# Patient Record
Sex: MALE | Race: WHITE | NOT HISPANIC OR LATINO | Employment: OTHER | URBAN - METROPOLITAN AREA
[De-identification: names, ages, dates, MRNs, and addresses within clinical notes are randomized per-mention and may not be internally consistent; named-entity substitution may affect disease eponyms.]

---

## 2020-10-07 ENCOUNTER — TELEPHONE (OUTPATIENT)
Dept: ENDOCRINOLOGY | Facility: CLINIC | Age: 65
End: 2020-10-07

## 2020-10-07 DIAGNOSIS — E11.9 TYPE 2 DIABETES MELLITUS WITHOUT COMPLICATION, WITH LONG-TERM CURRENT USE OF INSULIN (HCC): Primary | ICD-10-CM

## 2020-10-07 DIAGNOSIS — Z79.4 TYPE 2 DIABETES MELLITUS WITHOUT COMPLICATION, WITH LONG-TERM CURRENT USE OF INSULIN (HCC): Primary | ICD-10-CM

## 2020-10-07 DIAGNOSIS — E78.01 FAMILIAL HYPERCHOLESTEREMIA: ICD-10-CM

## 2020-10-07 NOTE — TELEPHONE ENCOUNTER
PLEASE CALL IN FOR PT HIS SYRINGES AND HIS INSULIN AND BP MED AND WHATEVER HE TAKES  [facility] [address]

## 2020-10-08 ENCOUNTER — TELEPHONE (OUTPATIENT)
Dept: ENDOCRINOLOGY | Facility: CLINIC | Age: 65
End: 2020-10-08

## 2020-10-08 RX ORDER — PEN NEEDLE, DIABETIC 32GX 5/32"
NEEDLE, DISPOSABLE MISCELLANEOUS
Qty: 100 EACH | Refills: 0 | Status: SHIPPED | OUTPATIENT
Start: 2020-10-08 | End: 2021-04-23 | Stop reason: SDUPTHER

## 2020-10-08 RX ORDER — ROSUVASTATIN CALCIUM 20 MG/1
20 TABLET, COATED ORAL DAILY
Qty: 30 TABLET | Refills: 0 | Status: SHIPPED | OUTPATIENT
Start: 2020-10-08 | End: 2020-11-13 | Stop reason: SDUPTHER

## 2020-10-08 RX ORDER — LISINOPRIL 5 MG/1
5 TABLET ORAL DAILY
Qty: 30 TABLET | Refills: 0 | Status: SHIPPED | OUTPATIENT
Start: 2020-10-08 | End: 2020-11-13 | Stop reason: SDUPTHER

## 2020-10-09 ENCOUNTER — TELEPHONE (OUTPATIENT)
Dept: ENDOCRINOLOGY | Facility: CLINIC | Age: 65
End: 2020-10-09

## 2020-10-09 DIAGNOSIS — E11.9 TYPE 2 DIABETES MELLITUS WITHOUT COMPLICATION, WITH LONG-TERM CURRENT USE OF INSULIN (HCC): Primary | ICD-10-CM

## 2020-10-09 DIAGNOSIS — Z79.4 TYPE 2 DIABETES MELLITUS WITHOUT COMPLICATION, WITH LONG-TERM CURRENT USE OF INSULIN (HCC): Primary | ICD-10-CM

## 2020-10-09 NOTE — TELEPHONE ENCOUNTER
Pharmacy has a question about a insulin glargine prescription came over as generic this only comes as brand name. Pharmacy notice this was for quick pen and notice the patient was getting vials are you switching the patient to quick pen or vials? Please contact pharmacy

## 2020-11-13 ENCOUNTER — OFFICE VISIT (OUTPATIENT)
Dept: ENDOCRINOLOGY | Facility: CLINIC | Age: 65
End: 2020-11-13

## 2020-11-13 VITALS
WEIGHT: 162.2 LBS | HEIGHT: 66 IN | OXYGEN SATURATION: 97 % | DIASTOLIC BLOOD PRESSURE: 76 MMHG | BODY MASS INDEX: 26.07 KG/M2 | HEART RATE: 62 BPM | TEMPERATURE: 98.4 F | SYSTOLIC BLOOD PRESSURE: 134 MMHG

## 2020-11-13 DIAGNOSIS — E78.5 DYSLIPIDEMIA: ICD-10-CM

## 2020-11-13 DIAGNOSIS — Z79.4 TYPE 2 DIABETES MELLITUS WITHOUT COMPLICATION, WITH LONG-TERM CURRENT USE OF INSULIN (HCC): ICD-10-CM

## 2020-11-13 DIAGNOSIS — IMO0002 DIABETES MELLITUS TYPE 2, UNCONTROLLED, WITH COMPLICATIONS: Primary | ICD-10-CM

## 2020-11-13 DIAGNOSIS — E11.9 TYPE 2 DIABETES MELLITUS WITHOUT COMPLICATION, WITH LONG-TERM CURRENT USE OF INSULIN (HCC): ICD-10-CM

## 2020-11-13 DIAGNOSIS — E78.01 FAMILIAL HYPERCHOLESTEREMIA: ICD-10-CM

## 2020-11-13 DIAGNOSIS — I10 ESSENTIAL HYPERTENSION: ICD-10-CM

## 2020-11-13 LAB
EXPIRATION DATE: NORMAL
HBA1C MFR BLD: 6.3 %
Lab: NORMAL

## 2020-11-13 PROCEDURE — 99213 OFFICE O/P EST LOW 20 MIN: CPT | Performed by: INTERNAL MEDICINE

## 2020-11-13 PROCEDURE — 83036 HEMOGLOBIN GLYCOSYLATED A1C: CPT | Performed by: INTERNAL MEDICINE

## 2020-11-13 RX ORDER — INSULIN GLARGINE 100 [IU]/ML
60 INJECTION, SOLUTION SUBCUTANEOUS DAILY
COMMUNITY
End: 2021-03-30 | Stop reason: SDUPTHER

## 2020-11-13 RX ORDER — LISINOPRIL 5 MG/1
5 TABLET ORAL DAILY
Qty: 30 TABLET | Refills: 0 | Status: SHIPPED | OUTPATIENT
Start: 2020-11-13 | End: 2022-12-13

## 2020-11-13 RX ORDER — IBUPROFEN 200 MG
TABLET ORAL
Qty: 100 EACH | Refills: 3 | Status: SHIPPED | OUTPATIENT
Start: 2020-11-13 | End: 2021-04-26 | Stop reason: SDUPTHER

## 2020-11-13 RX ORDER — ROSUVASTATIN CALCIUM 20 MG/1
20 TABLET, COATED ORAL DAILY
Qty: 30 TABLET | Refills: 0 | Status: SHIPPED | OUTPATIENT
Start: 2020-11-13 | End: 2021-04-27 | Stop reason: SDUPTHER

## 2020-11-13 NOTE — PROGRESS NOTES
"     Office Note      Date: 2020  Patient Name: Herbert Harley  MRN: [id number]  : 1955    Chief Complaint   Patient presents with   • Diabetes       History of Present Illness:   Herbert Harley is a 65 y.o. male who presents for Diabetes type 2. Diagnosed in   Severity: easily controlled  Complications: no retinopathy. Has symptoms of neuropathy in his feet. Has upcoming eye exam.  Foot exam is up to date.  Associated conditions  : htn and high cholesterol  Duration- 10 years    bg checks- seldom  Rarely low     Last A1c:  Hemoglobin A1C   Date Value Ref Range Status   2020 6.3 % Final       Changes in health since last visit: none . Last eye exam up to date .    Subjective          Review of Systems:   Review of Systems   Constitutional: Negative.    HENT: Negative.    Eyes: Negative.    Respiratory: Negative.    Cardiovascular: Negative.    Gastrointestinal: Negative.    Endocrine: Negative.    Genitourinary: Negative.    Musculoskeletal: Negative.    Skin: Negative.    Allergic/Immunologic: Negative.    Neurological: Negative.    Hematological: Negative.    Psychiatric/Behavioral: Negative.        The following portions of the patient's history were reviewed and updated as appropriate: allergies, current medications, past family history, past medical history, past social history, past surgical history and problem list.    Objective     Visit Vitals  /76 (BP Location: Left arm, Patient Position: Sitting, Cuff Size: Adult)   Pulse 62   Temp 98.4 °F (36.9 °C) (Infrared)   Ht 167.6 cm (66\")   Wt 73.6 kg (162 lb 3.2 oz)   SpO2 97%   BMI 26.18 kg/m²       Labs:    CMP  Lab Results   Component Value Date    GLUCOSE 106 (H) 2014    BUN 14 2014    CREATININE 0.9 2014    K 4.2 2014    CO2 26 2014    CALCIUM 8.9 2014    AST 78 (H) 2014    ALT 32 2014        CBC w/DIFF  Lab Results   Component Value Date    WBC 13.33 (H) 2014    RBC 5.26 " 03/29/2014    HGB 15.7 03/29/2014    HCT 46.7 03/29/2014    MCV 88.8 03/29/2014    MCH 29.8 03/29/2014    MCHC 33.6 03/29/2014     03/29/2014    NEUTRORELPCT 71.9 (H) 03/28/2014    LYMPHORELPCT 17.9 (L) 03/28/2014    MONORELPCT 8.3 03/28/2014    EOSRELPCT 1.3 03/28/2014    BASORELPCT 0.3 03/28/2014    NEUTROABS 10.95 (H) 03/28/2014    LYMPHSABS 2.72 03/28/2014    MONOSABS 1.26 (H) 03/28/2014    EOSABS 0.20 03/28/2014    BASOSABS 0.05 03/28/2014    NRBC 0.0 03/28/2014       Physical Exam:  Physical Exam  Vitals signs reviewed.   Constitutional:       Appearance: Normal appearance. He is normal weight.   HENT:      Head: Atraumatic.   Neck:      Musculoskeletal: Normal range of motion and neck supple.   Cardiovascular:      Rate and Rhythm: Normal rate and regular rhythm.   Neurological:      General: No focal deficit present.      Mental Status: He is alert. Mental status is at baseline.   Psychiatric:         Mood and Affect: Mood normal.         Thought Content: Thought content normal.         Judgment: Judgment normal.          Assessment / Plan      Assessment & Plan:  Problem List Items Addressed This Visit        Cardiovascular and Mediastinum    Hypertension    Current Assessment & Plan     Stable  And controlled . Refill meds .         Relevant Medications    carvedilol (COREG) 12.5 MG tablet    lisinopril (PRINIVIL,ZESTRIL) 5 MG tablet       Endocrine    Diabetes mellitus type 2, uncontrolled, with complications (CMS/[facility]) - Primary    Current Assessment & Plan     Diabetes is stable and well controlled without complications           Relevant Medications    insulin glargine (LANTUS) 100 UNIT/ML injection    Other Relevant Orders    POC Glycosylated Hemoglobin (Hb A1C) (Completed)       Other    Dyslipidemia    Current Assessment & Plan     Labs were good in spring. Will refill meds            Other Visit Diagnoses     Type 2 diabetes mellitus without complication, with long-term current use of insulin  "(CMS/[facility])        Relevant Medications    insulin glargine (LANTUS) 100 UNIT/ML injection    lisinopril (PRINIVIL,ZESTRIL) 5 MG tablet    Insulin Syringe 29G X 1/2\" 1 ML misc    Familial hypercholesteremia        Relevant Medications    rosuvastatin (Crestor) 20 MG tablet           Nima Peter MD   11/13/2020  "

## 2021-03-30 RX ORDER — INSULIN GLARGINE 100 [IU]/ML
60 INJECTION, SOLUTION SUBCUTANEOUS DAILY
Qty: 18 ML | Refills: 2 | Status: SHIPPED | OUTPATIENT
Start: 2021-03-30 | End: 2021-06-11 | Stop reason: SDUPTHER

## 2021-03-30 NOTE — TELEPHONE ENCOUNTER
[facility] CALLED REQUESTING A REFILL OF LANTUS TO BE SENT IN FOR THIS PT. THE PT IS OUT OF MEDICATION CURRENTLY. PLEASE SEND IN AT EARLIEST CONVENIENCE. THANK YOU

## 2021-04-23 DIAGNOSIS — E11.9 TYPE 2 DIABETES MELLITUS WITHOUT COMPLICATION, WITH LONG-TERM CURRENT USE OF INSULIN (HCC): ICD-10-CM

## 2021-04-23 DIAGNOSIS — Z79.4 TYPE 2 DIABETES MELLITUS WITHOUT COMPLICATION, WITH LONG-TERM CURRENT USE OF INSULIN (HCC): ICD-10-CM

## 2021-04-23 RX ORDER — PEN NEEDLE, DIABETIC 32GX 5/32"
NEEDLE, DISPOSABLE MISCELLANEOUS
Qty: 100 EACH | Refills: 0 | Status: SHIPPED | OUTPATIENT
Start: 2021-04-23 | End: 2021-06-11 | Stop reason: CLARIF

## 2021-04-26 DIAGNOSIS — Z79.4 TYPE 2 DIABETES MELLITUS WITHOUT COMPLICATION, WITH LONG-TERM CURRENT USE OF INSULIN (HCC): ICD-10-CM

## 2021-04-26 DIAGNOSIS — E11.9 TYPE 2 DIABETES MELLITUS WITHOUT COMPLICATION, WITH LONG-TERM CURRENT USE OF INSULIN (HCC): ICD-10-CM

## 2021-04-26 RX ORDER — IBUPROFEN 200 MG
TABLET ORAL
Qty: 100 EACH | Refills: 3 | Status: SHIPPED | OUTPATIENT
Start: 2021-04-26 | End: 2021-06-11 | Stop reason: SDUPTHER

## 2021-04-27 DIAGNOSIS — E78.01 FAMILIAL HYPERCHOLESTEREMIA: ICD-10-CM

## 2021-04-27 RX ORDER — ROSUVASTATIN CALCIUM 20 MG/1
20 TABLET, COATED ORAL DAILY
Qty: 90 TABLET | Refills: 1 | Status: SHIPPED | OUTPATIENT
Start: 2021-04-27 | End: 2021-12-23 | Stop reason: SDUPTHER

## 2021-06-11 ENCOUNTER — TELEPHONE (OUTPATIENT)
Dept: ENDOCRINOLOGY | Facility: CLINIC | Age: 66
End: 2021-06-11

## 2021-06-11 ENCOUNTER — OFFICE VISIT (OUTPATIENT)
Dept: ENDOCRINOLOGY | Facility: CLINIC | Age: 66
End: 2021-06-11

## 2021-06-11 VITALS
HEIGHT: 65 IN | DIASTOLIC BLOOD PRESSURE: 70 MMHG | BODY MASS INDEX: 27.72 KG/M2 | WEIGHT: 166.4 LBS | SYSTOLIC BLOOD PRESSURE: 150 MMHG

## 2021-06-11 DIAGNOSIS — E11.9 TYPE 2 DIABETES MELLITUS WITHOUT COMPLICATION, WITH LONG-TERM CURRENT USE OF INSULIN (HCC): Primary | ICD-10-CM

## 2021-06-11 DIAGNOSIS — Z79.4 TYPE 2 DIABETES MELLITUS WITHOUT COMPLICATION, WITH LONG-TERM CURRENT USE OF INSULIN (HCC): Primary | ICD-10-CM

## 2021-06-11 LAB
EXPIRATION DATE: ABNORMAL
EXPIRATION DATE: NORMAL
GLUCOSE BLDC GLUCOMTR-MCNC: 136 MG/DL (ref 70–130)
HBA1C MFR BLD: 6.9 %
Lab: ABNORMAL
Lab: NORMAL

## 2021-06-11 PROCEDURE — 82947 ASSAY GLUCOSE BLOOD QUANT: CPT | Performed by: INTERNAL MEDICINE

## 2021-06-11 PROCEDURE — 83036 HEMOGLOBIN GLYCOSYLATED A1C: CPT | Performed by: INTERNAL MEDICINE

## 2021-06-11 PROCEDURE — 99213 OFFICE O/P EST LOW 20 MIN: CPT | Performed by: INTERNAL MEDICINE

## 2021-06-11 RX ORDER — INSULIN GLARGINE 100 [IU]/ML
60 INJECTION, SOLUTION SUBCUTANEOUS DAILY
Qty: 18 ML | Refills: 2 | Status: SHIPPED | OUTPATIENT
Start: 2021-06-11 | End: 2021-09-13

## 2021-06-11 RX ORDER — IBUPROFEN 200 MG
TABLET ORAL
Qty: 100 EACH | Refills: 3 | Status: SHIPPED | OUTPATIENT
Start: 2021-06-11 | End: 2022-06-16 | Stop reason: SDUPTHER

## 2021-06-11 NOTE — PROGRESS NOTES
"     Office Note      Date: 2021  Patient Name: Herbert Harley  MRN: [id number]  : 1955    Chief Complaint   Patient presents with   • Follow-up   • Diabetes     type2    • Diabetic Eye Exam     week ago        History of Present Illness:   Herbert Harley is a 65 y.o. male who presents for Diabetes - type 2   Last A1c:6.3    bg is checked sometimes  He takes one shot oer shannan  He has occasion low spells in the am.  Hemoglobin A1C   Date Value Ref Range Status   2021 6.9 % Final       Changes in health since last visit: none . Last eye exam 5 days ago  Feet- no open wounds  Fasting labs are due .    Subjective              Review of Systems:   Review of Systems   Constitutional: Negative.    HENT: Negative.    Eyes: Negative.    Respiratory: Negative.    All other systems reviewed and are negative.      The following portions of the patient's history were reviewed and updated as appropriate: allergies, current medications, past family history, past medical history, past social history, past surgical history and problem list.    Objective     Visit Vitals  /70   Ht 165.1 cm (65\")   Wt 75.5 kg (166 lb 6.4 oz)   BMI 27.69 kg/m²       Labs:    CMP  Lab Results   Component Value Date    GLUCOSE 106 (H) 2014    BUN 14 2014    CREATININE 0.9 2014    K 4.2 2014    CO2 26 2014    CALCIUM 8.9 2014    AST 78 (H) 2014    ALT 32 2014        CBC w/DIFF  Lab Results   Component Value Date    WBC 13.33 (H) 2014    RBC 5.26 2014    HGB 15.7 2014    HCT 46.7 2014    MCV 88.8 2014    MCH 29.8 2014    MCHC 33.6 2014     2014    NEUTRORELPCT 71.9 (H) 2014    LYMPHORELPCT 17.9 (L) 2014    MONORELPCT 8.3 2014    EOSRELPCT 1.3 2014    BASORELPCT 0.3 2014    NEUTROABS 10.95 (H) 2014    LYMPHSABS 2.72 2014    MONOSABS 1.26 (H) 2014    EOSABS 0.20 2014    KRISTIE " "0.05 03/28/2014    [facility] 0.0 03/28/2014       Physical Exam:  Physical Exam  Vitals reviewed.   Constitutional:       Appearance: Normal appearance.   Neurological:      Mental Status: He is alert and oriented to person, place, and time.   Psychiatric:         Mood and Affect: Mood normal.         Thought Content: Thought content normal.         Judgment: Judgment normal.          Assessment / Plan      Assessment & Plan:  Problem List Items Addressed This Visit        Other    Type 2 diabetes mellitus without complication, with long-term current use of insulin (CMS/[facility]) - Primary    Current Assessment & Plan     At goal. No changes.          Relevant Medications    lisinopril (PRINIVIL,ZESTRIL) 5 MG tablet    Insulin Syringe 29G X 1/2\" 1 ML misc    Other Relevant Orders    POC Glycosylated Hemoglobin (Hb A1C) (Completed)    POC Glucose, Blood (Completed)           Nima Peter MD   06/11/2021  "

## 2021-06-11 NOTE — TELEPHONE ENCOUNTER
[facility] called and wanted to know if on the syringes, if they could change the gauge to 31, everything else to stay the same. Please advise.

## 2021-09-13 RX ORDER — INSULIN GLARGINE 100 [IU]/ML
INJECTION, SOLUTION SUBCUTANEOUS
Qty: 30 ML | Refills: 1 | Status: SHIPPED | OUTPATIENT
Start: 2021-09-13 | End: 2022-02-21

## 2021-12-14 ENCOUNTER — OFFICE VISIT (OUTPATIENT)
Dept: ENDOCRINOLOGY | Facility: CLINIC | Age: 66
End: 2021-12-14

## 2021-12-14 ENCOUNTER — LAB (OUTPATIENT)
Dept: LAB | Facility: HOSPITAL | Age: 66
End: 2021-12-14

## 2021-12-14 VITALS
DIASTOLIC BLOOD PRESSURE: 60 MMHG | BODY MASS INDEX: 27.92 KG/M2 | SYSTOLIC BLOOD PRESSURE: 160 MMHG | HEIGHT: 65 IN | WEIGHT: 167.6 LBS

## 2021-12-14 DIAGNOSIS — E11.649 TYPE 2 DIABETES MELLITUS WITH HYPOGLYCEMIA WITHOUT COMA, WITH LONG-TERM CURRENT USE OF INSULIN (HCC): ICD-10-CM

## 2021-12-14 DIAGNOSIS — Z79.4 TYPE 2 DIABETES MELLITUS WITH HYPOGLYCEMIA WITHOUT COMA, WITH LONG-TERM CURRENT USE OF INSULIN (HCC): Primary | ICD-10-CM

## 2021-12-14 DIAGNOSIS — E11.649 TYPE 2 DIABETES MELLITUS WITH HYPOGLYCEMIA WITHOUT COMA, WITH LONG-TERM CURRENT USE OF INSULIN (HCC): Primary | ICD-10-CM

## 2021-12-14 DIAGNOSIS — Z79.4 TYPE 2 DIABETES MELLITUS WITH HYPOGLYCEMIA WITHOUT COMA, WITH LONG-TERM CURRENT USE OF INSULIN (HCC): ICD-10-CM

## 2021-12-14 LAB
ALBUMIN SERPL-MCNC: 4.5 G/DL (ref 3.5–5.2)
ALBUMIN/GLOB SERPL: 1.4 G/DL
ALP SERPL-CCNC: 114 U/L (ref 39–117)
ALT SERPL W P-5'-P-CCNC: 40 U/L (ref 1–41)
ANION GAP SERPL CALCULATED.3IONS-SCNC: 7.9 MMOL/L (ref 5–15)
AST SERPL-CCNC: 19 U/L (ref 1–40)
BILIRUB SERPL-MCNC: 0.3 MG/DL (ref 0–1.2)
BUN SERPL-MCNC: 17 MG/DL (ref 8–23)
BUN/CREAT SERPL: 15.3 (ref 7–25)
CALCIUM SPEC-SCNC: 9.8 MG/DL (ref 8.6–10.5)
CHLORIDE SERPL-SCNC: 101 MMOL/L (ref 98–107)
CHOLEST SERPL-MCNC: 178 MG/DL (ref 0–200)
CO2 SERPL-SCNC: 30.1 MMOL/L (ref 22–29)
CREAT SERPL-MCNC: 1.11 MG/DL (ref 0.76–1.27)
EXPIRATION DATE: ABNORMAL
EXPIRATION DATE: NORMAL
GFR SERPL CREATININE-BSD FRML MDRD: 66 ML/MIN/1.73
GLOBULIN UR ELPH-MCNC: 3.2 GM/DL
GLUCOSE BLDC GLUCOMTR-MCNC: 46 MG/DL (ref 70–130)
GLUCOSE SERPL-MCNC: 88 MG/DL (ref 65–99)
HBA1C MFR BLD: 6.9 %
HDLC SERPL-MCNC: 33 MG/DL (ref 40–60)
LDLC SERPL CALC-MCNC: 117 MG/DL (ref 0–100)
LDLC/HDLC SERPL: 3.46 {RATIO}
Lab: ABNORMAL
Lab: NORMAL
POTASSIUM SERPL-SCNC: 4.4 MMOL/L (ref 3.5–5.2)
PROT SERPL-MCNC: 7.7 G/DL (ref 6–8.5)
SODIUM SERPL-SCNC: 139 MMOL/L (ref 136–145)
TRIGL SERPL-MCNC: 154 MG/DL (ref 0–150)
TSH SERPL DL<=0.05 MIU/L-ACNC: 3.24 UIU/ML (ref 0.27–4.2)
VLDLC SERPL-MCNC: 28 MG/DL (ref 5–40)

## 2021-12-14 PROCEDURE — 84443 ASSAY THYROID STIM HORMONE: CPT

## 2021-12-14 PROCEDURE — 82947 ASSAY GLUCOSE BLOOD QUANT: CPT | Performed by: INTERNAL MEDICINE

## 2021-12-14 PROCEDURE — 80053 COMPREHEN METABOLIC PANEL: CPT

## 2021-12-14 PROCEDURE — 80061 LIPID PANEL: CPT

## 2021-12-14 PROCEDURE — 83036 HEMOGLOBIN GLYCOSYLATED A1C: CPT | Performed by: INTERNAL MEDICINE

## 2021-12-14 PROCEDURE — 99213 OFFICE O/P EST LOW 20 MIN: CPT | Performed by: INTERNAL MEDICINE

## 2021-12-14 PROCEDURE — 3044F HG A1C LEVEL LT 7.0%: CPT | Performed by: INTERNAL MEDICINE

## 2021-12-14 NOTE — PROGRESS NOTES
"     Office Note      Date: 2021  Patient Name: Herbert Harley  MRN: [id number]  : 1955    Chief Complaint   Patient presents with   • Diabetes   • Fasting for Labs       History of Present Illness:   Herbert Harley is a 66 y.o. male who presents for Diabetes -type 2  He takes 60 units of insulin daily  bg checks are  Done rarely  He states he is seldom hypoglycemic  He was low this am because he was fasting.  We treated the low blood sugar     Last A1c:  Hemoglobin A1C   Date Value Ref Range Status   2021 6.9 % Final       Changes in health since last visit: none . Last eye exam up to date.    Subjective      .    Review of Systems:   Review of Systems   Constitutional: Negative.    HENT: Negative.    Eyes: Negative.    Respiratory: Negative.        The following portions of the patient's history were reviewed and updated as appropriate: allergies, current medications, past family history, past medical history, past social history, past surgical history and problem list.    Objective     Visit Vitals  /60 (BP Location: Left arm, Patient Position: Sitting, Cuff Size: Adult)   Ht 165.1 cm (65\")   Wt 76 kg (167 lb 9.6 oz)   BMI 27.89 kg/m²       Labs:    CMP  Lab Results   Component Value Date    GLUCOSE 106 (H) 2014    BUN 14 2014    CREATININE 0.9 2014    K 4.2 2014    CO2 26 2014    CALCIUM 8.9 2014    AST 78 (H) 2014    ALT 32 2014        CBC w/DIFF  Lab Results   Component Value Date    WBC 13.33 (H) 2014    RBC 5.26 2014    HGB 15.7 2014    HCT 46.7 2014    MCV 88.8 2014    MCH 29.8 2014    MCHC 33.6 2014     2014    NEUTRORELPCT 71.9 (H) 2014    LYMPHORELPCT 17.9 (L) 2014    MONORELPCT 8.3 2014    EOSRELPCT 1.3 2014    BASORELPCT 0.3 2014    NEUTROABS 10.95 (H) 2014    LYMPHSABS 2.72 2014    MONOSABS 1.26 (H) 2014    EOSABS 0.20 " "03/28/2014    BASOSABS 0.05 03/28/2014    NRBC 0.0 03/28/2014       Physical Exam:  Physical Exam  Vitals reviewed.   Constitutional:       Appearance: Normal appearance. He is normal weight.   Cardiovascular:      Pulses:           Dorsalis pedis pulses are 2+ on the right side and 2+ on the left side.        Posterior tibial pulses are 2+ on the right side and 2+ on the left side.   Musculoskeletal:      Right foot: Normal range of motion. Prominent metatarsal heads present. No deformity, bunion, Charcot foot or foot drop.      Left foot: Normal range of motion. Prominent metatarsal heads present. No deformity, bunion, Charcot foot or foot drop.   Feet:      Right foot:      Protective Sensation: 5 sites tested. 5 sites sensed.      Skin integrity: Skin integrity normal.      Toenail Condition: Right toenails are normal.      Left foot:      Protective Sensation: 5 sites tested. 5 sites sensed.      Skin integrity: Skin integrity normal.      Toenail Condition: Left toenails are normal.      Comments: Diabetic Foot Exam Performed and Monofilament Test Performed    Neurological:      Mental Status: He is alert.   Psychiatric:         Mood and Affect: Mood normal.         Thought Content: Thought content normal.         Judgment: Judgment normal.          Assessment / Plan      Assessment & Plan:  Problem List Items Addressed This Visit        Other    Type 2 diabetes mellitus with hypoglycemia, with long-term current use of insulin (HCC) - Primary    Current Assessment & Plan     Diabetes is unchanged.   Continue current treatment regimen.  Diabetes will be reassessed in 6 months.  We hypoglycemic just due to prolonged fasting  This am. No need to change  treatment         Relevant Medications    lisinopril (PRINIVIL,ZESTRIL) 5 MG tablet    Insulin Syringe 29G X 1/2\" 1 ML misc    Lantus 100 UNIT/ML injection    Other Relevant Orders    POC Glycosylated Hemoglobin (Hb A1C) (Completed)    POC Glucose, Blood " (Completed)           Nima Peter MD   12/14/2021

## 2021-12-14 NOTE — ASSESSMENT & PLAN NOTE
Diabetes is unchanged.   Continue current treatment regimen.  Diabetes will be reassessed in 6 months.  We hypoglycemic just due to prolonged fasting  This am. No need to change  treatment

## 2021-12-23 DIAGNOSIS — E78.01 FAMILIAL HYPERCHOLESTEREMIA: ICD-10-CM

## 2021-12-23 RX ORDER — ROSUVASTATIN CALCIUM 20 MG/1
20 TABLET, COATED ORAL DAILY
Qty: 90 TABLET | Refills: 1 | Status: SHIPPED | OUTPATIENT
Start: 2021-12-23 | End: 2022-12-13

## 2022-02-21 RX ORDER — INSULIN GLARGINE 100 [IU]/ML
INJECTION, SOLUTION SUBCUTANEOUS
Qty: 30 ML | Refills: 1 | Status: SHIPPED | OUTPATIENT
Start: 2022-02-21 | End: 2022-06-16 | Stop reason: SDUPTHER

## 2022-06-16 ENCOUNTER — OFFICE VISIT (OUTPATIENT)
Dept: ENDOCRINOLOGY | Facility: CLINIC | Age: 67
End: 2022-06-16

## 2022-06-16 VITALS
OXYGEN SATURATION: 99 % | WEIGHT: 167 LBS | DIASTOLIC BLOOD PRESSURE: 74 MMHG | SYSTOLIC BLOOD PRESSURE: 130 MMHG | HEIGHT: 65 IN | BODY MASS INDEX: 27.82 KG/M2 | HEART RATE: 84 BPM

## 2022-06-16 DIAGNOSIS — E11.649 TYPE 2 DIABETES MELLITUS WITH HYPOGLYCEMIA WITHOUT COMA, WITH LONG-TERM CURRENT USE OF INSULIN: Primary | ICD-10-CM

## 2022-06-16 DIAGNOSIS — Z79.4 TYPE 2 DIABETES MELLITUS WITH HYPOGLYCEMIA WITHOUT COMA, WITH LONG-TERM CURRENT USE OF INSULIN: Primary | ICD-10-CM

## 2022-06-16 LAB
EXPIRATION DATE: ABNORMAL
EXPIRATION DATE: NORMAL
GLUCOSE BLDC GLUCOMTR-MCNC: 212 MG/DL (ref 70–130)
HBA1C MFR BLD: 7.1 %
Lab: ABNORMAL
Lab: NORMAL

## 2022-06-16 PROCEDURE — 99213 OFFICE O/P EST LOW 20 MIN: CPT | Performed by: INTERNAL MEDICINE

## 2022-06-16 PROCEDURE — 83036 HEMOGLOBIN GLYCOSYLATED A1C: CPT | Performed by: INTERNAL MEDICINE

## 2022-06-16 PROCEDURE — 82947 ASSAY GLUCOSE BLOOD QUANT: CPT | Performed by: INTERNAL MEDICINE

## 2022-06-16 PROCEDURE — 3051F HG A1C>EQUAL 7.0%<8.0%: CPT | Performed by: INTERNAL MEDICINE

## 2022-06-16 RX ORDER — IBUPROFEN 200 MG
TABLET ORAL
Qty: 100 EACH | Refills: 3 | Status: ON HOLD | OUTPATIENT
Start: 2022-06-16 | End: 2023-03-07

## 2022-06-16 RX ORDER — INSULIN GLARGINE 100 [IU]/ML
INJECTION, SOLUTION SUBCUTANEOUS
Qty: 30 ML | Refills: 1 | Status: SHIPPED | OUTPATIENT
Start: 2022-06-16 | End: 2022-10-17

## 2022-06-16 NOTE — PROGRESS NOTES
"     Office Note      Date: 2022  Patient Name: Herbert Harley  MRN: [id number]  : 1955    Chief Complaint   Patient presents with   • Diabetes       History of Present Illness:   Herbert Harley is a 66 y.o. male who presents for Diabetes- type 2  Takes one shot per day  bg checks are done sporadically   He has not had symptoms of hypoglycemia    Last A1c:  Hemoglobin A1C   Date Value Ref Range Status   2022 7.1 % Final       Changes in health since last visit: none . Last eye exam up to date.    Subjective          Review of Systems:   Review of Systems   Constitutional: Negative.    HENT: Negative.    Eyes: Negative.    Respiratory: Negative.        The following portions of the patient's history were reviewed and updated as appropriate: allergies, current medications, past family history, past medical history, past social history, past surgical history and problem list.    Objective     Visit Vitals  /74   Pulse 84   Ht 165.1 cm (65\")   Wt 75.8 kg (167 lb)   SpO2 99%   BMI 27.79 kg/m²       Labs:    CMP  Lab Results   Component Value Date    GLUCOSE 88 2021    BUN 17 2021    CREATININE 1.11 2021    EGFRIFNONA 66 2021    BCR 15.3 2021    K 4.4 2021    CO2 30.1 (H) 2021    CALCIUM 9.8 2021    AST 19 2021    ALT 40 2021        CBC w/DIFF  Lab Results   Component Value Date    WBC 13.33 (H) 2014    RBC 5.26 2014    HGB 15.7 2014    HCT 46.7 2014    MCV 88.8 2014    MCH 29.8 2014    MCHC 33.6 2014     2014    NEUTRORELPCT 71.9 (H) 2014    LYMPHORELPCT 17.9 (L) 2014    MONORELPCT 8.3 2014    EOSRELPCT 1.3 2014    BASORELPCT 0.3 2014    NEUTROABS 10.95 (H) 2014    LYMPHSABS 2.72 2014    MONOSABS 1.26 (H) 2014    EOSABS 0.20 2014    BASOSABS 0.05 2014    NRBC 0.0 2014       Physical Exam:  Physical Exam  Vitals " "reviewed.   Constitutional:       Appearance: Normal appearance.   Neurological:      Mental Status: He is alert.   Psychiatric:         Mood and Affect: Mood normal.         Behavior: Behavior normal.         Thought Content: Thought content normal.         Judgment: Judgment normal.          Assessment / Plan      Assessment & Plan:  Problem List Items Addressed This Visit        Other    Type 2 diabetes mellitus with hypoglycemia, with long-term current use of insulin (HCC) - Primary    Current Assessment & Plan     Diabetes is unchanged.   Continue current treatment regimen.  Diabetes will be reassessed in 6 months.           Relevant Medications    lisinopril (PRINIVIL,ZESTRIL) 5 MG tablet    Insulin Syringe 29G X 1/2\" 1 ML misc    insulin glargine (Lantus) 100 UNIT/ML injection    Other Relevant Orders    POC Glucose, Blood (Completed)    POC Glycosylated Hemoglobin (Hb A1C) (Completed)           Nima Peter MD   06/16/2022  "

## 2022-10-17 RX ORDER — INSULIN GLARGINE 100 [IU]/ML
INJECTION, SOLUTION SUBCUTANEOUS
Qty: 20 ML | Refills: 5 | Status: ON HOLD | OUTPATIENT
Start: 2022-10-17 | End: 2023-04-05 | Stop reason: SDUPTHER

## 2022-12-11 ENCOUNTER — APPOINTMENT (OUTPATIENT)
Dept: GENERAL RADIOLOGY | Facility: HOSPITAL | Age: 67
End: 2022-12-11

## 2022-12-11 ENCOUNTER — APPOINTMENT (OUTPATIENT)
Dept: CT IMAGING | Facility: HOSPITAL | Age: 67
End: 2022-12-11

## 2022-12-11 ENCOUNTER — HOSPITAL ENCOUNTER (EMERGENCY)
Facility: HOSPITAL | Age: 67
Discharge: HOME | End: 2022-12-11
Attending: EMERGENCY MEDICINE | Admitting: EMERGENCY MEDICINE

## 2022-12-11 VITALS
SYSTOLIC BLOOD PRESSURE: 162 MMHG | HEIGHT: 68 IN | RESPIRATION RATE: 24 BRPM | HEART RATE: 98 BPM | BODY MASS INDEX: 25.01 KG/M2 | OXYGEN SATURATION: 90 % | WEIGHT: 165 LBS | DIASTOLIC BLOOD PRESSURE: 87 MMHG | TEMPERATURE: 98.3 F

## 2022-12-11 DIAGNOSIS — R06.02 SHORTNESS OF BREATH: Primary | ICD-10-CM

## 2022-12-11 DIAGNOSIS — R05.9 COUGH, UNSPECIFIED TYPE: ICD-10-CM

## 2022-12-11 DIAGNOSIS — F17.200 SMOKER: ICD-10-CM

## 2022-12-11 DIAGNOSIS — Z86.39 HISTORY OF DIABETES MELLITUS: ICD-10-CM

## 2022-12-11 LAB
ALBUMIN SERPL-MCNC: 4.6 G/DL (ref 3.5–5.2)
ALBUMIN/GLOB SERPL: 1.4 G/DL
ALP SERPL-CCNC: 106 U/L (ref 39–117)
ALT SERPL W P-5'-P-CCNC: 38 U/L (ref 1–41)
ANION GAP SERPL CALCULATED.3IONS-SCNC: 13 MMOL/L (ref 5–15)
AST SERPL-CCNC: 30 U/L (ref 1–40)
BASOPHILS # BLD AUTO: 0.07 10*3/MM3 (ref 0–0.2)
BASOPHILS NFR BLD AUTO: 0.5 % (ref 0–1.5)
BILIRUB SERPL-MCNC: 0.5 MG/DL (ref 0–1.2)
BUN SERPL-MCNC: 15 MG/DL (ref 8–23)
BUN/CREAT SERPL: 15.5 (ref 7–25)
CALCIUM SPEC-SCNC: 9.7 MG/DL (ref 8.6–10.5)
CHLORIDE SERPL-SCNC: 99 MMOL/L (ref 98–107)
CO2 SERPL-SCNC: 27 MMOL/L (ref 22–29)
CREAT SERPL-MCNC: 0.97 MG/DL (ref 0.76–1.27)
D DIMER PPP FEU-MCNC: 0.63 MCGFEU/ML (ref 0–0.67)
DEPRECATED RDW RBC AUTO: 45.8 FL (ref 37–54)
EGFRCR SERPLBLD CKD-EPI 2021: 85.6 ML/MIN/1.73
EOSINOPHIL # BLD AUTO: 0.23 10*3/MM3 (ref 0–0.4)
EOSINOPHIL NFR BLD AUTO: 1.8 % (ref 0.3–6.2)
ERYTHROCYTE [DISTWIDTH] IN BLOOD BY AUTOMATED COUNT: 13.5 % (ref 12.3–15.4)
FLUAV RNA RESP QL NAA+PROBE: NOT DETECTED
FLUBV RNA RESP QL NAA+PROBE: NOT DETECTED
GLOBULIN UR ELPH-MCNC: 3.3 GM/DL
GLUCOSE SERPL-MCNC: 130 MG/DL (ref 65–99)
HCT VFR BLD AUTO: 54.2 % (ref 37.5–51)
HGB BLD-MCNC: 17.8 G/DL (ref 13–17.7)
HOLD SPECIMEN: NORMAL
IMM GRANULOCYTES # BLD AUTO: 0.04 10*3/MM3 (ref 0–0.05)
IMM GRANULOCYTES NFR BLD AUTO: 0.3 % (ref 0–0.5)
LYMPHOCYTES # BLD AUTO: 1.92 10*3/MM3 (ref 0.7–3.1)
LYMPHOCYTES NFR BLD AUTO: 14.8 % (ref 19.6–45.3)
MCH RBC QN AUTO: 30.1 PG (ref 26.6–33)
MCHC RBC AUTO-ENTMCNC: 32.8 G/DL (ref 31.5–35.7)
MCV RBC AUTO: 91.6 FL (ref 79–97)
MONOCYTES # BLD AUTO: 1.07 10*3/MM3 (ref 0.1–0.9)
MONOCYTES NFR BLD AUTO: 8.2 % (ref 5–12)
NEUTROPHILS NFR BLD AUTO: 74.4 % (ref 42.7–76)
NEUTROPHILS NFR BLD AUTO: 9.68 10*3/MM3 (ref 1.7–7)
NRBC BLD AUTO-RTO: 0 /100 WBC (ref 0–0.2)
NT-PROBNP SERPL-MCNC: 2387 PG/ML (ref 0–900)
PLATELET # BLD AUTO: 214 10*3/MM3 (ref 140–450)
PMV BLD AUTO: 9.8 FL (ref 6–12)
POTASSIUM SERPL-SCNC: 4.6 MMOL/L (ref 3.5–5.2)
PROT SERPL-MCNC: 7.9 G/DL (ref 6–8.5)
RBC # BLD AUTO: 5.92 10*6/MM3 (ref 4.14–5.8)
SARS-COV-2 RNA RESP QL NAA+PROBE: NOT DETECTED
SODIUM SERPL-SCNC: 139 MMOL/L (ref 136–145)
TROPONIN T SERPL-MCNC: <0.01 NG/ML (ref 0–0.03)
WBC NRBC COR # BLD: 13.01 10*3/MM3 (ref 3.4–10.8)
WHOLE BLOOD HOLD COAG: NORMAL
WHOLE BLOOD HOLD SPECIMEN: NORMAL

## 2022-12-11 PROCEDURE — 85379 FIBRIN DEGRADATION QUANT: CPT | Performed by: EMERGENCY MEDICINE

## 2022-12-11 PROCEDURE — 84484 ASSAY OF TROPONIN QUANT: CPT | Performed by: EMERGENCY MEDICINE

## 2022-12-11 PROCEDURE — 71275 CT ANGIOGRAPHY CHEST: CPT

## 2022-12-11 PROCEDURE — 93005 ELECTROCARDIOGRAM TRACING: CPT

## 2022-12-11 PROCEDURE — 80053 COMPREHEN METABOLIC PANEL: CPT | Performed by: EMERGENCY MEDICINE

## 2022-12-11 PROCEDURE — 83880 ASSAY OF NATRIURETIC PEPTIDE: CPT | Performed by: EMERGENCY MEDICINE

## 2022-12-11 PROCEDURE — 71045 X-RAY EXAM CHEST 1 VIEW: CPT

## 2022-12-11 PROCEDURE — 99284 EMERGENCY DEPT VISIT MOD MDM: CPT

## 2022-12-11 PROCEDURE — 93005 ELECTROCARDIOGRAM TRACING: CPT | Performed by: EMERGENCY MEDICINE

## 2022-12-11 PROCEDURE — 87636 SARSCOV2 & INF A&B AMP PRB: CPT | Performed by: EMERGENCY MEDICINE

## 2022-12-11 PROCEDURE — 0 IOPAMIDOL PER 1 ML: Performed by: EMERGENCY MEDICINE

## 2022-12-11 PROCEDURE — 85025 COMPLETE CBC W/AUTO DIFF WBC: CPT | Performed by: EMERGENCY MEDICINE

## 2022-12-11 RX ORDER — SODIUM CHLORIDE 0.9 % (FLUSH) 0.9 %
10 SYRINGE (ML) INJECTION AS NEEDED
Status: DISCONTINUED | OUTPATIENT
Start: 2022-12-11 | End: 2022-12-12 | Stop reason: HOSPADM

## 2022-12-11 RX ORDER — BENZONATATE 100 MG/1
100 CAPSULE ORAL 3 TIMES DAILY PRN
Qty: 12 CAPSULE | Refills: 0 | Status: SHIPPED | OUTPATIENT
Start: 2022-12-11 | End: 2022-12-13

## 2022-12-11 RX ADMIN — IOPAMIDOL 80 ML: 755 INJECTION, SOLUTION INTRAVENOUS at 19:28

## 2022-12-11 NOTE — ED PROVIDER NOTES
[place]    EMERGENCY DEPARTMENT ENCOUNTER      Pt Name: Herbert Harley  MRN: [id number]  YOB: 1955  Date of evaluation: 12/11/2022  Provider: Jozef Harper MD    CHIEF COMPLAINT       Chief Complaint   Patient presents with   • Shortness of Breath         HISTORY OF PRESENT ILLNESS  (Location/Symptom, Timing/Onset, Context/Setting, Quality, Duration, Modifying Factors, Severity.)   Herbert Harley is a 67 y.o. male who presents to the emergency department with cough and congestion over the course the past 8 days.  Patient has some mild shortness of breath today as well its not worsened with exertion and is no associated any chest pain, fever, or chills.  He is a diabetic.  No no history of VTE.      Nursing notes were reviewed.    REVIEW OF SYSTEMS    (2-9 systems for level 4, 10 or more for level 5)   ROS:  General:  No fevers, no chills, no weakness  Cardiovascular:  No chest pain, no palpitations  Respiratory: Shortness breath, cough  Gastrointestinal:  No pain, no nausea, no vomiting, no diarrhea  Musculoskeletal:  No muscle pain, no joint pain  Skin:  No rash  Neurologic:  No speech problems, no headache, no extremity numbness, no extremity tingling, no extremity weakness  Psychiatric:  No anxiety  Genitourinary:  No dysuria, no hematuria    Except as noted above the remainder of the review of systems was reviewed and negative.       PAST MEDICAL HISTORY     Past Medical History:   Diagnosis Date   • Type 2 diabetes mellitus (HCC)          SURGICAL HISTORY       Past Surgical History:   Procedure Laterality Date   • TONSILLECTOMY           CURRENT MEDICATIONS     No current facility-administered medications for this encounter.    Current Outpatient Medications:   •  albuterol sulfate  (90 Base) MCG/ACT inhaler, Inhale 2 puffs Every 4 (Four) Hours As Needed for Wheezing., Disp: 18 g, Rfl: 2  •  aspirin 81 MG EC tablet, Take 81 mg by mouth daily., Disp: , Rfl:   •  furosemide (Lasix) 20  "MG tablet, Take 1 tablet by mouth 2 (Two) Times a Day., Disp: 60 tablet, Rfl: 0  •  Insulin Syringe 29G X 1/2\" 1 ML misc, For daily use, Disp: 100 each, Rfl: 3  •  Lantus 100 UNIT/ML injection, INJECT 60 UNITS SUBCUTANEOUSLY ONCE DAILY, Disp: 20 mL, Rfl: 5  •  nitroglycerin (NITROSTAT) 0.4 MG SL tablet, Place 0.4 mg under the tongue As Needed for Chest Pain. Take no more than 3 doses in 15 minutes., Disp: , Rfl:     ALLERGIES     Wellbutrin [bupropion]    FAMILY HISTORY       Family History   Problem Relation Age of Onset   • Diabetes Mother    • Cancer Father         liver          SOCIAL HISTORY       Social History     Socioeconomic History   • Marital status:    Tobacco Use   • Smoking status: Former     Packs/day: 1.00     Years: 40.00     Pack years: 40.00     Types: Cigarettes     Quit date: 2022     Years since quittin.0     Passive exposure: Never   • Smokeless tobacco: Never   Vaping Use   • Vaping Use: Never used   Substance and Sexual Activity   • Alcohol use: No   • Drug use: No   • Sexual activity: Defer         PHYSICAL EXAM    (up to 7 for level 4, 8 or more for level 5)     Vitals:    22 1636 22 2000 22 2109   BP: (!) 184/83 162/87    BP Location: Left arm     Patient Position: Sitting     Pulse: 97 93 98   Resp: 24     Temp: 98.3 °F (36.8 °C)     TempSrc: Oral     SpO2: 95% 92% 90%   Weight: 74.8 kg (165 lb)     Height: 172.7 cm (68\")         Physical Exam  General: Awake, alert, no acute distress.  HEENT: Conjunctivae normal.  Neck: Trachea midline.  Cardiac: Heart regular rate, rhythm, no murmurs, rubs, or gallops  Lungs: Lungs are clear to auscultation, there is no wheezing, rhonchi, or rales. There is no use of accessory muscles.  Chest wall: There is no tenderness to palpation over the chest wall or over ribs  Abdomen: Abdomen is soft, nontender, nondistended. There are no firm or pulsatile masses, no rebound rigidity or guarding.   Musculoskeletal: No " deformity.  Neuro: Alert and oriented x 4.  Dermatology: Skin is warm and dry  Psych: Mentation is grossly normal, cognition is grossly normal. Affect is appropriate.        DIAGNOSTIC RESULTS     EKG: All EKGs are interpreted by the Emergency Department Physician who either signs or Co-signs this chart in the absence of a cardiologist.    ECG 12 Lead ED Triage Standing Order; SOA   Final Result   Test Reason : ED Triage Standing Order~   Blood Pressure :   */*   mmHG   Vent. Rate :  85 BPM     Atrial Rate :  85 BPM      P-R Int : 202 ms          QRS Dur :  92 ms       QT Int : 382 ms       P-R-T Axes :  65  36 139 degrees      QTc Int : 454 ms      Normal sinus rhythm   Possible Left atrial enlargement   Inferior infarct (cited on or before 28-MAR-2014)   Cannot rule out Anteroseptal infarct (cited on or before 28-MAR-2014)   ST & T wave abnormality, consider lateral ischemia   Abnormal ECG   When compared with ECG of 29-MAR-2014 04:14,   Nonspecific T wave abnormality now evident in Inferior leads   QT has lengthened   Confirmed by RITA GRANGER ([id number]) on 12/12/2022 8:54:05 PM      Referred By:            Confirmed By: RITA GRANGER          RADIOLOGY:   Non-plain film images such as CT, Ultrasound and MRI are read by the radiologist. Plain radiographic images are visualized and preliminarily interpreted by the emergency physician with the below findings:      [x] Radiologist's Report Reviewed:  CT Angiogram Chest   Final Result   No pulmonary embolus or other acute finding in the chest.       Nonspecific mildly prominent upper retroperitoneal, axillary and   mediastinal lymph nodes are present, borderline enlarged and mildly   conspicuous in number, potentially reactive. Consider follow-up CT if   there is concern for lymphoproliferative process.       This report was finalized on 12/11/2022 7:48 PM by Nicolas Dover.          XR Chest 1 View   Final Result   No evidence of acute disease.       This report  was finalized on 12/11/2022 5:10 PM by Nicolas Dover.                ED BEDSIDE ULTRASOUND:   Performed by ED Physician - none    LABS:    I have reviewed and interpreted all of the currently available lab results from this visit (if applicable):  Results for orders placed or performed during the hospital encounter of 12/11/22   COVID-19 and FLU A/B PCR - Swab, Nasopharynx    Specimen: Nasopharynx; Swab   Result Value Ref Range    COVID19 Not Detected Not Detected - Ref. Range    Influenza A PCR Not Detected Not Detected    Influenza B PCR Not Detected Not Detected   Comprehensive Metabolic Panel    Specimen: Blood   Result Value Ref Range    Glucose 130 (H) 65 - 99 mg/dL    BUN 15 8 - 23 mg/dL    Creatinine 0.97 0.76 - 1.27 mg/dL    Sodium 139 136 - 145 mmol/L    Potassium 4.6 3.5 - 5.2 mmol/L    Chloride 99 98 - 107 mmol/L    CO2 27.0 22.0 - 29.0 mmol/L    Calcium 9.7 8.6 - 10.5 mg/dL    Total Protein 7.9 6.0 - 8.5 g/dL    Albumin 4.60 3.50 - 5.20 g/dL    ALT (SGPT) 38 1 - 41 U/L    AST (SGOT) 30 1 - 40 U/L    Alkaline Phosphatase 106 39 - 117 U/L    Total Bilirubin 0.5 0.0 - 1.2 mg/dL    Globulin 3.3 gm/dL    A/G Ratio 1.4 g/dL    BUN/Creatinine Ratio 15.5 7.0 - 25.0    Anion Gap 13.0 5.0 - 15.0 mmol/L    eGFR 85.6 >60.0 mL/min/1.73   BNP    Specimen: Blood   Result Value Ref Range    proBNP 2,387.0 (H) 0.0 - 900.0 pg/mL   Troponin    Specimen: Blood   Result Value Ref Range    Troponin T <0.010 0.000 - 0.030 ng/mL   CBC Auto Differential    Specimen: Blood   Result Value Ref Range    WBC 13.01 (H) 3.40 - 10.80 10*3/mm3    RBC 5.92 (H) 4.14 - 5.80 10*6/mm3    Hemoglobin 17.8 (H) 13.0 - 17.7 g/dL    Hematocrit 54.2 (H) 37.5 - 51.0 %    MCV 91.6 79.0 - 97.0 fL    MCH 30.1 26.6 - 33.0 pg    MCHC 32.8 31.5 - 35.7 g/dL    RDW 13.5 12.3 - 15.4 %    RDW-SD 45.8 37.0 - 54.0 fl    MPV 9.8 6.0 - 12.0 fL    Platelets 214 140 - 450 10*3/mm3    Neutrophil % 74.4 42.7 - 76.0 %    Lymphocyte % 14.8 (L) 19.6 - 45.3 %     "Monocyte % 8.2 5.0 - 12.0 %    Eosinophil % 1.8 0.3 - 6.2 %    Basophil % 0.5 0.0 - 1.5 %    Immature Grans % 0.3 0.0 - 0.5 %    Neutrophils, Absolute 9.68 (H) 1.70 - 7.00 10*3/mm3    Lymphocytes, Absolute 1.92 0.70 - 3.10 10*3/mm3    Monocytes, Absolute 1.07 (H) 0.10 - 0.90 10*3/mm3    Eosinophils, Absolute 0.23 0.00 - 0.40 10*3/mm3    Basophils, Absolute 0.07 0.00 - 0.20 10*3/mm3    Immature Grans, Absolute 0.04 0.00 - 0.05 10*3/mm3    nRBC 0.0 0.0 - 0.2 /100 WBC   D-dimer, Quantitative    Specimen: Blood   Result Value Ref Range    D-Dimer, Quantitative 0.63 0.00 - 0.67 MCGFEU/mL   ECG 12 Lead ED Triage Standing Order; SOA   Result Value Ref Range    QT Interval 382 ms    QTC Interval 454 ms   Green Top (Gel)   Result Value Ref Range    Extra Tube Hold for add-ons.    Lavender Top   Result Value Ref Range    Extra Tube hold for add-on    Gold Top - SST   Result Value Ref Range    Extra Tube Hold for add-ons.    Gray Top   Result Value Ref Range    Extra Tube Hold for add-ons.    Light Blue Top   Result Value Ref Range    Extra Tube Hold for add-ons.         All other labs were within normal range or not returned as of this dictation.      EMERGENCY DEPARTMENT COURSE and DIFFERENTIAL DIAGNOSIS/MDM:   Vitals:    Vitals:    12/11/22 1636 12/11/22 2000 12/11/22 2109   BP: (!) 184/83 162/87    BP Location: Left arm     Patient Position: Sitting     Pulse: 97 93 98   Resp: 24     Temp: 98.3 °F (36.8 °C)     TempSrc: Oral     SpO2: 95% 92% 90%   Weight: 74.8 kg (165 lb)     Height: 172.7 cm (68\")              Patient very well-appearing and nontoxic during the course of her stay in the emergency department.  CT chest demonstrates no evidence of PE, pneumonia, pneumothorax, or other acute intrathoracic process.  Vital signs stable and patient is not hypoxic.  No respiratory difficulty whatsoever.  No consistent with atypical ACS presentation and symptoms are most consistent with infectious etiology.  COVID and flu swabs " are both negative.  ECG and troponin demonstrate no evidence of myocardial injury.  Labs otherwise reassuring.    I had a discussion with the patient/family regarding diagnosis, diagnostic results, treatment plan, and medications.  The patient/family indicated understanding of these instructions.  I spent adequate time at the bedside preceding discharge necessary to personally discuss the aftercare instructions, giving patient education, providing explanations of the results of our evaluations/findings, and my decision making to assure that the patient/family understand the plan of care.  Time was allotted to answer questions at that time and throughout the ED course.  Emphasis was placed on timely follow-up after discharge.  I also discussed the potential for the development of an acute emergent condition requiring further evaluation, admission, or even surgical intervention. I discussed that we found nothing during the visit today indicating the need for further workup, admission, or the presence of an unstable medical condition.  I encouraged the patient to return to the emergency department immediately for ANY concerns, worsening, new complaints, or if symptoms persist and unable to seek follow-up in a timely fashion.  The patient/family expressed understanding and agreement with this plan.  The patient will follow-up with their PCP in 1-2 days for reevaluation.       MEDICATIONS ADMINISTERED IN ED:  Medications   iopamidol (ISOVUE-370) 76 % injection 100 mL (80 mL Intravenous Given 12/11/22 1928)       PROCEDURES:  Procedures    CRITICAL CARE TIME    Total Critical Care time was 0 minutes, excluding separately reportable procedures.   There was a high probability of clinically significant/life threatening deterioration in the patient's condition which required my urgent intervention.      FINAL IMPRESSION      1. Shortness of breath    2. Cough, unspecified type    3. History of diabetes mellitus    4. Smoker   "        DISPOSITION/PLAN     ED Disposition     ED Disposition   Discharge    Condition   Stable    Comment   --             PATIENT REFERRED TO:  Provider, No Known  [place] [address]    Schedule an appointment as soon as possible for a visit in 2 days      [facility] Emergency Department  [address] [facility] [address] [phone]    If symptoms worsen      DISCHARGE MEDICATIONS:     Medication List      CONTINUE taking these medications    aspirin 81 MG EC tablet     Insulin Syringe 29G X 1/2\" 1 ML misc  For daily use     Lantus 100 UNIT/ML injection  Generic drug: insulin glargine  INJECT 60 UNITS SUBCUTANEOUSLY ONCE DAILY     nitroglycerin 0.4 MG SL tablet  Commonly known as: NITROSTAT                Comment: Please note this report has been produced using speech recognition software.      Jozef Harper MD  Attending Emergency Physician               Jozef Harper MD  12/18/22 1942    "

## 2022-12-12 LAB
QT INTERVAL: 382 MS
QTC INTERVAL: 454 MS

## 2022-12-12 NOTE — DISCHARGE INSTRUCTIONS
Your CT scan showed some enlarged lymph nodes in your chest. Please follow up with your primary care provider as soon as possible regarding this.

## 2022-12-13 ENCOUNTER — OFFICE VISIT (OUTPATIENT)
Dept: FAMILY MEDICINE CLINIC | Facility: CLINIC | Age: 67
End: 2022-12-13

## 2022-12-13 VITALS
DIASTOLIC BLOOD PRESSURE: 68 MMHG | OXYGEN SATURATION: 94 % | TEMPERATURE: 97.8 F | HEIGHT: 66 IN | BODY MASS INDEX: 26.26 KG/M2 | HEART RATE: 86 BPM | SYSTOLIC BLOOD PRESSURE: 132 MMHG | WEIGHT: 163.4 LBS

## 2022-12-13 DIAGNOSIS — E78.5 DYSLIPIDEMIA: ICD-10-CM

## 2022-12-13 DIAGNOSIS — I25.10 CORONARY ARTERY DISEASE INVOLVING NATIVE CORONARY ARTERY OF NATIVE HEART WITHOUT ANGINA PECTORIS: ICD-10-CM

## 2022-12-13 DIAGNOSIS — Z79.4 TYPE 2 DIABETES MELLITUS WITH HYPOGLYCEMIA WITHOUT COMA, WITH LONG-TERM CURRENT USE OF INSULIN: Primary | ICD-10-CM

## 2022-12-13 DIAGNOSIS — R06.02 SHORTNESS OF BREATH: ICD-10-CM

## 2022-12-13 DIAGNOSIS — Z72.0 TOBACCO USE: ICD-10-CM

## 2022-12-13 DIAGNOSIS — E11.649 TYPE 2 DIABETES MELLITUS WITH HYPOGLYCEMIA WITHOUT COMA, WITH LONG-TERM CURRENT USE OF INSULIN: Primary | ICD-10-CM

## 2022-12-13 DIAGNOSIS — I10 PRIMARY HYPERTENSION: ICD-10-CM

## 2022-12-13 LAB
EXPIRATION DATE: NORMAL
HBA1C MFR BLD: 7.1 %
Lab: NORMAL

## 2022-12-13 PROCEDURE — 3051F HG A1C>EQUAL 7.0%<8.0%: CPT | Performed by: FAMILY MEDICINE

## 2022-12-13 PROCEDURE — 83036 HEMOGLOBIN GLYCOSYLATED A1C: CPT | Performed by: FAMILY MEDICINE

## 2022-12-13 PROCEDURE — 99204 OFFICE O/P NEW MOD 45 MIN: CPT | Performed by: FAMILY MEDICINE

## 2022-12-13 RX ORDER — FUROSEMIDE 20 MG/1
20 TABLET ORAL 2 TIMES DAILY
Qty: 60 TABLET | Refills: 0 | Status: SHIPPED | OUTPATIENT
Start: 2022-12-13 | End: 2023-01-19

## 2022-12-13 RX ORDER — ALBUTEROL SULFATE 90 UG/1
2 AEROSOL, METERED RESPIRATORY (INHALATION) EVERY 4 HOURS PRN
Qty: 18 G | Refills: 2 | Status: SHIPPED | OUTPATIENT
Start: 2022-12-13

## 2022-12-13 NOTE — PROGRESS NOTES
"      Subjective     Chief Complaint  Cough (10 days/Pt has possibly pulled a muscle on his left side/Pt had to go to ER bc he couldn't catch his breath, smothering)    Subjective          Herbert Harley is a 67 y.o. male who presents today to [facility] for initial evaluation .    HPI:   History of Present Illness     Patient is a very pleasant 67-year-old male presents today to follow-up after ER visit for shortness of breath.  He also is in need of a primary care physician and would like to establish.    His past medical history includes coronary artery disease status post PCI in 2014, type 2 diabetes mellitus, hyperlipidemia.     For shortness of breath, he reported to the emergency room for evaluation.  Chest x-ray was within normal limits, CTA was also within normal limits.  No signs of pleural effusion, PE or pneumonia.     Labs from his ER visit were reviewed: He did have some elevated white blood cell count, elevated BNP.   He was complaining of lower chest pain that was related to excess cough x 12 days.     The following portions of the patient's history were reviewed and updated as appropriate: allergies, current medications, past family history, past medical history, past social history, past surgical history and problem list.    Objective     Objective     Allergy:   Allergies   Allergen Reactions   • Wellbutrin [Bupropion]         Current Medications:   Current Outpatient Medications   Medication Sig Dispense Refill   • aspirin 81 MG EC tablet Take 81 mg by mouth daily.     • Insulin Syringe 29G X 1/2\" 1 ML misc For daily use 100 each 3   • Lantus 100 UNIT/ML injection INJECT 60 UNITS SUBCUTANEOUSLY ONCE DAILY 20 mL 5   • albuterol sulfate  (90 Base) MCG/ACT inhaler Inhale 2 puffs Every 4 (Four) Hours As Needed for Wheezing. 18 g 2   • furosemide (Lasix) 20 MG tablet Take 1 tablet by mouth 2 (Two) Times a Day. 60 tablet 0   • nitroglycerin (NITROSTAT) 0.4 MG SL " "tablet Place 0.4 mg under the tongue As Needed for Chest Pain. Take no more than 3 doses in 15 minutes.       No current facility-administered medications for this visit.       Past Medical History:  Past Medical History:   Diagnosis Date   • Type 2 diabetes mellitus (HCC)        Past Surgical History:  Past Surgical History:   Procedure Laterality Date   • TONSILLECTOMY         Social History:  Social History     Socioeconomic History   • Marital status:    Tobacco Use   • Smoking status: Former     Packs/day: 1.00     Years: 40.00     Pack years: 40.00     Types: Cigarettes     Quit date: 2022     Years since quittin.0     Passive exposure: Never   • Smokeless tobacco: Never   Vaping Use   • Vaping Use: Never used   Substance and Sexual Activity   • Alcohol use: No   • Drug use: No   • Sexual activity: Defer       Family History:  Family History   Problem Relation Age of Onset   • Diabetes Mother    • Cancer Father         liver       Review of Systems:  Review of Systems   Constitutional: Negative for activity change, appetite change and fatigue.   Respiratory: Positive for cough, shortness of breath and wheezing. Negative for chest tightness.    Cardiovascular: Negative for chest pain, palpitations and leg swelling.   Gastrointestinal: Negative for abdominal pain, blood in stool, constipation and diarrhea.   Allergic/Immunologic: Negative for environmental allergies.   Neurological: Negative for dizziness, facial asymmetry, light-headedness and numbness.   Psychiatric/Behavioral: Negative for agitation, hallucinations, self-injury and suicidal ideas.       Vital Signs:   /68   Pulse 86   Temp 97.8 °F (36.6 °C) (Infrared)   Ht 167.6 cm (66\")   Wt 74.1 kg (163 lb 6.4 oz)   SpO2 94%   BMI 26.37 kg/m²      Physical Exam:  Physical Exam  Constitutional:       Appearance: He is normal weight.   HENT:      Head: Normocephalic.   Cardiovascular:      Rate and Rhythm: Normal rate and regular " rhythm.      Pulses: Normal pulses.      Heart sounds: No murmur heard.  Pulmonary:      Effort: Pulmonary effort is normal. No respiratory distress.      Breath sounds: Rales present. No wheezing.   Abdominal:      General: Abdomen is flat. Bowel sounds are normal.   Musculoskeletal:         General: No swelling or tenderness.      Cervical back: Normal range of motion.   Neurological:      General: No focal deficit present.      Mental Status: He is alert. Mental status is at baseline.   Psychiatric:         Mood and Affect: Mood normal.         Behavior: Behavior normal.         Thought Content: Thought content normal.         Judgment: Judgment normal.             PHQ-9 Score  PHQ-9 Total Score: 0     Lab Review  Office Visit on 12/13/2022   Component Date Value Ref Range Status   • Hemoglobin A1C 12/13/2022 7.1  % Final   • Lot Number 12/13/2022 [id number]   Final   • Expiration Date 12/13/2022 09/14/2024   Final   Admission on 12/11/2022, Discharged on 12/11/2022   Component Date Value Ref Range Status   • QT Interval 12/11/2022 382  ms Final   • QTC Interval 12/11/2022 454  ms Final   • Glucose 12/11/2022 130 (H)  65 - 99 mg/dL Final   • BUN 12/11/2022 15  8 - 23 mg/dL Final   • Creatinine 12/11/2022 0.97  0.76 - 1.27 mg/dL Final   • Sodium 12/11/2022 139  136 - 145 mmol/L Final   • Potassium 12/11/2022 4.6  3.5 - 5.2 mmol/L Final    Slight hemolysis detected by analyzer. Results may be affected.   • Chloride 12/11/2022 99  98 - 107 mmol/L Final   • CO2 12/11/2022 27.0  22.0 - 29.0 mmol/L Final   • Calcium 12/11/2022 9.7  8.6 - 10.5 mg/dL Final   • Total Protein 12/11/2022 7.9  6.0 - 8.5 g/dL Final   • Albumin 12/11/2022 4.60  3.50 - 5.20 g/dL Final   • ALT (SGPT) 12/11/2022 38  1 - 41 U/L Final   • AST (SGOT) 12/11/2022 30  1 - 40 U/L Final   • Alkaline Phosphatase 12/11/2022 106  39 - 117 U/L Final   • Total Bilirubin 12/11/2022 0.5  0.0 - 1.2 mg/dL Final   • Globulin 12/11/2022 3.3  gm/dL Final     Calculated Result   • A/G Ratio 12/11/2022 1.4  g/dL Final   • BUN/Creatinine Ratio 12/11/2022 15.5  7.0 - 25.0 Final   • Anion Gap 12/11/2022 13.0  5.0 - 15.0 mmol/L Final   • eGFR 12/11/2022 85.6  >60.0 mL/min/1.73 Final    National Kidney Foundation and American Society of Nephrology (ASN) Task Force recommended calculation based on the Chronic Kidney Disease Epidemiology Collaboration (CKD-EPI) equation refit without adjustment for race.   • proBNP 12/11/2022 2,387.0 (H)  0.0 - 900.0 pg/mL Final   • Troponin T 12/11/2022 <0.010  0.000 - 0.030 ng/mL Final   • Extra Tube 12/11/2022 Hold for add-ons.   Final    Auto resulted.   • Extra Tube 12/11/2022 hold for add-on   Final    Auto resulted   • Extra Tube 12/11/2022 Hold for add-ons.   Final    Auto resulted.   • Extra Tube 12/11/2022 Hold for add-ons.   Final    Auto resulted.   • Extra Tube 12/11/2022 Hold for add-ons.   Final    Auto resulted   • WBC 12/11/2022 13.01 (H)  3.40 - 10.80 10*3/mm3 Final   • RBC 12/11/2022 5.92 (H)  4.14 - 5.80 10*6/mm3 Final   • Hemoglobin 12/11/2022 17.8 (H)  13.0 - 17.7 g/dL Final   • Hematocrit 12/11/2022 54.2 (H)  37.5 - 51.0 % Final   • MCV 12/11/2022 91.6  79.0 - 97.0 fL Final   • MCH 12/11/2022 30.1  26.6 - 33.0 pg Final   • MCHC 12/11/2022 32.8  31.5 - 35.7 g/dL Final   • RDW 12/11/2022 13.5  12.3 - 15.4 % Final   • RDW-SD 12/11/2022 45.8  37.0 - 54.0 fl Final   • MPV 12/11/2022 9.8  6.0 - 12.0 fL Final   • Platelets 12/11/2022 214  140 - 450 10*3/mm3 Final   • Neutrophil % 12/11/2022 74.4  42.7 - 76.0 % Final   • Lymphocyte % 12/11/2022 14.8 (L)  19.6 - 45.3 % Final   • Monocyte % 12/11/2022 8.2  5.0 - 12.0 % Final   • Eosinophil % 12/11/2022 1.8  0.3 - 6.2 % Final   • Basophil % 12/11/2022 0.5  0.0 - 1.5 % Final   • Immature Grans % 12/11/2022 0.3  0.0 - 0.5 % Final   • Neutrophils, Absolute 12/11/2022 9.68 (H)  1.70 - 7.00 10*3/mm3 Final   • Lymphocytes, Absolute 12/11/2022 1.92  0.70 - 3.10 10*3/mm3 Final   • Monocytes,  Absolute 12/11/2022 1.07 (H)  0.10 - 0.90 10*3/mm3 Final   • Eosinophils, Absolute 12/11/2022 0.23  0.00 - 0.40 10*3/mm3 Final   • Basophils, Absolute 12/11/2022 0.07  0.00 - 0.20 10*3/mm3 Final   • Immature Grans, Absolute 12/11/2022 0.04  0.00 - 0.05 10*3/mm3 Final   • nRBC 12/11/2022 0.0  0.0 - 0.2 /100 WBC Final   • COVID19 12/11/2022 Not Detected  Not Detected - Ref. Range Final   • Influenza A PCR 12/11/2022 Not Detected  Not Detected Final   • Influenza B PCR 12/11/2022 Not Detected  Not Detected Final   • D-Dimer, Quantitative 12/11/2022 0.63  0.00 - 0.67 MCGFEU/mL Final        Radiology Results  XR Chest 1 View    Result Date: 12/11/2022  Impression: No evidence of acute disease.  This report was finalized on 12/11/2022 5:10 PM by Nicolas Dover.      CT Angiogram Chest    Result Date: 12/11/2022  Impression: No pulmonary embolus or other acute finding in the chest.  Nonspecific mildly prominent upper retroperitoneal, axillary and mediastinal lymph nodes are present, borderline enlarged and mildly conspicuous in number, potentially reactive. Consider follow-up CT if there is concern for lymphoproliferative process.  This report was finalized on 12/11/2022 7:48 PM by Nicolas Dover.         Assessment / Plan         Assessment and Plan   Diagnoses and all orders for this visit:    1. Type 2 diabetes mellitus with hypoglycemia without coma, with long-term current use of insulin (HCC) (Primary)  Assessment & Plan:  Diabetes is unchanged.   Continue current treatment regimen.  Diabetes will be reassessed in 6 months.    Orders:  -     POC Glycosylated Hemoglobin (Hb A1C)  -     CBC & Differential; Future  -     Microalbumin / Creatinine Urine Ratio - Urine, Clean Catch; Future  -     TSH Rfx On Abnormal To Free T4; Future    2. Primary hypertension  Assessment & Plan:  Hypertension is improving with treatment.  Continue current treatment regimen.  Blood pressure will be reassessed at the next regular  appointment.    The patient was instructed to take blood pressure readings daily and bring log to next scheduled follow up.   Advised that their target blood pressure readings are an average of <140/90.  Advised to follow a low salt diet.       Orders:  -     Comprehensive Metabolic Panel; Future  -     TSH Rfx On Abnormal To Free T4; Future    3. Dyslipidemia  Assessment & Plan:  Fasting lipid panel ordered with labs       4. Coronary artery disease involving native coronary artery of native heart without angina pectoris  -     Lipid Panel; Future    5. Tobacco use  Assessment & Plan:  Pt has stopped smoking about 1 week ago!       6. Shortness of breath  Assessment & Plan:  Likely multifactorial - COPD vs Fluid over load   - Rx Lasix 20 mg BID for the next week   rtc for reevaluation in 2 weeks   - Also rx Albuterol prn     Pt with mild lymphadenopathy on CT scan, most likely reactive due to illness. Will monitor with repeat imaging in 3 mo     Orders:  -     BNP; Future  -     Adult Transthoracic Echo Complete W/ Cont if Necessary Per Protocol; Future  -     furosemide (Lasix) 20 MG tablet; Take 1 tablet by mouth 2 (Two) Times a Day.  Dispense: 60 tablet; Refill: 0  -     albuterol sulfate  (90 Base) MCG/ACT inhaler; Inhale 2 puffs Every 4 (Four) Hours As Needed for Wheezing.  Dispense: 18 g; Refill: 2      Discussed possible differential diagnoses, testing, treatment, recommended non-pharmacological interventions, risks, warning signs to monitor for that would indicate need for follow-up in clinic or ER. If no improvement with these regimens or you have new or worsening symptoms follow-up. Patient verbalizes understanding and agreement with plan of care. Denies further needs or concerns.     Patient was given instructions and counseling regarding her condition and for health maintenance advised.    BMI is >= 25 and <30. (Overweight) The following options were offered after discussion;: weight loss  educational material (shared in after visit summary)           Health Maintenance  Health Maintenance:   Health Maintenance Due   Topic Date Due   • URINE MICROALBUMIN  Never done   • COLORECTAL CANCER SCREENING  Never done   • TDAP/TD VACCINES (1 - Tdap) Never done   • ZOSTER VACCINE (1 of 2) Never done   • HEPATITIS C SCREENING  Never done   • ANNUAL WELLNESS VISIT  Never done   • AAA SCREEN (ONE-TIME)  Never done        Meds ordered during this visit  New Medications Ordered This Visit   Medications   • furosemide (Lasix) 20 MG tablet     Sig: Take 1 tablet by mouth 2 (Two) Times a Day.     Dispense:  60 tablet     Refill:  0   • albuterol sulfate  (90 Base) MCG/ACT inhaler     Sig: Inhale 2 puffs Every 4 (Four) Hours As Needed for Wheezing.     Dispense:  18 g     Refill:  2       Meds stopped during this visit:  Medications Discontinued During This Encounter   Medication Reason   • carvedilol (COREG) 12.5 MG tablet    • lisinopril (PRINIVIL,ZESTRIL) 5 MG tablet    • rosuvastatin (Crestor) 20 MG tablet    • clopidogrel (PLAVIX) 75 MG tablet    • benzonatate (TESSALON) 100 MG capsule         Visit Diagnoses    ICD-10-CM ICD-9-CM   1. Type 2 diabetes mellitus with hypoglycemia without coma, with long-term current use of insulin (HCC)  E11.649 250.80    Z79.4 251.2     V58.67   2. Primary hypertension  I10 401.9   3. Dyslipidemia  E78.5 272.4   4. Coronary artery disease involving native coronary artery of native heart without angina pectoris  I25.10 414.01   5. Tobacco use  Z72.0 305.1   6. Shortness of breath  R06.02 786.05       Patient was given instructions and counseling regarding his condition or for health maintenance advice. Please see specific information pulled into the AVS if appropriate.     Follow Up   Return in about 9 days (around 12/22/2022) for Recheck.          This document has been electronically signed by Yessica Ricci DO   December 13, 2022 14:53 EST    Dictated Utilizing Dragon  Dictation: Part of this note may be an electronic transcription/translation of spoken language to printed text using the Dragon Dictation System.    Yessica Ricci D.O.  [facility]

## 2022-12-13 NOTE — ASSESSMENT & PLAN NOTE
Likely multifactorial - COPD vs Fluid over load   - Rx Lasix 20 mg BID for the next week   rtc for reevaluation in 2 weeks   - Also rx Albuterol prn     Pt with mild lymphadenopathy on CT scan, most likely reactive due to illness. Will monitor with repeat imaging in 3 mo

## 2022-12-21 ENCOUNTER — HOSPITAL ENCOUNTER (OUTPATIENT)
Dept: CARDIOLOGY | Facility: HOSPITAL | Age: 67
Discharge: HOME | End: 2022-12-21
Admitting: FAMILY MEDICINE

## 2022-12-21 VITALS — HEIGHT: 66 IN | WEIGHT: 163 LBS | BODY MASS INDEX: 26.2 KG/M2

## 2022-12-21 DIAGNOSIS — R06.02 SHORTNESS OF BREATH: ICD-10-CM

## 2022-12-21 LAB
BH CV ECHO MEAS - AO MAX PG: 6.7 MMHG
BH CV ECHO MEAS - AO MEAN PG: 3.7 MMHG
BH CV ECHO MEAS - AO ROOT DIAM: 3.2 CM
BH CV ECHO MEAS - AO V2 MAX: 129.3 CM/SEC
BH CV ECHO MEAS - AO V2 VTI: 28.1 CM
BH CV ECHO MEAS - AVA(I,D): 1.8 CM2
BH CV ECHO MEAS - EDV(CUBED): 113.2 ML
BH CV ECHO MEAS - EDV(MOD-SP2): 142.4 ML
BH CV ECHO MEAS - EDV(MOD-SP4): 110.9 ML
BH CV ECHO MEAS - EF(MOD-BP): 44 %
BH CV ECHO MEAS - EF(MOD-SP2): 45.7 %
BH CV ECHO MEAS - EF(MOD-SP4): 41.2 %
BH CV ECHO MEAS - ESV(MOD-SP2): 77.3 ML
BH CV ECHO MEAS - ESV(MOD-SP4): 65.2 ML
BH CV ECHO MEAS - IVS/LVPW: 0.91 CM
BH CV ECHO MEAS - IVSD: 1.26 CM
BH CV ECHO MEAS - LA DIMENSION: 3.5 CM
BH CV ECHO MEAS - LV MASS(C)D: 254.4 GRAMS
BH CV ECHO MEAS - LV MAX PG: 4.4 MMHG
BH CV ECHO MEAS - LV MEAN PG: 2.44 MMHG
BH CV ECHO MEAS - LV V1 MAX: 105.1 CM/SEC
BH CV ECHO MEAS - LV V1 VTI: 22.1 CM
BH CV ECHO MEAS - LVIDD: 4.8 CM
BH CV ECHO MEAS - LVOT AREA: 2.29 CM2
BH CV ECHO MEAS - LVOT DIAM: 1.71 CM
BH CV ECHO MEAS - LVPWD: 1.38 CM
BH CV ECHO MEAS - MV A MAX VEL: 81.4 CM/SEC
BH CV ECHO MEAS - MV DEC SLOPE: 438.8 CM/SEC2
BH CV ECHO MEAS - MV DEC TIME: 0.19 MSEC
BH CV ECHO MEAS - MV E MAX VEL: 84 CM/SEC
BH CV ECHO MEAS - MV E/A: 1.03
BH CV ECHO MEAS - PA ACC TIME: 0.11 SEC
BH CV ECHO MEAS - PA PR(ACCEL): 31 MMHG
BH CV ECHO MEAS - PA V2 MAX: 112.1 CM/SEC
BH CV ECHO MEAS - PULM A REVS DUR: 0.19 SEC
BH CV ECHO MEAS - PULM A REVS VEL: 40.9 CM/SEC
BH CV ECHO MEAS - PULM DIAS VEL: 57 CM/SEC
BH CV ECHO MEAS - PULM S/D: 1.02
BH CV ECHO MEAS - PULM SYS VEL: 58 CM/SEC
BH CV ECHO MEAS - RAP SYSTOLE: 3 MMHG
BH CV ECHO MEAS - RVSP: 38 MMHG
BH CV ECHO MEAS - SV(LVOT): 50.6 ML
BH CV ECHO MEAS - SV(MOD-SP2): 65.1 ML
BH CV ECHO MEAS - SV(MOD-SP4): 45.7 ML
BH CV ECHO MEAS - TAPSE (>1.6): 2.1 CM
BH CV ECHO MEAS - TR MAX PG: 16.2 MMHG
BH CV ECHO MEAS - TR MAX VEL: 182.5 CM/SEC
BH CV XLRA - RV BASE: 4 CM
BH CV XLRA - RV LENGTH: 7.6 CM
BH CV XLRA - RV MID: 2.8 CM
LEFT ATRIUM VOLUME INDEX: 37 ML/M2
MAXIMAL PREDICTED HEART RATE: 153 BPM
STRESS TARGET HR: 130 BPM

## 2022-12-21 PROCEDURE — 93306 TTE W/DOPPLER COMPLETE: CPT | Performed by: INTERNAL MEDICINE

## 2022-12-21 PROCEDURE — 93306 TTE W/DOPPLER COMPLETE: CPT

## 2022-12-22 ENCOUNTER — OFFICE VISIT (OUTPATIENT)
Dept: FAMILY MEDICINE CLINIC | Facility: CLINIC | Age: 67
End: 2022-12-22

## 2022-12-22 ENCOUNTER — LAB (OUTPATIENT)
Dept: LAB | Facility: HOSPITAL | Age: 67
End: 2022-12-22

## 2022-12-22 VITALS
TEMPERATURE: 97.5 F | SYSTOLIC BLOOD PRESSURE: 136 MMHG | BODY MASS INDEX: 26.58 KG/M2 | WEIGHT: 165.4 LBS | HEART RATE: 93 BPM | DIASTOLIC BLOOD PRESSURE: 65 MMHG | HEIGHT: 66 IN | OXYGEN SATURATION: 97 %

## 2022-12-22 DIAGNOSIS — I25.10 CORONARY ARTERY DISEASE INVOLVING NATIVE CORONARY ARTERY OF NATIVE HEART WITHOUT ANGINA PECTORIS: ICD-10-CM

## 2022-12-22 DIAGNOSIS — I10 PRIMARY HYPERTENSION: ICD-10-CM

## 2022-12-22 DIAGNOSIS — R06.02 SHORTNESS OF BREATH: Primary | ICD-10-CM

## 2022-12-22 DIAGNOSIS — I25.5 ISCHEMIC CARDIOMYOPATHY: ICD-10-CM

## 2022-12-22 DIAGNOSIS — Z79.4 TYPE 2 DIABETES MELLITUS WITH HYPOGLYCEMIA WITHOUT COMA, WITH LONG-TERM CURRENT USE OF INSULIN: ICD-10-CM

## 2022-12-22 DIAGNOSIS — I25.118 CORONARY ARTERY DISEASE OF NATIVE ARTERY OF NATIVE HEART WITH STABLE ANGINA PECTORIS: ICD-10-CM

## 2022-12-22 DIAGNOSIS — R06.02 SHORTNESS OF BREATH: ICD-10-CM

## 2022-12-22 DIAGNOSIS — E11.649 TYPE 2 DIABETES MELLITUS WITH HYPOGLYCEMIA WITHOUT COMA, WITH LONG-TERM CURRENT USE OF INSULIN: ICD-10-CM

## 2022-12-22 LAB
ALBUMIN SERPL-MCNC: 4.2 G/DL (ref 3.5–5.2)
ALBUMIN UR-MCNC: 3.8 MG/DL
ALBUMIN/GLOB SERPL: 1.4 G/DL
ALP SERPL-CCNC: 108 U/L (ref 39–117)
ALT SERPL W P-5'-P-CCNC: 35 U/L (ref 1–41)
ANION GAP SERPL CALCULATED.3IONS-SCNC: 9.6 MMOL/L (ref 5–15)
AST SERPL-CCNC: 25 U/L (ref 1–40)
BASOPHILS # BLD AUTO: 0.1 10*3/MM3 (ref 0–0.2)
BASOPHILS NFR BLD AUTO: 1 % (ref 0–1.5)
BILIRUB SERPL-MCNC: 0.5 MG/DL (ref 0–1.2)
BUN SERPL-MCNC: 17 MG/DL (ref 8–23)
BUN/CREAT SERPL: 15.9 (ref 7–25)
CALCIUM SPEC-SCNC: 9.2 MG/DL (ref 8.6–10.5)
CHLORIDE SERPL-SCNC: 99 MMOL/L (ref 98–107)
CHOLEST SERPL-MCNC: 181 MG/DL (ref 0–200)
CO2 SERPL-SCNC: 29.4 MMOL/L (ref 22–29)
CREAT SERPL-MCNC: 1.07 MG/DL (ref 0.76–1.27)
CREAT UR-MCNC: 68 MG/DL
DEPRECATED RDW RBC AUTO: 41.2 FL (ref 37–54)
EGFRCR SERPLBLD CKD-EPI 2021: 76.1 ML/MIN/1.73
EOSINOPHIL # BLD AUTO: 0.46 10*3/MM3 (ref 0–0.4)
EOSINOPHIL NFR BLD AUTO: 4.5 % (ref 0.3–6.2)
ERYTHROCYTE [DISTWIDTH] IN BLOOD BY AUTOMATED COUNT: 12.9 % (ref 12.3–15.4)
GLOBULIN UR ELPH-MCNC: 3 GM/DL
GLUCOSE SERPL-MCNC: 147 MG/DL (ref 65–99)
HCT VFR BLD AUTO: 51.5 % (ref 37.5–51)
HDLC SERPL-MCNC: 33 MG/DL (ref 40–60)
HGB BLD-MCNC: 17.4 G/DL (ref 13–17.7)
IMM GRANULOCYTES # BLD AUTO: 0.05 10*3/MM3 (ref 0–0.05)
IMM GRANULOCYTES NFR BLD AUTO: 0.5 % (ref 0–0.5)
LDLC SERPL CALC-MCNC: 127 MG/DL (ref 0–100)
LDLC/HDLC SERPL: 3.79 {RATIO}
LYMPHOCYTES # BLD AUTO: 1.99 10*3/MM3 (ref 0.7–3.1)
LYMPHOCYTES NFR BLD AUTO: 19.6 % (ref 19.6–45.3)
MCH RBC QN AUTO: 29.8 PG (ref 26.6–33)
MCHC RBC AUTO-ENTMCNC: 33.8 G/DL (ref 31.5–35.7)
MCV RBC AUTO: 88.3 FL (ref 79–97)
MICROALBUMIN/CREAT UR: 55.9 MG/G
MONOCYTES # BLD AUTO: 1.22 10*3/MM3 (ref 0.1–0.9)
MONOCYTES NFR BLD AUTO: 12 % (ref 5–12)
NEUTROPHILS NFR BLD AUTO: 6.31 10*3/MM3 (ref 1.7–7)
NEUTROPHILS NFR BLD AUTO: 62.4 % (ref 42.7–76)
NRBC BLD AUTO-RTO: 0 /100 WBC (ref 0–0.2)
NT-PROBNP SERPL-MCNC: 1229 PG/ML (ref 0–900)
PLATELET # BLD AUTO: 280 10*3/MM3 (ref 140–450)
PMV BLD AUTO: 10.1 FL (ref 6–12)
POTASSIUM SERPL-SCNC: 4.5 MMOL/L (ref 3.5–5.2)
PROT SERPL-MCNC: 7.2 G/DL (ref 6–8.5)
RBC # BLD AUTO: 5.83 10*6/MM3 (ref 4.14–5.8)
SODIUM SERPL-SCNC: 138 MMOL/L (ref 136–145)
TRIGL SERPL-MCNC: 114 MG/DL (ref 0–150)
TSH SERPL DL<=0.05 MIU/L-ACNC: 2.33 UIU/ML (ref 0.27–4.2)
VLDLC SERPL-MCNC: 21 MG/DL (ref 5–40)
WBC NRBC COR # BLD: 10.13 10*3/MM3 (ref 3.4–10.8)

## 2022-12-22 PROCEDURE — 99214 OFFICE O/P EST MOD 30 MIN: CPT | Performed by: FAMILY MEDICINE

## 2022-12-22 PROCEDURE — 84443 ASSAY THYROID STIM HORMONE: CPT

## 2022-12-22 PROCEDURE — 36415 COLL VENOUS BLD VENIPUNCTURE: CPT

## 2022-12-22 PROCEDURE — 83880 ASSAY OF NATRIURETIC PEPTIDE: CPT

## 2022-12-22 PROCEDURE — 85025 COMPLETE CBC W/AUTO DIFF WBC: CPT

## 2022-12-22 PROCEDURE — 80061 LIPID PANEL: CPT

## 2022-12-22 PROCEDURE — 82043 UR ALBUMIN QUANTITATIVE: CPT

## 2022-12-22 PROCEDURE — 82570 ASSAY OF URINE CREATININE: CPT

## 2022-12-22 PROCEDURE — 80053 COMPREHEN METABOLIC PANEL: CPT

## 2022-12-22 NOTE — PROGRESS NOTES
Subjective     Chief Complaint  Cough (Follow up from last week, ultrasound and labs done)    Subjective          Herbert Harley is a 67 y.o. male who presents today to [facility] for initial evaluation .    HPI:      Information from last visit:   Patient is a very pleasant 67-year-old male presents today to follow-up after ER visit for shortness of breath.  He also is in need of a primary care physician and would like to establish.    His past medical history includes coronary artery disease status post PCI in 2014, type 2 diabetes mellitus, hyperlipidemia.     For shortness of breath, he reported to the emergency room for evaluation.  Chest x-ray was within normal limits, CTA was also within normal limits.  No signs of pleural effusion, PE or pneumonia.     Labs from his ER visit were reviewed: He did have some elevated white blood cell count, elevated BNP.   He was complaining of lower chest pain that was related to excess cough x 12 days.       At last office visit was felt that his cough and shortness of breath was multifactorial with COPD versus volume overload.  Prescribed Lasix 20 mg twice daily for the next week for him to come in for reevaluation.  A 2D echo was also obtained.  I discussed that his ejection fraction is 41 to 45%, left ventricular wall has mild concentric hypertrophy.  This could be contributing to his shortness of breath and volume overload.      The following portions of the patient's history were reviewed and updated as appropriate: allergies, current medications, past family history, past medical history, past social history, past surgical history and problem list.    Objective     Objective     Allergy:   Allergies   Allergen Reactions   • Wellbutrin [Bupropion]         Current Medications:   Current Outpatient Medications   Medication Sig Dispense Refill   • albuterol sulfate  (90 Base) MCG/ACT inhaler Inhale 2 puffs Every 4 (Four) Hours  "As Needed for Wheezing. 18 g 2   • aspirin 81 MG EC tablet Take 81 mg by mouth daily.     • Insulin Syringe 29G X /2\" 1 ML misc For daily use 100 each 3   • Lantus 100 UNIT/ML injection INJECT 60 UNITS SUBCUTANEOUSLY ONCE DAILY 20 mL 5   • furosemide (Lasix) 20 MG tablet Take 1 tablet by mouth 2 (Two) Times a Day. 60 tablet 0     No current facility-administered medications for this visit.       Past Medical History:  Past Medical History:   Diagnosis Date   • Type 2 diabetes mellitus (HCC)        Past Surgical History:  Past Surgical History:   Procedure Laterality Date   • TONSILLECTOMY         Social History:  Social History     Socioeconomic History   • Marital status:    Tobacco Use   • Smoking status: Former     Packs/day: 1.00     Years: 40.00     Pack years: 40.00     Types: Cigarettes     Quit date: 2022     Years since quittin.0     Passive exposure: Past   • Smokeless tobacco: Never   Vaping Use   • Vaping Use: Never used   Substance and Sexual Activity   • Alcohol use: No   • Drug use: No   • Sexual activity: Defer       Family History:  Family History   Problem Relation Age of Onset   • Diabetes Mother    • Cancer Father         liver       Review of Systems:  Review of Systems   Constitutional: Negative for activity change, appetite change and fatigue.   Respiratory: Positive for cough, shortness of breath and wheezing. Negative for chest tightness.    Cardiovascular: Negative for chest pain, palpitations and leg swelling.   Gastrointestinal: Negative for abdominal pain, blood in stool, constipation and diarrhea.   Allergic/Immunologic: Negative for environmental allergies.   Neurological: Negative for dizziness, facial asymmetry, light-headedness and numbness.   Psychiatric/Behavioral: Negative for agitation, hallucinations, self-injury and suicidal ideas.       Vital Signs:   /65   Pulse 93   Temp 97.5 °F (36.4 °C) (Infrared)   Ht 167.6 cm (65.98\")   Wt 75 kg (165 lb 6.4 " oz)   SpO2 97%   BMI 26.71 kg/m²      Physical Exam:  Physical Exam  Constitutional:       Appearance: He is normal weight.   HENT:      Head: Normocephalic.   Cardiovascular:      Rate and Rhythm: Normal rate and regular rhythm.      Pulses: Normal pulses.      Heart sounds: No murmur heard.  Pulmonary:      Effort: Pulmonary effort is normal. No respiratory distress.      Breath sounds: No wheezing or rales.   Abdominal:      General: Abdomen is flat. Bowel sounds are normal.   Musculoskeletal:         General: No swelling or tenderness.      Cervical back: Normal range of motion.   Skin:     Capillary Refill: Capillary refill takes less than 2 seconds.   Neurological:      General: No focal deficit present.      Mental Status: He is alert. Mental status is at baseline.   Psychiatric:         Mood and Affect: Mood normal.         Behavior: Behavior normal.         Thought Content: Thought content normal.         Judgment: Judgment normal.             PHQ-9 Score  PHQ-9 Total Score:       Lab Review  Hospital Outpatient Visit on 12/21/2022   Component Date Value Ref Range Status   • Target HR (85%) 12/21/2022 130  bpm Final   • Max. Pred. HR (100%) 12/21/2022 153  bpm Final   • LVIDd 12/21/2022 4.8  cm Final   • IVSd 12/21/2022 1.26  cm Final   • LVPWd 12/21/2022 1.38  cm Final   • IVS/LVPW 12/21/2022 0.91  cm Final   • EDV(cubed) 12/21/2022 113.2  ml Final   • LVOT area 12/21/2022 2.29  cm2 Final   • LV mass(C)d 12/21/2022 254.4  grams Final   • LVOT diam 12/21/2022 1.71  cm Final   • EDV(MOD-sp2) 12/21/2022 142.4  ml Final   • EDV(MOD-sp4) 12/21/2022 110.9  ml Final   • ESV(MOD-sp2) 12/21/2022 77.3  ml Final   • ESV(MOD-sp4) 12/21/2022 65.2  ml Final   • SV(MOD-sp2) 12/21/2022 65.1  ml Final   • SV(MOD-sp4) 12/21/2022 45.7  ml Final   • EF(MOD-sp2) 12/21/2022 45.7  % Final   • EF(MOD-sp4) 12/21/2022 41.2  % Final   • MV E max shona 12/21/2022 84.0  cm/sec Final   • MV A max shona 12/21/2022 81.4  cm/sec Final   •  MV dec time 12/21/2022 0.19  msec Final   • MV E/A 12/21/2022 1.03   Final   • Pulm A Revs Dur 12/21/2022 0.19  sec Final   • SV(LVOT) 12/21/2022 50.6  ml Final   • Pulm Sys Agustin 12/21/2022 58.0  cm/sec Final   • Pulm Alonso Agustin 12/21/2022 57.0  cm/sec Final   • Pulm S/D 12/21/2022 1.02   Final   • Pulm A Revs Agustin 12/21/2022 40.9  cm/sec Final   • LV V1 max 12/21/2022 105.1  cm/sec Final   • LV V1 max PG 12/21/2022 4.4  mmHg Final   • LV V1 mean PG 12/21/2022 2.44  mmHg Final   • LV V1 VTI 12/21/2022 22.1  cm Final   • Ao pk agustin 12/21/2022 129.3  cm/sec Final   • Ao max PG 12/21/2022 6.7  mmHg Final   • Ao mean PG 12/21/2022 3.7  mmHg Final   • Ao V2 VTI 12/21/2022 28.1  cm Final   • WILLIAMS(I,D) 12/21/2022 1.80  cm2 Final   • MV dec slope 12/21/2022 438.8  cm/sec2 Final   • TR max agustin 12/21/2022 182.5  cm/sec Final   • TR max PG 12/21/2022 16.2  mmHg Final   • PA V2 max 12/21/2022 112.1  cm/sec Final   • PA acc time 12/21/2022 0.11  sec Final   • PA pr(Accel) 12/21/2022 31.0  mmHg Final   • LA ESV Index (BP) 12/21/2022 37.0  ml/m2 Final   • RV Base 12/21/2022 4.00  cm Final   • RV Mid 12/21/2022 2.80  cm Final   • RV Length 12/21/2022 7.60  cm Final   • TAPSE (>1.6) 12/21/2022 2.10  cm Final   • LA dimension (2D)  12/21/2022 3.5  cm Final   • RVSP(TR) 12/21/2022 38  mmHg Final   • RAP systole 12/21/2022 3  mmHg Final   • Ao root diam 12/21/2022 3.2  cm Final   • EF(MOD-bp) 12/21/2022 44.0  % Final   Office Visit on 12/13/2022   Component Date Value Ref Range Status   • Hemoglobin A1C 12/13/2022 7.1  % Final   • Lot Number 12/13/2022 [id number]   Final   • Expiration Date 12/13/2022 09/14/2024   Final   Admission on 12/11/2022, Discharged on 12/11/2022   Component Date Value Ref Range Status   • QT Interval 12/11/2022 382  ms Final   • QTC Interval 12/11/2022 454  ms Final   • Glucose 12/11/2022 130 (H)  65 - 99 mg/dL Final   • BUN 12/11/2022 15  8 - 23 mg/dL Final   • Creatinine 12/11/2022 0.97  0.76 - 1.27 mg/dL Final    • Sodium 12/11/2022 139  136 - 145 mmol/L Final   • Potassium 12/11/2022 4.6  3.5 - 5.2 mmol/L Final    Slight hemolysis detected by analyzer. Results may be affected.   • Chloride 12/11/2022 99  98 - 107 mmol/L Final   • CO2 12/11/2022 27.0  22.0 - 29.0 mmol/L Final   • Calcium 12/11/2022 9.7  8.6 - 10.5 mg/dL Final   • Total Protein 12/11/2022 7.9  6.0 - 8.5 g/dL Final   • Albumin 12/11/2022 4.60  3.50 - 5.20 g/dL Final   • ALT (SGPT) 12/11/2022 38  1 - 41 U/L Final   • AST (SGOT) 12/11/2022 30  1 - 40 U/L Final   • Alkaline Phosphatase 12/11/2022 106  39 - 117 U/L Final   • Total Bilirubin 12/11/2022 0.5  0.0 - 1.2 mg/dL Final   • Globulin 12/11/2022 3.3  gm/dL Final    Calculated Result   • A/G Ratio 12/11/2022 1.4  g/dL Final   • BUN/Creatinine Ratio 12/11/2022 15.5  7.0 - 25.0 Final   • Anion Gap 12/11/2022 13.0  5.0 - 15.0 mmol/L Final   • eGFR 12/11/2022 85.6  >60.0 mL/min/1.73 Final    National Kidney Foundation and American Society of Nephrology (ASN) Task Force recommended calculation based on the Chronic Kidney Disease Epidemiology Collaboration (CKD-EPI) equation refit without adjustment for race.   • proBNP 12/11/2022 2,387.0 (H)  0.0 - 900.0 pg/mL Final   • Troponin T 12/11/2022 <0.010  0.000 - 0.030 ng/mL Final   • Extra Tube 12/11/2022 Hold for add-ons.   Final    Auto resulted.   • Extra Tube 12/11/2022 hold for add-on   Final    Auto resulted   • Extra Tube 12/11/2022 Hold for add-ons.   Final    Auto resulted.   • Extra Tube 12/11/2022 Hold for add-ons.   Final    Auto resulted.   • Extra Tube 12/11/2022 Hold for add-ons.   Final    Auto resulted   • WBC 12/11/2022 13.01 (H)  3.40 - 10.80 10*3/mm3 Final   • RBC 12/11/2022 5.92 (H)  4.14 - 5.80 10*6/mm3 Final   • Hemoglobin 12/11/2022 17.8 (H)  13.0 - 17.7 g/dL Final   • Hematocrit 12/11/2022 54.2 (H)  37.5 - 51.0 % Final   • MCV 12/11/2022 91.6  79.0 - 97.0 fL Final   • MCH 12/11/2022 30.1  26.6 - 33.0 pg Final   • MCHC 12/11/2022 32.8  31.5 -  35.7 g/dL Final   • RDW 12/11/2022 13.5  12.3 - 15.4 % Final   • RDW-SD 12/11/2022 45.8  37.0 - 54.0 fl Final   • MPV 12/11/2022 9.8  6.0 - 12.0 fL Final   • Platelets 12/11/2022 214  140 - 450 10*3/mm3 Final   • Neutrophil % 12/11/2022 74.4  42.7 - 76.0 % Final   • Lymphocyte % 12/11/2022 14.8 (L)  19.6 - 45.3 % Final   • Monocyte % 12/11/2022 8.2  5.0 - 12.0 % Final   • Eosinophil % 12/11/2022 1.8  0.3 - 6.2 % Final   • Basophil % 12/11/2022 0.5  0.0 - 1.5 % Final   • Immature Grans % 12/11/2022 0.3  0.0 - 0.5 % Final   • Neutrophils, Absolute 12/11/2022 9.68 (H)  1.70 - 7.00 10*3/mm3 Final   • Lymphocytes, Absolute 12/11/2022 1.92  0.70 - 3.10 10*3/mm3 Final   • Monocytes, Absolute 12/11/2022 1.07 (H)  0.10 - 0.90 10*3/mm3 Final   • Eosinophils, Absolute 12/11/2022 0.23  0.00 - 0.40 10*3/mm3 Final   • Basophils, Absolute 12/11/2022 0.07  0.00 - 0.20 10*3/mm3 Final   • Immature Grans, Absolute 12/11/2022 0.04  0.00 - 0.05 10*3/mm3 Final   • nRBC 12/11/2022 0.0  0.0 - 0.2 /100 WBC Final   • COVID19 12/11/2022 Not Detected  Not Detected - Ref. Range Final   • Influenza A PCR 12/11/2022 Not Detected  Not Detected Final   • Influenza B PCR 12/11/2022 Not Detected  Not Detected Final   • D-Dimer, Quantitative 12/11/2022 0.63  0.00 - 0.67 MCGFEU/mL Final        Radiology Results  XR Chest 1 View    Result Date: 12/11/2022  Impression: No evidence of acute disease.  This report was finalized on 12/11/2022 5:10 PM by Nicolas Dover.      CT Angiogram Chest    Result Date: 12/11/2022  Impression: No pulmonary embolus or other acute finding in the chest.  Nonspecific mildly prominent upper retroperitoneal, axillary and mediastinal lymph nodes are present, borderline enlarged and mildly conspicuous in number, potentially reactive. Consider follow-up CT if there is concern for lymphoproliferative process.  This report was finalized on 12/11/2022 7:48 PM by Nicolas Dover.       Results for orders placed during the  hospital encounter of 12/21/22    Adult Transthoracic Echo Complete W/ Cont if Necessary Per Protocol    Interpretation Summary  •  Left ventricular systolic function is mildly decreased. Left ventricular ejection fraction appears to be 41 - 45%.  •  Left ventricular wall thickness is consistent with mild concentric hypertrophy.  •  Left ventricular diastolic function was indeterminate.  •  Left atrial volume is mildly increased.  •  There is calcification of the aortic valve.  •  Estimated right ventricular systolic pressure from tricuspid regurgitation is mildly elevated (35-45 mmHg). Calculated right ventricular systolic pressure from tricuspid regurgitation is 38 mmHg.      Assessment / Plan         Assessment and Plan   Diagnoses and all orders for this visit:    1. Shortness of breath (Primary)  Assessment & Plan:  Still cardiac versus pulmonary nature.  Pulmonary function tests ordered to evaluate for COPD    Patient's EF is slightly decreased 41 to 45%  -- he has a history of coronary disease status post PCI.  Therefore I am referring patient to cardiology for evaluation for ischemic cardiomyopathy    Orders:  -     Full Pulmonary Function Test With Bronchodilator; Future    2. Ischemic cardiomyopathy  Assessment & Plan:  Recent EF 41 to 45%.  Last echo was in 2016 with a EF of 55%      Orders:  -     Ambulatory Referral to Cardiology    3. Coronary artery disease of native artery of native heart with stable angina pectoris (HCC)  -     Ambulatory Referral to Cardiology      Discussed possible differential diagnoses, testing, treatment, recommended non-pharmacological interventions, risks, warning signs to monitor for that would indicate need for follow-up in clinic or ER. If no improvement with these regimens or you have new or worsening symptoms follow-up. Patient verbalizes understanding and agreement with plan of care. Denies further needs or concerns.     Patient was given instructions and counseling  regarding her condition and for health maintenance advised.    BMI is >= 25 and <30. (Overweight) The following options were offered after discussion;: weight loss educational material (shared in after visit summary)           Health Maintenance  Health Maintenance:   Health Maintenance Due   Topic Date Due   • URINE MICROALBUMIN  Never done   • COLORECTAL CANCER SCREENING  Never done   • COVID-19 Vaccine (1) Never done   • TDAP/TD VACCINES (1 - Tdap) Never done   • ZOSTER VACCINE (1 of 2) Never done   • HEPATITIS C SCREENING  Never done   • ANNUAL WELLNESS VISIT  Never done   • AAA SCREEN (ONE-TIME)  Never done   • DIABETIC FOOT EXAM  12/14/2022   • LIPID PANEL  12/14/2022        Meds ordered during this visit  No orders of the defined types were placed in this encounter.      Meds stopped during this visit:  Medications Discontinued During This Encounter   Medication Reason   • nitroglycerin (NITROSTAT) 0.4 MG SL tablet         Visit Diagnoses    ICD-10-CM ICD-9-CM   1. Shortness of breath  R06.02 786.05   2. Ischemic cardiomyopathy  I25.5 414.8   3. Coronary artery disease of native artery of native heart with stable angina pectoris (HCC)  I25.118 414.01     413.9       Patient was given instructions and counseling regarding his condition or for health maintenance advice. Please see specific information pulled into the AVS if appropriate.     Follow Up   Return in about 2 months (around 2/22/2023) for Recheck.      This document has been electronically signed by Yessica Ricci DO   December 22, 2022 14:23 EST    Dictated Utilizing Dragon Dictation: Part of this note may be an electronic transcription/translation of spoken language to printed text using the Dragon Dictation System.    Yessica Ricci D.O.  [facility] [place]

## 2022-12-22 NOTE — ASSESSMENT & PLAN NOTE
Still cardiac versus pulmonary nature.  Pulmonary function tests ordered to evaluate for COPD    Patient's EF is slightly decreased 41 to 45%  -- he has a history of coronary disease status post PCI.  Therefore I am referring patient to cardiology for evaluation for ischemic cardiomyopathy

## 2022-12-23 ENCOUNTER — TELEPHONE (OUTPATIENT)
Dept: FAMILY MEDICINE CLINIC | Facility: CLINIC | Age: 67
End: 2022-12-23
Payer: MEDICARE

## 2022-12-23 RX ORDER — ATORVASTATIN CALCIUM 40 MG/1
40 TABLET, FILM COATED ORAL NIGHTLY
Qty: 90 TABLET | Refills: 1 | Status: SHIPPED | OUTPATIENT
Start: 2022-12-23 | End: 2023-01-19

## 2022-12-23 NOTE — TELEPHONE ENCOUNTER
HUB/FRONT CAN RELAY MESSAGE ----- Message from Yessica Ricci DO sent at 12/23/2022 10:47 AM EST -----  Attempted to contact pt with results - no answer. His labs are stable, his cholesterol is still elevated therefore I am sending a prescription for Atorvastatin to his pharmacy. Thanks!

## 2022-12-29 ENCOUNTER — HOSPITAL ENCOUNTER (OUTPATIENT)
Dept: PULMONOLOGY | Facility: HOSPITAL | Age: 67
Discharge: HOME | End: 2022-12-29
Admitting: FAMILY MEDICINE

## 2022-12-29 DIAGNOSIS — R06.02 SHORTNESS OF BREATH: ICD-10-CM

## 2022-12-29 PROCEDURE — 94727 GAS DIL/WSHOT DETER LNG VOL: CPT | Performed by: INTERNAL MEDICINE

## 2022-12-29 PROCEDURE — 94729 DIFFUSING CAPACITY: CPT

## 2022-12-29 PROCEDURE — 94060 EVALUATION OF WHEEZING: CPT

## 2022-12-29 PROCEDURE — 94060 EVALUATION OF WHEEZING: CPT | Performed by: INTERNAL MEDICINE

## 2022-12-29 PROCEDURE — 94727 GAS DIL/WSHOT DETER LNG VOL: CPT

## 2022-12-29 PROCEDURE — 94729 DIFFUSING CAPACITY: CPT | Performed by: INTERNAL MEDICINE

## 2022-12-29 PROCEDURE — 94640 AIRWAY INHALATION TREATMENT: CPT

## 2022-12-29 RX ORDER — ALBUTEROL SULFATE 2.5 MG/3ML
2.5 SOLUTION RESPIRATORY (INHALATION) ONCE
Status: COMPLETED | OUTPATIENT
Start: 2022-12-29 | End: 2022-12-29

## 2022-12-29 RX ADMIN — ALBUTEROL SULFATE 2.5 MG: 2.5 SOLUTION RESPIRATORY (INHALATION) at 13:02

## 2023-01-18 NOTE — PROGRESS NOTES
New Cardiology Patient Office Visit      Date: 2023  Patient Name: Herbert Harley  : 1955   MRN: [id number]   PCP: Yessica Ricci DO   Referring Provider: Yessica Ricci DO     Chief Complaint:    Chief Complaint   Patient presents with   • Establish Care     Coronary artery disease involving native coronary artery of native heart without angina pectoris       History of Present Illness: Herbert Harley is a 67 y.o. male who is referred here by Ysesica Ricci DO for evaluation of CAD.  Patient had an acute MI in  and never followed up after that with a doctor.  He starting having shortness of breath coughing and feeling bad in December.  He went to the emergency room and found to have been in heart failure at that time.  He was started on Lasix he is feeling better he is sent here for further evaluation.  He still having a nagging cough and at night especially.  He also is feeling fatigue and tired.  Patient denies any chest pain any lower extremity edema.  Patient is able to do most of the stuff at this time.      Problem List       1. CARDIAC  a. Coronary Artery Disease:   i. Inferior STEMI with [facility], Dr. Carter, 3/28/2014:  EF 50%, EES to the mid-dominant RCA, EES to the proximal LAD    b. Myocardium:   a. Echocardiogram, 3/29/2014:   EF 50-55%, RVSP 30.  b. Echo 2022:Stage C, HFmrEF EF 41- 45%, mild LVH    c. Valvular:   i. Mild calcification to aortic valve    d. Electrical:   i. NSR, LVH, PVC    e. Percardium:   i. Normal      2. CARDIAC RISK FACTORS:  a.        Hypertension  b.        Diabetes  c.        Dyslipidemia  d.        Tobacco Use, Quit after 75 years    3. NON-CARDIAC:  a. Asthma/COPD    4. SURGERIES:  a. Tonsillectomy      Subjective      Review of Systems:   Review of Systems   Constitutional: Positive for fatigue.   Respiratory: Positive for cough and shortness of breath.        Medications:   Current Outpatient Medications   Medication Sig Dispense Refill  "  • albuterol sulfate  (90 Base) MCG/ACT inhaler Inhale 2 puffs Every 4 (Four) Hours As Needed for Wheezing. 18 g 2   • aspirin 81 MG EC tablet Take 81 mg by mouth daily.     • Insulin Syringe 29G X 1/2\" 1 ML misc For daily use 100 each 3   • Lantus 100 UNIT/ML injection INJECT 60 UNITS SUBCUTANEOUSLY ONCE DAILY 20 mL 5   • dapagliflozin Propanediol (Farxiga) 10 MG tablet Take 10 mg by mouth Daily for 30 days. 30 tablet 3   • ezetimibe (ZETIA) 10 MG tablet Take 1 tablet by mouth Daily for 30 days. 30 tablet 11   • rosuvastatin (CRESTOR) 10 MG tablet Take 0.5 tablets by mouth Every Night for 30 days. 30 tablet 11   • sacubitril-valsartan (Entresto) 24-26 MG tablet Take 1 tablet by mouth 2 (Two) Times a Day for 30 days. 60 tablet 1     No current facility-administered medications for this visit.       Allergies:   Allergies   Allergen Reactions   • Wellbutrin [Bupropion] Anaphylaxis                 The following portions of the patient's history were reviewed and updated as appropriate: allergies, current medications, past family history, past medical history, past social history, past surgical history and problem list.    Objective     Physical Exam:  Vital Signs:   Vitals:    01/19/23 0817   BP: 102/60   BP Location: Right arm   Patient Position: Sitting   Cuff Size: Adult   Pulse: 56   Resp: 18   SpO2: 96%   Weight: 73.5 kg (162 lb)   Height: 167.6 cm (65.98\")     Body mass index is 26.16 kg/m².     Constitutional:       General: Not in acute distress.     Appearance: Healthy appearance. Not in distress.   Pulmonary:      Effort: Pulmonary effort is normal.      Breath sounds: Normal breath sounds. No wheezing. No rhonchi. No rales.   Cardiovascular:      Normal rate. Regular rhythm. Normal S1. Normal S2.      Murmurs: There is no murmur.      No gallop. No click. No rub.   Abdominal:      General: Bowel sounds are normal.      Palpations: Abdomen is soft.      Tenderness: There is no abdominal " tenderness.  Extremities:     Pulses     Intact distal pulses.  Right Carotid bruit     No Edema    Labs:  Lab Results   Component Value Date    GLUCOSE 147 (H) 12/22/2022    BUN 17 12/22/2022    CREATININE 1.07 12/22/2022    EGFRIFNONA 66 12/14/2021    BCR 15.9 12/22/2022    K 4.5 12/22/2022    CO2 29.4 (H) 12/22/2022    CALCIUM 9.2 12/22/2022    ALBUMIN 4.20 12/22/2022    AST 25 12/22/2022    ALT 35 12/22/2022     Lab Results   Component Value Date    WBC 10.13 12/22/2022    HGB 17.4 12/22/2022    HCT 51.5 (H) 12/22/2022    MCV 88.3 12/22/2022     12/22/2022     Lab Results   Component Value Date    CHOL 181 12/22/2022    CHLPL 174 03/28/2014    TRIG 114 12/22/2022    HDL 33 (L) 12/22/2022     (H) 12/22/2022     Lab Results   Component Value Date    TSH 2.330 12/22/2022     Lab Results   Component Value Date    HGBA1C 7.1 12/13/2022             ECG 12 Lead    Date/Time: 1/19/2023 9:25 AM  Performed by: Nadya Ramirez MD  Authorized by: Nadya Ramirez MD   Comparison: compared with previous ECG from 12/11/2022  Comparison to previous ECG: Heart rate has slowed down              Smoking Cessation:   He recently has quit smoking    Assessment / Plan      Assessment:   Diagnosis Plan   1. Acute HFrEF (heart failure with reduced ejection fraction) ([facility])  Basic Metabolic Panel      2. Coronary artery disease involving native coronary artery of native heart, unspecified whether angina present  Stress Test With Myocardial Perfusion One Day      3. Hyperlipidemia, unspecified hyperlipidemia type        4. Bruit of right carotid artery  US Carotid Bilateral           Plan:    1.  Patient obviously had heart failure with mildly reduced ejection fraction.  We will start him on Entresto at low-dose.  If he tolerates it we can do it twice a day.    2.  We will schedule him for a stress test.    3.  We will start him on Crestor and he will titrate it up.  We will also schedule him for Zetia 10 mg daily.    4.   We will get the ultrasound of the carotid arteries.    5.  We will schedule him for BMP in a week for his kidney function.          Follow Up:   Return in about 3 weeks (around 2/9/2023).    Scribed for Nadya Ramirez MD by Nadya Ramirez MD. 1/19/2023  09:29 EST     Nadya Ramirez MD

## 2023-01-19 ENCOUNTER — OFFICE VISIT (OUTPATIENT)
Dept: CARDIOLOGY | Facility: CLINIC | Age: 68
End: 2023-01-19
Payer: MEDICARE

## 2023-01-19 VITALS
OXYGEN SATURATION: 96 % | SYSTOLIC BLOOD PRESSURE: 102 MMHG | WEIGHT: 162 LBS | DIASTOLIC BLOOD PRESSURE: 60 MMHG | RESPIRATION RATE: 18 BRPM | HEIGHT: 66 IN | BODY MASS INDEX: 26.03 KG/M2 | HEART RATE: 56 BPM

## 2023-01-19 DIAGNOSIS — R09.89 BRUIT OF RIGHT CAROTID ARTERY: ICD-10-CM

## 2023-01-19 DIAGNOSIS — I50.21 ACUTE HFREF (HEART FAILURE WITH REDUCED EJECTION FRACTION): Primary | ICD-10-CM

## 2023-01-19 DIAGNOSIS — I25.10 CORONARY ARTERY DISEASE INVOLVING NATIVE CORONARY ARTERY OF NATIVE HEART, UNSPECIFIED WHETHER ANGINA PRESENT: ICD-10-CM

## 2023-01-19 DIAGNOSIS — E78.5 HYPERLIPIDEMIA, UNSPECIFIED HYPERLIPIDEMIA TYPE: ICD-10-CM

## 2023-01-19 PROCEDURE — 93000 ELECTROCARDIOGRAM COMPLETE: CPT | Performed by: INTERNAL MEDICINE

## 2023-01-19 PROCEDURE — 99204 OFFICE O/P NEW MOD 45 MIN: CPT | Performed by: INTERNAL MEDICINE

## 2023-01-19 RX ORDER — SACUBITRIL AND VALSARTAN 24; 26 MG/1; MG/1
1 TABLET, FILM COATED ORAL 2 TIMES DAILY
Qty: 60 TABLET | Refills: 1 | Status: SHIPPED | OUTPATIENT
Start: 2023-01-19 | End: 2023-02-18

## 2023-01-19 RX ORDER — DAPAGLIFLOZIN 10 MG/1
10 TABLET, FILM COATED ORAL
Qty: 30 TABLET | Refills: 3 | Status: SHIPPED | OUTPATIENT
Start: 2023-01-19 | End: 2023-02-28

## 2023-01-19 RX ORDER — ROSUVASTATIN CALCIUM 10 MG/1
5 TABLET, COATED ORAL NIGHTLY
Qty: 30 TABLET | Refills: 11 | Status: SHIPPED | OUTPATIENT
Start: 2023-01-19 | End: 2023-02-23 | Stop reason: DRUGHIGH

## 2023-01-19 RX ORDER — EZETIMIBE 10 MG/1
10 TABLET ORAL DAILY
Qty: 30 TABLET | Refills: 11 | Status: SHIPPED | OUTPATIENT
Start: 2023-01-19

## 2023-01-19 NOTE — PATIENT INSTRUCTIONS
Start taking Entresto, half a tablet at night for 1 week then increase dose to half a tablet in the morning and in the evening if your blood pressure can tolerate it.  You should only increase the dose if you are systolic blood pressure, top number is greater than 100.  If you experience any dizziness upon standing or syncope stop taking the medication.

## 2023-01-26 ENCOUNTER — OFFICE VISIT (OUTPATIENT)
Dept: FAMILY MEDICINE CLINIC | Facility: CLINIC | Age: 68
End: 2023-01-26
Payer: MEDICARE

## 2023-01-26 VITALS
TEMPERATURE: 96.9 F | HEIGHT: 66 IN | OXYGEN SATURATION: 97 % | HEART RATE: 71 BPM | BODY MASS INDEX: 26.87 KG/M2 | WEIGHT: 167.2 LBS | SYSTOLIC BLOOD PRESSURE: 114 MMHG | DIASTOLIC BLOOD PRESSURE: 62 MMHG

## 2023-01-26 DIAGNOSIS — Z12.11 SCREENING FOR COLON CANCER: ICD-10-CM

## 2023-01-26 DIAGNOSIS — Z23 NEED FOR VACCINATION: ICD-10-CM

## 2023-01-26 DIAGNOSIS — J45.909 MODERATE ASTHMA WITHOUT COMPLICATION, UNSPECIFIED WHETHER PERSISTENT: ICD-10-CM

## 2023-01-26 DIAGNOSIS — Z00.00 ENCOUNTER FOR SUBSEQUENT ANNUAL WELLNESS VISIT (AWV) IN MEDICARE PATIENT: Primary | ICD-10-CM

## 2023-01-26 PROBLEM — J30.9 ALLERGIC RHINITIS: Status: ACTIVE | Noted: 2023-01-26

## 2023-01-26 PROBLEM — E78.5 HLD (HYPERLIPIDEMIA): Status: ACTIVE | Noted: 2023-01-26

## 2023-01-26 PROBLEM — F17.200 NICOTINE ADDICTION: Status: ACTIVE | Noted: 2023-01-26

## 2023-01-26 PROBLEM — I25.2 HEALED MYOCARDIAL INFARCT: Status: ACTIVE | Noted: 2023-01-26

## 2023-01-26 PROBLEM — R10.9 ABDOMINAL PAIN: Status: ACTIVE | Noted: 2023-01-26

## 2023-01-26 PROBLEM — R00.0 TACHYCARDIA: Status: ACTIVE | Noted: 2021-06-30
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
## 2023-01-26 PROCEDURE — 90715 TDAP VACCINE 7 YRS/> IM: CPT | Performed by: FAMILY MEDICINE

## 2023-01-26 PROCEDURE — 99214 OFFICE O/P EST MOD 30 MIN: CPT | Performed by: FAMILY MEDICINE

## 2023-01-26 PROCEDURE — 1126F AMNT PAIN NOTED NONE PRSNT: CPT | Performed by: FAMILY MEDICINE

## 2023-01-26 PROCEDURE — G0439 PPPS, SUBSEQ VISIT: HCPCS | Performed by: FAMILY MEDICINE

## 2023-01-26 PROCEDURE — 90471 IMMUNIZATION ADMIN: CPT | Performed by: FAMILY MEDICINE

## 2023-01-26 PROCEDURE — 1159F MED LIST DOCD IN RCRD: CPT | Performed by: FAMILY MEDICINE

## 2023-01-26 PROCEDURE — 1170F FXNL STATUS ASSESSED: CPT | Performed by: FAMILY MEDICINE

## 2023-01-26 RX ORDER — ASPIRIN 81 MG/1
81 TABLET, CHEWABLE ORAL DAILY
COMMUNITY
End: 2023-03-20 | Stop reason: HOSPADM

## 2023-01-26 NOTE — ASSESSMENT & PLAN NOTE
Annual wellness exam completed today. Health Maintenance including immunizations was updated and reflected in the chart.    Health advice: healthy food choices with fresh fruits and vegetables, maintain sleep pattern at least 8 hours, avoid texting and distracted driving practices; wear safety belt, engage in regular exercise, maintain healthy weight, use safe sex practices, avoid alcohol and illicit drugs. Maintain immunizations that are up to date. Maintain health maintenance:PSA, Colonoscopy  Follow up with PCP if struggling with depression or anxiety. Keep regular dental and eye exams. Brush and floss teeth daily.     F/U annually and prn.

## 2023-01-26 NOTE — PROGRESS NOTES
"The ABCs of the Annual Wellness Visit  Subsequent Medicare Wellness Visit    Subjective    Herbert Harley is a 67 y.o. male who presents for a Subsequent Medicare Wellness Visit.    The following portions of the patient's history were reviewed and   updated as appropriate: allergies, current medications, past family history, past medical history, past social history, past surgical history and problem list.    Compared to one year ago, the patient feels his physical   health is the same.    Compared to one year ago, the patient feels his mental   health is the same.    Recent Hospitalizations:  He was not admitted within the past 365 days.      Current Medical Providers:  Patient Care Team:  Yessica Ricci DO as PCP - General (Family Medicine)  Nima Peter MD as Consulting Physician (Endocrinology)  Nadya Ramirez MD as Cardiologist (Cardiology)  Yessica Ricci DO as Consulting Physician (Family Medicine)    Outpatient Medications Prior to Visit   Medication Sig Dispense Refill   • albuterol sulfate  (90 Base) MCG/ACT inhaler Inhale 2 puffs Every 4 (Four) Hours As Needed for Wheezing. 18 g 2   • aspirin 81 MG chewable tablet Chew 81 mg Daily.     • aspirin 81 MG EC tablet Take 81 mg by mouth daily.     • dapagliflozin Propanediol (Farxiga) 10 MG tablet Take 10 mg by mouth Daily for 30 days. 30 tablet 3   • ezetimibe (ZETIA) 10 MG tablet Take 1 tablet by mouth Daily for 30 days. 30 tablet 11   • Insulin Syringe 29G X 1/2\" 1 ML misc For daily use 100 each 3   • Lantus 100 UNIT/ML injection INJECT 60 UNITS SUBCUTANEOUSLY ONCE DAILY 20 mL 5   • rosuvastatin (CRESTOR) 10 MG tablet Take 0.5 tablets by mouth Every Night for 30 days. 30 tablet 11   • sacubitril-valsartan (Entresto) 24-26 MG tablet Take 1 tablet by mouth 2 (Two) Times a Day for 30 days. 60 tablet 1     No facility-administered medications prior to visit.       No opioid medication identified on active medication list. I have " "reviewed chart for other potential  high risk medication/s and harmful drug interactions in the elderly.          . Aspirin use is indicated based on review of current medical condition/s. Pros and cons of this therapy have been discussed today. Benefits of this medication outweigh potential harm.  Patient has been encouraged to continue taking this medication.  .      Patient Active Problem List   Diagnosis   • Coronary artery disease   • Tobacco use   • Hypertension   • Dyslipidemia   • Type 2 diabetes mellitus with hypoglycemia, with long-term current use of insulin (HCC)   • Shortness of breath   • Ischemic cardiomyopathy   • Abdominal pain   • Healed myocardial infarct   • Allergic rhinitis   • HLD (hyperlipidemia)   • Nicotine addiction   • Tachycardia   • Tick bite   • Encounter for subsequent annual wellness visit (AWV) in Medicare patient   • Moderate asthma without complication     Advance Care Planning  Advance Directive is not on file.  ACP discussion was held with the patient during this visit. Patient does not have an advance directive, information provided.     Objective    Vitals:    01/26/23 1403   BP: 114/62   Pulse: 71   Temp: 96.9 °F (36.1 °C)   TempSrc: Infrared   SpO2: 97%   Weight: 75.8 kg (167 lb 3.2 oz)   Height: 167.6 cm (65.98\")   PainSc: 0-No pain     Estimated body mass index is 27 kg/m² as calculated from the following:    Height as of this encounter: 167.6 cm (65.98\").    Weight as of this encounter: 75.8 kg (167 lb 3.2 oz).    BMI is >= 25 and <30. (Overweight) The following options were offered after discussion;: weight loss educational material (shared in after visit summary)      Does the patient have evidence of cognitive impairment? No    Lab Results   Component Value Date    TRIG 114 12/22/2022    HDL 33 (L) 12/22/2022     (H) 12/22/2022    VLDL 21 12/22/2022    HGBA1C 7.1 12/13/2022        HEALTH RISK ASSESSMENT    Smoking Status:  Social History     Tobacco Use "   Smoking Status Former   • Packs/day: 1.50   • Years: 50.00   • Pack years: 75.00   • Types: Cigarettes   • Quit date: 2022   • Years since quittin.1   • Passive exposure: Past   Smokeless Tobacco Never     Alcohol Consumption:  Social History     Substance and Sexual Activity   Alcohol Use No     Fall Risk Screen:    JAMIL Fall Risk Assessment was completed, and patient is at LOW risk for falls.Assessment completed on:2023    Depression Screening:  PHQ-2/PHQ-9 Depression Screening 2023   Little Interest or Pleasure in Doing Things 0-->not at all   Feeling Down, Depressed or Hopeless 0-->not at all   PHQ-9: Brief Depression Severity Measure Score 0       Health Habits and Functional and Cognitive Screening:  Functional & Cognitive Status 2023   Do you have difficulty preparing food and eating? No   Do you have difficulty bathing yourself, getting dressed or grooming yourself? No   Do you have difficulty using the toilet? No   Do you have difficulty moving around from place to place? No   Do you have trouble with steps or getting out of a bed or a chair? No   Current Diet Well Balanced Diet   Dental Exam Not up to date   Eye Exam Up to date   Exercise (times per week) 0 times per week   Current Exercises Include No Regular Exercise   Do you need help using the phone?  No   Are you deaf or do you have serious difficulty hearing?  No   Do you need help with transportation? No   Do you need help shopping? No   Do you need help preparing meals?  No   Do you need help with housework?  No   Do you need help with laundry? No   Do you need help taking your medications? No   Do you need help managing money? No   Do you ever drive or ride in a car without wearing a seat belt? No   Have you felt unusual stress, anger or loneliness in the last month? No   Who do you live with? Spouse   If you need help, do you have trouble finding someone available to you? No   Have you been bothered in the last four  weeks by sexual problems? No   Do you have difficulty concentrating, remembering or making decisions? No       Age-appropriate Screening Schedule:  Refer to the list below for future screening recommendations based on patient's age, sex and/or medical conditions. Orders for these recommended tests are listed in the plan section. The patient has been provided with a written plan.    Health Maintenance   Topic Date Due   • ZOSTER VACCINE (1 of 2) Never done   • DIABETIC FOOT EXAM  12/14/2022   • INFLUENZA VACCINE  03/31/2023 (Originally 8/1/2022)   • HEMOGLOBIN A1C  06/13/2023   • DIABETIC EYE EXAM  10/20/2023   • LIPID PANEL  12/22/2023   • URINE MICROALBUMIN  12/22/2023   • TDAP/TD VACCINES (2 - Td or Tdap) 01/26/2033                CMS Preventative Services Quick Reference  Risk Factors Identified During Encounter  Immunizations Discussed/Encouraged: Tdap  Tobacco Use/Dependance Risk Encouraged continued cessation of smoking     The above risks/problems have been discussed with the patient.  Pertinent information has been shared with the patient in the After Visit Summary.  An After Visit Summary and PPPS were made available to the patient.    Follow Up:   Next Medicare Wellness visit to be scheduled in 1 year.       Additional E&M Note during same encounter follows:  Patient has multiple medical problems which are significant and separately identifiable that require additional work above and beyond the Medicare Wellness Visit.      Chief Complaint  Medicare Wellness-subsequent (Patient also wants to discuss what cardiologist had to say about ejection fraction being down. )    Subjective        HPI  Herbert Harley is also being seen today for follow-up on chronic conditions including new diagnosis of ischemic cardiomyopathy.  He also recently had PFTs that show moderate restriction and no obstruction.  He has intermittent shortness of breath.  It is improved with albuterol.  He has followed with cardiology  "for his cardiac issues and medications were optimized.  He has a follow-up with them in a few weeks and also a stress test ordered         Objective   Vital Signs:  /62   Pulse 71   Temp 96.9 °F (36.1 °C) (Infrared)   Ht 167.6 cm (65.98\")   Wt 75.8 kg (167 lb 3.2 oz)   SpO2 97%   BMI 27.00 kg/m²     Physical Exam  Constitutional:       Appearance: Normal appearance.   Cardiovascular:      Rate and Rhythm: Normal rate.      Pulses: Normal pulses.      Heart sounds: Normal heart sounds.   Pulmonary:      Effort: Pulmonary effort is normal.   Abdominal:      General: Abdomen is flat.      Palpations: Abdomen is soft.   Skin:     General: Skin is warm.      Capillary Refill: Capillary refill takes less than 2 seconds.   Neurological:      General: No focal deficit present.      Mental Status: He is alert.   Psychiatric:         Mood and Affect: Mood normal.         Behavior: Behavior normal.         Thought Content: Thought content normal.         Judgment: Judgment normal.                     Assessment and Plan   Diagnoses and all orders for this visit:    1. Encounter for subsequent annual wellness visit (AWV) in Medicare patient (Primary)  Assessment & Plan:  Annual wellness exam completed today. Health Maintenance including immunizations was updated and reflected in the chart.    Health advice: healthy food choices with fresh fruits and vegetables, maintain sleep pattern at least 8 hours, avoid texting and distracted driving practices; wear safety belt, engage in regular exercise, maintain healthy weight, use safe sex practices, avoid alcohol and illicit drugs. Maintain immunizations that are up to date. Maintain health maintenance:PSA, Colonoscopy  Follow up with PCP if struggling with depression or anxiety. Keep regular dental and eye exams. Brush and floss teeth daily.     F/U annually and prn.         2. Moderate asthma without complication, unspecified whether persistent  Assessment & Plan:  Asthma " is newly identified.  The patient is experiencing weekly daytime asthma symptoms. He is experiencing monthly nighttime asthma symptoms.  Counseled to void exposure to cigarette smoke.  Medications: Start Trelegy.  A sample given with expiration July 2024 with lot number [id number].  Follow up in 3 months, or sooner should new symptoms or problems arise.        Orders:  -     Fluticasone-Umeclidin-Vilant (TRELEGY) 100-62.5-25 MCG/ACT inhaler; Inhale 1 puff Daily.  Dispense: 1 each; Refill: 5    3. Screening for colon cancer  -     Cologuard - Stool, Per Rectum; Future    4. Need for vaccination  -     Tdap Vaccine Greater Than or Equal To 6yo IM       Follow Up   Return in about 3 months (around 4/26/2023) for Recheck.  Patient was given instructions and counseling regarding his condition or for health maintenance advice. Please see specific information pulled into the AVS if appropriate.         This document has been electronically signed by Yessica Ricci DO   January 26, 2023 15:35 EST    Dictated Utilizing Dragon Dictation: Part of this note may be an electronic transcription/translation of spoken language to printed text using the Dragon Dictation System.    Yessica Ricci D.O.  [facility]

## 2023-01-26 NOTE — PROGRESS NOTES
"The ABCs of the Annual Wellness Visit  Aline to Medicare Visit    Subjective   {Wrapup  Review (Popup)  Advance Care Planning  Labs  CC  Problem List  Visit Diagnosis  Medications  Result Review  Imaging  [facility]  [facility]  SnapShot  Encounters  Notes  Media  Procedures :23}  Herbert Harley is a 67 y.o. male who presents for a  Welcome to Medicare Visit.    The following portions of the patient's history were reviewed and   updated as appropriate: allergies, current medications, past family history, past medical history, past social history, past surgical history and problem list.     Compared to one year ago, the patient feels his physical   health is the same.    Compared to one year ago, the patient feels his mental   health is the same.    Recent Hospitalizations:  He was not admitted to the hospital during the last year.       Current Medical Providers:  Patient Care Team:  Yessica Ricci DO as PCP - General (Family Medicine)  Nima Peter MD as Consulting Physician (Endocrinology)  Nadya Ramirez MD as Cardiologist (Cardiology)  Yessica Ricci DO as Consulting Physician (Family Medicine)    Outpatient Medications Prior to Visit   Medication Sig Dispense Refill   • albuterol sulfate  (90 Base) MCG/ACT inhaler Inhale 2 puffs Every 4 (Four) Hours As Needed for Wheezing. 18 g 2   • aspirin 81 MG chewable tablet Chew 81 mg Daily.     • aspirin 81 MG EC tablet Take 81 mg by mouth daily.     • dapagliflozin Propanediol (Farxiga) 10 MG tablet Take 10 mg by mouth Daily for 30 days. 30 tablet 3   • ezetimibe (ZETIA) 10 MG tablet Take 1 tablet by mouth Daily for 30 days. 30 tablet 11   • Insulin Syringe 29G X 1/2\" 1 ML misc For daily use 100 each 3   • Lantus 100 UNIT/ML injection INJECT 60 UNITS SUBCUTANEOUSLY ONCE DAILY 20 mL 5   • rosuvastatin (CRESTOR) 10 MG tablet Take 0.5 tablets by mouth Every Night for 30 days. 30 tablet 11   • " "sacubitril-valsartan (Entresto) 24-26 MG tablet Take 1 tablet by mouth 2 (Two) Times a Day for 30 days. 60 tablet 1     No facility-administered medications prior to visit.       No opioid medication identified on active medication list. I have reviewed chart for other potential  high risk medication/s and harmful drug interactions in the elderly.          Aspirin is on active medication list. Aspirin use is indicated based on review of current medical condition/s. Pros and cons of this therapy have been discussed today. Benefits of this medication outweigh potential harm.  Patient has been encouraged to continue taking this medication.  .      Patient Active Problem List   Diagnosis   • Coronary artery disease   • Tobacco use   • Hypertension   • Dyslipidemia   • Type 2 diabetes mellitus with hypoglycemia, with long-term current use of insulin (HCC)   • Shortness of breath   • Ischemic cardiomyopathy   • Abdominal pain   • Healed myocardial infarct   • Allergic rhinitis   • HLD (hyperlipidemia)   • Nicotine addiction   • Tachycardia   • Tick bite   • Encounter for subsequent annual wellness visit (AWV) in Medicare patient   • Moderate asthma without complication     Advance Care Planning  Advance Directive is not on file.  ACP discussion was held with the patient during this visit. Patient does not have an advance directive, information provided.        Objective   Vitals:    01/26/23 1403   BP: 114/62   Pulse: 71   Temp: 96.9 °F (36.1 °C)   TempSrc: Infrared   SpO2: 97%   Weight: 75.8 kg (167 lb 3.2 oz)   Height: 167.6 cm (65.98\")   PainSc: 0-No pain     Estimated body mass index is 27 kg/m² as calculated from the following:    Height as of this encounter: 167.6 cm (65.98\").    Weight as of this encounter: 75.8 kg (167 lb 3.2 oz).    BMI is >= 25 and <30. (Overweight) The following options were offered after discussion;: weight loss educational material (shared in after visit summary)      Does the patient have " evidence of cognitive impairment?   No    Lab Results   Component Value Date    TRIG 114 2022    HDL 33 (L) 2022     (H) 2022    VLDL 21 2022    HGBA1C 7.1 2022            HEALTH RISK ASSESSMENT    Smoking Status:  Social History     Tobacco Use   Smoking Status Former   • Packs/day: 1.50   • Years: 50.00   • Pack years: 75.00   • Types: Cigarettes   • Quit date: 2022   • Years since quittin.1   • Passive exposure: Past   Smokeless Tobacco Never     Alcohol Consumption:  Social History     Substance and Sexual Activity   Alcohol Use No       Fall Risk Screen:    STEADI Fall Risk Assessment was completed, and patient is at LOW risk for falls.Assessment completed on:2023    Depression Screen:   PHQ-2/PHQ-9 Depression Screening 2023   Little Interest or Pleasure in Doing Things 0-->not at all   Feeling Down, Depressed or Hopeless 0-->not at all   PHQ-9: Brief Depression Severity Measure Score 0       Health Habits and Functional and Cognitive Screening:  Functional & Cognitive Status 2023   Do you have difficulty preparing food and eating? No   Do you have difficulty bathing yourself, getting dressed or grooming yourself? No   Do you have difficulty using the toilet? No   Do you have difficulty moving around from place to place? No   Do you have trouble with steps or getting out of a bed or a chair? No   Current Diet Well Balanced Diet   Dental Exam Not up to date   Eye Exam Up to date   Exercise (times per week) 0 times per week   Current Exercises Include No Regular Exercise   Do you need help using the phone?  No   Are you deaf or do you have serious difficulty hearing?  No   Do you need help with transportation? No   Do you need help shopping? No   Do you need help preparing meals?  No   Do you need help with housework?  No   Do you need help with laundry? No   Do you need help taking your medications? No   Do you need help managing money? No   Do you  ever drive or ride in a car without wearing a seat belt? No   Have you felt unusual stress, anger or loneliness in the last month? No   Who do you live with? Spouse   If you need help, do you have trouble finding someone available to you? No   Have you been bothered in the last four weeks by sexual problems? No   Do you have difficulty concentrating, remembering or making decisions? No       Visual Acuity:    No results found.    Age-appropriate Screening Schedule:  Refer to the list below for future screening recommendations based on patient's age, sex and/or medical conditions. Orders for these recommended tests are listed in the plan section. The patient has been provided with a written plan.    Health Maintenance   Topic Date Due   • TDAP/TD VACCINES (1 - Tdap) Never done   • ZOSTER VACCINE (1 of 2) Never done   • DIABETIC FOOT EXAM  12/14/2022   • INFLUENZA VACCINE  03/31/2023 (Originally 8/1/2022)   • HEMOGLOBIN A1C  06/13/2023   • DIABETIC EYE EXAM  10/20/2023   • LIPID PANEL  12/22/2023   • URINE MICROALBUMIN  12/22/2023        CMS Preventative Services Quick Reference  Risk Factors Identified During Encounter    {Medicare Wellness Risk Factors:18305}  The above risks/problems have been discussed with the patient.  Pertinent information has been shared with the patient in the After Visit Summary.  Follow up plans and orders are seen below in the Assessment/Plan Section.    Diagnoses and all orders for this visit:    1. Encounter for subsequent annual wellness visit (AWV) in Medicare patient (Primary)    2. Type 2 diabetes mellitus with hypoglycemia without coma, with long-term current use of insulin (HCC)    3. Primary hypertension    4. Dyslipidemia    5. Moderate asthma without complication, unspecified whether persistent    Other orders  -     Fluticasone-Umeclidin-Vilant (TRELEGY) 100-62.5-25 MCG/ACT inhaler; Inhale 1 puff Daily.  Dispense: 1 each; Refill: 5        Follow Up:   Initial Medicare Visit in  one year    An After Visit Summary and PPPS were made available to the patient.        This document has been electronically signed by Yessica Ricci DO   January 26, 2023 14:53 EST    Dictated Utilizing Dragon Dictation: Part of this note may be an electronic transcription/translation of spoken language to printed text using the Dragon Dictation System.    Yessica Ricci D.O.  [facility] [place] [facility] [place]

## 2023-01-26 NOTE — ASSESSMENT & PLAN NOTE
Asthma is newly identified.  The patient is experiencing weekly daytime asthma symptoms. He is experiencing monthly nighttime asthma symptoms.  Counseled to void exposure to cigarette smoke.  Medications: Start Trelegy.  A sample given with expiration July 2024 with lot number [id number].  Follow up in 3 months, or sooner should new symptoms or problems arise.

## 2023-02-01 NOTE — H&P (VIEW-ONLY)
Follow-up Visit      Date: 2023  Patient Name: Herbert Harley  : 1955   MRN: [id number]     Chief Complaint:    Chief Complaint   Patient presents with   • Acute HFrEF (heart failure with reduced ejection fraction       History of Present Illness: Herbert Harley is a 67 y.o. male who is here today for clarification of his tests.  Patient has been feeling fatigued and tired.  Patient wants to know why he needs to have a stress test.  I have start him a couple of medications and he is going to increase his dose and see how he responds.  He is having a little cough off and on.  He is doing fine otherwise without any significant symptoms.      Problem List       CARDIAC  Coronary Artery Disease:   Inferior STEMI with [facility], Dr. Carter, 3/28/2014:  EF 50%, EES to the mid-dominant RCA, EES to the proximal LAD    Myocardium:   Echocardiogram, 3/29/2014:   EF 50-55%, RVSP 30.  Echo 2022:Stage C, HFmrEF EF 41- 45%, mild LVH    Valvular:   Mild calcification to aortic valve    Electrical:   NSR, LVH, PVC    Percardium:   Normal      CARDIAC RISK FACTORS:         Hypertension         Diabetes         Dyslipidemia         Tobacco Use, Quit after 75 years    NON-CARDIAC:  Asthma/COPD    SURGERIES:  Tonsillectomy      Subjective      Review of Systems:   Review of Systems   Constitutional: Positive for fatigue.       Medications:     Current Outpatient Medications:   •  albuterol sulfate  (90 Base) MCG/ACT inhaler, Inhale 2 puffs Every 4 (Four) Hours As Needed for Wheezing., Disp: 18 g, Rfl: 2  •  aspirin 81 MG chewable tablet, Chew 81 mg Daily., Disp: , Rfl:   •  dapagliflozin Propanediol (Farxiga) 10 MG tablet, Take 10 mg by mouth Daily for 30 days., Disp: 30 tablet, Rfl: 3  •  ezetimibe (ZETIA) 10 MG tablet, Take 1 tablet by mouth Daily for 30 days., Disp: 30 tablet, Rfl: 11  •  Fluticasone-Umeclidin-Vilant (TRELEGY) 100-62.5-25 MCG/ACT inhaler, Inhale 1 puff Daily., Disp: 1 each, Rfl:  "5  •  Insulin Syringe 29G X 1/2\" 1 ML misc, For daily use, Disp: 100 each, Rfl: 3  •  Lantus 100 UNIT/ML injection, INJECT 60 UNITS SUBCUTANEOUSLY ONCE DAILY, Disp: 20 mL, Rfl: 5  •  rosuvastatin (CRESTOR) 10 MG tablet, Take 0.5 tablets by mouth Every Night for 30 days. (Patient taking differently: Take 10 mg by mouth Every Night.), Disp: 30 tablet, Rfl: 11  •  sacubitril-valsartan (Entresto) 24-26 MG tablet, Take 1 tablet by mouth 2 (Two) Times a Day for 30 days., Disp: 60 tablet, Rfl: 1    Allergies:   Allergies   Allergen Reactions   • Wellbutrin [Bupropion] Anaphylaxis       Objective     Physical Exam:  Vitals:    02/02/23 1550   BP: 128/66   BP Location: Right arm   Patient Position: Sitting   Pulse: 56   SpO2: 94%   Weight: 76.4 kg (168 lb 6.4 oz)   Height: 170.2 cm (67\")     Body mass index is 26.38 kg/m².      Constitutional:       General: Not in acute distress.     Appearance: Healthy appearance. Not in distress.     Neck:     JVP: JVP not elevated     Carotid artery: No carotid bruit    Pulmonary:      Effort: Pulmonary effort is normal.      Breath sounds: Normal breath sounds. No wheezing. No rhonchi. No rales.     Cardiovascular:      Normal rate. Regular rhythm. Normal S1. Normal S2.      Murmurs: There is no murmur.      No gallop. No click. No rub.     Abdominal:      General: Bowel sounds are normal.      Palpations: Abdomen is soft.      Tenderness: There is no abdominal tenderness.    Extremities:     Pulses: Good distal pulses     Edema: No edema    Smoking Cessation:   He just quit smoking after 25 pack years    Lab Review:   Lab Results   Component Value Date    GLUCOSE 147 (H) 12/22/2022    BUN 17 12/22/2022    CREATININE 1.07 12/22/2022    EGFRIFNONA 66 12/14/2021    BCR 15.9 12/22/2022    K 4.5 12/22/2022    CO2 29.4 (H) 12/22/2022    CALCIUM 9.2 12/22/2022    ALBUMIN 4.20 12/22/2022    AST 25 12/22/2022    ALT 35 12/22/2022     Lab Results   Component Value Date    WBC 10.13 12/22/2022    " HGB 17.4 12/22/2022    HCT 51.5 (H) 12/22/2022    MCV 88.3 12/22/2022     12/22/2022     Lab Results   Component Value Date    CHOL 181 12/22/2022    CHLPL 174 03/28/2014    TRIG 114 12/22/2022    HDL 33 (L) 12/22/2022     (H) 12/22/2022     Lab Results   Component Value Date    TSH 2.330 12/22/2022     Lab Results   Component Value Date    HGBA1C 7.1 12/13/2022           Assessment / Plan      Assessment:   Diagnosis Plan   1. Coronary artery disease involving native coronary artery of native heart, unspecified whether angina present        2. Mixed hyperlipidemia        3. Tobacco use             Plan:  1.  Patient EF has decreased in the last many years.  We have scheduled him for a stress test which he will be doing on February 15.    2.  His ejection fraction has been low.  I will start him on a low-dose of Entresto and will increase it to 1 tablet twice daily now.    3.  He is to continue quit smoking.      Follow Up:       Return in about 6 months (around 8/2/2023).    Nadya Ramirez MD

## 2023-02-01 NOTE — PROGRESS NOTES
Follow-up Visit      Date: 2023  Patient Name: Herbert Harley  : 1955   MRN: [id number]     Chief Complaint:    Chief Complaint   Patient presents with   • Acute HFrEF (heart failure with reduced ejection fraction       History of Present Illness: Herbert Harley is a 67 y.o. male who is here today for clarification of his tests.  Patient has been feeling fatigued and tired.  Patient wants to know why he needs to have a stress test.  I have start him a couple of medications and he is going to increase his dose and see how he responds.  He is having a little cough off and on.  He is doing fine otherwise without any significant symptoms.      Problem List       CARDIAC  Coronary Artery Disease:   Inferior STEMI with [facility], Dr. Carter, 3/28/2014:  EF 50%, EES to the mid-dominant RCA, EES to the proximal LAD    Myocardium:   Echocardiogram, 3/29/2014:   EF 50-55%, RVSP 30.  Echo 2022:Stage C, HFmrEF EF 41- 45%, mild LVH    Valvular:   Mild calcification to aortic valve    Electrical:   NSR, LVH, PVC    Percardium:   Normal      CARDIAC RISK FACTORS:         Hypertension         Diabetes         Dyslipidemia         Tobacco Use, Quit after 75 years    NON-CARDIAC:  Asthma/COPD    SURGERIES:  Tonsillectomy      Subjective      Review of Systems:   Review of Systems   Constitutional: Positive for fatigue.       Medications:     Current Outpatient Medications:   •  albuterol sulfate  (90 Base) MCG/ACT inhaler, Inhale 2 puffs Every 4 (Four) Hours As Needed for Wheezing., Disp: 18 g, Rfl: 2  •  aspirin 81 MG chewable tablet, Chew 81 mg Daily., Disp: , Rfl:   •  dapagliflozin Propanediol (Farxiga) 10 MG tablet, Take 10 mg by mouth Daily for 30 days., Disp: 30 tablet, Rfl: 3  •  ezetimibe (ZETIA) 10 MG tablet, Take 1 tablet by mouth Daily for 30 days., Disp: 30 tablet, Rfl: 11  •  Fluticasone-Umeclidin-Vilant (TRELEGY) 100-62.5-25 MCG/ACT inhaler, Inhale 1 puff Daily., Disp: 1 each, Rfl:  "5  •  Insulin Syringe 29G X 1/2\" 1 ML misc, For daily use, Disp: 100 each, Rfl: 3  •  Lantus 100 UNIT/ML injection, INJECT 60 UNITS SUBCUTANEOUSLY ONCE DAILY, Disp: 20 mL, Rfl: 5  •  rosuvastatin (CRESTOR) 10 MG tablet, Take 0.5 tablets by mouth Every Night for 30 days. (Patient taking differently: Take 10 mg by mouth Every Night.), Disp: 30 tablet, Rfl: 11  •  sacubitril-valsartan (Entresto) 24-26 MG tablet, Take 1 tablet by mouth 2 (Two) Times a Day for 30 days., Disp: 60 tablet, Rfl: 1    Allergies:   Allergies   Allergen Reactions   • Wellbutrin [Bupropion] Anaphylaxis       Objective     Physical Exam:  Vitals:    02/02/23 1550   BP: 128/66   BP Location: Right arm   Patient Position: Sitting   Pulse: 56   SpO2: 94%   Weight: 76.4 kg (168 lb 6.4 oz)   Height: 170.2 cm (67\")     Body mass index is 26.38 kg/m².      Constitutional:       General: Not in acute distress.     Appearance: Healthy appearance. Not in distress.     Neck:     JVP: JVP not elevated     Carotid artery: No carotid bruit    Pulmonary:      Effort: Pulmonary effort is normal.      Breath sounds: Normal breath sounds. No wheezing. No rhonchi. No rales.     Cardiovascular:      Normal rate. Regular rhythm. Normal S1. Normal S2.      Murmurs: There is no murmur.      No gallop. No click. No rub.     Abdominal:      General: Bowel sounds are normal.      Palpations: Abdomen is soft.      Tenderness: There is no abdominal tenderness.    Extremities:     Pulses: Good distal pulses     Edema: No edema    Smoking Cessation:   He just quit smoking after 25 pack years    Lab Review:   Lab Results   Component Value Date    GLUCOSE 147 (H) 12/22/2022    BUN 17 12/22/2022    CREATININE 1.07 12/22/2022    EGFRIFNONA 66 12/14/2021    BCR 15.9 12/22/2022    K 4.5 12/22/2022    CO2 29.4 (H) 12/22/2022    CALCIUM 9.2 12/22/2022    ALBUMIN 4.20 12/22/2022    AST 25 12/22/2022    ALT 35 12/22/2022     Lab Results   Component Value Date    WBC 10.13 12/22/2022    " HGB 17.4 12/22/2022    HCT 51.5 (H) 12/22/2022    MCV 88.3 12/22/2022     12/22/2022     Lab Results   Component Value Date    CHOL 181 12/22/2022    CHLPL 174 03/28/2014    TRIG 114 12/22/2022    HDL 33 (L) 12/22/2022     (H) 12/22/2022     Lab Results   Component Value Date    TSH 2.330 12/22/2022     Lab Results   Component Value Date    HGBA1C 7.1 12/13/2022           Assessment / Plan      Assessment:   Diagnosis Plan   1. Coronary artery disease involving native coronary artery of native heart, unspecified whether angina present        2. Mixed hyperlipidemia        3. Tobacco use             Plan:  1.  Patient EF has decreased in the last many years.  We have scheduled him for a stress test which he will be doing on February 15.    2.  His ejection fraction has been low.  I will start him on a low-dose of Entresto and will increase it to 1 tablet twice daily now.    3.  He is to continue quit smoking.      Follow Up:       Return in about 6 months (around 8/2/2023).    Nadya Ramirez MD

## 2023-02-02 ENCOUNTER — OFFICE VISIT (OUTPATIENT)
Dept: CARDIOLOGY | Facility: CLINIC | Age: 68
End: 2023-02-02
Payer: MEDICARE

## 2023-02-02 VITALS
BODY MASS INDEX: 26.43 KG/M2 | SYSTOLIC BLOOD PRESSURE: 128 MMHG | HEART RATE: 56 BPM | WEIGHT: 168.4 LBS | DIASTOLIC BLOOD PRESSURE: 66 MMHG | OXYGEN SATURATION: 94 % | HEIGHT: 67 IN

## 2023-02-02 DIAGNOSIS — I25.10 CORONARY ARTERY DISEASE INVOLVING NATIVE CORONARY ARTERY OF NATIVE HEART, UNSPECIFIED WHETHER ANGINA PRESENT: Primary | ICD-10-CM

## 2023-02-02 DIAGNOSIS — Z72.0 TOBACCO USE: ICD-10-CM

## 2023-02-02 DIAGNOSIS — E78.2 MIXED HYPERLIPIDEMIA: ICD-10-CM

## 2023-02-02 PROCEDURE — 99213 OFFICE O/P EST LOW 20 MIN: CPT | Performed by: INTERNAL MEDICINE

## 2023-02-15 ENCOUNTER — TELEPHONE (OUTPATIENT)
Dept: CARDIOLOGY | Facility: CLINIC | Age: 68
End: 2023-02-15

## 2023-02-15 ENCOUNTER — HOSPITAL ENCOUNTER (OUTPATIENT)
Dept: CARDIOLOGY | Facility: HOSPITAL | Age: 68
Discharge: HOME | End: 2023-02-15
Payer: MEDICARE

## 2023-02-15 VITALS
SYSTOLIC BLOOD PRESSURE: 114 MMHG | OXYGEN SATURATION: 98 % | HEIGHT: 67 IN | DIASTOLIC BLOOD PRESSURE: 66 MMHG | BODY MASS INDEX: 26.44 KG/M2 | WEIGHT: 168.43 LBS | HEART RATE: 54 BPM

## 2023-02-15 VITALS — DIASTOLIC BLOOD PRESSURE: 61 MMHG | SYSTOLIC BLOOD PRESSURE: 115 MMHG

## 2023-02-15 DIAGNOSIS — E78.5 DYSLIPIDEMIA: ICD-10-CM

## 2023-02-15 DIAGNOSIS — R94.39 POSITIVE CARDIAC STRESS TEST: Primary | ICD-10-CM

## 2023-02-15 DIAGNOSIS — R94.39 ABNORMAL STRESS TEST: Primary | ICD-10-CM

## 2023-02-15 DIAGNOSIS — I25.10 CORONARY ARTERY DISEASE INVOLVING NATIVE CORONARY ARTERY OF NATIVE HEART, UNSPECIFIED WHETHER ANGINA PRESENT: ICD-10-CM

## 2023-02-15 DIAGNOSIS — R09.89 BRUIT OF RIGHT CAROTID ARTERY: ICD-10-CM

## 2023-02-15 LAB
BH CV REST NUCLEAR ISOTOPE DOSE: 9.9 MCI
BH CV STRESS BP STAGE 1: NORMAL
BH CV STRESS BP STAGE 2: NORMAL
BH CV STRESS DURATION MIN STAGE 1: 3
BH CV STRESS DURATION MIN STAGE 2: 3
BH CV STRESS DURATION MIN STAGE 3: 1
BH CV STRESS DURATION SEC STAGE 1: 0
BH CV STRESS DURATION SEC STAGE 2: 0
BH CV STRESS DURATION SEC STAGE 3: 13
BH CV STRESS GRADE STAGE 1: 10
BH CV STRESS GRADE STAGE 2: 12
BH CV STRESS GRADE STAGE 3: 14
BH CV STRESS HR STAGE 1: 104
BH CV STRESS HR STAGE 3: 134
BH CV STRESS METS STAGE 1: 5
BH CV STRESS METS STAGE 2: 7.5
BH CV STRESS METS STAGE 3: 10
BH CV STRESS NUCLEAR ISOTOPE DOSE: 32.1 MCI
BH CV STRESS O2 STAGE 1: 99
BH CV STRESS O2 STAGE 2: 98
BH CV STRESS PROTOCOL 1: NORMAL
BH CV STRESS RECOVERY BP: NORMAL MMHG
BH CV STRESS RECOVERY HR: 82 BPM
BH CV STRESS RECOVERY O2: 99 %
BH CV STRESS SPEED STAGE 1: 1.7
BH CV STRESS SPEED STAGE 2: 2.5
BH CV STRESS SPEED STAGE 3: 3.4
BH CV STRESS STAGE 1: 1
BH CV STRESS STAGE 2: 2
BH CV STRESS STAGE 3: 3
BH CV XLRA MEAS LEFT DIST CCA EDV: 13 CM/SEC
BH CV XLRA MEAS LEFT DIST CCA PSV: 127 CM/SEC
BH CV XLRA MEAS LEFT DIST ICA EDV: 17 CM/SEC
BH CV XLRA MEAS LEFT DIST ICA PSV: 131 CM/SEC
BH CV XLRA MEAS LEFT ICA/CCA RATIO: 2.09
BH CV XLRA MEAS LEFT MID CCA EDV: 15.4 CM/SEC
BH CV XLRA MEAS LEFT MID CCA PSV: 102 CM/SEC
BH CV XLRA MEAS LEFT MID ICA EDV: 32 CM/SEC
BH CV XLRA MEAS LEFT MID ICA PSV: 159 CM/SEC
BH CV XLRA MEAS LEFT PROX CCA EDV: 12.6 CM/SEC
BH CV XLRA MEAS LEFT PROX CCA PSV: 114 CM/SEC
BH CV XLRA MEAS LEFT PROX ECA EDV: 13 CM/SEC
BH CV XLRA MEAS LEFT PROX ECA PSV: 158 CM/SEC
BH CV XLRA MEAS LEFT PROX ICA EDV: 27.4 CM/SEC
BH CV XLRA MEAS LEFT PROX ICA PSV: 213 CM/SEC
BH CV XLRA MEAS LEFT PROX SCLA PSV: 283 CM/SEC
BH CV XLRA MEAS LEFT VERTEBRAL A EDV: 13 CM/SEC
BH CV XLRA MEAS LEFT VERTEBRAL A PSV: 58 CM/SEC
BH CV XLRA MEAS RIGHT DIST CCA EDV: 23 CM/SEC
BH CV XLRA MEAS RIGHT DIST CCA PSV: 142 CM/SEC
BH CV XLRA MEAS RIGHT DIST ICA EDV: 20 CM/SEC
BH CV XLRA MEAS RIGHT DIST ICA PSV: 93 CM/SEC
BH CV XLRA MEAS RIGHT ICA/CCA RATIO: 0.93
BH CV XLRA MEAS RIGHT MID CCA EDV: 26 CM/SEC
BH CV XLRA MEAS RIGHT MID CCA PSV: 144 CM/SEC
BH CV XLRA MEAS RIGHT MID ICA EDV: 19 CM/SEC
BH CV XLRA MEAS RIGHT MID ICA PSV: 132 CM/SEC
BH CV XLRA MEAS RIGHT PROX CCA PSV: 177 CM/SEC
BH CV XLRA MEAS RIGHT PROX ECA PSV: 226 CM/SEC
BH CV XLRA MEAS RIGHT PROX ICA EDV: 16.2 CM/SEC
BH CV XLRA MEAS RIGHT PROX ICA PSV: 62.1 CM/SEC
BH CV XLRA MEAS RIGHT PROX SCLA PSV: 224 CM/SEC
BH CV XLRA MEAS RIGHT VERTEBRAL A EDV: 14.8 CM/SEC
BH CV XLRA MEAS RIGHT VERTEBRAL A PSV: 67 CM/SEC
LEFT ARM BP: NORMAL MMHG
LV EF NUC BP: 42 %
MAXIMAL PREDICTED HEART RATE: 153 BPM
MAXIMAL PREDICTED HEART RATE: 153 BPM
PERCENT MAX PREDICTED HR: 88.89 %
RIGHT ARM BP: NORMAL MMHG
STRESS BASELINE BP: NORMAL MMHG
STRESS BASELINE HR: 52 BPM
STRESS O2 SAT REST: 94 %
STRESS PERCENT HR: 105 %
STRESS POST ESTIMATED WORKLOAD: 7.6 METS
STRESS POST EXERCISE DUR MIN: 7 MIN
STRESS POST EXERCISE DUR SEC: 13 SEC
STRESS POST PEAK BP: NORMAL MMHG
STRESS POST PEAK HR: 136 BPM
STRESS TARGET HR: 130 BPM
STRESS TARGET HR: 130 BPM

## 2023-02-15 PROCEDURE — 78452 HT MUSCLE IMAGE SPECT MULT: CPT | Performed by: INTERNAL MEDICINE

## 2023-02-15 PROCEDURE — 78452 HT MUSCLE IMAGE SPECT MULT: CPT

## 2023-02-15 PROCEDURE — A9500 TC99M SESTAMIBI: HCPCS | Performed by: INTERNAL MEDICINE

## 2023-02-15 PROCEDURE — 0 TECHNETIUM SESTAMIBI: Performed by: INTERNAL MEDICINE

## 2023-02-15 PROCEDURE — 93880 EXTRACRANIAL BILAT STUDY: CPT

## 2023-02-15 PROCEDURE — 93017 CV STRESS TEST TRACING ONLY: CPT

## 2023-02-15 PROCEDURE — 93018 CV STRESS TEST I&R ONLY: CPT | Performed by: INTERNAL MEDICINE

## 2023-02-15 PROCEDURE — 93880 EXTRACRANIAL BILAT STUDY: CPT | Performed by: INTERNAL MEDICINE

## 2023-02-15 RX ORDER — SODIUM CHLORIDE 9 MG/ML
3 INJECTION, SOLUTION INTRAVENOUS CONTINUOUS
Status: CANCELLED | OUTPATIENT
Start: 2023-02-15 | End: 2023-02-15

## 2023-02-15 RX ADMIN — TECHNETIUM TC 99M SESTAMIBI 1 DOSE: 1 INJECTION INTRAVENOUS at 08:00

## 2023-02-15 RX ADMIN — TECHNETIUM TC 99M SESTAMIBI 1 DOSE: 1 INJECTION INTRAVENOUS at 09:40

## 2023-02-15 NOTE — TELEPHONE ENCOUNTER
Notified Vivian pts wife of positive ST and recommending heart cath per ROCHELLE Park verbalized understanding.     Notified Cyndi  to put pt on for Friday 2/17.

## 2023-02-17 ENCOUNTER — HOSPITAL ENCOUNTER (OUTPATIENT)
Facility: HOSPITAL | Age: 68
Discharge: HOME | End: 2023-02-17
Attending: INTERNAL MEDICINE | Admitting: INTERNAL MEDICINE
Payer: MEDICARE

## 2023-02-17 ENCOUNTER — APPOINTMENT (OUTPATIENT)
Dept: PULMONOLOGY | Facility: HOSPITAL | Age: 68
End: 2023-02-17
Payer: MEDICARE

## 2023-02-17 ENCOUNTER — TELEPHONE (OUTPATIENT)
Dept: CARDIOLOGY | Facility: CLINIC | Age: 68
End: 2023-02-17
Payer: MEDICARE

## 2023-02-17 VITALS
BODY MASS INDEX: 25.82 KG/M2 | HEIGHT: 67 IN | RESPIRATION RATE: 16 BRPM | TEMPERATURE: 97.4 F | HEART RATE: 50 BPM | DIASTOLIC BLOOD PRESSURE: 68 MMHG | WEIGHT: 164.53 LBS | SYSTOLIC BLOOD PRESSURE: 156 MMHG | OXYGEN SATURATION: 98 %

## 2023-02-17 DIAGNOSIS — R94.39 ABNORMAL STRESS TEST: ICD-10-CM

## 2023-02-17 DIAGNOSIS — E78.5 DYSLIPIDEMIA: ICD-10-CM

## 2023-02-17 DIAGNOSIS — R94.39 POSITIVE CARDIAC STRESS TEST: ICD-10-CM

## 2023-02-17 DIAGNOSIS — I25.10 CORONARY ARTERY DISEASE INVOLVING NATIVE CORONARY ARTERY OF NATIVE HEART, UNSPECIFIED WHETHER ANGINA PRESENT: Primary | ICD-10-CM

## 2023-02-17 LAB
ALBUMIN SERPL-MCNC: 3.9 G/DL (ref 3.5–5.2)
ALBUMIN/GLOB SERPL: 1.4 G/DL
ALP SERPL-CCNC: 75 U/L (ref 39–117)
ALT SERPL W P-5'-P-CCNC: 28 U/L (ref 1–41)
ANION GAP SERPL CALCULATED.3IONS-SCNC: 7 MMOL/L (ref 5–15)
AST SERPL-CCNC: 22 U/L (ref 1–40)
BILIRUB SERPL-MCNC: 0.4 MG/DL (ref 0–1.2)
BUN SERPL-MCNC: 15 MG/DL (ref 8–23)
BUN/CREAT SERPL: 14.2 (ref 7–25)
CALCIUM SPEC-SCNC: 8.6 MG/DL (ref 8.6–10.5)
CHLORIDE SERPL-SCNC: 110 MMOL/L (ref 98–107)
CHOLEST SERPL-MCNC: 80 MG/DL (ref 0–200)
CO2 SERPL-SCNC: 25 MMOL/L (ref 22–29)
CREAT BLDA-MCNC: 1.1 MG/DL (ref 0.6–1.3)
CREAT SERPL-MCNC: 1.06 MG/DL (ref 0.76–1.27)
DEPRECATED RDW RBC AUTO: 44.7 FL (ref 37–54)
EGFRCR SERPLBLD CKD-EPI 2021: 76.9 ML/MIN/1.73
ERYTHROCYTE [DISTWIDTH] IN BLOOD BY AUTOMATED COUNT: 13.4 % (ref 12.3–15.4)
GLOBULIN UR ELPH-MCNC: 2.8 GM/DL
GLUCOSE SERPL-MCNC: 83 MG/DL (ref 65–99)
HCT VFR BLD AUTO: 51.7 % (ref 37.5–51)
HDLC SERPL-MCNC: 37 MG/DL (ref 40–60)
HGB BLD-MCNC: 16.9 G/DL (ref 13–17.7)
LDLC SERPL CALC-MCNC: 29 MG/DL (ref 0–100)
LDLC/HDLC SERPL: 0.84 {RATIO}
MCH RBC QN AUTO: 29.5 PG (ref 26.6–33)
MCHC RBC AUTO-ENTMCNC: 32.7 G/DL (ref 31.5–35.7)
MCV RBC AUTO: 90.2 FL (ref 79–97)
PLATELET # BLD AUTO: 189 10*3/MM3 (ref 140–450)
PMV BLD AUTO: 10.4 FL (ref 6–12)
POTASSIUM SERPL-SCNC: 4.4 MMOL/L (ref 3.5–5.2)
PROT SERPL-MCNC: 6.7 G/DL (ref 6–8.5)
RBC # BLD AUTO: 5.73 10*6/MM3 (ref 4.14–5.8)
SODIUM SERPL-SCNC: 142 MMOL/L (ref 136–145)
TRIGL SERPL-MCNC: 60 MG/DL (ref 0–150)
VLDLC SERPL-MCNC: 14 MG/DL (ref 5–40)
WBC NRBC COR # BLD: 8.52 10*3/MM3 (ref 3.4–10.8)

## 2023-02-17 PROCEDURE — 25010000002 HEPARIN (PORCINE) PER 1000 UNITS: Performed by: INTERNAL MEDICINE

## 2023-02-17 PROCEDURE — 99204 OFFICE O/P NEW MOD 45 MIN: CPT | Performed by: STUDENT IN AN ORGANIZED HEALTH CARE EDUCATION/TRAINING PROGRAM

## 2023-02-17 PROCEDURE — 82565 ASSAY OF CREATININE: CPT

## 2023-02-17 PROCEDURE — 25010000002 FENTANYL CITRATE (PF) 50 MCG/ML SOLUTION: Performed by: INTERNAL MEDICINE

## 2023-02-17 PROCEDURE — C1769 GUIDE WIRE: HCPCS | Performed by: INTERNAL MEDICINE

## 2023-02-17 PROCEDURE — 36415 COLL VENOUS BLD VENIPUNCTURE: CPT

## 2023-02-17 PROCEDURE — 80053 COMPREHEN METABOLIC PANEL: CPT | Performed by: PHYSICIAN ASSISTANT

## 2023-02-17 PROCEDURE — C1894 INTRO/SHEATH, NON-LASER: HCPCS | Performed by: INTERNAL MEDICINE

## 2023-02-17 PROCEDURE — 25010000002 MIDAZOLAM PER 1 MG: Performed by: INTERNAL MEDICINE

## 2023-02-17 PROCEDURE — 0 IOPAMIDOL PER 1 ML: Performed by: INTERNAL MEDICINE

## 2023-02-17 PROCEDURE — 85027 COMPLETE CBC AUTOMATED: CPT | Performed by: PHYSICIAN ASSISTANT

## 2023-02-17 PROCEDURE — 94010 BREATHING CAPACITY TEST: CPT | Performed by: INTERNAL MEDICINE

## 2023-02-17 PROCEDURE — 93454 CORONARY ARTERY ANGIO S&I: CPT | Performed by: INTERNAL MEDICINE

## 2023-02-17 PROCEDURE — 80061 LIPID PANEL: CPT | Performed by: PHYSICIAN ASSISTANT

## 2023-02-17 PROCEDURE — 94010 BREATHING CAPACITY TEST: CPT

## 2023-02-17 RX ORDER — HEPARIN SODIUM 1000 [USP'U]/ML
INJECTION, SOLUTION INTRAVENOUS; SUBCUTANEOUS
Status: DISCONTINUED | OUTPATIENT
Start: 2023-02-17 | End: 2023-02-17 | Stop reason: HOSPADM

## 2023-02-17 RX ORDER — NICARDIPINE HCL-0.9% SOD CHLOR 1 MG/10 ML
SYRINGE (ML) INTRAVENOUS
Status: DISCONTINUED | OUTPATIENT
Start: 2023-02-17 | End: 2023-02-17 | Stop reason: HOSPADM

## 2023-02-17 RX ORDER — SODIUM CHLORIDE 9 MG/ML
3 INJECTION, SOLUTION INTRAVENOUS CONTINUOUS
Status: ACTIVE | OUTPATIENT
Start: 2023-02-17 | End: 2023-02-17

## 2023-02-17 RX ORDER — LIDOCAINE HYDROCHLORIDE 10 MG/ML
INJECTION, SOLUTION EPIDURAL; INFILTRATION; INTRACAUDAL; PERINEURAL
Status: DISCONTINUED | OUTPATIENT
Start: 2023-02-17 | End: 2023-02-17 | Stop reason: HOSPADM

## 2023-02-17 RX ORDER — SODIUM CHLORIDE 9 MG/ML
1 INJECTION, SOLUTION INTRAVENOUS CONTINUOUS
Status: DISCONTINUED | OUTPATIENT
Start: 2023-02-17 | End: 2023-02-17 | Stop reason: HOSPADM

## 2023-02-17 RX ORDER — MIDAZOLAM HYDROCHLORIDE 1 MG/ML
INJECTION INTRAMUSCULAR; INTRAVENOUS
Status: DISCONTINUED | OUTPATIENT
Start: 2023-02-17 | End: 2023-02-17 | Stop reason: HOSPADM

## 2023-02-17 RX ORDER — FENTANYL CITRATE 50 UG/ML
INJECTION, SOLUTION INTRAMUSCULAR; INTRAVENOUS
Status: DISCONTINUED | OUTPATIENT
Start: 2023-02-17 | End: 2023-02-17 | Stop reason: HOSPADM

## 2023-02-17 RX ORDER — ACETAMINOPHEN 325 MG/1
650 TABLET ORAL EVERY 4 HOURS PRN
Status: DISCONTINUED | OUTPATIENT
Start: 2023-02-17 | End: 2023-02-17 | Stop reason: HOSPADM

## 2023-02-17 RX ADMIN — SODIUM CHLORIDE 3 ML/KG/HR: 9 INJECTION, SOLUTION INTRAVENOUS at 08:41

## 2023-02-17 NOTE — DISCHARGE INSTR - ACTIVITY
Dr Saavedra's office will contact you regarding your pre-admission testing & surgery date.  [phone]

## 2023-02-17 NOTE — CONSULTS
Diabetes Education  Assessment/Teaching    Patient Name:  Herbert Harley  YOB: 1955  MRN: [id number]  Admit Date:  2/17/2023    Packet sent, will follow up postoperatively when admitted for CABG.      Electronically signed by:  Chloe Magaña RN  02/17/23 12:58 EST

## 2023-02-17 NOTE — CONSULTS
Cardiothoracic Surgery History & Physical           Chief complaint: Fatigue    HPI: Herbert Harley is a 67-year-old male, former smoker (states that he quit in 2022), with past medical history of coronary artery disease (s/p PCI in ), hypertension, diabetes, who presents for scheduled cardiac cath after a concerning stress test in the cardiology office.  He reports that he was experiencing some shortness of breath associated with cough for the past several months.  He reports occasional jaw pain associated with this.  He saw his PCP (he states that he was not following with any cardiologist after his PCI) who referred him to cardiologist where he underwent stress test.  Dr. Ramirez performed his cardiac cath which showed multivessel coronary artery disease.  The patient does not take any blood thinners, he denies any swelling in his legs.      Past Medical History:   Diagnosis Date   • Asthma 2022   • Coronary artery disease 2014   • HLD (hyperlipidemia) 2023   • Myocardial infarction (HCC) 2014   • Type 2 diabetes mellitus (HCC)      Past Surgical History:   Procedure Laterality Date   • CARDIAC CATHETERIZATION  2014   • CORONARY STENT PLACEMENT  2014   • TONSILLECTOMY       Family History   Problem Relation Age of Onset   • Diabetes Mother    • Cancer Father         liver   • Brain cancer Sister    • Heart attack Brother    • Prostate cancer Brother      Social History     Tobacco Use   • Smoking status: Former     Packs/day: 1.50     Years: 50.00     Pack years: 75.00     Types: Cigarettes     Quit date: 2022     Years since quittin.1     Passive exposure: Past   • Smokeless tobacco: Never   Vaping Use   • Vaping Use: Never used   Substance Use Topics   • Alcohol use: No   • Drug use: No       Medications Prior to Admission   Medication Sig Dispense Refill Last Dose   • albuterol sulfate  (90 Base) MCG/ACT inhaler Inhale 2 puffs Every 4 (Four) Hours  "As Needed for Wheezing. 18 g 2    • aspirin 81 MG chewable tablet Chew 81 mg Daily.   2/16/2023   • dapagliflozin Propanediol (Farxiga) 10 MG tablet Take 10 mg by mouth Daily for 30 days. 30 tablet 3 2/16/2023   • ezetimibe (ZETIA) 10 MG tablet Take 1 tablet by mouth Daily for 30 days. 30 tablet 11 2/16/2023   • Fluticasone-Umeclidin-Vilant (TRELEGY) 100-62.5-25 MCG/ACT inhaler Inhale 1 puff Daily. 1 each 5 2/16/2023   • Lantus 100 UNIT/ML injection INJECT 60 UNITS SUBCUTANEOUSLY ONCE DAILY 20 mL 5 2/16/2023   • rosuvastatin (CRESTOR) 10 MG tablet Take 0.5 tablets by mouth Every Night for 30 days. 30 tablet 11 2/16/2023   • sacubitril-valsartan (Entresto) 24-26 MG tablet Take 1 tablet by mouth 2 (Two) Times a Day for 30 days. 60 tablet 1 2/16/2023   • Insulin Syringe 29G X 1/2\" 1 ML misc For daily use 100 each 3      Allergies:  Wellbutrin [bupropion]    Review of Systems:  A comprehensive review of systems was negative except for:   Respiratory: Shortness of breath, cough  All other systems were reviewed and were negative.    Vital Signs:  Temp:  [97.4 °F (36.3 °C)] 97.4 °F (36.3 °C)  Heart Rate:  [52-75] 52  Resp:  [16] 16  BP: (110-138)/(53-73) 110/60    Physical Exam:  Physical Exam  Constitutional:       Appearance: Normal appearance.   HENT:      Head: Normocephalic and atraumatic.   Eyes:      Extraocular Movements: Extraocular movements intact.   Cardiovascular:      Rate and Rhythm: Normal rate and regular rhythm.      Pulses: Normal pulses.           Dorsalis pedis pulses are 2+ on the right side and 2+ on the left side.      Heart sounds: Normal heart sounds. No murmur heard.  Pulmonary:      Effort: Pulmonary effort is normal. No respiratory distress.      Breath sounds: Normal breath sounds. No wheezing.   Abdominal:      General: There is no distension.      Palpations: Abdomen is soft.      Tenderness: There is no abdominal tenderness.   Musculoskeletal:      Right lower leg: No edema.      Left lower " leg: No edema.   Skin:     General: Skin is warm and dry.      Comments: Multiple excoriations on bilateral lower extremities   Neurological:      Mental Status: He is alert and oriented to person, place, and time.         Labs:  Results from last 7 days   Lab Units 02/17/23  0830   WBC 10*3/mm3 8.52   HEMOGLOBIN g/dL 16.9   HEMATOCRIT % 51.7*   PLATELETS 10*3/mm3 189     Results from last 7 days   Lab Units 02/17/23  0925 02/17/23  0919   SODIUM mmol/L  --  142   POTASSIUM mmol/L  --  4.4   CHLORIDE mmol/L  --  110*   CO2 mmol/L  --  25.0   BUN mg/dL  --  15   CREATININE mg/dL 1.10 1.06   GLUCOSE mg/dL  --  83   CALCIUM mg/dL  --  8.6         Coagulation: No results found for: INR, APTT  Cardiac markers:     ABGs:       Invalid input(s): PO2    Imaging:   Transthoracic echo 2/21/2022:  Left Ventricle Left ventricular systolic function is mildly decreased. Left ventricular ejection fraction appears to be 41 - 45%.     Normal left ventricular cavity size noted. Left ventricular wall thickness is consistent with mild concentric hypertrophy. Left ventricular diastolic function was indeterminate.   Right Ventricle Normal right ventricular cavity size and systolic function noted.   Left Atrium Normal left atrial cavity size noted. Left atrial volume is mildly increased.   Right Atrium Normal right atrial cavity size noted.   Aortic Valve No aortic valve regurgitation or stenosis is present. The aortic valve is abnormal in structure. There is mild calcification of the aortic valve.   Mitral Valve The mitral valve is normal in structure. Mild mitral valve regurgitation is present.   Tricuspid Valve The tricuspid valve is structurally normal with no stenosis present. Mild tricuspid valve regurgitation is present. Estimated right ventricular systolic pressure from tricuspid regurgitation is mildly elevated (35-45 mmHg). Calculated right ventricular systolic pressure from tricuspid regurgitation is 38 mmHg.   Pulmonic Valve  The pulmonic valve is structurally normal. There is mild pulmonic valve regurgitation present.   Greater Vessels No dilation of the aortic root is present. The inferior vena cava is normally sized. Normal IVC inspiratory collapse of greater than 50% noted.   Pericardium The pericardium is normal. There is no evidence of pericardial effusion. .           Assessment:   Patient Active Problem List   Diagnosis   • Coronary artery disease   • Tobacco use   • Hypertension   • Dyslipidemia   • Type 2 diabetes mellitus with hypoglycemia, with long-term current use of insulin (HCC)   • Shortness of breath   • Ischemic cardiomyopathy   • Abdominal pain   • Healed myocardial infarct   • Allergic rhinitis   • HLD (hyperlipidemia)   • Nicotine addiction   • Tachycardia   • Tick bite   • Encounter for subsequent annual wellness visit (AWV) in Medicare patient   • Moderate asthma without complication   • Positive cardiac stress test         Plan:   Plan for CABG with Dr. Saavedra on 3/1  Preoperative work-up pending        Charlee Montgomery PA-C  02/17/23  11:56 EST

## 2023-02-17 NOTE — INTERVAL H&P NOTE
"  H&P updated. The patient was examined and the following changes are noted:        Subjective:  Since patient's office visit patient still complains of fatigue.    Objective:  /63 (BP Location: Left arm, Patient Position: Lying)   Pulse 57   Temp 97.4 °F (36.3 °C) (Temporal)   Ht 170.2 cm (67\")   Wt 74.6 kg (164 lb 8.5 oz)   SpO2 98%   BMI 25.77 kg/m²     General: No acute distress, mother at bedside  Lungs: Clear to auscultation bilaterally without wheezing, rhonchi or crackles  Cardiac: Regular rate and rhythm without murmur  Extremities: Palpable radial pulses bilaterally, no peripheral edema.    Lab Results   Component Value Date    GLUCOSE 147 (H) 12/22/2022    BUN 17 12/22/2022    CREATININE 1.07 12/22/2022    EGFR 76.1 12/22/2022    BCR 15.9 12/22/2022    K 4.5 12/22/2022    CO2 29.4 (H) 12/22/2022    CALCIUM 9.2 12/22/2022    ALBUMIN 4.20 12/22/2022    AST 25 12/22/2022    ALT 35 12/22/2022     CBC      CBC 12/11/22 12/22/22 2/17/23   WBC 13.01 (A) 10.13 8.52   RBC 5.92 (A) 5.83 (A) 5.73   Hemoglobin 17.8 (A) 17.4 16.9   Hematocrit 54.2 (A) 51.5 (A) 51.7 (A)   MCV 91.6 88.3 90.2   MCH 30.1 29.8 29.5   MCHC 32.8 33.8 32.7   RDW 13.5 12.9 13.4   Platelets 214 280 189   (A) Abnormal value                  Assessment and plan:    1.  CAD with abnormal stress test  -Plan for left heart catheterization plus or minus catheter-based intervention by Dr. Ramirez.  Risk and benefits were discussed with the patient and he agrees to proceed with heart catheterization.    Patient severe triple-vessel disease.  I have discussed in detail with patient the problems and goals of further treatment plan.  He was seen by a surgeon.  He is scheduled for coronary artery bypass graft in 1 March.  "

## 2023-02-20 ENCOUNTER — DOCUMENTATION (OUTPATIENT)
Dept: CARDIAC REHAB | Facility: HOSPITAL | Age: 68
End: 2023-02-20
Payer: MEDICARE

## 2023-02-20 NOTE — PROGRESS NOTES
Referral received for Phase II Cardiac Rehab.  Staff has reviewed chart and patient does not have a qualifying diagnosis for Phase II Cardiac Rehab at this time.  Staff follow up after CABG surgery.

## 2023-02-22 ENCOUNTER — PREP FOR SURGERY (OUTPATIENT)
Dept: OTHER | Facility: HOSPITAL | Age: 68
End: 2023-02-22
Payer: MEDICARE

## 2023-02-22 DIAGNOSIS — I25.10 CORONARY ARTERY DISEASE INVOLVING NATIVE CORONARY ARTERY OF NATIVE HEART, UNSPECIFIED WHETHER ANGINA PRESENT: Primary | ICD-10-CM

## 2023-02-23 ENCOUNTER — TELEPHONE (OUTPATIENT)
Dept: CARDIOLOGY | Facility: CLINIC | Age: 68
End: 2023-02-23
Payer: MEDICARE

## 2023-02-23 DIAGNOSIS — E78.5 DYSLIPIDEMIA: Primary | ICD-10-CM

## 2023-02-23 RX ORDER — CHLORHEXIDINE GLUCONATE 500 MG/1
1 CLOTH TOPICAL EVERY 12 HOURS PRN
Status: CANCELLED | OUTPATIENT
Start: 2023-02-23

## 2023-02-23 RX ORDER — ROSUVASTATIN CALCIUM 10 MG/1
10 TABLET, COATED ORAL DAILY
Qty: 30 TABLET | Refills: 11 | Status: SHIPPED | OUTPATIENT
Start: 2023-02-23

## 2023-02-23 RX ORDER — ASPIRIN 325 MG
325 TABLET ORAL NIGHTLY
Status: CANCELLED | OUTPATIENT
Start: 2023-02-23 | End: 2023-02-24

## 2023-02-23 RX ORDER — CEFAZOLIN SODIUM 2 G/100ML
2 INJECTION, SOLUTION INTRAVENOUS ONCE
Status: CANCELLED | OUTPATIENT
Start: 2023-02-23 | End: 2023-02-23

## 2023-02-23 RX ORDER — NITROGLYCERIN 0.4 MG/1
0.4 TABLET SUBLINGUAL
Status: CANCELLED | OUTPATIENT
Start: 2023-02-23

## 2023-02-23 RX ORDER — CHLORHEXIDINE GLUCONATE 0.12 MG/ML
15 RINSE ORAL ONCE
Status: CANCELLED | OUTPATIENT
Start: 2023-02-23 | End: 2023-02-23

## 2023-02-23 NOTE — TELEPHONE ENCOUNTER
Pt's wife Vivian calling to report pt is out of Crestor. She states MA said it was okay for the pt to take 10 mg Crestor daily due to pills not being scored to make 5 mg.     JOSE COLUNGA and Per MA sending a refill for Crestor 10 mg Daily to [facility] for pt.     Vivian verbalized understanding.

## 2023-02-28 ENCOUNTER — ANESTHESIA EVENT (OUTPATIENT)
Dept: PERIOP | Facility: HOSPITAL | Age: 68
DRG: 235 | End: 2023-02-28
Payer: MEDICARE

## 2023-02-28 ENCOUNTER — HOSPITAL ENCOUNTER (OUTPATIENT)
Dept: GENERAL RADIOLOGY | Facility: HOSPITAL | Age: 68
Discharge: HOME | DRG: 235 | End: 2023-02-28
Payer: MEDICARE

## 2023-02-28 ENCOUNTER — PRE-ADMISSION TESTING (OUTPATIENT)
Dept: PREADMISSION TESTING | Facility: HOSPITAL | Age: 68
DRG: 235 | End: 2023-02-28
Payer: MEDICARE

## 2023-02-28 VITALS — HEIGHT: 64 IN | BODY MASS INDEX: 28.4 KG/M2 | OXYGEN SATURATION: 96 % | WEIGHT: 166.34 LBS

## 2023-02-28 DIAGNOSIS — I25.10 CORONARY ARTERY DISEASE INVOLVING NATIVE CORONARY ARTERY OF NATIVE HEART, UNSPECIFIED WHETHER ANGINA PRESENT: ICD-10-CM

## 2023-02-28 LAB
ABO GROUP BLD: NORMAL
ALBUMIN SERPL-MCNC: 4.5 G/DL (ref 3.5–5.2)
ALBUMIN/GLOB SERPL: 1.5 G/DL
ALP SERPL-CCNC: 92 U/L (ref 39–117)
ALT SERPL W P-5'-P-CCNC: 32 U/L (ref 1–41)
AMPHET+METHAMPHET UR QL: NEGATIVE
AMPHETAMINES UR QL: NEGATIVE
ANION GAP SERPL CALCULATED.3IONS-SCNC: 8 MMOL/L (ref 5–15)
APTT PPP: 23.4 SECONDS (ref 22–39)
AST SERPL-CCNC: 22 U/L (ref 1–40)
BARBITURATES UR QL SCN: NEGATIVE
BASOPHILS # BLD AUTO: 0.09 10*3/MM3 (ref 0–0.2)
BASOPHILS NFR BLD AUTO: 1.1 % (ref 0–1.5)
BENZODIAZ UR QL SCN: NEGATIVE
BILIRUB SERPL-MCNC: 0.6 MG/DL (ref 0–1.2)
BLD GP AB SCN SERPL QL: NEGATIVE
BUN SERPL-MCNC: 18 MG/DL (ref 8–23)
BUN/CREAT SERPL: 17.5 (ref 7–25)
BUPRENORPHINE SERPL-MCNC: NEGATIVE NG/ML
CALCIUM SPEC-SCNC: 9.1 MG/DL (ref 8.6–10.5)
CANNABINOIDS SERPL QL: NEGATIVE
CHLORIDE SERPL-SCNC: 103 MMOL/L (ref 98–107)
CO2 SERPL-SCNC: 28 MMOL/L (ref 22–29)
COCAINE UR QL: NEGATIVE
CREAT SERPL-MCNC: 1.03 MG/DL (ref 0.76–1.27)
DEPRECATED RDW RBC AUTO: 42.5 FL (ref 37–54)
EGFRCR SERPLBLD CKD-EPI 2021: 79.6 ML/MIN/1.73
EOSINOPHIL # BLD AUTO: 0.48 10*3/MM3 (ref 0–0.4)
EOSINOPHIL NFR BLD AUTO: 5.8 % (ref 0.3–6.2)
ERYTHROCYTE [DISTWIDTH] IN BLOOD BY AUTOMATED COUNT: 13 % (ref 12.3–15.4)
GLOBULIN UR ELPH-MCNC: 3 GM/DL
GLUCOSE SERPL-MCNC: 91 MG/DL (ref 65–99)
HBA1C MFR BLD: 7.4 % (ref 4.8–5.6)
HCT VFR BLD AUTO: 51.5 % (ref 37.5–51)
HGB BLD-MCNC: 17 G/DL (ref 13–17.7)
IMM GRANULOCYTES # BLD AUTO: 0.03 10*3/MM3 (ref 0–0.05)
IMM GRANULOCYTES NFR BLD AUTO: 0.4 % (ref 0–0.5)
INR PPP: 1.01 (ref 0.84–1.13)
LYMPHOCYTES # BLD AUTO: 1.62 10*3/MM3 (ref 0.7–3.1)
LYMPHOCYTES NFR BLD AUTO: 19.6 % (ref 19.6–45.3)
MAGNESIUM SERPL-MCNC: 2.3 MG/DL (ref 1.6–2.4)
MCH RBC QN AUTO: 29.4 PG (ref 26.6–33)
MCHC RBC AUTO-ENTMCNC: 33 G/DL (ref 31.5–35.7)
MCV RBC AUTO: 89.1 FL (ref 79–97)
METHADONE UR QL SCN: NEGATIVE
MONOCYTES # BLD AUTO: 0.98 10*3/MM3 (ref 0.1–0.9)
MONOCYTES NFR BLD AUTO: 11.9 % (ref 5–12)
NEUTROPHILS NFR BLD AUTO: 5.06 10*3/MM3 (ref 1.7–7)
NEUTROPHILS NFR BLD AUTO: 61.2 % (ref 42.7–76)
NRBC BLD AUTO-RTO: 0 /100 WBC (ref 0–0.2)
OPIATES UR QL: NEGATIVE
OXYCODONE UR QL SCN: NEGATIVE
PA ADP PRP-ACNC: 137 PRU
PCP UR QL SCN: NEGATIVE
PLATELET # BLD AUTO: 194 10*3/MM3 (ref 140–450)
PMV BLD AUTO: 10.1 FL (ref 6–12)
POTASSIUM SERPL-SCNC: 4.2 MMOL/L (ref 3.5–5.2)
PROPOXYPH UR QL: NEGATIVE
PROT SERPL-MCNC: 7.5 G/DL (ref 6–8.5)
PROTHROMBIN TIME: 13.2 SECONDS (ref 11.4–14.4)
RBC # BLD AUTO: 5.78 10*6/MM3 (ref 4.14–5.8)
RH BLD: POSITIVE
SODIUM SERPL-SCNC: 139 MMOL/L (ref 136–145)
T&S EXPIRATION DATE: NORMAL
TRICYCLICS UR QL SCN: NEGATIVE
WBC NRBC COR # BLD: 8.26 10*3/MM3 (ref 3.4–10.8)

## 2023-02-28 PROCEDURE — 80053 COMPREHEN METABOLIC PANEL: CPT

## 2023-02-28 PROCEDURE — 80306 DRUG TEST PRSMV INSTRMNT: CPT

## 2023-02-28 PROCEDURE — 85730 THROMBOPLASTIN TIME PARTIAL: CPT

## 2023-02-28 PROCEDURE — 86900 BLOOD TYPING SEROLOGIC ABO: CPT

## 2023-02-28 PROCEDURE — 83036 HEMOGLOBIN GLYCOSYLATED A1C: CPT

## 2023-02-28 PROCEDURE — 86850 RBC ANTIBODY SCREEN: CPT

## 2023-02-28 PROCEDURE — 71046 X-RAY EXAM CHEST 2 VIEWS: CPT

## 2023-02-28 PROCEDURE — 85576 BLOOD PLATELET AGGREGATION: CPT

## 2023-02-28 PROCEDURE — 86901 BLOOD TYPING SEROLOGIC RH(D): CPT

## 2023-02-28 PROCEDURE — 85025 COMPLETE CBC W/AUTO DIFF WBC: CPT

## 2023-02-28 PROCEDURE — 83735 ASSAY OF MAGNESIUM: CPT

## 2023-02-28 PROCEDURE — 80305 DRUG TEST PRSMV DIR OPT OBS: CPT | Performed by: PHYSICIAN ASSISTANT

## 2023-02-28 PROCEDURE — 36415 COLL VENOUS BLD VENIPUNCTURE: CPT

## 2023-02-28 PROCEDURE — 85610 PROTHROMBIN TIME: CPT

## 2023-02-28 PROCEDURE — 86923 COMPATIBILITY TEST ELECTRIC: CPT

## 2023-02-28 RX ORDER — ASPIRIN 325 MG
325 TABLET ORAL NIGHTLY
Status: SHIPPED | OUTPATIENT
Start: 2023-02-28 | End: 2023-03-01

## 2023-02-28 RX ORDER — FAMOTIDINE 10 MG/ML
20 INJECTION, SOLUTION INTRAVENOUS ONCE
Status: CANCELLED | OUTPATIENT
Start: 2023-02-28 | End: 2023-02-28

## 2023-02-28 RX ORDER — CHLORHEXIDINE GLUCONATE 500 MG/1
1 CLOTH TOPICAL EVERY 12 HOURS PRN
Status: DISCONTINUED | OUTPATIENT
Start: 2023-02-28 | End: 2023-03-02

## 2023-03-01 ENCOUNTER — APPOINTMENT (OUTPATIENT)
Dept: GENERAL RADIOLOGY | Facility: HOSPITAL | Age: 68
DRG: 235 | End: 2023-03-01
Payer: MEDICARE

## 2023-03-01 ENCOUNTER — HOSPITAL ENCOUNTER (INPATIENT)
Facility: HOSPITAL | Age: 68
LOS: 19 days | Discharge: HOME HEALTH | DRG: 235 | End: 2023-03-20
Attending: THORACIC SURGERY (CARDIOTHORACIC VASCULAR SURGERY) | Admitting: THORACIC SURGERY (CARDIOTHORACIC VASCULAR SURGERY)
Payer: MEDICARE

## 2023-03-01 ENCOUNTER — ANESTHESIA EVENT CONVERTED (OUTPATIENT)
Dept: ANESTHESIOLOGY | Facility: HOSPITAL | Age: 68
DRG: 235 | End: 2023-03-01
Payer: MEDICARE

## 2023-03-01 ENCOUNTER — ANESTHESIA (OUTPATIENT)
Dept: PERIOP | Facility: HOSPITAL | Age: 68
DRG: 235 | End: 2023-03-01
Payer: MEDICARE

## 2023-03-01 ENCOUNTER — TELEPHONE (OUTPATIENT)
Dept: CARDIOLOGY | Facility: CLINIC | Age: 68
End: 2023-03-01
Payer: MEDICARE

## 2023-03-01 DIAGNOSIS — R94.39 POSITIVE CARDIAC STRESS TEST: ICD-10-CM

## 2023-03-01 DIAGNOSIS — I25.10 CORONARY ARTERY DISEASE INVOLVING NATIVE CORONARY ARTERY OF NATIVE HEART WITHOUT ANGINA PECTORIS: Primary | ICD-10-CM

## 2023-03-01 DIAGNOSIS — N17.9 ACUTE RENAL FAILURE, UNSPECIFIED ACUTE RENAL FAILURE TYPE: ICD-10-CM

## 2023-03-01 DIAGNOSIS — I25.10 CORONARY ARTERY DISEASE INVOLVING NATIVE CORONARY ARTERY OF NATIVE HEART, UNSPECIFIED WHETHER ANGINA PRESENT: ICD-10-CM

## 2023-03-01 DIAGNOSIS — I25.10 CORONARY ARTERY DISEASE INVOLVING NATIVE CORONARY ARTERY OF NATIVE HEART WITHOUT ANGINA PECTORIS: ICD-10-CM

## 2023-03-01 DIAGNOSIS — R13.13 PHARYNGEAL DYSPHAGIA: ICD-10-CM

## 2023-03-01 DIAGNOSIS — I50.21 ACUTE SYSTOLIC HEART FAILURE: ICD-10-CM

## 2023-03-01 DIAGNOSIS — J96.01 ACUTE RESPIRATORY FAILURE WITH HYPOXIA: ICD-10-CM

## 2023-03-01 PROBLEM — I50.22 CHRONIC SYSTOLIC CONGESTIVE HEART FAILURE: Status: ACTIVE | Noted: 2023-03-01

## 2023-03-01 PROBLEM — I25.2 HEALED MYOCARDIAL INFARCT: Status: RESOLVED | Noted: 2023-01-26 | Resolved: 2023-03-01

## 2023-03-01 PROBLEM — R00.0 TACHYCARDIA: Status: RESOLVED | Noted: 2021-06-30 | Resolved: 2023-03-01

## 2023-03-01 PROBLEM — R94.31 PROLONGED Q-T INTERVAL ON ECG: Status: ACTIVE | Noted: 2023-03-01

## 2023-03-01 PROBLEM — J41.1 MUCOPURULENT CHRONIC BRONCHITIS: Status: ACTIVE | Noted: 2023-03-01

## 2023-03-01 PROBLEM — F17.200 NICOTINE ADDICTION: Status: RESOLVED | Noted: 2023-01-26 | Resolved: 2023-03-01

## 2023-03-01 PROBLEM — R10.9 ABDOMINAL PAIN: Status: RESOLVED | Noted: 2023-01-26 | Resolved: 2023-03-01

## 2023-03-01 PROBLEM — Z00.00 ENCOUNTER FOR SUBSEQUENT ANNUAL WELLNESS VISIT (AWV) IN MEDICARE PATIENT: Status: RESOLVED | Noted: 2023-01-26 | Resolved: 2023-03-01
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
## 2023-03-01 PROBLEM — J45.909 MODERATE ASTHMA WITHOUT COMPLICATION: Status: RESOLVED | Noted: 2023-01-26 | Resolved: 2023-03-01

## 2023-03-01 PROBLEM — R06.02 SHORTNESS OF BREATH: Status: RESOLVED | Noted: 2022-12-13 | Resolved: 2023-03-01

## 2023-03-01 LAB
ABO GROUP BLD: NORMAL
ALBUMIN SERPL-MCNC: 4.3 G/DL (ref 3.5–5.2)
ALBUMIN SERPL-MCNC: 4.5 G/DL (ref 3.5–5.2)
ANION GAP SERPL CALCULATED.3IONS-SCNC: 10 MMOL/L (ref 5–15)
ANION GAP SERPL CALCULATED.3IONS-SCNC: 15 MMOL/L (ref 5–15)
APTT PPP: 31.3 SECONDS (ref 22–39)
ARTERIAL PATENCY WRIST A: ABNORMAL
ARTERIAL PATENCY WRIST A: ABNORMAL
ATMOSPHERIC PRESS: ABNORMAL MM[HG]
ATMOSPHERIC PRESS: ABNORMAL MM[HG]
BASE EXCESS BLDA CALC-SCNC: -0.6 MMOL/L (ref 0–2)
BASE EXCESS BLDA CALC-SCNC: -4 MMOL/L (ref 0–2)
BDY SITE: ABNORMAL
BDY SITE: ABNORMAL
BODY TEMPERATURE: 37 C
BODY TEMPERATURE: 37 C
BUN SERPL-MCNC: 17 MG/DL (ref 8–23)
BUN SERPL-MCNC: 18 MG/DL (ref 8–23)
BUN/CREAT SERPL: 11.7 (ref 7–25)
BUN/CREAT SERPL: 13.2 (ref 7–25)
CA-I SERPL ISE-MCNC: 1.32 MMOL/L (ref 1.12–1.32)
CALCIUM SPEC-SCNC: 8.8 MG/DL (ref 8.6–10.5)
CALCIUM SPEC-SCNC: 9.8 MG/DL (ref 8.6–10.5)
CHLORIDE SERPL-SCNC: 106 MMOL/L (ref 98–107)
CHLORIDE SERPL-SCNC: 107 MMOL/L (ref 98–107)
CO2 BLDA-SCNC: 24 MMOL/L (ref 22–33)
CO2 BLDA-SCNC: 24.1 MMOL/L (ref 22–33)
CO2 SERPL-SCNC: 22 MMOL/L (ref 22–29)
CO2 SERPL-SCNC: 23 MMOL/L (ref 22–29)
COHGB MFR BLD: 1 % (ref 0–2)
COHGB MFR BLD: 1.1 % (ref 0–2)
COTININE UR QL SCN: NEGATIVE NG/ML
CREAT SERPL-MCNC: 1.29 MG/DL (ref 0.76–1.27)
CREAT SERPL-MCNC: 1.54 MG/DL (ref 0.76–1.27)
DEPRECATED RDW RBC AUTO: 43.8 FL (ref 37–54)
DEPRECATED RDW RBC AUTO: 45 FL (ref 37–54)
EGFRCR SERPLBLD CKD-EPI 2021: 49.1 ML/MIN/1.73
EGFRCR SERPLBLD CKD-EPI 2021: 60.8 ML/MIN/1.73
EPAP: 0
EPAP: 0
ERYTHROCYTE [DISTWIDTH] IN BLOOD BY AUTOMATED COUNT: 13.4 % (ref 12.3–15.4)
ERYTHROCYTE [DISTWIDTH] IN BLOOD BY AUTOMATED COUNT: 13.5 % (ref 12.3–15.4)
GLUCOSE BLDC GLUCOMTR-MCNC: 128 MG/DL (ref 70–130)
GLUCOSE BLDC GLUCOMTR-MCNC: 129 MG/DL (ref 70–130)
GLUCOSE BLDC GLUCOMTR-MCNC: 156 MG/DL (ref 70–130)
GLUCOSE BLDC GLUCOMTR-MCNC: 163 MG/DL (ref 70–130)
GLUCOSE BLDC GLUCOMTR-MCNC: 164 MG/DL (ref 70–130)
GLUCOSE BLDC GLUCOMTR-MCNC: 165 MG/DL (ref 70–130)
GLUCOSE BLDC GLUCOMTR-MCNC: 169 MG/DL (ref 70–130)
GLUCOSE BLDC GLUCOMTR-MCNC: 183 MG/DL (ref 70–130)
GLUCOSE BLDC GLUCOMTR-MCNC: 189 MG/DL (ref 70–130)
GLUCOSE BLDC GLUCOMTR-MCNC: 274 MG/DL (ref 70–130)
GLUCOSE SERPL-MCNC: 138 MG/DL (ref 65–99)
GLUCOSE SERPL-MCNC: 183 MG/DL (ref 65–99)
HCO3 BLDA-SCNC: 22.6 MMOL/L (ref 20–26)
HCO3 BLDA-SCNC: 23.1 MMOL/L (ref 20–26)
HCT VFR BLD AUTO: 38.1 % (ref 37.5–51)
HCT VFR BLD AUTO: 41.7 % (ref 37.5–51)
HCT VFR BLD CALC: 38.8 % (ref 38–51)
HCT VFR BLD CALC: 43.8 % (ref 38–51)
HGB BLD-MCNC: 12.7 G/DL (ref 13–17.7)
HGB BLD-MCNC: 14.1 G/DL (ref 13–17.7)
HGB BLDA-MCNC: 12.7 G/DL (ref 13.5–17.5)
HGB BLDA-MCNC: 14.3 G/DL (ref 13.5–17.5)
INHALED O2 CONCENTRATION: 100 %
INHALED O2 CONCENTRATION: 40 %
INR PPP: 1.43 (ref 0.84–1.13)
IPAP: 0
IPAP: 0
Lab: NORMAL
MAGNESIUM SERPL-MCNC: 2.3 MG/DL (ref 1.6–2.4)
MAGNESIUM SERPL-MCNC: 2.4 MG/DL (ref 1.6–2.4)
MCH RBC QN AUTO: 30.2 PG (ref 26.6–33)
MCH RBC QN AUTO: 30.5 PG (ref 26.6–33)
MCHC RBC AUTO-ENTMCNC: 33.3 G/DL (ref 31.5–35.7)
MCHC RBC AUTO-ENTMCNC: 33.8 G/DL (ref 31.5–35.7)
MCV RBC AUTO: 89.3 FL (ref 79–97)
MCV RBC AUTO: 91.4 FL (ref 79–97)
METHGB BLD QL: 0.8 % (ref 0–1.5)
METHGB BLD QL: 0.9 % (ref 0–1.5)
MODALITY: ABNORMAL
MODALITY: ABNORMAL
NOTE: ABNORMAL
NOTE: ABNORMAL
OXYHGB MFR BLDV: 96.7 % (ref 94–99)
OXYHGB MFR BLDV: 98.6 % (ref 94–99)
PAW @ PEAK INSP FLOW SETTING VENT: 0 CMH2O
PAW @ PEAK INSP FLOW SETTING VENT: 0 CMH2O
PCO2 BLDA: 34.5 MM HG (ref 35–45)
PCO2 BLDA: 46.3 MM HG (ref 35–45)
PCO2 TEMP ADJ BLD: 34.5 MM HG (ref 35–48)
PCO2 TEMP ADJ BLD: 46.3 MM HG (ref 35–48)
PH BLDA: 7.3 PH UNITS (ref 7.35–7.45)
PH BLDA: 7.43 PH UNITS (ref 7.35–7.45)
PH, TEMP CORRECTED: 7.3 PH UNITS
PH, TEMP CORRECTED: 7.43 PH UNITS
PHOSPHATE SERPL-MCNC: 1.6 MG/DL (ref 2.5–4.5)
PHOSPHATE SERPL-MCNC: 6.2 MG/DL (ref 2.5–4.5)
PLATELET # BLD AUTO: 211 10*3/MM3 (ref 140–450)
PLATELET # BLD AUTO: 222 10*3/MM3 (ref 140–450)
PMV BLD AUTO: 10.5 FL (ref 6–12)
PMV BLD AUTO: 10.7 FL (ref 6–12)
PO2 BLDA: 114 MM HG (ref 83–108)
PO2 BLDA: 457 MM HG (ref 83–108)
PO2 TEMP ADJ BLD: 114 MM HG (ref 83–108)
PO2 TEMP ADJ BLD: 457 MM HG (ref 83–108)
POTASSIUM SERPL-SCNC: 4.2 MMOL/L (ref 3.5–5.2)
POTASSIUM SERPL-SCNC: 4.9 MMOL/L (ref 3.5–5.2)
PROTHROMBIN TIME: 17.4 SECONDS (ref 11.4–14.4)
QT INTERVAL: 442 MS
QTC INTERVAL: 500 MS
RBC # BLD AUTO: 4.17 10*6/MM3 (ref 4.14–5.8)
RBC # BLD AUTO: 4.67 10*6/MM3 (ref 4.14–5.8)
RH BLD: POSITIVE
SODIUM SERPL-SCNC: 139 MMOL/L (ref 136–145)
SODIUM SERPL-SCNC: 144 MMOL/L (ref 136–145)
TOTAL RATE: 0 BREATHS/MINUTE
TOTAL RATE: 0 BREATHS/MINUTE
WBC NRBC COR # BLD: 17.61 10*3/MM3 (ref 3.4–10.8)
WBC NRBC COR # BLD: 19.66 10*3/MM3 (ref 3.4–10.8)

## 2023-03-01 PROCEDURE — 94002 VENT MGMT INPAT INIT DAY: CPT

## 2023-03-01 PROCEDURE — 33533 CABG ARTERIAL SINGLE: CPT | Performed by: THORACIC SURGERY (CARDIOTHORACIC VASCULAR SURGERY)

## 2023-03-01 PROCEDURE — 25010000002 FENTANYL CITRATE (PF) 50 MCG/ML SOLUTION: Performed by: THORACIC SURGERY (CARDIOTHORACIC VASCULAR SURGERY)

## 2023-03-01 PROCEDURE — 93005 ELECTROCARDIOGRAM TRACING: CPT | Performed by: PHYSICIAN ASSISTANT

## 2023-03-01 PROCEDURE — 85014 HEMATOCRIT: CPT

## 2023-03-01 PROCEDURE — P9100 PATHOGEN TEST FOR PLATELETS: HCPCS

## 2023-03-01 PROCEDURE — 82375 ASSAY CARBOXYHB QUANT: CPT

## 2023-03-01 PROCEDURE — P9041 ALBUMIN (HUMAN),5%, 50ML: HCPCS | Performed by: PHYSICIAN ASSISTANT

## 2023-03-01 PROCEDURE — 93010 ELECTROCARDIOGRAM REPORT: CPT | Performed by: STUDENT IN AN ORGANIZED HEALTH CARE EDUCATION/TRAINING PROGRAM

## 2023-03-01 PROCEDURE — 25010000002 AMIODARONE PER 30 MG: Performed by: ANESTHESIOLOGY

## 2023-03-01 PROCEDURE — 25010000002 ALBUMIN HUMAN 5% PER 50 ML: Performed by: PHYSICIAN ASSISTANT

## 2023-03-01 PROCEDURE — 63710000001 INSULIN REGULAR HUMAN PER 5 UNITS: Performed by: ANESTHESIOLOGY

## 2023-03-01 PROCEDURE — 25010000002 AMIODARONE IN DEXTROSE 5% 360-4.14 MG/200ML-% SOLUTION: Performed by: ANESTHESIOLOGY

## 2023-03-01 PROCEDURE — 94640 AIRWAY INHALATION TREATMENT: CPT

## 2023-03-01 PROCEDURE — 36430 TRANSFUSION BLD/BLD COMPNT: CPT

## 2023-03-01 PROCEDURE — 83050 HGB METHEMOGLOBIN QUAN: CPT

## 2023-03-01 PROCEDURE — 82805 BLOOD GASES W/O2 SATURATION: CPT

## 2023-03-01 PROCEDURE — 25010000002 CEFAZOLIN IN DEXTROSE 2-4 GM/100ML-% SOLUTION: Performed by: PHYSICIAN ASSISTANT

## 2023-03-01 PROCEDURE — 33518 CABG ARTERY-VEIN TWO: CPT | Performed by: PHYSICIAN ASSISTANT

## 2023-03-01 PROCEDURE — 25010000002 ALBUMIN HUMAN 5% PER 50 ML

## 2023-03-01 PROCEDURE — 33508 ENDOSCOPIC VEIN HARVEST: CPT | Performed by: PHYSICIAN ASSISTANT

## 2023-03-01 PROCEDURE — 85027 COMPLETE CBC AUTOMATED: CPT | Performed by: PHYSICIAN ASSISTANT

## 2023-03-01 PROCEDURE — 25010000002 PROPOFOL 1000 MG/ML EMULSION: Performed by: ANESTHESIOLOGY

## 2023-03-01 PROCEDURE — 94761 N-INVAS EAR/PLS OXIMETRY MLT: CPT

## 2023-03-01 PROCEDURE — 84132 ASSAY OF SERUM POTASSIUM: CPT

## 2023-03-01 PROCEDURE — 25010000002 HEPARIN (PORCINE) PER 1000 UNITS: Performed by: ANESTHESIOLOGY

## 2023-03-01 PROCEDURE — 25010000002 PROTAMINE SULFATE PER 10 MG

## 2023-03-01 PROCEDURE — 94799 UNLISTED PULMONARY SVC/PX: CPT

## 2023-03-01 PROCEDURE — 85347 COAGULATION TIME ACTIVATED: CPT

## 2023-03-01 PROCEDURE — P9041 ALBUMIN (HUMAN),5%, 50ML: HCPCS

## 2023-03-01 PROCEDURE — 80069 RENAL FUNCTION PANEL: CPT | Performed by: PHYSICIAN ASSISTANT

## 2023-03-01 PROCEDURE — 99223 1ST HOSP IP/OBS HIGH 75: CPT | Performed by: INTERNAL MEDICINE

## 2023-03-01 PROCEDURE — 93005 ELECTROCARDIOGRAM TRACING: CPT | Performed by: THORACIC SURGERY (CARDIOTHORACIC VASCULAR SURGERY)

## 2023-03-01 PROCEDURE — 25010000002 PHENYLEPHRINE 10 MG/ML SOLUTION: Performed by: ANESTHESIOLOGY

## 2023-03-01 PROCEDURE — 06BP4ZZ EXCISION OF RIGHT SAPHENOUS VEIN, PERCUTANEOUS ENDOSCOPIC APPROACH: ICD-10-PCS | Performed by: THORACIC SURGERY (CARDIOTHORACIC VASCULAR SURGERY)

## 2023-03-01 PROCEDURE — 83735 ASSAY OF MAGNESIUM: CPT | Performed by: PHYSICIAN ASSISTANT

## 2023-03-01 PROCEDURE — C1894 INTRO/SHEATH, NON-LASER: HCPCS | Performed by: THORACIC SURGERY (CARDIOTHORACIC VASCULAR SURGERY)

## 2023-03-01 PROCEDURE — 82962 GLUCOSE BLOOD TEST: CPT

## 2023-03-01 PROCEDURE — 25010000002 EPINEPHRINE 1 MG/ML SOLUTION 30 ML VIAL: Performed by: PHYSICIAN ASSISTANT

## 2023-03-01 PROCEDURE — 84295 ASSAY OF SERUM SODIUM: CPT

## 2023-03-01 PROCEDURE — 71045 X-RAY EXAM CHEST 1 VIEW: CPT

## 2023-03-01 PROCEDURE — 99232 SBSQ HOSP IP/OBS MODERATE 35: CPT | Performed by: NURSE PRACTITIONER

## 2023-03-01 PROCEDURE — 25010000002 GENTAMICIN PER 80 MG: Performed by: THORACIC SURGERY (CARDIOTHORACIC VASCULAR SURGERY)

## 2023-03-01 PROCEDURE — 25010000002 VANCOMYCIN 1 G RECONSTITUTED SOLUTION: Performed by: THORACIC SURGERY (CARDIOTHORACIC VASCULAR SURGERY)

## 2023-03-01 PROCEDURE — 25810000003 DEXTROSE 5 % WITH KCL 20 MEQ 20 MEQ/L SOLUTION: Performed by: PHYSICIAN ASSISTANT

## 2023-03-01 PROCEDURE — P9035 PLATELET PHERES LEUKOREDUCED: HCPCS

## 2023-03-01 PROCEDURE — 25010000002 PROPOFOL 10 MG/ML EMULSION

## 2023-03-01 PROCEDURE — 25010000002 FENTANYL CITRATE (PF) 1000 MCG/20ML SOLUTION: Performed by: ANESTHESIOLOGY

## 2023-03-01 PROCEDURE — 33533 CABG ARTERIAL SINGLE: CPT | Performed by: PHYSICIAN ASSISTANT

## 2023-03-01 PROCEDURE — 25010000002 MIDAZOLAM PER 1 MG: Performed by: ANESTHESIOLOGY

## 2023-03-01 PROCEDURE — 82803 BLOOD GASES ANY COMBINATION: CPT

## 2023-03-01 PROCEDURE — 0 ACETAMINOPHEN 10 MG/ML SOLUTION: Performed by: ANESTHESIOLOGY

## 2023-03-01 PROCEDURE — 25010000002 AMIODARONE PER 30 MG: Performed by: THORACIC SURGERY (CARDIOTHORACIC VASCULAR SURGERY)

## 2023-03-01 PROCEDURE — 25010000002 PAPAVERINE PER 60 MG: Performed by: THORACIC SURGERY (CARDIOTHORACIC VASCULAR SURGERY)

## 2023-03-01 PROCEDURE — 85730 THROMBOPLASTIN TIME PARTIAL: CPT | Performed by: PHYSICIAN ASSISTANT

## 2023-03-01 PROCEDURE — C1751 CATH, INF, PER/CENT/MIDLINE: HCPCS | Performed by: ANESTHESIOLOGY

## 2023-03-01 PROCEDURE — 82330 ASSAY OF CALCIUM: CPT

## 2023-03-01 PROCEDURE — 86900 BLOOD TYPING SEROLOGIC ABO: CPT

## 2023-03-01 PROCEDURE — 33508 ENDOSCOPIC VEIN HARVEST: CPT | Performed by: THORACIC SURGERY (CARDIOTHORACIC VASCULAR SURGERY)

## 2023-03-01 PROCEDURE — 25010000002 MORPHINE PER 10 MG: Performed by: THORACIC SURGERY (CARDIOTHORACIC VASCULAR SURGERY)

## 2023-03-01 PROCEDURE — 82947 ASSAY GLUCOSE BLOOD QUANT: CPT

## 2023-03-01 PROCEDURE — 25010000002 HEPARIN (PORCINE) PER 1000 UNITS: Performed by: THORACIC SURGERY (CARDIOTHORACIC VASCULAR SURGERY)

## 2023-03-01 PROCEDURE — 25010000002 CEFAZOLIN PER 500 MG: Performed by: THORACIC SURGERY (CARDIOTHORACIC VASCULAR SURGERY)

## 2023-03-01 PROCEDURE — 82330 ASSAY OF CALCIUM: CPT | Performed by: PHYSICIAN ASSISTANT

## 2023-03-01 PROCEDURE — 86901 BLOOD TYPING SEROLOGIC RH(D): CPT

## 2023-03-01 PROCEDURE — 85610 PROTHROMBIN TIME: CPT | Performed by: PHYSICIAN ASSISTANT

## 2023-03-01 PROCEDURE — 0211093 BYPASS CORONARY ARTERY, TWO ARTERIES FROM CORONARY ARTERY WITH AUTOLOGOUS VENOUS TISSUE, OPEN APPROACH: ICD-10-PCS | Performed by: THORACIC SURGERY (CARDIOTHORACIC VASCULAR SURGERY)

## 2023-03-01 PROCEDURE — 25010000002 ONDANSETRON PER 1 MG: Performed by: PHYSICIAN ASSISTANT

## 2023-03-01 PROCEDURE — 0 ACETAMINOPHEN 10 MG/ML SOLUTION: Performed by: PHYSICIAN ASSISTANT

## 2023-03-01 PROCEDURE — 33518 CABG ARTERY-VEIN TWO: CPT | Performed by: THORACIC SURGERY (CARDIOTHORACIC VASCULAR SURGERY)

## 2023-03-01 PROCEDURE — 25010000002 PROTAMINE SULFATE PER 10 MG: Performed by: ANESTHESIOLOGY

## 2023-03-01 PROCEDURE — 5A1221Z PERFORMANCE OF CARDIAC OUTPUT, CONTINUOUS: ICD-10-PCS | Performed by: THORACIC SURGERY (CARDIOTHORACIC VASCULAR SURGERY)

## 2023-03-01 PROCEDURE — 25010000002 CEFAZOLIN PER 500 MG: Performed by: ANESTHESIOLOGY

## 2023-03-01 PROCEDURE — 02100Z9 BYPASS CORONARY ARTERY, ONE ARTERY FROM LEFT INTERNAL MAMMARY, OPEN APPROACH: ICD-10-PCS | Performed by: THORACIC SURGERY (CARDIOTHORACIC VASCULAR SURGERY)

## 2023-03-01 RX ORDER — MIDAZOLAM HYDROCHLORIDE 1 MG/ML
INJECTION INTRAMUSCULAR; INTRAVENOUS AS NEEDED
Status: DISCONTINUED | OUTPATIENT
Start: 2023-03-01 | End: 2023-03-01 | Stop reason: SURG

## 2023-03-01 RX ORDER — VANCOMYCIN HYDROCHLORIDE 1 G/20ML
INJECTION, POWDER, LYOPHILIZED, FOR SOLUTION INTRAVENOUS AS NEEDED
Status: DISCONTINUED | OUTPATIENT
Start: 2023-03-01 | End: 2023-03-01 | Stop reason: HOSPADM

## 2023-03-01 RX ORDER — AMIODARONE HCL/D5W 450 MG/250
1 PLASTIC BAG, INJECTION (ML) INTRAVENOUS CONTINUOUS
Status: DISCONTINUED | OUTPATIENT
Start: 2023-03-01 | End: 2023-03-01

## 2023-03-01 RX ORDER — ATORVASTATIN CALCIUM 40 MG/1
40 TABLET, FILM COATED ORAL NIGHTLY
Status: DISCONTINUED | OUTPATIENT
Start: 2023-03-01 | End: 2023-03-03

## 2023-03-01 RX ORDER — SODIUM CHLORIDE 9 MG/ML
INJECTION, SOLUTION INTRAVENOUS AS NEEDED
Status: DISCONTINUED | OUTPATIENT
Start: 2023-03-01 | End: 2023-03-01 | Stop reason: HOSPADM

## 2023-03-01 RX ORDER — AMIODARONE HCL/D5W 450 MG/250
0.5 PLASTIC BAG, INJECTION (ML) INTRAVENOUS CONTINUOUS
Status: DISCONTINUED | OUTPATIENT
Start: 2023-03-01 | End: 2023-03-03

## 2023-03-01 RX ORDER — SODIUM CHLORIDE 0.9 % (FLUSH) 0.9 %
10 SYRINGE (ML) INJECTION EVERY 12 HOURS SCHEDULED
Status: DISCONTINUED | OUTPATIENT
Start: 2023-03-01 | End: 2023-03-01 | Stop reason: HOSPADM

## 2023-03-01 RX ORDER — NICARDIPINE HYDROCHLORIDE 2.5 MG/ML
INJECTION INTRAVENOUS AS NEEDED
Status: DISCONTINUED | OUTPATIENT
Start: 2023-03-01 | End: 2023-03-01 | Stop reason: SURG

## 2023-03-01 RX ORDER — DEXMEDETOMIDINE HYDROCHLORIDE 4 UG/ML
.2-1.5 INJECTION, SOLUTION INTRAVENOUS CONTINUOUS PRN
Status: DISCONTINUED | OUTPATIENT
Start: 2023-03-01 | End: 2023-03-02

## 2023-03-01 RX ORDER — CEFAZOLIN SODIUM 1 G/3ML
INJECTION, POWDER, FOR SOLUTION INTRAMUSCULAR; INTRAVENOUS AS NEEDED
Status: DISCONTINUED | OUTPATIENT
Start: 2023-03-01 | End: 2023-03-01 | Stop reason: SURG

## 2023-03-01 RX ORDER — CEFAZOLIN SODIUM 2 G/100ML
2 INJECTION, SOLUTION INTRAVENOUS EVERY 8 HOURS
Status: DISCONTINUED | OUTPATIENT
Start: 2023-03-01 | End: 2023-03-01

## 2023-03-01 RX ORDER — AMIODARONE HCL/D5W 450 MG/250
1 PLASTIC BAG, INJECTION (ML) INTRAVENOUS CONTINUOUS
Status: DISPENSED | OUTPATIENT
Start: 2023-03-01 | End: 2023-03-01

## 2023-03-01 RX ORDER — LIDOCAINE HYDROCHLORIDE 10 MG/ML
0.5 INJECTION, SOLUTION EPIDURAL; INFILTRATION; INTRACAUDAL; PERINEURAL ONCE AS NEEDED
Status: COMPLETED | OUTPATIENT
Start: 2023-03-01 | End: 2023-03-01

## 2023-03-01 RX ORDER — PROTAMINE SULFATE 10 MG/ML
INJECTION, SOLUTION INTRAVENOUS AS NEEDED
Status: DISCONTINUED | OUTPATIENT
Start: 2023-03-01 | End: 2023-03-01 | Stop reason: SURG

## 2023-03-01 RX ORDER — SODIUM CHLORIDE 0.9 % (FLUSH) 0.9 %
10 SYRINGE (ML) INJECTION AS NEEDED
Status: DISCONTINUED | OUTPATIENT
Start: 2023-03-01 | End: 2023-03-01 | Stop reason: HOSPADM

## 2023-03-01 RX ORDER — SODIUM CHLORIDE 9 MG/ML
INJECTION, SOLUTION INTRAVENOUS CONTINUOUS PRN
Status: DISCONTINUED | OUTPATIENT
Start: 2023-03-01 | End: 2023-03-01 | Stop reason: SURG

## 2023-03-01 RX ORDER — NOREPINEPHRINE BIT/0.9 % NACL 8 MG/250ML
INFUSION BOTTLE (ML) INTRAVENOUS CONTINUOUS PRN
Status: DISCONTINUED | OUTPATIENT
Start: 2023-03-01 | End: 2023-03-01 | Stop reason: SURG

## 2023-03-01 RX ORDER — BUDESONIDE 0.5 MG/2ML
0.5 INHALANT ORAL
Status: DISCONTINUED | OUTPATIENT
Start: 2023-03-01 | End: 2023-03-13

## 2023-03-01 RX ORDER — ETOMIDATE 2 MG/ML
INJECTION INTRAVENOUS AS NEEDED
Status: DISCONTINUED | OUTPATIENT
Start: 2023-03-01 | End: 2023-03-01 | Stop reason: SURG

## 2023-03-01 RX ORDER — NITROGLYCERIN 20 MG/100ML
5-200 INJECTION INTRAVENOUS CONTINUOUS PRN
Status: DISCONTINUED | OUTPATIENT
Start: 2023-03-01 | End: 2023-03-13

## 2023-03-01 RX ORDER — ACETAMINOPHEN 10 MG/ML
1000 INJECTION, SOLUTION INTRAVENOUS ONCE
Status: COMPLETED | OUTPATIENT
Start: 2023-03-01 | End: 2023-03-01

## 2023-03-01 RX ORDER — AMIODARONE HCL/D5W 450 MG/250
0.5 PLASTIC BAG, INJECTION (ML) INTRAVENOUS CONTINUOUS
Status: DISCONTINUED | OUTPATIENT
Start: 2023-03-01 | End: 2023-03-01

## 2023-03-01 RX ORDER — CEFAZOLIN SODIUM 2 G/100ML
2 INJECTION, SOLUTION INTRAVENOUS EVERY 8 HOURS
Status: DISCONTINUED | OUTPATIENT
Start: 2023-03-01 | End: 2023-03-02

## 2023-03-01 RX ORDER — MORPHINE SULFATE 2 MG/ML
2 INJECTION, SOLUTION INTRAMUSCULAR; INTRAVENOUS
Status: DISCONTINUED | OUTPATIENT
Start: 2023-03-01 | End: 2023-03-01

## 2023-03-01 RX ORDER — CHLORHEXIDINE GLUCONATE 0.12 MG/ML
15 RINSE ORAL ONCE
Status: COMPLETED | OUTPATIENT
Start: 2023-03-01 | End: 2023-03-01

## 2023-03-01 RX ORDER — DEXTROSE MONOHYDRATE 25 G/50ML
10-50 INJECTION, SOLUTION INTRAVENOUS
Status: DISCONTINUED | OUTPATIENT
Start: 2023-03-01 | End: 2023-03-03

## 2023-03-01 RX ORDER — EPHEDRINE SULFATE 50 MG/ML
INJECTION INTRAVENOUS AS NEEDED
Status: DISCONTINUED | OUTPATIENT
Start: 2023-03-01 | End: 2023-03-01 | Stop reason: SURG

## 2023-03-01 RX ORDER — ONDANSETRON 2 MG/ML
4 INJECTION INTRAMUSCULAR; INTRAVENOUS EVERY 6 HOURS PRN
Status: DISCONTINUED | OUTPATIENT
Start: 2023-03-01 | End: 2023-03-20 | Stop reason: HOSPADM

## 2023-03-01 RX ORDER — PROTAMINE SULFATE 10 MG/ML
50 INJECTION, SOLUTION INTRAVENOUS ONCE
Status: COMPLETED | OUTPATIENT
Start: 2023-03-01 | End: 2023-03-01

## 2023-03-01 RX ORDER — PROTAMINE SULFATE 10 MG/ML
INJECTION, SOLUTION INTRAVENOUS
Status: COMPLETED
Start: 2023-03-01 | End: 2023-03-01

## 2023-03-01 RX ORDER — MEPERIDINE HYDROCHLORIDE 25 MG/ML
25 INJECTION INTRAMUSCULAR; INTRAVENOUS; SUBCUTANEOUS EVERY 4 HOURS PRN
Status: ACTIVE | OUTPATIENT
Start: 2023-03-01 | End: 2023-03-01

## 2023-03-01 RX ORDER — MIDAZOLAM HYDROCHLORIDE 1 MG/ML
0.5 INJECTION INTRAMUSCULAR; INTRAVENOUS
Status: DISCONTINUED | OUTPATIENT
Start: 2023-03-01 | End: 2023-03-01 | Stop reason: HOSPADM

## 2023-03-01 RX ORDER — ASPIRIN 325 MG
325 TABLET ORAL ONCE
Status: COMPLETED | OUTPATIENT
Start: 2023-03-01 | End: 2023-03-01

## 2023-03-01 RX ORDER — OXYCODONE HYDROCHLORIDE 5 MG/1
10 TABLET ORAL EVERY 4 HOURS PRN
Status: DISCONTINUED | OUTPATIENT
Start: 2023-03-01 | End: 2023-03-03

## 2023-03-01 RX ORDER — MORPHINE SULFATE 2 MG/ML
2 INJECTION, SOLUTION INTRAMUSCULAR; INTRAVENOUS
Status: DISPENSED | OUTPATIENT
Start: 2023-03-01 | End: 2023-03-08

## 2023-03-01 RX ORDER — ALBUMIN, HUMAN INJ 5% 5 %
SOLUTION INTRAVENOUS
Status: COMPLETED
Start: 2023-03-01 | End: 2023-03-01

## 2023-03-01 RX ORDER — HYDROCODONE BITARTRATE AND ACETAMINOPHEN 7.5; 325 MG/1; MG/1
1 TABLET ORAL EVERY 4 HOURS PRN
Status: DISCONTINUED | OUTPATIENT
Start: 2023-03-01 | End: 2023-03-03

## 2023-03-01 RX ORDER — ASPIRIN 325 MG
325 TABLET, DELAYED RELEASE (ENTERIC COATED) ORAL DAILY
Status: DISCONTINUED | OUTPATIENT
Start: 2023-03-02 | End: 2023-03-03

## 2023-03-01 RX ORDER — PROPOFOL 10 MG/ML
VIAL (ML) INTRAVENOUS
Status: COMPLETED
Start: 2023-03-01 | End: 2023-03-01

## 2023-03-01 RX ORDER — DOPAMINE HYDROCHLORIDE 160 MG/100ML
2-20 INJECTION, SOLUTION INTRAVENOUS CONTINUOUS PRN
Status: DISCONTINUED | OUTPATIENT
Start: 2023-03-01 | End: 2023-03-03

## 2023-03-01 RX ORDER — ALBUTEROL SULFATE 2.5 MG/3ML
2.5 SOLUTION RESPIRATORY (INHALATION) EVERY 4 HOURS PRN
Status: DISCONTINUED | OUTPATIENT
Start: 2023-03-01 | End: 2023-03-01

## 2023-03-01 RX ORDER — NITROGLYCERIN 0.4 MG/1
0.4 TABLET SUBLINGUAL
Status: DISCONTINUED | OUTPATIENT
Start: 2023-03-01 | End: 2023-03-01 | Stop reason: HOSPADM

## 2023-03-01 RX ORDER — ROCURONIUM BROMIDE 10 MG/ML
INJECTION, SOLUTION INTRAVENOUS AS NEEDED
Status: DISCONTINUED | OUTPATIENT
Start: 2023-03-01 | End: 2023-03-01 | Stop reason: SURG

## 2023-03-01 RX ORDER — POTASSIUM CHLORIDE, DEXTROSE MONOHYDRATE 150; 5 MG/100ML; G/100ML
30 INJECTION, SOLUTION INTRAVENOUS CONTINUOUS
Status: DISCONTINUED | OUTPATIENT
Start: 2023-03-01 | End: 2023-03-02

## 2023-03-01 RX ORDER — FENTANYL CITRATE 0.05 MG/ML
INJECTION, SOLUTION INTRAMUSCULAR; INTRAVENOUS AS NEEDED
Status: DISCONTINUED | OUTPATIENT
Start: 2023-03-01 | End: 2023-03-01 | Stop reason: SURG

## 2023-03-01 RX ORDER — NOREPINEPHRINE BIT/0.9 % NACL 8 MG/250ML
.02-.3 INFUSION BOTTLE (ML) INTRAVENOUS CONTINUOUS PRN
Status: DISCONTINUED | OUTPATIENT
Start: 2023-03-01 | End: 2023-03-15

## 2023-03-01 RX ORDER — METOPROLOL TARTRATE 5 MG/5ML
2.5 INJECTION INTRAVENOUS EVERY 6 HOURS SCHEDULED
Status: ACTIVE | OUTPATIENT
Start: 2023-03-01 | End: 2023-03-02

## 2023-03-01 RX ORDER — DOBUTAMINE HYDROCHLORIDE 100 MG/100ML
5 INJECTION INTRAVENOUS CONTINUOUS PRN
Status: DISCONTINUED | OUTPATIENT
Start: 2023-03-01 | End: 2023-03-08

## 2023-03-01 RX ORDER — FAMOTIDINE 20 MG/1
20 TABLET, FILM COATED ORAL ONCE
Status: COMPLETED | OUTPATIENT
Start: 2023-03-01 | End: 2023-03-01

## 2023-03-01 RX ORDER — AMINOCAPROIC ACID 250 MG/ML
INJECTION, SOLUTION INTRAVENOUS AS NEEDED
Status: DISCONTINUED | OUTPATIENT
Start: 2023-03-01 | End: 2023-03-01 | Stop reason: SURG

## 2023-03-01 RX ORDER — NITROGLYCERIN 20 MG/100ML
INJECTION INTRAVENOUS AS NEEDED
Status: DISCONTINUED | OUTPATIENT
Start: 2023-03-01 | End: 2023-03-01 | Stop reason: SURG

## 2023-03-01 RX ORDER — SODIUM CHLORIDE 9 MG/ML
40 INJECTION, SOLUTION INTRAVENOUS AS NEEDED
Status: DISCONTINUED | OUTPATIENT
Start: 2023-03-01 | End: 2023-03-01 | Stop reason: HOSPADM

## 2023-03-01 RX ORDER — ALBUMIN, HUMAN INJ 5% 5 %
250 SOLUTION INTRAVENOUS AS NEEDED
Status: DISCONTINUED | OUTPATIENT
Start: 2023-03-01 | End: 2023-03-02

## 2023-03-01 RX ORDER — ARFORMOTEROL TARTRATE 15 UG/2ML
15 SOLUTION RESPIRATORY (INHALATION)
Status: DISCONTINUED | OUTPATIENT
Start: 2023-03-01 | End: 2023-03-15

## 2023-03-01 RX ORDER — PHENYLEPHRINE HYDROCHLORIDE 10 MG/ML
INJECTION INTRAVENOUS AS NEEDED
Status: DISCONTINUED | OUTPATIENT
Start: 2023-03-01 | End: 2023-03-01 | Stop reason: SURG

## 2023-03-01 RX ORDER — ACETAMINOPHEN 325 MG/1
650 TABLET ORAL EVERY 8 HOURS SCHEDULED
Status: DISCONTINUED | OUTPATIENT
Start: 2023-03-02 | End: 2023-03-03

## 2023-03-01 RX ORDER — NICOTINE POLACRILEX 4 MG
15 LOZENGE BUCCAL
Status: DISCONTINUED | OUTPATIENT
Start: 2023-03-01 | End: 2023-03-03

## 2023-03-01 RX ORDER — AMIODARONE HYDROCHLORIDE 50 MG/ML
INJECTION, SOLUTION INTRAVENOUS AS NEEDED
Status: DISCONTINUED | OUTPATIENT
Start: 2023-03-01 | End: 2023-03-01 | Stop reason: SURG

## 2023-03-01 RX ORDER — AMOXICILLIN 250 MG
2 CAPSULE ORAL 2 TIMES DAILY
Status: DISCONTINUED | OUTPATIENT
Start: 2023-03-01 | End: 2023-03-03

## 2023-03-01 RX ORDER — CEFAZOLIN SODIUM 2 G/100ML
2 INJECTION, SOLUTION INTRAVENOUS ONCE
Status: COMPLETED | OUTPATIENT
Start: 2023-03-01 | End: 2023-03-01

## 2023-03-01 RX ORDER — THROMBIN HUMAN AND FIBRINOGEN 2; 5.5 [USP'U]/1; MG/1
PATCH TOPICAL AS NEEDED
Status: DISCONTINUED | OUTPATIENT
Start: 2023-03-01 | End: 2023-03-01 | Stop reason: HOSPADM

## 2023-03-01 RX ORDER — FENTANYL CITRATE 50 UG/ML
25 INJECTION, SOLUTION INTRAMUSCULAR; INTRAVENOUS
Status: DISCONTINUED | OUTPATIENT
Start: 2023-03-01 | End: 2023-03-03

## 2023-03-01 RX ORDER — CHLORHEXIDINE GLUCONATE 0.12 MG/ML
15 RINSE ORAL EVERY 12 HOURS SCHEDULED
Status: DISCONTINUED | OUTPATIENT
Start: 2023-03-01 | End: 2023-03-02

## 2023-03-01 RX ORDER — IBUPROFEN 600 MG/1
1 TABLET ORAL
Status: DISCONTINUED | OUTPATIENT
Start: 2023-03-01 | End: 2023-03-03

## 2023-03-01 RX ORDER — DEXMEDETOMIDINE HYDROCHLORIDE 4 UG/ML
INJECTION, SOLUTION INTRAVENOUS CONTINUOUS PRN
Status: DISCONTINUED | OUTPATIENT
Start: 2023-03-01 | End: 2023-03-01 | Stop reason: SURG

## 2023-03-01 RX ORDER — ESMOLOL HYDROCHLORIDE 10 MG/ML
INJECTION INTRAVENOUS AS NEEDED
Status: DISCONTINUED | OUTPATIENT
Start: 2023-03-01 | End: 2023-03-01 | Stop reason: SURG

## 2023-03-01 RX ORDER — CHLORHEXIDINE GLUCONATE 500 MG/1
1 CLOTH TOPICAL EVERY 12 HOURS PRN
Status: DISCONTINUED | OUTPATIENT
Start: 2023-03-01 | End: 2023-03-01 | Stop reason: HOSPADM

## 2023-03-01 RX ORDER — NALOXONE HCL 0.4 MG/ML
0.4 VIAL (ML) INJECTION
Status: DISCONTINUED | OUTPATIENT
Start: 2023-03-01 | End: 2023-03-13

## 2023-03-01 RX ORDER — GUAIFENESIN 600 MG/1
1200 TABLET, EXTENDED RELEASE ORAL EVERY 12 HOURS SCHEDULED
Status: DISCONTINUED | OUTPATIENT
Start: 2023-03-02 | End: 2023-03-03

## 2023-03-01 RX ORDER — PAPAVERINE HYDROCHLORIDE 30 MG/ML
INJECTION INTRAMUSCULAR; INTRAVENOUS AS NEEDED
Status: DISCONTINUED | OUTPATIENT
Start: 2023-03-01 | End: 2023-03-01 | Stop reason: HOSPADM

## 2023-03-01 RX ORDER — SODIUM CHLORIDE, SODIUM LACTATE, POTASSIUM CHLORIDE, CALCIUM CHLORIDE 600; 310; 30; 20 MG/100ML; MG/100ML; MG/100ML; MG/100ML
9 INJECTION, SOLUTION INTRAVENOUS CONTINUOUS
Status: DISCONTINUED | OUTPATIENT
Start: 2023-03-01 | End: 2023-03-02

## 2023-03-01 RX ORDER — CALCIUM CHLORIDE 100 MG/ML
INJECTION INTRAVENOUS; INTRAVENTRICULAR AS NEEDED
Status: DISCONTINUED | OUTPATIENT
Start: 2023-03-01 | End: 2023-03-01 | Stop reason: SURG

## 2023-03-01 RX ORDER — ACETAMINOPHEN 10 MG/ML
INJECTION, SOLUTION INTRAVENOUS AS NEEDED
Status: DISCONTINUED | OUTPATIENT
Start: 2023-03-01 | End: 2023-03-01 | Stop reason: SURG

## 2023-03-01 RX ORDER — IPRATROPIUM BROMIDE AND ALBUTEROL SULFATE 2.5; .5 MG/3ML; MG/3ML
3 SOLUTION RESPIRATORY (INHALATION) EVERY 4 HOURS PRN
Status: DISCONTINUED | OUTPATIENT
Start: 2023-03-01 | End: 2023-03-13

## 2023-03-01 RX ORDER — HEPARIN SODIUM 1000 [USP'U]/ML
INJECTION, SOLUTION INTRAVENOUS; SUBCUTANEOUS AS NEEDED
Status: DISCONTINUED | OUTPATIENT
Start: 2023-03-01 | End: 2023-03-01 | Stop reason: SURG

## 2023-03-01 RX ADMIN — MIDAZOLAM 1 MG: 1 INJECTION INTRAMUSCULAR; INTRAVENOUS at 09:57

## 2023-03-01 RX ADMIN — AMINOCAPROIC ACID 10 G: 250 INJECTION, SOLUTION INTRAVENOUS at 07:36

## 2023-03-01 RX ADMIN — EPHEDRINE SULFATE 5 MG: 50 INJECTION INTRAVENOUS at 09:40

## 2023-03-01 RX ADMIN — FENTANYL CITRATE 250 MCG: 0.05 INJECTION, SOLUTION INTRAMUSCULAR; INTRAVENOUS at 07:42

## 2023-03-01 RX ADMIN — LIDOCAINE HYDROCHLORIDE 100 MG: 20 INJECTION INTRAVENOUS at 09:33

## 2023-03-01 RX ADMIN — CHLORHEXIDINE GLUCONATE 0.12% ORAL RINSE 15 ML: 1.2 LIQUID ORAL at 12:04

## 2023-03-01 RX ADMIN — HYDROCODONE BITARTRATE AND ACETAMINOPHEN 1 TABLET: 7.5; 325 TABLET ORAL at 16:17

## 2023-03-01 RX ADMIN — PROPOFOL 50 MCG/KG/MIN: 10 INJECTION, EMULSION INTRAVENOUS at 11:05

## 2023-03-01 RX ADMIN — MORPHINE SULFATE 2 MG: 2 INJECTION, SOLUTION INTRAMUSCULAR; INTRAVENOUS at 21:35

## 2023-03-01 RX ADMIN — EPHEDRINE SULFATE 10 MG: 50 INJECTION INTRAVENOUS at 07:20

## 2023-03-01 RX ADMIN — OXYCODONE HYDROCHLORIDE 10 MG: 5 TABLET ORAL at 14:20

## 2023-03-01 RX ADMIN — EPINEPHRINE 0.02 MCG/KG/MIN: 1 INJECTION INTRAMUSCULAR; INTRAVENOUS; SUBCUTANEOUS at 21:32

## 2023-03-01 RX ADMIN — NICARDIPINE HYDROCHLORIDE 0.25 MG: 25 INJECTION INTRAVENOUS at 08:01

## 2023-03-01 RX ADMIN — SODIUM PHOSPHATE, MONOBASIC, MONOHYDRATE AND SODIUM PHOSPHATE, DIBASIC, ANHYDROUS 15 MMOL: 276; 142 INJECTION, SOLUTION INTRAVENOUS at 14:05

## 2023-03-01 RX ADMIN — FAMOTIDINE 20 MG: 20 TABLET ORAL at 06:20

## 2023-03-01 RX ADMIN — MORPHINE SULFATE 2 MG: 2 INJECTION, SOLUTION INTRAMUSCULAR; INTRAVENOUS at 20:33

## 2023-03-01 RX ADMIN — SODIUM PHOSPHATE, MONOBASIC, MONOHYDRATE AND SODIUM PHOSPHATE, DIBASIC, ANHYDROUS 15 MMOL: 276; 142 INJECTION, SOLUTION INTRAVENOUS at 14:54

## 2023-03-01 RX ADMIN — FENTANYL CITRATE 250 MCG: 0.05 INJECTION, SOLUTION INTRAMUSCULAR; INTRAVENOUS at 10:00

## 2023-03-01 RX ADMIN — ALBUMIN (HUMAN) 250 ML: 12.5 INJECTION, SOLUTION INTRAVENOUS at 13:25

## 2023-03-01 RX ADMIN — SODIUM CHLORIDE: 9 INJECTION, SOLUTION INTRAMUSCULAR; INTRAVENOUS; SUBCUTANEOUS at 10:02

## 2023-03-01 RX ADMIN — INSULIN HUMAN 3.2 UNITS/HR: 1 INJECTION, SOLUTION INTRAVENOUS at 07:28

## 2023-03-01 RX ADMIN — ACETAMINOPHEN 1000 MG: 10 INJECTION INTRAVENOUS at 17:13

## 2023-03-01 RX ADMIN — SODIUM CHLORIDE, POTASSIUM CHLORIDE, SODIUM LACTATE AND CALCIUM CHLORIDE 9 ML/HR: 600; 310; 30; 20 INJECTION, SOLUTION INTRAVENOUS at 06:18

## 2023-03-01 RX ADMIN — ESMOLOL HYDROCHLORIDE 20 MG: 100 INJECTION, SOLUTION INTRAVENOUS at 07:06

## 2023-03-01 RX ADMIN — ACETAMINOPHEN 1000 MG: 10 INJECTION INTRAVENOUS at 10:11

## 2023-03-01 RX ADMIN — PHENYLEPHRINE HYDROCHLORIDE 50 MCG: 10 INJECTION INTRAVENOUS at 08:19

## 2023-03-01 RX ADMIN — HEPARIN SODIUM 30000 UNITS: 1000 INJECTION, SOLUTION INTRAVENOUS; SUBCUTANEOUS at 08:04

## 2023-03-01 RX ADMIN — AMINOCAPROIC ACID 10 G: 250 INJECTION, SOLUTION INTRAVENOUS at 10:04

## 2023-03-01 RX ADMIN — PROTAMINE SULFATE 50 MG: 10 INJECTION, SOLUTION INTRAVENOUS at 11:30

## 2023-03-01 RX ADMIN — CEFAZOLIN SODIUM 2 G: 2 INJECTION, SOLUTION INTRAVENOUS at 07:24

## 2023-03-01 RX ADMIN — OXYCODONE HYDROCHLORIDE 10 MG: 5 TABLET ORAL at 23:35

## 2023-03-01 RX ADMIN — OXYCODONE HYDROCHLORIDE 10 MG: 5 TABLET ORAL at 19:19

## 2023-03-01 RX ADMIN — LIDOCAINE HYDROCHLORIDE 70 MG: 20 INJECTION INTRAVENOUS at 07:06

## 2023-03-01 RX ADMIN — ALBUMIN (HUMAN) 250 ML: 12.5 INJECTION, SOLUTION INTRAVENOUS at 18:34

## 2023-03-01 RX ADMIN — EPHEDRINE SULFATE 5 MG: 50 INJECTION INTRAVENOUS at 07:37

## 2023-03-01 RX ADMIN — Medication 0.5 MG/MIN: at 16:24

## 2023-03-01 RX ADMIN — FENTANYL CITRATE 250 MCG: 0.05 INJECTION, SOLUTION INTRAMUSCULAR; INTRAVENOUS at 07:49

## 2023-03-01 RX ADMIN — AMIODARONE HYDROCHLORIDE 1 MG/MIN: 1.8 INJECTION, SOLUTION INTRAVENOUS at 09:32

## 2023-03-01 RX ADMIN — CEFAZOLIN SODIUM 2 G: 2 INJECTION, SOLUTION INTRAVENOUS at 14:54

## 2023-03-01 RX ADMIN — DEXMEDETOMIDINE HYDROCHLORIDE 0.3 MCG/HR: 4 INJECTION, SOLUTION INTRAVENOUS at 07:40

## 2023-03-01 RX ADMIN — EPHEDRINE SULFATE 5 MG: 50 INJECTION INTRAVENOUS at 09:28

## 2023-03-01 RX ADMIN — ETOMIDATE 24 MG: 2 INJECTION, SOLUTION INTRAVENOUS at 07:06

## 2023-03-01 RX ADMIN — PROTAMINE SULFATE 50 MG: 10 INJECTION, SOLUTION INTRAVENOUS at 10:19

## 2023-03-01 RX ADMIN — NICARDIPINE HYDROCHLORIDE 0.5 MG: 25 INJECTION INTRAVENOUS at 09:53

## 2023-03-01 RX ADMIN — FENTANYL CITRATE 250 MCG: 0.05 INJECTION, SOLUTION INTRAMUSCULAR; INTRAVENOUS at 07:06

## 2023-03-01 RX ADMIN — ARFORMOTEROL TARTRATE 15 MCG: 15 SOLUTION RESPIRATORY (INHALATION) at 19:06

## 2023-03-01 RX ADMIN — GUAIFENESIN 1200 MG: 600 TABLET, EXTENDED RELEASE ORAL at 23:44

## 2023-03-01 RX ADMIN — BUDESONIDE 0.5 MG: 0.5 SUSPENSION RESPIRATORY (INHALATION) at 19:06

## 2023-03-01 RX ADMIN — PROPOFOL 50 MCG/KG/MIN: 10 INJECTION, EMULSION INTRAVENOUS at 10:06

## 2023-03-01 RX ADMIN — ROCURONIUM BROMIDE 50 MG: 50 INJECTION INTRAVENOUS at 08:16

## 2023-03-01 RX ADMIN — CALCIUM CHLORIDE 0.5 G: 100 INJECTION INTRAVENOUS; INTRAVENTRICULAR at 09:57

## 2023-03-01 RX ADMIN — MORPHINE SULFATE 2 MG: 2 INJECTION, SOLUTION INTRAMUSCULAR; INTRAVENOUS at 17:13

## 2023-03-01 RX ADMIN — MIDAZOLAM 2 MG: 1 INJECTION INTRAMUSCULAR; INTRAVENOUS at 07:03

## 2023-03-01 RX ADMIN — MORPHINE SULFATE 2 MG: 2 INJECTION, SOLUTION INTRAMUSCULAR; INTRAVENOUS at 19:20

## 2023-03-01 RX ADMIN — INSULIN HUMAN 5 UNITS: 100 INJECTION, SOLUTION PARENTERAL at 09:05

## 2023-03-01 RX ADMIN — ONDANSETRON 4 MG: 2 INJECTION INTRAMUSCULAR; INTRAVENOUS at 20:37

## 2023-03-01 RX ADMIN — MUPIROCIN 1 APPLICATION: 20 OINTMENT TOPICAL at 06:18

## 2023-03-01 RX ADMIN — ASPIRIN 325 MG: 325 TABLET ORAL at 12:04

## 2023-03-01 RX ADMIN — ALBUMIN (HUMAN) 250 ML: 12.5 INJECTION, SOLUTION INTRAVENOUS at 11:30

## 2023-03-01 RX ADMIN — ROCURONIUM BROMIDE 100 MG: 50 INJECTION INTRAVENOUS at 07:06

## 2023-03-01 RX ADMIN — POTASSIUM CHLORIDE AND DEXTROSE MONOHYDRATE 30 ML/HR: 150; 5 INJECTION, SOLUTION INTRAVENOUS at 11:00

## 2023-03-01 RX ADMIN — SODIUM CHLORIDE: 9 INJECTION, SOLUTION INTRAMUSCULAR; INTRAVENOUS; SUBCUTANEOUS at 07:20

## 2023-03-01 RX ADMIN — CHLORHEXIDINE GLUCONATE 0.12% ORAL RINSE 15 ML: 1.2 LIQUID ORAL at 06:18

## 2023-03-01 RX ADMIN — LIDOCAINE HYDROCHLORIDE 0.5 ML: 10 INJECTION, SOLUTION EPIDURAL; INFILTRATION; INTRACAUDAL; PERINEURAL at 06:18

## 2023-03-01 RX ADMIN — NICARDIPINE HYDROCHLORIDE 0.5 MG: 25 INJECTION INTRAVENOUS at 09:45

## 2023-03-01 RX ADMIN — CEFAZOLIN SODIUM 2 G: 1 INJECTION, POWDER, FOR SOLUTION INTRAMUSCULAR; INTRAVENOUS at 10:41

## 2023-03-01 RX ADMIN — HYDROCODONE BITARTRATE AND ACETAMINOPHEN 1 TABLET: 7.5; 325 TABLET ORAL at 20:33

## 2023-03-01 RX ADMIN — Medication 0.02 MCG/KG/MIN: at 09:37

## 2023-03-01 RX ADMIN — MORPHINE SULFATE 2 MG: 2 INJECTION, SOLUTION INTRAMUSCULAR; INTRAVENOUS at 23:35

## 2023-03-01 RX ADMIN — MIDAZOLAM 2 MG: 1 INJECTION INTRAMUSCULAR; INTRAVENOUS at 08:16

## 2023-03-01 RX ADMIN — FENTANYL CITRATE 25 MCG: 50 INJECTION, SOLUTION INTRAMUSCULAR; INTRAVENOUS at 14:41

## 2023-03-01 RX ADMIN — PROTAMINE SULFATE 350 MG: 10 INJECTION, SOLUTION INTRAVENOUS at 09:54

## 2023-03-01 RX ADMIN — NITROGLYCERIN 36 MCG/MIN: 20 INJECTION INTRAVENOUS at 07:48

## 2023-03-01 RX ADMIN — CEFAZOLIN SODIUM 2 G: 2 INJECTION, SOLUTION INTRAVENOUS at 23:39

## 2023-03-01 RX ADMIN — AMIODARONE HYDROCHLORIDE 375 MG: 50 INJECTION, SOLUTION INTRAVENOUS at 09:29

## 2023-03-01 NOTE — ANESTHESIA PROCEDURE NOTES
Central Line      Patient reassessed immediately prior to procedure    Patient location during procedure: OR  Start time: 3/1/2023 7:20 AM  Indications: vascular access, MD/Surgeon request and central pressure monitoring  Staff  Anesthesiologist: Chelsy Lema MD  Preanesthetic Checklist  Completed: patient identified, IV checked, site marked, risks and benefits discussed, surgical consent, monitors and equipment checked, pre-op evaluation and timeout performed  Central Line Prep  Sterile Tech:cap, gloves and gown  Prep: chloraprep  Patient monitoring: blood pressure monitoring, continuous pulse oximetry and EKG  Central Line Procedure  Laterality:right  Location:internal jugular  Catheter Type:MAC  Catheter Size:9 Fr  Guidance:ultrasound guidedImages: still images obtained, printed/placed on chart  Assessment  Post procedure:biopatch applied, line sutured, occlusive dressing applied and secured with tape  Assessement:blood return through all ports, free fluid flow, chest x-ray ordered and Adarsh Test  Complications:no  Patient Tolerance:patient tolerated the procedure well with no apparent complications

## 2023-03-01 NOTE — PROGRESS NOTES
Intensive Care Admission Note     Chief Complaint:  Coronary artery disease    History of Present Illness     Mr. Harley is a 68 yo male with PMH former smoker (quit in December 2022), CAD s/p PCI in 2014, HTN and diabetes who presents to the [facility] ICU s/p CABG with Dr. Saavedra. Patient reported he had been experiencing shortness of breath associated with cough for which his PCP referred him to a cardiologist. He underwent stress test that was found to be abnormal and was thus sent for [facility]. That showed multivessel CAD so he was referred to [facility] for evaluation. At that time it was felt he would benefit from a CABG.    ECHO: EF 41-45%. Mild concentric hypertrophy of left ventricular wall. Mild calcification of the aortic, mitral, tricuspid, pulmonic valves. Est. RVSP is 35-45 mmHg.     Carotid Duplex: Right ICA stenosis 0-49%. Left ICA stenosis 50-69%. There is antegrade flow in the vertebral arteries bilaterally.     PFTs shows no clear obstructive defect. The reduction in volumes could suggest restrictive disease. FVC 41%, FEV 52%, FEV1/FVC 93.24    A1C 7.4    Today he presented to the hospital for procedure. Post op he was brought to the ICU for management.    I spent 8 minutes of time on this.  Kirsten Aguila, ZORA, AGACNP-BC, APRN    Electronically signed by TALON Kurtz, 03/01/23, 7:39 AM EST.    On arrival to the ICU patient is on nitroglycerin on mechanical ventilation.  Appropriate chest tube and urine output.  Patient is also on amiodarone    Problem List, Surgical History, Family, Social History, and ROS     Patient Active Problem List    Diagnosis    • *Coronary artery disease [I25.10]    • Mucopurulent chronic bronchitis (HCC) [J41.1]    • Positive cardiac stress test [R94.39]    • Abdominal pain [R10.9]    • Healed myocardial infarct [I25.2]    • Allergic rhinitis [J30.9]    • HLD (hyperlipidemia) [E78.5]    • Nicotine addiction [F17.200]    • Tick bite [W57.XXXA]    • Encounter for subsequent annual  "wellness visit (AWV) in Medicare patient [Z00.00]    • Moderate asthma without complication [J45.909]    • Ischemic cardiomyopathy [I25.5]    • Shortness of breath [R06.02]    • Tachycardia [R00.0]    • Type 2 diabetes mellitus with hypoglycemia, with long-term current use of insulin (HCC) [E11.649, Z79.4]    • Hypertension [I10]    • Dyslipidemia [E78.5]      Past Surgical History:   Procedure Laterality Date   • CARDIAC CATHETERIZATION  March 2014   • CARDIAC CATHETERIZATION Left 02/17/2023    Procedure: Left Heart Cath;  Surgeon: Nadya Ramirez MD;  Location: [facility] CATH INVASIVE LOCATION;  Service: Cardiology;  Laterality: Left;   • CORONARY STENT PLACEMENT  March 2014   • TONSILLECTOMY         Allergies   Allergen Reactions   • Wellbutrin [Bupropion] Swelling     Facial swelling, severe     No current facility-administered medications on file prior to encounter.     Current Outpatient Medications on File Prior to Encounter   Medication Sig   • albuterol sulfate  (90 Base) MCG/ACT inhaler Inhale 2 puffs Every 4 (Four) Hours As Needed for Wheezing.   • aspirin 81 MG chewable tablet Chew 1 tablet Daily.   • ezetimibe (ZETIA) 10 MG tablet Take 1 tablet by mouth Daily for 30 days.   • Fluticasone-Umeclidin-Vilant (TRELEGY) 100-62.5-25 MCG/ACT inhaler Inhale 1 puff Daily. (Patient taking differently: Inhale 1 puff Daily. Patient is taking only as needed (not daily))   • Lantus 100 UNIT/ML injection INJECT 60 UNITS SUBCUTANEOUSLY ONCE DAILY (Patient taking differently: Inject 60 Units under the skin into the appropriate area as directed Every Night. INJECT 60 UNITS SUBCUTANEOUSLY ONCE DAILY)   • dapagliflozin Propanediol (Farxiga) 10 MG tablet Take 10 mg by mouth Daily for 30 days.   • Insulin Syringe 29G X 1/2\" 1 ML misc For daily use     MEDICATION LIST AND ALLERGIES REVIEWED.    Family History   Problem Relation Age of Onset   • Diabetes Mother    • Cancer Father         liver   • Brain cancer Sister    • " "Heart attack Brother    • Prostate cancer Brother      Social History     Tobacco Use   • Smoking status: Former     Packs/day: 1.50     Years: 52.00     Pack years: 78.00     Types: Cigarettes     Start date:      Quit date: 2022     Years since quittin.2     Passive exposure: Past   • Smokeless tobacco: Never   Vaping Use   • Vaping Use: Never used   Substance Use Topics   • Alcohol use: No   • Drug use: No     Social History     Social History Narrative   • Not on file     FAMILY AND SOCIAL HISTORY REVIEWED.    Review of Systems   Unable to perform ROS: Intubated     ALL OTHER SYSTEMS REVIEWED AND ARE NEGATIVE.    Physical Exam and Clinical Information   /80 (BP Location: Right arm, Patient Position: Lying) Comment: 161/86 left  Pulse 61   Temp 97.6 °F (36.4 °C) (Temporal)   Resp 16   Ht 162.6 cm (64\")   Wt 75.3 kg (166 lb)   SpO2 96%   BMI 28.49 kg/m²   GENERAL : Sedated and intubated  SKIN : Normal temperature, turgor and texture; no rash, ulcers or subcutaneous nodules  EYES : conjunctiva clear, Pupils equal and reactive.  HENMT : Atraumatic; ET tube in place  NECK : Trachea midline; FROM, supple, no thyromegaly or lymphadenopathy  RESPIRATORY/THORAX : Mild increase in respiratory effort, Coarse breath sounds bilaterally  CARDIOVASCULAR : Normal S1/S2, RRR. 1+ ext edema.  GASTROINTESTINAL : Soft, NT/ND. BS x 4 normoactive. No hepatosplenomegaly.  MUSCULOSKELETAL : No cyanosis, clubbing, or ischemia  NEUROLOGICAL: Sedated    Results from last 7 days   Lab Units 23  0835   WBC 10*3/mm3 8.26   HEMOGLOBIN g/dL 17.0   PLATELETS 10*3/mm3 194     Results from last 7 days   Lab Units 23  0835   SODIUM mmol/L 139   POTASSIUM mmol/L 4.2   CO2 mmol/L 28.0   BUN mg/dL 18   CREATININE mg/dL 1.03   MAGNESIUM mg/dL 2.3   GLUCOSE mg/dL 91     Estimated Creatinine Clearance: 64.6 mL/min (by C-G formula based on SCr of 1.03 mg/dL).  Results from last 7 days   Lab Units 23  0835 "   HEMOGLOBIN A1C % 7.40*         No results found for: LACTATE       I reviewed the patient's results/ images and I agree with the reports.     Impression     Coronary artery disease    Dyslipidemia    Type 2 diabetes mellitus with hypoglycemia, with long-term current use of insulin (HCC)    Ischemic cardiomyopathy    Mucopurulent chronic bronchitis (HCC)      Plan/Recommendations     67-year-old male with a past medical history significant for tobacco abuse, coronary disease status post PCI in 2014, hypertension, and diabetes mellitus.  Who presents to [facility] ICU status post CABG by Dr. Saavedra on 3/1/2023.    -ICU to follow  -Postoperative orders and chest tubes per CT surgery  -Continue mechanical ventilation wean as able  -Continue to wean vasoactive medications   -Insulin drip  -Aspirin/statin/beta-blocker  -Brovana, budesonide, and DuoNebs for COPD  -PT/OT to evaluate post extubation  -SCDs for DVT prophylaxis until chest tubes are removed  -A.m. chest x-ray  -A.m. labs    High level of risk due to:  drug(s) requiring intensive monitoring for toxicity and Post CABG on mechanical ventilation.      HARDEEP Hayes DO  Pulmonary and Critical Care Medicine  03/01/23 10:23 EST     CC: Yessica Ricci DO

## 2023-03-01 NOTE — PROGRESS NOTES
Cardiology Progress Note      Reason for visit:    · Coronary artery disease status post CABG  · Ischemic cardiomyopathy  · Hyperlipidemia    IDENTIFICATION: 67-year-old gentleman who resides in [facility] Problems    Diagnosis  POA   • **Coronary artery disease involving native coronary artery of native heart without angina pectoris [I25.10]  Yes     Priority: High     · Cardiac catheterization for inferior STEMI with Dr. Carter (3/28/2014): LVEF 50%.  NILE to RCA.  NILE to proximal LAD.  Had intraprocedural atrial fibrillation  · Echo (3/29/2014): LVEF 50-55%.  RVSP 30 mmHg  · MPS (2/15/2023): Abnormal with moderate to large area of ischemia in inferior lateral wall.  High risk study  · Cardiac cath (2/17/2023): Multivessel CAD.  · CABG with Dr. Saavedra (3/1/2023): LIMA to LAD.  SVG to OM.  SVG to RPDA     • Ischemic cardiomyopathy [I25.5]  Yes     Priority: High     · Echo (3/29/2014): LVEF 50-55%  · Echo (12/2022): LVEF 41-45%     • Type 2 diabetes mellitus with hypoglycemia, with long-term current use of insulin (HCC) [E11.649, Z79.4]  Not Applicable     Priority: High   • Mucopurulent chronic bronchitis (HCC) [J41.1]  Yes     Priority: Medium   • Hyperlipidemia LDL goal <70 [E78.5]  Yes     Priority: Medium   • Hypertension [I10]  Yes     Priority: Medium            Patient is a 67-year-old gentleman with a history of coronary artery disease status post PCI in 2014 who was seen last month in the office by Dr. Nadya Ramirez.  He had an echocardiogram in December that showed a reduced LVEF of 41-45%.  He had complaints of fatigue and shortness of breath and underwent stress testing which was abnormal.  He ultimately underwent cardiac catheterization on 2/17/2023 and results showed multivessel CAD.  He was referred to CT surgery for consideration of surgical revascularization.  The patient underwent a scheduled CABG with Dr. Paresh Saavedra earlier today and we have been asked to assist  with management of his ischemic cardiomyopathy and blood pressures in the postoperative period.  The patient is currently sedated and intubated and no family is at bedside.  IV amiodarone drip is infusing at 1 mg/minute.  He is in normal sinus rhythm on telemetry.           Vital Sign Min/Max for last 24 hours  Temp  Min: 96.1 °F (35.6 °C)  Max: 97.6 °F (36.4 °C)   BP  Min: 131/65  Max: 170/80   Pulse  Min: 57  Max: 61   Resp  Min: 14  Max: 16   SpO2  Min: 96 %  Max: 100 %   No data recorded      Intake/Output Summary (Last 24 hours) at 3/1/2023 1148  Last data filed at 3/1/2023 1100  Gross per 24 hour   Intake 2205 ml   Output 1650 ml   Net 555 ml           Physical Exam  Constitutional:       Interventions: He is sedated and intubated.   Cardiovascular:      Rate and Rhythm: Normal rate and regular rhythm.   Pulmonary:      Effort: Pulmonary effort is normal. He is intubated.      Breath sounds: Normal breath sounds.   Abdominal:      General: There is no distension.      Palpations: Abdomen is soft.   Skin:     General: Skin is warm and dry.         Tele: Normal sinus rhythm    Results Review (reviewed the patient's recent labs in the electronic medical record):     EKG (3/1/2023): Normal sinus rhythm first-degree AV block.  ST/T wave abnormality in anterior and lateral leads, prolonged QT interval    ECHO (12/21/2022): LVEF 41-45%.  RVSP 38 mmHg.  Calcification aortic valve    Results from last 7 days   Lab Units 02/28/23  0835   SODIUM mmol/L 139   POTASSIUM mmol/L 4.2   CHLORIDE mmol/L 103   BUN mg/dL 18   CREATININE mg/dL 1.03   MAGNESIUM mg/dL 2.3         Results from last 7 days   Lab Units 03/01/23  1124 02/28/23  0835   WBC 10*3/mm3 19.66* 8.26   HEMOGLOBIN g/dL 14.1 17.0   HEMATOCRIT % 41.7 51.5*   PLATELETS 10*3/mm3 211 194       Lab Results   Component Value Date    HGBA1C 7.40 (H) 02/28/2023       Lab Results   Component Value Date    CHOL 80 02/17/2023    CHLPL 174 03/28/2014    TRIG 60 02/17/2023     HDL 37 (L) 02/17/2023    LDL 29 02/17/2023              Coronary artery disease  · Status post CABG 3/1/2023 with LIMA to LAD, SVG to RPDA and SVG to OM  · Aspirin 325 mg daily      Ischemic cardiomyopathy/systolic heart failure  · Echo 12/2022 LVEF 41-45%      Prolonged QT interval on EKG  · Monitor EKGs and avoid QT prolonging medications    Type 2 diabetes mellitus  · Relatively controlled with hemoglobin A1c 7.4      Hypertension  · Current /66      Hyperlipidemia  · Lipitor 40 mg daily               · Once extubated recommend starting a SGL2 inhibitor for his ischemic cardiomyopathy  · Continue routine postoperative care with aspirin, and statin  · Will start guideline directed medical therapy for heart failure once blood pressure is hemodynamically stable  · Amiodarone per CT surgery.  Monitor telemetry for any arrhythmia's    Electronically signed by TALON Modi, 03/01/23, 11:48 AM EST.

## 2023-03-01 NOTE — ANESTHESIA PREPROCEDURE EVALUATION
Anesthesia Evaluation     Patient summary reviewed and Nursing notes reviewed   no history of anesthetic complications:  NPO Solid Status: > 8 hours  NPO Liquid Status: > 2 hours           Airway   Mallampati: II  TM distance: >3 FB  Neck ROM: full  No difficulty expected  Dental    (+) poor dentition    Comment: Multiple missing and broken teeth    Pulmonary - normal exam   (+) a smoker Former, asthma,shortness of breath,   Cardiovascular - normal exam    ECG reviewed  Beta blocker given within 24 hours of surgery and Beta blocker not taken-may be given intraoperatively    (+) hypertension, past MI , CAD, angina, LOPEZ, hyperlipidemia,     ROS comment: Cath Angiographic Findings:2/17/23  Right coronary dominance  LM: Distal left main has 40 to 50% disease divides into LAD and circumflex  LAD: Proximal LAD had eccentric lesion of 70 to 80%  LCX: Circumflex is 100% occluded  RCA: RCA has proximal disease of 80 to 85% with distal 80 percent to 85%  Collaterals collaterals are seen from the right and from the left    Echo 12/21/2022  Interpretation Summary       •  Left ventricular systolic function is mildly decreased. Left ventricular ejection fraction appears to be 41 - 45%.  •  Left ventricular wall thickness is consistent with mild concentric hypertrophy.  •  Left ventricular diastolic function was indeterminate.  •  Left atrial volume is mildly increased.  •  There is calcification of the aortic valve.  •  Estimated right ventricular systolic pressure from tricuspid regurgitation is mildly elevated (35-45 mmHg). Calculated right ventricular systolic pressure from tricuspid regurgitation is 38 mmHg.         Neuro/Psych- negative ROS  GI/Hepatic/Renal/Endo    (+)   diabetes mellitus,   (-) GERD, liver disease, no renal disease, no thyroid disorder    Musculoskeletal     Abdominal    Substance History      OB/GYN          Other                      Anesthesia Plan    ASA 4     general, Paola, CVL and ERAS Protocol      (Paola, CVC, +/- JUAN PABLO, post op ICu/vent)  intravenous induction   Postoperative Plan: Expected vent after surgery  Anesthetic plan, risks, benefits, and alternatives have been provided, discussed and informed consent has been obtained with: patient.    Use of blood products discussed with patient .       CODE STATUS:

## 2023-03-01 NOTE — H&P
Pre-Op H&P  Herbert Harley  [id number]  1955      Chief complaint: Fatigue      Subjective:  Patient is a 67 y.o.male presents for scheduled surgery by Dr. Saavedra. He anticipates a CORONARY ARTERY BYPASS GRAFTING, EV today.  The patient endorsed having extreme fatigue recently.  He denied any chest pain, shortness of breath, or any other related symptoms.  The cardiac cath on 2/17/2023 demonstrated severe triple-vessel disease.      Review of Systems:  Constitutional-- No fever, chills or sweats. No fatigue.  CV-- No chest pain, palpitation or syncope. +HTN, HLD. Previous MI (2014)  Resp-- No SOB, cough, hemoptysis  Skin--No rashes or lesions      Allergies:   Allergies   Allergen Reactions   • Wellbutrin [Bupropion] Swelling     Facial swelling, severe         Home Meds:  Medications Prior to Admission   Medication Sig Dispense Refill Last Dose   • albuterol sulfate  (90 Base) MCG/ACT inhaler Inhale 2 puffs Every 4 (Four) Hours As Needed for Wheezing. 18 g 2 Past Week   • aspirin 81 MG chewable tablet Chew 1 tablet Daily.   2/28/2023 at 2000   • ezetimibe (ZETIA) 10 MG tablet Take 1 tablet by mouth Daily for 30 days. 30 tablet 11 2/28/2023   • Fluticasone-Umeclidin-Vilant (TRELEGY) 100-62.5-25 MCG/ACT inhaler Inhale 1 puff Daily. (Patient taking differently: Inhale 1 puff Daily. Patient is taking only as needed (not daily)) 1 each 5 Past Month   • Lantus 100 UNIT/ML injection INJECT 60 UNITS SUBCUTANEOUSLY ONCE DAILY (Patient taking differently: Inject 60 Units under the skin into the appropriate area as directed Every Night. INJECT 60 UNITS SUBCUTANEOUSLY ONCE DAILY) 20 mL 5 2/27/2023   • rosuvastatin (CRESTOR) 10 MG tablet Take 1 tablet by mouth Daily. 30 tablet 11 2/28/2023   • sacubitril-valsartan (ENTRESTO) 24-26 MG tablet Take 1 tablet by mouth 2 (Two) Times a Day.   2/28/2023   • dapagliflozin Propanediol (Farxiga) 10 MG tablet Take 10 mg by mouth Daily for 30 days. 30 tablet 3    •  "Insulin Syringe 29G X 1/2\" 1 ML misc For daily use 100 each 3          PMH:   Past Medical History:   Diagnosis Date   • Asthma 2022   • Coronary artery disease 2014   • Elevated cholesterol    • History of tobacco abuse    • HLD (hyperlipidemia) 2023   • Hypertension    • Ischemic cardiomyopathy    • Myocardial infarction (HCC) 2014   • Type 2 diabetes mellitus (HCC)    • Wears glasses      PSH:    Past Surgical History:   Procedure Laterality Date   • CARDIAC CATHETERIZATION  2014   • CARDIAC CATHETERIZATION Left 2023    Procedure: Left Heart Cath;  Surgeon: Nadya Ramirez MD;  Location: [facility] CATH INVASIVE LOCATION;  Service: Cardiology;  Laterality: Left;   • CORONARY STENT PLACEMENT  2014   • TONSILLECTOMY         Immunization History:  Influenza: No  Pneumococcal: No  Tetanus: Yes  Covid : No    Social History:   Tobacco:   Social History     Tobacco Use   Smoking Status Former   • Packs/day: 1.50   • Years: 52.00   • Pack years: 78.00   • Types: Cigarettes   • Start date:    • Quit date: 2022   • Years since quittin.2   • Passive exposure: Past   Smokeless Tobacco Never      Alcohol:     Social History     Substance and Sexual Activity   Alcohol Use No         Physical Exam:/80 (BP Location: Right arm, Patient Position: Lying) Comment: 161/86 left  Pulse 61   Temp 97.6 °F (36.4 °C) (Temporal)   Resp 16   Ht 162.6 cm (64\")   Wt 75.3 kg (166 lb)   SpO2 96%   BMI 28.49 kg/m²       General Appearance:    Alert, cooperative, no distress, appears stated age   Head:    Normocephalic, without obvious abnormality, atraumatic   Lungs:     Clear to auscultation bilaterally, respirations unlabored    Heart:   Regular rate and rhythm, S1 and S2 normal    Abdomen:    Soft without tenderness   Extremities:   Extremities normal, atraumatic, no cyanosis or edema   Skin:   Skin color, texture, turgor normal, no rashes or lesions   Neurologic:   Grossly " intact     Results Review:     LABS:  Lab Results   Component Value Date    WBC 8.26 02/28/2023    HGB 17.0 02/28/2023    HCT 51.5 (H) 02/28/2023    MCV 89.1 02/28/2023     02/28/2023    NEUTROABS 5.06 02/28/2023    GLUCOSE 91 02/28/2023    BUN 18 02/28/2023    CREATININE 1.03 02/28/2023    EGFRIFNONA 66 12/14/2021     02/28/2023    K 4.2 02/28/2023     02/28/2023    CO2 28.0 02/28/2023    MG 2.3 02/28/2023    CALCIUM 9.1 02/28/2023    ALBUMIN 4.5 02/28/2023    AST 22 02/28/2023    ALT 32 02/28/2023    BILITOT 0.6 02/28/2023       RADIOLOGY:  Cardiac Catheterization 2/17/23    Conclusion       •  Severe triple-vessel disease     Evaluation for coronary artery bypass graft     Procedure Narrative    FINAL IMPRESSION:  Severe coronary artery disease       I reviewed the patient's new clinical results.        Impression: Coronary Artery Disease      Plan: CORONARY ARTERY BYPASS GRAFTING, EVH  Agree the above.  I discussed surgery with the patient and his wife and I discussed his risk of stroke bleeding infection and death based upon the STS database which is recorded on the chart.  We have actually done this database on 2 occasions and on both occasions we have the same results.  The patient is agreeable to proceed    Marek Clarke, APRN   3/1/2023   06:26 EST

## 2023-03-01 NOTE — OP NOTE
Operative Report    Preop Diagnosis: Coronary artery disease.  Hypertension hyperlipidemia    Results of STS risk score discussed with patient and family    Postoperative Diagnosis: Same      Procedure: Coronary artery bypass grafting x3 with endoscopic harvesting of the right great saphenous vein.  Left internal mammary artery to left anterior descending saphenous vein graft to the obtuse marginal branch of circumflex and saphenous vein graft to the posterior descending branch of the right coronary.        Surgeons: Paresh Saavedra MD      Assistant: Stacy De La Torre PA-C    The Assistant provided services of suctioning, irrigation and retraction.  Also, assisted in suture closure of parts of the skin incision.      Indication: Patient referred with the above listed medical problems.  He understood that the surgery carries with it a risk of stroke bleed infection death renal failure agreed to proceed.  No guarantees were made as to outcome.  STS risk score was discussed with the patient.        Description: Supine position.  Sterile prep and drape and antibiotics given.  General endotracheal anesthesia.  The right great saphenous vein was harvested endoscopically and it was a good quality conduit.  Median sternotomy performed and the left internal mammary was harvested as a pedicled graft and the patient fully heparinized.  Cannulas placed in the ascending aorta and right atrial appendage and patient begun on cardiopulmonary bypass.  Aorta crossclamped antegrade cardioplegia given with a rapid electrical and mechanical arrest.  For saphenous vein graft sutured to a diffusely diseased chronically occluded obtuse marginal branch and circumflex.  Second saphenous vein graft sutured to a fairly good quality posterior descending branch of the right which was 1.75 mm in diameter.  The left internal mammary was sutured to an intramyocardial left anterior descending coronary.  This was a quite difficult anastomosis as not  only was the left anterior descending significantly intramyocardial but there was a large amount of epicardial fat surrounding this making the anastomoses technically difficult.  The 2 proximal vein grafts were sutured to the ascending aorta.  The patient was weaned from bypass initially paced but subsequently sinus and without the need for pressor agents.  Sternum was closed with wire in fashion skin was suture sponge and needle counts was reported as correct.  Estimated blood loss 250 mL.  There was no early apparent present complications      Please note that portions of this note were completed with a voice recognition program. Efforts were made to edit the dictations, but occasionally words are mistranscribed.

## 2023-03-01 NOTE — ANESTHESIA PROCEDURE NOTES
Arterial Line      Patient reassessed immediately prior to procedure    Patient location during procedure: pre-op  Start time: 3/1/2023 6:45 AM   Line placed for hemodynamic monitoring, ABGs/Labs/ISTAT and MD/Surgeon request.  Performed By   Anesthesiologist: Chelsy Lema MD   Preanesthetic Checklist  Completed: patient identified, IV checked, site marked, risks and benefits discussed, surgical consent, monitors and equipment checked, pre-op evaluation and timeout performed  Arterial Line Prep    Sterile Tech: gloves, cap, mask and sterile barriers  Prep: ChloraPrep  Patient monitoring: blood pressure monitoring, continuous pulse oximetry and EKG  Arterial Line Procedure   Laterality:right  Location:  radial artery  Catheter size: 20 G   Guidance: ultrasound guided  PROCEDURE NOTE/ULTRASOUND INTERPRETATION.  Using ultrasound guidance the potential vascular sites for insertion of the catheter were visualized to determine the patency of the vessel to be used for vascular access.  After selecting the appropriate site for insertion, the needle was visualized under ultrasound being inserted into the radial artery, followed by ultrasound confirmation of wire and catheter placement. There were no abnormalities seen on ultrasound; an image was taken; and the patient tolerated the procedure with no complications.   Number of attempts: 1  Successful placement: yes Images: still images not obtained  Post Assessment   Dressing Type: biopatch applied, line sutured, occlusive dressing applied, secured with tape and wrist guard applied.   Complications no  Circ/Move/Sens Assessment: normal.   Patient Tolerance: patient tolerated the procedure well with no apparent complications

## 2023-03-01 NOTE — ANESTHESIA PROCEDURE NOTES
Airway  Date/Time: 3/1/2023 7:08 AM  Airway not difficult    General Information and Staff    Patient location during procedure: OR  Anesthesiologist: Chelsy Lema MD    Indications and Patient Condition  Indications for airway management: airway protection    Preoxygenated: yes  Mask difficulty assessment: 1 - vent by mask    Final Airway Details  Final airway type: endotracheal airway      Successful airway: ETT  Cuffed: yes   Successful intubation technique: direct laryngoscopy  Facilitating devices/methods: intubating stylet and anterior pressure/BURP  Endotracheal tube insertion site: oral  Blade: Adyana  Blade size: 3  ETT size (mm): 7.5  Cormack-Lehane Classification: grade IIa - partial view of glottis  Placement verified by: chest auscultation and capnometry   Cuff volume (mL): 8  Measured from: gums  ETT/EBT to gums (cm): 22  Number of attempts at approach: 1  Assessment: lips, teeth, and gum same as pre-op and atraumatic intubation

## 2023-03-01 NOTE — ANESTHESIA POSTPROCEDURE EVALUATION
Patient: Herbert Halllps    Procedure Summary     Date: 03/01/23 Room / Location:  [facility] [address] [place] [address] [facility]    Anesthesia Start: 0700 Anesthesia Stop: 1103    Procedure: MEDIAN STERNOTOMY CORONARY ARTERY BYPASS GRAFTING X3  UTILIZING THE LEFT INTERNAL MAMMERY GRAFT, AND EVH OF THE GREATER RIGHT SAPHENOUS VEIN (Chest) Diagnosis:       Coronary artery disease involving native coronary artery of native heart, unspecified whether angina present      (Coronary artery disease involving native coronary artery of native heart, unspecified whether angina present [I25.10])    Surgeons: Paresh Saavedra MD Provider: Chelsy Lema MD    Anesthesia Type: general, [place], CVL, ERAS Protocol ASA Status: 4          Anesthesia Type: general, [place], CVL, ERAS Protocol    Vitals  No vitals data found for the desired time range.          Post Anesthesia Care and Evaluation    Patient location during evaluation: ICU  Patient participation: complete - patient cannot participate (intubated and sedated)  Post-procedure mental status: sedated.  Pain score: 0    Airway patency: patent  Anesthetic complications: No anesthetic complications  PONV Status: none  Cardiovascular status: acceptable and hemodynamically stable  Respiratory status: acceptable, ETT, intubated and ventilator  Hydration status: acceptable    Comments: No apparent anesthesia complications noted at this time

## 2023-03-02 ENCOUNTER — APPOINTMENT (OUTPATIENT)
Dept: ULTRASOUND IMAGING | Facility: HOSPITAL | Age: 68
DRG: 235 | End: 2023-03-02
Payer: MEDICARE

## 2023-03-02 ENCOUNTER — APPOINTMENT (OUTPATIENT)
Dept: CARDIOLOGY | Facility: HOSPITAL | Age: 68
DRG: 235 | End: 2023-03-02
Payer: MEDICARE

## 2023-03-02 ENCOUNTER — APPOINTMENT (OUTPATIENT)
Dept: GENERAL RADIOLOGY | Facility: HOSPITAL | Age: 68
DRG: 235 | End: 2023-03-02
Payer: MEDICARE

## 2023-03-02 PROBLEM — N17.9 ACUTE RENAL FAILURE (ARF): Status: ACTIVE | Noted: 2023-03-02

## 2023-03-02 PROBLEM — R57.9 SHOCK: Status: ACTIVE | Noted: 2023-03-02

## 2023-03-02 LAB
ACT BLD: 125 SECONDS (ref 82–152)
ACT BLD: 125 SECONDS (ref 82–152)
ACT BLD: 636 SECONDS (ref 82–152)
ACT BLD: 702 SECONDS (ref 82–152)
ACT BLD: 799 SECONDS (ref 82–152)
ACT BLD: 799 SECONDS (ref 82–152)
ALBUMIN SERPL-MCNC: 3.8 G/DL (ref 3.5–5.2)
ALBUMIN SERPL-MCNC: 4.5 G/DL (ref 3.5–5.2)
ALBUMIN/GLOB SERPL: 1.9 G/DL
ALP SERPL-CCNC: 54 U/L (ref 39–117)
ALT SERPL W P-5'-P-CCNC: 14 U/L (ref 1–41)
ANION GAP SERPL CALCULATED.3IONS-SCNC: 16 MMOL/L (ref 5–15)
ANION GAP SERPL CALCULATED.3IONS-SCNC: 17 MMOL/L (ref 5–15)
ANION GAP SERPL CALCULATED.3IONS-SCNC: 20 MMOL/L (ref 5–15)
ARTERIAL PATENCY WRIST A: ABNORMAL
AST SERPL-CCNC: 65 U/L (ref 1–40)
ATMOSPHERIC PRESS: ABNORMAL MM[HG]
BASE EXCESS BLDA CALC-SCNC: -1 MMOL/L (ref -5–5)
BASE EXCESS BLDA CALC-SCNC: -1 MMOL/L (ref -5–5)
BASE EXCESS BLDA CALC-SCNC: -10.8 MMOL/L (ref 0–2)
BASE EXCESS BLDA CALC-SCNC: -2 MMOL/L (ref -5–5)
BASE EXCESS BLDA CALC-SCNC: -2.8 MMOL/L (ref 0–2)
BASE EXCESS BLDA CALC-SCNC: -5.6 MMOL/L (ref 0–2)
BASE EXCESS BLDA CALC-SCNC: -8.2 MMOL/L (ref 0–2)
BASE EXCESS BLDA CALC-SCNC: 0 MMOL/L (ref -5–5)
BASE EXCESS BLDA CALC-SCNC: 1 MMOL/L (ref -5–5)
BASE EXCESS BLDA CALC-SCNC: 2 MMOL/L (ref -5–5)
BASE EXCESS BLDA CALC-SCNC: 2 MMOL/L (ref -5–5)
BASOPHILS # BLD AUTO: 0.05 10*3/MM3 (ref 0–0.2)
BASOPHILS NFR BLD AUTO: 0.3 % (ref 0–1.5)
BDY SITE: ABNORMAL
BH BB BLOOD EXPIRATION DATE: NORMAL
BH BB BLOOD TYPE BARCODE: 6200
BH BB DISPENSE STATUS: NORMAL
BH BB PRODUCT CODE: NORMAL
BH BB UNIT NUMBER: NORMAL
BILIRUB SERPL-MCNC: 0.2 MG/DL (ref 0–1.2)
BODY TEMPERATURE: 37 C
BUN SERPL-MCNC: 22 MG/DL (ref 8–23)
BUN SERPL-MCNC: 26 MG/DL (ref 8–23)
BUN SERPL-MCNC: 31 MG/DL (ref 8–23)
BUN/CREAT SERPL: 5.7 (ref 7–25)
BUN/CREAT SERPL: 6.6 (ref 7–25)
BUN/CREAT SERPL: 8.4 (ref 7–25)
CA-I BLDA-SCNC: 0.86 MMOL/L (ref 1.2–1.32)
CA-I BLDA-SCNC: 0.9 MMOL/L (ref 1.2–1.32)
CA-I BLDA-SCNC: 0.99 MMOL/L (ref 1.2–1.32)
CA-I BLDA-SCNC: 1.04 MMOL/L (ref 1.2–1.32)
CA-I BLDA-SCNC: 1.13 MMOL/L (ref 1.2–1.32)
CA-I BLDA-SCNC: 1.21 MMOL/L (ref 1.2–1.32)
CA-I BLDA-SCNC: 1.3 MMOL/L (ref 1.2–1.32)
CA-I SERPL ISE-MCNC: 1.04 MMOL/L (ref 1.12–1.32)
CALCIUM SPEC-SCNC: 7.2 MG/DL (ref 8.6–10.5)
CALCIUM SPEC-SCNC: 7.4 MG/DL (ref 8.6–10.5)
CALCIUM SPEC-SCNC: 8 MG/DL (ref 8.6–10.5)
CHLORIDE SERPL-SCNC: 100 MMOL/L (ref 98–107)
CHLORIDE SERPL-SCNC: 101 MMOL/L (ref 98–107)
CHLORIDE SERPL-SCNC: 95 MMOL/L (ref 98–107)
CO2 BLDA-SCNC: 18.9 MMOL/L (ref 22–33)
CO2 BLDA-SCNC: 21 MMOL/L (ref 22–33)
CO2 BLDA-SCNC: 22.6 MMOL/L (ref 22–33)
CO2 BLDA-SCNC: 25 MMOL/L (ref 24–29)
CO2 BLDA-SCNC: 25.4 MMOL/L (ref 22–33)
CO2 BLDA-SCNC: 27 MMOL/L (ref 24–29)
CO2 BLDA-SCNC: 27 MMOL/L (ref 24–29)
CO2 BLDA-SCNC: 28 MMOL/L (ref 24–29)
CO2 BLDA-SCNC: 29 MMOL/L (ref 24–29)
CO2 SERPL-SCNC: 20 MMOL/L (ref 22–29)
CO2 SERPL-SCNC: 23 MMOL/L (ref 22–29)
CO2 SERPL-SCNC: 23 MMOL/L (ref 22–29)
COHGB MFR BLD: 0.9 % (ref 0–2)
CORTIS SERPL-MCNC: 27.44 MCG/DL
CREAT SERPL-MCNC: 2.62 MG/DL (ref 0.76–1.27)
CREAT SERPL-MCNC: 4.58 MG/DL (ref 0.76–1.27)
CREAT SERPL-MCNC: 4.73 MG/DL (ref 0.76–1.27)
CREAT UR-MCNC: 58.8 MG/DL
DEPRECATED RDW RBC AUTO: 48.6 FL (ref 37–54)
DEPRECATED RDW RBC AUTO: 49.9 FL (ref 37–54)
EGFRCR SERPLBLD CKD-EPI 2021: 12.8 ML/MIN/1.73
EGFRCR SERPLBLD CKD-EPI 2021: 13.3 ML/MIN/1.73
EGFRCR SERPLBLD CKD-EPI 2021: 26 ML/MIN/1.73
EOSINOPHIL # BLD AUTO: 0.04 10*3/MM3 (ref 0–0.4)
EOSINOPHIL NFR BLD AUTO: 0.2 % (ref 0.3–6.2)
EOSINOPHIL SPEC QL MICRO: 0 % EOS/100 CELLS (ref 0–0)
EPAP: 0
EPAP: 0
EPAP: 7
ERYTHROCYTE [DISTWIDTH] IN BLOOD BY AUTOMATED COUNT: 14.2 % (ref 12.3–15.4)
ERYTHROCYTE [DISTWIDTH] IN BLOOD BY AUTOMATED COUNT: 14.5 % (ref 12.3–15.4)
GLOBULIN UR ELPH-MCNC: 2 GM/DL
GLUCOSE BLDC GLUCOMTR-MCNC: 120 MG/DL (ref 70–130)
GLUCOSE BLDC GLUCOMTR-MCNC: 123 MG/DL (ref 70–130)
GLUCOSE BLDC GLUCOMTR-MCNC: 127 MG/DL (ref 70–130)
GLUCOSE BLDC GLUCOMTR-MCNC: 128 MG/DL (ref 70–130)
GLUCOSE BLDC GLUCOMTR-MCNC: 131 MG/DL (ref 70–130)
GLUCOSE BLDC GLUCOMTR-MCNC: 132 MG/DL (ref 70–130)
GLUCOSE BLDC GLUCOMTR-MCNC: 141 MG/DL (ref 70–130)
GLUCOSE BLDC GLUCOMTR-MCNC: 143 MG/DL (ref 70–130)
GLUCOSE BLDC GLUCOMTR-MCNC: 146 MG/DL (ref 70–130)
GLUCOSE BLDC GLUCOMTR-MCNC: 148 MG/DL (ref 70–130)
GLUCOSE BLDC GLUCOMTR-MCNC: 148 MG/DL (ref 70–130)
GLUCOSE BLDC GLUCOMTR-MCNC: 152 MG/DL (ref 70–130)
GLUCOSE BLDC GLUCOMTR-MCNC: 154 MG/DL (ref 70–130)
GLUCOSE BLDC GLUCOMTR-MCNC: 160 MG/DL (ref 70–130)
GLUCOSE BLDC GLUCOMTR-MCNC: 161 MG/DL (ref 70–130)
GLUCOSE BLDC GLUCOMTR-MCNC: 162 MG/DL (ref 70–130)
GLUCOSE BLDC GLUCOMTR-MCNC: 163 MG/DL (ref 70–130)
GLUCOSE BLDC GLUCOMTR-MCNC: 164 MG/DL (ref 70–130)
GLUCOSE BLDC GLUCOMTR-MCNC: 172 MG/DL (ref 70–130)
GLUCOSE BLDC GLUCOMTR-MCNC: 180 MG/DL (ref 70–130)
GLUCOSE BLDC GLUCOMTR-MCNC: 196 MG/DL (ref 70–130)
GLUCOSE BLDC GLUCOMTR-MCNC: 206 MG/DL (ref 70–130)
GLUCOSE BLDC GLUCOMTR-MCNC: 209 MG/DL (ref 70–130)
GLUCOSE BLDC GLUCOMTR-MCNC: 215 MG/DL (ref 70–130)
GLUCOSE BLDC GLUCOMTR-MCNC: 220 MG/DL (ref 70–130)
GLUCOSE SERPL-MCNC: 142 MG/DL (ref 65–99)
GLUCOSE SERPL-MCNC: 153 MG/DL (ref 65–99)
GLUCOSE SERPL-MCNC: 189 MG/DL (ref 65–99)
HCO3 BLDA-SCNC: 17.5 MMOL/L (ref 20–26)
HCO3 BLDA-SCNC: 19.5 MMOL/L (ref 20–26)
HCO3 BLDA-SCNC: 21.2 MMOL/L (ref 20–26)
HCO3 BLDA-SCNC: 23.6 MMOL/L (ref 22–26)
HCO3 BLDA-SCNC: 23.8 MMOL/L (ref 22–26)
HCO3 BLDA-SCNC: 23.8 MMOL/L (ref 22–26)
HCO3 BLDA-SCNC: 23.9 MMOL/L (ref 20–26)
HCO3 BLDA-SCNC: 25.5 MMOL/L (ref 22–26)
HCO3 BLDA-SCNC: 25.6 MMOL/L (ref 22–26)
HCO3 BLDA-SCNC: 26.8 MMOL/L (ref 22–26)
HCO3 BLDA-SCNC: 27.3 MMOL/L (ref 22–26)
HCT VFR BLD AUTO: 31.9 % (ref 37.5–51)
HCT VFR BLD AUTO: 33.6 % (ref 37.5–51)
HCT VFR BLD CALC: 32.4 % (ref 38–51)
HCT VFR BLD CALC: 32.7 % (ref 38–51)
HCT VFR BLD CALC: 32.8 % (ref 38–51)
HCT VFR BLD CALC: 33.3 % (ref 38–51)
HCT VFR BLDA CALC: 29 % (ref 38–51)
HCT VFR BLDA CALC: 31 % (ref 38–51)
HCT VFR BLDA CALC: 33 % (ref 38–51)
HCT VFR BLDA CALC: 36 % (ref 38–51)
HCT VFR BLDA CALC: 37 % (ref 38–51)
HCT VFR BLDA CALC: 42 % (ref 38–51)
HCT VFR BLDA CALC: 47 % (ref 38–51)
HGB BLD-MCNC: 10.4 G/DL (ref 13–17.7)
HGB BLD-MCNC: 10.7 G/DL (ref 13–17.7)
HGB BLDA-MCNC: 10.5 G/DL (ref 12–17)
HGB BLDA-MCNC: 10.6 G/DL (ref 13.5–17.5)
HGB BLDA-MCNC: 10.7 G/DL (ref 13.5–17.5)
HGB BLDA-MCNC: 10.7 G/DL (ref 13.5–17.5)
HGB BLDA-MCNC: 10.9 G/DL (ref 13.5–17.5)
HGB BLDA-MCNC: 11.2 G/DL (ref 12–17)
HGB BLDA-MCNC: 12.2 G/DL (ref 12–17)
HGB BLDA-MCNC: 12.6 G/DL (ref 12–17)
HGB BLDA-MCNC: 14.3 G/DL (ref 12–17)
HGB BLDA-MCNC: 16 G/DL (ref 12–17)
HGB BLDA-MCNC: 9.9 G/DL (ref 12–17)
IMM GRANULOCYTES # BLD AUTO: 0.1 10*3/MM3 (ref 0–0.05)
IMM GRANULOCYTES NFR BLD AUTO: 0.5 % (ref 0–0.5)
INHALED O2 CONCENTRATION: 28 %
INHALED O2 CONCENTRATION: 30 %
INHALED O2 CONCENTRATION: 35 %
INHALED O2 CONCENTRATION: 36 %
INR PPP: 1.32 (ref 0.84–1.13)
IPAP: 0
IPAP: 0
IPAP: 14
LYMPHOCYTES # BLD AUTO: 1.02 10*3/MM3 (ref 0.7–3.1)
LYMPHOCYTES NFR BLD AUTO: 5.3 % (ref 19.6–45.3)
Lab: ABNORMAL
Lab: ABNORMAL
MAGNESIUM SERPL-MCNC: 1.8 MG/DL (ref 1.6–2.4)
MAGNESIUM SERPL-MCNC: 2.1 MG/DL (ref 1.6–2.4)
MCH RBC QN AUTO: 30.1 PG (ref 26.6–33)
MCH RBC QN AUTO: 30.1 PG (ref 26.6–33)
MCHC RBC AUTO-ENTMCNC: 31.8 G/DL (ref 31.5–35.7)
MCHC RBC AUTO-ENTMCNC: 32.6 G/DL (ref 31.5–35.7)
MCV RBC AUTO: 92.2 FL (ref 79–97)
MCV RBC AUTO: 94.6 FL (ref 79–97)
METHGB BLD QL: 0.3 % (ref 0–1.5)
METHGB BLD QL: 0.5 % (ref 0–1.5)
METHGB BLD QL: 0.6 % (ref 0–1.5)
METHGB BLD QL: 0.6 % (ref 0–1.5)
MODALITY: ABNORMAL
MONOCYTES # BLD AUTO: 1.54 10*3/MM3 (ref 0.1–0.9)
MONOCYTES NFR BLD AUTO: 8.1 % (ref 5–12)
NEUTROPHILS NFR BLD AUTO: 16.33 10*3/MM3 (ref 1.7–7)
NEUTROPHILS NFR BLD AUTO: 85.6 % (ref 42.7–76)
NOTE: ABNORMAL
NOTIFIED BY: ABNORMAL
NOTIFIED BY: ABNORMAL
NOTIFIED WHO: ABNORMAL
NOTIFIED WHO: ABNORMAL
NRBC BLD AUTO-RTO: 0 /100 WBC (ref 0–0.2)
OXYHGB MFR BLDV: 95.4 % (ref 94–99)
OXYHGB MFR BLDV: 96.4 % (ref 94–99)
OXYHGB MFR BLDV: 97.5 % (ref 94–99)
OXYHGB MFR BLDV: 97.6 % (ref 94–99)
PAW @ PEAK INSP FLOW SETTING VENT: 0 CMH2O
PAW @ PEAK INSP FLOW SETTING VENT: 0 CMH2O
PCO2 BLDA: 39.6 MM HG (ref 35–45)
PCO2 BLDA: 39.9 MM HG (ref 35–45)
PCO2 BLDA: 40.3 MM HG (ref 35–45)
PCO2 BLDA: 41.6 MM HG (ref 35–45)
PCO2 BLDA: 44.4 MM HG (ref 35–45)
PCO2 BLDA: 45.8 MM HG (ref 35–45)
PCO2 BLDA: 46.1 MM HG (ref 35–45)
PCO2 BLDA: 46.9 MM HG (ref 35–45)
PCO2 BLDA: 48 MM HG (ref 35–45)
PCO2 BLDA: 48.6 MM HG (ref 35–45)
PCO2 BLDA: 48.9 MM HG (ref 35–45)
PCO2 TEMP ADJ BLD: 46.1 MM HG (ref 35–48)
PCO2 TEMP ADJ BLD: 48 MM HG (ref 35–48)
PCO2 TEMP ADJ BLD: 48.6 MM HG (ref 35–48)
PCO2 TEMP ADJ BLD: 48.9 MM HG (ref 35–48)
PH BLDA: 7.17 PH UNITS (ref 7.35–7.45)
PH BLDA: 7.21 PH UNITS (ref 7.35–7.45)
PH BLDA: 7.27 PH UNITS (ref 7.35–7.45)
PH BLDA: 7.3 PH UNITS (ref 7.35–7.45)
PH BLDA: 7.36 PH UNITS (ref 7.35–7.6)
PH BLDA: 7.36 PH UNITS (ref 7.35–7.6)
PH BLDA: 7.37 PH UNITS (ref 7.35–7.6)
PH BLDA: 7.38 PH UNITS (ref 7.35–7.6)
PH BLDA: 7.39 PH UNITS (ref 7.35–7.6)
PH BLDA: 7.39 PH UNITS (ref 7.35–7.6)
PH BLDA: 7.41 PH UNITS (ref 7.35–7.6)
PH, TEMP CORRECTED: 7.17 PH UNITS
PH, TEMP CORRECTED: 7.21 PH UNITS
PH, TEMP CORRECTED: 7.27 PH UNITS
PH, TEMP CORRECTED: 7.3 PH UNITS
PHOSPHATE SERPL-MCNC: 7.2 MG/DL (ref 2.5–4.5)
PLATELET # BLD AUTO: 160 10*3/MM3 (ref 140–450)
PLATELET # BLD AUTO: 242 10*3/MM3 (ref 140–450)
PMV BLD AUTO: 11 FL (ref 6–12)
PMV BLD AUTO: 11.8 FL (ref 6–12)
PO2 BLDA: 111 MM HG (ref 83–108)
PO2 BLDA: 121 MM HG (ref 83–108)
PO2 BLDA: 236 MMHG (ref 80–105)
PO2 BLDA: 398 MMHG (ref 80–105)
PO2 BLDA: 399 MMHG (ref 80–105)
PO2 BLDA: 40 MMHG (ref 80–105)
PO2 BLDA: 401 MMHG (ref 80–105)
PO2 BLDA: 440 MMHG (ref 80–105)
PO2 BLDA: 497 MMHG (ref 80–105)
PO2 BLDA: 86.2 MM HG (ref 83–108)
PO2 BLDA: 88.2 MM HG (ref 83–108)
PO2 TEMP ADJ BLD: 111 MM HG (ref 83–108)
PO2 TEMP ADJ BLD: 121 MM HG (ref 83–108)
PO2 TEMP ADJ BLD: 86.2 MM HG (ref 83–108)
PO2 TEMP ADJ BLD: 88.2 MM HG (ref 83–108)
POTASSIUM BLDA-SCNC: 4.2 MMOL/L (ref 3.5–4.9)
POTASSIUM BLDA-SCNC: 4.5 MMOL/L (ref 3.5–4.9)
POTASSIUM BLDA-SCNC: 4.5 MMOL/L (ref 3.5–4.9)
POTASSIUM BLDA-SCNC: 5.6 MMOL/L (ref 3.5–4.9)
POTASSIUM BLDA-SCNC: 5.8 MMOL/L (ref 3.5–4.9)
POTASSIUM BLDA-SCNC: 6.3 MMOL/L (ref 3.5–4.9)
POTASSIUM BLDA-SCNC: 7.5 MMOL/L (ref 3.5–4.9)
POTASSIUM SERPL-SCNC: 3.1 MMOL/L (ref 3.5–5.2)
POTASSIUM SERPL-SCNC: 4.4 MMOL/L (ref 3.5–5.2)
POTASSIUM SERPL-SCNC: 4.8 MMOL/L (ref 3.5–5.2)
PROT ?TM UR-MCNC: 180 MG/DL
PROT SERPL-MCNC: 5.8 G/DL (ref 6–8.5)
PROTHROMBIN TIME: 16.3 SECONDS (ref 11.4–14.4)
QT INTERVAL: 430 MS
QT INTERVAL: 452 MS
QTC INTERVAL: 460 MS
QTC INTERVAL: 525 MS
RBC # BLD AUTO: 3.46 10*6/MM3 (ref 4.14–5.8)
RBC # BLD AUTO: 3.55 10*6/MM3 (ref 4.14–5.8)
SAO2 % BLDA: 100 % (ref 95–98)
SAO2 % BLDA: 74 % (ref 95–98)
SODIUM BLD-SCNC: 134 MMOL/L (ref 138–146)
SODIUM BLD-SCNC: 136 MMOL/L (ref 138–146)
SODIUM BLD-SCNC: 137 MMOL/L (ref 138–146)
SODIUM BLD-SCNC: 137 MMOL/L (ref 138–146)
SODIUM BLD-SCNC: 142 MMOL/L (ref 138–146)
SODIUM SERPL-SCNC: 134 MMOL/L (ref 136–145)
SODIUM SERPL-SCNC: 140 MMOL/L (ref 136–145)
SODIUM SERPL-SCNC: 141 MMOL/L (ref 136–145)
SODIUM UR-SCNC: 30 MMOL/L
TOTAL RATE: 0 BREATHS/MINUTE
TOTAL RATE: 0 BREATHS/MINUTE
UNIT  ABO: NORMAL
UNIT  RH: NORMAL
VENTILATOR MODE: ABNORMAL
WBC NRBC COR # BLD: 18.55 10*3/MM3 (ref 3.4–10.8)
WBC NRBC COR # BLD: 19.08 10*3/MM3 (ref 3.4–10.8)

## 2023-03-02 PROCEDURE — 99024 POSTOP FOLLOW-UP VISIT: CPT | Performed by: THORACIC SURGERY (CARDIOTHORACIC VASCULAR SURGERY)

## 2023-03-02 PROCEDURE — 63710000001 DIPHENHYDRAMINE PER 50 MG: Performed by: INTERNAL MEDICINE

## 2023-03-02 PROCEDURE — 83735 ASSAY OF MAGNESIUM: CPT | Performed by: PHYSICIAN ASSISTANT

## 2023-03-02 PROCEDURE — 25010000002 EPINEPHRINE 1 MG/ML SOLUTION 30 ML VIAL: Performed by: PHYSICIAN ASSISTANT

## 2023-03-02 PROCEDURE — 84300 ASSAY OF URINE SODIUM: CPT | Performed by: INTERNAL MEDICINE

## 2023-03-02 PROCEDURE — 83735 ASSAY OF MAGNESIUM: CPT | Performed by: INTERNAL MEDICINE

## 2023-03-02 PROCEDURE — 85610 PROTHROMBIN TIME: CPT | Performed by: PHYSICIAN ASSISTANT

## 2023-03-02 PROCEDURE — 99232 SBSQ HOSP IP/OBS MODERATE 35: CPT | Performed by: INTERNAL MEDICINE

## 2023-03-02 PROCEDURE — 76775 US EXAM ABDO BACK WALL LIM: CPT

## 2023-03-02 PROCEDURE — 25010000002 ONDANSETRON PER 1 MG: Performed by: PHYSICIAN ASSISTANT

## 2023-03-02 PROCEDURE — 93306 TTE W/DOPPLER COMPLETE: CPT

## 2023-03-02 PROCEDURE — 94799 UNLISTED PULMONARY SVC/PX: CPT

## 2023-03-02 PROCEDURE — 25010000002 CALCIUM GLUCONATE-NACL 1-0.675 GM/50ML-% SOLUTION: Performed by: NURSE PRACTITIONER

## 2023-03-02 PROCEDURE — 82533 TOTAL CORTISOL: CPT | Performed by: NURSE PRACTITIONER

## 2023-03-02 PROCEDURE — 25010000002 MORPHINE PER 10 MG: Performed by: THORACIC SURGERY (CARDIOTHORACIC VASCULAR SURGERY)

## 2023-03-02 PROCEDURE — 71045 X-RAY EXAM CHEST 1 VIEW: CPT

## 2023-03-02 PROCEDURE — 80053 COMPREHEN METABOLIC PANEL: CPT | Performed by: PHYSICIAN ASSISTANT

## 2023-03-02 PROCEDURE — 25010000002 FENTANYL CITRATE (PF) 50 MCG/ML SOLUTION: Performed by: THORACIC SURGERY (CARDIOTHORACIC VASCULAR SURGERY)

## 2023-03-02 PROCEDURE — 25010000002 CEFAZOLIN IN DEXTROSE 2-4 GM/100ML-% SOLUTION

## 2023-03-02 PROCEDURE — 94660 CPAP INITIATION&MGMT: CPT

## 2023-03-02 PROCEDURE — 93010 ELECTROCARDIOGRAM REPORT: CPT | Performed by: INTERNAL MEDICINE

## 2023-03-02 PROCEDURE — 36600 WITHDRAWAL OF ARTERIAL BLOOD: CPT

## 2023-03-02 PROCEDURE — 82570 ASSAY OF URINE CREATININE: CPT | Performed by: INTERNAL MEDICINE

## 2023-03-02 PROCEDURE — 25010000002 DOBUTAMINE PER 250 MG: Performed by: PHYSICIAN ASSISTANT

## 2023-03-02 PROCEDURE — 84100 ASSAY OF PHOSPHORUS: CPT | Performed by: PHYSICIAN ASSISTANT

## 2023-03-02 PROCEDURE — 82375 ASSAY CARBOXYHB QUANT: CPT

## 2023-03-02 PROCEDURE — 85027 COMPLETE CBC AUTOMATED: CPT | Performed by: INTERNAL MEDICINE

## 2023-03-02 PROCEDURE — 80048 BASIC METABOLIC PNL TOTAL CA: CPT | Performed by: NURSE PRACTITIONER

## 2023-03-02 PROCEDURE — 63710000001 DIPHENHYDRAMINE PER 50 MG: Performed by: NURSE PRACTITIONER

## 2023-03-02 PROCEDURE — 84156 ASSAY OF PROTEIN URINE: CPT | Performed by: INTERNAL MEDICINE

## 2023-03-02 PROCEDURE — 82962 GLUCOSE BLOOD TEST: CPT

## 2023-03-02 PROCEDURE — 93306 TTE W/DOPPLER COMPLETE: CPT | Performed by: INTERNAL MEDICINE

## 2023-03-02 PROCEDURE — 25010000002 DOBUTAMINE PER 250 MG: Performed by: THORACIC SURGERY (CARDIOTHORACIC VASCULAR SURGERY)

## 2023-03-02 PROCEDURE — 82330 ASSAY OF CALCIUM: CPT | Performed by: INTERNAL MEDICINE

## 2023-03-02 PROCEDURE — 93005 ELECTROCARDIOGRAM TRACING: CPT | Performed by: PHYSICIAN ASSISTANT

## 2023-03-02 PROCEDURE — 85025 COMPLETE CBC W/AUTO DIFF WBC: CPT | Performed by: PHYSICIAN ASSISTANT

## 2023-03-02 PROCEDURE — 36620 INSERTION CATHETER ARTERY: CPT

## 2023-03-02 PROCEDURE — 99291 CRITICAL CARE FIRST HOUR: CPT | Performed by: INTERNAL MEDICINE

## 2023-03-02 PROCEDURE — 82805 BLOOD GASES W/O2 SATURATION: CPT

## 2023-03-02 PROCEDURE — 94761 N-INVAS EAR/PLS OXIMETRY MLT: CPT

## 2023-03-02 PROCEDURE — 03HC33Z INSERTION OF INFUSION DEVICE INTO LEFT RADIAL ARTERY, PERCUTANEOUS APPROACH: ICD-10-PCS

## 2023-03-02 PROCEDURE — 83050 HGB METHEMOGLOBIN QUAN: CPT

## 2023-03-02 PROCEDURE — 87205 SMEAR GRAM STAIN: CPT | Performed by: INTERNAL MEDICINE

## 2023-03-02 RX ORDER — DIPHENHYDRAMINE HCL 25 MG
25 CAPSULE ORAL ONCE
Status: COMPLETED | OUTPATIENT
Start: 2023-03-02 | End: 2023-03-02

## 2023-03-02 RX ORDER — DIPHENHYDRAMINE HCL 25 MG
25 CAPSULE ORAL EVERY 6 HOURS PRN
Status: DISCONTINUED | OUTPATIENT
Start: 2023-03-02 | End: 2023-03-03

## 2023-03-02 RX ORDER — CALCIUM GLUCONATE 20 MG/ML
1 INJECTION, SOLUTION INTRAVENOUS ONCE
Status: COMPLETED | OUTPATIENT
Start: 2023-03-02 | End: 2023-03-02

## 2023-03-02 RX ORDER — CEFAZOLIN SODIUM 2 G/100ML
2 INJECTION, SOLUTION INTRAVENOUS EVERY 12 HOURS
Status: COMPLETED | OUTPATIENT
Start: 2023-03-02 | End: 2023-03-03

## 2023-03-02 RX ORDER — FUROSEMIDE 20 MG/1
20 TABLET ORAL 2 TIMES DAILY
COMMUNITY
End: 2023-03-20 | Stop reason: HOSPADM

## 2023-03-02 RX ADMIN — ARFORMOTEROL TARTRATE 15 MCG: 15 SOLUTION RESPIRATORY (INHALATION) at 20:40

## 2023-03-02 RX ADMIN — DIPHENHYDRAMINE HYDROCHLORIDE 25 MG: 25 CAPSULE ORAL at 21:17

## 2023-03-02 RX ADMIN — CHLORHEXIDINE GLUCONATE 0.12% ORAL RINSE 15 ML: 1.2 LIQUID ORAL at 09:39

## 2023-03-02 RX ADMIN — BUDESONIDE 0.5 MG: 0.5 SUSPENSION RESPIRATORY (INHALATION) at 08:52

## 2023-03-02 RX ADMIN — DOBUTAMINE IN DEXTROSE 2 MCG/KG/MIN: 100 INJECTION, SOLUTION INTRAVENOUS at 03:30

## 2023-03-02 RX ADMIN — ONDANSETRON 4 MG: 2 INJECTION INTRAMUSCULAR; INTRAVENOUS at 17:10

## 2023-03-02 RX ADMIN — DIPHENHYDRAMINE HYDROCHLORIDE 25 MG: 25 CAPSULE ORAL at 16:03

## 2023-03-02 RX ADMIN — SODIUM BICARBONATE 150 MEQ: 84 INJECTION INTRAVENOUS at 11:52

## 2023-03-02 RX ADMIN — SODIUM BICARBONATE 75 MEQ: 84 INJECTION, SOLUTION INTRAVENOUS at 10:30

## 2023-03-02 RX ADMIN — FENTANYL CITRATE 25 MCG: 50 INJECTION, SOLUTION INTRAMUSCULAR; INTRAVENOUS at 13:33

## 2023-03-02 RX ADMIN — INSULIN HUMAN 4.2 UNITS/HR: 1 INJECTION, SOLUTION INTRAVENOUS at 13:07

## 2023-03-02 RX ADMIN — BUDESONIDE 0.5 MG: 0.5 SUSPENSION RESPIRATORY (INHALATION) at 20:40

## 2023-03-02 RX ADMIN — SODIUM BICARBONATE 50 MEQ: 84 INJECTION, SOLUTION INTRAVENOUS at 09:39

## 2023-03-02 RX ADMIN — SODIUM BICARBONATE 50 MEQ: 84 INJECTION, SOLUTION INTRAVENOUS at 11:18

## 2023-03-02 RX ADMIN — HYDROCODONE BITARTRATE AND ACETAMINOPHEN 1 TABLET: 7.5; 325 TABLET ORAL at 17:10

## 2023-03-02 RX ADMIN — OXYCODONE HYDROCHLORIDE 10 MG: 5 TABLET ORAL at 20:12

## 2023-03-02 RX ADMIN — ACETAMINOPHEN 325MG 650 MG: 325 TABLET ORAL at 20:13

## 2023-03-02 RX ADMIN — MORPHINE SULFATE 2 MG: 2 INJECTION, SOLUTION INTRAMUSCULAR; INTRAVENOUS at 20:38

## 2023-03-02 RX ADMIN — SODIUM BICARBONATE 25 MEQ: 84 INJECTION, SOLUTION INTRAVENOUS at 18:38

## 2023-03-02 RX ADMIN — ACETAMINOPHEN 325MG 650 MG: 325 TABLET ORAL at 13:33

## 2023-03-02 RX ADMIN — Medication 0.02 MCG/KG/MIN: at 06:32

## 2023-03-02 RX ADMIN — CEFAZOLIN SODIUM 2 G: 2 INJECTION, SOLUTION INTRAVENOUS at 13:07

## 2023-03-02 RX ADMIN — HYDROCODONE BITARTRATE AND ACETAMINOPHEN 1 TABLET: 7.5; 325 TABLET ORAL at 23:01

## 2023-03-02 RX ADMIN — SODIUM CHLORIDE 500 ML: 9 INJECTION, SOLUTION INTRAVENOUS at 03:33

## 2023-03-02 RX ADMIN — ARFORMOTEROL TARTRATE 15 MCG: 15 SOLUTION RESPIRATORY (INHALATION) at 08:52

## 2023-03-02 RX ADMIN — CALCIUM GLUCONATE 1 G: 20 INJECTION, SOLUTION INTRAVENOUS at 22:52

## 2023-03-02 RX ADMIN — EPINEPHRINE 0.28 MCG/KG/MIN: 1 INJECTION INTRAMUSCULAR; INTRAVENOUS; SUBCUTANEOUS at 07:45

## 2023-03-02 RX ADMIN — SENNOSIDES AND DOCUSATE SODIUM 2 TABLET: 8.6; 5 TABLET ORAL at 20:12

## 2023-03-02 RX ADMIN — GUAIFENESIN 1200 MG: 600 TABLET, EXTENDED RELEASE ORAL at 20:12

## 2023-03-02 RX ADMIN — DOBUTAMINE IN DEXTROSE 5 MCG/KG/MIN: 100 INJECTION, SOLUTION INTRAVENOUS at 17:10

## 2023-03-02 RX ADMIN — ATORVASTATIN CALCIUM 40 MG: 40 TABLET, FILM COATED ORAL at 20:13

## 2023-03-02 RX ADMIN — INSULIN HUMAN 9.8 UNITS/HR: 1 INJECTION, SOLUTION INTRAVENOUS at 03:44

## 2023-03-02 RX ADMIN — Medication 0.12 MCG/KG/MIN: at 17:10

## 2023-03-02 RX ADMIN — DOBUTAMINE IN DEXTROSE 10 MCG/KG/MIN: 100 INJECTION, SOLUTION INTRAVENOUS at 09:13

## 2023-03-02 NOTE — PROGRESS NOTES
"[facility]  IP Progress Note    PROBLEM LIST:    CARDIAC  Coronary Artery Disease:   Inferior STEMI with [facility], Dr. Carter, 3/28/2014:  EF 50%, NILE to the mid-dominant RCA, NILE to the proximal LAD  Stress test, 2/13/2023: Inferolateral ischemia, %, RCA 80%, LAD 80%  [facility] 2/17/2023: Severe triple-vessel disease  CABG x3 3/1/2023: LIMA to the LAD, SVG to OM, and SVG to PDA    Myocardium:   Echocardiogram, 3/29/2014:   EF 50-55%, RVSP 30.  Echo 12/21/2022:Stage C, HFmrEF EF 41- 45%, mild LVH    Valvular:   Mild calcification to aortic valve    Electrical:   NSR, LVH, PVC    Percardium:   Normal    VASCULAR:  Arterial  Cerebrovascular disease:   Carotid Doppler: 2/2023 less than 50 %      CARDIAC RISK FACTORS:         Hypertension         Diabetes         Dyslipidemia         Tobacco Use, Quit after 75 years    NON-CARDIAC:  Asthma/COPD: Abnormal PFTs  CKD    SURGERIES:  Tonsillectomy    CHIEF COMPLAINTS:  Follow-up hypertension and dyslipidemia      Subjective   Patient in bed with BiPAP on, family at bedside.  Patient does complain of pain around his sternum but not left-sided chest pain.  He states he feels better than he did this morning.      Objective     Blood pressure 107/63, pulse 82, temperature 99 °F (37.2 °C), temperature source Bladder, resp. rate 18, height 162.6 cm (64\"), weight 75.3 kg (166 lb), SpO2 100 %.     Intake/Output Summary (Last 24 hours) at 3/2/2023 1212  Last data filed at 3/2/2023 1000  Gross per 24 hour   Intake 2543.53 ml   Output 1510 ml   Net 1033.53 ml     Constitutional:       Comments: Mild discomfort   HENT:         Comments: BiPAP mask on  Neck:      Vascular: JVD normal.   Pulmonary:      Breath sounds: Examination of the right-lower field reveals decreased breath sounds. Examination of the left-lower field reveals decreased breath sounds. Decreased breath sounds present.   Cardiovascular:      Regular rhythm.      Murmurs: There is no " murmur.      Biphasic rub.      Comments: Trace bilateral lower extremity edema, patient also has edema noted to upper extremities.  Sternal incision is clean dry and intact.  Abdominal:      General: There is no distension.   Musculoskeletal:         General: No deformity. Skin:     General: Skin is warm and dry.   Neurological:      General: No focal deficit present.      Mental Status: Alert and oriented to person, place and time.         RESULR REVIEW:    I reviewed the patient's new clinical results.      MEDICATIONS:    acetaminophen, 650 mg, Oral, Q8H  arformoterol, 15 mcg, Nebulization, BID - RT  aspirin, 325 mg, Oral, Daily  atorvastatin, 40 mg, Oral, Nightly  budesonide, 0.5 mg, Nebulization, BID - RT  ceFAZolin, 2 g, Intravenous, Q12H  chlorhexidine, 15 mL, Mouth/Throat, Q12H  guaiFENesin, 1,200 mg, Oral, Q12H  metoprolol tartrate, 12.5 mg, Oral, Q12H  pharmacy consult - MTM, , Does not apply, BID  senna-docusate sodium, 2 tablet, Oral, BID          Results from last 7 days   Lab Units 03/02/23 0215   WBC 10*3/mm3 19.08*   HEMOGLOBIN g/dL 10.7*   HEMATOCRIT % 33.6*   PLATELETS 10*3/mm3 242     Results from last 7 days   Lab Units 03/02/23 0215 03/01/23  1210 02/28/23  0835   SODIUM mmol/L 141   < > 139   POTASSIUM mmol/L 3.1*   < > 4.2   CHLORIDE mmol/L 101   < > 103   CO2 mmol/L 20.0*   < > 28.0   BUN mg/dL 22   < > 18   CREATININE mg/dL 2.62*   < > 1.03   CALCIUM mg/dL 8.0*   < > 9.1   BILIRUBIN mg/dL  --   --  0.6   ALK PHOS U/L  --   --  92   ALT (SGPT) U/L  --   --  32   AST (SGOT) U/L  --   --  22   GLUCOSE mg/dL 189*   < > 91    < > = values in this interval not displayed.     Results from last 7 days   Lab Units 03/02/23 0215 03/01/23  1124 02/28/23  0835   INR  1.32* 1.43* 1.01     Lab Results   Component Value Date    TROPONINI 15.31 (C) 03/30/2014    TROPONINT <0.010 12/11/2022                 No results found for: IRON, FERRITIN, LABIRON, TIBC   Hemoglobin A1C   Date Value Ref Range Status    02/28/2023 7.40 (H) 4.80 - 5.60 % Final     Magnesium   Date Value Ref Range Status   03/02/2023 2.1 1.6 - 2.4 mg/dL Final        Tele: Normal sinus rhythm      ASSESSMENT:     1. CAD s/p CABG x3, preop EF 40-45%  2. Ischemic cardiomyopathy  3. History of hypertension with recent hypotension on multiple pressors overnight.  4. Prolonged QT  5. Hyperlipidemia, on Crestor 10 at home  6. Acute on chronic kidney disease  7. Type 2 diabetes          PLAN:     1. Continue IV pressor support, currently weaning.  2. Can continue Lipitor 40 for now, will transition to Crestor in the future.  3. Will initiate GDMT once patient's blood pressure is more stable.    Rosangela Queen PA-C          STAFF CARDIOLOGIST:      SUMMARY:    1. Patient underwent successful revascularization but having problem with urine output      PERTINENT:    1. On pressors  2. Decreased urine output      IMPRESSION:    1. Coronary artery disease  2. Acute kidney injury      RECOMMENDATIONS:    1. Continue with current medications.  2. Nephrology is on board  3. May need early dialysis        I have seen and examined the patient, reviewed the above note, necessary changes were made and I agree with the final note.   Nadya Ramirez MD

## 2023-03-02 NOTE — CONSULTS
Referring Provider: Paresh Cullen  Reason for Consultation: [place] post CABG    Subjective     Chief complaint coronary artery disease    History of present illness:    67-year-old male with a history of hypertension, diabetes, former smoker quit December 2022, history of CAD s/p PCI in 2014.  Patient complained of shortness of breath associated with cough, he was referred to cardiologist, stress stress was positive, he was sent for heart cath which showed coronary artery disease.  Patient underwent CABG x3, his current condition got worse postsurgery now on multiple pressors for blood pressure support, renal function has decreased.  His echocardiogram shows ejection fraction 41-45% with mild concentric hypertrophy the left ventricle.  Patient's blood pressure yesterday was in the 40s systolic, and mostly remained in the 60s and 80s range.  Requiring multiple IV pressors.  Patient denies any history of epistaxis, hemoptysis, hematemesis, gross hematuria, kidney stones, recurrent urine tract infection, no recent fever or chills.  History  Past Medical History:   Diagnosis Date   • Acute renal failure (ARF) ([facility]) 3/2/2023   • Asthma December 2022   • Chronic systolic congestive heart failure (HCC) 3/1/2023   • Coronary artery disease March 2014   • Elevated cholesterol    • History of tobacco abuse    • HLD (hyperlipidemia) 01/26/2023   • Hypertension    • Ischemic cardiomyopathy    • Myocardial infarction (HCC) March 2014   • Prolonged Q-T interval on ECG 3/1/2023   • Type 2 diabetes mellitus (HCC)    • Wears glasses    ,   Past Surgical History:   Procedure Laterality Date   • CARDIAC CATHETERIZATION  March 2014   • CARDIAC CATHETERIZATION Left 02/17/2023    Procedure: Left Heart Cath;  Surgeon: Nadya Ramirez MD;  Location: [facility] INVASIVE LOCATION;  Service: Cardiology;  Laterality: Left;   • CORONARY ARTERY BYPASS GRAFT N/A 3/1/2023    Procedure: MEDIAN STERNOTOMY CORONARY ARTERY BYPASS GRAFTING X3   UTILIZING THE LEFT INTERNAL MAMMERY GRAFT, AND EVH OF THE GREATER RIGHT SAPHENOUS VEIN;  Surgeon: Paresh Saavedra MD;  Location: [facility];  Service: Cardiothoracic;  Laterality: N/A;   • CORONARY STENT PLACEMENT  2014   • TONSILLECTOMY     ,   Family History   Problem Relation Age of Onset   • Diabetes Mother    • Cancer Father         liver   • Brain cancer Sister    • Heart attack Brother    • Prostate cancer Brother    ,   Social History     Socioeconomic History   • Marital status:    Tobacco Use   • Smoking status: Former     Packs/day: 1.50     Years: 52.00     Pack years: 78.00     Types: Cigarettes     Start date:      Quit date: 2022     Years since quittin.2     Passive exposure: Past   • Smokeless tobacco: Never   Vaping Use   • Vaping Use: Never used   Substance and Sexual Activity   • Alcohol use: No   • Drug use: No   • Sexual activity: Defer     E-cigarette/Vaping   • E-cigarette/Vaping Use Never User    • Passive Exposure No      E-cigarette/Vaping Substances     E-cigarette/Vaping Devices       ,   Medications Prior to Admission   Medication Sig Dispense Refill Last Dose   • albuterol sulfate  (90 Base) MCG/ACT inhaler Inhale 2 puffs Every 4 (Four) Hours As Needed for Wheezing. 18 g 2 Past Week   • aspirin 81 MG chewable tablet Chew 1 tablet Daily.   2023 at 2000   • ezetimibe (ZETIA) 10 MG tablet Take 1 tablet by mouth Daily for 30 days. 30 tablet 11 2023   • Fluticasone-Umeclidin-Vilant (TRELEGY) 100-62.5-25 MCG/ACT inhaler Inhale 1 puff Daily. (Patient taking differently: Inhale 1 puff Daily. Patient is taking only as needed (not daily)) 1 each 5 Past Month   • Lantus 100 UNIT/ML injection INJECT 60 UNITS SUBCUTANEOUSLY ONCE DAILY (Patient taking differently: Inject 60 Units under the skin into the appropriate area as directed Every Night. INJECT 60 UNITS SUBCUTANEOUSLY ONCE DAILY) 20 mL 5 2023   • rosuvastatin (CRESTOR) 10 MG tablet Take 1  "tablet by mouth Daily. 30 tablet 11 2/28/2023   • sacubitril-valsartan (ENTRESTO) 24-26 MG tablet Take 1 tablet by mouth 2 (Two) Times a Day.   2/28/2023   • dapagliflozin Propanediol (Farxiga) 10 MG tablet Take 10 mg by mouth Daily for 30 days. 30 tablet 3    • furosemide (LASIX) 20 MG tablet Take 1 tablet by mouth 2 (Two) Times a Day.      • Insulin Syringe 29G X 1/2\" 1 ML misc For daily use 100 each 3    , Scheduled Meds:  acetaminophen, 650 mg, Oral, Q8H  arformoterol, 15 mcg, Nebulization, BID - RT  aspirin, 325 mg, Oral, Daily  atorvastatin, 40 mg, Oral, Nightly  budesonide, 0.5 mg, Nebulization, BID - RT  ceFAZolin, 2 g, Intravenous, Q12H  chlorhexidine, 15 mL, Mouth/Throat, Q12H  guaiFENesin, 1,200 mg, Oral, Q12H  metoprolol tartrate, 12.5 mg, Oral, Q12H  pharmacy consult - MTM, , Does not apply, BID  senna-docusate sodium, 2 tablet, Oral, BID    , Continuous Infusions:  amiodarone, 0.5 mg/min, Last Rate: Stopped (03/01/23 2229)  dexmedetomidine, 0.2-1.5 mcg/kg/hr  DOBUTamine, 5 mcg/kg/min, Last Rate: 5 mcg/kg/min (03/02/23 1356)  DOPamine, 2-20 mcg/kg/min  EPINEPHrine, 0.02-0.3 mcg/kg/min, Last Rate: Stopped (03/02/23 1425)  insulin, 0-100 Units/hr, Last Rate: 2.9 Units/hr (03/02/23 1309)  niCARdipine, 5-15 mg/hr  nitroglycerin, 5-200 mcg/min, Last Rate: Stopped (03/01/23 1431)  norepinephrine, 0.02-0.3 mcg/kg/min, Last Rate: 0.16 mcg/kg/min (03/02/23 1425)  phenylephrine, 0.5-3 mcg/kg/min  propofol, 5-50 mcg/kg/min, Last Rate: Stopped (03/01/23 1426)  sodium bicarbonate drip (greater than 75 mEq/bag), 150 mEq, Last Rate: 150 mEq (03/02/23 1152)    , PRN Meds:  •  albumin human  •  dexmedetomidine  •  dextrose  •  dextrose  •  DOBUTamine  •  DOPamine  •  EPINEPHrine  •  fentaNYL citrate (PF) **AND** naloxone  •  glucagon (human recombinant)  •  HYDROcodone-acetaminophen  •  ipratropium-albuterol  •  Morphine **AND** naloxone  •  niCARdipine  •  nitroglycerin  •  norepinephrine  •  ondansetron  •  " oxyCODONE  •  phenylephrine  •  propofol  •  racemic epinephrine and Allergies:  Wellbutrin [bupropion]    Review of Systems  Pertinent items are noted in HPI, all other systems reviewed and negative    Objective     Vital Signs  Temp:  [97.9 °F (36.6 °C)-101.1 °F (38.4 °C)] 99 °F (37.2 °C)  Heart Rate:  [55-96] 74  Resp:  [18-22] 18  BP: ()/(32-65) 118/56  FiO2 (%):  [40 %] 40 %    I/O this shift:  In: 260 [I.V.:160; IV Piggyback:100]  Out: 180 [Urine:20; Chest Tube:160]  I/O last 3 completed shifts:  In: 4748.5 [I.V.:3393.5; Blood:655; IV Piggyback:700]  Out: 4185 [Urine:3415; Chest Tube:770]    Physical Exam:  General appearance: Awake, alert, oriented, no obvious distress laying comfortable in bed.  HEENT: Atraumatic normocephalic head, eyes pupil reactive, extraocular muscle intact, nose no bleed, oropharynx clear, neck is supple, no JVD, no lymph node enlargement, trachea midline.  Chest wall: Surgical scar mid sternum and bandage no sign of bleeding.  Chest tubes are in place.  Lungs: Clear to auscultation, equal chest movement, no crepitation.  Heart: Normal S1, S2, no gallop, murmur.  Abdomen: Soft, nontender, positive bowel sounds, no organomegaly.  Extremities: No edema, no cyanosis, no joint swelling.  Neuro: Alert, oriented x4, no focal deficit.  Psych: Mood and affect are normal and appropriate.  Skin: Skin is warm and dry.  : No suprapubic fullness or tenderness, Solomon catheter in place.  Low urine output.    Results Review:   I reviewed the patient's new clinical results.  I reviewed the patient's new imaging results and agree with the interpretation.    WBC WBC   Date Value Ref Range Status   03/02/2023 18.55 (H) 3.40 - 10.80 10*3/mm3 Final   03/02/2023 19.08 (H) 3.40 - 10.80 10*3/mm3 Final   03/01/2023 17.61 (H) 3.40 - 10.80 10*3/mm3 Final   03/01/2023 19.66 (H) 3.40 - 10.80 10*3/mm3 Final   02/28/2023 8.26 3.40 - 10.80 10*3/mm3 Final      HGB Hemoglobin   Date Value Ref Range Status    03/02/2023 10.4 (L) 13.0 - 17.7 g/dL Final   03/02/2023 10.7 (L) 13.0 - 17.7 g/dL Final   03/01/2023 12.7 (L) 13.0 - 17.7 g/dL Final   03/01/2023 14.1 13.0 - 17.7 g/dL Final   03/01/2023 11.2 (L) 12.0 - 17.0 g/dL Final   03/01/2023 12.2 12.0 - 17.0 g/dL Final   03/01/2023 12.6 12.0 - 17.0 g/dL Final   03/01/2023 10.5 (L) 12.0 - 17.0 g/dL Final   03/01/2023 9.9 (L) 12.0 - 17.0 g/dL Final   03/01/2023 14.3 12.0 - 17.0 g/dL Final   03/01/2023 16.0 12.0 - 17.0 g/dL Final   02/28/2023 17.0 13.0 - 17.7 g/dL Final      HCT Hematocrit   Date Value Ref Range Status   03/02/2023 31.9 (L) 37.5 - 51.0 % Final   03/02/2023 33.6 (L) 37.5 - 51.0 % Final   03/01/2023 38.1 37.5 - 51.0 % Final   03/01/2023 41.7 37.5 - 51.0 % Final   03/01/2023 33 (L) 38 - 51 % Final   03/01/2023 36 (L) 38 - 51 % Final   03/01/2023 37 (L) 38 - 51 % Final   03/01/2023 31 (L) 38 - 51 % Final   03/01/2023 29 (L) 38 - 51 % Final   03/01/2023 42 38 - 51 % Final   03/01/2023 47 38 - 51 % Final   02/28/2023 51.5 (H) 37.5 - 51.0 % Final      Platlets No results found for: LABPLAT   MCV MCV   Date Value Ref Range Status   03/02/2023 92.2 79.0 - 97.0 fL Final   03/02/2023 94.6 79.0 - 97.0 fL Final   03/01/2023 91.4 79.0 - 97.0 fL Final   03/01/2023 89.3 79.0 - 97.0 fL Final   02/28/2023 89.1 79.0 - 97.0 fL Final          Sodium Sodium   Date Value Ref Range Status   03/02/2023 141 136 - 145 mmol/L Final   03/01/2023 144 136 - 145 mmol/L Final   03/01/2023 139 136 - 145 mmol/L Final   02/28/2023 139 136 - 145 mmol/L Final      Potassium Potassium   Date Value Ref Range Status   03/02/2023 3.1 (L) 3.5 - 5.2 mmol/L Final     Comment:     Slight hemolysis detected by analyzer. Results may be affected.   03/01/2023 4.2 3.5 - 5.2 mmol/L Final   03/01/2023 4.9 3.5 - 5.2 mmol/L Final     Comment:     Slight 1+ hemolysis detected by analyzer. Results may be affected.   02/28/2023 4.2 3.5 - 5.2 mmol/L Final     Comment:     Slight hemolysis detected by analyzer.  Results may be affected.      Chloride Chloride   Date Value Ref Range Status   03/02/2023 101 98 - 107 mmol/L Final   03/01/2023 107 98 - 107 mmol/L Final   03/01/2023 106 98 - 107 mmol/L Final   02/28/2023 103 98 - 107 mmol/L Final      CO2 CO2   Date Value Ref Range Status   03/02/2023 20.0 (L) 22.0 - 29.0 mmol/L Final   03/01/2023 22.0 22.0 - 29.0 mmol/L Final   03/01/2023 23.0 22.0 - 29.0 mmol/L Final   02/28/2023 28.0 22.0 - 29.0 mmol/L Final      BUN BUN   Date Value Ref Range Status   03/02/2023 22 8 - 23 mg/dL Final   03/01/2023 18 8 - 23 mg/dL Final   03/01/2023 17 8 - 23 mg/dL Final   02/28/2023 18 8 - 23 mg/dL Final      Creatinine Creatinine   Date Value Ref Range Status   03/02/2023 2.62 (H) 0.76 - 1.27 mg/dL Final   03/01/2023 1.54 (H) 0.76 - 1.27 mg/dL Final   03/01/2023 1.29 (H) 0.76 - 1.27 mg/dL Final   02/28/2023 1.03 0.76 - 1.27 mg/dL Final      Calcium Calcium   Date Value Ref Range Status   03/02/2023 8.0 (L) 8.6 - 10.5 mg/dL Final   03/01/2023 8.8 8.6 - 10.5 mg/dL Final   03/01/2023 9.8 8.6 - 10.5 mg/dL Final   02/28/2023 9.1 8.6 - 10.5 mg/dL Final      PO4 No results found for: CAPO4   Albumin Albumin   Date Value Ref Range Status   03/02/2023 4.5 3.5 - 5.2 g/dL Final   03/01/2023 4.3 3.5 - 5.2 g/dL Final   03/01/2023 4.5 3.5 - 5.2 g/dL Final   02/28/2023 4.5 3.5 - 5.2 g/dL Final      Magnesium Magnesium   Date Value Ref Range Status   03/02/2023 2.1 1.6 - 2.4 mg/dL Final   03/01/2023 2.4 1.6 - 2.4 mg/dL Final   03/01/2023 2.3 1.6 - 2.4 mg/dL Final   02/28/2023 2.3 1.6 - 2.4 mg/dL Final      Uric Acid No results found for: URICACID         acetaminophen, 650 mg, Oral, Q8H  arformoterol, 15 mcg, Nebulization, BID - RT  aspirin, 325 mg, Oral, Daily  atorvastatin, 40 mg, Oral, Nightly  budesonide, 0.5 mg, Nebulization, BID - RT  ceFAZolin, 2 g, Intravenous, Q12H  chlorhexidine, 15 mL, Mouth/Throat, Q12H  guaiFENesin, 1,200 mg, Oral, Q12H  metoprolol tartrate, 12.5 mg, Oral, Q12H  pharmacy  consult - MTM, , Does not apply, BID  senna-docusate sodium, 2 tablet, Oral, BID      amiodarone, 0.5 mg/min, Last Rate: Stopped (03/01/23 2229)  dexmedetomidine, 0.2-1.5 mcg/kg/hr  DOBUTamine, 5 mcg/kg/min, Last Rate: 5 mcg/kg/min (03/02/23 1356)  DOPamine, 2-20 mcg/kg/min  EPINEPHrine, 0.02-0.3 mcg/kg/min, Last Rate: Stopped (03/02/23 1425)  insulin, 0-100 Units/hr, Last Rate: 2.9 Units/hr (03/02/23 1309)  niCARdipine, 5-15 mg/hr  nitroglycerin, 5-200 mcg/min, Last Rate: Stopped (03/01/23 1431)  norepinephrine, 0.02-0.3 mcg/kg/min, Last Rate: 0.16 mcg/kg/min (03/02/23 1425)  phenylephrine, 0.5-3 mcg/kg/min  propofol, 5-50 mcg/kg/min, Last Rate: Stopped (03/01/23 1426)  sodium bicarbonate drip (greater than 75 mEq/bag), 150 mEq, Last Rate: 150 mEq (03/02/23 1152)        Assessment & Plan       Coronary artery disease involving native coronary artery of native heart without angina pectoris    Hypertension    Hyperlipidemia LDL goal <70    Type 2 diabetes mellitus with hypoglycemia, with long-term current use of insulin (HCC)    Ischemic cardiomyopathy    Mucopurulent chronic bronchitis (HCC)    Prolonged Q-T interval on ECG    Acute renal failure (ARF) (HCC)    Shock (HCC)    1.  [place]: ATN likely secondary to shock with systolic blood pressure postsurgery in the range of 40s to 80s, now on multiple vasopressors.  Admit creatinine 1.08, creatinine 2.62 at this time.  2.  S/p CABG x3  3.  Ischemic cardiomyopathy with ejection fraction 41-45%.  4.  Diabetes type 2.  5.  Cardiogenic shock.    Recommendations:  If no renal recovery patient will require CRRT if he remains on IV pressors.  At this time will monitor closely.  Keep the mean arterial pressure greater than 65.  Avoid nephrotoxic medications.  Keep systolic blood pressure greater than 100.  Check volume status.  Check labs in the morning.  Urine labs, complement C3-C4  Daily evaluation for renal replacement therapy will be done.  Adjust medication for the new  GFR.  Case discussed with the medical staff taking care of the patient.  I discussed the patients findings and my recommendations with patient, family and nursing staff  High risk patient complex with multiple medical problems as noted above.  Replace with packed RBC if hemoglobin less than 7  Chris Moran MD  03/02/23  @NOW

## 2023-03-02 NOTE — PROGRESS NOTES
Intensive Care Follow-up     Hospital:  LOS: 1 day   Mr. Herbert Harley, 67 y.o. male is followed for:   Coronary artery disease involving native coronary artery of native heart without angina pectoris            History of present illness:   Mr. Harley is a 66 yo male with PMH former smoker (quit in December 2022), CAD s/p PCI in 2014, HTN and diabetes who presents to the [facility] ICU s/p CABG with Dr. Saavedra. Patient reported he had been experiencing shortness of breath associated with cough for which his PCP referred him to a cardiologist. He underwent stress test that was found to be abnormal and was thus sent for [facility]. That showed multivessel CAD so he was referred to [facility] for evaluation. At that time it was felt he would benefit from a CABG.     ECHO: EF 41-45%. Mild concentric hypertrophy of left ventricular wall. Mild calcification of the aortic, mitral, tricuspid, pulmonic valves. Est. RVSP is 35-45 mmHg.      Carotid Duplex: Right ICA stenosis 0-49%. Left ICA stenosis 50-69%. There is antegrade flow in the vertebral arteries bilaterally.      PFTs shows no clear obstructive defect. The reduction in volumes could suggest restrictive disease. FVC 41%, FEV 52%, FEV1/FVC 93.24     A1C 7.4     Today he presented to the hospital for procedure. Post op he was brought to the ICU for management.      Subjective   Interval History:  Patient had significant decompensation overnight.  When he came out of the OR he was on antihypertensive medications.  Now is currently on 3 pressors.  Bedside echo performed this morning stat showed normal LV function no signs of pericardial effusion.  Creatinine doubled overnight with minimal urine output.  A-line was lost over the course the evening and replaced this morning, showing the patient has been hypotensive.  I suspect the patient has been in shock for the last few hours due to his worsening renal failure and acidosis.  Renal has been consulted.  Patient was started on a  bicarb drip this morning in addition to utilizing BiPAP for some concomitant hypercarbia.             The patient's past medical, surgical and social history were reviewed and updated in Epic as appropriate.       Objective     Infusions:  amiodarone, 0.5 mg/min, Last Rate: Stopped (03/01/23 2229)  dexmedetomidine, 0.2-1.5 mcg/kg/hr  dextrose 5 % with KCl 20 mEq, 30 mL/hr, Last Rate: 30 mL/hr (03/01/23 1100)  DOBUTamine, 2-20 mcg/kg/min, Last Rate: 10 mcg/kg/min (03/02/23 0913)  DOPamine, 2-20 mcg/kg/min  EPINEPHrine, 0.02-0.3 mcg/kg/min, Last Rate: 0.16 mcg/kg/min (03/02/23 0943)  insulin, 0-100 Units/hr, Last Rate: 10 Units/hr (03/02/23 0951)  lactated ringers, 9 mL/hr, Last Rate: Stopped (03/01/23 1033)  niCARdipine, 5-15 mg/hr  nitroglycerin, 5-200 mcg/min, Last Rate: Stopped (03/01/23 1431)  norepinephrine, 0.02-0.3 mcg/kg/min, Last Rate: 0.16 mcg/kg/min (03/02/23 0942)  phenylephrine, 0.5-3 mcg/kg/min  propofol, 5-50 mcg/kg/min, Last Rate: Stopped (03/01/23 1426)  sodium bicarbonate drip less than/equal to 75 mEq/bag, 75 mEq      Medications:  acetaminophen, 650 mg, Oral, Q8H  arformoterol, 15 mcg, Nebulization, BID - RT  aspirin, 325 mg, Oral, Daily  atorvastatin, 40 mg, Oral, Nightly  budesonide, 0.5 mg, Nebulization, BID - RT  ceFAZolin, 2 g, Intravenous, Q12H  chlorhexidine, 15 mL, Mouth/Throat, Q12H  guaiFENesin, 1,200 mg, Oral, Q12H  metoprolol tartrate, 12.5 mg, Oral, Q12H  metoprolol tartrate, 2.5 mg, Intravenous, Q6H  pharmacy consult - MT, , Does not apply, BID  senna-docusate sodium, 2 tablet, Oral, BID      I reviewed the patient's medications.    Vital Sign Min/Max for last 24 hours  Temp  Min: 95.4 °F (35.2 °C)  Max: 101.1 °F (38.4 °C)   BP  Min: 49/36  Max: 136/66   Pulse  Min: 55  Max: 87   Resp  Min: 14  Max: 22   SpO2  Min: 56 %  Max: 100 %   Flow (L/min)  Min: 1  Max: 2       Input/Output for last 24 hour shift  03/01 0701 - 03/02 0700  In: 4748.5 [I.V.:3393.5]  Out: 4185 [Urine:3415]    FiO2 (%):  [40 %-100 %] 40 %  S RR:  [10-14] 14  PEEP/CPAP (cm H2O):  [5 cm H20] 5 cm H20  MT SUP:  [0 cm H20-10 cm H20] 0 cm H20  MAP (cm H2O):  [5.8-13] 5.8  GENERAL : NAD, conversant  RESPIRATORY/THORAX : normal respiratory effort and no intercostal retractions, Coarse crackles bilaterally  CARDIOVASCULAR : Normal S1/S2, RRR. 1+ lower ext edema.  GASTROINTESTINAL : Soft, NT/ND. BS x 4 normoactive. No hepatosplenomegaly.  MUSCULOSKELETAL : No cyanosis, clubbing, or ischemia  NEUROLOGICAL: alert and oriented to person, place and time  PSYCHOLOGICAL : Appropriate affect    Results from last 7 days   Lab Units 03/02/23  0215 03/01/23  1509 03/01/23  1124   WBC 10*3/mm3 19.08* 17.61* 19.66*   HEMOGLOBIN g/dL 10.7* 12.7* 14.1   PLATELETS 10*3/mm3 242 222 211     Results from last 7 days   Lab Units 03/02/23  0215 03/01/23  1509 03/01/23  1210   SODIUM mmol/L 141 144 139   POTASSIUM mmol/L 3.1* 4.2 4.9   CO2 mmol/L 20.0* 22.0 23.0   BUN mg/dL 22 18 17   CREATININE mg/dL 2.62* 1.54* 1.29*   MAGNESIUM mg/dL 2.1 2.4 2.3   PHOSPHORUS mg/dL 7.2* 6.2* 1.6*   GLUCOSE mg/dL 189* 183* 138*     Estimated Creatinine Clearance: 25.4 mL/min (A) (by C-G formula based on SCr of 2.62 mg/dL (H)).    Results from last 7 days   Lab Units 03/02/23  0927   PH, ARTERIAL pH units 7.169*   PCO2, ARTERIAL mm Hg 48.0*   PO2 ART mm Hg 86.2       I reviewed the patient's new clinical results.  I reviewed the patient's new imaging results/reports including actual images and agree with reports.       Imaging Results (Last 24 Hours)     Procedure Component Value Units Date/Time    XR Chest 1 View [[id number]] Collected: 03/02/23 0054     Updated: 03/02/23 0256    Narrative:      Chest one view.    DATE: 3/2/2023.    COMPARISON: 3/1/2023.    CLINICAL HISTORY: Postop open heart surgery.      Impression:        There is diffuse interstitial patchy airspace opacities which is new since the previous examination likely relates to a  component.    Mediastinal and left-sided chest tubes are present. The endotracheal tube has been removed.    Right internal jugular sheath is unchanged.    There are midline sternotomy wires.    There is likely a small-to-moderate left pleural effusion.    Electronically signed by:  Costa Rousseau D.O.    3/2/2023 12:55 AM Mountain Time    XR Chest 1 View [[id number]] Collected: 03/01/23 1202     Updated: 03/01/23 1207    Narrative:      XR CHEST 1 VW    Date of Exam: 3/1/2023 11:37 AM EST    Indication: Post-Op Check Line & Tube Placement.    Comparison: 2/28/2023    Findings:  Patient is now status post median sternotomy. Endotracheal tube is in place the tip 2 cm above the teagan. There is a right internal jugular central venous catheter in place the tip projecting over the superior vena cava. Nasogastric tube is in place the   tip below the diaphragm but not included within the field-of-view. 2 left-sided thoracostomy tubes are in place. Mediastinal drain projects over the right side of the lower mediastinum.    Heart size is within normal limits. Pulmonary vessels are indistinct suggesting some mild pulmonary vascular congestion. No focal airspace consolidation. No pleural effusion. No evidence of pneumothorax.      Impression:      Impression:    1. Interval median sternotomy. There are multiple support tubes and lines in place which are in good position with no evidence of complication.  2. Mild pulmonary vascular congestion.  3. No evidence of pneumothorax.    Electronically Signed: Bret Pierson    3/1/2023 12:04 PM EST    Workstation ID: [id number]          Assessment & Plan   Impression        Coronary artery disease involving native coronary artery of native heart without angina pectoris    Hypertension    Hyperlipidemia LDL goal <70    Type 2 diabetes mellitus with hypoglycemia, with long-term current use of insulin (HCC)    Ischemic cardiomyopathy    Mucopurulent chronic bronchitis (HCC)    Prolonged Q-T  interval on ECG    Acute renal failure (ARF) (HCC)    Shock (HCC)       Plan        67-year-old male with a past medical history significant for tobacco abuse, coronary disease status post PCI in 2014, hypertension, and diabetes mellitus.  Who presents to [facility] ICU status post CABG by Dr. Saavedra on 3/1/2023.  Patient initially hypertensive but then overnight on 3/2/2023 patient developed worsening shock and acute renal failure.  Stat echo performed on 3/2/2023 showed an EF of 40 to 45% with no signs of pericardial effusion.       -ICU to follow  -Postoperative orders and chest tubes per CT surgery  -Continue BiPAP and supplemental oxygen, will wean oxygen to saturation greater than 88%.  Patient is at high risk for need for reintubation secondary to the worsening acidosis.  -Continue to wean vasoactive medications  -Start bicarb drip, with serial ABGs  -Consult renal, patient may require CRRT this afternoon if there is no improvement in the patient's urine output with blood pressure support.  -Continue insulin drip  -Aspirin/statin/beta-blocker  -Brovana, budesonide, and DuoNebs for COPD  -PT/OT when patient is more stable  -We will plan to start heparin for DVT prophylaxis once chest tubes are removed  -A.m. chest x-ray  -A.m. labs    I conducted multidisciplinary rounds in the plan of care was discussed with the multidisciplinary team at that time. In attendance at multidisciplinary rounds was clinical pharmacist, dietitian, nursing staff, and case management.    I discussed the patient's findings and my recommendations with primary care team     Critical Care time spent in direct patient care: 45 minutes (excluding procedure time, if applicable) including high complexity decision making to assess, manipulate, and support vital organ system failure in this individual who has impairment of one or more vital organ systems such that there is a high probability of imminent or life threatening  deterioration in the patient's condition.      Carlos Hayes,   Pulmonary, Critical care and Sleep Medicine

## 2023-03-02 NOTE — PROGRESS NOTES
CTS Progress Note       LOS: 1 day   Patient Care Team:  Yessica Ricci DO as PCP - General (Family Medicine)  Nima Peter MD as Consulting Physician (Endocrinology)  Nadya Ramirez MD as Cardiologist (Cardiology)  Yessica Ricci DO as Consulting Physician (Family Medicine)    Chief Complaint: Coronary artery disease involving native coronary artery of native heart without angina pectoris    Vital Signs:  Temp:  [95.4 °F (35.2 °C)-101.1 °F (38.4 °C)] 99 °F (37.2 °C)  Heart Rate:  [55-87] 83  Resp:  [14-22] 18  BP: ()/(32-73) 94/65  FiO2 (%):  [40 %-100 %] 40 %    Physical Exam: Patient is alert conversant but lethargic he is breathing unlabored although the lung sounds are coarse       Results:   Results from last 7 days   Lab Units 03/02/23  0215   WBC 10*3/mm3 19.08*   HEMOGLOBIN g/dL 10.7*   HEMATOCRIT % 33.6*   PLATELETS 10*3/mm3 242     Results from last 7 days   Lab Units 03/02/23  0215   SODIUM mmol/L 141   POTASSIUM mmol/L 3.1*   CHLORIDE mmol/L 101   CO2 mmol/L 20.0*   BUN mg/dL 22   CREATININE mg/dL 2.62*   GLUCOSE mg/dL 189*   CALCIUM mg/dL 8.0*           Imaging Results (Last 24 Hours)     Procedure Component Value Units Date/Time    XR Chest 1 View [[id number]] Collected: 03/02/23 0054     Updated: 03/02/23 0256    Narrative:      Chest one view.    DATE: 3/2/2023.    COMPARISON: 3/1/2023.    CLINICAL HISTORY: Postop open heart surgery.      Impression:        There is diffuse interstitial patchy airspace opacities which is new since the previous examination likely relates to a component.    Mediastinal and left-sided chest tubes are present. The endotracheal tube has been removed.    Right internal jugular sheath is unchanged.    There are midline sternotomy wires.    There is likely a small-to-moderate left pleural effusion.    Electronically signed by:  Costa Rousseau D.O.    3/2/2023 12:55 AM Mountain Time    XR Chest 1 View [[id number]] Collected: 03/01/23 1202     Updated:  03/01/23 1207    Narrative:      XR CHEST 1 VW    Date of Exam: 3/1/2023 11:37 AM EST    Indication: Post-Op Check Line & Tube Placement.    Comparison: 2/28/2023    Findings:  Patient is now status post median sternotomy. Endotracheal tube is in place the tip 2 cm above the teagan. There is a right internal jugular central venous catheter in place the tip projecting over the superior vena cava. Nasogastric tube is in place the   tip below the diaphragm but not included within the field-of-view. 2 left-sided thoracostomy tubes are in place. Mediastinal drain projects over the right side of the lower mediastinum.    Heart size is within normal limits. Pulmonary vessels are indistinct suggesting some mild pulmonary vascular congestion. No focal airspace consolidation. No pleural effusion. No evidence of pneumothorax.      Impression:      Impression:    1. Interval median sternotomy. There are multiple support tubes and lines in place which are in good position with no evidence of complication.  2. Mild pulmonary vascular congestion.  3. No evidence of pneumothorax.    Electronically Signed: Bret Pierson    3/1/2023 12:04 PM EST    Workstation ID: [id number]          Assessment      Coronary artery disease involving native coronary artery of native heart without angina pectoris    Hypertension    Hyperlipidemia LDL goal <70    Type 2 diabetes mellitus with hypoglycemia, with long-term current use of insulin (HCC)    Ischemic cardiomyopathy    Mucopurulent chronic bronchitis (HCC)    Prolonged Q-T interval on ECG    Patient is requiring more pressor agents than he should.  Urine output has significantly decreased and I believe it was related to a period of hypotension during the night and loss of arterial line function.  I will ask [facility] to see and we are obtaining an echocardiogram.  His cardiac index is satisfactory but his chest x-ray gives the appearance of pulmonary edema with possible heart failure I am  concerned that this patient could end up back on the ventilator and/or CRRT    Plan   Perform ordered echocardiogram stat    Please note that portions of this note were completed with a voice recognition program. Efforts were made to edit the dictations, but occasionally words are mistranscribed.    Paresh Saavedra MD  03/02/23  09:11 EST

## 2023-03-02 NOTE — CASE MANAGEMENT/SOCIAL WORK
Discharge Planning Assessment  [facility]     Patient Name: Herbert Harley  MRN: [id number]  Today's Date: 3/2/2023    Admit Date: 3/1/2023    Plan: home   Discharge Needs Assessment     Row Name 03/02/23 1156       Living Environment    People in Home spouse    Current Living Arrangements home    Primary Care Provided by self    Provides Primary Care For no one    Family Caregiver if Needed spouse    Quality of Family Relationships helpful;involved;supportive    Able to Return to Prior Arrangements yes       Resource/Environmental Concerns    Resource/Environmental Concerns none       Transition Planning    Patient/Family Anticipates Transition to home with family    Transportation Anticipated family or friend will provide       Discharge Needs Assessment    Equipment Currently Used at Home none               Discharge Plan     Row Name 03/02/23 1157       Plan    Plan home    Patient/Family in Agreement with Plan yes    Plan Comments Mr. Harley resides in [place] with his spouse.  He is independent with ADL's and does not use any DME.  I verified his insurance and that he has prescription coverage.  He is not current with home health or PT.  He is post op CABG X 3 who declined during night.  He is now on multiple pressors for B/P support and bipap at 35%.  Renal has been consulted for decreased kidney function.  Plan pending patients progress.    Final Discharge Disposition Code 30 - still a patient              Continued Care and Services - Admitted Since 3/1/2023    Coordination has not been started for this encounter.       Expected Discharge Date and Time     Expected Discharge Date Expected Discharge Time    Mar 6, 2023          Demographic Summary     Row Name 03/02/23 1154       General Information    Admission Type inpatient    Reason for Consult discharge planning    Preferred Language English    General Information Comments PCP - Yessica Ricci               Functional Status     Row Name  03/02/23 1156       Functional Status    Usual Activity Tolerance good    Current Activity Tolerance moderate       Functional Status, IADL    Medications independent    Meal Preparation independent    Housekeeping independent    Laundry independent    Shopping independent       Employment/    Employment Status retired               Psychosocial    No documentation.                Abuse/Neglect    No documentation.                Legal    No documentation.                Substance Abuse    No documentation.                Patient Forms    No documentation.                   Martha Montanez RN

## 2023-03-02 NOTE — PROCEDURES
Insert Arterial Line    Date/Time: 3/2/2023 6:06 AM  Performed by: Sarahi Lin APRN  Authorized by: Sarahi Lin APRN     Universal Protocol:     Verbal consent obtained?: Yes      Risks and benefits: Risks, benefits and alternatives were discussed      Consent given by:  Patient    Patient identity confirmed:  Arm band and provided demographic data    Time out: Immediately prior to the procedure a time out was called    A time out verifies correct patient, procedure, equipment, support staff and site/side marked as required:   Preparation:     Preparation: Patient was prepped and draped in usual sterile fashion    Indications:     Indications: hemodynamic monitoring    Location:     Location:  Left radial  Anesthesia:     Anesthesia:  Local infiltration    Local anesthetic:  Lidocaine 1% without epinephrine    Anesthetic total (ml):  2    Patient sedated: No    Procedure Details:     Ultrasound Guidance: yes  The ultrasound was used for evaluation of possible access sites.  Vessel patency was confirmed with the ultrasound.  Needle entry into vessel was visualized in realtime with the ultrasound.   Permanent recorded image(s) of the vascular access site will be included in the patient record.      Bo's test normal?: Yes      Needle gauge:  20    Seldinger technique: Seldinger technique used      Number of attempts:  1  Post-procedure:     Post-procedure:  Line sutured and dressing applied    Post-procedure CMS:  Normal     patient tolerated the procedure well with no immediate complications     Inserted with ultrasound guidance with bright red pulsatile blood detected. Connected to pressure tubing with arterial waveform present. Suture placed and sterile dressing applied.     Rubi Lin, MSN, APRN, ACNPC-AG  Pulmonary and Critical Care Medicine  Electronically signed by TALON Bean, 03/02/23, 6:07 AM EST.

## 2023-03-02 NOTE — PLAN OF CARE
Goal Outcome Evaluation:  Plan of Care Reviewed With: patient, spouse        Progress: improving   Patient post op CABG x 3, extubated at 1615, neuro intact, 2 L NC, NSR 1st degree block, CI> 2.7, amio/insulin/levo gtts, phos replaced, MT total output: 395 air leak and fluctuation present, muller total output: 1960, family at bedside.

## 2023-03-02 NOTE — PROGRESS NOTES
CTS Progress Note       LOS: 1 day   Patient Care Team:  Yessica Ricci DO as PCP - General (Family Medicine)  Nima Peter MD as Consulting Physician (Endocrinology)  Nadya Ramirez MD as Cardiologist (Cardiology)  Yessica Ricci DO as Consulting Physician (Family Medicine)    Chief Complaint: Coronary artery disease involving native coronary artery of native heart without angina pectoris    Vital Signs:  Temp:  [95.4 °F (35.2 °C)-101.1 °F (38.4 °C)] 99 °F (37.2 °C)  Heart Rate:  [55-87] 85  Resp:  [14-22] 18  BP: ()/(32-73) 83/42  FiO2 (%):  [40 %-100 %] 40 %    Physical Exam:  Extubated neuro intact     Results:   Results from last 7 days   Lab Units 03/02/23  0215   WBC 10*3/mm3 19.08*   HEMOGLOBIN g/dL 10.7*   HEMATOCRIT % 33.6*   PLATELETS 10*3/mm3 242     Results from last 7 days   Lab Units 03/02/23  0215   SODIUM mmol/L 141   POTASSIUM mmol/L 3.1*   CHLORIDE mmol/L 101   CO2 mmol/L 20.0*   BUN mg/dL 22   CREATININE mg/dL 2.62*   GLUCOSE mg/dL 189*   CALCIUM mg/dL 8.0*           Imaging Results (Last 24 Hours)     Procedure Component Value Units Date/Time    XR Chest 1 View [[id number]] Collected: 03/02/23 0054     Updated: 03/02/23 0256    Narrative:      Chest one view.    DATE: 3/2/2023.    COMPARISON: 3/1/2023.    CLINICAL HISTORY: Postop open heart surgery.      Impression:        There is diffuse interstitial patchy airspace opacities which is new since the previous examination likely relates to a component.    Mediastinal and left-sided chest tubes are present. The endotracheal tube has been removed.    Right internal jugular sheath is unchanged.    There are midline sternotomy wires.    There is likely a small-to-moderate left pleural effusion.    Electronically signed by:  Costa Rousseau D.O.    3/2/2023 12:55 AM Mountain Time    XR Chest 1 View [[id number]] Collected: 03/01/23 1202     Updated: 03/01/23 1207    Narrative:      XR CHEST 1 VW    Date of Exam: 3/1/2023 11:37 AM  EST    Indication: Post-Op Check Line & Tube Placement.    Comparison: 2/28/2023    Findings:  Patient is now status post median sternotomy. Endotracheal tube is in place the tip 2 cm above the teagan. There is a right internal jugular central venous catheter in place the tip projecting over the superior vena cava. Nasogastric tube is in place the   tip below the diaphragm but not included within the field-of-view. 2 left-sided thoracostomy tubes are in place. Mediastinal drain projects over the right side of the lower mediastinum.    Heart size is within normal limits. Pulmonary vessels are indistinct suggesting some mild pulmonary vascular congestion. No focal airspace consolidation. No pleural effusion. No evidence of pneumothorax.      Impression:      Impression:    1. Interval median sternotomy. There are multiple support tubes and lines in place which are in good position with no evidence of complication.  2. Mild pulmonary vascular congestion.  3. No evidence of pneumothorax.    Electronically Signed: Bret Pierson    3/1/2023 12:04 PM EST    Workstation ID: [id number]          Assessment      Coronary artery disease involving native coronary artery of native heart without angina pectoris    Hypertension    Hyperlipidemia LDL goal <70    Type 2 diabetes mellitus with hypoglycemia, with long-term current use of insulin (HCC)    Ischemic cardiomyopathy    Mucopurulent chronic bronchitis (HCC)    Prolonged Q-T interval on ECG    Unclear as to why patient had increase in serum creatinine but there may have been some hypotension during the night as patient's arterial line became nonfunctional.  We now have an arterial line that is functional and I discussed with nurses we will institute low-dose Levophed    Plan   Continuing supportive care    Please note that portions of this note were completed with a voice recognition program. Efforts were made to edit the dictations, but occasionally words are  mistchelseascribed.    Paresh Saavedra MD  03/02/23  06:35 EST

## 2023-03-03 ENCOUNTER — TRANSCRIBE ORDERS (OUTPATIENT)
Dept: CARDIAC REHAB | Facility: HOSPITAL | Age: 68
End: 2023-03-03
Payer: MEDICARE

## 2023-03-03 ENCOUNTER — APPOINTMENT (OUTPATIENT)
Dept: GENERAL RADIOLOGY | Facility: HOSPITAL | Age: 68
DRG: 235 | End: 2023-03-03
Payer: MEDICARE

## 2023-03-03 DIAGNOSIS — Z95.1 S/P CABG (CORONARY ARTERY BYPASS GRAFT): Primary | ICD-10-CM

## 2023-03-03 PROBLEM — J96.01 ACUTE RESPIRATORY FAILURE WITH HYPOXIA: Status: ACTIVE | Noted: 2023-03-03

## 2023-03-03 LAB
ALBUMIN SERPL-MCNC: 3.6 G/DL (ref 3.5–5.2)
ALBUMIN SERPL-MCNC: 3.8 G/DL (ref 3.5–5.2)
ALBUMIN/GLOB SERPL: 2 G/DL
ALP SERPL-CCNC: 54 U/L (ref 39–117)
ALT SERPL W P-5'-P-CCNC: 6 U/L (ref 1–41)
ANION GAP SERPL CALCULATED.3IONS-SCNC: 14 MMOL/L (ref 5–15)
ANION GAP SERPL CALCULATED.3IONS-SCNC: 17 MMOL/L (ref 5–15)
ARTERIAL PATENCY WRIST A: ABNORMAL
AST SERPL-CCNC: 67 U/L (ref 1–40)
ATMOSPHERIC PRESS: ABNORMAL MM[HG]
BASE EXCESS BLDA CALC-SCNC: -0.5 MMOL/L (ref 0–2)
BDY SITE: ABNORMAL
BH CV ECHO MEAS - AO MAX PG: 16 MMHG
BH CV ECHO MEAS - AO MEAN PG: 9 MMHG
BH CV ECHO MEAS - AO ROOT DIAM: 3 CM
BH CV ECHO MEAS - AO V2 MAX: 200 CM/SEC
BH CV ECHO MEAS - AO V2 VTI: 32.6 CM
BH CV ECHO MEAS - AVA(I,D): 2.07 CM2
BH CV ECHO MEAS - EDV(MOD-SP2): 140 ML
BH CV ECHO MEAS - EDV(MOD-SP4): 128 ML
BH CV ECHO MEAS - EF(MOD-BP): 40.4 %
BH CV ECHO MEAS - EF(MOD-SP2): 48.8 %
BH CV ECHO MEAS - EF(MOD-SP4): 32.3 %
BH CV ECHO MEAS - ESV(MOD-SP2): 71.7 ML
BH CV ECHO MEAS - ESV(MOD-SP4): 86.6 ML
BH CV ECHO MEAS - LAT PEAK E' VEL: 10.7 CM/SEC
BH CV ECHO MEAS - LV MAX PG: 8.8 MMHG
BH CV ECHO MEAS - LV MEAN PG: 4 MMHG
BH CV ECHO MEAS - LV V1 MAX: 148 CM/SEC
BH CV ECHO MEAS - LV V1 VTI: 21.5 CM
BH CV ECHO MEAS - LVOT AREA: 3.1 CM2
BH CV ECHO MEAS - LVOT DIAM: 2 CM
BH CV ECHO MEAS - MED PEAK E' VEL: 7.8 CM/SEC
BH CV ECHO MEAS - MV A MAX VEL: 104 CM/SEC
BH CV ECHO MEAS - MV DEC SLOPE: 382 CM/SEC2
BH CV ECHO MEAS - MV DEC TIME: 0.19 MSEC
BH CV ECHO MEAS - MV E MAX VEL: 104 CM/SEC
BH CV ECHO MEAS - MV E/A: 1
BH CV ECHO MEAS - MV MAX PG: 4.4 MMHG
BH CV ECHO MEAS - MV MEAN PG: 2 MMHG
BH CV ECHO MEAS - MV P1/2T: 79 MSEC
BH CV ECHO MEAS - MV V2 VTI: 29.1 CM
BH CV ECHO MEAS - MVA(P1/2T): 2.8 CM2
BH CV ECHO MEAS - MVA(VTI): 2.32 CM2
BH CV ECHO MEAS - PA ACC TIME: 0.13 SEC
BH CV ECHO MEAS - PA PR(ACCEL): 20.5 MMHG
BH CV ECHO MEAS - PA V2 MAX: 88.2 CM/SEC
BH CV ECHO MEAS - RAP SYSTOLE: 3 MMHG
BH CV ECHO MEAS - RVSP: 23 MMHG
BH CV ECHO MEAS - SV(LVOT): 67.5 ML
BH CV ECHO MEAS - SV(MOD-SP2): 68.3 ML
BH CV ECHO MEAS - SV(MOD-SP4): 41.4 ML
BH CV ECHO MEAS - TAPSE (>1.6): 1.04 CM
BH CV ECHO MEAS - TR MAX PG: 19.6 MMHG
BH CV ECHO MEAS - TR MAX VEL: 221 CM/SEC
BH CV ECHO MEASUREMENTS AVERAGE E/E' RATIO: 11.24
BH CV VAS BP LEFT ARM: NORMAL MMHG
BH CV XLRA - RV BASE: 4.2 CM
BH CV XLRA - RV LENGTH: 5.9 CM
BH CV XLRA - RV MID: 2.7 CM
BH CV XLRA - TDI S': 11.6 CM/SEC
BILIRUB SERPL-MCNC: 0.2 MG/DL (ref 0–1.2)
BODY TEMPERATURE: 37 C
BUN SERPL-MCNC: 34 MG/DL (ref 8–23)
BUN SERPL-MCNC: 35 MG/DL (ref 8–23)
BUN/CREAT SERPL: 6.9 (ref 7–25)
BUN/CREAT SERPL: 8.4 (ref 7–25)
C3 SERPL-MCNC: 83 MG/DL (ref 82–167)
C4 SERPL-MCNC: 18 MG/DL (ref 14–44)
CA-I SERPL ISE-MCNC: 1.02 MMOL/L (ref 1.12–1.32)
CA-I SERPL ISE-MCNC: 1.06 MMOL/L (ref 1.12–1.32)
CALCIUM SPEC-SCNC: 7.1 MG/DL (ref 8.6–10.5)
CALCIUM SPEC-SCNC: 7.3 MG/DL (ref 8.6–10.5)
CHLORIDE SERPL-SCNC: 93 MMOL/L (ref 98–107)
CHLORIDE SERPL-SCNC: 95 MMOL/L (ref 98–107)
CO2 BLDA-SCNC: 27.1 MMOL/L (ref 22–33)
CO2 SERPL-SCNC: 23 MMOL/L (ref 22–29)
CO2 SERPL-SCNC: 24 MMOL/L (ref 22–29)
COHGB MFR BLD: 1 % (ref 0–2)
CREAT SERPL-MCNC: 4.05 MG/DL (ref 0.76–1.27)
CREAT SERPL-MCNC: 5.09 MG/DL (ref 0.76–1.27)
DEPRECATED RDW RBC AUTO: 48.5 FL (ref 37–54)
EGFRCR SERPLBLD CKD-EPI 2021: 11.7 ML/MIN/1.73
EGFRCR SERPLBLD CKD-EPI 2021: 15.4 ML/MIN/1.73
EPAP: 0
ERYTHROCYTE [DISTWIDTH] IN BLOOD BY AUTOMATED COUNT: 14.6 % (ref 12.3–15.4)
GLOBULIN UR ELPH-MCNC: 1.9 GM/DL
GLUCOSE BLDC GLUCOMTR-MCNC: 134 MG/DL (ref 70–130)
GLUCOSE BLDC GLUCOMTR-MCNC: 136 MG/DL (ref 70–130)
GLUCOSE BLDC GLUCOMTR-MCNC: 139 MG/DL (ref 70–130)
GLUCOSE BLDC GLUCOMTR-MCNC: 139 MG/DL (ref 70–130)
GLUCOSE BLDC GLUCOMTR-MCNC: 142 MG/DL (ref 70–130)
GLUCOSE BLDC GLUCOMTR-MCNC: 149 MG/DL (ref 70–130)
GLUCOSE BLDC GLUCOMTR-MCNC: 151 MG/DL (ref 70–130)
GLUCOSE BLDC GLUCOMTR-MCNC: 167 MG/DL (ref 70–130)
GLUCOSE BLDC GLUCOMTR-MCNC: 182 MG/DL (ref 70–130)
GLUCOSE BLDC GLUCOMTR-MCNC: 189 MG/DL (ref 70–130)
GLUCOSE SERPL-MCNC: 191 MG/DL (ref 65–99)
GLUCOSE SERPL-MCNC: 195 MG/DL (ref 65–99)
HCO3 BLDA-SCNC: 25.6 MMOL/L (ref 20–26)
HCT VFR BLD AUTO: 30.8 % (ref 37.5–51)
HCT VFR BLD CALC: 31.3 % (ref 38–51)
HGB BLD-MCNC: 10.2 G/DL (ref 13–17.7)
HGB BLDA-MCNC: 10.2 G/DL (ref 13.5–17.5)
INHALED O2 CONCENTRATION: 30 %
IPAP: 0
LEFT ATRIUM VOLUME INDEX: 37.3 ML/M2
MAGNESIUM SERPL-MCNC: 1.7 MG/DL (ref 1.6–2.4)
MAGNESIUM SERPL-MCNC: 1.8 MG/DL (ref 1.6–2.4)
MAXIMAL PREDICTED HEART RATE: 153 BPM
MCH RBC QN AUTO: 30.2 PG (ref 26.6–33)
MCHC RBC AUTO-ENTMCNC: 33.1 G/DL (ref 31.5–35.7)
MCV RBC AUTO: 91.1 FL (ref 79–97)
METHGB BLD QL: 0.5 % (ref 0–1.5)
MODALITY: ABNORMAL
NOTE: ABNORMAL
OXYHGB MFR BLDV: 96.2 % (ref 94–99)
PAW @ PEAK INSP FLOW SETTING VENT: 0 CMH2O
PCO2 BLDA: 48.1 MM HG (ref 35–45)
PCO2 TEMP ADJ BLD: 48.1 MM HG (ref 35–48)
PH BLDA: 7.33 PH UNITS (ref 7.35–7.45)
PH, TEMP CORRECTED: 7.33 PH UNITS
PHOSPHATE SERPL-MCNC: 6.1 MG/DL (ref 2.5–4.5)
PLATELET # BLD AUTO: 160 10*3/MM3 (ref 140–450)
PMV BLD AUTO: 11.1 FL (ref 6–12)
PO2 BLDA: 86 MM HG (ref 83–108)
PO2 TEMP ADJ BLD: 86 MM HG (ref 83–108)
POTASSIUM SERPL-SCNC: 4.5 MMOL/L (ref 3.5–5.2)
POTASSIUM SERPL-SCNC: 5 MMOL/L (ref 3.5–5.2)
PROT SERPL-MCNC: 5.7 G/DL (ref 6–8.5)
QT INTERVAL: 372 MS
QT INTERVAL: 422 MS
QTC INTERVAL: 486 MS
QTC INTERVAL: 536 MS
RBC # BLD AUTO: 3.38 10*6/MM3 (ref 4.14–5.8)
SODIUM SERPL-SCNC: 132 MMOL/L (ref 136–145)
SODIUM SERPL-SCNC: 134 MMOL/L (ref 136–145)
STRESS TARGET HR: 130 BPM
TOTAL RATE: 0 BREATHS/MINUTE
WBC NRBC COR # BLD: 22 10*3/MM3 (ref 3.4–10.8)

## 2023-03-03 PROCEDURE — 93005 ELECTROCARDIOGRAM TRACING: CPT | Performed by: INTERNAL MEDICINE

## 2023-03-03 PROCEDURE — 86160 COMPLEMENT ANTIGEN: CPT | Performed by: INTERNAL MEDICINE

## 2023-03-03 PROCEDURE — 82375 ASSAY CARBOXYHB QUANT: CPT

## 2023-03-03 PROCEDURE — 83735 ASSAY OF MAGNESIUM: CPT | Performed by: INTERNAL MEDICINE

## 2023-03-03 PROCEDURE — 99024 POSTOP FOLLOW-UP VISIT: CPT | Performed by: PHYSICIAN ASSISTANT

## 2023-03-03 PROCEDURE — 94799 UNLISTED PULMONARY SVC/PX: CPT

## 2023-03-03 PROCEDURE — 25010000002 MAGNESIUM SULFATE 2 GM/50ML SOLUTION: Performed by: INTERNAL MEDICINE

## 2023-03-03 PROCEDURE — P9047 ALBUMIN (HUMAN), 25%, 50ML: HCPCS | Performed by: INTERNAL MEDICINE

## 2023-03-03 PROCEDURE — 25010000002 DOBUTAMINE PER 250 MG: Performed by: THORACIC SURGERY (CARDIOTHORACIC VASCULAR SURGERY)

## 2023-03-03 PROCEDURE — 84100 ASSAY OF PHOSPHORUS: CPT | Performed by: INTERNAL MEDICINE

## 2023-03-03 PROCEDURE — 82962 GLUCOSE BLOOD TEST: CPT

## 2023-03-03 PROCEDURE — 83050 HGB METHEMOGLOBIN QUAN: CPT

## 2023-03-03 PROCEDURE — 5A1D70Z PERFORMANCE OF URINARY FILTRATION, INTERMITTENT, LESS THAN 6 HOURS PER DAY: ICD-10-PCS | Performed by: INTERNAL MEDICINE

## 2023-03-03 PROCEDURE — 74018 RADEX ABDOMEN 1 VIEW: CPT

## 2023-03-03 PROCEDURE — 85027 COMPLETE CBC AUTOMATED: CPT | Performed by: PHYSICIAN ASSISTANT

## 2023-03-03 PROCEDURE — 63710000001 DIPHENHYDRAMINE PER 50 MG: Performed by: NURSE PRACTITIONER

## 2023-03-03 PROCEDURE — B544ZZA ULTRASONOGRAPHY OF LEFT JUGULAR VEINS, GUIDANCE: ICD-10-PCS | Performed by: NURSE PRACTITIONER

## 2023-03-03 PROCEDURE — 93010 ELECTROCARDIOGRAM REPORT: CPT | Performed by: INTERNAL MEDICINE

## 2023-03-03 PROCEDURE — 25010000002 FENTANYL CITRATE (PF) 50 MCG/ML SOLUTION: Performed by: THORACIC SURGERY (CARDIOTHORACIC VASCULAR SURGERY)

## 2023-03-03 PROCEDURE — 25010000002 AMIODARONE IN DEXTROSE 5% 150-4.21 MG/100ML-% SOLUTION: Performed by: THORACIC SURGERY (CARDIOTHORACIC VASCULAR SURGERY)

## 2023-03-03 PROCEDURE — 82805 BLOOD GASES W/O2 SATURATION: CPT

## 2023-03-03 PROCEDURE — 71045 X-RAY EXAM CHEST 1 VIEW: CPT

## 2023-03-03 PROCEDURE — 76937 US GUIDE VASCULAR ACCESS: CPT | Performed by: NURSE PRACTITIONER

## 2023-03-03 PROCEDURE — 25010000002 ALBUMIN HUMAN 25% PER 50 ML: Performed by: INTERNAL MEDICINE

## 2023-03-03 PROCEDURE — 82330 ASSAY OF CALCIUM: CPT | Performed by: INTERNAL MEDICINE

## 2023-03-03 PROCEDURE — 80053 COMPREHEN METABOLIC PANEL: CPT | Performed by: INTERNAL MEDICINE

## 2023-03-03 PROCEDURE — 36556 INSERT NON-TUNNEL CV CATH: CPT | Performed by: NURSE PRACTITIONER

## 2023-03-03 PROCEDURE — 94660 CPAP INITIATION&MGMT: CPT

## 2023-03-03 PROCEDURE — 25010000002 ONDANSETRON PER 1 MG: Performed by: PHYSICIAN ASSISTANT

## 2023-03-03 PROCEDURE — 82330 ASSAY OF CALCIUM: CPT | Performed by: NURSE PRACTITIONER

## 2023-03-03 PROCEDURE — 93005 ELECTROCARDIOGRAM TRACING: CPT | Performed by: PHYSICIAN ASSISTANT

## 2023-03-03 PROCEDURE — 25010000002 MORPHINE PER 10 MG: Performed by: THORACIC SURGERY (CARDIOTHORACIC VASCULAR SURGERY)

## 2023-03-03 PROCEDURE — 99232 SBSQ HOSP IP/OBS MODERATE 35: CPT | Performed by: INTERNAL MEDICINE

## 2023-03-03 PROCEDURE — 05HN33Z INSERTION OF INFUSION DEVICE INTO LEFT INTERNAL JUGULAR VEIN, PERCUTANEOUS APPROACH: ICD-10-PCS | Performed by: NURSE PRACTITIONER

## 2023-03-03 PROCEDURE — 25010000002 AMIODARONE IN DEXTROSE 5% 360-4.14 MG/200ML-% SOLUTION: Performed by: THORACIC SURGERY (CARDIOTHORACIC VASCULAR SURGERY)

## 2023-03-03 PROCEDURE — C1752 CATH,HEMODIALYSIS,SHORT-TERM: HCPCS

## 2023-03-03 PROCEDURE — 99291 CRITICAL CARE FIRST HOUR: CPT | Performed by: INTERNAL MEDICINE

## 2023-03-03 PROCEDURE — 25010000002 CEFAZOLIN IN DEXTROSE 2-4 GM/100ML-% SOLUTION

## 2023-03-03 RX ORDER — CYCLOBENZAPRINE HCL 10 MG
10 TABLET ORAL 3 TIMES DAILY PRN
Status: DISCONTINUED | OUTPATIENT
Start: 2023-03-03 | End: 2023-03-03

## 2023-03-03 RX ORDER — ACETAMINOPHEN 160 MG/5ML
650 SOLUTION ORAL EVERY 8 HOURS
Status: DISCONTINUED | OUTPATIENT
Start: 2023-03-03 | End: 2023-03-03

## 2023-03-03 RX ORDER — NICOTINE POLACRILEX 4 MG
15 LOZENGE BUCCAL
Status: DISCONTINUED | OUTPATIENT
Start: 2023-03-03 | End: 2023-03-03

## 2023-03-03 RX ORDER — DIPHENHYDRAMINE HCL 12.5MG/5ML
25 LIQUID (ML) ORAL EVERY 6 HOURS PRN
Status: DISCONTINUED | OUTPATIENT
Start: 2023-03-03 | End: 2023-03-03

## 2023-03-03 RX ORDER — CALCIUM CHLORIDE, MAGNESIUM CHLORIDE, SODIUM CHLORIDE, SODIUM BICARBONATE, POTASSIUM CHLORIDE AND SODIUM PHOSPHATE DIBASIC DIHYDRATE 3.68; 3.05; 6.34; 3.09; .314; .187 G/L; G/L; G/L; G/L; G/L; G/L
750 INJECTION INTRAVENOUS CONTINUOUS
Status: DISCONTINUED | OUTPATIENT
Start: 2023-03-03 | End: 2023-03-11

## 2023-03-03 RX ORDER — ROSUVASTATIN CALCIUM 10 MG/1
10 TABLET, COATED ORAL NIGHTLY
Status: DISCONTINUED | OUTPATIENT
Start: 2023-03-03 | End: 2023-03-03

## 2023-03-03 RX ORDER — INSULIN LISPRO 100 [IU]/ML
0-14 INJECTION, SOLUTION INTRAVENOUS; SUBCUTANEOUS
Status: DISCONTINUED | OUTPATIENT
Start: 2023-03-03 | End: 2023-03-03

## 2023-03-03 RX ORDER — DEXTROSE MONOHYDRATE 25 G/50ML
25 INJECTION, SOLUTION INTRAVENOUS
Status: DISCONTINUED | OUTPATIENT
Start: 2023-03-03 | End: 2023-03-20 | Stop reason: HOSPADM

## 2023-03-03 RX ORDER — AMIODARONE HCL/D5W 450 MG/250
1 PLASTIC BAG, INJECTION (ML) INTRAVENOUS CONTINUOUS
Status: DISCONTINUED | OUTPATIENT
Start: 2023-03-03 | End: 2023-03-03

## 2023-03-03 RX ORDER — DOCUSATE SODIUM 50 MG/5 ML
100 LIQUID (ML) ORAL 2 TIMES DAILY
Status: DISCONTINUED | OUTPATIENT
Start: 2023-03-03 | End: 2023-03-04

## 2023-03-03 RX ORDER — ANTICOAGULANT CITRATE DEXTROSE SOLUTION FORMULA A 12.25; 11; 3.65 G/500ML; G/500ML; G/500ML
0-2 SOLUTION INTRAVENOUS AS NEEDED
Status: DISCONTINUED | OUTPATIENT
Start: 2023-03-03 | End: 2023-03-13

## 2023-03-03 RX ORDER — ASPIRIN 325 MG
325 TABLET ORAL DAILY
Status: DISCONTINUED | OUTPATIENT
Start: 2023-03-04 | End: 2023-03-03

## 2023-03-03 RX ORDER — ACETAMINOPHEN 160 MG/5ML
650 SOLUTION ORAL EVERY 8 HOURS
Status: DISCONTINUED | OUTPATIENT
Start: 2023-03-03 | End: 2023-03-04

## 2023-03-03 RX ORDER — AMIODARONE HCL/D5W 450 MG/250
0.5 PLASTIC BAG, INJECTION (ML) INTRAVENOUS CONTINUOUS
Status: DISCONTINUED | OUTPATIENT
Start: 2023-03-03 | End: 2023-03-03

## 2023-03-03 RX ORDER — MAGNESIUM SULFATE HEPTAHYDRATE 40 MG/ML
2 INJECTION, SOLUTION INTRAVENOUS ONCE
Status: COMPLETED | OUTPATIENT
Start: 2023-03-03 | End: 2023-03-03

## 2023-03-03 RX ORDER — IBUPROFEN 600 MG/1
1 TABLET ORAL
Status: DISCONTINUED | OUTPATIENT
Start: 2023-03-03 | End: 2023-03-13

## 2023-03-03 RX ORDER — GUAIFENESIN 200 MG/10ML
400 LIQUID ORAL EVERY 6 HOURS
Status: DISCONTINUED | OUTPATIENT
Start: 2023-03-03 | End: 2023-03-03

## 2023-03-03 RX ORDER — HYDROCODONE BITARTRATE AND ACETAMINOPHEN 7.5; 325 MG/1; MG/1
1 TABLET ORAL EVERY 4 HOURS PRN
Status: DISPENSED | OUTPATIENT
Start: 2023-03-03 | End: 2023-03-08

## 2023-03-03 RX ORDER — DIPHENHYDRAMINE HCL 12.5MG/5ML
25 LIQUID (ML) ORAL EVERY 6 HOURS PRN
Status: DISCONTINUED | OUTPATIENT
Start: 2023-03-03 | End: 2023-03-04

## 2023-03-03 RX ORDER — ALBUMIN (HUMAN) 12.5 G/50ML
12.5 SOLUTION INTRAVENOUS AS NEEDED
Status: DISCONTINUED | OUTPATIENT
Start: 2023-03-03 | End: 2023-03-13

## 2023-03-03 RX ORDER — CYCLOBENZAPRINE HCL 10 MG
10 TABLET ORAL 3 TIMES DAILY PRN
Status: DISCONTINUED | OUTPATIENT
Start: 2023-03-03 | End: 2023-03-04

## 2023-03-03 RX ORDER — ASPIRIN 325 MG
325 TABLET ORAL DAILY
Status: DISCONTINUED | OUTPATIENT
Start: 2023-03-04 | End: 2023-03-04

## 2023-03-03 RX ORDER — GUAIFENESIN 200 MG/10ML
400 LIQUID ORAL EVERY 6 HOURS
Status: DISCONTINUED | OUTPATIENT
Start: 2023-03-03 | End: 2023-03-04

## 2023-03-03 RX ORDER — OXYCODONE HYDROCHLORIDE 5 MG/1
10 TABLET ORAL EVERY 4 HOURS PRN
Status: DISCONTINUED | OUTPATIENT
Start: 2023-03-03 | End: 2023-03-04

## 2023-03-03 RX ORDER — ROSUVASTATIN CALCIUM 10 MG/1
10 TABLET, COATED ORAL NIGHTLY
Status: DISCONTINUED | OUTPATIENT
Start: 2023-03-03 | End: 2023-03-04

## 2023-03-03 RX ADMIN — BUDESONIDE 0.5 MG: 0.5 SUSPENSION RESPIRATORY (INHALATION) at 21:05

## 2023-03-03 RX ADMIN — SENNOSIDES AND DOCUSATE SODIUM 2 TABLET: 8.6; 5 TABLET ORAL at 09:24

## 2023-03-03 RX ADMIN — SODIUM BICARBONATE 150 MEQ: 84 INJECTION INTRAVENOUS at 00:00

## 2023-03-03 RX ADMIN — BUDESONIDE 0.5 MG: 0.5 SUSPENSION RESPIRATORY (INHALATION) at 09:04

## 2023-03-03 RX ADMIN — AMIODARONE HYDROCHLORIDE 0.5 MG/MIN: 1.8 INJECTION, SOLUTION INTRAVENOUS at 19:44

## 2023-03-03 RX ADMIN — MORPHINE SULFATE 2 MG: 2 INJECTION, SOLUTION INTRAMUSCULAR; INTRAVENOUS at 22:58

## 2023-03-03 RX ADMIN — DIPHENHYDRAMINE HYDROCHLORIDE 25 MG: 25 CAPSULE ORAL at 15:33

## 2023-03-03 RX ADMIN — ASPIRIN 325 MG: 325 TABLET, COATED ORAL at 09:24

## 2023-03-03 RX ADMIN — CALCIUM CHLORIDE, MAGNESIUM CHLORIDE, SODIUM CHLORIDE, SODIUM BICARBONATE, POTASSIUM CHLORIDE AND SODIUM PHOSPHATE DIBASIC DIHYDRATE 1500 ML/HR: 3.68; 3.05; 6.34; 3.09; .314; .187 INJECTION INTRAVENOUS at 19:39

## 2023-03-03 RX ADMIN — ROSUVASTATIN 10 MG: 10 TABLET, FILM COATED ORAL at 20:38

## 2023-03-03 RX ADMIN — GUAIFENESIN 1200 MG: 600 TABLET, EXTENDED RELEASE ORAL at 09:24

## 2023-03-03 RX ADMIN — MORPHINE SULFATE 2 MG: 2 INJECTION, SOLUTION INTRAMUSCULAR; INTRAVENOUS at 00:04

## 2023-03-03 RX ADMIN — Medication 0.2 MCG/KG/MIN: at 09:37

## 2023-03-03 RX ADMIN — FENTANYL CITRATE 25 MCG: 50 INJECTION, SOLUTION INTRAMUSCULAR; INTRAVENOUS at 10:59

## 2023-03-03 RX ADMIN — CEFAZOLIN SODIUM 2 G: 2 INJECTION, SOLUTION INTRAVENOUS at 00:00

## 2023-03-03 RX ADMIN — ACETAMINOPHEN 325MG 650 MG: 325 TABLET ORAL at 05:58

## 2023-03-03 RX ADMIN — CALCIUM CHLORIDE, MAGNESIUM CHLORIDE, SODIUM CHLORIDE, SODIUM BICARBONATE, POTASSIUM CHLORIDE AND SODIUM PHOSPHATE DIBASIC DIHYDRATE 1500 ML/HR: 3.68; 3.05; 6.34; 3.09; .314; .187 INJECTION INTRAVENOUS at 19:38

## 2023-03-03 RX ADMIN — DIPHENHYDRAMINE HYDROCHLORIDE 25 MG: 25 SOLUTION ORAL at 23:20

## 2023-03-03 RX ADMIN — DOCUSATE SODIUM 100 MG: 50 LIQUID ORAL at 20:41

## 2023-03-03 RX ADMIN — CALCIUM CHLORIDE, MAGNESIUM CHLORIDE, SODIUM CHLORIDE, SODIUM BICARBONATE, POTASSIUM CHLORIDE AND SODIUM PHOSPHATE DIBASIC DIHYDRATE 1500 ML/HR: 3.68; 3.05; 6.34; 3.09; .314; .187 INJECTION INTRAVENOUS at 15:20

## 2023-03-03 RX ADMIN — OXYCODONE HYDROCHLORIDE 10 MG: 5 TABLET ORAL at 20:38

## 2023-03-03 RX ADMIN — ARFORMOTEROL TARTRATE 15 MCG: 15 SOLUTION RESPIRATORY (INHALATION) at 09:04

## 2023-03-03 RX ADMIN — MAGNESIUM SULFATE HEPTAHYDRATE 2 G: 2 INJECTION, SOLUTION INTRAVENOUS at 15:33

## 2023-03-03 RX ADMIN — CYCLOBENZAPRINE 10 MG: 10 TABLET, FILM COATED ORAL at 17:35

## 2023-03-03 RX ADMIN — DOBUTAMINE IN DEXTROSE 5 MCG/KG/MIN: 100 INJECTION, SOLUTION INTRAVENOUS at 00:12

## 2023-03-03 RX ADMIN — Medication 0.18 MCG/KG/MIN: at 20:37

## 2023-03-03 RX ADMIN — SODIUM BICARBONATE 150 MEQ: 84 INJECTION INTRAVENOUS at 14:07

## 2023-03-03 RX ADMIN — MORPHINE SULFATE 2 MG: 2 INJECTION, SOLUTION INTRAMUSCULAR; INTRAVENOUS at 01:05

## 2023-03-03 RX ADMIN — CALCIUM CHLORIDE, MAGNESIUM CHLORIDE, SODIUM CHLORIDE, SODIUM BICARBONATE, POTASSIUM CHLORIDE AND SODIUM PHOSPHATE DIBASIC DIHYDRATE 1500 ML/HR: 3.68; 3.05; 6.34; 3.09; .314; .187 INJECTION INTRAVENOUS at 22:46

## 2023-03-03 RX ADMIN — ACETAMINOPHEN 650 MG: 650 SOLUTION ORAL at 19:46

## 2023-03-03 RX ADMIN — ARFORMOTEROL TARTRATE 15 MCG: 15 SOLUTION RESPIRATORY (INHALATION) at 21:06

## 2023-03-03 RX ADMIN — ALBUMIN (HUMAN) 12.5 G: 0.25 INJECTION, SOLUTION INTRAVENOUS at 23:35

## 2023-03-03 RX ADMIN — AMIODARONE HYDROCHLORIDE 0.5 MG/MIN: 1.8 INJECTION, SOLUTION INTRAVENOUS at 18:50

## 2023-03-03 RX ADMIN — OXYCODONE HYDROCHLORIDE 10 MG: 5 TABLET ORAL at 00:00

## 2023-03-03 RX ADMIN — CALCIUM CHLORIDE, MAGNESIUM CHLORIDE, SODIUM CHLORIDE, SODIUM BICARBONATE, POTASSIUM CHLORIDE AND SODIUM PHOSPHATE DIBASIC DIHYDRATE 1500 ML/HR: 3.68; 3.05; 6.34; 3.09; .314; .187 INJECTION INTRAVENOUS at 12:40

## 2023-03-03 RX ADMIN — GUAIFENESIN 400 MG: 200 SOLUTION ORAL at 20:38

## 2023-03-03 RX ADMIN — HYDROCODONE BITARTRATE AND ACETAMINOPHEN 1 TABLET: 7.5; 325 TABLET ORAL at 10:33

## 2023-03-03 RX ADMIN — Medication 0.2 MCG/KG/MIN: at 01:11

## 2023-03-03 RX ADMIN — CYCLOBENZAPRINE 10 MG: 10 TABLET, FILM COATED ORAL at 09:24

## 2023-03-03 RX ADMIN — DOBUTAMINE IN DEXTROSE 5 MCG/KG/MIN: 100 INJECTION, SOLUTION INTRAVENOUS at 09:37

## 2023-03-03 RX ADMIN — Medication 12.5 MG: at 22:58

## 2023-03-03 RX ADMIN — ONDANSETRON 4 MG: 2 INJECTION INTRAMUSCULAR; INTRAVENOUS at 10:59

## 2023-03-03 RX ADMIN — AMIODARONE HYDROCHLORIDE 150 MG: 1.5 INJECTION, SOLUTION INTRAVENOUS at 13:16

## 2023-03-03 RX ADMIN — AMIODARONE HYDROCHLORIDE 1 MG/MIN: 1.8 INJECTION, SOLUTION INTRAVENOUS at 13:17

## 2023-03-03 RX ADMIN — DOBUTAMINE IN DEXTROSE 5 MCG/KG/MIN: 100 INJECTION, SOLUTION INTRAVENOUS at 19:46

## 2023-03-03 RX ADMIN — SENNOSIDES 5 ML: 8.8 LIQUID ORAL at 20:38

## 2023-03-03 RX ADMIN — LIDOCAINE HYDROCHLORIDE: 20 SOLUTION ORAL; TOPICAL at 18:42

## 2023-03-03 NOTE — PROGRESS NOTES
"   LOS: 2 days    Patient Care Team:  Yessica Ricci DO as PCP - General (Family Medicine)  Nima Peter MD as Consulting Physician (Endocrinology)  Nadya Ramirez MD as Cardiologist (Cardiology)  Yessica Ricci DO as Consulting Physician (Family Medicine)    Chief Complaint: Acute kidney injury s/p CABG  67-year-old with history of hypertension, diabetes, former smoker 1-1/2 pack/day for quite some time, CAD s/p PCI in 2014, underwent CABG x3, post CABG remain hypotensive blood pressure in the 40s systolic in 60s and 70s 80s range requiring IV pressors.  Developed GEORGIANA oliguric, now an uric  Subjective     Interval History:   Hypotensive, critically ill in ICU  Anuric.    Review of Systems:   Complains of chest discomfort, hiccups.  All other negative    Objective     Vital Sign Min/Max for last 24 hours  Temp  Min: 98.1 °F (36.7 °C)  Max: 99.1 °F (37.3 °C)   BP  Min: 82/47  Max: 124/50   Pulse  Min: 70  Max: 96   Resp  Min: 16  Max: 26   SpO2  Min: 94 %  Max: 100 %   Flow (L/min)  Min: 2  Max: 4   No data recorded     Flowsheet Rows    Flowsheet Row First Filed Value   Admission Height 162.6 cm (64\") Documented at 03/01/2023 0611   Admission Weight 75.3 kg (166 lb) Documented at 03/01/2023 0611          No intake/output data recorded.  I/O last 3 completed shifts:  In: 4393 [I.V.:4193; IV Piggyback:200]  Out: 1200 [Urine:345; Chest Tube:855]    Physical Exam:  General Appearance: Alert, oriented, no obvious distress.  Seen in ICU setting  Eyes: PER, EOMI.  Neck: Supple no JVD.  Chest: Chest tube in place.  Lungs: Clear auscultation, no rales rhonchi's, equal chest movement, nonlabored.  Heart: No gallop, murmur, rub, RRR.  Abdomen: Soft, nontender, positive bowel sounds, no organomegaly.  Extremities: Positive bilateral lower extremity dependent edema, no cyanosis.  Neuro: No focal deficit, moving all extremities, alert oriented X 3  : Solomon catheter in place.  Minimal urine output  Skin warm and " dry.  Psych mood and affect appropriate   WBC WBC   Date Value Ref Range Status   03/03/2023 22.00 (H) 3.40 - 10.80 10*3/mm3 Final   03/02/2023 18.55 (H) 3.40 - 10.80 10*3/mm3 Final   03/02/2023 19.08 (H) 3.40 - 10.80 10*3/mm3 Final   03/01/2023 17.61 (H) 3.40 - 10.80 10*3/mm3 Final   03/01/2023 19.66 (H) 3.40 - 10.80 10*3/mm3 Final      HGB Hemoglobin   Date Value Ref Range Status   03/03/2023 10.2 (L) 13.0 - 17.7 g/dL Final   03/02/2023 10.4 (L) 13.0 - 17.7 g/dL Final   03/02/2023 10.7 (L) 13.0 - 17.7 g/dL Final   03/01/2023 12.7 (L) 13.0 - 17.7 g/dL Final   03/01/2023 14.1 13.0 - 17.7 g/dL Final   03/01/2023 11.2 (L) 12.0 - 17.0 g/dL Final   03/01/2023 12.2 12.0 - 17.0 g/dL Final   03/01/2023 12.6 12.0 - 17.0 g/dL Final   03/01/2023 10.5 (L) 12.0 - 17.0 g/dL Final   03/01/2023 9.9 (L) 12.0 - 17.0 g/dL Final   03/01/2023 14.3 12.0 - 17.0 g/dL Final   03/01/2023 16.0 12.0 - 17.0 g/dL Final      HCT Hematocrit   Date Value Ref Range Status   03/03/2023 30.8 (L) 37.5 - 51.0 % Final   03/02/2023 31.9 (L) 37.5 - 51.0 % Final   03/02/2023 33.6 (L) 37.5 - 51.0 % Final   03/01/2023 38.1 37.5 - 51.0 % Final   03/01/2023 41.7 37.5 - 51.0 % Final   03/01/2023 33 (L) 38 - 51 % Final   03/01/2023 36 (L) 38 - 51 % Final   03/01/2023 37 (L) 38 - 51 % Final   03/01/2023 31 (L) 38 - 51 % Final   03/01/2023 29 (L) 38 - 51 % Final   03/01/2023 42 38 - 51 % Final   03/01/2023 47 38 - 51 % Final      Platlets No results found for: LABPLAT   MCV MCV   Date Value Ref Range Status   03/03/2023 91.1 79.0 - 97.0 fL Final   03/02/2023 92.2 79.0 - 97.0 fL Final   03/02/2023 94.6 79.0 - 97.0 fL Final   03/01/2023 91.4 79.0 - 97.0 fL Final   03/01/2023 89.3 79.0 - 97.0 fL Final          Sodium Sodium   Date Value Ref Range Status   03/03/2023 134 (L) 136 - 145 mmol/L Final   03/02/2023 134 (L) 136 - 145 mmol/L Final   03/02/2023 140 136 - 145 mmol/L Final   03/02/2023 141 136 - 145 mmol/L Final   03/01/2023 144 136 - 145 mmol/L Final    03/01/2023 139 136 - 145 mmol/L Final      Potassium Potassium   Date Value Ref Range Status   03/03/2023 4.5 3.5 - 5.2 mmol/L Final     Comment:     Slight hemolysis detected by analyzer. Results may be affected.   03/02/2023 4.8 3.5 - 5.2 mmol/L Final   03/02/2023 4.4 3.5 - 5.2 mmol/L Final   03/02/2023 3.1 (L) 3.5 - 5.2 mmol/L Final     Comment:     Slight hemolysis detected by analyzer. Results may be affected.   03/01/2023 4.2 3.5 - 5.2 mmol/L Final   03/01/2023 4.9 3.5 - 5.2 mmol/L Final     Comment:     Slight 1+ hemolysis detected by analyzer. Results may be affected.      Chloride Chloride   Date Value Ref Range Status   03/03/2023 93 (L) 98 - 107 mmol/L Final   03/02/2023 95 (L) 98 - 107 mmol/L Final   03/02/2023 100 98 - 107 mmol/L Final   03/02/2023 101 98 - 107 mmol/L Final   03/01/2023 107 98 - 107 mmol/L Final   03/01/2023 106 98 - 107 mmol/L Final      CO2 CO2   Date Value Ref Range Status   03/03/2023 24.0 22.0 - 29.0 mmol/L Final   03/02/2023 23.0 22.0 - 29.0 mmol/L Final   03/02/2023 23.0 22.0 - 29.0 mmol/L Final   03/02/2023 20.0 (L) 22.0 - 29.0 mmol/L Final   03/01/2023 22.0 22.0 - 29.0 mmol/L Final   03/01/2023 23.0 22.0 - 29.0 mmol/L Final      BUN BUN   Date Value Ref Range Status   03/03/2023 35 (H) 8 - 23 mg/dL Final   03/02/2023 31 (H) 8 - 23 mg/dL Final   03/02/2023 26 (H) 8 - 23 mg/dL Final   03/02/2023 22 8 - 23 mg/dL Final   03/01/2023 18 8 - 23 mg/dL Final   03/01/2023 17 8 - 23 mg/dL Final      Creatinine Creatinine   Date Value Ref Range Status   03/03/2023 5.09 (H) 0.76 - 1.27 mg/dL Final   03/02/2023 4.73 (H) 0.76 - 1.27 mg/dL Final   03/02/2023 4.58 (H) 0.76 - 1.27 mg/dL Final   03/02/2023 2.62 (H) 0.76 - 1.27 mg/dL Final   03/01/2023 1.54 (H) 0.76 - 1.27 mg/dL Final   03/01/2023 1.29 (H) 0.76 - 1.27 mg/dL Final      Calcium Calcium   Date Value Ref Range Status   03/03/2023 7.3 (L) 8.6 - 10.5 mg/dL Final   03/02/2023 7.2 (L) 8.6 - 10.5 mg/dL Final   03/02/2023 7.4 (L) 8.6 -  10.5 mg/dL Final   03/02/2023 8.0 (L) 8.6 - 10.5 mg/dL Final   03/01/2023 8.8 8.6 - 10.5 mg/dL Final   03/01/2023 9.8 8.6 - 10.5 mg/dL Final      PO4 No results found for: CAPO4   Albumin Albumin   Date Value Ref Range Status   03/03/2023 3.8 3.5 - 5.2 g/dL Final   03/02/2023 3.8 3.5 - 5.2 g/dL Final   03/02/2023 4.5 3.5 - 5.2 g/dL Final   03/01/2023 4.3 3.5 - 5.2 g/dL Final   03/01/2023 4.5 3.5 - 5.2 g/dL Final      Magnesium Magnesium   Date Value Ref Range Status   03/02/2023 1.8 1.6 - 2.4 mg/dL Final   03/02/2023 2.1 1.6 - 2.4 mg/dL Final   03/01/2023 2.4 1.6 - 2.4 mg/dL Final   03/01/2023 2.3 1.6 - 2.4 mg/dL Final      Uric Acid No results found for: URICACID        Results Review:     I reviewed the patient's new clinical results.    acetaminophen, 650 mg, Oral, Q8H  arformoterol, 15 mcg, Nebulization, BID - RT  aspirin, 325 mg, Oral, Daily  atorvastatin, 40 mg, Oral, Nightly  budesonide, 0.5 mg, Nebulization, BID - RT  guaiFENesin, 1,200 mg, Oral, Q12H  metoprolol tartrate, 12.5 mg, Oral, Q12H  pharmacy consult - [place], , Does not apply, BID  senna-docusate sodium, 2 tablet, Oral, BID      DOBUTamine, 5 mcg/kg/min, Last Rate: 5 mcg/kg/min (03/03/23 0012)  DOPamine, 2-20 mcg/kg/min  EPINEPHrine, 0.02-0.3 mcg/kg/min, Last Rate: Stopped (03/02/23 6110)  insulin, 0-100 Units/hr, Last Rate: 1.5 Units/hr (03/03/23 1256)  niCARdipine, 5-15 mg/hr  nitroglycerin, 5-200 mcg/min, Last Rate: Stopped (03/01/23 1431)  norepinephrine, 0.02-0.3 mcg/kg/min, Last Rate: 0.22 mcg/kg/min (03/03/23 0400)  phenylephrine, 0.5-3 mcg/kg/min  sodium bicarbonate drip (greater than 75 mEq/bag), 150 mEq, Last Rate: 150 mEq (03/03/23 0000)        Medication Review: Reviewed    Assessment & Plan       Coronary artery disease involving native coronary artery of native heart without angina pectoris    Hypertension    Hyperlipidemia LDL goal <70    Type 2 diabetes mellitus with hypoglycemia, with long-term current use of insulin (HCC)     Ischemic cardiomyopathy    Mucopurulent chronic bronchitis (HCC)    Prolonged Q-T interval on ECG    Acute renal failure (ARF) (HCC)    Shock (HCC)     1.  GEORGIANA: ATN likely secondary to shock with systolic blood pressure postsurgery in the range of 40s to 80s, now on multiple vasopressors.  Admit creatinine 1.08, creatinine progressively getting worse.  Patient is oliguric.  2.  S/p CABG x3  3.  Ischemic cardiomyopathy with ejection fraction 41-45%.  4.  Diabetes type 2.  5.  Cardiogenic shock.     Recommendations:  Discussed with Dr. Hayes and Dr. Saavedra's regards to starting CRRT.  Discussed with the wife in the room and the patient and explained the procedure and outcomes.  Oliguric, increasing BUN and creatinine, hypotensive, patient required CRRT.  Avoid nephrotoxic medications.  Keep systolic blood pressure greater than 100.  Check volume status.  Check labs in the morning.  Urine labs, complement C3-C4   Adjust medication for the new GFR.  Case discussed with the medical staff taking care of the patient.  High risk patient with complex medical problems.  Replace with packed RBC if hemoglobin less than 7       Chris Moran MD  03/03/23  08:52 EST

## 2023-03-03 NOTE — CASE MANAGEMENT/SOCIAL WORK
Continued Stay Note  [facility]     Patient Name: Herbert Harley  MRN: [id number]  Today's Date: 3/3/2023    Admit Date: 3/1/2023    Plan: ongoing   Discharge Plan     Row Name 03/03/23 1115       Plan    Plan ongoing    Plan Comments Mr. Harley remains in the ICU on pressors for B/P support.  Plan  of care discussed in MDR.  Possibly starting CRRT this afternoon on patient.  CM will continue to follow for discharge planning.    Final Discharge Disposition Code 30 - still a patient               Discharge Codes    No documentation.               Expected Discharge Date and Time     Expected Discharge Date Expected Discharge Time    Mar 6, 2023             Martha Montanez RN

## 2023-03-03 NOTE — PROGRESS NOTES
Intensive Care Follow-up     Hospital:  LOS: 2 days   Mr. Herbert Harley, 67 y.o. male is followed for:   Coronary artery disease involving native coronary artery of native heart without angina pectoris            History of present illness:   Mr. Harley is a 68 yo male with PMH former smoker (quit in December 2022), CAD s/p PCI in 2014, HTN and diabetes who presents to the [facility] ICU s/p CABG with Dr. Saavedra. Patient reported he had been experiencing shortness of breath associated with cough for which his PCP referred him to a cardiologist. He underwent stress test that was found to be abnormal and was thus sent for [facility]. That showed multivessel CAD so he was referred to [facility] for evaluation. At that time it was felt he would benefit from a CABG.     ECHO: EF 41-45%. Mild concentric hypertrophy of left ventricular wall. Mild calcification of the aortic, mitral, tricuspid, pulmonic valves. Est. RVSP is 35-45 mmHg.      Carotid Duplex: Right ICA stenosis 0-49%. Left ICA stenosis 50-69%. There is antegrade flow in the vertebral arteries bilaterally.      PFTs shows no clear obstructive defect. The reduction in volumes could suggest restrictive disease. FVC 41%, FEV 52%, FEV1/FVC 93.24     A1C 7.4     Today he presented to the hospital for procedure. Post op he was brought to the ICU for management.      Subjective   Interval History:  Patient doing better this morning.  Had 80 cc of urine out overnight.  pH is improved this morning.  A-line had to be placed at bedside.  Found to have clots in both radial arteries bilaterally therefore catheter placed in the left groin.  Continuing to wean pressors.             The patient's past medical, surgical and social history were reviewed and updated in Epic as appropriate.       Objective     Infusions:  DOBUTamine, 5 mcg/kg/min, Last Rate: 5 mcg/kg/min (03/03/23 1366)  EPINEPHrine, 0.02-0.3 mcg/kg/min, Last Rate: Stopped (03/02/23 4767)  niCARdipine, 5-15  mg/hr  nitroglycerin, 5-200 mcg/min, Last Rate: Stopped (03/01/23 1431)  norepinephrine, 0.02-0.3 mcg/kg/min, Last Rate: 0.2 mcg/kg/min (03/03/23 0937)  phenylephrine, 0.5-3 mcg/kg/min  sodium bicarbonate drip (greater than 75 mEq/bag), 150 mEq, Last Rate: 150 mEq (03/03/23 0000)      Medications:  acetaminophen, 650 mg, Oral, Q8H  arformoterol, 15 mcg, Nebulization, BID - RT  aspirin, 325 mg, Oral, Daily  budesonide, 0.5 mg, Nebulization, BID - RT  guaiFENesin, 1,200 mg, Oral, Q12H  metoprolol tartrate, 12.5 mg, Oral, Q12H  pharmacy consult - MTM, , Does not apply, BID  rosuvastatin, 10 mg, Oral, Nightly  senna-docusate sodium, 2 tablet, Oral, BID      I reviewed the patient's medications.    Vital Sign Min/Max for last 24 hours  Temp  Min: 98.1 °F (36.7 °C)  Max: 99.1 °F (37.3 °C)   BP  Min: 82/47  Max: 124/50   Pulse  Min: 70  Max: 96   Resp  Min: 16  Max: 26   SpO2  Min: 94 %  Max: 100 %   Flow (L/min)  Min: 2  Max: 5       Input/Output for last 24 hour shift  03/02 0701 - 03/03 0700  In: 3130 [I.V.:3030]  Out: 570 [Urine:90]      GENERAL : NAD, conversant  RESPIRATORY/THORAX : normal respiratory effort and no intercostal retractions, Coarse crackles bilaterally  CARDIOVASCULAR : Normal S1/S2, RRR. 1+ lower ext edema.  GASTROINTESTINAL : Soft, NT/ND. BS x 4 normoactive. No hepatosplenomegaly.  MUSCULOSKELETAL : No cyanosis, clubbing, or ischemia  NEUROLOGICAL: alert and oriented to person, place and time  PSYCHOLOGICAL : Appropriate affect    Results from last 7 days   Lab Units 03/03/23  0206 03/02/23  1356 03/02/23  0215   WBC 10*3/mm3 22.00* 18.55* 19.08*   HEMOGLOBIN g/dL 10.2* 10.4* 10.7*   PLATELETS 10*3/mm3 160 160 242     Results from last 7 days   Lab Units 03/03/23  0206 03/02/23  1952 03/02/23  1438 03/02/23  0215 03/01/23  1509 03/01/23  1210   SODIUM mmol/L 134* 134* 140 141 144 139   POTASSIUM mmol/L 4.5 4.8 4.4 3.1* 4.2 4.9   CO2 mmol/L 24.0 23.0 23.0 20.0* 22.0 23.0   BUN mg/dL 35* 31* 26* 22 18  17   CREATININE mg/dL 5.09* 4.73* 4.58* 2.62* 1.54* 1.29*   MAGNESIUM mg/dL  --   --  1.8 2.1 2.4 2.3   PHOSPHORUS mg/dL  --   --   --  7.2* 6.2* 1.6*   GLUCOSE mg/dL 191* 153* 142* 189* 183* 138*     Estimated Creatinine Clearance: 13.1 mL/min (A) (by C-G formula based on SCr of 5.09 mg/dL (H)).    Results from last 7 days   Lab Units 03/03/23  0500   PH, ARTERIAL pH units 7.335*   PCO2, ARTERIAL mm Hg 48.1*   PO2 ART mm Hg 86.0       I reviewed the patient's new clinical results.  I reviewed the patient's new imaging results/reports including actual images and agree with reports.       Imaging Results (Last 24 Hours)     Procedure Component Value Units Date/Time    XR Chest 1 View [[id number]] Collected: 03/03/23 0745     Updated: 03/03/23 0750    Narrative:      XR CHEST 1 VW    Date of Exam: 3/3/2023 4:42 AM EST    Indication: Post-Op Heart Surgery.    Comparison: 3/2/2023 0147 hours    Findings:  Postoperative changes are noted. The chest tube/mediastinal drains are stable in position. Residual atelectasis is seen throughout the left mid to lower lung. There is a small residual left pleural effusion. The right IJ venous sheath is stable in   position. The catheter is retracted towards the sheath. There is no evidence for pneumothorax bilaterally.      Impression:      Impression:  1.Stable positioning of the left chest tube and mediastinal drains.  2.Residual atelectasis is seen throughout the left mid to lower lung. There is residual small left pleural effusion.  3.The right IJ venous sheath is stable in position. The catheter is retracted towards the sheath.    Electronically Signed: Vincent Servin    3/3/2023 7:47 AM EST    Workstation ID: [id number]    US Renal Bilateral [[id number]] Collected: 03/02/23 1733     Updated: 03/02/23 1737    Narrative:      US RENAL BILATERAL    Date of Exam: 3/2/2023 4:51 PM EST    Indication: Acute kidney injury.    Comparison: No comparisons available.    Technique: Grayscale  and color Doppler ultrasound evaluation of the kidneys and urinary bladder was performed.      Findings:  The right kidney measures 11.0 x 4.9 x 5.4 cm in length and the left kidney measures 10.6 x 5.4 x 5.3 cm in length   Kidneys demonstrate normal cortical echogenicity.  No hydronephrosis or intrarenal stones.  No focal lesions.    Bladder is limited secondary to Solomon catheter placement      Impression:      Unremarkable ultrasound of the kidneys.  Limited evaluation of the bladder secondary to placement of Solomon catheter    Electronically Signed: Estevan Montaño    3/2/2023 5:34 PM EST    Workstation ID: [id number]          Assessment & Plan   Impression        Coronary artery disease involving native coronary artery of native heart without angina pectoris    Hypertension    Hyperlipidemia LDL goal <70    Type 2 diabetes mellitus with hypoglycemia, with long-term current use of insulin (HCC)    Ischemic cardiomyopathy    Mucopurulent chronic bronchitis (HCC)    Prolonged Q-T interval on ECG    Acute renal failure (ARF) (HCC)    Shock (HCC)    Acute respiratory failure with hypoxia (HCC)       Plan        67-year-old male with a past medical history significant for tobacco abuse, coronary disease status post PCI in 2014, hypertension, and diabetes mellitus.  Who presents to [facility] ICU status post CABG by Dr. Saavedra on 3/1/2023.  Patient initially hypertensive but then overnight on 3/2/2023 patient developed worsening shock and acute renal failure.  Stat echo performed on 3/2/2023 showed an EF of 40 to 45% with no signs of pericardial effusion.       -ICU to follow  -chest tubes per CT surgery  -Continue BiPAP and supplemental oxygen, will wean oxygen to saturation greater than 88%.  -Continue to wean norepinephrine to a MAP greater than 65  -Continue bicarb drip and monitor ABGs  -Renal has been consulted, will defer to them about timing of initiating CRRT if needed.  -Discontinue insulin drip,  transition to subcu insulin  -Aspirin/statin/beta-blocker  -Brovana, budesonide, and DuoNebs for COPD  -PT/OT when patient is more stable  -We will plan to start heparin for DVT prophylaxis once chest tubes are removed  -A.m. chest x-ray  -A.m. labs    I conducted multidisciplinary rounds in the plan of care was discussed with the multidisciplinary team at that time. In attendance at multidisciplinary rounds was clinical pharmacist, dietitian, nursing staff, and case management.      Critical Care time spent in direct patient care: 35 minutes (excluding procedure time, if applicable) including high complexity decision making to assess, manipulate, and support vital organ system failure in this individual who has impairment of one or more vital organ systems such that there is a high probability of imminent or life threatening deterioration in the patient's condition.      Carlos Hayes, DO  Pulmonary, Critical care and Sleep Medicine

## 2023-03-03 NOTE — PROCEDURES
"Non-Tunneled Catheter    Date/Time: 3/3/2023 12:08 PM  Performed by: Whit Resendez APRN  Authorized by: Whit Resendez APRN   Consent: Written consent obtained.  Risks and benefits: risks, benefits and alternatives were discussed  Procedure consent: procedure consent matches procedure scheduled  Patient identity confirmed: arm band and verbally with patient  Time out: Immediately prior to procedure a \"time out\" was called to verify the correct patient, procedure, equipment, support staff and site/side marked as required.  Indications: vascular access  Anesthesia: local infiltration    Anesthesia:  Local Anesthetic: lidocaine 1% without epinephrine  Anesthetic total: 5 mL    Sedation:  Patient sedated: yes  Sedatives: fentanyl  Vitals: Vital signs were monitored during sedation.    Preparation: skin prepped with ChloraPrep  Skin prep agent dried: skin prep agent completely dried prior to procedure  Sterile barriers: all five maximum sterile barriers used - cap, mask, sterile gown, sterile gloves, and large sterile sheet  Hand hygiene: hand hygiene performed prior to central venous catheter insertion  Location details: left internal jugular  Patient position: flat  Catheter type: triple lumen  Catheter size: 12 Fr  Pre-procedure: landmarks identified  Ultrasound guidance: yes  Number of attempts: 1  Successful placement: yes  Post-procedure: line sutured and dressing applied  Assessment: blood return through all ports,  free fluid flow,  placement verified by x-ray and no pneumothorax on x-ray  Patient tolerance: patient tolerated the procedure well with no immediate complications  Comments: Dark, nonpulsatile blood was aspirated from Lt IJ using finder needle under ultrasound.  Wire was threaded via finder needle without difficulty and visualized in Lt IJ under ultrasound.  Dilator was advanced over wire after small incision was made in the skin.  Dilator was removed.  Catheter was " advanced over the wire up to hub of the catheter.  Wire was removed.  Flushed end caps were placed on all three ports.  All three ports aspirated dark, nonpulsatile blood and flushed back well.  Catheter was sutured in place x2.  An antibacterial dressing was applied over the site.

## 2023-03-03 NOTE — PROGRESS NOTES
Pt. Referred for Phase II Cardiac Rehab. Staff discussed benefits of exercise, program protocol, and educational material provided. Teach back verified.  Patient scheduled for orientation at [facility] on Tuesday April 4th, 2023 at 1:00pm.

## 2023-03-03 NOTE — PROGRESS NOTES
Cardiothoracic Surgery Progress Note      POD # 2 s/p CABG x 3     LOS: 2 days      Subjective:  No acute events overnight    Objective:  Vital Signs  Temp:  [98.1 °F (36.7 °C)-99.1 °F (37.3 °C)] 99.1 °F (37.3 °C)  Heart Rate:  [70-96] 81  Resp:  [16-26] 16  BP: ()/(40-63) 124/50    Physical Exam:   General Appearance: alert, appears stated age and cooperative   Lungs: breathing unlabored, respirations even   Heart: regular rate and rhythm   Skin: Incision c/d/i    Output by Drain (mL) 03/02/23 0701 - 03/02/23 1900 03/02/23 1901 - 03/03/23 0700 03/03/23 0701 - 03/03/23 0835 Range Total   Y Chest Tube 1 and 2 1 Right Mediastinal 32 Fr. 2 Right Mediastinal 32 Fr. 100 200  300   Y Chest Tube 3 and 4 3 Right Mediastinal 32 Fr. 4 Right Mediastinal 32 Fr. 100 80  180        Results:  Results from last 7 days   Lab Units 03/03/23  0206   WBC 10*3/mm3 22.00*   HEMOGLOBIN g/dL 10.2*   HEMATOCRIT % 30.8*   PLATELETS 10*3/mm3 160     Results from last 7 days   Lab Units 03/03/23  0206   SODIUM mmol/L 134*   POTASSIUM mmol/L 4.5   CHLORIDE mmol/L 93*   CO2 mmol/L 24.0   BUN mg/dL 35*   CREATININE mg/dL 5.09*   GLUCOSE mg/dL 191*   CALCIUM mg/dL 7.3*       Assessment:  POD # 2 s/p CABG x 3    Plan:  Keep in ICU  Nephrology following   Keep chest tubes given output    Eva Pino PA-C  03/03/23  08:35 EST

## 2023-03-03 NOTE — OP NOTE
CTS Progress Note       LOS: 2 days   Patient Care Team:  Yessica Ricci DO as PCP - General (Family Medicine)  Nima Peter MD as Consulting Physician (Endocrinology)  Nadya Ramirez MD as Cardiologist (Cardiology)  Yessica Ricci DO as Consulting Physician (Family Medicine)    Chief Complaint: Coronary artery disease involving native coronary artery of native heart without angina pectoris    Vital Signs:  Temp:  [98.1 °F (36.7 °C)-99.1 °F (37.3 °C)] 99.1 °F (37.3 °C)  Heart Rate:  [70-96] 81  Resp:  [16-26] 16  BP: ()/(40-65) 124/50    Physical Exam: Alert breathing unlabored       Results:   Results from last 7 days   Lab Units 03/03/23  0206   WBC 10*3/mm3 22.00*   HEMOGLOBIN g/dL 10.2*   HEMATOCRIT % 30.8*   PLATELETS 10*3/mm3 160     Results from last 7 days   Lab Units 03/03/23  0206   SODIUM mmol/L 134*   POTASSIUM mmol/L 4.5   CHLORIDE mmol/L 93*   CO2 mmol/L 24.0   BUN mg/dL 35*   CREATININE mg/dL 5.09*   GLUCOSE mg/dL 191*   CALCIUM mg/dL 7.3*           Imaging Results (Last 24 Hours)     Procedure Component Value Units Date/Time    XR Chest 1 View [[id number]] Resulted: 03/03/23 0442     Updated: 03/03/23 0450    US Renal Bilateral [[id number]] Collected: 03/02/23 1733     Updated: 03/02/23 1737    Narrative:      US RENAL BILATERAL    Date of Exam: 3/2/2023 4:51 PM EST    Indication: Acute kidney injury.    Comparison: No comparisons available.    Technique: Grayscale and color Doppler ultrasound evaluation of the kidneys and urinary bladder was performed.      Findings:  The right kidney measures 11.0 x 4.9 x 5.4 cm in length and the left kidney measures 10.6 x 5.4 x 5.3 cm in length   Kidneys demonstrate normal cortical echogenicity.  No hydronephrosis or intrarenal stones.  No focal lesions.    Bladder is limited secondary to Solomon catheter placement      Impression:      Unremarkable ultrasound of the kidneys.  Limited evaluation of the bladder secondary to placement of Solomon  catheter    Electronically Signed: Estevan Montaño    3/2/2023 5:34 PM EST    Workstation ID: [id number]          Assessment      Coronary artery disease involving native coronary artery of native heart without angina pectoris    Hypertension    Hyperlipidemia LDL goal <70    Type 2 diabetes mellitus with hypoglycemia, with long-term current use of insulin (HCC)    Ischemic cardiomyopathy    Mucopurulent chronic bronchitis (HCC)    Prolonged Q-T interval on ECG    Acute renal failure (ARF) (HCC)    Shock (HCC)    Is still on pressor agents.  Renal function has essentially shut down and will require CRRT today.  He was never hypotensive in the operating room and actually left the operating room to the intensive care unit on Cardene.  This probable hypotension the night post surgery contributed to his renal failure.  Today's chest x-ray demonstrates what appears to be collapse of the left lower lobe and this patient may benefit from bronchoscopy will defer that to [facility]  Do not remove chest tubes    Plan   Do not remove chest tubes  May need bronchoscopy will defer to pulmonary    Please note that portions of this note were completed with a voice recognition program. Efforts were made to edit the dictations, but occasionally words are mistranscribed.    Paresh Saavedra MD  03/03/23  07:08 EST

## 2023-03-04 ENCOUNTER — APPOINTMENT (OUTPATIENT)
Dept: GENERAL RADIOLOGY | Facility: HOSPITAL | Age: 68
DRG: 235 | End: 2023-03-04
Payer: MEDICARE

## 2023-03-04 LAB
ALBUMIN SERPL-MCNC: 3.2 G/DL (ref 3.5–5.2)
ALBUMIN SERPL-MCNC: 3.4 G/DL (ref 3.5–5.2)
ALBUMIN SERPL-MCNC: 3.4 G/DL (ref 3.5–5.2)
ALBUMIN SERPL-MCNC: 3.7 G/DL (ref 3.5–5.2)
ALBUMIN/GLOB SERPL: 1.7 G/DL
ALP SERPL-CCNC: 65 U/L (ref 39–117)
ALT SERPL W P-5'-P-CCNC: <5 U/L (ref 1–41)
ANION GAP SERPL CALCULATED.3IONS-SCNC: 14 MMOL/L (ref 5–15)
ANION GAP SERPL CALCULATED.3IONS-SCNC: 15 MMOL/L (ref 5–15)
ARTERIAL PATENCY WRIST A: ABNORMAL
AST SERPL-CCNC: 54 U/L (ref 1–40)
ATMOSPHERIC PRESS: ABNORMAL MM[HG]
BASE EXCESS BLDA CALC-SCNC: -6.6 MMOL/L (ref 0–2)
BDY SITE: ABNORMAL
BH BB BLOOD EXPIRATION DATE: NORMAL
BH BB BLOOD EXPIRATION DATE: NORMAL
BH BB BLOOD TYPE BARCODE: 5100
BH BB BLOOD TYPE BARCODE: 5100
BH BB DISPENSE STATUS: NORMAL
BH BB DISPENSE STATUS: NORMAL
BH BB PRODUCT CODE: NORMAL
BH BB PRODUCT CODE: NORMAL
BH BB UNIT NUMBER: NORMAL
BH BB UNIT NUMBER: NORMAL
BILIRUB SERPL-MCNC: 0.3 MG/DL (ref 0–1.2)
BODY TEMPERATURE: 37 C
BUN SERPL-MCNC: 24 MG/DL (ref 8–23)
BUN SERPL-MCNC: 24 MG/DL (ref 8–23)
BUN SERPL-MCNC: 26 MG/DL (ref 8–23)
BUN SERPL-MCNC: 33 MG/DL (ref 8–23)
BUN/CREAT SERPL: 13.4 (ref 7–25)
BUN/CREAT SERPL: 9.1 (ref 7–25)
BUN/CREAT SERPL: 9.3 (ref 7–25)
BUN/CREAT SERPL: 9.6 (ref 7–25)
CA-I SERPL ISE-MCNC: 1.02 MMOL/L (ref 1.12–1.32)
CA-I SERPL ISE-MCNC: 1.05 MMOL/L (ref 1.12–1.32)
CA-I SERPL ISE-MCNC: 1.05 MMOL/L (ref 1.12–1.32)
CALCIUM SPEC-SCNC: 6.8 MG/DL (ref 8.6–10.5)
CALCIUM SPEC-SCNC: 7 MG/DL (ref 8.6–10.5)
CALCIUM SPEC-SCNC: 7.2 MG/DL (ref 8.6–10.5)
CALCIUM SPEC-SCNC: 7.2 MG/DL (ref 8.6–10.5)
CHLORIDE SERPL-SCNC: 98 MMOL/L (ref 98–107)
CHLORIDE SERPL-SCNC: 99 MMOL/L (ref 98–107)
CO2 BLDA-SCNC: 21.6 MMOL/L (ref 22–33)
CO2 SERPL-SCNC: 21 MMOL/L (ref 22–29)
CO2 SERPL-SCNC: 22 MMOL/L (ref 22–29)
COHGB MFR BLD: 0.9 % (ref 0–2)
CREAT SERPL-MCNC: 2.47 MG/DL (ref 0.76–1.27)
CREAT SERPL-MCNC: 2.49 MG/DL (ref 0.76–1.27)
CREAT SERPL-MCNC: 2.65 MG/DL (ref 0.76–1.27)
CREAT SERPL-MCNC: 2.81 MG/DL (ref 0.76–1.27)
CROSSMATCH INTERPRETATION: NORMAL
CROSSMATCH INTERPRETATION: NORMAL
DEPRECATED RDW RBC AUTO: 49.8 FL (ref 37–54)
EGFRCR SERPLBLD CKD-EPI 2021: 23.9 ML/MIN/1.73
EGFRCR SERPLBLD CKD-EPI 2021: 25.6 ML/MIN/1.73
EGFRCR SERPLBLD CKD-EPI 2021: 27.6 ML/MIN/1.73
EGFRCR SERPLBLD CKD-EPI 2021: 27.9 ML/MIN/1.73
EPAP: 0
ERYTHROCYTE [DISTWIDTH] IN BLOOD BY AUTOMATED COUNT: 14.6 % (ref 12.3–15.4)
GLOBULIN UR ELPH-MCNC: 2 GM/DL
GLUCOSE BLDC GLUCOMTR-MCNC: 149 MG/DL (ref 70–130)
GLUCOSE BLDC GLUCOMTR-MCNC: 162 MG/DL (ref 70–130)
GLUCOSE BLDC GLUCOMTR-MCNC: 172 MG/DL (ref 70–130)
GLUCOSE BLDC GLUCOMTR-MCNC: 82 MG/DL (ref 70–130)
GLUCOSE SERPL-MCNC: 160 MG/DL (ref 65–99)
GLUCOSE SERPL-MCNC: 161 MG/DL (ref 65–99)
GLUCOSE SERPL-MCNC: 166 MG/DL (ref 65–99)
GLUCOSE SERPL-MCNC: 211 MG/DL (ref 65–99)
HCO3 BLDA-SCNC: 20.2 MMOL/L (ref 20–26)
HCT VFR BLD AUTO: 28.2 % (ref 37.5–51)
HCT VFR BLD AUTO: 28.6 % (ref 37.5–51)
HCT VFR BLD CALC: 28.5 % (ref 38–51)
HGB BLD-MCNC: 9 G/DL (ref 13–17.7)
HGB BLD-MCNC: 9.1 G/DL (ref 13–17.7)
HGB BLDA-MCNC: 9.3 G/DL (ref 13.5–17.5)
INHALED O2 CONCENTRATION: 21 %
IPAP: 0
MAGNESIUM SERPL-MCNC: 2.2 MG/DL (ref 1.6–2.4)
MAGNESIUM SERPL-MCNC: 2.4 MG/DL (ref 1.6–2.4)
MAGNESIUM SERPL-MCNC: 2.4 MG/DL (ref 1.6–2.4)
MCH RBC QN AUTO: 29.7 PG (ref 26.6–33)
MCHC RBC AUTO-ENTMCNC: 31.9 G/DL (ref 31.5–35.7)
MCV RBC AUTO: 93.1 FL (ref 79–97)
METHGB BLD QL: 0.6 % (ref 0–1.5)
MODALITY: ABNORMAL
NOTE: ABNORMAL
OXYHGB MFR BLDV: 94 % (ref 94–99)
PAW @ PEAK INSP FLOW SETTING VENT: 0 CMH2O
PCO2 BLDA: 45.4 MM HG (ref 35–45)
PCO2 TEMP ADJ BLD: 45.4 MM HG (ref 35–48)
PH BLDA: 7.26 PH UNITS (ref 7.35–7.45)
PH, TEMP CORRECTED: 7.26 PH UNITS
PHOSPHATE SERPL-MCNC: 5.8 MG/DL (ref 2.5–4.5)
PHOSPHATE SERPL-MCNC: 6.1 MG/DL (ref 2.5–4.5)
PHOSPHATE SERPL-MCNC: 6.5 MG/DL (ref 2.5–4.5)
PLATELET # BLD AUTO: 132 10*3/MM3 (ref 140–450)
PMV BLD AUTO: 11.8 FL (ref 6–12)
PO2 BLDA: 84.5 MM HG (ref 83–108)
PO2 TEMP ADJ BLD: 84.5 MM HG (ref 83–108)
POTASSIUM SERPL-SCNC: 4.6 MMOL/L (ref 3.5–5.2)
POTASSIUM SERPL-SCNC: 4.8 MMOL/L (ref 3.5–5.2)
POTASSIUM SERPL-SCNC: 4.9 MMOL/L (ref 3.5–5.2)
POTASSIUM SERPL-SCNC: 4.9 MMOL/L (ref 3.5–5.2)
PROT SERPL-MCNC: 5.4 G/DL (ref 6–8.5)
RBC # BLD AUTO: 3.03 10*6/MM3 (ref 4.14–5.8)
SODIUM SERPL-SCNC: 135 MMOL/L (ref 136–145)
SODIUM SERPL-SCNC: 136 MMOL/L (ref 136–145)
TOTAL RATE: 0 BREATHS/MINUTE
UNIT  ABO: NORMAL
UNIT  ABO: NORMAL
UNIT  RH: NORMAL
UNIT  RH: NORMAL
WBC NRBC COR # BLD: 13.05 10*3/MM3 (ref 3.4–10.8)

## 2023-03-04 PROCEDURE — 94660 CPAP INITIATION&MGMT: CPT

## 2023-03-04 PROCEDURE — 82330 ASSAY OF CALCIUM: CPT | Performed by: INTERNAL MEDICINE

## 2023-03-04 PROCEDURE — 71045 X-RAY EXAM CHEST 1 VIEW: CPT

## 2023-03-04 PROCEDURE — C1752 CATH,HEMODIALYSIS,SHORT-TERM: HCPCS

## 2023-03-04 PROCEDURE — 85014 HEMATOCRIT: CPT | Performed by: THORACIC SURGERY (CARDIOTHORACIC VASCULAR SURGERY)

## 2023-03-04 PROCEDURE — 25010000002 AMIODARONE IN DEXTROSE 5% 360-4.14 MG/200ML-% SOLUTION: Performed by: THORACIC SURGERY (CARDIOTHORACIC VASCULAR SURGERY)

## 2023-03-04 PROCEDURE — 85027 COMPLETE CBC AUTOMATED: CPT | Performed by: PHYSICIAN ASSISTANT

## 2023-03-04 PROCEDURE — 36556 INSERT NON-TUNNEL CV CATH: CPT | Performed by: NURSE PRACTITIONER

## 2023-03-04 PROCEDURE — B543ZZA ULTRASONOGRAPHY OF RIGHT JUGULAR VEINS, GUIDANCE: ICD-10-PCS | Performed by: NURSE PRACTITIONER

## 2023-03-04 PROCEDURE — 83735 ASSAY OF MAGNESIUM: CPT | Performed by: INTERNAL MEDICINE

## 2023-03-04 PROCEDURE — 84100 ASSAY OF PHOSPHORUS: CPT | Performed by: INTERNAL MEDICINE

## 2023-03-04 PROCEDURE — 80053 COMPREHEN METABOLIC PANEL: CPT | Performed by: INTERNAL MEDICINE

## 2023-03-04 PROCEDURE — 82375 ASSAY CARBOXYHB QUANT: CPT

## 2023-03-04 PROCEDURE — 25010000002 ALBUMIN HUMAN 25% PER 50 ML: Performed by: INTERNAL MEDICINE

## 2023-03-04 PROCEDURE — 85018 HEMOGLOBIN: CPT | Performed by: THORACIC SURGERY (CARDIOTHORACIC VASCULAR SURGERY)

## 2023-03-04 PROCEDURE — 94799 UNLISTED PULMONARY SVC/PX: CPT

## 2023-03-04 PROCEDURE — 83050 HGB METHEMOGLOBIN QUAN: CPT

## 2023-03-04 PROCEDURE — 25010000002 MORPHINE PER 10 MG: Performed by: THORACIC SURGERY (CARDIOTHORACIC VASCULAR SURGERY)

## 2023-03-04 PROCEDURE — 74018 RADEX ABDOMEN 1 VIEW: CPT

## 2023-03-04 PROCEDURE — 63710000001 DIPHENHYDRAMINE PER 50 MG

## 2023-03-04 PROCEDURE — 82805 BLOOD GASES W/O2 SATURATION: CPT

## 2023-03-04 PROCEDURE — 05HM33Z INSERTION OF INFUSION DEVICE INTO RIGHT INTERNAL JUGULAR VEIN, PERCUTANEOUS APPROACH: ICD-10-PCS | Performed by: NURSE PRACTITIONER

## 2023-03-04 PROCEDURE — P9047 ALBUMIN (HUMAN), 25%, 50ML: HCPCS | Performed by: INTERNAL MEDICINE

## 2023-03-04 PROCEDURE — 25010000002 ONDANSETRON PER 1 MG: Performed by: PHYSICIAN ASSISTANT

## 2023-03-04 PROCEDURE — 63710000001 INSULIN REGULAR HUMAN PER 5 UNITS: Performed by: INTERNAL MEDICINE

## 2023-03-04 PROCEDURE — 25010000002 DOBUTAMINE PER 250 MG: Performed by: THORACIC SURGERY (CARDIOTHORACIC VASCULAR SURGERY)

## 2023-03-04 PROCEDURE — 82962 GLUCOSE BLOOD TEST: CPT

## 2023-03-04 PROCEDURE — 99233 SBSQ HOSP IP/OBS HIGH 50: CPT | Performed by: INTERNAL MEDICINE

## 2023-03-04 RX ORDER — ASPIRIN 325 MG
325 TABLET, DELAYED RELEASE (ENTERIC COATED) ORAL DAILY
Status: DISCONTINUED | OUTPATIENT
Start: 2023-03-04 | End: 2023-03-20 | Stop reason: HOSPADM

## 2023-03-04 RX ORDER — GUAIFENESIN 600 MG/1
1200 TABLET, EXTENDED RELEASE ORAL EVERY 12 HOURS SCHEDULED
Status: DISCONTINUED | OUTPATIENT
Start: 2023-03-04 | End: 2023-03-13

## 2023-03-04 RX ORDER — AMOXICILLIN 250 MG
2 CAPSULE ORAL 2 TIMES DAILY
Status: DISCONTINUED | OUTPATIENT
Start: 2023-03-04 | End: 2023-03-09

## 2023-03-04 RX ORDER — CYCLOBENZAPRINE HCL 10 MG
10 TABLET ORAL 3 TIMES DAILY PRN
Status: DISCONTINUED | OUTPATIENT
Start: 2023-03-04 | End: 2023-03-20 | Stop reason: HOSPADM

## 2023-03-04 RX ORDER — DIPHENHYDRAMINE HCL 12.5MG/5ML
25 LIQUID (ML) ORAL EVERY 6 HOURS PRN
Status: DISCONTINUED | OUTPATIENT
Start: 2023-03-04 | End: 2023-03-04

## 2023-03-04 RX ORDER — ROSUVASTATIN CALCIUM 10 MG/1
10 TABLET, COATED ORAL NIGHTLY
Status: DISCONTINUED | OUTPATIENT
Start: 2023-03-04 | End: 2023-03-20 | Stop reason: HOSPADM

## 2023-03-04 RX ORDER — HYDROXYZINE HYDROCHLORIDE 25 MG/1
25 TABLET, FILM COATED ORAL 3 TIMES DAILY PRN
Status: DISCONTINUED | OUTPATIENT
Start: 2023-03-04 | End: 2023-03-13

## 2023-03-04 RX ORDER — ACETAMINOPHEN 325 MG/1
650 TABLET ORAL EVERY 8 HOURS SCHEDULED
Status: DISCONTINUED | OUTPATIENT
Start: 2023-03-04 | End: 2023-03-09

## 2023-03-04 RX ORDER — OXYCODONE HYDROCHLORIDE 5 MG/1
10 TABLET ORAL EVERY 4 HOURS PRN
Status: ACTIVE | OUTPATIENT
Start: 2023-03-04 | End: 2023-03-08

## 2023-03-04 RX ORDER — DIPHENHYDRAMINE HCL 25 MG
25 CAPSULE ORAL EVERY 6 HOURS PRN
Status: DISCONTINUED | OUTPATIENT
Start: 2023-03-04 | End: 2023-03-04

## 2023-03-04 RX ADMIN — GUAIFENESIN 1200 MG: 600 TABLET, EXTENDED RELEASE ORAL at 20:36

## 2023-03-04 RX ADMIN — ROSUVASTATIN CALCIUM 10 MG: 10 TABLET, FILM COATED ORAL at 20:36

## 2023-03-04 RX ADMIN — CALCIUM CHLORIDE, MAGNESIUM CHLORIDE, SODIUM CHLORIDE, SODIUM BICARBONATE, POTASSIUM CHLORIDE AND SODIUM PHOSPHATE DIBASIC DIHYDRATE 1500 ML/HR: 3.68; 3.05; 6.34; 3.09; .314; .187 INJECTION INTRAVENOUS at 23:25

## 2023-03-04 RX ADMIN — ASPIRIN 325 MG: 325 TABLET, COATED ORAL at 09:05

## 2023-03-04 RX ADMIN — CYCLOBENZAPRINE 10 MG: 10 TABLET, FILM COATED ORAL at 05:11

## 2023-03-04 RX ADMIN — DIPHENHYDRAMINE HYDROCHLORIDE 25 MG: 25 SOLUTION ORAL at 04:47

## 2023-03-04 RX ADMIN — AMIODARONE HYDROCHLORIDE 0.5 MG/MIN: 1.8 INJECTION, SOLUTION INTRAVENOUS at 20:49

## 2023-03-04 RX ADMIN — CALCIUM CHLORIDE, MAGNESIUM CHLORIDE, SODIUM CHLORIDE, SODIUM BICARBONATE, POTASSIUM CHLORIDE AND SODIUM PHOSPHATE DIBASIC DIHYDRATE 1500 ML/HR: 3.68; 3.05; 6.34; 3.09; .314; .187 INJECTION INTRAVENOUS at 20:01

## 2023-03-04 RX ADMIN — CALCIUM CHLORIDE, MAGNESIUM CHLORIDE, SODIUM CHLORIDE, SODIUM BICARBONATE, POTASSIUM CHLORIDE AND SODIUM PHOSPHATE DIBASIC DIHYDRATE 1500 ML/HR: 3.68; 3.05; 6.34; 3.09; .314; .187 INJECTION INTRAVENOUS at 01:39

## 2023-03-04 RX ADMIN — CALCIUM CHLORIDE, MAGNESIUM CHLORIDE, SODIUM CHLORIDE, SODIUM BICARBONATE, POTASSIUM CHLORIDE AND SODIUM PHOSPHATE DIBASIC DIHYDRATE 1500 ML/HR: 3.68; 3.05; 6.34; 3.09; .314; .187 INJECTION INTRAVENOUS at 05:33

## 2023-03-04 RX ADMIN — SODIUM BICARBONATE 150 MEQ: 84 INJECTION INTRAVENOUS at 03:23

## 2023-03-04 RX ADMIN — BUDESONIDE 0.5 MG: 0.5 SUSPENSION RESPIRATORY (INHALATION) at 22:24

## 2023-03-04 RX ADMIN — ONDANSETRON 4 MG: 2 INJECTION INTRAMUSCULAR; INTRAVENOUS at 11:47

## 2023-03-04 RX ADMIN — SODIUM BICARBONATE 150 MEQ: 84 INJECTION INTRAVENOUS at 18:06

## 2023-03-04 RX ADMIN — Medication 0.24 MCG/KG/MIN: at 03:23

## 2023-03-04 RX ADMIN — SODIUM CHLORIDE 200 ML: 9 INJECTION, SOLUTION INTRAVENOUS at 23:21

## 2023-03-04 RX ADMIN — AMIODARONE HYDROCHLORIDE 0.5 MG/MIN: 1.8 INJECTION, SOLUTION INTRAVENOUS at 09:05

## 2023-03-04 RX ADMIN — CYCLOBENZAPRINE 10 MG: 10 TABLET, FILM COATED ORAL at 20:28

## 2023-03-04 RX ADMIN — OXYCODONE HYDROCHLORIDE 10 MG: 5 TABLET ORAL at 06:20

## 2023-03-04 RX ADMIN — DIPHENHYDRAMINE HYDROCHLORIDE 25 MG: 25 CAPSULE ORAL at 10:47

## 2023-03-04 RX ADMIN — GUAIFENESIN 1200 MG: 600 TABLET, EXTENDED RELEASE ORAL at 11:01

## 2023-03-04 RX ADMIN — INSULIN HUMAN 2 UNITS: 100 INJECTION, SOLUTION PARENTERAL at 12:44

## 2023-03-04 RX ADMIN — ALBUMIN (HUMAN) 12.5 G: 0.25 INJECTION, SOLUTION INTRAVENOUS at 22:52

## 2023-03-04 RX ADMIN — BUDESONIDE 0.5 MG: 0.5 SUSPENSION RESPIRATORY (INHALATION) at 10:09

## 2023-03-04 RX ADMIN — DOBUTAMINE IN DEXTROSE 5 MCG/KG/MIN: 100 INJECTION, SOLUTION INTRAVENOUS at 02:36

## 2023-03-04 RX ADMIN — ACETAMINOPHEN 650 MG: 650 SOLUTION ORAL at 05:11

## 2023-03-04 RX ADMIN — ARFORMOTEROL TARTRATE 15 MCG: 15 SOLUTION RESPIRATORY (INHALATION) at 22:24

## 2023-03-04 RX ADMIN — Medication 12.5 MG: at 20:36

## 2023-03-04 RX ADMIN — HYDROXYZINE HYDROCHLORIDE 25 MG: 25 TABLET ORAL at 22:55

## 2023-03-04 RX ADMIN — DIPHENHYDRAMINE HYDROCHLORIDE 25 MG: 25 CAPSULE ORAL at 20:28

## 2023-03-04 RX ADMIN — OXYCODONE HYDROCHLORIDE 10 MG: 5 TABLET ORAL at 02:37

## 2023-03-04 RX ADMIN — Medication 0.14 MCG/KG/MIN: at 23:33

## 2023-03-04 RX ADMIN — INSULIN HUMAN 2 UNITS: 100 INJECTION, SOLUTION PARENTERAL at 18:10

## 2023-03-04 RX ADMIN — MORPHINE SULFATE 2 MG: 2 INJECTION, SOLUTION INTRAMUSCULAR; INTRAVENOUS at 20:28

## 2023-03-04 RX ADMIN — SENNOSIDES AND DOCUSATE SODIUM 2 TABLET: 50; 8.6 TABLET ORAL at 20:36

## 2023-03-04 RX ADMIN — CALCIUM CHLORIDE, MAGNESIUM CHLORIDE, SODIUM CHLORIDE, SODIUM BICARBONATE, POTASSIUM CHLORIDE AND SODIUM PHOSPHATE DIBASIC DIHYDRATE 1500 ML/HR: 3.68; 3.05; 6.34; 3.09; .314; .187 INJECTION INTRAVENOUS at 10:05

## 2023-03-04 RX ADMIN — CALCIUM CHLORIDE, MAGNESIUM CHLORIDE, SODIUM CHLORIDE, SODIUM BICARBONATE, POTASSIUM CHLORIDE AND SODIUM PHOSPHATE DIBASIC DIHYDRATE 1500 ML/HR: 3.68; 3.05; 6.34; 3.09; .314; .187 INJECTION INTRAVENOUS at 10:34

## 2023-03-04 RX ADMIN — SENNOSIDES AND DOCUSATE SODIUM 2 TABLET: 50; 8.6 TABLET ORAL at 09:05

## 2023-03-04 RX ADMIN — ARFORMOTEROL TARTRATE 15 MCG: 15 SOLUTION RESPIRATORY (INHALATION) at 10:09

## 2023-03-04 RX ADMIN — Medication 0.2 MCG/KG/MIN: at 12:44

## 2023-03-04 RX ADMIN — GUAIFENESIN 400 MG: 200 SOLUTION ORAL at 04:49

## 2023-03-04 RX ADMIN — ACETAMINOPHEN 325MG 650 MG: 325 TABLET ORAL at 22:53

## 2023-03-04 RX ADMIN — IPRATROPIUM BROMIDE AND ALBUTEROL SULFATE 3 ML: 2.5; .5 SOLUTION RESPIRATORY (INHALATION) at 10:09

## 2023-03-04 NOTE — PLAN OF CARE
Goal Outcome Evaluation:      Pt intermittently confused to place and situation but easily reoriented. On 2LNC, pt could only tolerate bi-pap for 1hr. Remains in a-fib. Dobutamine weaned off; levo, amio and bi-carb gtts cont. CRRT continues, filter changed twice before access was changed. UF increased to 50.

## 2023-03-04 NOTE — PROGRESS NOTES
"[facility] at [facility]  IP Progress Note    PROBLEM LIST:    CARDIAC  Coronary Artery Disease:   Inferior STEMI with [facility], Dr. Carter, 3/28/2014:  EF 50%, NILE to the mid-dominant RCA, NILE to the proximal LAD  Stress test, 2/13/2023: Inferolateral ischemia, %, RCA 80%, LAD 80%  [facility] 2/17/2023: Severe triple-vessel disease  CABG x3 3/1/2023: LIMA to the LAD, SVG to OM, and SVG to PDA    Myocardium:   Echocardiogram, 3/29/2014:   EF 50-55%, RVSP 30.  Echo 12/21/2022:Stage C, HFmrEF EF 41- 45%, mild LVH    Valvular:   Mild calcification to aortic valve    Electrical:   NSR, LVH, PVC    Percardium:   Normal    VASCULAR:  Arterial  Cerebrovascular disease:   Carotid Doppler: 2/2023 less than 50 %      CARDIAC RISK FACTORS:         Hypertension         Diabetes         Dyslipidemia         Tobacco Use, Quit after 75 years    NON-CARDIAC:  Asthma/COPD: Abnormal PFTs  CKD    SURGERIES:  Tonsillectomy    CHIEF COMPLAINTS:  Follow-up hypertension and dyslipidemia      Subjective Alert and in no distress this morning.  CRRT ongoing  .      Objective     Blood pressure 118/65, pulse 94, temperature 97.7 °F (36.5 °C), temperature source Oral, resp. rate 22, height 162.6 cm (64\"), weight 75.3 kg (166 lb), SpO2 98 %.     Intake/Output Summary (Last 24 hours) at 3/4/2023 0716  Last data filed at 3/4/2023 0700  Gross per 24 hour   Intake 3235.23 ml   Output 3601 ml   Net -365.77 ml     Constitutional:       Comments: Mild discomfort   HENT:         Comments: BiPAP mask on  Neck:      Vascular: JVD normal.   Pulmonary:      Breath sounds: Examination of the right-lower field reveals decreased breath sounds. Examination of the left-lower field reveals decreased breath sounds. Decreased breath sounds present.   Cardiovascular:      Regular rhythm.      Murmurs: There is no murmur.      Biphasic rub.      Comments: Trace bilateral lower extremity edema, patient also has edema noted to upper extremities.  " Sternal incision is clean dry and intact.  Abdominal:      General: There is no distension.   Musculoskeletal:         General: No deformity. Skin:     General: Skin is warm and dry.   Neurological:      General: No focal deficit present.      Mental Status: Alert and oriented to person, place and time.   Psychiatric:      Comments: Confused this morning stating we are in [place], [place]         RESUL REVIEW:    I reviewed the patient's new clinical results.      MEDICATIONS:    acetaminophen, 650 mg, Nasogastric, Q8H  arformoterol, 15 mcg, Nebulization, BID - RT  aspirin, 325 mg, Nasogastric, Daily  budesonide, 0.5 mg, Nebulization, BID - RT  docusate sodium, 100 mg, Nasogastric, BID  guaifenesin, 400 mg, Nasogastric, Q6H  insulin regular, 0-9 Units, Subcutaneous, Q6H  metoprolol tartrate, 12.5 mg, Nasogastric, Q12H  pharmacy consult - MTM, , Does not apply, BID  rosuvastatin, 10 mg, Nasogastric, Nightly  sennosides, 5 mL, Nasogastric, BID          Results from last 7 days   Lab Units 03/04/23  0444   WBC 10*3/mm3 13.05*   HEMOGLOBIN g/dL 9.0*   HEMATOCRIT % 28.2*   PLATELETS 10*3/mm3 132*     Results from last 7 days   Lab Units 03/04/23  0444   SODIUM mmol/L 136   POTASSIUM mmol/L 4.9   CHLORIDE mmol/L 99   CO2 mmol/L 22.0   BUN mg/dL 24*   CREATININE mg/dL 2.65*   CALCIUM mg/dL 7.0*   BILIRUBIN mg/dL 0.3   ALK PHOS U/L 65   ALT (SGPT) U/L <5   AST (SGOT) U/L 54*   GLUCOSE mg/dL 166*     Results from last 7 days   Lab Units 03/02/23  0215 03/01/23  1124 02/28/23  0835   INR  1.32* 1.43* 1.01     Lab Results   Component Value Date    TROPONINI 15.31 (C) 03/30/2014    TROPONINT <0.010 12/11/2022                 No results found for: IRON, FERRITIN, LABIRON, TIBC   Hemoglobin A1C   Date Value Ref Range Status   02/28/2023 7.40 (H) 4.80 - 5.60 % Final     Magnesium   Date Value Ref Range Status   03/04/2023 2.2 1.6 - 2.4 mg/dL Final        Tele: Normal sinus rhythm      ASSESSMENT:     1. CAD s/p CABG x3, preop EF  40-45%  2. Ischemic cardiomyopathy  3. History of hypertension with recent hypotension on dobutamine and Levophed.  4. Prolonged QT, improving  5. Hyperlipidemia, on Crestor 10 at home  6. Acute on chronic kidney failure  7. Type 2 diabetes          PLAN:     1. Continue IV pressor support, currently on dobutamine and Levophed.  All others weaned.  2. Continue aspirin and Crestor 10.  3. Worsening kidney function overnight patient will start CRRT today.  4. Will initiate GDMT once patient continues to improve and patient is off of pressors.    Electronically signed by Rosangela Queen PA-C, 03/03/23, 8:59 AM EST.      STAFF CARDIOLOGIST:      SUMMARY:    1. CABG 3/1/2023  2. Postop atrial fibrillation  3. Postop GEORGIANA presently on CRRT      PERTINENT:    1. Irregular heart rate with A-fib        IMPRESSION:    1. Coronary artery disease  2. Atrial fibrillation  3. Acute kidney injury      RECOMMENDATIONS:    CRRT per nephrology  Present IV medications include amiodarone, dobutamine, norepinephrine and bicarb  Appears to be improving with present care and support  Monitor QT interval particularly while on amiodarone      I have seen and examined the patient, reviewed the above note, necessary changes were made and I agree with the final note.   Amandeep Quick MD

## 2023-03-04 NOTE — PROGRESS NOTES
"   LOS: 3 days    Patient Care Team:  Yessica Ricci DO as PCP - General (Family Medicine)  Nima Peter MD as Consulting Physician (Endocrinology)  Nadya Ramirez MD as Cardiologist (Cardiology)  Yessica Ricci DO as Consulting Physician (Family Medicine)    Chief Complaint: Acute kidney injury s/p CABG  67-year-old with history of hypertension, diabetes, former smoker 1-1/2 pack/day for quite some time, CAD s/p PCI in 2014, underwent CABG x3, post CABG remain hypotensive blood pressure in the 40s systolic in 60s and 70s 80s range requiring IV pressors.  Developed GEORGIANA oliguric, now an uric  Subjective     Interval History:   CRRT in progress.  Critically ill  On IV pressors.  Complains of mild chest discomfort  Having problem with the dialysis catheter for CRRT  Review of Systems:   .  All others negative.  Alert, oriented x3 no obvious distress.    Objective     Vital Sign Min/Max for last 24 hours  Temp  Min: 96.8 °F (36 °C)  Max: 97.9 °F (36.6 °C)   BP  Min: 76/55  Max: 153/115   Pulse  Min: 81  Max: 130   Resp  Min: 20  Max: 22   SpO2  Min: 54 %  Max: 100 %   Flow (L/min)  Min: 2  Max: 5   No data recorded     Flowsheet Rows    Flowsheet Row First Filed Value   Admission Height 162.6 cm (64\") Documented at 03/01/2023 0611   Admission Weight 75.3 kg (166 lb) Documented at 03/01/2023 0611          No intake/output data recorded.  I/O last 3 completed shifts:  In: 4948.8 [P.O.:70; I.V.:4798.8; NG/GT:30; IV Piggyback:50]  Out: 3961 [Urine:140; Emesis/NG output:300; Other:2821; Chest Tube:700]    Physical Exam:  General Appearance: Awake, alert oriented x3.  Laying comfortably in bed.  Eyes: PER, EOMI.  Neck: Supple no JVD.  Right triple-lumen in place.  Chest: Chest tube in place.  Lungs: Clear auscultation, no rales rhonchi's, equal chest movement, nonlabored.  Heart: No gallop, murmur, rub, RRR.  Abdomen: Soft, nontender, positive bowel sounds, no organomegaly.  Extremities: Positive bilateral lower " extremity dependent edema, no cyanosis.  Neuro: No focal deficit, moving all extremities, alert oriented X 3  : Solomon catheter in place.  Minimal urine output  Skin warm and dry.  Psych mood and affect appropriate  Nontunneled dialysis cath on the left IJ  WBC WBC   Date Value Ref Range Status   03/04/2023 13.05 (H) 3.40 - 10.80 10*3/mm3 Final   03/03/2023 22.00 (H) 3.40 - 10.80 10*3/mm3 Final   03/02/2023 18.55 (H) 3.40 - 10.80 10*3/mm3 Final   03/02/2023 19.08 (H) 3.40 - 10.80 10*3/mm3 Final   03/01/2023 17.61 (H) 3.40 - 10.80 10*3/mm3 Final   03/01/2023 19.66 (H) 3.40 - 10.80 10*3/mm3 Final      HGB Hemoglobin   Date Value Ref Range Status   03/04/2023 9.0 (L) 13.0 - 17.7 g/dL Final   03/04/2023 9.1 (L) 13.0 - 17.7 g/dL Final   03/03/2023 10.2 (L) 13.0 - 17.7 g/dL Final   03/02/2023 10.4 (L) 13.0 - 17.7 g/dL Final   03/02/2023 10.7 (L) 13.0 - 17.7 g/dL Final   03/01/2023 12.7 (L) 13.0 - 17.7 g/dL Final   03/01/2023 14.1 13.0 - 17.7 g/dL Final   03/01/2023 11.2 (L) 12.0 - 17.0 g/dL Final   03/01/2023 12.2 12.0 - 17.0 g/dL Final   03/01/2023 12.6 12.0 - 17.0 g/dL Final      HCT Hematocrit   Date Value Ref Range Status   03/04/2023 28.2 (L) 37.5 - 51.0 % Final   03/04/2023 28.6 (L) 37.5 - 51.0 % Final   03/03/2023 30.8 (L) 37.5 - 51.0 % Final   03/02/2023 31.9 (L) 37.5 - 51.0 % Final   03/02/2023 33.6 (L) 37.5 - 51.0 % Final   03/01/2023 38.1 37.5 - 51.0 % Final   03/01/2023 41.7 37.5 - 51.0 % Final   03/01/2023 33 (L) 38 - 51 % Final   03/01/2023 36 (L) 38 - 51 % Final   03/01/2023 37 (L) 38 - 51 % Final      Platlets No results found for: LABPLAT   MCV MCV   Date Value Ref Range Status   03/04/2023 93.1 79.0 - 97.0 fL Final   03/03/2023 91.1 79.0 - 97.0 fL Final   03/02/2023 92.2 79.0 - 97.0 fL Final   03/02/2023 94.6 79.0 - 97.0 fL Final   03/01/2023 91.4 79.0 - 97.0 fL Final   03/01/2023 89.3 79.0 - 97.0 fL Final          Sodium Sodium   Date Value Ref Range Status   03/04/2023 136 136 - 145 mmol/L Final    03/04/2023 135 (L) 136 - 145 mmol/L Final   03/03/2023 132 (L) 136 - 145 mmol/L Final   03/03/2023 134 (L) 136 - 145 mmol/L Final   03/02/2023 134 (L) 136 - 145 mmol/L Final   03/02/2023 140 136 - 145 mmol/L Final   03/02/2023 141 136 - 145 mmol/L Final   03/01/2023 144 136 - 145 mmol/L Final   03/01/2023 139 136 - 145 mmol/L Final      Potassium Potassium   Date Value Ref Range Status   03/04/2023 4.9 3.5 - 5.2 mmol/L Final   03/04/2023 4.8 3.5 - 5.2 mmol/L Final     Comment:     Slight hemolysis detected by analyzer. Results may be affected.   03/03/2023 5.0 3.5 - 5.2 mmol/L Final     Comment:     Slight hemolysis detected by analyzer. Results may be affected.   03/03/2023 4.5 3.5 - 5.2 mmol/L Final     Comment:     Slight hemolysis detected by analyzer. Results may be affected.   03/02/2023 4.8 3.5 - 5.2 mmol/L Final   03/02/2023 4.4 3.5 - 5.2 mmol/L Final   03/02/2023 3.1 (L) 3.5 - 5.2 mmol/L Final     Comment:     Slight hemolysis detected by analyzer. Results may be affected.   03/01/2023 4.2 3.5 - 5.2 mmol/L Final   03/01/2023 4.9 3.5 - 5.2 mmol/L Final     Comment:     Slight 1+ hemolysis detected by analyzer. Results may be affected.      Chloride Chloride   Date Value Ref Range Status   03/04/2023 99 98 - 107 mmol/L Final   03/04/2023 98 98 - 107 mmol/L Final   03/03/2023 95 (L) 98 - 107 mmol/L Final   03/03/2023 93 (L) 98 - 107 mmol/L Final   03/02/2023 95 (L) 98 - 107 mmol/L Final   03/02/2023 100 98 - 107 mmol/L Final   03/02/2023 101 98 - 107 mmol/L Final   03/01/2023 107 98 - 107 mmol/L Final   03/01/2023 106 98 - 107 mmol/L Final      CO2 CO2   Date Value Ref Range Status   03/04/2023 22.0 22.0 - 29.0 mmol/L Final   03/04/2023 22.0 22.0 - 29.0 mmol/L Final   03/03/2023 23.0 22.0 - 29.0 mmol/L Final   03/03/2023 24.0 22.0 - 29.0 mmol/L Final   03/02/2023 23.0 22.0 - 29.0 mmol/L Final   03/02/2023 23.0 22.0 - 29.0 mmol/L Final   03/02/2023 20.0 (L) 22.0 - 29.0 mmol/L Final   03/01/2023 22.0 22.0 -  29.0 mmol/L Final   03/01/2023 23.0 22.0 - 29.0 mmol/L Final      BUN BUN   Date Value Ref Range Status   03/04/2023 24 (H) 8 - 23 mg/dL Final   03/04/2023 26 (H) 8 - 23 mg/dL Final   03/03/2023 34 (H) 8 - 23 mg/dL Final   03/03/2023 35 (H) 8 - 23 mg/dL Final   03/02/2023 31 (H) 8 - 23 mg/dL Final   03/02/2023 26 (H) 8 - 23 mg/dL Final   03/02/2023 22 8 - 23 mg/dL Final   03/01/2023 18 8 - 23 mg/dL Final   03/01/2023 17 8 - 23 mg/dL Final      Creatinine Creatinine   Date Value Ref Range Status   03/04/2023 2.65 (H) 0.76 - 1.27 mg/dL Final   03/04/2023 2.81 (H) 0.76 - 1.27 mg/dL Final   03/03/2023 4.05 (H) 0.76 - 1.27 mg/dL Final   03/03/2023 5.09 (H) 0.76 - 1.27 mg/dL Final   03/02/2023 4.73 (H) 0.76 - 1.27 mg/dL Final   03/02/2023 4.58 (H) 0.76 - 1.27 mg/dL Final   03/02/2023 2.62 (H) 0.76 - 1.27 mg/dL Final   03/01/2023 1.54 (H) 0.76 - 1.27 mg/dL Final   03/01/2023 1.29 (H) 0.76 - 1.27 mg/dL Final      Calcium Calcium   Date Value Ref Range Status   03/04/2023 7.0 (L) 8.6 - 10.5 mg/dL Final   03/04/2023 7.2 (L) 8.6 - 10.5 mg/dL Final   03/03/2023 7.1 (L) 8.6 - 10.5 mg/dL Final   03/03/2023 7.3 (L) 8.6 - 10.5 mg/dL Final   03/02/2023 7.2 (L) 8.6 - 10.5 mg/dL Final   03/02/2023 7.4 (L) 8.6 - 10.5 mg/dL Final   03/02/2023 8.0 (L) 8.6 - 10.5 mg/dL Final   03/01/2023 8.8 8.6 - 10.5 mg/dL Final   03/01/2023 9.8 8.6 - 10.5 mg/dL Final      PO4 No results found for: CAPO4   Albumin Albumin   Date Value Ref Range Status   03/04/2023 3.4 (L) 3.5 - 5.2 g/dL Final   03/04/2023 3.7 3.5 - 5.2 g/dL Final   03/03/2023 3.6 3.5 - 5.2 g/dL Final   03/03/2023 3.8 3.5 - 5.2 g/dL Final   03/02/2023 3.8 3.5 - 5.2 g/dL Final   03/02/2023 4.5 3.5 - 5.2 g/dL Final   03/01/2023 4.3 3.5 - 5.2 g/dL Final   03/01/2023 4.5 3.5 - 5.2 g/dL Final      Magnesium Magnesium   Date Value Ref Range Status   03/04/2023 2.2 1.6 - 2.4 mg/dL Final   03/03/2023 1.8 1.6 - 2.4 mg/dL Final   03/03/2023 1.7 1.6 - 2.4 mg/dL Final   03/02/2023 1.8 1.6 - 2.4  mg/dL Final   03/02/2023 2.1 1.6 - 2.4 mg/dL Final   03/01/2023 2.4 1.6 - 2.4 mg/dL Final   03/01/2023 2.3 1.6 - 2.4 mg/dL Final      Uric Acid No results found for: URICACID        Results Review:     I reviewed the patient's new clinical results.    acetaminophen, 650 mg, Nasogastric, Q8H  arformoterol, 15 mcg, Nebulization, BID - RT  aspirin, 325 mg, Nasogastric, Daily  budesonide, 0.5 mg, Nebulization, BID - RT  docusate sodium, 100 mg, Nasogastric, BID  guaifenesin, 400 mg, Nasogastric, Q6H  insulin regular, 0-9 Units, Subcutaneous, Q6H  metoprolol tartrate, 12.5 mg, Nasogastric, Q12H  pharmacy consult - MTM, , Does not apply, BID  rosuvastatin, 10 mg, Nasogastric, Nightly  sennosides, 5 mL, Nasogastric, BID      amiodarone in dextrose 5%, 0.5 mg/min, Last Rate: 0.5 mg/min (03/03/23 1944)  DOBUTamine, 5 mcg/kg/min, Last Rate: 5 mcg/kg/min (03/04/23 0236)  EPINEPHrine, 0.02-0.3 mcg/kg/min, Last Rate: Stopped (03/02/23 1425)  niCARdipine, 5-15 mg/hr  nitroglycerin, 5-200 mcg/min, Last Rate: Stopped (03/01/23 1431)  norepinephrine, 0.02-0.3 mcg/kg/min, Last Rate: 0.22 mcg/kg/min (03/04/23 0600)  phenylephrine, 0.5-3 mcg/kg/min  Phoxillum BK4/2.5, 1,500 mL/hr, Last Rate: 1,500 mL/hr (03/04/23 0533)  Phoxillum BK4/2.5, 1,500 mL/hr, Last Rate: 1,500 mL/hr (03/04/23 0533)  Phoxillum BK4/2.5, 1,500 mL/hr, Last Rate: 1,500 mL/hr (03/04/23 0533)  sodium bicarbonate drip (greater than 75 mEq/bag), 150 mEq, Last Rate: 150 mEq (03/04/23 3664)        Medication Review: Reviewed    Assessment & Plan       Coronary artery disease involving native coronary artery of native heart without angina pectoris    Hypertension    Hyperlipidemia LDL goal <70    Type 2 diabetes mellitus with hypoglycemia, with long-term current use of insulin (HCC)    Ischemic cardiomyopathy    Mucopurulent chronic bronchitis (HCC)    Prolonged Q-T interval on ECG    Acute renal failure (ARF) (HCC)    Shock (HCC)    Acute respiratory failure with hypoxia  (HCC)     1.  GEORGIANA: ATN likely secondary to shock with systolic blood pressure postsurgery in the range of 40s to 80s, now on multiple vasopressors.  Admit creatinine 1.08, creatinine progressively getting worse.  Patient is oliguric.  C3 complement 18, C4 complement 83 within normal range.  Ultrasound of the kidney shows right kidney 11 cm left kidney 10.6 cm normal ultrasound, bladder ultrasound normal.  2.  S/p CABG x3  3.  Ischemic cardiomyopathy with ejection fraction 41-45%.  4.  Diabetes type 2.  5.  Cardiogenic shock.     Recommendations:  CRRT in progress.  We will continue.  Case discussed with the RN taking care of the CRRT.  Case discussed with the wife.  Physician notes are seen.  Increase ultrafiltration to 50 mL/h  Replace calcium  Avoid nephrotoxic medications.  Keep MAP greater than 65  Check volume status.  Check labs in the morning.  Urine labs, complement C3-C4   Adjust medication for the new GFR.  Case discussed with the medical staff taking care of the patient.  High risk patient with complex medical problems.  Replace with packed RBC if hemoglobin less than 7       Chris Moran MD  03/04/23  08:50 EST

## 2023-03-04 NOTE — PROCEDURES
"Non-Tunneled Catheter    Date/Time: 3/4/2023 3:29 PM  Performed by: Whit Resendez APRN  Authorized by: Whit Resendez APRN   Consent: The procedure was performed in an emergent situation.  Patient identity confirmed: arm band  Time out: Immediately prior to procedure a \"time out\" was called to verify the correct patient, procedure, equipment, support staff and site/side marked as required.  Indications: vascular access  Anesthesia: local infiltration    Anesthesia:  Local Anesthetic: lidocaine 1% without epinephrine  Anesthetic total: 2 mL    Sedation:  Patient sedated: no    Preparation: skin prepped with ChloraPrep  Skin prep agent dried: skin prep agent completely dried prior to procedure  Sterile barriers: all five maximum sterile barriers used - cap, mask, sterile gown, sterile gloves, and large sterile sheet  Hand hygiene: hand hygiene performed prior to central venous catheter insertion  Location details: right internal jugular  Patient position: flat  Catheter type: triple lumen  Catheter size: 12 Fr  Pre-procedure: landmarks identified  Ultrasound guidance: no  Number of attempts: 1  Successful placement: yes  Post-procedure: line sutured and dressing applied  Assessment: blood return through all ports,  free fluid flow,  placement verified by x-ray and no pneumothorax on x-ray  Patient tolerance: patient tolerated the procedure well with no immediate complications  Comments: There were Lt IJ dialysis access issues on CRRT, so the Lt IJ will now be used as the deep line for pressors.  The Rt IJ MAC was exchanged over a wire for a temp dialysis catheter.  There was dark, nonpulsatile blood free flowing from all three ports and they all flushed back well.  Caps were placed on each port.  Catheter was sutured in place x2.  Site was cleaned with chloroprep and an antibacterial dressing was applied over the site.  Tip of catheter was verified in right atrium by transducing " catheter.  Pressures were 6-8.

## 2023-03-04 NOTE — PROGRESS NOTES
Cardiothoracic Surgery Progress Note      POD # 3 s/p CABG x 3     LOS: 3 days      Subjective:  No acute events overnight  Extubated    Objective:  Vital Signs  Temp:  [96.8 °F (36 °C)-97.9 °F (36.6 °C)] 97.1 °F (36.2 °C)  Heart Rate:  [] 102  Resp:  [20-24] 24  BP: ()/() 113/39    Physical Exam:   General Appearance: alert, appears stated age and cooperative   Lungs: breathing unlabored, respirations even   Heart: regular rate and rhythm   Skin: Incision c/d/i    Output by Drain (mL) 03/03/23 0701 - 03/03/23 1900 03/03/23 1901 - 03/04/23 0700 03/04/23 0701 - 03/04/23 1152 Range Total   Y Chest Tube 1 and 2 1 Right Mediastinal 32 Fr. 2 Right Mediastinal 32 Fr. 50 40 20 110   Y Chest Tube 3 and 4 3 Right Mediastinal 32 Fr. 4 Right Mediastinal 32 Fr. 150 180 80 410        Results:    Results from last 7 days   Lab Units 03/04/23  0444   WBC 10*3/mm3 13.05*   HEMOGLOBIN g/dL 9.0*   HEMATOCRIT % 28.2*   PLATELETS 10*3/mm3 132*     Results from last 7 days   Lab Units 03/04/23  0806   SODIUM mmol/L 135*   POTASSIUM mmol/L 4.9   CHLORIDE mmol/L 99   CO2 mmol/L 22.0   BUN mg/dL 24*   CREATININE mg/dL 2.49*   GLUCOSE mg/dL 161*   CALCIUM mg/dL 7.2*       Assessment:  POD # 3 s/p CABG x 3  Acute kidney injury status post CABG  Chest tube 420 mL of drainage in 24 hours with an air leak  Emesis    Plan:  Continue NG tube  Acute kidney injury in CRRT per nephrology  Continue chest tube drainage is improved  Atrial fib per cardiology  Amiodarone, dobutamine, and nor epi continues  Benton Yost PA-C  03/04/23  11:52 EST

## 2023-03-04 NOTE — PROGRESS NOTES
"INTENSIVIST NOTE     Hospital Day: 3    Mr. Herbert Harley, 67 y.o. male is followed for:   Perioperative management of comorbid medical conditions       SUBJECTIVE     Interval history:    Remains on amiodarone, dobutamine, norepinephrine, and bicarbonate drips.  Weaning pressors.  Ongoing CRRT but catheter flow is suboptimal.  Awake alert and oriented on and on nasal cannula O2.  Fluid balance roughly even.  Afebrile.    The patient's relevant past medical, surgical and social history were reviewed and updated in Epic as appropriate.       OBJECTIVE     Vital Sign Min/Max for last 24 hours  Temp  Min: 96.8 °F (36 °C)  Max: 97.7 °F (36.5 °C)   BP  Min: 76/55  Max: 118/65   Pulse  Min: 86  Max: 115   Resp  Min: 20  Max: 24   SpO2  Min: 54 %  Max: 100 %   No data recorded   No data recorded      Intake/Output Summary (Last 24 hours) at 3/4/2023 1317  Last data filed at 3/4/2023 1100  Gross per 24 hour   Intake 3848.23 ml   Output 4081 ml   Net -232.77 ml      Flowsheet Rows    Flowsheet Row First Filed Value   Admission Height 162.6 cm (64\") Documented at 03/01/2023 0611   Admission Weight 75.3 kg (166 lb) Documented at 03/01/2023 0611             03/01/23  0611   Weight: 75.3 kg (166 lb)            Objective:  General Appearance:  In no acute distress.    Vital signs: (most recent): Blood pressure (!) 113/39, pulse 102, temperature 97.1 °F (36.2 °C), temperature source Axillary, resp. rate 24, height 162.6 cm (64\"), weight 75.3 kg (166 lb), SpO2 96 %.    HEENT: (Nasal cannula O2  Right internal jugular introducer  Left IJ dialysis catheter)    Lungs:  Normal effort and normal respiratory rate.  Breath sounds clear to auscultation.  He is not in respiratory distress.  No rales, wheezes or rhonchi.    Heart: Normal rate.  Regular rhythm.  S1 normal and S2 normal.  No murmur or gallop.   Chest: Symmetric chest wall expansion. (Median sternotomy.  Mediastinal tubes in place)  Abdomen: Abdomen is soft and " non-distended.  Hypoactive bowel sounds.   There is no mass. There is no splenomegaly. There is no hepatomegaly.   Extremities: There is dependent edema.  There is no deformity.    Neurological: Patient is alert and oriented to person, place and time.    Pupils:  Pupils are equal, round, and reactive to light.    Skin:  Warm and dry.              I reviewed the patient's new clinical results.  I reviewed the patient's new imaging results/reports including actual images and agree with reports.    XR Chest 1 View    Result Date: 3/3/2023  Impression: 1. Interval insertion of a left-sided central line with the tip in the SVC and no pneumothorax. 2. Chest radiograph appears otherwise unchanged. Electronically Signed: Juanjo Valenzuela  3/3/2023 12:14 PM EST  Workstation ID: [id number]    XR Chest 1 View    Result Date: 3/3/2023  Impression: 1.Stable positioning of the left chest tube and mediastinal drains. 2.Residual atelectasis is seen throughout the left mid to lower lung. There is residual small left pleural effusion. 3.The right IJ venous sheath is stable in position. The catheter is retracted towards the sheath. Electronically Signed: Vincent Servin  3/3/2023 7:47 AM EST  Workstation ID: [id number]    US Renal Bilateral    Result Date: 3/2/2023  Unremarkable ultrasound of the kidneys. Limited evaluation of the bladder secondary to placement of Solomon catheter Electronically Signed: Estevan Montaño  3/2/2023 5:34 PM EST  Workstation ID: [id number]    XR Abdomen KUB    Result Date: 3/4/2023  Impression: 1. NG tube in the mid stomach. 2. Mildly increased large and small bowel gas, consistent with low level ileus. Electronically Signed: Larry Covington  3/4/2023 12:46 PM EST  Workstation ID: [id number]    XR Abdomen KUB    Result Date: 3/3/2023  Impression: NG tube with tip in stomach. Electronically Signed: Bala Man  3/3/2023 1:27 PM EST  Workstation ID: [id number]       INFUSIONS  amiodarone in dextrose 5%, 0.5 mg/min, Last Rate:  0.5 mg/min (03/04/23 0905)  DOBUTamine, 5 mcg/kg/min, Last Rate: 3 mcg/kg/min (03/04/23 1011)  EPINEPHrine, 0.02-0.3 mcg/kg/min, Last Rate: Stopped (03/02/23 1425)  niCARdipine, 5-15 mg/hr  nitroglycerin, 5-200 mcg/min, Last Rate: Stopped (03/01/23 1431)  norepinephrine, 0.02-0.3 mcg/kg/min, Last Rate: 0.2 mcg/kg/min (03/04/23 1244)  phenylephrine, 0.5-3 mcg/kg/min  Phoxillum BK4/2.5, 1,500 mL/hr, Last Rate: 1,500 mL/hr (03/04/23 1034)  Phoxillum BK4/2.5, 1,500 mL/hr, Last Rate: 1,500 mL/hr (03/04/23 1005)  Phoxillum BK4/2.5, 1,500 mL/hr, Last Rate: 1,500 mL/hr (03/04/23 1005)  sodium bicarbonate drip (greater than 75 mEq/bag), 150 mEq, Last Rate: 150 mEq (03/04/23 0323)        Results from last 7 days   Lab Units 03/04/23  0444 03/04/23  0244 03/03/23  0206 03/02/23  1356   WBC 10*3/mm3 13.05*  --  22.00* 18.55*   HEMOGLOBIN g/dL 9.0* 9.1* 10.2* 10.4*   HEMATOCRIT % 28.2* 28.6* 30.8* 31.9*   PLATELETS 10*3/mm3 132*  --  160 160     Results from last 7 days   Lab Units 03/04/23  0806 03/04/23  0444 03/04/23  0057 03/03/23  1526   SODIUM mmol/L 135* 136 135* 132*   POTASSIUM mmol/L 4.9 4.9 4.8 5.0   CHLORIDE mmol/L 99 99 98 95*   CO2 mmol/L 22.0 22.0 22.0 23.0   BUN mg/dL 24* 24* 26* 34*   GLUCOSE mg/dL 161* 166* 160* 195*   CREATININE mg/dL 2.49* 2.65* 2.81* 4.05*   MAGNESIUM mg/dL 2.4  --  2.2 1.8   CALCIUM mg/dL 7.2* 7.0* 7.2* 7.1*         Results from last 7 days   Lab Units 03/03/23  0500 03/02/23 2028 03/02/23  1400 03/02/23  1044 03/02/23  0927   PH, ARTERIAL pH units 7.335* 7.297* 7.271* 7.212* 7.169*   PCO2, ARTERIAL mm Hg 48.1* 48.9* 46.1* 48.6* 48.0*   PO2 ART mm Hg 86.0 88.2 111.0* 121.0* 86.2   FIO2 % 30 30 36 35 28       Patient isn't on Tube Feeding   /h  Patient doesn't have any tube feeding modular orders    Mechanical Ventilator:   Settings: Observed:   Mode: (S) PS (03/01/23 1522)    Vt (Set, mL): 600 mL (03/01/23 1459) Vt Mandatory Ins (observed, mL): 39 mL (03/01/23 1600)   Resp Rate (Set): 14  (03/01/23 1459) Resp Rate (Observed) Vent: 18 (03/01/23 1615)   Pressure Support (cm H2O): 0 cm H20 (03/01/23 1600) Minute Ventilation (L/min) (Obs): 7.12 L/min (03/01/23 1600)       FiO2 (%): 40 % (03/01/23 1600) Plateau Pressure (cm H2O): 0 cm H2O (03/01/23 1128)   PEEP/CPAP (cm H2O): 5 cm H20 (03/01/23 1600) I:E Ratio (Obs): 1.1:1 (03/01/23 1600)         I reviewed the patient's medications.    Assessment & Plan   ASSESSMENT/PLAN     Active Hospital Problems    Diagnosis    • **Coronary artery disease involving native coronary artery of native heart without angina pectoris    • Acute respiratory failure with hypoxia (HCC)    • Acute renal failure (ARF) (HCC)    • Shock (HCC)    • Mucopurulent chronic bronchitis (HCC)    • Prolonged Q-T interval on ECG    • Ischemic cardiomyopathy    • Type 2 diabetes mellitus with hypoglycemia, with long-term current use of insulin (HCC)    • Hyperlipidemia LDL goal <70    • Hypertension        67-year-old male with a past medical history significant for tobacco abuse, COPD, coronary disease status post PCI in 2014, hypertension, and diabetes mellitus.  He presents to [facility] ICU status post CABG by Dr. Saavedra on 3/1/2023.  Patient initially hypertensive but then overnight on 3/2/2023 patient developed worsening shock and acute renal failure.  Stat echo performed on 3/2/2023 showed an EF of 40 to 45% with no signs of pericardial effusion.      Today, remains on amiodarone, dobutamine, norepinephrine, and bicarbonate drips.  Weaning pressors.  Ongoing CRRT but catheter flow is suboptimal.  Awake alert and oriented on and on nasal cannula O2.  Fluid balance roughly even.  Afebrile.    Plan:    1. Amiodarone drip per cardiology  2. Continue dialysis for ATN.  Renal following.  3. Temporary dialysis catheter flows suboptimal.  One option will be to change right IJ introducer over wire to a right-sided dialysis catheter and use the existing left dialysis catheter  for IV access in place of the introducer  4. Bicarbonate drip per renal  5. Wean pressors as tolerated  6. Mediastinal tubes per surgery  7. Continue bronchodilators for COPD  8. Pulmonary toilet     I discussed the patient's findings and my recommendations with patient, family and nursing staff     Plan of care and goals reviewed with multidisciplinary team at daily rounds.    High level of risk due to:  drug(s) requiring intensive monitoring for toxicity.    Jose Patel MD  Pulmonary and Critical Care Medicine  03/04/23 13:17 EST

## 2023-03-04 NOTE — PROCEDURES
CRRT notes second visit  Discussed with RN regards to earlier problems of pressures.  Change pressure much better with changing the filter and 3 filter fluid  Dr. Patel to look at the note on tunnel catheter positioning.  We will continue with the CRRT at this time.

## 2023-03-04 NOTE — NURSING NOTE
CRRT rounds complete. Policy and procedure reviewed with ANDRÉS Carias. Orders and documentation reviewed. Hematocrit updated. Supplies adequate. Denies further need at this time. Encouraged to call [phone] for assistance or for questions.

## 2023-03-05 ENCOUNTER — APPOINTMENT (OUTPATIENT)
Dept: GENERAL RADIOLOGY | Facility: HOSPITAL | Age: 68
DRG: 235 | End: 2023-03-05
Payer: MEDICARE

## 2023-03-05 LAB
ALBUMIN SERPL-MCNC: 3.2 G/DL (ref 3.5–5.2)
ALBUMIN SERPL-MCNC: 3.2 G/DL (ref 3.5–5.2)
ALBUMIN SERPL-MCNC: 3.4 G/DL (ref 3.5–5.2)
ALBUMIN SERPL-MCNC: 3.6 G/DL (ref 3.5–5.2)
ALBUMIN/GLOB SERPL: 1.9 G/DL
ALP SERPL-CCNC: 61 U/L (ref 39–117)
ALT SERPL W P-5'-P-CCNC: 7 U/L (ref 1–41)
ANION GAP SERPL CALCULATED.3IONS-SCNC: 12 MMOL/L (ref 5–15)
ANION GAP SERPL CALCULATED.3IONS-SCNC: 14 MMOL/L (ref 5–15)
ANION GAP SERPL CALCULATED.3IONS-SCNC: 15 MMOL/L (ref 5–15)
ANION GAP SERPL CALCULATED.3IONS-SCNC: 15 MMOL/L (ref 5–15)
ARTERIAL PATENCY WRIST A: ABNORMAL
AST SERPL-CCNC: 71 U/L (ref 1–40)
ATMOSPHERIC PRESS: ABNORMAL MM[HG]
BASE EXCESS BLDA CALC-SCNC: -4.7 MMOL/L (ref 0–2)
BASOPHILS # BLD AUTO: 0.03 10*3/MM3 (ref 0–0.2)
BASOPHILS NFR BLD AUTO: 0.2 % (ref 0–1.5)
BDY SITE: ABNORMAL
BILIRUB SERPL-MCNC: 0.7 MG/DL (ref 0–1.2)
BODY TEMPERATURE: 37 C
BUN SERPL-MCNC: 23 MG/DL (ref 8–23)
BUN SERPL-MCNC: 23 MG/DL (ref 8–23)
BUN SERPL-MCNC: 25 MG/DL (ref 8–23)
BUN SERPL-MCNC: 25 MG/DL (ref 8–23)
BUN/CREAT SERPL: 13 (ref 7–25)
BUN/CREAT SERPL: 13.4 (ref 7–25)
BUN/CREAT SERPL: 15 (ref 7–25)
BUN/CREAT SERPL: 17.7 (ref 7–25)
CA-I SERPL ISE-MCNC: 1.03 MMOL/L (ref 1.12–1.32)
CA-I SERPL ISE-MCNC: 1.05 MMOL/L (ref 1.12–1.32)
CA-I SERPL ISE-MCNC: 1.06 MMOL/L (ref 1.12–1.32)
CALCIUM SPEC-SCNC: 7 MG/DL (ref 8.6–10.5)
CALCIUM SPEC-SCNC: 7.1 MG/DL (ref 8.6–10.5)
CALCIUM SPEC-SCNC: 7.1 MG/DL (ref 8.6–10.5)
CALCIUM SPEC-SCNC: 7.2 MG/DL (ref 8.6–10.5)
CHLORIDE SERPL-SCNC: 101 MMOL/L (ref 98–107)
CHLORIDE SERPL-SCNC: 101 MMOL/L (ref 98–107)
CHLORIDE SERPL-SCNC: 102 MMOL/L (ref 98–107)
CHLORIDE SERPL-SCNC: 103 MMOL/L (ref 98–107)
CO2 BLDA-SCNC: 23 MMOL/L (ref 22–33)
CO2 SERPL-SCNC: 20 MMOL/L (ref 22–29)
CO2 SERPL-SCNC: 20 MMOL/L (ref 22–29)
CO2 SERPL-SCNC: 21 MMOL/L (ref 22–29)
CO2 SERPL-SCNC: 21 MMOL/L (ref 22–29)
COHGB MFR BLD: 0.9 % (ref 0–2)
CREAT SERPL-MCNC: 1.41 MG/DL (ref 0.76–1.27)
CREAT SERPL-MCNC: 1.53 MG/DL (ref 0.76–1.27)
CREAT SERPL-MCNC: 1.77 MG/DL (ref 0.76–1.27)
CREAT SERPL-MCNC: 1.86 MG/DL (ref 0.76–1.27)
DEPRECATED RDW RBC AUTO: 48.2 FL (ref 37–54)
EGFRCR SERPLBLD CKD-EPI 2021: 39.2 ML/MIN/1.73
EGFRCR SERPLBLD CKD-EPI 2021: 41.6 ML/MIN/1.73
EGFRCR SERPLBLD CKD-EPI 2021: 49.5 ML/MIN/1.73
EGFRCR SERPLBLD CKD-EPI 2021: 54.6 ML/MIN/1.73
EOSINOPHIL # BLD AUTO: 0 10*3/MM3 (ref 0–0.4)
EOSINOPHIL NFR BLD AUTO: 0 % (ref 0.3–6.2)
EPAP: 0
ERYTHROCYTE [DISTWIDTH] IN BLOOD BY AUTOMATED COUNT: 14.3 % (ref 12.3–15.4)
GLOBULIN UR ELPH-MCNC: 1.9 GM/DL
GLUCOSE BLDC GLUCOMTR-MCNC: 103 MG/DL (ref 70–130)
GLUCOSE BLDC GLUCOMTR-MCNC: 112 MG/DL (ref 70–130)
GLUCOSE BLDC GLUCOMTR-MCNC: 112 MG/DL (ref 70–130)
GLUCOSE BLDC GLUCOMTR-MCNC: 119 MG/DL (ref 70–130)
GLUCOSE BLDC GLUCOMTR-MCNC: 128 MG/DL (ref 70–130)
GLUCOSE BLDC GLUCOMTR-MCNC: 58 MG/DL (ref 70–130)
GLUCOSE BLDC GLUCOMTR-MCNC: 66 MG/DL (ref 70–130)
GLUCOSE BLDC GLUCOMTR-MCNC: 73 MG/DL (ref 70–130)
GLUCOSE BLDC GLUCOMTR-MCNC: 93 MG/DL (ref 70–130)
GLUCOSE SERPL-MCNC: 137 MG/DL (ref 65–99)
GLUCOSE SERPL-MCNC: 159 MG/DL (ref 65–99)
GLUCOSE SERPL-MCNC: 77 MG/DL (ref 65–99)
GLUCOSE SERPL-MCNC: 82 MG/DL (ref 65–99)
HCO3 BLDA-SCNC: 21.6 MMOL/L (ref 20–26)
HCT VFR BLD AUTO: 27.4 % (ref 37.5–51)
HCT VFR BLD CALC: 28.2 % (ref 38–51)
HGB BLD-MCNC: 8.9 G/DL (ref 13–17.7)
HGB BLDA-MCNC: 9.2 G/DL (ref 13.5–17.5)
IMM GRANULOCYTES # BLD AUTO: 0.08 10*3/MM3 (ref 0–0.05)
IMM GRANULOCYTES NFR BLD AUTO: 0.5 % (ref 0–0.5)
INHALED O2 CONCENTRATION: 24 %
IPAP: 0
LYMPHOCYTES # BLD AUTO: 1.37 10*3/MM3 (ref 0.7–3.1)
LYMPHOCYTES NFR BLD AUTO: 8.8 % (ref 19.6–45.3)
MAGNESIUM SERPL-MCNC: 2.5 MG/DL (ref 1.6–2.4)
MCH RBC QN AUTO: 29.6 PG (ref 26.6–33)
MCHC RBC AUTO-ENTMCNC: 32.5 G/DL (ref 31.5–35.7)
MCV RBC AUTO: 91 FL (ref 79–97)
METHGB BLD QL: 0.3 % (ref 0–1.5)
MODALITY: ABNORMAL
MONOCYTES # BLD AUTO: 1.86 10*3/MM3 (ref 0.1–0.9)
MONOCYTES NFR BLD AUTO: 11.9 % (ref 5–12)
NEUTROPHILS NFR BLD AUTO: 12.3 10*3/MM3 (ref 1.7–7)
NEUTROPHILS NFR BLD AUTO: 78.6 % (ref 42.7–76)
NOTE: ABNORMAL
NRBC BLD AUTO-RTO: 0 /100 WBC (ref 0–0.2)
OXYHGB MFR BLDV: 96.8 % (ref 94–99)
PAW @ PEAK INSP FLOW SETTING VENT: 0 CMH2O
PCO2 BLDA: 44.5 MM HG (ref 35–45)
PCO2 TEMP ADJ BLD: 44.5 MM HG (ref 35–48)
PH BLDA: 7.29 PH UNITS (ref 7.35–7.45)
PH, TEMP CORRECTED: 7.29 PH UNITS
PHOSPHATE SERPL-MCNC: 5.7 MG/DL (ref 2.5–4.5)
PHOSPHATE SERPL-MCNC: 6 MG/DL (ref 2.5–4.5)
PHOSPHATE SERPL-MCNC: 6.4 MG/DL (ref 2.5–4.5)
PLATELET # BLD AUTO: 146 10*3/MM3 (ref 140–450)
PMV BLD AUTO: 11.3 FL (ref 6–12)
PO2 BLDA: 105 MM HG (ref 83–108)
PO2 TEMP ADJ BLD: 105 MM HG (ref 83–108)
POTASSIUM SERPL-SCNC: 5 MMOL/L (ref 3.5–5.2)
POTASSIUM SERPL-SCNC: 5.1 MMOL/L (ref 3.5–5.2)
POTASSIUM SERPL-SCNC: 5.4 MMOL/L (ref 3.5–5.2)
POTASSIUM SERPL-SCNC: 5.4 MMOL/L (ref 3.5–5.2)
PROT SERPL-MCNC: 5.5 G/DL (ref 6–8.5)
RBC # BLD AUTO: 3.01 10*6/MM3 (ref 4.14–5.8)
SODIUM SERPL-SCNC: 134 MMOL/L (ref 136–145)
SODIUM SERPL-SCNC: 136 MMOL/L (ref 136–145)
SODIUM SERPL-SCNC: 136 MMOL/L (ref 136–145)
SODIUM SERPL-SCNC: 139 MMOL/L (ref 136–145)
TOTAL RATE: 0 BREATHS/MINUTE
WBC NRBC COR # BLD: 15.64 10*3/MM3 (ref 3.4–10.8)

## 2023-03-05 PROCEDURE — 25010000002 AMIODARONE IN DEXTROSE 5% 360-4.14 MG/200ML-% SOLUTION: Performed by: THORACIC SURGERY (CARDIOTHORACIC VASCULAR SURGERY)

## 2023-03-05 PROCEDURE — 80053 COMPREHEN METABOLIC PANEL: CPT | Performed by: INTERNAL MEDICINE

## 2023-03-05 PROCEDURE — 99233 SBSQ HOSP IP/OBS HIGH 50: CPT | Performed by: INTERNAL MEDICINE

## 2023-03-05 PROCEDURE — 83050 HGB METHEMOGLOBIN QUAN: CPT

## 2023-03-05 PROCEDURE — 25010000002 MORPHINE PER 10 MG: Performed by: THORACIC SURGERY (CARDIOTHORACIC VASCULAR SURGERY)

## 2023-03-05 PROCEDURE — 82330 ASSAY OF CALCIUM: CPT | Performed by: INTERNAL MEDICINE

## 2023-03-05 PROCEDURE — 84100 ASSAY OF PHOSPHORUS: CPT | Performed by: INTERNAL MEDICINE

## 2023-03-05 PROCEDURE — 82962 GLUCOSE BLOOD TEST: CPT

## 2023-03-05 PROCEDURE — 94799 UNLISTED PULMONARY SVC/PX: CPT

## 2023-03-05 PROCEDURE — 82375 ASSAY CARBOXYHB QUANT: CPT

## 2023-03-05 PROCEDURE — 71045 X-RAY EXAM CHEST 1 VIEW: CPT

## 2023-03-05 PROCEDURE — 94660 CPAP INITIATION&MGMT: CPT

## 2023-03-05 PROCEDURE — 85025 COMPLETE CBC W/AUTO DIFF WBC: CPT | Performed by: INTERNAL MEDICINE

## 2023-03-05 PROCEDURE — 82805 BLOOD GASES W/O2 SATURATION: CPT

## 2023-03-05 PROCEDURE — 93005 ELECTROCARDIOGRAM TRACING: CPT | Performed by: INTERNAL MEDICINE

## 2023-03-05 PROCEDURE — 83735 ASSAY OF MAGNESIUM: CPT | Performed by: INTERNAL MEDICINE

## 2023-03-05 RX ORDER — INSULIN LISPRO 100 [IU]/ML
0-9 INJECTION, SOLUTION INTRAVENOUS; SUBCUTANEOUS
Status: DISCONTINUED | OUTPATIENT
Start: 2023-03-05 | End: 2023-03-20 | Stop reason: HOSPADM

## 2023-03-05 RX ADMIN — CALCIUM CHLORIDE, MAGNESIUM CHLORIDE, SODIUM CHLORIDE, SODIUM BICARBONATE, POTASSIUM CHLORIDE AND SODIUM PHOSPHATE DIBASIC DIHYDRATE 1500 ML/HR: 3.68; 3.05; 6.34; 3.09; .314; .187 INJECTION INTRAVENOUS at 02:47

## 2023-03-05 RX ADMIN — MORPHINE SULFATE 2 MG: 2 INJECTION, SOLUTION INTRAMUSCULAR; INTRAVENOUS at 00:52

## 2023-03-05 RX ADMIN — CALCIUM CHLORIDE, MAGNESIUM CHLORIDE, SODIUM CHLORIDE, SODIUM BICARBONATE, POTASSIUM CHLORIDE AND SODIUM PHOSPHATE DIBASIC DIHYDRATE 1500 ML/HR: 3.68; 3.05; 6.34; 3.09; .314; .187 INJECTION INTRAVENOUS at 09:36

## 2023-03-05 RX ADMIN — GUAIFENESIN 1200 MG: 600 TABLET, EXTENDED RELEASE ORAL at 08:12

## 2023-03-05 RX ADMIN — ACETAMINOPHEN 325MG 650 MG: 325 TABLET ORAL at 20:51

## 2023-03-05 RX ADMIN — SODIUM BICARBONATE 150 MEQ: 84 INJECTION INTRAVENOUS at 02:50

## 2023-03-05 RX ADMIN — CALCIUM CHLORIDE, MAGNESIUM CHLORIDE, SODIUM CHLORIDE, SODIUM BICARBONATE, POTASSIUM CHLORIDE AND SODIUM PHOSPHATE DIBASIC DIHYDRATE 1500 ML/HR: 3.68; 3.05; 6.34; 3.09; .314; .187 INJECTION INTRAVENOUS at 05:59

## 2023-03-05 RX ADMIN — CALCIUM CHLORIDE, MAGNESIUM CHLORIDE, SODIUM CHLORIDE, SODIUM BICARBONATE, POTASSIUM CHLORIDE AND SODIUM PHOSPHATE DIBASIC DIHYDRATE 1500 ML/HR: 3.68; 3.05; 6.34; 3.09; .314; .187 INJECTION INTRAVENOUS at 21:00

## 2023-03-05 RX ADMIN — ROSUVASTATIN CALCIUM 10 MG: 10 TABLET, FILM COATED ORAL at 20:51

## 2023-03-05 RX ADMIN — CALCIUM CHLORIDE, MAGNESIUM CHLORIDE, SODIUM CHLORIDE, SODIUM BICARBONATE, POTASSIUM CHLORIDE AND SODIUM PHOSPHATE DIBASIC DIHYDRATE 1500 ML/HR: 3.68; 3.05; 6.34; 3.09; .314; .187 INJECTION INTRAVENOUS at 13:37

## 2023-03-05 RX ADMIN — MORPHINE SULFATE 2 MG: 2 INJECTION, SOLUTION INTRAMUSCULAR; INTRAVENOUS at 03:31

## 2023-03-05 RX ADMIN — CALCIUM CHLORIDE, MAGNESIUM CHLORIDE, SODIUM CHLORIDE, SODIUM BICARBONATE, POTASSIUM CHLORIDE AND SODIUM PHOSPHATE DIBASIC DIHYDRATE 1500 ML/HR: 3.68; 3.05; 6.34; 3.09; .314; .187 INJECTION INTRAVENOUS at 17:03

## 2023-03-05 RX ADMIN — SODIUM BICARBONATE 150 MEQ: 84 INJECTION INTRAVENOUS at 08:12

## 2023-03-05 RX ADMIN — SENNOSIDES AND DOCUSATE SODIUM 2 TABLET: 50; 8.6 TABLET ORAL at 08:12

## 2023-03-05 RX ADMIN — DEXTROSE MONOHYDRATE 25 G: 25 INJECTION, SOLUTION INTRAVENOUS at 05:13

## 2023-03-05 RX ADMIN — ACETAMINOPHEN 325MG 650 MG: 325 TABLET ORAL at 05:13

## 2023-03-05 RX ADMIN — BUDESONIDE 0.5 MG: 0.5 SUSPENSION RESPIRATORY (INHALATION) at 09:28

## 2023-03-05 RX ADMIN — HYDROXYZINE HYDROCHLORIDE 25 MG: 25 TABLET ORAL at 20:51

## 2023-03-05 RX ADMIN — ACETAMINOPHEN 325MG 650 MG: 325 TABLET ORAL at 12:16

## 2023-03-05 RX ADMIN — HYDROXYZINE HYDROCHLORIDE 25 MG: 25 TABLET ORAL at 08:12

## 2023-03-05 RX ADMIN — SODIUM BICARBONATE 150 MEQ: 84 INJECTION INTRAVENOUS at 21:51

## 2023-03-05 RX ADMIN — HYDROCODONE BITARTRATE AND ACETAMINOPHEN 1 TABLET: 7.5; 325 TABLET ORAL at 08:12

## 2023-03-05 RX ADMIN — SENNOSIDES AND DOCUSATE SODIUM 2 TABLET: 50; 8.6 TABLET ORAL at 21:51

## 2023-03-05 RX ADMIN — ASPIRIN 325 MG: 325 TABLET, COATED ORAL at 08:12

## 2023-03-05 RX ADMIN — GUAIFENESIN 1200 MG: 600 TABLET, EXTENDED RELEASE ORAL at 20:51

## 2023-03-05 RX ADMIN — ARFORMOTEROL TARTRATE 15 MCG: 15 SOLUTION RESPIRATORY (INHALATION) at 09:28

## 2023-03-05 RX ADMIN — AMIODARONE HYDROCHLORIDE 0.5 MG/MIN: 1.8 INJECTION, SOLUTION INTRAVENOUS at 09:36

## 2023-03-05 RX ADMIN — AMIODARONE HYDROCHLORIDE 0.5 MG/MIN: 1.8 INJECTION, SOLUTION INTRAVENOUS at 21:51

## 2023-03-05 RX ADMIN — Medication 0.14 MCG/KG/MIN: at 12:45

## 2023-03-05 NOTE — PROGRESS NOTES
"INTENSIVIST NOTE     Hospital Day: 4    Mr. Herbert Harley, 67 y.o. male is followed for:   Perioperative management of comorbid medical conditions       SUBJECTIVE     Interval history:    Awake alert and oriented.  BiPAP 4 hours overnight.  Afebrile.  Fluid balance -1.1 L.  Remains on amiodarone and norepinephrine drips.  Norepinephrine currently 0.12 mcg/kilogram/minute.  Remains on CRRT.  Mediastinal tubes remain in place.    The patient's relevant past medical, surgical and social history were reviewed and updated in Epic as appropriate.       OBJECTIVE     Vital Sign Min/Max for last 24 hours  Temp  Min: 96.3 °F (35.7 °C)  Max: 98.8 °F (37.1 °C)   BP  Min: 84/59  Max: 127/73   Pulse  Min: 73  Max: 114   Resp  Min: 13  Max: 22   SpO2  Min: 90 %  Max: 100 %   No data recorded   No data recorded      Intake/Output Summary (Last 24 hours) at 3/5/2023 1528  Last data filed at 3/5/2023 1400  Gross per 24 hour   Intake 2884 ml   Output 4004 ml   Net -1120 ml      Flowsheet Rows    Flowsheet Row First Filed Value   Admission Height 162.6 cm (64\") Documented at 03/01/2023 0611   Admission Weight 75.3 kg (166 lb) Documented at 03/01/2023 0611             03/01/23  0611   Weight: 75.3 kg (166 lb)            Objective:  General Appearance:  In no acute distress.    Vital signs: (most recent): Blood pressure 99/63, pulse 79, temperature 96.5 °F (35.8 °C), temperature source Axillary, resp. rate 22, height 162.6 cm (64\"), weight 75.3 kg (166 lb), SpO2 100 %.    HEENT: (Nasal cannula O2  Right internal jugular introducer  Left IJ dialysis catheter)    Lungs:  Normal effort and normal respiratory rate.  Breath sounds clear to auscultation.  He is not in respiratory distress.  No rales, wheezes or rhonchi.    Heart: Normal rate.  Regular rhythm.  S1 normal and S2 normal.  No murmur or gallop.   Chest: Symmetric chest wall expansion. (Median sternotomy.  Mediastinal tubes in place)  Abdomen: Abdomen is soft and " non-distended.  Hypoactive bowel sounds.   There is no mass. There is no splenomegaly. There is no hepatomegaly.   Extremities: There is dependent edema.  There is no deformity.    Neurological: Patient is alert and oriented to person, place and time.    Pupils:  Pupils are equal, round, and reactive to light.    Skin:  Warm and dry.              I reviewed the patient's new clinical results.  I reviewed the patient's new imaging results/reports including actual images and agree with reports.    XR Chest 1 View    Result Date: 3/5/2023  Impression: Small left pneumothorax. Status post extubation. Postoperative changes the heart. Subsegmental atelectasis both lungs. Electronically Signed: Carleen Ashley  3/5/2023 8:35 AM EST  Workstation ID: [id number]    XR Chest 1 View    Result Date: 3/4/2023  Impression: 1. Interval placement of nasogastric tube with the tip projecting over the mid body of the stomach. 2. Hazy left basilar airspace disease favored to be due to atelectasis. Electronically Signed: Bret Pierson  3/4/2023 3:46 PM EST  Workstation ID: [id number]    XR Abdomen KUB    Result Date: 3/4/2023  Impression: 1. NG tube in the mid stomach. 2. Mildly increased large and small bowel gas, consistent with low level ileus. Electronically Signed: Larry Covington  3/4/2023 12:46 PM EST  Workstation ID: [id number]       INFUSIONS  amiodarone in dextrose 5%, 0.5 mg/min, Last Rate: 0.5 mg/min (03/05/23 0936)  DOBUTamine, 5 mcg/kg/min, Last Rate: Stopped (03/04/23 1803)  EPINEPHrine, 0.02-0.3 mcg/kg/min, Last Rate: Stopped (03/02/23 1425)  niCARdipine, 5-15 mg/hr  nitroglycerin, 5-200 mcg/min, Last Rate: Stopped (03/01/23 1431)  norepinephrine, 0.02-0.3 mcg/kg/min, Last Rate: 0.12 mcg/kg/min (03/05/23 1332)  phenylephrine, 0.5-3 mcg/kg/min  Phoxillum BK4/2.5, 1,500 mL/hr, Last Rate: 1,500 mL/hr (03/05/23 1337)  Phoxillum BK4/2.5, 1,500 mL/hr, Last Rate: 1,500 mL/hr (03/05/23 1337)  Phoxillum BK4/2.5, 1,500 mL/hr, Last Rate: 1,500 mL/hr  (03/05/23 1337)  sodium bicarbonate drip (greater than 75 mEq/bag), 150 mEq, Last Rate: 150 mEq (03/05/23 0812)        Results from last 7 days   Lab Units 03/05/23  0440 03/04/23  0444 03/04/23  0244 03/03/23  0206   WBC 10*3/mm3 15.64* 13.05*  --  22.00*   HEMOGLOBIN g/dL 8.9* 9.0* 9.1* 10.2*   HEMATOCRIT % 27.4* 28.2* 28.6* 30.8*   PLATELETS 10*3/mm3 146 132*  --  160     Results from last 7 days   Lab Units 03/05/23  0811 03/05/23  0440 03/04/23  2343 03/04/23  1555   SODIUM mmol/L 136 136 139 135*   POTASSIUM mmol/L 5.4* 5.4* 5.1 4.6   CHLORIDE mmol/L 102 101 103 99   CO2 mmol/L 20.0* 20.0* 21.0* 21.0*   BUN mg/dL 23 23 25* 33*   GLUCOSE mg/dL 159* 82 77 211*   CREATININE mg/dL 1.53* 1.77* 1.86* 2.47*   MAGNESIUM mg/dL 2.5*  --  2.5* 2.4   CALCIUM mg/dL 7.0* 7.2* 7.1* 6.8*         Results from last 7 days   Lab Units 03/05/23  0324 03/04/23  1354 03/03/23  0500 03/02/23 2028 03/02/23  1400   PH, ARTERIAL pH units 7.295* 7.257* 7.335* 7.297* 7.271*   PCO2, ARTERIAL mm Hg 44.5 45.4* 48.1* 48.9* 46.1*   PO2 ART mm Hg 105.0 84.5 86.0 88.2 111.0*   FIO2 % 24 21 30 30 36       Patient isn't on Tube Feeding   /h  Patient doesn't have any tube feeding modular orders    Mechanical Ventilator:   Settings: Observed:   Mode: (S) PS (03/01/23 1522)    Vt (Set, mL): 600 mL (03/01/23 1459) Vt Mandatory Ins (observed, mL): 39 mL (03/01/23 1600)   Resp Rate (Set): 14 (03/01/23 1459) Resp Rate (Observed) Vent: 18 (03/01/23 1615)   Pressure Support (cm H2O): 0 cm H20 (03/01/23 1600) Minute Ventilation (L/min) (Obs): 7.12 L/min (03/01/23 1600)       FiO2 (%): 40 % (03/01/23 1600) Plateau Pressure (cm H2O): 0 cm H2O (03/01/23 1128)   PEEP/CPAP (cm H2O): 5 cm H20 (03/01/23 1600) I:E Ratio (Obs): 1.1:1 (03/01/23 1600)         I reviewed the patient's medications.    Assessment & Plan   ASSESSMENT/PLAN     Active Hospital Problems    Diagnosis    • **Coronary artery disease involving native coronary artery of native heart without  angina pectoris    • Acute respiratory failure with hypoxia (HCC)    • Acute renal failure (ARF) (HCC)    • Shock (HCC)    • Mucopurulent chronic bronchitis (HCC)    • Prolonged Q-T interval on ECG    • Ischemic cardiomyopathy    • Type 2 diabetes mellitus with hypoglycemia, with long-term current use of insulin (HCC)    • Hyperlipidemia LDL goal <70    • Hypertension        67-year-old male with a past medical history significant for tobacco abuse, COPD, coronary disease status post PCI in 2014, hypertension, and diabetes mellitus.  He presents to [facility] ICU status post CABG by Dr. Saavedra on 3/1/2023.  Patient initially hypertensive but then overnight on 3/2/2023 patient developed worsening shock and acute renal failure.  Stat echo performed on 3/2/2023 showed an EF of 40 to 45% with no signs of pericardial effusion.      New dialysis catheter placed in the right IJ over wire in place of his previous Cordis in the poorly functioning left IJ dialysis catheter now being used for IV access.  Afebrile.  Fluid balance -1.1 L.  Remains on amiodarone and norepinephrine drips.  Norepinephrine currently 0.12 mcg/kilogram/minute.  Remains on CRRT.  Mediastinal tubes remain in place.    Plan:    1. Amiodarone drip per cardiology  2. Wean norepinephrine as tolerated  3. Continuous dialysis for ATN.  Renal following.  4. Bicarbonate drip per renal  5. Mediastinal tubes per surgery  6. Continue bronchodilators for COPD  7. BiPAP as needed  8. Pulmonary toilet     I discussed the patient's findings and my recommendations with patient, family and nursing staff     Plan of care and goals reviewed with multidisciplinary team at daily rounds.    High level of risk due to:  drug(s) requiring intensive monitoring for toxicity.    Jose Patel MD  Pulmonary and Critical Care Medicine  03/05/23 15:28 EST

## 2023-03-05 NOTE — NURSING NOTE
CRRT rounds complete. Policy and procedure reviewed with ANDRÉS Carias. Orders and documentation reviewed. Hematocrit updated. Supplies adequate. Denies further need at this time. Encouraged to call [phone] for assistance or for questions.

## 2023-03-05 NOTE — PLAN OF CARE
Goal Outcome Evaluation:      Pt rested on and off throughout shift. Remains neuro intact. Pt is on 2LNC. Remains in rate controlled A-fib. Amio, levo and bicarb gtts continue. CRRT continues, filter changed x1.

## 2023-03-05 NOTE — PROGRESS NOTES
"[facility] at [facility]  IP Progress Note    PROBLEM LIST:    CARDIAC  Coronary Artery Disease:   Inferior STEMI with [facility], Dr. Carter, 3/28/2014:  EF 50%, NILE to the mid-dominant RCA, NILE to the proximal LAD  Stress test, 2/13/2023: Inferolateral ischemia, %, RCA 80%, LAD 80%  [facility] 2/17/2023: Severe triple-vessel disease  CABG x3 3/1/2023: LIMA to the LAD, SVG to OM, and SVG to PDA    Myocardium:   Echocardiogram, 3/29/2014:   EF 50-55%, RVSP 30.  Echo 12/21/2022:Stage C, HFmrEF EF 41- 45%, mild LVH    Valvular:   Mild calcification to aortic valve    Electrical:   NSR, LVH, PVC    Percardium:   Normal    VASCULAR:  Arterial  Cerebrovascular disease:   Carotid Doppler: 2/2023 less than 50 %      CARDIAC RISK FACTORS:         Hypertension         Diabetes         Dyslipidemia         Tobacco Use, Quit after 75 years    NON-CARDIAC:  Asthma/COPD: Abnormal PFTs  CKD    SURGERIES:  Tonsillectomy    CHIEF COMPLAINTS:  Follow-up hypertension and dyslipidemia      Subjective Alert and in no distress this morning.  CRRT ongoing; not sleeping well  .      Objective     Blood pressure (!) 88/48, pulse 88, temperature 97.8 °F (36.6 °C), temperature source Axillary, resp. rate 14, height 162.6 cm (64\"), weight 75.3 kg (166 lb), SpO2 98 %.     Intake/Output Summary (Last 24 hours) at 3/5/2023 0641  Last data filed at 3/5/2023 0600  Gross per 24 hour   Intake 3266 ml   Output 3895 ml   Net -629 ml     Constitutional:       Comments: Mild discomfort   HENT:         Comments: BiPAP mask on  Neck:      Vascular: JVD normal.   Pulmonary:      Breath sounds: Examination of the right-lower field reveals decreased breath sounds. Examination of the left-lower field reveals decreased breath sounds. Decreased breath sounds present.   Cardiovascular:      Regular rhythm.      Murmurs: There is no murmur.      Biphasic rub.      Comments: Trace bilateral lower extremity edema, patient also has edema noted to " upper extremities.  Sternal incision is clean dry and intact.  Abdominal:      General: There is no distension.   Musculoskeletal:         General: No deformity. Skin:     General: Skin is warm and dry.   Neurological:      General: No focal deficit present.      Mental Status: Alert and oriented to person, place and time.   Psychiatric:      Comments: Confused this morning stating we are in [place], [place]         RESUL REVIEW:    I reviewed the patient's new clinical results.      MEDICATIONS:    acetaminophen, 650 mg, Oral, Q8H  arformoterol, 15 mcg, Nebulization, BID - RT  aspirin, 325 mg, Oral, Daily  budesonide, 0.5 mg, Nebulization, BID - RT  guaiFENesin, 1,200 mg, Oral, Q12H  insulin regular, 0-9 Units, Subcutaneous, Q6H  metoprolol tartrate, 12.5 mg, Oral, Q12H  pharmacy consult - MTM, , Does not apply, BID  rosuvastatin, 10 mg, Oral, Nightly  senna-docusate sodium, 2 tablet, Oral, BID          Results from last 7 days   Lab Units 03/05/23  0440   WBC 10*3/mm3 15.64*   HEMOGLOBIN g/dL 8.9*   HEMATOCRIT % 27.4*   PLATELETS 10*3/mm3 146     Results from last 7 days   Lab Units 03/05/23  0440   SODIUM mmol/L 136   POTASSIUM mmol/L 5.4*   CHLORIDE mmol/L 101   CO2 mmol/L 20.0*   BUN mg/dL 23   CREATININE mg/dL 1.77*   CALCIUM mg/dL 7.2*   BILIRUBIN mg/dL 0.7   ALK PHOS U/L 61   ALT (SGPT) U/L 7   AST (SGOT) U/L 71*   GLUCOSE mg/dL 82     Results from last 7 days   Lab Units 03/02/23  0215 03/01/23  1124 02/28/23  0835   INR  1.32* 1.43* 1.01     Lab Results   Component Value Date    TROPONINI 15.31 (C) 03/30/2014    TROPONINT <0.010 12/11/2022                 No results found for: IRON, FERRITIN, LABIRON, TIBC   Hemoglobin A1C   Date Value Ref Range Status   02/28/2023 7.40 (H) 4.80 - 5.60 % Final     Magnesium   Date Value Ref Range Status   03/04/2023 2.5 (H) 1.6 - 2.4 mg/dL Final        Tele: Normal sinus rhythm      ASSESSMENT:     1. CAD s/p CABG x3, preop EF 40-45%  2. Ischemic cardiomyopathy  3. History of  hypertension with recent hypotension on dobutamine and Levophed.  4. Prolonged QT, improving  5. Hyperlipidemia, on Crestor 10 at home  6. Acute on chronic kidney failure  7. Type 2 diabetes          PLAN:     1. Continue IV pressor support with norepinephrine  2. Continue aspirin and Crestor 10.  3. Worsening kidney function overnight patient will start CRRT today.  4. Will initiate GDMT once patient continues to improve and patient is off of pressors.        STAFF CARDIOLOGIST:      SUMMARY:    1. CABG 3/1/2023  2. Postop atrial fibrillation  3. Postop GE[place]IANA presently on CRRT      PERTINENT:    1. Irregular heart rate with A-fib        IMPRESSION:    1. Coronary artery disease  2. Atrial fibrillation  3. Acute kidney injury      RECOMMENDATIONS:    CRRT per nephrology  Present IV medications include amiodarone and norepinephrine  Appears to be improving with present care and support  Monitor QT interval particularly while on amiodarone  Continue supportive care      I have seen and examined the patient, reviewed the above note, necessary changes were made and I agree with the final note.   Amandeep Quick MD

## 2023-03-05 NOTE — PROGRESS NOTES
"   LOS: 4 days    Patient Care Team:  Yessica Ricci DO as PCP - General (Family Medicine)  Nima Peter MD as Consulting Physician (Endocrinology)  Nadya Ramirez MD as Cardiologist (Cardiology)  Yessica Ricci DO as Consulting Physician (Family Medicine)    Chief Complaint: Acute kidney injury s/p CABG  67-year-old with history of hypertension, diabetes, former smoker 1-1/2 pack/day for quite some time, CAD s/p PCI in 2014, underwent CABG x3, post CABG remain hypotensive blood pressure in the 40s systolic in 60s and 70s 80s range requiring IV pressors.  Developed GEORGIANA oliguric, now an uric  Subjective     Interval History:   CRRT in progress.  Critically ill  On IV pressors.   Dialysis catheter changed yesterday  Review of Systems:   .  All others negative.  Alert, oriented x3 no obvious distress.    Objective     Vital Sign Min/Max for last 24 hours  Temp  Min: 96.3 °F (35.7 °C)  Max: 98.2 °F (36.8 °C)   BP  Min: 84/59  Max: 123/69   Pulse  Min: 72  Max: 114   Resp  Min: 14  Max: 22   SpO2  Min: 93 %  Max: 100 %   Flow (L/min)  Min: 2  Max: 2   No data recorded     Flowsheet Rows    Flowsheet Row First Filed Value   Admission Height 162.6 cm (64\") Documented at 03/01/2023 0611   Admission Weight 75.3 kg (166 lb) Documented at 03/01/2023 0611          I/O this shift:  In: 1111 [P.O.:3; I.V.:1108]  Out: 1531 [Other:1361; Chest Tube:170]  I/O last 3 completed shifts:  In: 5133 [P.O.:260; I.V.:4518; NG/GT:30; IV Piggyback:325]  Out: 6215 [Urine:105; Other:5340; Chest Tube:770]    Physical Exam:  General Appearance: Awake, alert oriented x3.  Laying comfortably in bed.  Eyes: PER, EOMI.  Neck: Supple no JVD.  Right triple-lumen in place.  Chest: Chest tube in place.  Lungs: Clear auscultation, no rales rhonchi's, equal chest movement, nonlabored.  Heart: No gallop, murmur, rub, RRR.  Abdomen: Soft, nontender, positive bowel sounds, no organomegaly.  Extremities: Positive bilateral lower extremity " dependent edema, no cyanosis.  Neuro: No focal deficit, moving all extremities, alert oriented X 3  : Solomon catheter in place.  Minimal urine output  Skin warm and dry.  Psych mood and affect appropriate  Nontunneled dialysis cath right IJ  WBC WBC   Date Value Ref Range Status   03/05/2023 15.64 (H) 3.40 - 10.80 10*3/mm3 Final   03/04/2023 13.05 (H) 3.40 - 10.80 10*3/mm3 Final   03/03/2023 22.00 (H) 3.40 - 10.80 10*3/mm3 Final      HGB Hemoglobin   Date Value Ref Range Status   03/05/2023 8.9 (L) 13.0 - 17.7 g/dL Final   03/04/2023 9.0 (L) 13.0 - 17.7 g/dL Final   03/04/2023 9.1 (L) 13.0 - 17.7 g/dL Final   03/03/2023 10.2 (L) 13.0 - 17.7 g/dL Final      HCT Hematocrit   Date Value Ref Range Status   03/05/2023 27.4 (L) 37.5 - 51.0 % Final   03/04/2023 28.2 (L) 37.5 - 51.0 % Final   03/04/2023 28.6 (L) 37.5 - 51.0 % Final   03/03/2023 30.8 (L) 37.5 - 51.0 % Final      Platlets No results found for: LABPLAT   MCV MCV   Date Value Ref Range Status   03/05/2023 91.0 79.0 - 97.0 fL Final   03/04/2023 93.1 79.0 - 97.0 fL Final   03/03/2023 91.1 79.0 - 97.0 fL Final          Sodium Sodium   Date Value Ref Range Status   03/05/2023 134 (L) 136 - 145 mmol/L Final   03/05/2023 136 136 - 145 mmol/L Final   03/05/2023 136 136 - 145 mmol/L Final   03/04/2023 139 136 - 145 mmol/L Final   03/04/2023 135 (L) 136 - 145 mmol/L Final   03/04/2023 135 (L) 136 - 145 mmol/L Final   03/04/2023 136 136 - 145 mmol/L Final   03/04/2023 135 (L) 136 - 145 mmol/L Final   03/03/2023 132 (L) 136 - 145 mmol/L Final   03/03/2023 134 (L) 136 - 145 mmol/L Final   03/02/2023 134 (L) 136 - 145 mmol/L Final      Potassium Potassium   Date Value Ref Range Status   03/05/2023 5.0 3.5 - 5.2 mmol/L Final   03/05/2023 5.4 (H) 3.5 - 5.2 mmol/L Final   03/05/2023 5.4 (H) 3.5 - 5.2 mmol/L Final   03/04/2023 5.1 3.5 - 5.2 mmol/L Final   03/04/2023 4.6 3.5 - 5.2 mmol/L Final   03/04/2023 4.9 3.5 - 5.2 mmol/L Final   03/04/2023 4.9 3.5 - 5.2 mmol/L Final    03/04/2023 4.8 3.5 - 5.2 mmol/L Final     Comment:     Slight hemolysis detected by analyzer. Results may be affected.   03/03/2023 5.0 3.5 - 5.2 mmol/L Final     Comment:     Slight hemolysis detected by analyzer. Results may be affected.   03/03/2023 4.5 3.5 - 5.2 mmol/L Final     Comment:     Slight hemolysis detected by analyzer. Results may be affected.   03/02/2023 4.8 3.5 - 5.2 mmol/L Final      Chloride Chloride   Date Value Ref Range Status   03/05/2023 101 98 - 107 mmol/L Final   03/05/2023 102 98 - 107 mmol/L Final   03/05/2023 101 98 - 107 mmol/L Final   03/04/2023 103 98 - 107 mmol/L Final   03/04/2023 99 98 - 107 mmol/L Final   03/04/2023 99 98 - 107 mmol/L Final   03/04/2023 99 98 - 107 mmol/L Final   03/04/2023 98 98 - 107 mmol/L Final   03/03/2023 95 (L) 98 - 107 mmol/L Final   03/03/2023 93 (L) 98 - 107 mmol/L Final   03/02/2023 95 (L) 98 - 107 mmol/L Final      CO2 CO2   Date Value Ref Range Status   03/05/2023 21.0 (L) 22.0 - 29.0 mmol/L Final   03/05/2023 20.0 (L) 22.0 - 29.0 mmol/L Final   03/05/2023 20.0 (L) 22.0 - 29.0 mmol/L Final   03/04/2023 21.0 (L) 22.0 - 29.0 mmol/L Final   03/04/2023 21.0 (L) 22.0 - 29.0 mmol/L Final   03/04/2023 22.0 22.0 - 29.0 mmol/L Final   03/04/2023 22.0 22.0 - 29.0 mmol/L Final   03/04/2023 22.0 22.0 - 29.0 mmol/L Final   03/03/2023 23.0 22.0 - 29.0 mmol/L Final   03/03/2023 24.0 22.0 - 29.0 mmol/L Final   03/02/2023 23.0 22.0 - 29.0 mmol/L Final      BUN BUN   Date Value Ref Range Status   03/05/2023 25 (H) 8 - 23 mg/dL Final   03/05/2023 23 8 - 23 mg/dL Final   03/05/2023 23 8 - 23 mg/dL Final   03/04/2023 25 (H) 8 - 23 mg/dL Final   03/04/2023 33 (H) 8 - 23 mg/dL Final   03/04/2023 24 (H) 8 - 23 mg/dL Final   03/04/2023 24 (H) 8 - 23 mg/dL Final   03/04/2023 26 (H) 8 - 23 mg/dL Final   03/03/2023 34 (H) 8 - 23 mg/dL Final   03/03/2023 35 (H) 8 - 23 mg/dL Final   03/02/2023 31 (H) 8 - 23 mg/dL Final      Creatinine Creatinine   Date Value Ref Range Status    03/05/2023 1.41 (H) 0.76 - 1.27 mg/dL Final   03/05/2023 1.53 (H) 0.76 - 1.27 mg/dL Final   03/05/2023 1.77 (H) 0.76 - 1.27 mg/dL Final   03/04/2023 1.86 (H) 0.76 - 1.27 mg/dL Final   03/04/2023 2.47 (H) 0.76 - 1.27 mg/dL Final   03/04/2023 2.49 (H) 0.76 - 1.27 mg/dL Final   03/04/2023 2.65 (H) 0.76 - 1.27 mg/dL Final   03/04/2023 2.81 (H) 0.76 - 1.27 mg/dL Final   03/03/2023 4.05 (H) 0.76 - 1.27 mg/dL Final   03/03/2023 5.09 (H) 0.76 - 1.27 mg/dL Final   03/02/2023 4.73 (H) 0.76 - 1.27 mg/dL Final      Calcium Calcium   Date Value Ref Range Status   03/05/2023 7.1 (L) 8.6 - 10.5 mg/dL Final   03/05/2023 7.0 (L) 8.6 - 10.5 mg/dL Final   03/05/2023 7.2 (L) 8.6 - 10.5 mg/dL Final   03/04/2023 7.1 (L) 8.6 - 10.5 mg/dL Final   03/04/2023 6.8 (L) 8.6 - 10.5 mg/dL Final   03/04/2023 7.2 (L) 8.6 - 10.5 mg/dL Final   03/04/2023 7.0 (L) 8.6 - 10.5 mg/dL Final   03/04/2023 7.2 (L) 8.6 - 10.5 mg/dL Final   03/03/2023 7.1 (L) 8.6 - 10.5 mg/dL Final   03/03/2023 7.3 (L) 8.6 - 10.5 mg/dL Final   03/02/2023 7.2 (L) 8.6 - 10.5 mg/dL Final      PO4 No results found for: CAPO4   Albumin Albumin   Date Value Ref Range Status   03/05/2023 3.2 (L) 3.5 - 5.2 g/dL Final   03/05/2023 3.2 (L) 3.5 - 5.2 g/dL Final   03/05/2023 3.6 3.5 - 5.2 g/dL Final   03/04/2023 3.4 (L) 3.5 - 5.2 g/dL Final   03/04/2023 3.2 (L) 3.5 - 5.2 g/dL Final   03/04/2023 3.4 (L) 3.5 - 5.2 g/dL Final   03/04/2023 3.4 (L) 3.5 - 5.2 g/dL Final   03/04/2023 3.7 3.5 - 5.2 g/dL Final   03/03/2023 3.6 3.5 - 5.2 g/dL Final   03/03/2023 3.8 3.5 - 5.2 g/dL Final      Magnesium Magnesium   Date Value Ref Range Status   03/05/2023 2.5 (H) 1.6 - 2.4 mg/dL Final   03/05/2023 2.5 (H) 1.6 - 2.4 mg/dL Final   03/04/2023 2.5 (H) 1.6 - 2.4 mg/dL Final   03/04/2023 2.4 1.6 - 2.4 mg/dL Final   03/04/2023 2.4 1.6 - 2.4 mg/dL Final   03/04/2023 2.2 1.6 - 2.4 mg/dL Final   03/03/2023 1.8 1.6 - 2.4 mg/dL Final   03/03/2023 1.7 1.6 - 2.4 mg/dL Final      Uric Acid No results found for:  URICACID        Results Review:     I reviewed the patient's new clinical results.    acetaminophen, 650 mg, Oral, Q8H  arformoterol, 15 mcg, Nebulization, BID - RT  aspirin, 325 mg, Oral, Daily  budesonide, 0.5 mg, Nebulization, BID - RT  guaiFENesin, 1,200 mg, Oral, Q12H  insulin lispro, 0-9 Units, Subcutaneous, 4x Daily With Meals & Nightly  metoprolol tartrate, 12.5 mg, Oral, Q12H  pharmacy consult - [place], , Does not apply, BID  rosuvastatin, 10 mg, Oral, Nightly  senna-docusate sodium, 2 tablet, Oral, BID      amiodarone in dextrose 5%, 0.5 mg/min, Last Rate: 0.5 mg/min (03/05/23 0936)  DOBUTamine, 5 mcg/kg/min, Last Rate: Stopped (03/04/23 1803)  EPINEPHrine, 0.02-0.3 mcg/kg/min, Last Rate: Stopped (03/02/23 1425)  niCARdipine, 5-15 mg/hr  nitroglycerin, 5-200 mcg/min, Last Rate: Stopped (03/01/23 1431)  norepinephrine, 0.02-0.3 mcg/kg/min, Last Rate: 0.1 mcg/kg/min (03/05/23 1703)  phenylephrine, 0.5-3 mcg/kg/min  Phoxillum BK4/2.5, 1,500 mL/hr, Last Rate: 1,500 mL/hr (03/05/23 1703)  Phoxillum BK4/2.5, 1,500 mL/hr, Last Rate: 1,500 mL/hr (03/05/23 1703)  Phoxillum BK4/2.5, 1,500 mL/hr, Last Rate: 1,500 mL/hr (03/05/23 1703)  sodium bicarbonate drip (greater than 75 mEq/bag), 150 mEq, Last Rate: 150 mEq (03/05/23 0812)        Medication Review: Reviewed    Assessment & Plan       Coronary artery disease involving native coronary artery of native heart without angina pectoris    Hypertension    Hyperlipidemia LDL goal <70    Type 2 diabetes mellitus with hypoglycemia, with long-term current use of insulin (HCC)    Ischemic cardiomyopathy    Mucopurulent chronic bronchitis (HCC)    Prolonged Q-T interval on ECG    Acute renal failure (ARF) (HCC)    Shock (HCC)    Acute respiratory failure with hypoxia (HCC)     1.  GEORGIANA: ATN likely secondary to shock with systolic blood pressure postsurgery in the range of 40s to 80s, now on multiple vasopressors.  Admit creatinine 1.08, creatinine progressively getting worse.   Patient is oliguric.  C3 complement 18, C4 complement 83 within normal range.  Ultrasound of the kidney shows right kidney 11 cm left kidney 10.6 cm normal ultrasound, bladder ultrasound normal.  2.  S/p CABG x3  3.  Ischemic cardiomyopathy with ejection fraction 41-45%.  4.  Diabetes type 2.  5.  Cardiogenic shock.     Recommendations:  CRRT in progress.  We will continue.  Case discussed with the RN taking care of the CRRT.  Case discussed with the wife.  Physician notes are seen.  Increase ultrafiltration to 50 mL/h  Replace calcium  Avoid nephrotoxic medications.  Keep MAP greater than 65  Check volume status.  Check labs in the morning.  Urine labs, complement C3-C4   Adjust medication for the new GFR.  Case discussed with the medical staff taking care of the patient.  High risk patient with complex medical problems.  Replace with packed RBC if hemoglobin less than 7       Chris Moran MD  03/05/23  17:38 EST

## 2023-03-05 NOTE — PROCEDURES
CRRT second visit.  Ultrafiltration 50 mL/h.  Monitor blood pressure electrolytes and replace as needed.  Discussed with RN in detail.  Critical but stable at this time.     We will continue with the CRRT at this time.

## 2023-03-06 ENCOUNTER — APPOINTMENT (OUTPATIENT)
Dept: GENERAL RADIOLOGY | Facility: HOSPITAL | Age: 68
DRG: 235 | End: 2023-03-06
Payer: MEDICARE

## 2023-03-06 LAB
ABO GROUP BLD: NORMAL
ALBUMIN SERPL-MCNC: 2.6 G/DL (ref 3.5–5.2)
ALBUMIN SERPL-MCNC: 3 G/DL (ref 3.5–5.2)
ALBUMIN SERPL-MCNC: 3 G/DL (ref 3.5–5.2)
ALBUMIN SERPL-MCNC: 3.3 G/DL (ref 3.5–5.2)
ALBUMIN SERPL-MCNC: 3.4 G/DL (ref 3.5–5.2)
ALBUMIN/GLOB SERPL: 1.7 G/DL
ALBUMIN/GLOB SERPL: 1.7 G/DL
ALP SERPL-CCNC: 65 U/L (ref 39–117)
ALP SERPL-CCNC: 72 U/L (ref 39–117)
ALT SERPL W P-5'-P-CCNC: 39 U/L (ref 1–41)
ALT SERPL W P-5'-P-CCNC: 49 U/L (ref 1–41)
ANION GAP SERPL CALCULATED.3IONS-SCNC: 11 MMOL/L (ref 5–15)
ANION GAP SERPL CALCULATED.3IONS-SCNC: 14 MMOL/L (ref 5–15)
ANION GAP SERPL CALCULATED.3IONS-SCNC: 15 MMOL/L (ref 5–15)
AST SERPL-CCNC: 154 U/L (ref 1–40)
AST SERPL-CCNC: 218 U/L (ref 1–40)
BASOPHILS # BLD AUTO: 0.02 10*3/MM3 (ref 0–0.2)
BASOPHILS NFR BLD AUTO: 0.1 % (ref 0–1.5)
BILIRUB SERPL-MCNC: 0.6 MG/DL (ref 0–1.2)
BILIRUB SERPL-MCNC: 0.7 MG/DL (ref 0–1.2)
BLD GP AB SCN SERPL QL: NEGATIVE
BUN SERPL-MCNC: 24 MG/DL (ref 8–23)
BUN SERPL-MCNC: 25 MG/DL (ref 8–23)
BUN SERPL-MCNC: 25 MG/DL (ref 8–23)
BUN SERPL-MCNC: 28 MG/DL (ref 8–23)
BUN SERPL-MCNC: 31 MG/DL (ref 8–23)
BUN/CREAT SERPL: 17.9 (ref 7–25)
BUN/CREAT SERPL: 18 (ref 7–25)
BUN/CREAT SERPL: 18 (ref 7–25)
BUN/CREAT SERPL: 20.4 (ref 7–25)
BUN/CREAT SERPL: 21.7 (ref 7–25)
CA-I SERPL ISE-MCNC: 1.08 MMOL/L (ref 1.12–1.32)
CA-I SERPL ISE-MCNC: 1.08 MMOL/L (ref 1.12–1.32)
CA-I SERPL ISE-MCNC: 1.09 MMOL/L (ref 1.12–1.32)
CALCIUM SPEC-SCNC: 6.9 MG/DL (ref 8.6–10.5)
CALCIUM SPEC-SCNC: 7 MG/DL (ref 8.6–10.5)
CALCIUM SPEC-SCNC: 7 MG/DL (ref 8.6–10.5)
CALCIUM SPEC-SCNC: 7.1 MG/DL (ref 8.6–10.5)
CALCIUM SPEC-SCNC: 7.3 MG/DL (ref 8.6–10.5)
CHLORIDE SERPL-SCNC: 102 MMOL/L (ref 98–107)
CHLORIDE SERPL-SCNC: 99 MMOL/L (ref 98–107)
CO2 SERPL-SCNC: 20 MMOL/L (ref 22–29)
CO2 SERPL-SCNC: 21 MMOL/L (ref 22–29)
CO2 SERPL-SCNC: 23 MMOL/L (ref 22–29)
CREAT SERPL-MCNC: 1.34 MG/DL (ref 0.76–1.27)
CREAT SERPL-MCNC: 1.37 MG/DL (ref 0.76–1.27)
CREAT SERPL-MCNC: 1.39 MG/DL (ref 0.76–1.27)
CREAT SERPL-MCNC: 1.39 MG/DL (ref 0.76–1.27)
CREAT SERPL-MCNC: 1.43 MG/DL (ref 0.76–1.27)
DEPRECATED RDW RBC AUTO: 48.5 FL (ref 37–54)
DEPRECATED RDW RBC AUTO: 49 FL (ref 37–54)
EGFRCR SERPLBLD CKD-EPI 2021: 53.7 ML/MIN/1.73
EGFRCR SERPLBLD CKD-EPI 2021: 55.6 ML/MIN/1.73
EGFRCR SERPLBLD CKD-EPI 2021: 55.6 ML/MIN/1.73
EGFRCR SERPLBLD CKD-EPI 2021: 56.5 ML/MIN/1.73
EGFRCR SERPLBLD CKD-EPI 2021: 58.1 ML/MIN/1.73
EOSINOPHIL # BLD AUTO: 0.01 10*3/MM3 (ref 0–0.4)
EOSINOPHIL NFR BLD AUTO: 0.1 % (ref 0.3–6.2)
ERYTHROCYTE [DISTWIDTH] IN BLOOD BY AUTOMATED COUNT: 14.4 % (ref 12.3–15.4)
ERYTHROCYTE [DISTWIDTH] IN BLOOD BY AUTOMATED COUNT: 14.5 % (ref 12.3–15.4)
GLOBULIN UR ELPH-MCNC: 1.5 GM/DL
GLOBULIN UR ELPH-MCNC: 1.8 GM/DL
GLUCOSE BLDC GLUCOMTR-MCNC: 128 MG/DL (ref 70–130)
GLUCOSE BLDC GLUCOMTR-MCNC: 133 MG/DL (ref 70–130)
GLUCOSE BLDC GLUCOMTR-MCNC: 137 MG/DL (ref 70–130)
GLUCOSE BLDC GLUCOMTR-MCNC: 246 MG/DL (ref 70–130)
GLUCOSE BLDC GLUCOMTR-MCNC: 265 MG/DL (ref 70–130)
GLUCOSE SERPL-MCNC: 139 MG/DL (ref 65–99)
GLUCOSE SERPL-MCNC: 147 MG/DL (ref 65–99)
GLUCOSE SERPL-MCNC: 147 MG/DL (ref 65–99)
GLUCOSE SERPL-MCNC: 149 MG/DL (ref 65–99)
GLUCOSE SERPL-MCNC: 157 MG/DL (ref 65–99)
HCT VFR BLD AUTO: 24 % (ref 37.5–51)
HCT VFR BLD AUTO: 26.7 % (ref 37.5–51)
HCT VFR BLD AUTO: 28.5 % (ref 37.5–51)
HGB BLD-MCNC: 7.7 G/DL (ref 13–17.7)
HGB BLD-MCNC: 8.6 G/DL (ref 13–17.7)
HGB BLD-MCNC: 9.3 G/DL (ref 13–17.7)
IMM GRANULOCYTES # BLD AUTO: 0.1 10*3/MM3 (ref 0–0.05)
IMM GRANULOCYTES NFR BLD AUTO: 0.6 % (ref 0–0.5)
LYMPHOCYTES # BLD AUTO: 1.52 10*3/MM3 (ref 0.7–3.1)
LYMPHOCYTES NFR BLD AUTO: 9.9 % (ref 19.6–45.3)
MAGNESIUM SERPL-MCNC: 2.6 MG/DL (ref 1.6–2.4)
MAGNESIUM SERPL-MCNC: 2.6 MG/DL (ref 1.6–2.4)
MAGNESIUM SERPL-MCNC: 2.7 MG/DL (ref 1.6–2.4)
MCH RBC QN AUTO: 29.4 PG (ref 26.6–33)
MCH RBC QN AUTO: 29.7 PG (ref 26.6–33)
MCHC RBC AUTO-ENTMCNC: 32.1 G/DL (ref 31.5–35.7)
MCHC RBC AUTO-ENTMCNC: 32.2 G/DL (ref 31.5–35.7)
MCV RBC AUTO: 91.1 FL (ref 79–97)
MCV RBC AUTO: 92.7 FL (ref 79–97)
MONOCYTES # BLD AUTO: 2.15 10*3/MM3 (ref 0.1–0.9)
MONOCYTES NFR BLD AUTO: 13.9 % (ref 5–12)
NEUTROPHILS NFR BLD AUTO: 11.62 10*3/MM3 (ref 1.7–7)
NEUTROPHILS NFR BLD AUTO: 75.4 % (ref 42.7–76)
NRBC BLD AUTO-RTO: 0.4 /100 WBC (ref 0–0.2)
PHOSPHATE SERPL-MCNC: 5.6 MG/DL (ref 2.5–4.5)
PHOSPHATE SERPL-MCNC: 5.9 MG/DL (ref 2.5–4.5)
PHOSPHATE SERPL-MCNC: 6 MG/DL (ref 2.5–4.5)
PLATELET # BLD AUTO: 113 10*3/MM3 (ref 140–450)
PLATELET # BLD AUTO: 95 10*3/MM3 (ref 140–450)
PMV BLD AUTO: 11.2 FL (ref 6–12)
PMV BLD AUTO: 11.3 FL (ref 6–12)
POTASSIUM SERPL-SCNC: 4.8 MMOL/L (ref 3.5–5.2)
POTASSIUM SERPL-SCNC: 4.9 MMOL/L (ref 3.5–5.2)
POTASSIUM SERPL-SCNC: 5 MMOL/L (ref 3.5–5.2)
PROT SERPL-MCNC: 4.1 G/DL (ref 6–8.5)
PROT SERPL-MCNC: 4.8 G/DL (ref 6–8.5)
RBC # BLD AUTO: 2.59 10*6/MM3 (ref 4.14–5.8)
RBC # BLD AUTO: 2.93 10*6/MM3 (ref 4.14–5.8)
RH BLD: POSITIVE
SODIUM SERPL-SCNC: 134 MMOL/L (ref 136–145)
SODIUM SERPL-SCNC: 136 MMOL/L (ref 136–145)
SODIUM SERPL-SCNC: 137 MMOL/L (ref 136–145)
T&S EXPIRATION DATE: NORMAL
WBC NRBC COR # BLD: 13.67 10*3/MM3 (ref 3.4–10.8)
WBC NRBC COR # BLD: 15.42 10*3/MM3 (ref 3.4–10.8)

## 2023-03-06 PROCEDURE — 82330 ASSAY OF CALCIUM: CPT | Performed by: INTERNAL MEDICINE

## 2023-03-06 PROCEDURE — 84100 ASSAY OF PHOSPHORUS: CPT | Performed by: INTERNAL MEDICINE

## 2023-03-06 PROCEDURE — 99233 SBSQ HOSP IP/OBS HIGH 50: CPT | Performed by: INTERNAL MEDICINE

## 2023-03-06 PROCEDURE — 83735 ASSAY OF MAGNESIUM: CPT | Performed by: INTERNAL MEDICINE

## 2023-03-06 PROCEDURE — 86923 COMPATIBILITY TEST ELECTRIC: CPT

## 2023-03-06 PROCEDURE — 86900 BLOOD TYPING SEROLOGIC ABO: CPT

## 2023-03-06 PROCEDURE — 85025 COMPLETE CBC W/AUTO DIFF WBC: CPT | Performed by: INTERNAL MEDICINE

## 2023-03-06 PROCEDURE — 99024 POSTOP FOLLOW-UP VISIT: CPT | Performed by: THORACIC SURGERY (CARDIOTHORACIC VASCULAR SURGERY)

## 2023-03-06 PROCEDURE — 71045 X-RAY EXAM CHEST 1 VIEW: CPT

## 2023-03-06 PROCEDURE — 97163 PT EVAL HIGH COMPLEX 45 MIN: CPT

## 2023-03-06 PROCEDURE — 94799 UNLISTED PULMONARY SVC/PX: CPT

## 2023-03-06 PROCEDURE — 99232 SBSQ HOSP IP/OBS MODERATE 35: CPT | Performed by: INTERNAL MEDICINE

## 2023-03-06 PROCEDURE — 94761 N-INVAS EAR/PLS OXIMETRY MLT: CPT

## 2023-03-06 PROCEDURE — 94664 DEMO&/EVAL PT USE INHALER: CPT

## 2023-03-06 PROCEDURE — 86901 BLOOD TYPING SEROLOGIC RH(D): CPT | Performed by: THORACIC SURGERY (CARDIOTHORACIC VASCULAR SURGERY)

## 2023-03-06 PROCEDURE — 82962 GLUCOSE BLOOD TEST: CPT

## 2023-03-06 PROCEDURE — P9016 RBC LEUKOCYTES REDUCED: HCPCS

## 2023-03-06 PROCEDURE — 86850 RBC ANTIBODY SCREEN: CPT | Performed by: THORACIC SURGERY (CARDIOTHORACIC VASCULAR SURGERY)

## 2023-03-06 PROCEDURE — 63710000001 INSULIN LISPRO (HUMAN) PER 5 UNITS: Performed by: INTERNAL MEDICINE

## 2023-03-06 PROCEDURE — 85027 COMPLETE CBC AUTOMATED: CPT | Performed by: THORACIC SURGERY (CARDIOTHORACIC VASCULAR SURGERY)

## 2023-03-06 PROCEDURE — 85018 HEMOGLOBIN: CPT | Performed by: THORACIC SURGERY (CARDIOTHORACIC VASCULAR SURGERY)

## 2023-03-06 PROCEDURE — 80053 COMPREHEN METABOLIC PANEL: CPT | Performed by: INTERNAL MEDICINE

## 2023-03-06 PROCEDURE — 85014 HEMATOCRIT: CPT | Performed by: THORACIC SURGERY (CARDIOTHORACIC VASCULAR SURGERY)

## 2023-03-06 PROCEDURE — 86900 BLOOD TYPING SEROLOGIC ABO: CPT | Performed by: THORACIC SURGERY (CARDIOTHORACIC VASCULAR SURGERY)

## 2023-03-06 RX ORDER — AMIODARONE HYDROCHLORIDE 200 MG/1
200 TABLET ORAL EVERY 8 HOURS
Status: DISCONTINUED | OUTPATIENT
Start: 2023-03-06 | End: 2023-03-08

## 2023-03-06 RX ORDER — GUAIFENESIN 200 MG/10ML
200 LIQUID ORAL EVERY 4 HOURS PRN
Status: DISCONTINUED | OUTPATIENT
Start: 2023-03-06 | End: 2023-03-13

## 2023-03-06 RX ORDER — MIDODRINE HYDROCHLORIDE 5 MG/1
5 TABLET ORAL EVERY 8 HOURS
Status: DISCONTINUED | OUTPATIENT
Start: 2023-03-06 | End: 2023-03-08

## 2023-03-06 RX ORDER — LIDOCAINE HYDROCHLORIDE 10 MG/ML
5 INJECTION, SOLUTION EPIDURAL; INFILTRATION; INTRACAUDAL; PERINEURAL ONCE
Status: DISCONTINUED | OUTPATIENT
Start: 2023-03-06 | End: 2023-03-07

## 2023-03-06 RX ORDER — AMIODARONE HYDROCHLORIDE 200 MG/1
200 TABLET ORAL DAILY
Status: DISCONTINUED | OUTPATIENT
Start: 2023-03-27 | End: 2023-03-08

## 2023-03-06 RX ORDER — AMIODARONE HYDROCHLORIDE 200 MG/1
200 TABLET ORAL EVERY 12 HOURS
Status: DISCONTINUED | OUTPATIENT
Start: 2023-03-13 | End: 2023-03-08

## 2023-03-06 RX ORDER — IPRATROPIUM BROMIDE AND ALBUTEROL SULFATE 2.5; .5 MG/3ML; MG/3ML
3 SOLUTION RESPIRATORY (INHALATION)
Status: DISCONTINUED | OUTPATIENT
Start: 2023-03-06 | End: 2023-03-13

## 2023-03-06 RX ADMIN — HYDROXYZINE HYDROCHLORIDE 25 MG: 25 TABLET ORAL at 20:23

## 2023-03-06 RX ADMIN — ARFORMOTEROL TARTRATE 15 MCG: 15 SOLUTION RESPIRATORY (INHALATION) at 18:57

## 2023-03-06 RX ADMIN — BUDESONIDE 0.5 MG: 0.5 SUSPENSION RESPIRATORY (INHALATION) at 18:57

## 2023-03-06 RX ADMIN — ASPIRIN 325 MG: 325 TABLET, COATED ORAL at 08:00

## 2023-03-06 RX ADMIN — CALCIUM CHLORIDE, MAGNESIUM CHLORIDE, SODIUM CHLORIDE, SODIUM BICARBONATE, POTASSIUM CHLORIDE AND SODIUM PHOSPHATE DIBASIC DIHYDRATE 1500 ML/HR: 3.68; 3.05; 6.34; 3.09; .314; .187 INJECTION INTRAVENOUS at 00:00

## 2023-03-06 RX ADMIN — CALCIUM CHLORIDE, MAGNESIUM CHLORIDE, SODIUM CHLORIDE, SODIUM BICARBONATE, POTASSIUM CHLORIDE AND SODIUM PHOSPHATE DIBASIC DIHYDRATE 1500 ML/HR: 3.68; 3.05; 6.34; 3.09; .314; .187 INJECTION INTRAVENOUS at 21:44

## 2023-03-06 RX ADMIN — HYDROXYZINE HYDROCHLORIDE 25 MG: 25 TABLET ORAL at 08:54

## 2023-03-06 RX ADMIN — GUAIFENESIN 1200 MG: 600 TABLET, EXTENDED RELEASE ORAL at 08:00

## 2023-03-06 RX ADMIN — CALCIUM CHLORIDE, MAGNESIUM CHLORIDE, SODIUM CHLORIDE, SODIUM BICARBONATE, POTASSIUM CHLORIDE AND SODIUM PHOSPHATE DIBASIC DIHYDRATE 1500 ML/HR: 3.68; 3.05; 6.34; 3.09; .314; .187 INJECTION INTRAVENOUS at 11:33

## 2023-03-06 RX ADMIN — CALCIUM CHLORIDE, MAGNESIUM CHLORIDE, SODIUM CHLORIDE, SODIUM BICARBONATE, POTASSIUM CHLORIDE AND SODIUM PHOSPHATE DIBASIC DIHYDRATE 1500 ML/HR: 3.68; 3.05; 6.34; 3.09; .314; .187 INJECTION INTRAVENOUS at 07:55

## 2023-03-06 RX ADMIN — ACETAMINOPHEN 325MG 650 MG: 325 TABLET ORAL at 14:52

## 2023-03-06 RX ADMIN — CALCIUM CHLORIDE, MAGNESIUM CHLORIDE, SODIUM CHLORIDE, SODIUM BICARBONATE, POTASSIUM CHLORIDE AND SODIUM PHOSPHATE DIBASIC DIHYDRATE 1500 ML/HR: 3.68; 3.05; 6.34; 3.09; .314; .187 INJECTION INTRAVENOUS at 18:14

## 2023-03-06 RX ADMIN — INSULIN LISPRO 4 UNITS: 100 INJECTION, SOLUTION INTRAVENOUS; SUBCUTANEOUS at 21:23

## 2023-03-06 RX ADMIN — SENNOSIDES AND DOCUSATE SODIUM 2 TABLET: 50; 8.6 TABLET ORAL at 20:23

## 2023-03-06 RX ADMIN — AMIODARONE HYDROCHLORIDE 200 MG: 200 TABLET ORAL at 10:15

## 2023-03-06 RX ADMIN — GUAIFENESIN 200 MG: 200 SOLUTION ORAL at 11:35

## 2023-03-06 RX ADMIN — AMIODARONE HYDROCHLORIDE 200 MG: 200 TABLET ORAL at 18:16

## 2023-03-06 RX ADMIN — SENNOSIDES AND DOCUSATE SODIUM 2 TABLET: 50; 8.6 TABLET ORAL at 08:00

## 2023-03-06 RX ADMIN — BUDESONIDE 0.5 MG: 0.5 SUSPENSION RESPIRATORY (INHALATION) at 08:32

## 2023-03-06 RX ADMIN — CALCIUM CHLORIDE, MAGNESIUM CHLORIDE, SODIUM CHLORIDE, SODIUM BICARBONATE, POTASSIUM CHLORIDE AND SODIUM PHOSPHATE DIBASIC DIHYDRATE 1500 ML/HR: 3.68; 3.05; 6.34; 3.09; .314; .187 INJECTION INTRAVENOUS at 04:00

## 2023-03-06 RX ADMIN — MIDODRINE HYDROCHLORIDE 5 MG: 5 TABLET ORAL at 10:14

## 2023-03-06 RX ADMIN — IPRATROPIUM BROMIDE AND ALBUTEROL SULFATE 3 ML: 2.5; .5 SOLUTION RESPIRATORY (INHALATION) at 23:37

## 2023-03-06 RX ADMIN — ROSUVASTATIN CALCIUM 10 MG: 10 TABLET, FILM COATED ORAL at 20:23

## 2023-03-06 RX ADMIN — Medication 0.08 MCG/KG/MIN: at 05:10

## 2023-03-06 RX ADMIN — GUAIFENESIN 1200 MG: 600 TABLET, EXTENDED RELEASE ORAL at 20:23

## 2023-03-06 RX ADMIN — MIDODRINE HYDROCHLORIDE 5 MG: 5 TABLET ORAL at 18:16

## 2023-03-06 RX ADMIN — CALCIUM CHLORIDE, MAGNESIUM CHLORIDE, SODIUM CHLORIDE, SODIUM BICARBONATE, POTASSIUM CHLORIDE AND SODIUM PHOSPHATE DIBASIC DIHYDRATE 1500 ML/HR: 3.68; 3.05; 6.34; 3.09; .314; .187 INJECTION INTRAVENOUS at 07:56

## 2023-03-06 RX ADMIN — ARFORMOTEROL TARTRATE 15 MCG: 15 SOLUTION RESPIRATORY (INHALATION) at 08:32

## 2023-03-06 RX ADMIN — CALCIUM CHLORIDE, MAGNESIUM CHLORIDE, SODIUM CHLORIDE, SODIUM BICARBONATE, POTASSIUM CHLORIDE AND SODIUM PHOSPHATE DIBASIC DIHYDRATE 1500 ML/HR: 3.68; 3.05; 6.34; 3.09; .314; .187 INJECTION INTRAVENOUS at 14:52

## 2023-03-06 RX ADMIN — IPRATROPIUM BROMIDE AND ALBUTEROL SULFATE 3 ML: 2.5; .5 SOLUTION RESPIRATORY (INHALATION) at 14:50

## 2023-03-06 RX ADMIN — CALCIUM CHLORIDE, MAGNESIUM CHLORIDE, SODIUM CHLORIDE, SODIUM BICARBONATE, POTASSIUM CHLORIDE AND SODIUM PHOSPHATE DIBASIC DIHYDRATE 1500 ML/HR: 3.68; 3.05; 6.34; 3.09; .314; .187 INJECTION INTRAVENOUS at 07:53

## 2023-03-06 RX ADMIN — GUAIFENESIN 200 MG: 200 SOLUTION ORAL at 20:23

## 2023-03-06 RX ADMIN — IPRATROPIUM BROMIDE AND ALBUTEROL SULFATE 3 ML: 2.5; .5 SOLUTION RESPIRATORY (INHALATION) at 18:56

## 2023-03-06 RX ADMIN — IPRATROPIUM BROMIDE AND ALBUTEROL SULFATE 3 ML: 2.5; .5 SOLUTION RESPIRATORY (INHALATION) at 11:32

## 2023-03-06 NOTE — PROCEDURES
CRRT second notes  Discussed with RN regards to blood pressure.  Slowly blood pressure is getting better  We will monitor volume and ultrafiltrate..     Continue with the CRRT at this time

## 2023-03-06 NOTE — PROGRESS NOTES
"[facility] at [facility]  IP Progress Note    PROBLEM LIST:    CARDIAC  Coronary Artery Disease:   Inferior STEMI with [facility], Dr. Carter, 3/28/2014:  EF 50%, NILE to the mid-dominant RCA, NILE to the proximal LAD  Stress test, 2/13/2023: Inferolateral ischemia,   C 2/17/2023: Severe triple-vessel disease  CABG x3 3/1/2023: LIMA to the LAD, SVG to OM, and SVG to PDA    Myocardium:   Echocardiogram, 3/29/2014:   EF 50-55%, RVSP 30.  Echo 12/21/2022:Stage C, HFmrEF EF 41- 45%, mild LVH    Valvular:   Mild calcification to aortic valve    Electrical:   NSR, LVH, PVC    Percardium:   Normal    VASCULAR:  Arterial  Cerebrovascular disease:   Carotid Doppler: 2/2023 less than 50 %      CARDIAC RISK FACTORS:         Hypertension         Diabetes         Dyslipidemia         Tobacco Use, Quit after 75 years    NON-CARDIAC:  Asthma/COPD: Abnormal PFTs  CKD    SURGERIES:  Tonsillectomy    CHIEF COMPLAINTS:  Follow-up hypertension and dyslipidemia      Subjective    Patient alert this morning with wife at bedside.  Patient wants to get up out of bed.  No other complaints.      Objective     Blood pressure 96/69, pulse 93, temperature 97.7 °F (36.5 °C), temperature source Axillary, resp. rate 20, height 162.6 cm (64\"), weight 75.3 kg (166 lb), SpO2 97 %.     Intake/Output Summary (Last 24 hours) at 3/6/2023 0910  Last data filed at 3/6/2023 0700  Gross per 24 hour   Intake 2357 ml   Output 3383 ml   Net -1026 ml     Constitutional:       Comments: Mild discomfort   HENT:         Comments: BiPAP mask on  Neck:      Vascular: JVD normal.   Pulmonary:      Breath sounds: Examination of the right-lower field reveals decreased breath sounds. Examination of the left-lower field reveals decreased breath sounds. Decreased breath sounds present.      Comments: Scattered rhonchi  Cardiovascular:      Regular rhythm.      Murmurs: There is no murmur.      Biphasic rub.      Comments: Trace bilateral lower extremity " edema, patient also has edema noted to upper extremities.  Sternal incision is clean dry and intact.  Abdominal:      General: There is no distension.   Musculoskeletal:         General: No deformity. Skin:     General: Skin is warm and dry.   Neurological:      General: No focal deficit present.      Mental Status: Alert and oriented to person, place and time.   Psychiatric:      Comments: Improved mentation since Friday, although anxious to get up out of bed.         RESULT REVIEW:    I reviewed the patient's new clinical results.      MEDICATIONS:    acetaminophen, 650 mg, Oral, Q8H  amiodarone, 200 mg, Oral, Q8H   Followed by  [START ON 3/13/2023] amiodarone, 200 mg, Oral, Q12H   Followed by  [START ON 3/27/2023] amiodarone, 200 mg, Oral, Daily  arformoterol, 15 mcg, Nebulization, BID - RT  aspirin, 325 mg, Oral, Daily  budesonide, 0.5 mg, Nebulization, BID - RT  guaiFENesin, 1,200 mg, Oral, Q12H  insulin lispro, 0-9 Units, Subcutaneous, 4x Daily With Meals & Nightly  lidocaine PF 1%, 5 mL, Infiltration, Once  metoprolol tartrate, 12.5 mg, Oral, Q12H  pharmacy consult - MTM, , Does not apply, BID  rosuvastatin, 10 mg, Oral, Nightly  senna-docusate sodium, 2 tablet, Oral, BID          Results from last 7 days   Lab Units 03/06/23  0419   WBC 10*3/mm3 13.67*   HEMOGLOBIN g/dL 7.7*   HEMATOCRIT % 24.0*   PLATELETS 10*3/mm3 95*     Results from last 7 days   Lab Units 03/06/23  0419   SODIUM mmol/L 136   POTASSIUM mmol/L 4.8   CHLORIDE mmol/L 102   CO2 mmol/L 23.0   BUN mg/dL 24*   CREATININE mg/dL 1.34*   CALCIUM mg/dL 6.9*   BILIRUBIN mg/dL 0.6   ALK PHOS U/L 65   ALT (SGPT) U/L 39   AST (SGOT) U/L 154*   GLUCOSE mg/dL 139*     Results from last 7 days   Lab Units 03/02/23  0215 03/01/23  1124 02/28/23  0835   INR  1.32* 1.43* 1.01     Lab Results   Component Value Date    TROPONINI 15.31 (C) 03/30/2014    TROPONINT <0.010 12/11/2022                 No results found for: IRON, FERRITIN, LABIRON, TIBC   Hemoglobin  A1C   Date Value Ref Range Status   02/28/2023 7.40 (H) 4.80 - 5.60 % Final     Magnesium   Date Value Ref Range Status   03/06/2023 2.6 (H) 1.6 - 2.4 mg/dL Final        Tele: Atrial fibrillation    XR Chest 1 View    Result Date: 3/6/2023  Impression: 1. No interval tube or line change. 2. Normal left basilar atelectasis. 3. No evidence pneumothorax. Electronically Signed: Bret Pierson  3/6/2023 8:19 AM EST  Workstation ID: [id number]    XR Chest 1 View    Result Date: 3/5/2023  Impression: Small left pneumothorax. Status post extubation. Postoperative changes the heart. Subsegmental atelectasis both lungs. Electronically Signed: Carleen Ramirez  3/5/2023 8:35 AM EST  Workstation ID: [id number]    XR Chest 1 View    Result Date: 3/4/2023  Impression: 1. Interval placement of nasogastric tube with the tip projecting over the mid body of the stomach. 2. Hazy left basilar airspace disease favored to be due to atelectasis. Electronically Signed: Bret Pierson  3/4/2023 3:46 PM EST  Workstation ID: [id number]    XR Abdomen KUB    Result Date: 3/4/2023  Impression: 1. NG tube in the mid stomach. 2. Mildly increased large and small bowel gas, consistent with low level ileus. Electronically Signed: Larry Covington  3/4/2023 12:46 PM EST  Workstation ID: [id number]        ASSESSMENT:     1. CAD s/p CABG x3, preop EF 40-45%  2. Ischemic cardiomyopathy  3. History of hypertension with recent hypotension on dobutamine and Levophed.  4. Prolonged QT, improving  5. Hyperlipidemia, on Crestor 10 at home  6. Acute on chronic kidney failure  7. Type 2 diabetes          PLAN:     1. Continue IV pressor support with norepinephrine  2. Continue aspirin and Crestor 10.  3. Continuing CRRT and bicarb drip per nephrology.  We will continue to monitor patient for signs of fluid overload.  4. Will initiate GDMT once patient continues to improve and patient is off of pressors.  5. We will switch amiodarone to oral as patient starting at 200 every 8 hours for  1 week then weaning down.    Electronically signed by Rosangela Queen PA-C, 03/06/23, 9:14 AM EST.        STAFF CARDIOLOGIST:      SUMMARY:    1. 67 years old gentleman with a history of for coronary artery disease had successful revascularization and had acute kidney injury on hemodialysis      PERTINENT:    1. No urine output  2. Hemoglobin 7.7      IMPRESSION:    1. Coronary artery disease  2. Acute kidney injury  3. Anemia  4. Atrial fibrillation      RECOMMENDATIONS:    1. Patient has been stable on very rhythm at this time.  We will switch IV amnio to p.o.  2. Patient might need blood transfusion if hemoglobin keeps dropping.  3. Chest tubes are still draining around 90.  Hopefully will take them out and he will be able to get out of the bed as he really wants to walk around.        I have seen and examined the patient, reviewed the above note, necessary changes were made and I agree with the final note.   Nadya Ramirez MD

## 2023-03-06 NOTE — PROGRESS NOTES
Clinical Nutrition     Nutrition Support Assessment  Reason for Visit: MDR, Reduced oral intake      Patient Name: Herbert Harley  YOB: 1955  MRN: [id number]  Date of Encounter: 03/06/23 10:58 EST  Admission date: 3/1/2023    Comments:  -Discontinued Premier Protein (pt dislikes full liquid ONS drinks).  -Ordered Boost Breeze 2x daily for patient to try.      Nutrition Assessment   Admission Diagnosis:  Coronary artery disease involving native coronary artery of native heart, unspecified whether angina present [I25.10]      Problem List:    Coronary artery disease involving native coronary artery of native heart without angina pectoris    Hypertension    Hyperlipidemia LDL goal <70    Type 2 diabetes mellitus with hypoglycemia, with long-term current use of insulin (HCC)    Ischemic cardiomyopathy    Mucopurulent chronic bronchitis (HCC)    Prolonged Q-T interval on ECG    Acute renal failure (ARF) (HCC)    Shock (HCC)    Acute respiratory failure with hypoxia (HCC)        PMH:   He  has a past medical history of Acute renal failure (ARF) (HCC) (3/2/2023), Asthma (December 2022), Chronic systolic congestive heart failure (HCC) (3/1/2023), Coronary artery disease (March 2014), Elevated cholesterol, History of tobacco abuse, HLD (hyperlipidemia) (01/26/2023), Hypertension, Ischemic cardiomyopathy, Myocardial infarction (HCC) (March 2014), Prolonged Q-T interval on ECG (3/1/2023), Type 2 diabetes mellitus (HCC), and Wears glasses.    PSH:  He  has a past surgical history that includes Tonsillectomy; Cardiac catheterization (March 2014); Coronary stent placement (March 2014); Cardiac catheterization (Left, 02/17/2023); and Coronary artery bypass graft (N/A, 3/1/2023).      Applicable Nutrition Concerns:   Skin:  Oral:  GI:      Applicable Interval History:   (3/1) s/p CABG, intubated, extubated  (3/3) placement of non-tunneled catheter for dialysis  (3/4) CRRT initiated, NG tube  removed  3/5) solid food diet order initiated      Reported/Observed/Food/Nutrition Related History:   Note patient has been requiring nasal cannula and bipap. Patient on CRRT, wife at bedside during visit. Wife reports patient is eating but very little (RD notes solid food diet just began yesterday). Patient likes the mandarin oranges. Does not like full liquid ONS drinks, so is not drinking the Premier Protein that is currently ordered. Wife would like patient to try the wildberry Boost Breeze. Patient enjoys hot tea between meals.      Labs    Labs Reviewed: Yes     Results from last 7 days   Lab Units 03/06/23  0931 03/06/23  0419 03/06/23  0004 03/05/23  1643 03/05/23  0811 03/05/23  0440   GLUCOSE mg/dL 157* 139* 147*  147* 137*   < > 82   BUN mg/dL 31* 24* 25*  25* 25*   < > 23   CREATININE mg/dL 1.43* 1.34* 1.39*  1.39* 1.41*   < > 1.77*   SODIUM mmol/L 137 136 137  137 134*   < > 136   CHLORIDE mmol/L 102 102 102  102 101   < > 101   POTASSIUM mmol/L 5.0 4.8 4.9  4.9 5.0   < > 5.4*   PHOSPHORUS mg/dL 6.0*  --  5.6* 6.0*   < >  --    MAGNESIUM mg/dL 2.7*  --  2.6* 2.5*   < >  --    ALT (SGPT) U/L  --  39 49*  --   --  7    < > = values in this interval not displayed.       Results from last 7 days   Lab Units 03/06/23  0931 03/06/23  0419 03/06/23  0004 03/05/23  1643   ALBUMIN g/dL 3.4* 2.6* 3.0*  3.0* 3.2*   IONIZED CALCIUM mmol/L 1.09*  --  1.08* 1.06*       Results from last 7 days   Lab Units 03/06/23  0709 03/05/23 2013 03/05/23  1646 03/05/23  1113 03/05/23  0911 03/05/23  0813   GLUCOSE mg/dL 137* 112 119 128 103 112     Lab Results   Lab Value Date/Time    HGBA1C 7.40 (H) 02/28/2023 0835    HGBA1C 7.1 12/13/2022 1330    HGBA1C 7.1 06/16/2022 0944                 Medications    Medications Reviewed: Yes  Pertinent: insulin, pericolace  Infusion: levophed (@ 0.08 mcg/kg/min), sodium bicarbonate @ 150 ml/hr  PRN:     Intake/Ouptut 24 hrs (0701 - 0700)   I&O's Reviewed: Yes     No documented BM  "this admission    Anthropometrics     Flowsheet Rows    Flowsheet Row First Filed Value   Admission Height 162.6 cm (64\") Documented at 03/01/2023 0611   Admission Weight 75.3 kg (166 lb) Documented at 03/01/2023 0611        Height: Height: 162.6 cm (64\")  Last Filed Weight: Weight: 75.3 kg (166 lb) (03/01/23 0611)  Method:   standing scale (2/28/23) per EMR  BMI: BMI (Calculated): 28.5  BMI classification: Overweight: 25.0-29.9kg/m2   IBW:   130 lbs      Nutrition Focused Physical Exam     Date:   Unable to perform exam due to: Defer pending indication    Needs Assessment   Date: 3/6    Height used:Height: 162.6 cm (64\")  Weights used: 166 lbs/75.5 kg (actual wt) 130 lbs/59 (IBW)      Estimated Calorie needs: ~1800 calories daily  Method: 22-25 Kcals/KG actual wt = 3951-3032      Estimated Protein needs: ~148 g protein daily  Method: 2.0-2.5 g/Kg = 118-148    Estimated Fluid needs:  Per clinical status      Current Nutrition Prescription     PO: Diet: Cardiac Diets; Healthy Heart (2-3 Na+); Texture: Regular Texture (IDDSI 7); Fluid Consistency: Thin (IDDSI 0)  Oral Nutrition Supplement: Premier Protein 3x daily    Intake: Insufficient data      Nutrition Diagnosis     Date: 3/6 Updated:   Problem Inadequate oral intake   Etiology Diet orders r/t CABG, poor appetite   Signs/Symptoms NPO/Liquids from admission until (3/5); <25% intake at breakfast this morning observed by RD   Status:     Date:   Updated:  Problem Increased nutrient needs   Etiology Condition associated with increased caloric/protein needs   Signs/Symptoms [place] requiring CRRT   Status:     Goal:   General: Nutrition to support treatment  PO: Increase intake  EN/PN: N/A    Nutrition Intervention      Follow treatment progress, Care plan reviewed, Interview for preferences, Adjusted supplement, Encourage intake    -Discontinued Premier Protein (pt dislikes full liquid ONS drinks).  -Ordered Boost Breeze 2x daily for patient to " try.    Monitoring/Evaluation:   Per protocol, I&O, PO intake, Supplement intake, Pertinent labs, Weight, Skin status, GI status, Symptoms, Hemodynamic stability      Yadi Gamez RD  Time Spent: 30 min

## 2023-03-06 NOTE — THERAPY EVALUATION
Patient Name: Herbert Harley  : 1955    MRN: [id number]                              Today's Date: 3/6/2023       Admit Date: 3/1/2023    Visit Dx:     ICD-10-CM ICD-9-CM   1. Coronary artery disease involving native coronary artery of native heart without angina pectoris  I25.10 414.01   2. Coronary artery disease involving native coronary artery of native heart, unspecified whether angina present  I25.10 414.01   3. Acute renal failure, unspecified acute renal failure type (HCC)  N17.9 584.9   4. Acute respiratory failure with hypoxia (HCC)  J96.01 518.81     Patient Active Problem List   Diagnosis   • Coronary artery disease involving native coronary artery of native heart without angina pectoris   • Hypertension   • Hyperlipidemia LDL goal <70   • Type 2 diabetes mellitus with hypoglycemia, with long-term current use of insulin (HCC)   • Ischemic cardiomyopathy   • Allergic rhinitis   • Positive cardiac stress test   • Mucopurulent chronic bronchitis (HCC)   • Prolonged Q-T interval on ECG   • Acute renal failure (ARF) (HCC)   • Shock (HCC)   • Acute respiratory failure with hypoxia (HCC)     Past Medical History:   Diagnosis Date   • Acute renal failure (ARF) (HCC) 3/2/2023   • Asthma 2022   • Chronic systolic congestive heart failure (HCC) 3/1/2023   • Coronary artery disease 2014   • Elevated cholesterol    • History of tobacco abuse    • HLD (hyperlipidemia) 2023   • Hypertension    • Ischemic cardiomyopathy    • Myocardial infarction (HCC) 2014   • Prolonged Q-T interval on ECG 3/1/2023   • Type 2 diabetes mellitus (HCC)    • Wears glasses      Past Surgical History:   Procedure Laterality Date   • CARDIAC CATHETERIZATION  2014   • CARDIAC CATHETERIZATION Left 2023    Procedure: Left Heart Cath;  Surgeon: Nadya Ramirez MD;  Location: [facility] CATH INVASIVE LOCATION;  Service: Cardiology;  Laterality: Left;   • CORONARY ARTERY BYPASS GRAFT N/A 3/1/2023     Procedure: MEDIAN STERNOTOMY CORONARY ARTERY BYPASS GRAFTING X3  UTILIZING THE LEFT INTERNAL MAMMERY GRAFT, AND EVH OF THE GREATER RIGHT SAPHENOUS VEIN;  Surgeon: Paresh Saavedra MD;  Location: [facility];  Service: Cardiothoracic;  Laterality: N/A;   • CORONARY STENT PLACEMENT  March 2014   • TONSILLECTOMY        General Information     Row Name 03/06/23 1544          Physical Therapy Time and Intention    Document Type evaluation  -KG     Mode of Treatment physical therapy  -KG     Row Name 03/06/23 1544          General Information    Patient Profile Reviewed yes  -KG     Prior Level of Function independent:;all household mobility;gait;transfer;ADL's;dressing;bathing  -KG     Existing Precautions/Restrictions cardiac;fall;oxygen therapy device and L/min;sternal;other (see comments)   CRRT  -KG     Barriers to Rehab medically complex;cognitive status  -KG     Row Name 03/06/23 1544          Living Environment    People in Home spouse  -KG     Row Name 03/06/23 1544          Home Main Entrance    Number of Stairs, Main Entrance three  can avoid  -KG     Stair Railings, Main Entrance railings on both sides of stairs  -KG     Row Name 03/06/23 1544          Stairs Within Home, Primary    Number of Stairs, Within Home, Primary twelve  -KG     Stair Railings, Within Home, Primary railing on left side (ascending)  -KG     Stairs Comment, Within Home, Primary does not have to use  -KG     Row Name 03/06/23 1544          Cognition    Orientation Status (Cognition) oriented x 3  -KG     Row Name 03/06/23 1544          Safety Issues, Functional Mobility    Safety Issues Affecting Function (Mobility) awareness of need for assistance;insight into deficits/self-awareness;safety precaution awareness;safety precautions follow-through/compliance  -KG     Impairments Affecting Function (Mobility) balance;coordination;endurance/activity tolerance;motor control;motor planning;postural/trunk control;shortness of breath;strength   -KG           User Key  (r) = Recorded By, (t) = Taken By, (c) = Cosigned By    Initials Name Provider Type    Whitney Wyatt PT Physical Therapist               Mobility     Row Name 03/06/23 1545          Bed Mobility    Bed Mobility scooting/bridging;supine-sit;sit-supine  -KG     Scooting/Bridging [place] (Bed Mobility) dependent (less than 25% patient effort);2 person assist;verbal cues  -KG     Supine-Sit [place] (Bed Mobility) maximum assist (25% patient effort);2 person assist;verbal cues  -KG     Sit-Supine [place] (Bed Mobility) maximum assist (25% patient effort);2 person assist;verbal cues  -KG     Assistive Device (Bed Mobility) draw sheet;head of bed elevated  -KG     Comment, (Bed Mobility) VC's for sequencing and safe hand placement to maintain sternal precautions. Pt required increased assistance at trunk and BLEs. Upon sitting EOB pt demonstrated significant posterior lean.  -KG     Row Name 03/06/23 1545          Transfers    Comment, (Transfers) VC's for sequencing and safe hand placement to maintain sternal precautions. Upon standing pt demonstrated significant posterior lean; unable to correct despite verbal and tactile cues. Deferred additional mobility this date.  -KG     Row Name 03/06/23 1545          Sit-Stand Transfer    Sit-Stand [place] (Transfers) maximum assist (25% patient effort);2 person assist;1 person to manage equipment;verbal cues  +1 for CRRT line management  -KG     Row Name 03/06/23 1545          Gait/Stairs (Locomotion)    [place] Level (Gait) unable to assess  -KG     Comment, (Gait/Stairs) Ambulation deferred. Not appropriate to assess.  -KG           User Key  (r) = Recorded By, (t) = Taken By, (c) = Cosigned By    Initials Name Provider Type    Whitney Wyatt PT Physical Therapist               Obj/Interventions     Row Name 03/06/23 1547          Range of Motion Comprehensive    General Range of Motion no range of motion  deficits identified  -KG     Comment, General Range of Motion B LE WFL  -KG     Row Name 03/06/23 1547          Strength Comprehensive (MMT)    Comment, General Manual Muscle Testing (MMT) Assessment B LE grossly 3+/5  -KG     Row Name 03/06/23 1547          Balance    Balance Assessment sitting static balance;standing static balance  -KG     Static Sitting Balance moderate assist  -KG     Position, Sitting Balance supported;sitting edge of bed  -KG     Static Standing Balance moderate assist;2-person assist  -KG     Position/Device Used, Standing Balance supported  -KG     Row Name 03/06/23 1547          Sensory Assessment (Somatosensory)    Sensory Assessment (Somatosensory) LE sensation intact  -KG           User Key  (r) = Recorded By, (t) = Taken By, (c) = Cosigned By    Initials Name Provider Type    KG Whitney Mistry N, PT Physical Therapist               Goals/Plan     Row Name 03/06/23 1550          Bed Mobility Goal 1 (PT)    Activity/Assistive Device (Bed Mobility Goal 1, PT) sit to supine;supine to sit  -KG     [place] Level/Cues Needed (Bed Mobility Goal 1, PT) minimum assist (75% or more patient effort)  -KG     Time Frame (Bed Mobility Goal 1, PT) 2 weeks  -KG     Progress/Outcomes (Bed Mobility Goal 1, PT) goal ongoing  -KG     Row Name 03/06/23 1550          Transfer Goal 1 (PT)    Activity/Assistive Device (Transfer Goal 1, PT) sit-to-stand/stand-to-sit;bed-to-chair/chair-to-bed;walker, rolling  -KG     [place] Level/Cues Needed (Transfer Goal 1, PT) minimum assist (75% or more patient effort)  -KG     Time Frame (Transfer Goal 1, PT) 2 weeks  -KG     Progress/Outcome (Transfer Goal 1, PT) goal ongoing  -KG     Row Name 03/06/23 1550          Gait Training Goal 1 (PT)    Activity/Assistive Device (Gait Training Goal 1, PT) gait (walking locomotion);assistive device use;walker, rolling  -KG     [place] Level (Gait Training Goal 1, PT) moderate assist (50-74% patient effort)  -KG      Distance (Gait Training Goal 1, PT) 50 feet  -KG     Time Frame (Gait Training Goal 1, PT) 2 weeks  -KG     Progress/Outcome (Gait Training Goal 1, PT) goal ongoing  -KG     Row Name 03/06/23 1550          Therapy Assessment/Plan (PT)    Planned Therapy Interventions (PT) balance training;bed mobility training;gait training;strengthening;transfer training  -KG           User Key  (r) = Recorded By, (t) = Taken By, (c) = Cosigned By    Initials Name Provider Type    KG Whitney Mistry, PT Physical Therapist               Clinical Impression     Row Name 03/06/23 1546          Pain    Additional Documentation Pain Scale: FACES Pre/Post-Treatment (Group)  -KG     Row Name 03/06/23 1546          Pain Scale: FACES Pre/Post-Treatment    Pain: FACES Scale, Pretreatment 0-->no hurt  -KG     Posttreatment Pain Rating 0-->no hurt  -KG     Row Name 03/06/23 154          Plan of Care Review    Plan of Care Reviewed With patient  -KG     Outcome Evaluation PT initial evaluation completed for pt s/p CABG x3, currently on CRRT, presenting with generalized weakness, SOA, impaired balance, and decreased functional mobility. Pt required maxA x2 for bed mobility and STS transfers. Pt's decreased independence warrants PT skilled care. Recommend D/C to IP rehab facility.  -KG     Row Name 03/06/23 9598          Therapy Assessment/Plan (PT)    Patient/Family Therapy Goals Statement (PT) return to PLOF  -KG     Rehab Potential (PT) good, to achieve stated therapy goals  -KG     Criteria for Skilled Interventions Met (PT) yes;skilled treatment is necessary  -KG     Therapy Frequency (PT) daily  -KG     Row Name 03/06/23 9627          Vital Signs    Pre Systolic BP Rehab 84  -KG     Pre Treatment Diastolic BP 50  -KG     Post Systolic BP Rehab 126  -KG     Post Treatment Diastolic BP 56  -KG     Pretreatment Heart Rate (beats/min) 92  -KG     Posttreatment Heart Rate (beats/min) 76  -KG     Pre SpO2 (%) 98  -KG     O2 Delivery Pre  Treatment supplemental O2  -KG     Post SpO2 (%) 100  -KG     O2 Delivery Post Treatment supplemental O2  -KG     Pre Patient Position Supine  -KG     Intra Patient Position Standing  -KG     Post Patient Position Supine  -KG     Row Name 03/06/23 1548          Positioning and Restraints    Pre-Treatment Position in bed  -KG     Post Treatment Position bed  -KG     In Bed notified nsg;supine;call light within reach;encouraged to call for assist;with family/caregiver;side rails up x3;RUE elevated;LUE elevated  -KG           User Key  (r) = Recorded By, (t) = Taken By, (c) = Cosigned By    Initials Name Provider Type    Whitney Wyatt, PT Physical Therapist               Outcome Measures     Row Name 03/06/23 1550          How much help from another person do you currently need...    Turning from your back to your side while in flat bed without using bedrails? 2  -KG     Moving from lying on back to sitting on the side of a flat bed without bedrails? 2  -KG     Moving to and from a bed to a chair (including a wheelchair)? 1  -KG     Standing up from a chair using your arms (e.g., wheelchair, bedside chair)? 2  -KG     Climbing 3-5 steps with a railing? 1  -KG     To walk in hospital room? 1  -KG     AM-PAC 6 Clicks Score (PT) 9  -KG     Highest level of mobility 3 --> Sat at edge of bed  -KG     Row Name 03/06/23 1550          Functional Assessment    Outcome Measure Options AM-PAC 6 Clicks Basic Mobility (PT)  -KG           User Key  (r) = Recorded By, (t) = Taken By, (c) = Cosigned By    Initials Name Provider Type    Whitney Wyatt, PT Physical Therapist                             Physical Therapy Education     Title: PT OT SLP Therapies (In Progress)     Topic: Physical Therapy (In Progress)     Point: Mobility training (In Progress)     Learning Progress Summary           Patient Acceptance, E, NR by KG at 3/6/2023 1324                   Point: Home exercise program (Not Started)     Learner  Progress:  Not documented in this visit.          Point: Body mechanics (In Progress)     Learning Progress Summary           Patient Acceptance, E, NR by KG at 3/6/2023 1324                   Point: Precautions (In Progress)     Learning Progress Summary           Patient Acceptance, E, NR by KG at 3/6/2023 1324                               User Key     Initials Effective Dates Name Provider Type Discipline    KG 05/22/20 -  Whitney Mistry, PT Physical Therapist PT              PT Recommendation and Plan  Planned Therapy Interventions (PT): balance training, bed mobility training, gait training, strengthening, transfer training  Plan of Care Reviewed With: patient  Outcome Evaluation: PT initial evaluation completed for pt s/p CABG x3, currently on CRRT, presenting with generalized weakness, SOA, impaired balance, and decreased functional mobility. Pt required maxA x2 for bed mobility and STS transfers. Pt's decreased independence warrants PT skilled care. Recommend D/C to IP rehab facility.     Time Calculation:    PT Charges     Row Name 03/06/23 1324             Time Calculation    Start Time 1324  -KG      PT Received On 03/06/23  -KG      PT Goal Re-Cert Due Date 03/16/23  -KG         Untimed Charges    PT Eval/Re-eval Minutes 48  -KG         Total Minutes    Untimed Charges Total Minutes 48  -KG       Total Minutes 48  -KG            User Key  (r) = Recorded By, (t) = Taken By, (c) = Cosigned By    Initials Name Provider Type    KG Whitney Mistry, PT Physical Therapist              Therapy Charges for Today     Code Description Service Date Service Provider Modifiers Qty    86689448582 HC PT EVAL HIGH COMPLEXITY 4 3/6/2023 Whitney Mistry, PT GP 1          PT G-Codes  Outcome Measure Options: AM-PAC 6 Clicks Basic Mobility (PT)  AM-PAC 6 Clicks Score (PT): 9  PT Discharge Summary  Anticipated Discharge Disposition (PT): inpatient rehabilitation facility    Shelly Mistry  PT  3/6/2023

## 2023-03-06 NOTE — CASE MANAGEMENT/SOCIAL WORK
Continued Stay Note  [facility]     Patient Name: Herbert Harley  MRN: [id number]  Today's Date: 3/6/2023    Admit Date: 3/1/2023    Plan: ONGOING   Discharge Plan     Row Name 03/06/23 0842       Plan    Plan ONGOING    Patient/Family in Agreement with Plan --    Plan Comments Cont CRRT.  Cont IV drips:  Levo, Amio, Bicarb.  Maintain CT.  Tolerating O2 at 3lpm/NC.  BiPAP prn as tolerated.  Transfuse one unit PRBC today.  CM will cont to follow hospital course and asssit with any DC needs as indicated.    Final Discharge Disposition Code 30 - still a patient               Discharge Codes    No documentation.               Expected Discharge Date and Time     Expected Discharge Date Expected Discharge Time    Mar 6, 2023             Whit Matta RN

## 2023-03-06 NOTE — PROGRESS NOTES
CTS Progress Note       LOS: 5 days   Patient Care Team:  Yessica Ricci DO as PCP - General (Family Medicine)  Nima Peter MD as Consulting Physician (Endocrinology)  Nadya Ramirez MD as Cardiologist (Cardiology)  Yessica Ricci DO as Consulting Physician (Family Medicine)    Chief Complaint: Coronary artery disease involving native coronary artery of native heart without angina pectoris    Vital Signs:  Temp:  [96.3 °F (35.7 °C)-97.7 °F (36.5 °C)] 97.7 °F (36.5 °C)  Heart Rate:  [] 72  Resp:  [18-22] 18  BP: ()/(34-79) 113/59    Physical Exam: Lethargic but neuro intact breathing unlabored on 2 L nasal cannula       Results:   Results from last 7 days   Lab Units 03/06/23 0419   WBC 10*3/mm3 13.67*   HEMOGLOBIN g/dL 7.7*   HEMATOCRIT % 24.0*   PLATELETS 10*3/mm3 95*     Results from last 7 days   Lab Units 03/06/23  0419   SODIUM mmol/L 136   POTASSIUM mmol/L 4.8   CHLORIDE mmol/L 102   CO2 mmol/L 23.0   BUN mg/dL 24*   CREATININE mg/dL 1.34*   GLUCOSE mg/dL 139*   CALCIUM mg/dL 6.9*           Imaging Results (Last 24 Hours)     Procedure Component Value Units Date/Time    XR Chest 1 View [[id number]] Resulted: 03/06/23 0448     Updated: 03/06/23 0556    XR Chest 1 View [[id number]] Collected: 03/05/23 0834     Updated: 03/05/23 0838    Narrative:      XR CHEST 1 VW    Date of Exam: 3/5/2023 3:47 AM EST    Indication: Hypoxia.    Comparison: Chest x-ray 3/4/2023    Findings:  Patient has been extubated. Internal jugular catheters have appropriate position. Postoperative changes the heart. Chest tubes are present. Small pneumothorax on the left measures 9.6 cm. There is infrahilar opacity on the right and mild left lower lobe   airspace opacity.      Impression:      Impression:  Small left pneumothorax.  Status post extubation.  Postoperative changes the heart.  Subsegmental atelectasis both lungs.    Electronically Signed: Carleen Ramirez    3/5/2023 8:35 AM EST    Workstation ID: [id number]           Assessment      Coronary artery disease involving native coronary artery of native heart without angina pectoris    Hypertension    Hyperlipidemia LDL goal <70    Type 2 diabetes mellitus with hypoglycemia, with long-term current use of insulin (HCC)    Ischemic cardiomyopathy    Mucopurulent chronic bronchitis (HCC)    Prolonged Q-T interval on ECG    Acute renal failure (ARF) (HCC)    Shock (HCC)    Acute respiratory failure with hypoxia (HCC)    Continuing supportive care currently on CRRT    Plan   Transfuse 1 unit packed cells over 2 hours  Keep 2 units typed and crossed  CBC, CMP and chest x-ray in the a.m.    Please note that portions of this note were completed with a voice recognition program. Efforts were made to edit the dictations, but occasionally words are mistranscribed.    Paresh Saavedra MD  03/06/23  06:25 EST

## 2023-03-06 NOTE — PLAN OF CARE
Goal Outcome Evaluation:  Plan of Care Reviewed With: patient           Outcome Evaluation: PT initial evaluation completed for pt s/p CABG x3, currently on CRRT, presenting with generalized weakness, SOA, impaired balance, and decreased functional mobility. Pt required maxA x2 for bed mobility and STS transfers. Pt's decreased independence warrants PT skilled care. Recommend D/C to IP rehab facility.

## 2023-03-06 NOTE — PLAN OF CARE
Goal Outcome Evaluation:                 Patient alert and oriented. On CRRT, changed filter x1. On 2L NC 02 sats >93%, refused to wear bipap over night. On Levo gtts to maintain map >65. SBP 90-120s. Patient in and out of rate controlled afib, on amio gtts. HR 60-110s. Remains on bicarb gtts. CT output: 1/2-20ml 3/4-210ml. No urine output, bladder scan showed 0. Will continue to monitor.

## 2023-03-06 NOTE — PROGRESS NOTES
"   LOS: 5 days    Patient Care Team:  Yessica Ricci DO as PCP - General (Family Medicine)  Nima Peter MD as Consulting Physician (Endocrinology)  Nadya Ramirez MD as Cardiologist (Cardiology)  Yessica Ricci DO as Consulting Physician (Family Medicine)    Chief Complaint: Acute kidney injury s/p CABG  67-year-old with history of hypertension, diabetes, former smoker 1-1/2 pack/day for quite some time, CAD s/p PCI in 2014, underwent CABG x3, post CABG remain hypotensive blood pressure in the 40s systolic in 60s and 70s 80s range requiring IV pressors.  Developed GEORGIANA oliguric, now an uric  Subjective     Interval History:   Dialysis CRRT in progress at this time hypotension is noted.  Patient is on IV pressors.  IV albumin can be given.  Patient getting blood transfusions.  Remain hypotensive  Review of Systems:   Complains of hiccups, all other negative    Objective     Vital Sign Min/Max for last 24 hours  Temp  Min: 96.5 °F (35.8 °C)  Max: 97.9 °F (36.6 °C)   BP  Min: 75/43  Max: 122/50   Pulse  Min: 68  Max: 112   Resp  Min: 18  Max: 22   SpO2  Min: 83 %  Max: 100 %   Flow (L/min)  Min: 2  Max: 3   No data recorded     Flowsheet Rows    Flowsheet Row First Filed Value   Admission Height 162.6 cm (64\") Documented at 03/01/2023 0611   Admission Weight 75.3 kg (166 lb) Documented at 03/01/2023 0611          No intake/output data recorded.  I/O last 3 completed shifts:  In: 4333 [P.O.:143; I.V.:3915; IV Piggyback:275]  Out: 6157 [Urine:40; Other:5417; Chest Tube:700]    Physical Exam:  General Appearance:  male awake alert oriented no obvious distress laying comfortable.  Eyes: PER, EOMI.  Neck: Supple no JVD.  Right triple-lumen in place.  Chest: Chest tube in place.  Lungs: Clear auscultation, no rales rhonchi's, equal chest movement, nonlabored.  Heart: No gallop, murmur, rub, RRR.  Abdomen: Soft, nontender, positive bowel sounds, no organomegaly.  Extremities: Positive bilateral lower " extremity dependent edema, no cyanosis.  Neuro: No focal deficit, moving all extremities, alert oriented X 3  : Solomon catheter in place.  Minimal urine output  Skin warm and dry.  Psych mood and affect appropriate  Nontunneled dialysis cath right IJ  WBC WBC   Date Value Ref Range Status   03/06/2023 13.67 (H) 3.40 - 10.80 10*3/mm3 Final   03/06/2023 15.42 (H) 3.40 - 10.80 10*3/mm3 Final   03/05/2023 15.64 (H) 3.40 - 10.80 10*3/mm3 Final   03/04/2023 13.05 (H) 3.40 - 10.80 10*3/mm3 Final      HGB Hemoglobin   Date Value Ref Range Status   03/06/2023 7.7 (L) 13.0 - 17.7 g/dL Final   03/06/2023 8.6 (L) 13.0 - 17.7 g/dL Final   03/05/2023 8.9 (L) 13.0 - 17.7 g/dL Final   03/04/2023 9.0 (L) 13.0 - 17.7 g/dL Final   03/04/2023 9.1 (L) 13.0 - 17.7 g/dL Final      HCT Hematocrit   Date Value Ref Range Status   03/06/2023 24.0 (L) 37.5 - 51.0 % Final   03/06/2023 26.7 (L) 37.5 - 51.0 % Final   03/05/2023 27.4 (L) 37.5 - 51.0 % Final   03/04/2023 28.2 (L) 37.5 - 51.0 % Final   03/04/2023 28.6 (L) 37.5 - 51.0 % Final      Platlets No results found for: LABPLAT   MCV MCV   Date Value Ref Range Status   03/06/2023 92.7 79.0 - 97.0 fL Final   03/06/2023 91.1 79.0 - 97.0 fL Final   03/05/2023 91.0 79.0 - 97.0 fL Final   03/04/2023 93.1 79.0 - 97.0 fL Final          Sodium Sodium   Date Value Ref Range Status   03/06/2023 137 136 - 145 mmol/L Final   03/06/2023 136 136 - 145 mmol/L Final   03/06/2023 137 136 - 145 mmol/L Final   03/06/2023 137 136 - 145 mmol/L Final   03/05/2023 134 (L) 136 - 145 mmol/L Final   03/05/2023 136 136 - 145 mmol/L Final   03/05/2023 136 136 - 145 mmol/L Final   03/04/2023 139 136 - 145 mmol/L Final   03/04/2023 135 (L) 136 - 145 mmol/L Final   03/04/2023 135 (L) 136 - 145 mmol/L Final   03/04/2023 136 136 - 145 mmol/L Final   03/04/2023 135 (L) 136 - 145 mmol/L Final   03/03/2023 132 (L) 136 - 145 mmol/L Final      Potassium Potassium   Date Value Ref Range Status   03/06/2023 5.0 3.5 - 5.2 mmol/L  Final     Comment:     Slight hemolysis detected by analyzer. Results may be affected.   03/06/2023 4.8 3.5 - 5.2 mmol/L Final   03/06/2023 4.9 3.5 - 5.2 mmol/L Final   03/06/2023 4.9 3.5 - 5.2 mmol/L Final   03/05/2023 5.0 3.5 - 5.2 mmol/L Final   03/05/2023 5.4 (H) 3.5 - 5.2 mmol/L Final   03/05/2023 5.4 (H) 3.5 - 5.2 mmol/L Final   03/04/2023 5.1 3.5 - 5.2 mmol/L Final   03/04/2023 4.6 3.5 - 5.2 mmol/L Final   03/04/2023 4.9 3.5 - 5.2 mmol/L Final   03/04/2023 4.9 3.5 - 5.2 mmol/L Final   03/04/2023 4.8 3.5 - 5.2 mmol/L Final     Comment:     Slight hemolysis detected by analyzer. Results may be affected.   03/03/2023 5.0 3.5 - 5.2 mmol/L Final     Comment:     Slight hemolysis detected by analyzer. Results may be affected.      Chloride Chloride   Date Value Ref Range Status   03/06/2023 102 98 - 107 mmol/L Final   03/06/2023 102 98 - 107 mmol/L Final   03/06/2023 102 98 - 107 mmol/L Final   03/06/2023 102 98 - 107 mmol/L Final   03/05/2023 101 98 - 107 mmol/L Final   03/05/2023 102 98 - 107 mmol/L Final   03/05/2023 101 98 - 107 mmol/L Final   03/04/2023 103 98 - 107 mmol/L Final   03/04/2023 99 98 - 107 mmol/L Final   03/04/2023 99 98 - 107 mmol/L Final   03/04/2023 99 98 - 107 mmol/L Final   03/04/2023 98 98 - 107 mmol/L Final   03/03/2023 95 (L) 98 - 107 mmol/L Final      CO2 CO2   Date Value Ref Range Status   03/06/2023 21.0 (L) 22.0 - 29.0 mmol/L Final   03/06/2023 23.0 22.0 - 29.0 mmol/L Final   03/06/2023 20.0 (L) 22.0 - 29.0 mmol/L Final   03/06/2023 20.0 (L) 22.0 - 29.0 mmol/L Final   03/05/2023 21.0 (L) 22.0 - 29.0 mmol/L Final   03/05/2023 20.0 (L) 22.0 - 29.0 mmol/L Final   03/05/2023 20.0 (L) 22.0 - 29.0 mmol/L Final   03/04/2023 21.0 (L) 22.0 - 29.0 mmol/L Final   03/04/2023 21.0 (L) 22.0 - 29.0 mmol/L Final   03/04/2023 22.0 22.0 - 29.0 mmol/L Final   03/04/2023 22.0 22.0 - 29.0 mmol/L Final   03/04/2023 22.0 22.0 - 29.0 mmol/L Final   03/03/2023 23.0 22.0 - 29.0 mmol/L Final      BUN BUN   Date  Value Ref Range Status   03/06/2023 31 (H) 8 - 23 mg/dL Final   03/06/2023 24 (H) 8 - 23 mg/dL Final   03/06/2023 25 (H) 8 - 23 mg/dL Final   03/06/2023 25 (H) 8 - 23 mg/dL Final   03/05/2023 25 (H) 8 - 23 mg/dL Final   03/05/2023 23 8 - 23 mg/dL Final   03/05/2023 23 8 - 23 mg/dL Final   03/04/2023 25 (H) 8 - 23 mg/dL Final   03/04/2023 33 (H) 8 - 23 mg/dL Final   03/04/2023 24 (H) 8 - 23 mg/dL Final   03/04/2023 24 (H) 8 - 23 mg/dL Final   03/04/2023 26 (H) 8 - 23 mg/dL Final   03/03/2023 34 (H) 8 - 23 mg/dL Final      Creatinine Creatinine   Date Value Ref Range Status   03/06/2023 1.43 (H) 0.76 - 1.27 mg/dL Final   03/06/2023 1.34 (H) 0.76 - 1.27 mg/dL Final   03/06/2023 1.39 (H) 0.76 - 1.27 mg/dL Final   03/06/2023 1.39 (H) 0.76 - 1.27 mg/dL Final   03/05/2023 1.41 (H) 0.76 - 1.27 mg/dL Final   03/05/2023 1.53 (H) 0.76 - 1.27 mg/dL Final   03/05/2023 1.77 (H) 0.76 - 1.27 mg/dL Final   03/04/2023 1.86 (H) 0.76 - 1.27 mg/dL Final   03/04/2023 2.47 (H) 0.76 - 1.27 mg/dL Final   03/04/2023 2.49 (H) 0.76 - 1.27 mg/dL Final   03/04/2023 2.65 (H) 0.76 - 1.27 mg/dL Final   03/04/2023 2.81 (H) 0.76 - 1.27 mg/dL Final   03/03/2023 4.05 (H) 0.76 - 1.27 mg/dL Final      Calcium Calcium   Date Value Ref Range Status   03/06/2023 7.1 (L) 8.6 - 10.5 mg/dL Final   03/06/2023 6.9 (L) 8.6 - 10.5 mg/dL Final   03/06/2023 7.0 (L) 8.6 - 10.5 mg/dL Final   03/06/2023 7.0 (L) 8.6 - 10.5 mg/dL Final   03/05/2023 7.1 (L) 8.6 - 10.5 mg/dL Final   03/05/2023 7.0 (L) 8.6 - 10.5 mg/dL Final   03/05/2023 7.2 (L) 8.6 - 10.5 mg/dL Final   03/04/2023 7.1 (L) 8.6 - 10.5 mg/dL Final   03/04/2023 6.8 (L) 8.6 - 10.5 mg/dL Final   03/04/2023 7.2 (L) 8.6 - 10.5 mg/dL Final   03/04/2023 7.0 (L) 8.6 - 10.5 mg/dL Final   03/04/2023 7.2 (L) 8.6 - 10.5 mg/dL Final   03/03/2023 7.1 (L) 8.6 - 10.5 mg/dL Final      PO4 No results found for: CAPO4   Albumin Albumin   Date Value Ref Range Status   03/06/2023 3.4 (L) 3.5 - 5.2 g/dL Final   03/06/2023 2.6 (L)  3.5 - 5.2 g/dL Final   03/06/2023 3.0 (L) 3.5 - 5.2 g/dL Final   03/06/2023 3.0 (L) 3.5 - 5.2 g/dL Final   03/05/2023 3.2 (L) 3.5 - 5.2 g/dL Final   03/05/2023 3.2 (L) 3.5 - 5.2 g/dL Final   03/05/2023 3.6 3.5 - 5.2 g/dL Final   03/04/2023 3.4 (L) 3.5 - 5.2 g/dL Final   03/04/2023 3.2 (L) 3.5 - 5.2 g/dL Final   03/04/2023 3.4 (L) 3.5 - 5.2 g/dL Final   03/04/2023 3.4 (L) 3.5 - 5.2 g/dL Final   03/04/2023 3.7 3.5 - 5.2 g/dL Final   03/03/2023 3.6 3.5 - 5.2 g/dL Final      Magnesium Magnesium   Date Value Ref Range Status   03/06/2023 2.7 (H) 1.6 - 2.4 mg/dL Final   03/06/2023 2.6 (H) 1.6 - 2.4 mg/dL Final   03/05/2023 2.5 (H) 1.6 - 2.4 mg/dL Final   03/05/2023 2.5 (H) 1.6 - 2.4 mg/dL Final   03/04/2023 2.5 (H) 1.6 - 2.4 mg/dL Final   03/04/2023 2.4 1.6 - 2.4 mg/dL Final   03/04/2023 2.4 1.6 - 2.4 mg/dL Final   03/04/2023 2.2 1.6 - 2.4 mg/dL Final   03/03/2023 1.8 1.6 - 2.4 mg/dL Final      Uric Acid No results found for: URICACID        Results Review:     I reviewed the patient's new clinical results.    acetaminophen, 650 mg, Oral, Q8H  amiodarone, 200 mg, Oral, Q8H   Followed by  [START ON 3/13/2023] amiodarone, 200 mg, Oral, Q12H   Followed by  [START ON 3/27/2023] amiodarone, 200 mg, Oral, Daily  arformoterol, 15 mcg, Nebulization, BID - RT  aspirin, 325 mg, Oral, Daily  budesonide, 0.5 mg, Nebulization, BID - RT  guaiFENesin, 1,200 mg, Oral, Q12H  insulin lispro, 0-9 Units, Subcutaneous, 4x Daily With Meals & Nightly  lidocaine PF 1%, 5 mL, Infiltration, Once  metoprolol tartrate, 12.5 mg, Oral, Q12H  midodrine, 5 mg, Oral, Q8H  pharmacy consult - MTM, , Does not apply, BID  rosuvastatin, 10 mg, Oral, Nightly  senna-docusate sodium, 2 tablet, Oral, BID      amiodarone in dextrose 5%, 0.5 mg/min, Last Rate: 0.5 mg/min (03/05/23 5411)  DOBUTamine, 5 mcg/kg/min, Last Rate: Stopped (03/04/23 1803)  EPINEPHrine, 0.02-0.3 mcg/kg/min, Last Rate: Stopped (03/02/23 1425)  niCARdipine, 5-15 mg/hr  nitroglycerin, 5-200  mcg/min, Last Rate: Stopped (03/01/23 1431)  norepinephrine, 0.02-0.3 mcg/kg/min, Last Rate: 0.08 mcg/kg/min (03/06/23 0510)  phenylephrine, 0.5-3 mcg/kg/min  Phoxillum BK4/2.5, 1,500 mL/hr, Last Rate: 1,500 mL/hr (03/06/23 0756)  Phoxillum BK4/2.5, 1,500 mL/hr, Last Rate: 1,500 mL/hr (03/06/23 0755)  Phoxillum BK4/2.5, 1,500 mL/hr, Last Rate: 1,500 mL/hr (03/06/23 0753)  sodium bicarbonate drip (greater than 75 mEq/bag), 150 mEq, Last Rate: 150 mEq (03/05/23 2151)        Medication Review: Reviewed    Assessment & Plan       Coronary artery disease involving native coronary artery of native heart without angina pectoris    Hypertension    Hyperlipidemia LDL goal <70    Type 2 diabetes mellitus with hypoglycemia, with long-term current use of insulin ([facility])    Ischemic cardiomyopathy    Mucopurulent chronic bronchitis (HCC)    Prolonged Q-T interval on ECG    Acute renal failure (ARF) (HCC)    Shock (HCC)    Acute respiratory failure with hypoxia (HCC)     1.  GEORGIANA: ATN likely secondary to shock with systolic blood pressure postsurgery in the range of 40s to 80s, now on multiple vasopressors.  Admit creatinine 1.08, creatinine progressively getting worse.  Patient is oliguric.  C3 complement 18, C4 complement 83 within normal range.  Ultrasound of the kidney shows right kidney 11 cm left kidney 10.6 cm normal ultrasound, bladder ultrasound normal.  2.  S/p CABG x3  3.  Ischemic cardiomyopathy with ejection fraction 41-45%.  4.  Diabetes type 2.  5.  Cardiogenic shock.     Recommendations:  Continue with the CRRT.  Okay to give blood transfusion for hemoglobin less than 7.  Physician and providers notes seen.  Ultrafiltrate if tolerated  Replace electrolytes as needed  Avoid nephrotoxic medications.  Keep MAP greater than 65  Check volume status.  Adjust medication for the new GFR 25 mL on CRRT  Case discussed with the medical staff taking care of the patient.  Case discussed with wife  High risk patient with complex  medical problems.  Replace with packed RBC if hemoglobin less than 7 orders for packed RBC given       Chris Moran MD  03/06/23  11:13 EST

## 2023-03-06 NOTE — PROGRESS NOTES
Intensive Care Follow-up     Hospital:  LOS: 5 days   Mr. Herbert Harley, 67 y.o. male is followed for:   Coronary artery disease involving native coronary artery of native heart without angina pectoris   Post op respiratory, electrolyte and hemodynamic management       History of present illness:         Subjective   Interval History:  Patient getting anxious and agitated at times due to unable to get out of bed and move around.  Patient felisha on CRRT however.  Also having some coughing spells and wheezing.  Budesonide nebulizer as well as Brovana.  Will give DuoNeb as well.  Robitussin to see if that will help calm the cough down.  Changing amiodarone IV to oral.  Receiving 1 unit PRBC as well.  Remains on norepinephrine 0.12 mics.                 The patient's past medical, surgical and social history were reviewed and updated in Epic as appropriate.       Objective     Infusions:  amiodarone in dextrose 5%, 0.5 mg/min, Last Rate: 0.5 mg/min (03/05/23 2151)  DOBUTamine, 5 mcg/kg/min, Last Rate: Stopped (03/04/23 1803)  EPINEPHrine, 0.02-0.3 mcg/kg/min, Last Rate: Stopped (03/02/23 1425)  niCARdipine, 5-15 mg/hr  nitroglycerin, 5-200 mcg/min, Last Rate: Stopped (03/01/23 1431)  norepinephrine, 0.02-0.3 mcg/kg/min, Last Rate: 0.08 mcg/kg/min (03/06/23 0510)  phenylephrine, 0.5-3 mcg/kg/min  Phoxillum BK4/2.5, 1,500 mL/hr, Last Rate: 1,500 mL/hr (03/06/23 1133)  Phoxillum BK4/2.5, 1,500 mL/hr, Last Rate: 1,500 mL/hr (03/06/23 1133)  Phoxillum BK4/2.5, 1,500 mL/hr, Last Rate: 1,500 mL/hr (03/06/23 1133)  sodium bicarbonate drip (greater than 75 mEq/bag), 150 mEq, Last Rate: 150 mEq (03/05/23 2151)      Medications:  acetaminophen, 650 mg, Oral, Q8H  amiodarone, 200 mg, Oral, Q8H   Followed by  [START ON 3/13/2023] amiodarone, 200 mg, Oral, Q12H   Followed by  [START ON 3/27/2023] amiodarone, 200 mg, Oral, Daily  arformoterol, 15 mcg, Nebulization, BID - RT  aspirin, 325 mg, Oral, Daily  budesonide, 0.5 mg,  Nebulization, BID - RT  guaiFENesin, 1,200 mg, Oral, Q12H  insulin lispro, 0-9 Units, Subcutaneous, 4x Daily With Meals & Nightly  ipratropium-albuterol, 3 mL, Nebulization, Q4H - RT  lidocaine PF 1%, 5 mL, Infiltration, Once  metoprolol tartrate, 12.5 mg, Oral, Q12H  midodrine, 5 mg, Oral, Q8H  pharmacy consult - MTM, , Does not apply, BID  rosuvastatin, 10 mg, Oral, Nightly  senna-docusate sodium, 2 tablet, Oral, BID        Vital Sign Min/Max for last 24 hours  Temp  Min: 97.1 °F (36.2 °C)  Max: 97.9 °F (36.6 °C)   BP  Min: 75/43  Max: 122/50   Pulse  Min: 68  Max: 112   Resp  Min: 18  Max: 22   SpO2  Min: 83 %  Max: 100 %   Flow (L/min)  Min: 2  Max: 3.5       Input/Output for last 24 hour shift  03/05 0701 - 03/06 0700  In: 2581 [P.O.:3; I.V.:2578]  Out: 3688       Objective  General Appearance: Awake, in mild resp distress  Head:    Atraumatic, Normocephalic, without obvious abnormality  Lungs:   B/L Breath sounds present with decreased breath sounds on bases, diffuse wheezing heard,occ crackles.   Heart: S1 and S2 present, no murmur  Abdomen: Soft, nontender, no guarding or rigidity, bowel sounds positive.  Extremities:  no cyanosis or clubbing,  no edema, warm to touch.  Neurologic:  Moving all four extremities. Good strength bilaterally.  Psychological: Anxious    Results from last 7 days   Lab Units 03/06/23  0419 03/06/23  0004 03/05/23  0440   WBC 10*3/mm3 13.67* 15.42* 15.64*   HEMOGLOBIN g/dL 7.7* 8.6* 8.9*   PLATELETS 10*3/mm3 95* 113* 146     Results from last 7 days   Lab Units 03/06/23  0931 03/06/23  0419 03/06/23  0004 03/05/23  1643   SODIUM mmol/L 137 136 137  137 134*   POTASSIUM mmol/L 5.0 4.8 4.9  4.9 5.0   CO2 mmol/L 21.0* 23.0 20.0*  20.0* 21.0*   BUN mg/dL 31* 24* 25*  25* 25*   CREATININE mg/dL 1.43* 1.34* 1.39*  1.39* 1.41*   MAGNESIUM mg/dL 2.7*  --  2.6* 2.5*   PHOSPHORUS mg/dL 6.0*  --  5.6* 6.0*   GLUCOSE mg/dL 157* 139* 147*  147* 137*     Estimated Creatinine Clearance: 46.5  mL/min (A) (by C-G formula based on SCr of 1.43 mg/dL (H)).    Results from last 7 days   Lab Units 03/05/23  0324   PH, ARTERIAL pH units 7.295*   PCO2, ARTERIAL mm Hg 44.5   PO2 ART mm Hg 105.0       Images:   Chest x-ray reviewed personally and showed cardiomegaly and probable small bilateral effusions.  Mild pulmonary vascular congestion.  Chest tube in good position without any pneumothorax.  Dialysis line in good position.  Right IJ catheter in good position as well.    I reviewed the patient's results and images.     Assessment & Plan   Impression        Coronary artery disease involving native coronary artery of native heart without angina pectoris    Hypertension    Hyperlipidemia LDL goal <70    Type 2 diabetes mellitus with hypoglycemia, with long-term current use of insulin (HCC)    Ischemic cardiomyopathy    Mucopurulent chronic bronchitis (HCC)    Prolonged Q-T interval on ECG    Acute renal failure (ARF) ([facility])    Shock (HCC)    Acute respiratory failure with hypoxia ([facility])       Plan        1.  Patient has postcoronary bypass graft surgery.  Postop management per CT surgery.  2.  Continue aspirin, statin.    3.  Remains hemodynamically unstable requiring norepinephrine infusion.  Try to wean as tolerated.  Will add low-dose midodrine at 5 mg every 8 hours.  4.  Continue CRRT per nephrology.  Not much urine output.  Once hemodynamically stable hopefully will be able to get off CRRT to intermittent hemodialysis.  5.  Amiodarone for atrial fibrillation.  Changing to oral today per cardiology.  6.  Slight drop in hemoglobin and getting transfused 1 unit PRBC.  No acute bleed noted otherwise.  7.  Patient with history of asthma and significant wheezing noted on exam today.  Has been on budesonide and Brovana nebulizer.  We will schedule DuoNeb nebulizers as well every 4 hours and monitor closely.  No need for systemic steroids at this point.  Will give Robitussin for cough control and see if that helps.  8.   Replace electrolytes per ICU protocol.   9.  Patient getting anxious and agitated at times as he is unable to get out of bed.  Discussed the rationale behind it as patient is on CRRT and that will affect the machine.  We will continue to monitor closely.  10. Sliding scale insulin coverage for hyperglycemia management.  11.  Oral diet as tolerated.    Patient felisha with guarded prognosis.  Met with family and updated them about current plan of care as well    Plan of care and goals reviewed with multidisciplinary/antibiotic stewardship team during rounds.   I discussed the patient's findings and my recommendations with patient, family, nursing staff and consulting provider       Time spent 38 min (exclusive of procedure time)  including high complexity decision making to assess, manipulate, and support vital organ system failure in this individual who has impairment of one or more vital organ systems such that there is a high probability of imminent or life threatening deterioration in the patient’s condition.      Thomas Singh MD, [facility]  Pulmonary, Critical care and Sleep Medicine

## 2023-03-06 NOTE — NURSING NOTE
CRRT rounds complete. Policy and procedure reviewed with ANDRÉS Shafer. Orders and documentation reviewed. Hematocrit updated.     Reports filter change x 2 in last 24 hours.  Supplies adequate. Denies further need at this time. Encouraged to call [phone] for assistance or for questions.

## 2023-03-07 LAB
ALBUMIN SERPL-MCNC: 2.5 G/DL (ref 3.5–5.2)
ALBUMIN SERPL-MCNC: 2.6 G/DL (ref 3.5–5.2)
ALBUMIN SERPL-MCNC: 2.6 G/DL (ref 3.5–5.2)
ALBUMIN SERPL-MCNC: 2.7 G/DL (ref 3.5–5.2)
ALBUMIN/GLOB SERPL: 1.9 G/DL
ALP SERPL-CCNC: 81 U/L (ref 39–117)
ALT SERPL W P-5'-P-CCNC: 46 U/L (ref 1–41)
ANION GAP SERPL CALCULATED.3IONS-SCNC: 11 MMOL/L (ref 5–15)
ANION GAP SERPL CALCULATED.3IONS-SCNC: 5 MMOL/L (ref 5–15)
ANION GAP SERPL CALCULATED.3IONS-SCNC: 7 MMOL/L (ref 5–15)
ANION GAP SERPL CALCULATED.3IONS-SCNC: 9 MMOL/L (ref 5–15)
AST SERPL-CCNC: 160 U/L (ref 1–40)
BASOPHILS # BLD AUTO: 0.03 10*3/MM3 (ref 0–0.2)
BASOPHILS NFR BLD AUTO: 0.2 % (ref 0–1.5)
BILIRUB SERPL-MCNC: 0.6 MG/DL (ref 0–1.2)
BUN SERPL-MCNC: 22 MG/DL (ref 8–23)
BUN SERPL-MCNC: 22 MG/DL (ref 8–23)
BUN SERPL-MCNC: 24 MG/DL (ref 8–23)
BUN SERPL-MCNC: 25 MG/DL (ref 8–23)
BUN/CREAT SERPL: 18.6 (ref 7–25)
BUN/CREAT SERPL: 20.2 (ref 7–25)
BUN/CREAT SERPL: 23.1 (ref 7–25)
BUN/CREAT SERPL: 24.5 (ref 7–25)
CA-I SERPL ISE-MCNC: 1.09 MMOL/L (ref 1.12–1.32)
CA-I SERPL ISE-MCNC: 1.11 MMOL/L (ref 1.12–1.32)
CA-I SERPL ISE-MCNC: 1.11 MMOL/L (ref 1.12–1.32)
CALCIUM SPEC-SCNC: 6.6 MG/DL (ref 8.6–10.5)
CALCIUM SPEC-SCNC: 6.7 MG/DL (ref 8.6–10.5)
CHLORIDE SERPL-SCNC: 103 MMOL/L (ref 98–107)
CHLORIDE SERPL-SCNC: 104 MMOL/L (ref 98–107)
CHLORIDE SERPL-SCNC: 105 MMOL/L (ref 98–107)
CHLORIDE SERPL-SCNC: 106 MMOL/L (ref 98–107)
CO2 SERPL-SCNC: 23 MMOL/L (ref 22–29)
CO2 SERPL-SCNC: 24 MMOL/L (ref 22–29)
CO2 SERPL-SCNC: 25 MMOL/L (ref 22–29)
CO2 SERPL-SCNC: 26 MMOL/L (ref 22–29)
CREAT SERPL-MCNC: 0.98 MG/DL (ref 0.76–1.27)
CREAT SERPL-MCNC: 1.08 MG/DL (ref 0.76–1.27)
CREAT SERPL-MCNC: 1.09 MG/DL (ref 0.76–1.27)
CREAT SERPL-MCNC: 1.18 MG/DL (ref 0.76–1.27)
DEPRECATED RDW RBC AUTO: 51.2 FL (ref 37–54)
EGFRCR SERPLBLD CKD-EPI 2021: 67.6 ML/MIN/1.73
EGFRCR SERPLBLD CKD-EPI 2021: 74.4 ML/MIN/1.73
EGFRCR SERPLBLD CKD-EPI 2021: 75.2 ML/MIN/1.73
EGFRCR SERPLBLD CKD-EPI 2021: 84.5 ML/MIN/1.73
EOSINOPHIL # BLD AUTO: 0.08 10*3/MM3 (ref 0–0.4)
EOSINOPHIL NFR BLD AUTO: 0.5 % (ref 0.3–6.2)
ERYTHROCYTE [DISTWIDTH] IN BLOOD BY AUTOMATED COUNT: 15.7 % (ref 12.3–15.4)
GLOBULIN UR ELPH-MCNC: 1.4 GM/DL
GLUCOSE BLDC GLUCOMTR-MCNC: 113 MG/DL (ref 70–130)
GLUCOSE BLDC GLUCOMTR-MCNC: 173 MG/DL (ref 70–130)
GLUCOSE BLDC GLUCOMTR-MCNC: 185 MG/DL (ref 70–130)
GLUCOSE BLDC GLUCOMTR-MCNC: 95 MG/DL (ref 70–130)
GLUCOSE SERPL-MCNC: 100 MG/DL (ref 65–99)
GLUCOSE SERPL-MCNC: 107 MG/DL (ref 65–99)
GLUCOSE SERPL-MCNC: 113 MG/DL (ref 65–99)
GLUCOSE SERPL-MCNC: 118 MG/DL (ref 65–99)
HCT VFR BLD AUTO: 24 % (ref 37.5–51)
HCT VFR BLD AUTO: 25 % (ref 37.5–51)
HCT VFR BLD AUTO: 25.4 % (ref 37.5–51)
HGB BLD-MCNC: 7.6 G/DL (ref 13–17.7)
HGB BLD-MCNC: 8.2 G/DL (ref 13–17.7)
HGB BLD-MCNC: 8.4 G/DL (ref 13–17.7)
IMM GRANULOCYTES # BLD AUTO: 0.62 10*3/MM3 (ref 0–0.05)
IMM GRANULOCYTES NFR BLD AUTO: 3.6 % (ref 0–0.5)
LYMPHOCYTES # BLD AUTO: 2.03 10*3/MM3 (ref 0.7–3.1)
LYMPHOCYTES NFR BLD AUTO: 11.7 % (ref 19.6–45.3)
MAGNESIUM SERPL-MCNC: 2.4 MG/DL (ref 1.6–2.4)
MAGNESIUM SERPL-MCNC: 2.5 MG/DL (ref 1.6–2.4)
MAGNESIUM SERPL-MCNC: 2.5 MG/DL (ref 1.6–2.4)
MCH RBC QN AUTO: 29.4 PG (ref 26.6–33)
MCHC RBC AUTO-ENTMCNC: 32.8 G/DL (ref 31.5–35.7)
MCV RBC AUTO: 89.6 FL (ref 79–97)
MONOCYTES # BLD AUTO: 2.47 10*3/MM3 (ref 0.1–0.9)
MONOCYTES NFR BLD AUTO: 14.2 % (ref 5–12)
NEUTROPHILS NFR BLD AUTO: 12.19 10*3/MM3 (ref 1.7–7)
NEUTROPHILS NFR BLD AUTO: 69.8 % (ref 42.7–76)
NRBC BLD AUTO-RTO: 2.6 /100 WBC (ref 0–0.2)
PHOSPHATE SERPL-MCNC: 4.2 MG/DL (ref 2.5–4.5)
PHOSPHATE SERPL-MCNC: 4.5 MG/DL (ref 2.5–4.5)
PHOSPHATE SERPL-MCNC: 4.5 MG/DL (ref 2.5–4.5)
PLATELET # BLD AUTO: 88 10*3/MM3 (ref 140–450)
PMV BLD AUTO: 11 FL (ref 6–12)
POTASSIUM SERPL-SCNC: 4.2 MMOL/L (ref 3.5–5.2)
POTASSIUM SERPL-SCNC: 4.3 MMOL/L (ref 3.5–5.2)
POTASSIUM SERPL-SCNC: 4.3 MMOL/L (ref 3.5–5.2)
POTASSIUM SERPL-SCNC: 4.6 MMOL/L (ref 3.5–5.2)
PROT SERPL-MCNC: 4 G/DL (ref 6–8.5)
QT INTERVAL: 402 MS
QTC INTERVAL: 491 MS
RBC # BLD AUTO: 2.79 10*6/MM3 (ref 4.14–5.8)
SODIUM SERPL-SCNC: 135 MMOL/L (ref 136–145)
SODIUM SERPL-SCNC: 136 MMOL/L (ref 136–145)
SODIUM SERPL-SCNC: 138 MMOL/L (ref 136–145)
SODIUM SERPL-SCNC: 139 MMOL/L (ref 136–145)
WBC NRBC COR # BLD: 17.42 10*3/MM3 (ref 3.4–10.8)

## 2023-03-07 PROCEDURE — 94799 UNLISTED PULMONARY SVC/PX: CPT

## 2023-03-07 PROCEDURE — 82962 GLUCOSE BLOOD TEST: CPT

## 2023-03-07 PROCEDURE — 84100 ASSAY OF PHOSPHORUS: CPT | Performed by: INTERNAL MEDICINE

## 2023-03-07 PROCEDURE — 99232 SBSQ HOSP IP/OBS MODERATE 35: CPT | Performed by: INTERNAL MEDICINE

## 2023-03-07 PROCEDURE — 97530 THERAPEUTIC ACTIVITIES: CPT

## 2023-03-07 PROCEDURE — 80053 COMPREHEN METABOLIC PANEL: CPT | Performed by: INTERNAL MEDICINE

## 2023-03-07 PROCEDURE — 85025 COMPLETE CBC W/AUTO DIFF WBC: CPT | Performed by: INTERNAL MEDICINE

## 2023-03-07 PROCEDURE — 63710000001 INSULIN LISPRO (HUMAN) PER 5 UNITS: Performed by: INTERNAL MEDICINE

## 2023-03-07 PROCEDURE — 82330 ASSAY OF CALCIUM: CPT | Performed by: INTERNAL MEDICINE

## 2023-03-07 PROCEDURE — P9016 RBC LEUKOCYTES REDUCED: HCPCS

## 2023-03-07 PROCEDURE — 85014 HEMATOCRIT: CPT | Performed by: THORACIC SURGERY (CARDIOTHORACIC VASCULAR SURGERY)

## 2023-03-07 PROCEDURE — 93005 ELECTROCARDIOGRAM TRACING: CPT | Performed by: PHYSICIAN ASSISTANT

## 2023-03-07 PROCEDURE — 99024 POSTOP FOLLOW-UP VISIT: CPT | Performed by: THORACIC SURGERY (CARDIOTHORACIC VASCULAR SURGERY)

## 2023-03-07 PROCEDURE — 93010 ELECTROCARDIOGRAM REPORT: CPT | Performed by: INTERNAL MEDICINE

## 2023-03-07 PROCEDURE — 83735 ASSAY OF MAGNESIUM: CPT | Performed by: INTERNAL MEDICINE

## 2023-03-07 PROCEDURE — 85018 HEMOGLOBIN: CPT | Performed by: THORACIC SURGERY (CARDIOTHORACIC VASCULAR SURGERY)

## 2023-03-07 PROCEDURE — 86900 BLOOD TYPING SEROLOGIC ABO: CPT

## 2023-03-07 RX ORDER — CHOLECALCIFEROL (VITAMIN D3) 125 MCG
5 CAPSULE ORAL NIGHTLY PRN
Status: DISCONTINUED | OUTPATIENT
Start: 2023-03-07 | End: 2023-03-20 | Stop reason: HOSPADM

## 2023-03-07 RX ADMIN — IPRATROPIUM BROMIDE AND ALBUTEROL SULFATE 3 ML: 2.5; .5 SOLUTION RESPIRATORY (INHALATION) at 23:15

## 2023-03-07 RX ADMIN — ACETAMINOPHEN 325MG 650 MG: 325 TABLET ORAL at 14:03

## 2023-03-07 RX ADMIN — CALCIUM CHLORIDE, MAGNESIUM CHLORIDE, SODIUM CHLORIDE, SODIUM BICARBONATE, POTASSIUM CHLORIDE AND SODIUM PHOSPHATE DIBASIC DIHYDRATE 1500 ML/HR: 3.68; 3.05; 6.34; 3.09; .314; .187 INJECTION INTRAVENOUS at 09:23

## 2023-03-07 RX ADMIN — GUAIFENESIN 1200 MG: 600 TABLET, EXTENDED RELEASE ORAL at 22:04

## 2023-03-07 RX ADMIN — CALCIUM CHLORIDE, MAGNESIUM CHLORIDE, SODIUM CHLORIDE, SODIUM BICARBONATE, POTASSIUM CHLORIDE AND SODIUM PHOSPHATE DIBASIC DIHYDRATE 1500 ML/HR: 3.68; 3.05; 6.34; 3.09; .314; .187 INJECTION INTRAVENOUS at 16:19

## 2023-03-07 RX ADMIN — CALCIUM CHLORIDE, MAGNESIUM CHLORIDE, SODIUM CHLORIDE, SODIUM BICARBONATE, POTASSIUM CHLORIDE AND SODIUM PHOSPHATE DIBASIC DIHYDRATE 1500 ML/HR: 3.68; 3.05; 6.34; 3.09; .314; .187 INJECTION INTRAVENOUS at 12:19

## 2023-03-07 RX ADMIN — INSULIN LISPRO 2 UNITS: 100 INJECTION, SOLUTION INTRAVENOUS; SUBCUTANEOUS at 17:35

## 2023-03-07 RX ADMIN — HYDROCODONE BITARTRATE AND ACETAMINOPHEN 1 TABLET: 7.5; 325 TABLET ORAL at 20:16

## 2023-03-07 RX ADMIN — INSULIN LISPRO 2 UNITS: 100 INJECTION, SOLUTION INTRAVENOUS; SUBCUTANEOUS at 12:20

## 2023-03-07 RX ADMIN — SODIUM BICARBONATE 150 MEQ: 84 INJECTION INTRAVENOUS at 02:41

## 2023-03-07 RX ADMIN — SENNOSIDES AND DOCUSATE SODIUM 2 TABLET: 50; 8.6 TABLET ORAL at 08:03

## 2023-03-07 RX ADMIN — CALCIUM CHLORIDE, MAGNESIUM CHLORIDE, SODIUM CHLORIDE, SODIUM BICARBONATE, POTASSIUM CHLORIDE AND SODIUM PHOSPHATE DIBASIC DIHYDRATE 1500 ML/HR: 3.68; 3.05; 6.34; 3.09; .314; .187 INJECTION INTRAVENOUS at 20:09

## 2023-03-07 RX ADMIN — IPRATROPIUM BROMIDE AND ALBUTEROL SULFATE 3 ML: 2.5; .5 SOLUTION RESPIRATORY (INHALATION) at 06:48

## 2023-03-07 RX ADMIN — ARFORMOTEROL TARTRATE 15 MCG: 15 SOLUTION RESPIRATORY (INHALATION) at 06:48

## 2023-03-07 RX ADMIN — IPRATROPIUM BROMIDE AND ALBUTEROL SULFATE 3 ML: 2.5; .5 SOLUTION RESPIRATORY (INHALATION) at 11:05

## 2023-03-07 RX ADMIN — CALCIUM CHLORIDE, MAGNESIUM CHLORIDE, SODIUM CHLORIDE, SODIUM BICARBONATE, POTASSIUM CHLORIDE AND SODIUM PHOSPHATE DIBASIC DIHYDRATE 1500 ML/HR: 3.68; 3.05; 6.34; 3.09; .314; .187 INJECTION INTRAVENOUS at 16:25

## 2023-03-07 RX ADMIN — AMIODARONE HYDROCHLORIDE 200 MG: 200 TABLET ORAL at 17:37

## 2023-03-07 RX ADMIN — CALCIUM CHLORIDE, MAGNESIUM CHLORIDE, SODIUM CHLORIDE, SODIUM BICARBONATE, POTASSIUM CHLORIDE AND SODIUM PHOSPHATE DIBASIC DIHYDRATE 1500 ML/HR: 3.68; 3.05; 6.34; 3.09; .314; .187 INJECTION INTRAVENOUS at 01:39

## 2023-03-07 RX ADMIN — BUDESONIDE 0.5 MG: 0.5 SUSPENSION RESPIRATORY (INHALATION) at 06:48

## 2023-03-07 RX ADMIN — CALCIUM CHLORIDE, MAGNESIUM CHLORIDE, SODIUM CHLORIDE, SODIUM BICARBONATE, POTASSIUM CHLORIDE AND SODIUM PHOSPHATE DIBASIC DIHYDRATE 1500 ML/HR: 3.68; 3.05; 6.34; 3.09; .314; .187 INJECTION INTRAVENOUS at 05:11

## 2023-03-07 RX ADMIN — SENNOSIDES AND DOCUSATE SODIUM 2 TABLET: 50; 8.6 TABLET ORAL at 20:15

## 2023-03-07 RX ADMIN — MIDODRINE HYDROCHLORIDE 5 MG: 5 TABLET ORAL at 02:12

## 2023-03-07 RX ADMIN — GUAIFENESIN 1200 MG: 600 TABLET, EXTENDED RELEASE ORAL at 08:03

## 2023-03-07 RX ADMIN — AMIODARONE HYDROCHLORIDE 200 MG: 200 TABLET ORAL at 02:12

## 2023-03-07 RX ADMIN — CALCIUM CHLORIDE, MAGNESIUM CHLORIDE, SODIUM CHLORIDE, SODIUM BICARBONATE, POTASSIUM CHLORIDE AND SODIUM PHOSPHATE DIBASIC DIHYDRATE 1500 ML/HR: 3.68; 3.05; 6.34; 3.09; .314; .187 INJECTION INTRAVENOUS at 23:30

## 2023-03-07 RX ADMIN — IPRATROPIUM BROMIDE AND ALBUTEROL SULFATE 3 ML: 2.5; .5 SOLUTION RESPIRATORY (INHALATION) at 15:35

## 2023-03-07 RX ADMIN — ANTICOAGULANT CITRATE DEXTROSE SOLUTION FORMULA A 2 ML: 12.25; 11; 3.65 SOLUTION INTRAVENOUS at 23:02

## 2023-03-07 RX ADMIN — GUAIFENESIN 200 MG: 200 SOLUTION ORAL at 17:34

## 2023-03-07 RX ADMIN — AMIODARONE HYDROCHLORIDE 200 MG: 200 TABLET ORAL at 09:39

## 2023-03-07 RX ADMIN — MIDODRINE HYDROCHLORIDE 5 MG: 5 TABLET ORAL at 10:07

## 2023-03-07 RX ADMIN — ANTICOAGULANT CITRATE DEXTROSE SOLUTION FORMULA A 2 ML: 12.25; 11; 3.65 SOLUTION INTRAVENOUS at 23:03

## 2023-03-07 RX ADMIN — MIDODRINE HYDROCHLORIDE 5 MG: 5 TABLET ORAL at 17:34

## 2023-03-07 RX ADMIN — Medication 0.02 MCG/KG/MIN: at 02:10

## 2023-03-07 RX ADMIN — HYDROXYZINE HYDROCHLORIDE 25 MG: 25 TABLET ORAL at 20:15

## 2023-03-07 RX ADMIN — GUAIFENESIN 200 MG: 200 SOLUTION ORAL at 09:45

## 2023-03-07 RX ADMIN — ROSUVASTATIN CALCIUM 10 MG: 10 TABLET, FILM COATED ORAL at 20:15

## 2023-03-07 RX ADMIN — IPRATROPIUM BROMIDE AND ALBUTEROL SULFATE 3 ML: 2.5; .5 SOLUTION RESPIRATORY (INHALATION) at 19:07

## 2023-03-07 RX ADMIN — Medication 5 MG: at 22:04

## 2023-03-07 RX ADMIN — CALCIUM CHLORIDE, MAGNESIUM CHLORIDE, SODIUM CHLORIDE, SODIUM BICARBONATE, POTASSIUM CHLORIDE AND SODIUM PHOSPHATE DIBASIC DIHYDRATE 1500 ML/HR: 3.68; 3.05; 6.34; 3.09; .314; .187 INJECTION INTRAVENOUS at 16:21

## 2023-03-07 RX ADMIN — BUDESONIDE 0.5 MG: 0.5 SUSPENSION RESPIRATORY (INHALATION) at 19:07

## 2023-03-07 RX ADMIN — ASPIRIN 325 MG: 325 TABLET, COATED ORAL at 08:03

## 2023-03-07 NOTE — PROGRESS NOTES
CTS Progress Note       LOS: 6 days   Patient Care Team:  Yessica Ricci DO as PCP - General (Family Medicine)  Nima Peter MD as Consulting Physician (Endocrinology)  Nadya Ramirez MD as Cardiologist (Cardiology)  Yessica Ricci DO as Consulting Physician (Family Medicine)    Chief Complaint: Coronary artery disease involving native coronary artery of native heart without angina pectoris    Vital Signs:  Temp:  [97.3 °F (36.3 °C)-98.1 °F (36.7 °C)] 97.3 °F (36.3 °C)  Heart Rate:  [] 94  Resp:  [18-24] 18  BP: ()/(43-84) 103/57    Physical Exam: Up in chair breathing unlabored on CRRT       Results:   Results from last 7 days   Lab Units 03/07/23  0220   WBC 10*3/mm3 17.42*   HEMOGLOBIN g/dL 8.2*   HEMATOCRIT % 25.0*   PLATELETS 10*3/mm3 88*     Results from last 7 days   Lab Units 03/07/23  0451   SODIUM mmol/L 135*   POTASSIUM mmol/L 4.3   CHLORIDE mmol/L 103   CO2 mmol/L 25.0   BUN mg/dL 22   CREATININE mg/dL 1.18   GLUCOSE mg/dL 107*   CALCIUM mg/dL 6.7*           Imaging Results (Last 24 Hours)     Procedure Component Value Units Date/Time    XR Chest 1 View [[id number]] Collected: 03/06/23 0817     Updated: 03/06/23 0822    Narrative:      XR CHEST 1 VW    Date of Exam: 3/6/2023 4:48 AM EST    Indication: Pulmonary edema and pneumothorax.    Comparison: 3/5/2023    Findings:  Bilateral internal jugular catheters are unchanged. Mediastinal drains and left thoracostomy tubes are also unchanged. Patient is again noted be status post median sternotomy. Heart size is at the upper limits of normal. Pulmonary vessels are within   normal limits. There is some minimal left basilar atelectasis. Right lung is clear. No definite pleural effusion. No evidence pneumothorax.      Impression:      Impression:    1. No interval tube or line change.  2. Normal left basilar atelectasis.  3. No evidence pneumothorax.    Electronically Signed: Bret Pierson    3/6/2023 8:19 AM EST    Workstation ID:  [id number]          Assessment      Coronary artery disease involving native coronary artery of native heart without angina pectoris    Hypertension    Hyperlipidemia LDL goal <70    Type 2 diabetes mellitus with hypoglycemia, with long-term current use of insulin (HCC)    Ischemic cardiomyopathy    Mucopurulent chronic bronchitis (HCC)    Prolonged Q-T interval on ECG    Acute renal failure (ARF) (HCC)    Shock (HCC)    Acute respiratory failure with hypoxia (HCC)        Plan   Discontinue chest tubes number to 3 and 4.  Do not remove chest tubes 1 and 2.  Hemoglobin hematocrit at noon today    Please note that portions of this note were completed with a voice recognition program. Efforts were made to edit the dictations, but occasionally words are mistranscribed.    Paresh Saavedra MD  03/07/23  06:40 EST

## 2023-03-07 NOTE — NURSING NOTE
CRRT rounds complete. Policy and procedure reviewed with ANDRÉS Shafer. Orders and documentation reviewed. Hematocrit updated. Supplies adequate. Denies further need at this time. Encouraged to call [phone] for assistance or for questions.     2 filter changes since rounding yesterday.

## 2023-03-07 NOTE — CASE MANAGEMENT/SOCIAL WORK
Continued Stay Note  [facility]     Patient Name: Herbert Harley  MRN: [id number]  Today's Date: 3/7/2023    Admit Date: 3/1/2023    Plan: Home with spouse   Discharge Plan     Row Name 03/07/23 0844       Plan    Plan Home with spouse    Patient/Family in Agreement with Plan yes    Plan Comments Spoke with patient at bedside. Plan is home with spouse. Patient is refusing rehab. CM will continue to follow.    Final Discharge Disposition Code 01 - home or self-care               Discharge Codes    No documentation.               Expected Discharge Date and Time     Expected Discharge Date Expected Discharge Time    Mar 11, 2023             Benton Whitman RN

## 2023-03-07 NOTE — PROGRESS NOTES
Intensive Care Follow-up     Hospital:  LOS: 6 days   Mr. Herbert Harley, 67 y.o. male is followed for:   Coronary artery disease involving native coronary artery of native heart without angina pectoris   Post op respiratory, electrolyte and hemodynamic management       History of present illness:   67-year-old male with past medical history of tobacco abuse, COPD, coronary disease status post PCI in 2014, hypertension and diabetes.  Patient was having problem with unstable angina and underwent stress test which was found to be abnormal.  Left heart cath revealed multivessel coronary disease.  Echocardiogram showed EF 41 to 45%, mild concentric left ventricular hypertrophy and RVSP of 35-45.  Preoperative carotid duplex did not show any significant carotid disease.  Preop PFTs were abnormal with mostly nonspecific defect and concerning for restrictive defect.  Hemoglobin A1c of 7.4.  Patient underwent  Coronary artery bypass graft surgery x3 by Dr. Saavedra on March 1.  Postop course complicated by worsening renal failure and persistent pressor requirements.  Patient was started on CRRT.    Subjective   Interval History:  Patient was started on midodrine and blood pressure improved.  Was off norepinephrine for few hours yesterday and restarted back today at low-dose.  We will continue to try to wean it off.  2 chest tubes have been removed today.  WBC count is up further but no fevers currently.  No definite focus of infection.  Cough has improved with nebulizers.  Not producing any sputum.  Will monitor for now.                 The patient's past medical, surgical and social history were reviewed and updated in Epic as appropriate.       Objective     Infusions:  DOBUTamine, 5 mcg/kg/min, Last Rate: Stopped (03/04/23 1803)  EPINEPHrine, 0.02-0.3 mcg/kg/min, Last Rate: Stopped (03/02/23 1425)  niCARdipine, 5-15 mg/hr  nitroglycerin, 5-200 mcg/min, Last Rate: Stopped (03/01/23 1431)  norepinephrine, 0.02-0.3  "mcg/kg/min, Last Rate: Stopped (03/07/23 0632)  phenylephrine, 0.5-3 mcg/kg/min  Phoxillum BK4/2.5, 1,500 mL/hr, Last Rate: 1,500 mL/hr (03/07/23 0923)  Phoxillum BK4/2.5, 1,500 mL/hr, Last Rate: 1,500 mL/hr (03/07/23 0923)  Phoxillum BK4/2.5, 1,500 mL/hr, Last Rate: 1,500 mL/hr (03/07/23 0923)  sodium bicarbonate drip (greater than 75 mEq/bag), 150 mEq, Last Rate: 150 mEq (03/07/23 0241)      Medications:  acetaminophen, 650 mg, Oral, Q8H  amiodarone, 200 mg, Oral, Q8H   Followed by  [START ON 3/13/2023] amiodarone, 200 mg, Oral, Q12H   Followed by  [START ON 3/27/2023] amiodarone, 200 mg, Oral, Daily  arformoterol, 15 mcg, Nebulization, BID - RT  aspirin, 325 mg, Oral, Daily  budesonide, 0.5 mg, Nebulization, BID - RT  guaiFENesin, 1,200 mg, Oral, Q12H  insulin lispro, 0-9 Units, Subcutaneous, 4x Daily With Meals & Nightly  ipratropium-albuterol, 3 mL, Nebulization, Q4H - RT  lidocaine PF 1%, 5 mL, Infiltration, Once  metoprolol tartrate, 12.5 mg, Oral, Q12H  midodrine, 5 mg, Oral, Q8H  pharmacy consult - MTM, , Does not apply, BID  rosuvastatin, 10 mg, Oral, Nightly  senna-docusate sodium, 2 tablet, Oral, BID        Vital Sign Min/Max for last 24 hours  Temp  Min: 97.3 °F (36.3 °C)  Max: 98.1 °F (36.7 °C)   BP  Min: 50/39  Max: 132/69   Pulse  Min: 71  Max: 120   Resp  Min: 18  Max: 24   SpO2  Min: 83 %  Max: 100 %   Flow (L/min)  Min: 2  Max: 3.5       Input/Output for last 24 hour shift  03/06 0701 - 03/07 0700  In: 2646 [I.V.:2332]  Out: 0833       Objective:  Vital signs: (most recent): Blood pressure (!) 70/43, pulse 74, temperature 97.3 °F (36.3 °C), temperature source Oral, resp. rate 18, height 162.6 cm (64\"), weight 75.3 kg (166 lb), SpO2 100 %.            General Appearance: Awake, in no acute resp distress  Lungs:   B/L Breath sounds present with decreased breath sounds on bases, no wheezing heard,occ crackles.   Heart: S1 and S2 present, no murmur  Abdomen: Soft, nontender, no guarding or rigidity, " bowel sounds positive.  Extremities:  no cyanosis or clubbing,  no edema, warm to touch.  Neurologic:  Moving all four extremities. Good strength bilaterally.  Psychological: Anxious    Results from last 7 days   Lab Units 03/07/23  0220 03/06/23 2057 03/06/23  0419 03/06/23  0004   WBC 10*3/mm3 17.42*  --  13.67* 15.42*   HEMOGLOBIN g/dL 8.2* 9.3* 7.7* 8.6*   PLATELETS 10*3/mm3 88*  --  95* 113*     Results from last 7 days   Lab Units 03/07/23  0756 03/07/23  0451 03/07/23  0220 03/06/23  1544   SODIUM mmol/L 138 135* 139 134*   POTASSIUM mmol/L 4.6 4.3 4.2 4.9   CO2 mmol/L 23.0 25.0 24.0 20.0*   BUN mg/dL 22 22 25* 28*   CREATININE mg/dL 1.09 1.18 1.08 1.37*   MAGNESIUM mg/dL  --  2.4 2.5* 2.6*   PHOSPHORUS mg/dL 4.5  --  4.5 5.9*   GLUCOSE mg/dL 113* 107* 100* 149*     Estimated Creatinine Clearance: 61 mL/min (by C-G formula based on SCr of 1.09 mg/dL).    Results from last 7 days   Lab Units 03/05/23  0324   PH, ARTERIAL pH units 7.295*   PCO2, ARTERIAL mm Hg 44.5   PO2 ART mm Hg 105.0       Images:   None new    I reviewed the patient's results and images.     Assessment & Plan   Impression        Coronary artery disease involving native coronary artery of native heart without angina pectoris    Hypertension    Hyperlipidemia LDL goal <70    Type 2 diabetes mellitus with hypoglycemia, with long-term current use of insulin (HCC)    Ischemic cardiomyopathy    Mucopurulent chronic bronchitis (HCC)    Prolonged Q-T interval on ECG    Acute renal failure (ARF) ([facility])    Shock (HCC)    Acute respiratory failure with hypoxia ([facility])       Plan        1.  Patient has postcoronary bypass graft surgery.  Postop management per CT surgery.  2.  Continue aspirin, statin.    3.  Remains hemodynamically unstable requiring norepinephrine infusion.   continue midodrine.  CT surgery planning to give 1 more unit of blood to see if that will help improve his hemodynamics further as he has seen slight drop in hemoglobin which could  be due to CRRT circuit clotting of.  No other acute bleed noted.  4.  Continue CRRT per nephrology.  Not much urine output.  Once hemodynamically stable hopefully will be able to get off CRRT to intermittent hemodialysis.  5.  Amiodarone for atrial fibrillation.  Cardiology team is following.  6.  Patient with history of asthma and significant wheezing.  Continue current nebulizer therapy.  DuoNebs scheduled yesterday and seems to be doing better today without any wheezing.  Continue to wean FiO2 as tolerated.  7.  Monitor electrolytes and replace per ICU protocol.  8.  WBC count is elevated but no fevers.  No definite focus of infection.  We will continue to trend for now.  9.  Patient getting anxious and agitated at times as he is unable to get out of bed.  Discussed the rationale behind it as patient is on CRRT and that will affect the machine.  We will continue to monitor closely.  10. Sliding scale insulin coverage for hyperglycemia management.  11.  Oral diet as tolerated.    Patient remains with guarded prognosis and need continued ICU care.  Met with family and updated them about current plan of care as well    Plan of care and goals reviewed with multidisciplinary/antibiotic stewardship team during rounds.   I discussed the patient's findings and my recommendations with patient, family, nursing staff and consulting provider       Time spent 30 min (exclusive of procedure time)  including high complexity decision making to assess, manipulate, and support vital organ system failure in this individual who has impairment of one or more vital organ systems such that there is a high probability of imminent or life threatening deterioration in the patient’s condition.      Thomas Singh MD, [facility]  Pulmonary, Critical care and Sleep Medicine

## 2023-03-07 NOTE — PROCEDURES
CRRT notes #2  Patient on CRRT, slowly blood pressure is getting better and we are able to cut down on the IV pressors.  Still blood pressure in the 90s.  Patient is awake alert.  No significant edema.  Discussed with RN we will continue with the CRRT at this time ultrafiltrating 50 mL/h.

## 2023-03-07 NOTE — PLAN OF CARE
Goal Outcome Evaluation:  Plan of Care Reviewed With: patient           Outcome Evaluation: Pt neuro intact.  Pt worked with physical therapy.  1 unit of PRBC given.  Bicarb gtt continues and levophed gtt being weaned.  will continue to monitor.  at n3/6/2023 193

## 2023-03-07 NOTE — PLAN OF CARE
Goal Outcome Evaluation:               Patient neuro intact. On 2LNC sats>94%. HR 70s-90s. SBP 90-120s. No pressors this am. Bicarb gtt remains. CRRT remains, filter change x1. CT 1/2-120 3/4-80. Will continue to monitor.

## 2023-03-07 NOTE — PROGRESS NOTES
"[facility] at [facility]  IP Progress Note    PROBLEM LIST:  CARDIAC   Coronary Artery Disease:   Inferior STEMI with [facility], Dr. Carter, 3/28/2014:  EF 50%, NILE to the mid-dominant RCA, NILE to the proximal LAD   Stress test, 2/13/2023: Inferolateral ischemia,   [facility], 2/17/2023: Severe triple-vessel disease, %, RCA 80%, LAD 80%  CABG x3, complicated with acute kidney injury  Myocardium:   Echocardiogram, 3/29/2014:   EF 50-55%, RVSP 30.   Echo 12/21/2022:Stage C, HFmrEF EF 41- 45%, mild LVH     Valvular:   Mild calcification to aortic valve     Electrical:   NSR, LVH, PVC     Percardium:   Normal     VASCULAR:   Arterial   Cerebrovascular disease:   Carotid Doppler: 2/2023 less than 50 %         CARDIAC RISK FACTORS:          Hypertension          Diabetes          Dyslipidemia          Tobacco Use, Quit after 75 years     NON-CARDIAC:   Asthma/COPD: Abnormal PFTs     SURGERIES:   Tonsillectomy       HOSPITAL COURSE:  Patient has successful revascularization but unfortunately had acute kidney injury and has been on dialysis      CHIEF COMPLAINTS:  Angiogram with triple-vessel disease      Subjective   Sitting in chair not happy being in the hospital      Objective     Blood pressure 103/57, pulse 96, temperature 97.3 °F (36.3 °C), temperature source Axillary, resp. rate 20, height 162.6 cm (64\"), weight 75.3 kg (166 lb), SpO2 94 %.     Intake/Output Summary (Last 24 hours) at 3/7/2023 0726  Last data filed at 3/7/2023 0700  Gross per 24 hour   Intake 2646 ml   Output 3471 ml   Net -825 ml       PHYSICAL EXAM:  Constitutional:       General: Not in acute distress.     Appearance: Healthy appearance. Not in distress.     Neck:     JVP: 12 cm     Carotid artery: No carotid bruit    Pulmonary:      Effort: Pulmonary effort is normal.      Breath sounds: Normal breath sounds.  Decreased air entry in both lungs.     Cardiovascular:      Normal rate. Regular rhythm. Normal S1. Normal S2.      " 1200 medications not given, Pt lethargic, hardly arousable, VS WNL. NPO maintained pending speech/swallow eval. NP Bartolome notified. Murmurs: There is no murmur.      No gallop. No click. No rub.     Abdominal:      General: Bowel sounds are normal.      Palpations: Abdomen is soft.      Tenderness: There is no abdominal tenderness.    Extremities:     Pulses: Decreased pulses     Edema: No edema    RESULR REVIEW:    I reviewed the patient's new clinical results.      MEDICATIONS:    acetaminophen, 650 mg, Oral, Q8H  amiodarone, 200 mg, Oral, Q8H   Followed by  [START ON 3/13/2023] amiodarone, 200 mg, Oral, Q12H   Followed by  [START ON 3/27/2023] amiodarone, 200 mg, Oral, Daily  arformoterol, 15 mcg, Nebulization, BID - RT  aspirin, 325 mg, Oral, Daily  budesonide, 0.5 mg, Nebulization, BID - RT  guaiFENesin, 1,200 mg, Oral, Q12H  insulin lispro, 0-9 Units, Subcutaneous, 4x Daily With Meals & Nightly  ipratropium-albuterol, 3 mL, Nebulization, Q4H - RT  lidocaine PF 1%, 5 mL, Infiltration, Once  metoprolol tartrate, 12.5 mg, Oral, Q12H  midodrine, 5 mg, Oral, Q8H  pharmacy consult - MTM, , Does not apply, BID  rosuvastatin, 10 mg, Oral, Nightly  senna-docusate sodium, 2 tablet, Oral, BID          Results from last 7 days   Lab Units 03/07/23  0220   WBC 10*3/mm3 17.42*   HEMOGLOBIN g/dL 8.2*   HEMATOCRIT % 25.0*   PLATELETS 10*3/mm3 88*     Results from last 7 days   Lab Units 03/07/23  0451   SODIUM mmol/L 135*   POTASSIUM mmol/L 4.3   CHLORIDE mmol/L 103   CO2 mmol/L 25.0   BUN mg/dL 22   CREATININE mg/dL 1.18   CALCIUM mg/dL 6.7*   BILIRUBIN mg/dL 0.6   ALK PHOS U/L 81   ALT (SGPT) U/L 46*   AST (SGOT) U/L 160*   GLUCOSE mg/dL 107*     Results from last 7 days   Lab Units 03/02/23  0215 03/01/23  1124 02/28/23  0835   INR  1.32* 1.43* 1.01     Lab Results   Component Value Date    TROPONINI 15.31 (C) 03/30/2014    TROPONINT <0.010 12/11/2022                 No results found for: IRON, FERRITIN, LABIRON, TIBC   Hemoglobin A1C   Date Value Ref Range Status   02/28/2023 7.40 (H) 4.80 - 5.60 % Final     Magnesium   Date Value Ref Range Status    03/07/2023 2.4 1.6 - 2.4 mg/dL Final        Tele: Sinus Rythym      ASSESSMENT:     1. Coronary artery disease s/p coronary artery bypass graft  2. Acute kidney injury  3. Chest tubes  4. No urine output  5. Mildly elevated LFTs          PLAN:     1. 2 out of 4 chest tubes will be coming out and he most likely will be more comfortable after that.  He wants to walk.  2. He will continue be on dialysis and may need it for some time.  3. Off of pressors doing better.

## 2023-03-07 NOTE — PROGRESS NOTES
"   LOS: 6 days    Patient Care Team:  Yessica Ricci DO as PCP - General (Family Medicine)  Nima Peter MD as Consulting Physician (Endocrinology)  Nadya Ramirez MD as Cardiologist (Cardiology)  Yessica Ricci DO as Consulting Physician (Family Medicine)    Chief Complaint: Acute kidney injury s/p CABG  67-year-old with history of hypertension, diabetes, former smoker 1-1/2 pack/day for quite some time, CAD s/p PCI in 2014, underwent CABG x3, post CABG remain hypotensive blood pressure in the 40s systolic in 60s and 70s 80s range requiring IV pressors.  Developed [place]NA oliguric, now an uric  Subjective     Interval History:   CRRT in progress, patient remained hypotensive requiring IV pressors and IV albumin.  Review of Systems:   No new complaints.    Objective     Vital Sign Min/Max for last 24 hours  Temp  Min: 97.3 °F (36.3 °C)  Max: 98.1 °F (36.7 °C)   BP  Min: 75/43  Max: 132/69   Pulse  Min: 71  Max: 103   Resp  Min: 18  Max: 24   SpO2  Min: 83 %  Max: 100 %   Flow (L/min)  Min: 2  Max: 3.5   No data recorded     Flowsheet Rows    Flowsheet Row First Filed Value   Admission Height 162.6 cm (64\") Documented at 03/01/2023 0611   Admission Weight 75.3 kg (166 lb) Documented at 03/01/2023 0611          No intake/output data recorded.  I/O last 3 completed shifts:  In: 3907 [I.V.:3593; Blood:314]  Out: 5276 [Other:4586; Chest Tube:690]    Physical Exam:  General Appearance:  male awake alert oriented no obvious distress, sitting and eating at this time.  Eyes: PER, EOMI.  Neck: Supple no JVD.  Right-sided nontunneled catheter.  Left side old nontunneled catheter in place  Chest: Chest tube in place.  Lungs: Clear auscultation, no rales rhonchi's, equal chest movement, nonlabored.  Heart: No gallop, murmur, rub, RRR.  Abdomen: Soft, nontender, positive bowel sounds, no organomegaly.  Extremities: Positive bilateral lower extremity dependent edema, no cyanosis.  Neuro: No focal deficit, moving " all extremities, alert oriented X 3  : Solomon catheter in place.  Minimal urine output  Skin warm and dry.  Psych mood and affect appropriate  Nontunneled dialysis cath right IJ  WBC WBC   Date Value Ref Range Status   03/07/2023 17.42 (H) 3.40 - 10.80 10*3/mm3 Final   03/06/2023 13.67 (H) 3.40 - 10.80 10*3/mm3 Final   03/06/2023 15.42 (H) 3.40 - 10.80 10*3/mm3 Final   03/05/2023 15.64 (H) 3.40 - 10.80 10*3/mm3 Final      HGB Hemoglobin   Date Value Ref Range Status   03/07/2023 8.2 (L) 13.0 - 17.7 g/dL Final   03/06/2023 9.3 (L) 13.0 - 17.7 g/dL Final   03/06/2023 7.7 (L) 13.0 - 17.7 g/dL Final   03/06/2023 8.6 (L) 13.0 - 17.7 g/dL Final   03/05/2023 8.9 (L) 13.0 - 17.7 g/dL Final      HCT Hematocrit   Date Value Ref Range Status   03/07/2023 25.0 (L) 37.5 - 51.0 % Final   03/06/2023 28.5 (L) 37.5 - 51.0 % Final   03/06/2023 24.0 (L) 37.5 - 51.0 % Final   03/06/2023 26.7 (L) 37.5 - 51.0 % Final   03/05/2023 27.4 (L) 37.5 - 51.0 % Final      Platlets No results found for: LABPLAT   MCV MCV   Date Value Ref Range Status   03/07/2023 89.6 79.0 - 97.0 fL Final   03/06/2023 92.7 79.0 - 97.0 fL Final   03/06/2023 91.1 79.0 - 97.0 fL Final   03/05/2023 91.0 79.0 - 97.0 fL Final          Sodium Sodium   Date Value Ref Range Status   03/07/2023 135 (L) 136 - 145 mmol/L Final   03/07/2023 139 136 - 145 mmol/L Final   03/06/2023 134 (L) 136 - 145 mmol/L Final   03/06/2023 137 136 - 145 mmol/L Final   03/06/2023 136 136 - 145 mmol/L Final   03/06/2023 137 136 - 145 mmol/L Final   03/06/2023 137 136 - 145 mmol/L Final   03/05/2023 134 (L) 136 - 145 mmol/L Final   03/05/2023 136 136 - 145 mmol/L Final   03/05/2023 136 136 - 145 mmol/L Final   03/04/2023 139 136 - 145 mmol/L Final   03/04/2023 135 (L) 136 - 145 mmol/L Final      Potassium Potassium   Date Value Ref Range Status   03/07/2023 4.3 3.5 - 5.2 mmol/L Final   03/07/2023 4.2 3.5 - 5.2 mmol/L Final   03/06/2023 4.9 3.5 - 5.2 mmol/L Final   03/06/2023 5.0 3.5 - 5.2 mmol/L  Final     Comment:     Slight hemolysis detected by analyzer. Results may be affected.   03/06/2023 4.8 3.5 - 5.2 mmol/L Final   03/06/2023 4.9 3.5 - 5.2 mmol/L Final   03/06/2023 4.9 3.5 - 5.2 mmol/L Final   03/05/2023 5.0 3.5 - 5.2 mmol/L Final   03/05/2023 5.4 (H) 3.5 - 5.2 mmol/L Final   03/05/2023 5.4 (H) 3.5 - 5.2 mmol/L Final   03/04/2023 5.1 3.5 - 5.2 mmol/L Final   03/04/2023 4.6 3.5 - 5.2 mmol/L Final      Chloride Chloride   Date Value Ref Range Status   03/07/2023 103 98 - 107 mmol/L Final   03/07/2023 106 98 - 107 mmol/L Final   03/06/2023 99 98 - 107 mmol/L Final   03/06/2023 102 98 - 107 mmol/L Final   03/06/2023 102 98 - 107 mmol/L Final   03/06/2023 102 98 - 107 mmol/L Final   03/06/2023 102 98 - 107 mmol/L Final   03/05/2023 101 98 - 107 mmol/L Final   03/05/2023 102 98 - 107 mmol/L Final   03/05/2023 101 98 - 107 mmol/L Final   03/04/2023 103 98 - 107 mmol/L Final   03/04/2023 99 98 - 107 mmol/L Final      CO2 CO2   Date Value Ref Range Status   03/07/2023 25.0 22.0 - 29.0 mmol/L Final   03/07/2023 24.0 22.0 - 29.0 mmol/L Final   03/06/2023 20.0 (L) 22.0 - 29.0 mmol/L Final   03/06/2023 21.0 (L) 22.0 - 29.0 mmol/L Final   03/06/2023 23.0 22.0 - 29.0 mmol/L Final   03/06/2023 20.0 (L) 22.0 - 29.0 mmol/L Final   03/06/2023 20.0 (L) 22.0 - 29.0 mmol/L Final   03/05/2023 21.0 (L) 22.0 - 29.0 mmol/L Final   03/05/2023 20.0 (L) 22.0 - 29.0 mmol/L Final   03/05/2023 20.0 (L) 22.0 - 29.0 mmol/L Final   03/04/2023 21.0 (L) 22.0 - 29.0 mmol/L Final   03/04/2023 21.0 (L) 22.0 - 29.0 mmol/L Final      BUN BUN   Date Value Ref Range Status   03/07/2023 22 8 - 23 mg/dL Final   03/07/2023 25 (H) 8 - 23 mg/dL Final   03/06/2023 28 (H) 8 - 23 mg/dL Final   03/06/2023 31 (H) 8 - 23 mg/dL Final   03/06/2023 24 (H) 8 - 23 mg/dL Final   03/06/2023 25 (H) 8 - 23 mg/dL Final   03/06/2023 25 (H) 8 - 23 mg/dL Final   03/05/2023 25 (H) 8 - 23 mg/dL Final   03/05/2023 23 8 - 23 mg/dL Final   03/05/2023 23 8 - 23 mg/dL Final    03/04/2023 25 (H) 8 - 23 mg/dL Final   03/04/2023 33 (H) 8 - 23 mg/dL Final      Creatinine Creatinine   Date Value Ref Range Status   03/07/2023 1.18 0.76 - 1.27 mg/dL Final   03/07/2023 1.08 0.76 - 1.27 mg/dL Final   03/06/2023 1.37 (H) 0.76 - 1.27 mg/dL Final   03/06/2023 1.43 (H) 0.76 - 1.27 mg/dL Final   03/06/2023 1.34 (H) 0.76 - 1.27 mg/dL Final   03/06/2023 1.39 (H) 0.76 - 1.27 mg/dL Final   03/06/2023 1.39 (H) 0.76 - 1.27 mg/dL Final   03/05/2023 1.41 (H) 0.76 - 1.27 mg/dL Final   03/05/2023 1.53 (H) 0.76 - 1.27 mg/dL Final   03/05/2023 1.77 (H) 0.76 - 1.27 mg/dL Final   03/04/2023 1.86 (H) 0.76 - 1.27 mg/dL Final   03/04/2023 2.47 (H) 0.76 - 1.27 mg/dL Final      Calcium Calcium   Date Value Ref Range Status   03/07/2023 6.7 (L) 8.6 - 10.5 mg/dL Final   03/07/2023 6.7 (L) 8.6 - 10.5 mg/dL Final   03/06/2023 7.3 (L) 8.6 - 10.5 mg/dL Final   03/06/2023 7.1 (L) 8.6 - 10.5 mg/dL Final   03/06/2023 6.9 (L) 8.6 - 10.5 mg/dL Final   03/06/2023 7.0 (L) 8.6 - 10.5 mg/dL Final   03/06/2023 7.0 (L) 8.6 - 10.5 mg/dL Final   03/05/2023 7.1 (L) 8.6 - 10.5 mg/dL Final   03/05/2023 7.0 (L) 8.6 - 10.5 mg/dL Final   03/05/2023 7.2 (L) 8.6 - 10.5 mg/dL Final   03/04/2023 7.1 (L) 8.6 - 10.5 mg/dL Final   03/04/2023 6.8 (L) 8.6 - 10.5 mg/dL Final      PO4 No results found for: CAPO4   Albumin Albumin   Date Value Ref Range Status   03/07/2023 2.6 (L) 3.5 - 5.2 g/dL Final   03/07/2023 2.6 (L) 3.5 - 5.2 g/dL Final   03/06/2023 3.3 (L) 3.5 - 5.2 g/dL Final   03/06/2023 3.4 (L) 3.5 - 5.2 g/dL Final   03/06/2023 2.6 (L) 3.5 - 5.2 g/dL Final   03/06/2023 3.0 (L) 3.5 - 5.2 g/dL Final   03/06/2023 3.0 (L) 3.5 - 5.2 g/dL Final   03/05/2023 3.2 (L) 3.5 - 5.2 g/dL Final   03/05/2023 3.2 (L) 3.5 - 5.2 g/dL Final   03/05/2023 3.6 3.5 - 5.2 g/dL Final   03/04/2023 3.4 (L) 3.5 - 5.2 g/dL Final   03/04/2023 3.2 (L) 3.5 - 5.2 g/dL Final      Magnesium Magnesium   Date Value Ref Range Status   03/07/2023 2.4 1.6 - 2.4 mg/dL Final   03/07/2023  2.5 (H) 1.6 - 2.4 mg/dL Final   03/06/2023 2.6 (H) 1.6 - 2.4 mg/dL Final   03/06/2023 2.7 (H) 1.6 - 2.4 mg/dL Final   03/06/2023 2.6 (H) 1.6 - 2.4 mg/dL Final   03/05/2023 2.5 (H) 1.6 - 2.4 mg/dL Final   03/05/2023 2.5 (H) 1.6 - 2.4 mg/dL Final   03/04/2023 2.5 (H) 1.6 - 2.4 mg/dL Final   03/04/2023 2.4 1.6 - 2.4 mg/dL Final      Uric Acid No results found for: URICACID        Results Review:     I reviewed the patient's new clinical results.    acetaminophen, 650 mg, Oral, Q8H  amiodarone, 200 mg, Oral, Q8H   Followed by  [START ON 3/13/2023] amiodarone, 200 mg, Oral, Q12H   Followed by  [START ON 3/27/2023] amiodarone, 200 mg, Oral, Daily  arformoterol, 15 mcg, Nebulization, BID - RT  aspirin, 325 mg, Oral, Daily  budesonide, 0.5 mg, Nebulization, BID - RT  guaiFENesin, 1,200 mg, Oral, Q12H  insulin lispro, 0-9 Units, Subcutaneous, 4x Daily With Meals & Nightly  ipratropium-albuterol, 3 mL, Nebulization, Q4H - RT  lidocaine PF 1%, 5 mL, Infiltration, Once  metoprolol tartrate, 12.5 mg, Oral, Q12H  midodrine, 5 mg, Oral, Q8H  pharmacy consult - MTM, , Does not apply, BID  rosuvastatin, 10 mg, Oral, Nightly  senna-docusate sodium, 2 tablet, Oral, BID      amiodarone in dextrose 5%, 0.5 mg/min, Last Rate: 0.5 mg/min (03/05/23 2151)  DOBUTamine, 5 mcg/kg/min, Last Rate: Stopped (03/04/23 1803)  EPINEPHrine, 0.02-0.3 mcg/kg/min, Last Rate: Stopped (03/02/23 1425)  niCARdipine, 5-15 mg/hr  nitroglycerin, 5-200 mcg/min, Last Rate: Stopped (03/01/23 1431)  norepinephrine, 0.02-0.3 mcg/kg/min, Last Rate: Stopped (03/07/23 0632)  phenylephrine, 0.5-3 mcg/kg/min  Phoxillum BK4/2.5, 1,500 mL/hr, Last Rate: 1,500 mL/hr (03/07/23 0511)  Phoxillum BK4/2.5, 1,500 mL/hr, Last Rate: 1,500 mL/hr (03/07/23 0511)  Phoxillum BK4/2.5, 1,500 mL/hr, Last Rate: 1,500 mL/hr (03/07/23 0511)  sodium bicarbonate drip (greater than 75 mEq/bag), 150 mEq, Last Rate: 150 mEq (03/07/23 0241)        Medication Review: Reviewed    Assessment & Plan        Coronary artery disease involving native coronary artery of native heart without angina pectoris    Hypertension    Hyperlipidemia LDL goal <70    Type 2 diabetes mellitus with hypoglycemia, with long-term current use of insulin (HCC)    Ischemic cardiomyopathy    Mucopurulent chronic bronchitis (HCC)    Prolonged Q-T interval on ECG    Acute renal failure (ARF) (HCC)    Shock (HCC)    Acute respiratory failure with hypoxia (HCC)     1.  [place]: ATN likely secondary to shock with systolic blood pressure postsurgery in the range of 40s to 80s, now on multiple vasopressors.  Admit creatinine 1.08, creatinine progressively getting worse.  Patient is oliguric.  C3 complement 18, C4 complement 83 within normal range.  Ultrasound of the kidney shows right kidney 11 cm left kidney 10.6 cm normal ultrasound, bladder ultrasound normal.  2.  S/p CABG x3  3.  Ischemic cardiomyopathy with ejection fraction 41-45%.  4.  Diabetes type 2.  5.  Cardiogenic shock.     Recommendations:  Physicians notes seen.  Continue with the current management CRRT.  Ultrafiltrate if tolerated  Replace electrolytes as needed  Avoid nephrotoxic medications.  Keep MAP greater than 65  Check volume status.  Adjust medication for the new GFR 25 mL on CRRT  Case discussed with the medical staff taking care of the patient.  High risk patient with complex medical problems.  Replace with packed RBC if hemoglobin less than 7 orders for packed RBC given       Chris Moran MD  03/07/23  08:13 EST

## 2023-03-08 ENCOUNTER — APPOINTMENT (OUTPATIENT)
Dept: GENERAL RADIOLOGY | Facility: HOSPITAL | Age: 68
DRG: 235 | End: 2023-03-08
Payer: MEDICARE

## 2023-03-08 LAB
ALBUMIN SERPL-MCNC: 2.3 G/DL (ref 3.5–5.2)
ALBUMIN SERPL-MCNC: 2.5 G/DL (ref 3.5–5.2)
ALBUMIN SERPL-MCNC: 2.6 G/DL (ref 3.5–5.2)
ALBUMIN/GLOB SERPL: 1.8 G/DL
ALBUMIN/GLOB SERPL: 1.8 G/DL
ALP SERPL-CCNC: 101 U/L (ref 39–117)
ALP SERPL-CCNC: 93 U/L (ref 39–117)
ALT SERPL W P-5'-P-CCNC: 57 U/L (ref 1–41)
ALT SERPL W P-5'-P-CCNC: 60 U/L (ref 1–41)
ANION GAP SERPL CALCULATED.3IONS-SCNC: 11 MMOL/L (ref 5–15)
ANION GAP SERPL CALCULATED.3IONS-SCNC: 12 MMOL/L (ref 5–15)
ANION GAP SERPL CALCULATED.3IONS-SCNC: 8 MMOL/L (ref 5–15)
AST SERPL-CCNC: 178 U/L (ref 1–40)
AST SERPL-CCNC: 185 U/L (ref 1–40)
BASOPHILS # BLD MANUAL: 0 10*3/MM3 (ref 0–0.2)
BASOPHILS NFR BLD MANUAL: 0 % (ref 0–1.5)
BH BB BLOOD EXPIRATION DATE: NORMAL
BH BB BLOOD EXPIRATION DATE: NORMAL
BH BB BLOOD TYPE BARCODE: 5100
BH BB BLOOD TYPE BARCODE: 5100
BH BB DISPENSE STATUS: NORMAL
BH BB DISPENSE STATUS: NORMAL
BH BB PRODUCT CODE: NORMAL
BH BB PRODUCT CODE: NORMAL
BH BB UNIT NUMBER: NORMAL
BH BB UNIT NUMBER: NORMAL
BILIRUB SERPL-MCNC: 0.5 MG/DL (ref 0–1.2)
BILIRUB SERPL-MCNC: 0.5 MG/DL (ref 0–1.2)
BUN SERPL-MCNC: 20 MG/DL (ref 8–23)
BUN SERPL-MCNC: 20 MG/DL (ref 8–23)
BUN SERPL-MCNC: 21 MG/DL (ref 8–23)
BUN SERPL-MCNC: 22 MG/DL (ref 8–23)
BUN SERPL-MCNC: 30 MG/DL (ref 8–23)
BUN/CREAT SERPL: 18.5 (ref 7–25)
BUN/CREAT SERPL: 19.6 (ref 7–25)
BUN/CREAT SERPL: 19.6 (ref 7–25)
BUN/CREAT SERPL: 20.2 (ref 7–25)
BUN/CREAT SERPL: 21.4 (ref 7–25)
CA-I SERPL ISE-MCNC: 1.1 MMOL/L (ref 1.12–1.32)
CA-I SERPL ISE-MCNC: 1.12 MMOL/L (ref 1.12–1.32)
CA-I SERPL ISE-MCNC: 1.12 MMOL/L (ref 1.12–1.32)
CALCIUM SPEC-SCNC: 6.8 MG/DL (ref 8.6–10.5)
CALCIUM SPEC-SCNC: 6.8 MG/DL (ref 8.6–10.5)
CALCIUM SPEC-SCNC: 6.9 MG/DL (ref 8.6–10.5)
CHLORIDE SERPL-SCNC: 103 MMOL/L (ref 98–107)
CHLORIDE SERPL-SCNC: 104 MMOL/L (ref 98–107)
CHLORIDE SERPL-SCNC: 104 MMOL/L (ref 98–107)
CHLORIDE SERPL-SCNC: 105 MMOL/L (ref 98–107)
CHLORIDE SERPL-SCNC: 105 MMOL/L (ref 98–107)
CO2 SERPL-SCNC: 21 MMOL/L (ref 22–29)
CO2 SERPL-SCNC: 23 MMOL/L (ref 22–29)
CO2 SERPL-SCNC: 24 MMOL/L (ref 22–29)
CO2 SERPL-SCNC: 25 MMOL/L (ref 22–29)
CO2 SERPL-SCNC: 25 MMOL/L (ref 22–29)
CREAT SERPL-MCNC: 1.02 MG/DL (ref 0.76–1.27)
CREAT SERPL-MCNC: 1.02 MG/DL (ref 0.76–1.27)
CREAT SERPL-MCNC: 1.04 MG/DL (ref 0.76–1.27)
CREAT SERPL-MCNC: 1.19 MG/DL (ref 0.76–1.27)
CREAT SERPL-MCNC: 1.4 MG/DL (ref 0.76–1.27)
CROSSMATCH INTERPRETATION: NORMAL
CROSSMATCH INTERPRETATION: NORMAL
DEPRECATED RDW RBC AUTO: 49.8 FL (ref 37–54)
EGFRCR SERPLBLD CKD-EPI 2021: 55.1 ML/MIN/1.73
EGFRCR SERPLBLD CKD-EPI 2021: 67 ML/MIN/1.73
EGFRCR SERPLBLD CKD-EPI 2021: 78.7 ML/MIN/1.73
EGFRCR SERPLBLD CKD-EPI 2021: 80.6 ML/MIN/1.73
EGFRCR SERPLBLD CKD-EPI 2021: 80.6 ML/MIN/1.73
EOSINOPHIL # BLD MANUAL: 0 10*3/MM3 (ref 0–0.4)
EOSINOPHIL NFR BLD MANUAL: 0 % (ref 0.3–6.2)
ERYTHROCYTE [DISTWIDTH] IN BLOOD BY AUTOMATED COUNT: 15.5 % (ref 12.3–15.4)
GLOBULIN UR ELPH-MCNC: 1.4 GM/DL
GLOBULIN UR ELPH-MCNC: 1.4 GM/DL
GLUCOSE BLDC GLUCOMTR-MCNC: 155 MG/DL (ref 70–130)
GLUCOSE BLDC GLUCOMTR-MCNC: 189 MG/DL (ref 70–130)
GLUCOSE BLDC GLUCOMTR-MCNC: 208 MG/DL (ref 70–130)
GLUCOSE BLDC GLUCOMTR-MCNC: 98 MG/DL (ref 70–130)
GLUCOSE SERPL-MCNC: 103 MG/DL (ref 65–99)
GLUCOSE SERPL-MCNC: 112 MG/DL (ref 65–99)
GLUCOSE SERPL-MCNC: 136 MG/DL (ref 65–99)
GLUCOSE SERPL-MCNC: 136 MG/DL (ref 65–99)
GLUCOSE SERPL-MCNC: 212 MG/DL (ref 65–99)
HCT VFR BLD AUTO: 26.9 % (ref 37.5–51)
HGB BLD-MCNC: 8.9 G/DL (ref 13–17.7)
LYMPHOCYTES # BLD MANUAL: 2.03 10*3/MM3 (ref 0.7–3.1)
LYMPHOCYTES NFR BLD MANUAL: 7 % (ref 5–12)
MAGNESIUM SERPL-MCNC: 2.3 MG/DL (ref 1.6–2.4)
MAGNESIUM SERPL-MCNC: 2.3 MG/DL (ref 1.6–2.4)
MAGNESIUM SERPL-MCNC: 2.4 MG/DL (ref 1.6–2.4)
MCH RBC QN AUTO: 29.7 PG (ref 26.6–33)
MCHC RBC AUTO-ENTMCNC: 33.1 G/DL (ref 31.5–35.7)
MCV RBC AUTO: 89.7 FL (ref 79–97)
METAMYELOCYTES NFR BLD MANUAL: 1 % (ref 0–0)
MONOCYTES # BLD: 1.42 10*3/MM3 (ref 0.1–0.9)
MRSA DNA SPEC QL NAA+PROBE: NEGATIVE
MYELOCYTES NFR BLD MANUAL: 1 % (ref 0–0)
NEUTROPHILS # BLD AUTO: 16.4 10*3/MM3 (ref 1.7–7)
NEUTROPHILS NFR BLD MANUAL: 60 % (ref 42.7–76)
NEUTS BAND NFR BLD MANUAL: 21 % (ref 0–5)
NRBC SPEC MANUAL: 4 /100 WBC (ref 0–0.2)
PHOSPHATE SERPL-MCNC: 4.5 MG/DL (ref 2.5–4.5)
PHOSPHATE SERPL-MCNC: 4.8 MG/DL (ref 2.5–4.5)
PHOSPHATE SERPL-MCNC: 5 MG/DL (ref 2.5–4.5)
PLAT MORPH BLD: NORMAL
PLATELET # BLD AUTO: 69 10*3/MM3 (ref 140–450)
PMV BLD AUTO: 10.9 FL (ref 6–12)
POTASSIUM SERPL-SCNC: 4.5 MMOL/L (ref 3.5–5.2)
POTASSIUM SERPL-SCNC: 4.5 MMOL/L (ref 3.5–5.2)
POTASSIUM SERPL-SCNC: 4.6 MMOL/L (ref 3.5–5.2)
POTASSIUM SERPL-SCNC: 4.8 MMOL/L (ref 3.5–5.2)
POTASSIUM SERPL-SCNC: 5 MMOL/L (ref 3.5–5.2)
PROCALCITONIN SERPL-MCNC: 1.55 NG/ML (ref 0–0.25)
PROT SERPL-MCNC: 3.9 G/DL (ref 6–8.5)
PROT SERPL-MCNC: 3.9 G/DL (ref 6–8.5)
RBC # BLD AUTO: 3 10*6/MM3 (ref 4.14–5.8)
RBC MORPH BLD: NORMAL
SODIUM SERPL-SCNC: 134 MMOL/L (ref 136–145)
SODIUM SERPL-SCNC: 137 MMOL/L (ref 136–145)
SODIUM SERPL-SCNC: 138 MMOL/L (ref 136–145)
SODIUM SERPL-SCNC: 138 MMOL/L (ref 136–145)
SODIUM SERPL-SCNC: 139 MMOL/L (ref 136–145)
UNIT  ABO: NORMAL
UNIT  ABO: NORMAL
UNIT  RH: NORMAL
UNIT  RH: NORMAL
VARIANT LYMPHS NFR BLD MANUAL: 1 % (ref 0–5)
VARIANT LYMPHS NFR BLD MANUAL: 9 % (ref 19.6–45.3)
WBC MORPH BLD: NORMAL
WBC NRBC COR # BLD: 20.25 10*3/MM3 (ref 3.4–10.8)

## 2023-03-08 PROCEDURE — 94761 N-INVAS EAR/PLS OXIMETRY MLT: CPT

## 2023-03-08 PROCEDURE — 87040 BLOOD CULTURE FOR BACTERIA: CPT | Performed by: INTERNAL MEDICINE

## 2023-03-08 PROCEDURE — 93005 ELECTROCARDIOGRAM TRACING: CPT | Performed by: PHYSICIAN ASSISTANT

## 2023-03-08 PROCEDURE — 25010000002 PIPERACILLIN SOD-TAZOBACTAM PER 1 G: Performed by: INTERNAL MEDICINE

## 2023-03-08 PROCEDURE — 94660 CPAP INITIATION&MGMT: CPT

## 2023-03-08 PROCEDURE — 99232 SBSQ HOSP IP/OBS MODERATE 35: CPT | Performed by: INTERNAL MEDICINE

## 2023-03-08 PROCEDURE — 71045 X-RAY EXAM CHEST 1 VIEW: CPT

## 2023-03-08 PROCEDURE — 83735 ASSAY OF MAGNESIUM: CPT | Performed by: INTERNAL MEDICINE

## 2023-03-08 PROCEDURE — 85025 COMPLETE CBC W/AUTO DIFF WBC: CPT | Performed by: INTERNAL MEDICINE

## 2023-03-08 PROCEDURE — 94799 UNLISTED PULMONARY SVC/PX: CPT

## 2023-03-08 PROCEDURE — 99233 SBSQ HOSP IP/OBS HIGH 50: CPT | Performed by: INTERNAL MEDICINE

## 2023-03-08 PROCEDURE — 82330 ASSAY OF CALCIUM: CPT | Performed by: INTERNAL MEDICINE

## 2023-03-08 PROCEDURE — 99024 POSTOP FOLLOW-UP VISIT: CPT | Performed by: THORACIC SURGERY (CARDIOTHORACIC VASCULAR SURGERY)

## 2023-03-08 PROCEDURE — 82962 GLUCOSE BLOOD TEST: CPT

## 2023-03-08 PROCEDURE — 87641 MR-STAPH DNA AMP PROBE: CPT | Performed by: INTERNAL MEDICINE

## 2023-03-08 PROCEDURE — 84145 PROCALCITONIN (PCT): CPT | Performed by: INTERNAL MEDICINE

## 2023-03-08 PROCEDURE — 85007 BL SMEAR W/DIFF WBC COUNT: CPT | Performed by: INTERNAL MEDICINE

## 2023-03-08 PROCEDURE — 80053 COMPREHEN METABOLIC PANEL: CPT | Performed by: INTERNAL MEDICINE

## 2023-03-08 PROCEDURE — 63710000001 INSULIN LISPRO (HUMAN) PER 5 UNITS: Performed by: INTERNAL MEDICINE

## 2023-03-08 PROCEDURE — 93010 ELECTROCARDIOGRAM REPORT: CPT | Performed by: INTERNAL MEDICINE

## 2023-03-08 PROCEDURE — 84100 ASSAY OF PHOSPHORUS: CPT | Performed by: INTERNAL MEDICINE

## 2023-03-08 PROCEDURE — 97530 THERAPEUTIC ACTIVITIES: CPT

## 2023-03-08 RX ORDER — AMIODARONE HYDROCHLORIDE 200 MG/1
200 TABLET ORAL EVERY 12 HOURS SCHEDULED
Status: DISCONTINUED | OUTPATIENT
Start: 2023-03-08 | End: 2023-03-15

## 2023-03-08 RX ORDER — PANTOPRAZOLE SODIUM 40 MG/1
40 TABLET, DELAYED RELEASE ORAL
Status: DISCONTINUED | OUTPATIENT
Start: 2023-03-08 | End: 2023-03-12

## 2023-03-08 RX ORDER — CALCIUM CARBONATE 200(500)MG
1 TABLET,CHEWABLE ORAL 3 TIMES DAILY PRN
Status: DISCONTINUED | OUTPATIENT
Start: 2023-03-08 | End: 2023-03-20 | Stop reason: HOSPADM

## 2023-03-08 RX ORDER — PANTOPRAZOLE SODIUM 40 MG/1
40 TABLET, DELAYED RELEASE ORAL
Status: DISCONTINUED | OUTPATIENT
Start: 2023-03-09 | End: 2023-03-08

## 2023-03-08 RX ORDER — MIDODRINE HYDROCHLORIDE 10 MG/1
10 TABLET ORAL EVERY 8 HOURS
Status: DISCONTINUED | OUTPATIENT
Start: 2023-03-08 | End: 2023-03-20 | Stop reason: HOSPADM

## 2023-03-08 RX ADMIN — TAZOBACTAM SODIUM AND PIPERACILLIN SODIUM 3.38 G: 375; 3 INJECTION, SOLUTION INTRAVENOUS at 11:12

## 2023-03-08 RX ADMIN — AMIODARONE HYDROCHLORIDE 200 MG: 200 TABLET ORAL at 18:09

## 2023-03-08 RX ADMIN — CALCIUM CHLORIDE, MAGNESIUM CHLORIDE, SODIUM CHLORIDE, SODIUM BICARBONATE, POTASSIUM CHLORIDE AND SODIUM PHOSPHATE DIBASIC DIHYDRATE 750 ML/HR: 3.68; 3.05; 6.34; 3.09; .314; .187 INJECTION INTRAVENOUS at 18:29

## 2023-03-08 RX ADMIN — INSULIN LISPRO 2 UNITS: 100 INJECTION, SOLUTION INTRAVENOUS; SUBCUTANEOUS at 13:36

## 2023-03-08 RX ADMIN — GUAIFENESIN 1200 MG: 600 TABLET, EXTENDED RELEASE ORAL at 20:18

## 2023-03-08 RX ADMIN — ACETAMINOPHEN 325MG 650 MG: 325 TABLET ORAL at 07:03

## 2023-03-08 RX ADMIN — Medication 5 MG: at 20:18

## 2023-03-08 RX ADMIN — CALCIUM CARBONATE (ANTACID) CHEW TAB 500 MG 1 TABLET: 500 CHEW TAB at 18:22

## 2023-03-08 RX ADMIN — TAZOBACTAM SODIUM AND PIPERACILLIN SODIUM 3.38 G: 375; 3 INJECTION, SOLUTION INTRAVENOUS at 18:12

## 2023-03-08 RX ADMIN — SENNOSIDES AND DOCUSATE SODIUM 2 TABLET: 50; 8.6 TABLET ORAL at 20:19

## 2023-03-08 RX ADMIN — ROSUVASTATIN CALCIUM 10 MG: 10 TABLET, FILM COATED ORAL at 20:18

## 2023-03-08 RX ADMIN — HYDROXYZINE HYDROCHLORIDE 25 MG: 25 TABLET ORAL at 20:18

## 2023-03-08 RX ADMIN — MIDODRINE HYDROCHLORIDE 10 MG: 10 TABLET ORAL at 10:40

## 2023-03-08 RX ADMIN — CALCIUM CHLORIDE, MAGNESIUM CHLORIDE, SODIUM CHLORIDE, SODIUM BICARBONATE, POTASSIUM CHLORIDE AND SODIUM PHOSPHATE DIBASIC DIHYDRATE 750 ML/HR: 3.68; 3.05; 6.34; 3.09; .314; .187 INJECTION INTRAVENOUS at 11:20

## 2023-03-08 RX ADMIN — GUAIFENESIN 200 MG: 200 SOLUTION ORAL at 13:36

## 2023-03-08 RX ADMIN — MIDODRINE HYDROCHLORIDE 10 MG: 10 TABLET ORAL at 18:09

## 2023-03-08 RX ADMIN — IPRATROPIUM BROMIDE AND ALBUTEROL SULFATE 3 ML: 2.5; .5 SOLUTION RESPIRATORY (INHALATION) at 18:35

## 2023-03-08 RX ADMIN — IPRATROPIUM BROMIDE AND ALBUTEROL SULFATE 3 ML: 2.5; .5 SOLUTION RESPIRATORY (INHALATION) at 10:52

## 2023-03-08 RX ADMIN — ARFORMOTEROL TARTRATE 15 MCG: 15 SOLUTION RESPIRATORY (INHALATION) at 06:23

## 2023-03-08 RX ADMIN — IPRATROPIUM BROMIDE AND ALBUTEROL SULFATE 3 ML: 2.5; .5 SOLUTION RESPIRATORY (INHALATION) at 06:23

## 2023-03-08 RX ADMIN — BUDESONIDE 0.5 MG: 0.5 SUSPENSION RESPIRATORY (INHALATION) at 18:34

## 2023-03-08 RX ADMIN — Medication 12.5 MG: at 08:10

## 2023-03-08 RX ADMIN — CALCIUM CHLORIDE, MAGNESIUM CHLORIDE, SODIUM CHLORIDE, SODIUM BICARBONATE, POTASSIUM CHLORIDE AND SODIUM PHOSPHATE DIBASIC DIHYDRATE 1500 ML/HR: 3.68; 3.05; 6.34; 3.09; .314; .187 INJECTION INTRAVENOUS at 06:37

## 2023-03-08 RX ADMIN — CALCIUM CHLORIDE, MAGNESIUM CHLORIDE, SODIUM CHLORIDE, SODIUM BICARBONATE, POTASSIUM CHLORIDE AND SODIUM PHOSPHATE DIBASIC DIHYDRATE 1500 ML/HR: 3.68; 3.05; 6.34; 3.09; .314; .187 INJECTION INTRAVENOUS at 03:05

## 2023-03-08 RX ADMIN — AMIODARONE HYDROCHLORIDE 200 MG: 200 TABLET ORAL at 03:08

## 2023-03-08 RX ADMIN — ARFORMOTEROL TARTRATE 15 MCG: 15 SOLUTION RESPIRATORY (INHALATION) at 18:35

## 2023-03-08 RX ADMIN — GUAIFENESIN 1200 MG: 600 TABLET, EXTENDED RELEASE ORAL at 08:08

## 2023-03-08 RX ADMIN — INSULIN LISPRO 2 UNITS: 100 INJECTION, SOLUTION INTRAVENOUS; SUBCUTANEOUS at 18:09

## 2023-03-08 RX ADMIN — IPRATROPIUM BROMIDE AND ALBUTEROL SULFATE 3 ML: 2.5; .5 SOLUTION RESPIRATORY (INHALATION) at 16:29

## 2023-03-08 RX ADMIN — IPRATROPIUM BROMIDE AND ALBUTEROL SULFATE 3 ML: 2.5; .5 SOLUTION RESPIRATORY (INHALATION) at 02:54

## 2023-03-08 RX ADMIN — INSULIN LISPRO 4 UNITS: 100 INJECTION, SOLUTION INTRAVENOUS; SUBCUTANEOUS at 20:19

## 2023-03-08 RX ADMIN — BUDESONIDE 0.5 MG: 0.5 SUSPENSION RESPIRATORY (INHALATION) at 06:23

## 2023-03-08 RX ADMIN — ACETAMINOPHEN 325MG 650 MG: 325 TABLET ORAL at 20:18

## 2023-03-08 RX ADMIN — SENNOSIDES AND DOCUSATE SODIUM 2 TABLET: 50; 8.6 TABLET ORAL at 08:09

## 2023-03-08 RX ADMIN — ASPIRIN 325 MG: 325 TABLET, COATED ORAL at 08:08

## 2023-03-08 RX ADMIN — PANTOPRAZOLE SODIUM 40 MG: 40 TABLET, DELAYED RELEASE ORAL at 21:18

## 2023-03-08 RX ADMIN — MIDODRINE HYDROCHLORIDE 5 MG: 5 TABLET ORAL at 03:08

## 2023-03-08 NOTE — PLAN OF CARE
Goal Outcome Evaluation:      Patient alert and oriented. On RA 02 sats >93%. SBP 90s-130. HR 70-90s. No gtts over night. CT: 220mls. CRRT remains. Bladder scan 76mls. WBC trending up. Will continue to monitor.

## 2023-03-08 NOTE — NURSING NOTE
CRRT rounds complete. Policy and procedure reviewed with ANDRÉS Ellsworth. Orders and documentation reviewed.    Reports filter changed 1 time in the last 24 hours.      Patient has been hypothermic despite using heating blanket.  Prismaflex line warmer connected to patient.       Hematocrit updated. Supplies adequate. Denies further need at this time. Encouraged to call [phone] for assistance or for questions.

## 2023-03-08 NOTE — PROGRESS NOTES
CTS Progress Note       LOS: 7 days   Patient Care Team:  Yessica Ricci DO as PCP - General (Family Medicine)  Nima Peter MD as Consulting Physician (Endocrinology)  Nadya Ramirez MD as Cardiologist (Cardiology)  Yessica Ricci DO as Consulting Physician (Family Medicine)    Chief Complaint: Coronary artery disease involving native coronary artery of native heart without angina pectoris    Vital Signs:  Temp:  [97.3 °F (36.3 °C)-98.5 °F (36.9 °C)] 97.9 °F (36.6 °C)  Heart Rate:  [] 89  Resp:  [16-20] 16  BP: ()/() 107/68    Physical Exam: Breathing unlabored alert       Results:   Results from last 7 days   Lab Units 03/08/23  0422   WBC 10*3/mm3 20.25*   HEMOGLOBIN g/dL 8.9*   HEMATOCRIT % 26.9*   PLATELETS 10*3/mm3 69*     Results from last 7 days   Lab Units 03/08/23  0422   SODIUM mmol/L 139   POTASSIUM mmol/L 4.8   CHLORIDE mmol/L 104   CO2 mmol/L 24.0   BUN mg/dL 21   CREATININE mg/dL 1.04   GLUCOSE mg/dL 112*   CALCIUM mg/dL 6.9*           Imaging Results (Last 24 Hours)     ** No results found for the last 24 hours. **          Assessment      Coronary artery disease involving native coronary artery of native heart without angina pectoris    Hypertension    Hyperlipidemia LDL goal <70    Type 2 diabetes mellitus with hypoglycemia, with long-term current use of insulin (HCC)    Ischemic cardiomyopathy    Mucopurulent chronic bronchitis (HCC)    Prolonged Q-T interval on ECG    Acute renal failure (ARF) (HCC)    Shock (HCC)    Acute respiratory failure with hypoxia (HCC)    Off pressor agents.  Leukocytosis noted.  220 mils from chest tube in the last 12 hours so cannot remove chest tube    Plan   CBC BMP in the a.m.    Please note that portions of this note were completed with a voice recognition program. Efforts were made to edit the dictations, but occasionally words are mistranscribed.    Paresh Saavedra MD  03/08/23  06:23 EST

## 2023-03-08 NOTE — PROGRESS NOTES
Clinical Nutrition     Nutrition Support Assessment  Reason for Visit: MDR, Follow-up protocol      Patient Name: Herbert Harley  YOB: 1955  MRN: [id number]  Date of Encounter: 03/08/23 10:08 EST  Admission date: 3/1/2023    Comments:  -Continue Boost Breeze 2x daily - patient drinking. Not ideal choice given sugar content, but patient dislikes full liquid ONS drinks.  -Recommend Premier Protein clear drinks at home (1 g carbohydrate, 20 g protein)  -Communicated meal orders/preferences to diet office.      Nutrition Assessment   Admission Diagnosis:  Coronary artery disease involving native coronary artery of native heart, unspecified whether angina present [I25.10]      Problem List:    Coronary artery disease involving native coronary artery of native heart without angina pectoris    Hypertension    Hyperlipidemia LDL goal <70    Type 2 diabetes mellitus with hypoglycemia, with long-term current use of insulin (HCC)    Ischemic cardiomyopathy    Mucopurulent chronic bronchitis (HCC)    Prolonged Q-T interval on ECG    Acute renal failure (ARF) (HCC)    Shock (HCC)    Acute respiratory failure with hypoxia (HCC)        PMH:   He  has a past medical history of Acute renal failure (ARF) (HCC) (3/2/2023), Asthma (December 2022), Chronic systolic congestive heart failure (HCC) (3/1/2023), Coronary artery disease (March 2014), Elevated cholesterol, History of tobacco abuse, HLD (hyperlipidemia) (01/26/2023), Hypertension, Ischemic cardiomyopathy, Myocardial infarction (HCC) (March 2014), Prolonged Q-T interval on ECG (3/1/2023), Type 2 diabetes mellitus (HCC), and Wears glasses.    PSH:  He  has a past surgical history that includes Tonsillectomy; Cardiac catheterization (March 2014); Coronary stent placement (March 2014); Cardiac catheterization (Left, 02/17/2023); and Coronary artery bypass graft (N/A, 3/1/2023).      Applicable Nutrition Concerns:    Skin:  Oral:  GI:      Applicable Interval History:   (3/1) s/p CABG, intubated, extubated  (3/3) placement of non-tunneled catheter for dialysis  (3/4) CRRT initiated, NG tube removed  3/5) solid food diet order initiated      Reported/Observed/Food/Nutrition Related History:   3/8  Patient sleeping, wife at bedside. Pt continues on CRRT. Wife reports patient continues to eat very little at meals. Throat sore and not tolerating swallowing meat well at this time. Likes and is drinking the wildberry Boost Breeze. RD recommends clear Premier Protein at home as this contains more protein and significantly less carbohydrate. Wife provided RD with meal orders/preferences. Requests oatmeal (maple/brown sugar) daily with breakfast as patient enjoys eating this every morning at home. Enjoys cottage cheese with pears. Dislikes salads. RN reports she has requested something be ordered for patient's indigestion.    3/6  Note patient has been requiring nasal cannula and bipap. Patient on CRRT, wife at bedside during visit. Wife reports patient is eating but very little (RD notes solid food diet just began yesterday). Patient likes the mandarin oranges. Does not like full liquid ONS drinks, so is not drinking the Premier Protein that is currently ordered. Wife would like patient to try the wildberry Boost Breeze. Patient enjoys hot tea between meals.      Labs    Labs Reviewed: Yes     Results from last 7 days   Lab Units 03/08/23  0806 03/08/23  0422 03/08/23  0013 03/07/23  1810 03/07/23  0756 03/07/23  0451   GLUCOSE mg/dL 103* 112* 136*  136* 118*   < > 107*   BUN mg/dL 22 21 20  20 24*   < > 22   CREATININE mg/dL 1.19 1.04 1.02  1.02 0.98   < > 1.18   SODIUM mmol/L 137 139 138  138 136   < > 135*   CHLORIDE mmol/L 104 104 105  105 105   < > 103   POTASSIUM mmol/L 4.6 4.8 4.5  4.5 4.3   < > 4.3   PHOSPHORUS mg/dL 4.8*  --  4.5 4.2   < >  --    MAGNESIUM mg/dL 2.3  --  2.3 2.5*  --  2.4   ALT (SGPT) U/L  --  60* 57*   "--   --  46*    < > = values in this interval not displayed.       Results from last 7 days   Lab Units 03/08/23  0806 03/08/23  0422 03/08/23  0013 03/07/23  1820   ALBUMIN g/dL 2.3* 2.5* 2.5*  2.5*  --    IONIZED CALCIUM mmol/L 1.12  --  1.10* 1.09*       Results from last 7 days   Lab Units 03/08/23  0709 03/07/23  1936 03/07/23  1621 03/07/23  1157 03/07/23  0813 03/06/23 2021   GLUCOSE mg/dL 98 95 185* 173* 113 246*     Lab Results   Lab Value Date/Time    HGBA1C 7.40 (H) 02/28/2023 0835    HGBA1C 7.1 12/13/2022 1330    HGBA1C 7.1 06/16/2022 0944                 Medications    Medications Reviewed: Yes  Pertinent: insulin, pericolace, antibiotic  Infusion:  PRN:     Intake/Ouptut 24 hrs (0701 - 0700)   I&O's Reviewed: Yes     No documented BM this admission    Anthropometrics     Flowsheet Rows    Flowsheet Row First Filed Value   Admission Height 162.6 cm (64\") Documented at 03/01/2023 0611   Admission Weight 75.3 kg (166 lb) Documented at 03/01/2023 0611        Height: Height: 162.6 cm (64\")  Last Filed Weight: Weight: 75.3 kg (166 lb) (03/01/23 0611)  Method:   standing scale (2/28/23) per EMR  BMI: BMI (Calculated): 28.5  BMI classification: Overweight: 25.0-29.9kg/m2   IBW:   130 lbs      Nutrition Focused Physical Exam     Date:   Unable to perform exam due to: Defer pending indication    Needs Assessment   Date: 3/6    Height used:Height: 162.6 cm (64\")  Weights used: 166 lbs/75.5 kg (actual wt) 130 lbs/59 (IBW)      Estimated Calorie needs: ~1800 calories daily  Method: 22-25 Kcals/KG actual wt = 0992-5929    Estimated Protein needs: ~148 g protein daily  Method: 2.0-2.5 g/Kg IBW = 118-148    Estimated Fluid needs:  Per clinical status      Current Nutrition Prescription     PO: Diet: Cardiac Diets; Healthy Heart (2-3 Na+); Texture: Regular Texture (IDDSI 7); Fluid Consistency: Thin (IDDSI 0)  Oral Nutrition Supplement: Boost Breeze 2x daily    Intake: 42% x 3 meals (only documentation " available)      Nutrition Diagnosis     Date: 3/6 Updated: 3/8  Problem Inadequate oral intake   Etiology Diet orders r/t CABG, poor appetite, food preferences   Signs/Symptoms NPO/Liquids from admission until (3/5); 42% x 3 meals per nsg doc   Status: ongoing    Date:  3/6 Updated: 3/8  Problem Increased nutrient needs   Etiology Condition associated with increased caloric/protein needs   Signs/Symptoms [place] requiring CRRT   Status: ongoing    Goal:   General: Nutrition to support treatment  PO: Increase intake  EN/PN: N/A    Nutrition Intervention      Follow treatment progress, Care plan reviewed, Advise alternate selection, Interview for preferences, Menu provided, Adjusted supplement, Encourage intake    -Continue Boost Breeze 2x daily  -Communicated preferences to diet office.    Monitoring/Evaluation:   Per protocol, I&O, PO intake, Supplement intake, Pertinent labs, Weight, Skin status, GI status, Symptoms, Hemodynamic stability      Yadi Gamez RD  Time Spent: 30 min

## 2023-03-08 NOTE — PROGRESS NOTES
"[facility] at [facility]  IP Progress Note    PROBLEM LIST:  CARDIAC     Coronary Artery Disease:     Inferior STEMI with [facility], Dr. Caretr, 3/28/2014:  EF 50%, NILE to the mid-dominant RCA, NILE to the proximal LAD   Stress test, 2/13/2023: Inferolateral ischemia,   [facility], 2/17/2023: Severe triple-vessel disease, %, RCA 80%, LAD 80%  CABG x3, complicated with acute kidney injury    Myocardium:   Echocardiogram, 3/29/2014:   EF 50-55%, RVSP 30.   Echo 12/21/2022:Stage C, HFmrEF EF 41- 45%, mild LVH     Valvular:   Mild calcification to aortic valve     Electrical:   NSR, LVH, PVC     Percardium:   Normal     VASCULAR:   Arterial   Cerebrovascular disease:   Carotid Doppler: 2/2023 less than 50 %         CARDIAC RISK FACTORS:          Hypertension          Diabetes          Dyslipidemia          Tobacco Use, Quit after 75 years     NON-CARDIAC:   Asthma/COPD: Abnormal PFTs     SURGERIES:   Tonsillectomy       HOSPITAL COURSE:  Patient has successful revascularization but unfortunately had acute kidney injury and has been on dialysis      CHIEF COMPLAINTS:  Angiogram with triple-vessel disease      Subjective   Sitting up in bed, wife at bedside.  Eating breakfast.  Has no complaints.    Objective     Blood pressure 119/76, pulse 91, temperature 97.6 °F (36.4 °C), temperature source Oral, resp. rate 18, height 162.6 cm (64\"), weight 75.3 kg (166 lb), SpO2 97 %.     Intake/Output Summary (Last 24 hours) at 3/8/2023 0918  Last data filed at 3/8/2023 0800  Gross per 24 hour   Intake 783 ml   Output 1684 ml   Net -901 ml       PHYSICAL EXAM:  Constitutional:    No acute distress    Neck:  No elevated JVD, no carotid bruit  Pulmonary:   Decreased breath sounds in bases  Cardiovascular:   Regular rate and rhythm without rub, midline sternal incision is clean dry and intact    Abdominal:    Abdomen is soft and nontender, nondistended    Extremities:  Trace bilateral lower extremity edema, palpable PT " pulses    Psych:  Remains anxious    RESULR REVIEW:    I reviewed the patient's new clinical results.      MEDICATIONS:    acetaminophen, 650 mg, Oral, Q8H  amiodarone, 200 mg, Oral, Q8H   Followed by  [START ON 3/13/2023] amiodarone, 200 mg, Oral, Q12H   Followed by  [START ON 3/27/2023] amiodarone, 200 mg, Oral, Daily  arformoterol, 15 mcg, Nebulization, BID - RT  aspirin, 325 mg, Oral, Daily  budesonide, 0.5 mg, Nebulization, BID - RT  guaiFENesin, 1,200 mg, Oral, Q12H  insulin lispro, 0-9 Units, Subcutaneous, 4x Daily With Meals & Nightly  ipratropium-albuterol, 3 mL, Nebulization, Q4H - RT  metoprolol tartrate, 12.5 mg, Oral, Q12H  midodrine, 5 mg, Oral, Q8H  pharmacy consult - MTM, , Does not apply, BID  rosuvastatin, 10 mg, Oral, Nightly  senna-docusate sodium, 2 tablet, Oral, BID          Results from last 7 days   Lab Units 03/08/23  0422   WBC 10*3/mm3 20.25*   HEMOGLOBIN g/dL 8.9*   HEMATOCRIT % 26.9*   PLATELETS 10*3/mm3 69*     Results from last 7 days   Lab Units 03/08/23  0806 03/08/23  0422   SODIUM mmol/L 137 139   POTASSIUM mmol/L 4.6 4.8   CHLORIDE mmol/L 104 104   CO2 mmol/L 21.0* 24.0   BUN mg/dL 22 21   CREATININE mg/dL 1.19 1.04   CALCIUM mg/dL 6.9* 6.9*   BILIRUBIN mg/dL  --  0.5   ALK PHOS U/L  --  101   ALT (SGPT) U/L  --  60*   AST (SGOT) U/L  --  185*   GLUCOSE mg/dL 103* 112*     Results from last 7 days   Lab Units 03/02/23  0215 03/01/23  1124   INR  1.32* 1.43*     Lab Results   Component Value Date    TROPONINI 15.31 (C) 03/30/2014    TROPONINT <0.010 12/11/2022                 No results found for: IRON, FERRITIN, LABIRON, TIBC   Hemoglobin A1C   Date Value Ref Range Status   02/28/2023 7.40 (H) 4.80 - 5.60 % Final     Magnesium   Date Value Ref Range Status   03/08/2023 2.3 1.6 - 2.4 mg/dL Final        Tele: Sinus Rythym      ASSESSMENT:      1. CAD s/p CABG x3, preop EF 40-45%  2. Ischemic cardiomyopathy  3. History of hypertension with recent hypotension on dobutamine and  Levophed.  4. Prolonged QT, improving  5. Hyperlipidemia, on Crestor 10 at home  6. Acute on chronic kidney failure  7. Type 2 diabetes          PLAN:     1. Decrease amiodarone to 200 mg p.o. twice daily as patient is in normal sinus rhythm and QTc is still slightly prolonged.  2. Continue aspirin 325 mg and Crestor 10 mg  3. Continue metoprolol tartrate 12.5 twice daily for rate control.  4. Remain on midodrine 5 mg 3 times daily if patient's blood pressure remains lower although it is improved today.  5. Continue CRRT per nephrology still awaiting for increased urine output  6. Chest tube remains in per CT surgery    Electronically signed by Rosangela Queen PA-C, 03/08/23, 9:24 AM EST.            STAFF CARDIOLOGIST:      SUMMARY:    1. Severe triple-vessel disease with successful review vascularization complicated by acute kidney injury on dialysis.      PERTINENT:    1. 2 out of 4 chest tubes are out  2. He is still draining more than 100 150 and chest tubes  3. He is eating but has no appetite  4. Bladder scan has about 75 cc in last 24 hours      IMPRESSION:    1. Acute kidney injury  2. Coronary artery disease status postcoronary artery bypass graft  3. EF around 45 to 50%      RECOMMENDATIONS:    1. We will switch it to p.o. amiodarone  2. I think if his kidneys does not recover we will might switch it to hemodialysis  3. PT OT.        I have seen and examined the patient, reviewed the above note, necessary changes were made and I agree with the final note.   Nadya Ramirez MD

## 2023-03-08 NOTE — THERAPY TREATMENT NOTE
Patient Name: Herbert Harley  : 1955    MRN: [id number]                              Today's Date: 3/8/2023       Admit Date: 3/1/2023    Visit Dx:     ICD-10-CM ICD-9-CM   1. Coronary artery disease involving native coronary artery of native heart without angina pectoris  I25.10 414.01   2. Coronary artery disease involving native coronary artery of native heart, unspecified whether angina present  I25.10 414.01   3. Acute renal failure, unspecified acute renal failure type (HCC)  N17.9 584.9   4. Acute respiratory failure with hypoxia (HCC)  J96.01 518.81     Patient Active Problem List   Diagnosis   • Coronary artery disease involving native coronary artery of native heart without angina pectoris   • Hypertension   • Hyperlipidemia LDL goal <70   • Type 2 diabetes mellitus with hypoglycemia, with long-term current use of insulin (HCC)   • Ischemic cardiomyopathy   • Allergic rhinitis   • Positive cardiac stress test   • Mucopurulent chronic bronchitis (HCC)   • Prolonged Q-T interval on ECG   • Acute renal failure (ARF) (HCC)   • Shock (HCC)   • Acute respiratory failure with hypoxia (HCC)     Past Medical History:   Diagnosis Date   • Acute renal failure (ARF) (HCC) 3/2/2023   • Asthma 2022   • Chronic systolic congestive heart failure (HCC) 3/1/2023   • Coronary artery disease 2014   • Elevated cholesterol    • History of tobacco abuse    • HLD (hyperlipidemia) 2023   • Hypertension    • Ischemic cardiomyopathy    • Myocardial infarction (HCC) 2014   • Prolonged Q-T interval on ECG 3/1/2023   • Type 2 diabetes mellitus (HCC)    • Wears glasses      Past Surgical History:   Procedure Laterality Date   • CARDIAC CATHETERIZATION  2014   • CARDIAC CATHETERIZATION Left 2023    Procedure: Left Heart Cath;  Surgeon: Nadya Ramirez MD;  Location[facility] CATH INVASIVE LOCATION;  Service: Cardiology;  Laterality: Left;   • CORONARY ARTERY BYPASS GRAFT N/A 3/1/2023     Procedure: MEDIAN STERNOTOMY CORONARY ARTERY BYPASS GRAFTING X3  UTILIZING THE LEFT INTERNAL MAMMERY GRAFT, AND EVH OF THE GREATER RIGHT SAPHENOUS VEIN;  Surgeon: Paresh Saavedra MD;  Location: [facility];  Service: Cardiothoracic;  Laterality: N/A;   • CORONARY STENT PLACEMENT  March 2014   • TONSILLECTOMY        General Information     Row Name 03/08/23 1137          Physical Therapy Time and Intention    Document Type therapy note (daily note)  -KG     Mode of Treatment physical therapy  -KG     Row Name 03/08/23 1137          General Information    Existing Precautions/Restrictions cardiac;fall;oxygen therapy device and L/min;sternal;other (see comments)   CRRT  -KG     Barriers to Rehab medically complex;cognitive status  -KG     Row Name 03/08/23 1137          Cognition    Orientation Status (Cognition) oriented x 3  -KG     Row Name 03/08/23 1137          Safety Issues, Functional Mobility    Safety Issues Affecting Function (Mobility) awareness of need for assistance;insight into deficits/self-awareness;safety precaution awareness;safety precautions follow-through/compliance  -KG     Impairments Affecting Function (Mobility) balance;coordination;endurance/activity tolerance;postural/trunk control;shortness of breath;strength  -KG           User Key  (r) = Recorded By, (t) = Taken By, (c) = Cosigned By    Initials Name Provider Type    KG Whitney Mistry, PT Physical Therapist               Mobility     Row Name 03/08/23 1137          Bed Mobility    Bed Mobility scooting/bridging;supine-sit;sit-supine  -KG     Scooting/Bridging [place] (Bed Mobility) maximum assist (25% patient effort);2 person assist;verbal cues  -KG     Supine-Sit [place] (Bed Mobility) maximum assist (25% patient effort);verbal cues  -KG     Sit-Supine [place] (Bed Mobility) maximum assist (25% patient effort);2 person assist;verbal cues  -KG     Assistive Device (Bed Mobility) draw sheet;head of bed elevated  -KG      Comment, (Bed Mobility) VC's for sequencing. Pt required increased assistance at trunk and BLEs. Upon sitting EOB pt demonstrated posterior lean; improved with verbal and tactile cues. Progressed to periods of CGA for static sitting balance.  -KG     Row Name 03/08/23 1137          Transfers    Comment, (Transfers) STS x3 from EOB with blocking of aric knees. VC's for upright posture with forward gaze and B LEs under hips. Improved posture with cueing, but pt continues to demonstrate posterior and lateral lean to the L.  -KG     Row Name 03/08/23 1137          Sit-Stand Transfer    Sit-Stand [place] (Transfers) moderate assist (50% patient effort);2 person assist;verbal cues  -KG     Row Name 03/08/23 1137          Gait/Stairs (Locomotion)    [place] Level (Gait) maximum assist (25% patient effort);2 person assist;verbal cues  -KG     Assistive Device (Gait) other (see comments)  B UE support  -KG     Distance in Feet (Gait) 3 +3 +3  -KG     Deviations/Abnormal Patterns (Gait) base of support, narrow;deena decreased;festinating/shuffling;stride length decreased  -KG     Bilateral Gait Deviations heel strike decreased;weight shift ability decreased  -KG     Comment, (Gait/Stairs) Pt able to take steps forward and backward at EOB with two seated rest breaks. Max cueing for upright posture with feet in line with hips and wider JADYN. Pt demonstrated short, shuffled steps and required assistance for proper weight shifting and LE advancement. Pt's mobility limited by weakness and fatigue.  -KG           User Key  (r) = Recorded By, (t) = Taken By, (c) = Cosigned By    Initials Name Provider Type    KG Whitney Mistry, PT Physical Therapist               Obj/Interventions     Row Name 03/08/23 1140          Balance    Balance Assessment sitting static balance;standing static balance;standing dynamic balance  -KG     Static Sitting Balance contact guard  -KG     Position, Sitting Balance  unsupported;sitting edge of bed  -KG     Static Standing Balance moderate assist;2-person assist  -KG     Dynamic Standing Balance maximum assist;2-person assist  -KG     Position/Device Used, Standing Balance supported  -KG           User Key  (r) = Recorded By, (t) = Taken By, (c) = Cosigned By    Initials Name Provider Type    KG Whitney Mistry N, PT Physical Therapist               Goals/Plan    No documentation.                Clinical Impression     Row Name 03/08/23 1140          Pain    Pretreatment Pain Rating 0/10 - no pain  -KG     Posttreatment Pain Rating 0/10 - no pain  -KG     Row Name 03/08/23 1140          Plan of Care Review    Plan of Care Reviewed With patient  -KG     Progress improving  -KG     Outcome Evaluation Pt performed STS x3 with modA x2 and ambulated 3ft +3ft +3ft with maxA x2 and B UE support. Required two seated rest breaks. Frequent cues for upright posture with wider JADYN and increased stride length. Pt continues to be unsteady with increased posterior and lateral lean to the L. Limited by fatigue and generalized weakness. Continue to recommend PT skilled care.  -KG     Row Name 03/08/23 1140          Vital Signs    Pre Systolic BP Rehab 86  -KG     Pre Treatment Diastolic BP 42  -KG     Post Systolic BP Rehab 93  -KG     Post Treatment Diastolic BP 37  -KG     Pretreatment Heart Rate (beats/min) 63  -KG     Posttreatment Heart Rate (beats/min) 68  -KG     Pre SpO2 (%) 99  -KG     O2 Delivery Pre Treatment room air  -KG     Post SpO2 (%) 99  -KG     O2 Delivery Post Treatment room air  -KG     Pre Patient Position Supine  -KG     Intra Patient Position Standing  -KG     Post Patient Position Supine  -KG     Row Name 03/08/23 1140          Positioning and Restraints    Pre-Treatment Position in bed  -KG     Post Treatment Position bed  -KG     In Bed notified nsg;supine;call light within reach;encouraged to call for assist;with family/caregiver;side rails up x3;RUE elevated;LUE  elevated  -KG           User Key  (r) = Recorded By, (t) = Taken By, (c) = Cosigned By    Initials Name Provider Type    Whitney Wyatt PT Physical Therapist               Outcome Measures     Row Name 03/08/23 1143          How much help from another person do you currently need...    Turning from your back to your side while in flat bed without using bedrails? 2  -KG     Moving from lying on back to sitting on the side of a flat bed without bedrails? 2  -KG     Moving to and from a bed to a chair (including a wheelchair)? 2  -KG     Standing up from a chair using your arms (e.g., wheelchair, bedside chair)? 2  -KG     Climbing 3-5 steps with a railing? 1  -KG     To walk in hospital room? 2  -KG     AM-PAC 6 Clicks Score (PT) 11  -KG     Highest level of mobility 4 --> Transferred to chair/commode  -KG     Row Name 03/08/23 1143          Functional Assessment    Outcome Measure Options AM-PAC 6 Clicks Basic Mobility (PT)  -KG           User Key  (r) = Recorded By, (t) = Taken By, (c) = Cosigned By    Initials Name Provider Type    Whitney Wyatt PT Physical Therapist                             Physical Therapy Education     Title: PT OT SLP Therapies (In Progress)     Topic: Physical Therapy (In Progress)     Point: Mobility training (In Progress)     Learning Progress Summary           Patient Acceptance, E, NR by KG at 3/8/2023 0954    Acceptance, E, NR by KG at 3/7/2023 0919    Acceptance, E, NR by KG at 3/6/2023 1324                   Point: Home exercise program (In Progress)     Learning Progress Summary           Patient Acceptance, E, NR by KG at 3/8/2023 0954    Acceptance, E, NR by KG at 3/7/2023 0919                   Point: Body mechanics (In Progress)     Learning Progress Summary           Patient Acceptance, E, NR by KG at 3/8/2023 0954    Acceptance, E, NR by KG at 3/7/2023 0919    Acceptance, E, NR by KG at 3/6/2023 1324                   Point: Precautions (In Progress)      Learning Progress Summary           Patient Acceptance, E, NR by KG at 3/8/2023 0954    Acceptance, E, NR by KG at 3/7/2023 0919    Acceptance, E, NR by KG at 3/6/2023 1324                               User Key     Initials Effective Dates Name Provider Type Discipline    KG 05/22/20 -  Whitney Mistry PT Physical Therapist PT              PT Recommendation and Plan  Planned Therapy Interventions (PT): balance training, bed mobility training, gait training, strengthening, transfer training  Plan of Care Reviewed With: patient  Progress: improving  Outcome Evaluation: Pt performed STS x3 with modA x2 and ambulated 3ft +3ft +3ft with maxA x2 and B UE support. Required two seated rest breaks. Frequent cues for upright posture with wider JADYN and increased stride length. Pt continues to be unsteady with increased posterior and lateral lean to the L. Limited by fatigue and generalized weakness. Continue to recommend PT skilled care.     Time Calculation:    PT Charges     Row Name 03/08/23 0954             Time Calculation    Start Time 0954  -KG      PT Received On 03/08/23  -KG      PT Goal Re-Cert Due Date 03/16/23  -KG         Time Calculation- PT    Total Timed Code Minutes- PT 23 minute(s)  -KG         Timed Charges    10293 - PT Therapeutic Activity Minutes 23  -KG         Total Minutes    Timed Charges Total Minutes 23  -KG       Total Minutes 23  -KG            User Key  (r) = Recorded By, (t) = Taken By, (c) = Cosigned By    Initials Name Provider Type    KG Whitney Mistry, PT Physical Therapist              Therapy Charges for Today     Code Description Service Date Service Provider Modifiers Qty    20658086260 HC PT THERAPEUTIC ACT EA 15 MIN 3/7/2023 Whitney Mistry, PT GP 2    20102653503 HC PT THERAPEUTIC ACT EA 15 MIN 3/8/2023 Whitney Mistry, PT GP 2          PT G-Codes  Outcome Measure Options: AM-PAC 6 Clicks Basic Mobility (PT)  AM-PAC 6 Clicks Score (PT): 11  PT Discharge  Summary  Anticipated Discharge Disposition (PT): inpatient rehabilitation facility    Shelly Mistry, PT  3/8/2023

## 2023-03-08 NOTE — CASE MANAGEMENT/SOCIAL WORK
Continued Stay Note  [facility]     Patient Name: Herbert Harley  MRN: [id number]  Today's Date: 3/8/2023    Admit Date: 3/1/2023    Plan: ongoing   Discharge Plan     Row Name 03/08/23 1035       Plan    Plan ongoing    Plan Comments Mr. Harley remains on CRRT.  He is  not on any pressors now and is on room air.  Per MDR may discontinue CRRT today.  CM will continue to follow for discharge planning.    Final Discharge Disposition Code 30 - still a patient               Discharge Codes    No documentation.               Expected Discharge Date and Time     Expected Discharge Date Expected Discharge Time    Mar 11, 2023             Martha Montanez RN

## 2023-03-08 NOTE — PROGRESS NOTES
"   LOS: 7 days    Patient Care Team:  Yessica Ricci DO as PCP - General (Family Medicine)  Nima Peter MD as Consulting Physician (Endocrinology)  Nadya Ramirez MD as Cardiologist (Cardiology)  Yessica Ricci DO as Consulting Physician (Family Medicine)    Chief Complaint: Acute kidney injury s/p CABG  67-year-old with history of hypertension, diabetes, former smoker 1-1/2 pack/day for quite some time, CAD s/p PCI in 2014, underwent CABG x3, post CABG remain hypotensive blood pressure in the 40s systolic in 60s and 70s 80s range requiring IV pressors.  Developed GEORGIANA oliguric, now an uric  Subjective     Interval History:   On CRRT filter clotting x 2/day. Bp soft. Clinically doing well still has significant edema     Review of Systems:   No new complaints.    Objective     Vital Sign Min/Max for last 24 hours  Temp  Min: 97.6 °F (36.4 °C)  Max: 98.5 °F (36.9 °C)   BP  Min: 74/70  Max: 130/55   Pulse  Min: 65  Max: 91   Resp  Min: 16  Max: 18   SpO2  Min: 90 %  Max: 100 %   No data recorded   No data recorded     Flowsheet Rows    Flowsheet Row First Filed Value   Admission Height 162.6 cm (64\") Documented at 03/01/2023 0611   Admission Weight 75.3 kg (166 lb) Documented at 03/01/2023 0611          I/O this shift:  In: -   Out: 197 [Other:147; Chest Tube:50]  I/O last 3 completed shifts:  In: 2422 [P.O.:480; I.V.:1532; Blood:410]  Out: 3442 [Other:2827; Chest Tube:615]    Physical Exam:  General Appearance:  male awake alert oriented no obvious distress, sitting and eating at this time.  Eyes: PER, EOMI.  Neck: Supple no JVD.  Right-sided nontunneled catheter.  Left side old nontunneled catheter in place  Chest: Chest tube in place.  Lungs: Clear auscultation, no rales rhonchi's, equal chest movement, nonlabored.  Heart: No gallop, murmur, rub, RRR.  Abdomen: Soft, nontender, positive bowel sounds, no organomegaly.  Extremities: Positive bilateral lower extremity dependent edema, no " cyanosis.  Neuro: No focal deficit, moving all extremities, alert oriented X 3  : Solomon catheter in place.  Minimal urine output  Skin warm and dry.  Psych mood and affect appropriate  Nontunneled dialysis cath right IJ  WBC WBC   Date Value Ref Range Status   03/08/2023 20.25 (H) 3.40 - 10.80 10*3/mm3 Final   03/07/2023 17.42 (H) 3.40 - 10.80 10*3/mm3 Final   03/06/2023 13.67 (H) 3.40 - 10.80 10*3/mm3 Final   03/06/2023 15.42 (H) 3.40 - 10.80 10*3/mm3 Final      HGB Hemoglobin   Date Value Ref Range Status   03/08/2023 8.9 (L) 13.0 - 17.7 g/dL Final   03/07/2023 8.4 (L) 13.0 - 17.7 g/dL Final   03/07/2023 7.6 (L) 13.0 - 17.7 g/dL Final   03/07/2023 8.2 (L) 13.0 - 17.7 g/dL Final   03/06/2023 9.3 (L) 13.0 - 17.7 g/dL Final   03/06/2023 7.7 (L) 13.0 - 17.7 g/dL Final   03/06/2023 8.6 (L) 13.0 - 17.7 g/dL Final      HCT Hematocrit   Date Value Ref Range Status   03/08/2023 26.9 (L) 37.5 - 51.0 % Final   03/07/2023 25.4 (L) 37.5 - 51.0 % Final   03/07/2023 24.0 (L) 37.5 - 51.0 % Final   03/07/2023 25.0 (L) 37.5 - 51.0 % Final   03/06/2023 28.5 (L) 37.5 - 51.0 % Final   03/06/2023 24.0 (L) 37.5 - 51.0 % Final   03/06/2023 26.7 (L) 37.5 - 51.0 % Final      Platlets No results found for: LABPLAT   MCV MCV   Date Value Ref Range Status   03/08/2023 89.7 79.0 - 97.0 fL Final   03/07/2023 89.6 79.0 - 97.0 fL Final   03/06/2023 92.7 79.0 - 97.0 fL Final   03/06/2023 91.1 79.0 - 97.0 fL Final          Sodium Sodium   Date Value Ref Range Status   03/08/2023 137 136 - 145 mmol/L Final   03/08/2023 139 136 - 145 mmol/L Final   03/08/2023 138 136 - 145 mmol/L Final   03/08/2023 138 136 - 145 mmol/L Final   03/07/2023 136 136 - 145 mmol/L Final   03/07/2023 138 136 - 145 mmol/L Final   03/07/2023 135 (L) 136 - 145 mmol/L Final   03/07/2023 139 136 - 145 mmol/L Final   03/06/2023 134 (L) 136 - 145 mmol/L Final   03/06/2023 137 136 - 145 mmol/L Final   03/06/2023 136 136 - 145 mmol/L Final   03/06/2023 137 136 - 145 mmol/L Final    03/06/2023 137 136 - 145 mmol/L Final   03/05/2023 134 (L) 136 - 145 mmol/L Final      Potassium Potassium   Date Value Ref Range Status   03/08/2023 4.6 3.5 - 5.2 mmol/L Final   03/08/2023 4.8 3.5 - 5.2 mmol/L Final     Comment:     Slight hemolysis detected by analyzer. Results may be affected.   03/08/2023 4.5 3.5 - 5.2 mmol/L Final   03/08/2023 4.5 3.5 - 5.2 mmol/L Final   03/07/2023 4.3 3.5 - 5.2 mmol/L Final   03/07/2023 4.6 3.5 - 5.2 mmol/L Final   03/07/2023 4.3 3.5 - 5.2 mmol/L Final   03/07/2023 4.2 3.5 - 5.2 mmol/L Final   03/06/2023 4.9 3.5 - 5.2 mmol/L Final   03/06/2023 5.0 3.5 - 5.2 mmol/L Final     Comment:     Slight hemolysis detected by analyzer. Results may be affected.   03/06/2023 4.8 3.5 - 5.2 mmol/L Final   03/06/2023 4.9 3.5 - 5.2 mmol/L Final   03/06/2023 4.9 3.5 - 5.2 mmol/L Final   03/05/2023 5.0 3.5 - 5.2 mmol/L Final      Chloride Chloride   Date Value Ref Range Status   03/08/2023 104 98 - 107 mmol/L Final   03/08/2023 104 98 - 107 mmol/L Final   03/08/2023 105 98 - 107 mmol/L Final   03/08/2023 105 98 - 107 mmol/L Final   03/07/2023 105 98 - 107 mmol/L Final   03/07/2023 104 98 - 107 mmol/L Final   03/07/2023 103 98 - 107 mmol/L Final   03/07/2023 106 98 - 107 mmol/L Final   03/06/2023 99 98 - 107 mmol/L Final   03/06/2023 102 98 - 107 mmol/L Final   03/06/2023 102 98 - 107 mmol/L Final   03/06/2023 102 98 - 107 mmol/L Final   03/06/2023 102 98 - 107 mmol/L Final   03/05/2023 101 98 - 107 mmol/L Final      CO2 CO2   Date Value Ref Range Status   03/08/2023 21.0 (L) 22.0 - 29.0 mmol/L Final   03/08/2023 24.0 22.0 - 29.0 mmol/L Final   03/08/2023 25.0 22.0 - 29.0 mmol/L Final   03/08/2023 25.0 22.0 - 29.0 mmol/L Final   03/07/2023 26.0 22.0 - 29.0 mmol/L Final   03/07/2023 23.0 22.0 - 29.0 mmol/L Final   03/07/2023 25.0 22.0 - 29.0 mmol/L Final   03/07/2023 24.0 22.0 - 29.0 mmol/L Final   03/06/2023 20.0 (L) 22.0 - 29.0 mmol/L Final   03/06/2023 21.0 (L) 22.0 - 29.0 mmol/L Final    03/06/2023 23.0 22.0 - 29.0 mmol/L Final   03/06/2023 20.0 (L) 22.0 - 29.0 mmol/L Final   03/06/2023 20.0 (L) 22.0 - 29.0 mmol/L Final   03/05/2023 21.0 (L) 22.0 - 29.0 mmol/L Final      BUN BUN   Date Value Ref Range Status   03/08/2023 22 8 - 23 mg/dL Final   03/08/2023 21 8 - 23 mg/dL Final   03/08/2023 20 8 - 23 mg/dL Final   03/08/2023 20 8 - 23 mg/dL Final   03/07/2023 24 (H) 8 - 23 mg/dL Final   03/07/2023 22 8 - 23 mg/dL Final   03/07/2023 22 8 - 23 mg/dL Final   03/07/2023 25 (H) 8 - 23 mg/dL Final   03/06/2023 28 (H) 8 - 23 mg/dL Final   03/06/2023 31 (H) 8 - 23 mg/dL Final   03/06/2023 24 (H) 8 - 23 mg/dL Final   03/06/2023 25 (H) 8 - 23 mg/dL Final   03/06/2023 25 (H) 8 - 23 mg/dL Final   03/05/2023 25 (H) 8 - 23 mg/dL Final      Creatinine Creatinine   Date Value Ref Range Status   03/08/2023 1.19 0.76 - 1.27 mg/dL Final   03/08/2023 1.04 0.76 - 1.27 mg/dL Final   03/08/2023 1.02 0.76 - 1.27 mg/dL Final   03/08/2023 1.02 0.76 - 1.27 mg/dL Final   03/07/2023 0.98 0.76 - 1.27 mg/dL Final   03/07/2023 1.09 0.76 - 1.27 mg/dL Final   03/07/2023 1.18 0.76 - 1.27 mg/dL Final   03/07/2023 1.08 0.76 - 1.27 mg/dL Final   03/06/2023 1.37 (H) 0.76 - 1.27 mg/dL Final   03/06/2023 1.43 (H) 0.76 - 1.27 mg/dL Final   03/06/2023 1.34 (H) 0.76 - 1.27 mg/dL Final   03/06/2023 1.39 (H) 0.76 - 1.27 mg/dL Final   03/06/2023 1.39 (H) 0.76 - 1.27 mg/dL Final   03/05/2023 1.41 (H) 0.76 - 1.27 mg/dL Final      Calcium Calcium   Date Value Ref Range Status   03/08/2023 6.9 (L) 8.6 - 10.5 mg/dL Final   03/08/2023 6.9 (L) 8.6 - 10.5 mg/dL Final   03/08/2023 6.8 (L) 8.6 - 10.5 mg/dL Final   03/08/2023 6.8 (L) 8.6 - 10.5 mg/dL Final   03/07/2023 6.7 (L) 8.6 - 10.5 mg/dL Final   03/07/2023 6.6 (L) 8.6 - 10.5 mg/dL Final   03/07/2023 6.7 (L) 8.6 - 10.5 mg/dL Final   03/07/2023 6.7 (L) 8.6 - 10.5 mg/dL Final   03/06/2023 7.3 (L) 8.6 - 10.5 mg/dL Final   03/06/2023 7.1 (L) 8.6 - 10.5 mg/dL Final   03/06/2023 6.9 (L) 8.6 - 10.5 mg/dL  Final   03/06/2023 7.0 (L) 8.6 - 10.5 mg/dL Final   03/06/2023 7.0 (L) 8.6 - 10.5 mg/dL Final   03/05/2023 7.1 (L) 8.6 - 10.5 mg/dL Final      PO4 No results found for: CAPO4   Albumin Albumin   Date Value Ref Range Status   03/08/2023 2.3 (L) 3.5 - 5.2 g/dL Final   03/08/2023 2.5 (L) 3.5 - 5.2 g/dL Final   03/08/2023 2.5 (L) 3.5 - 5.2 g/dL Final   03/08/2023 2.5 (L) 3.5 - 5.2 g/dL Final   03/07/2023 2.5 (L) 3.5 - 5.2 g/dL Final   03/07/2023 2.7 (L) 3.5 - 5.2 g/dL Final   03/07/2023 2.6 (L) 3.5 - 5.2 g/dL Final   03/07/2023 2.6 (L) 3.5 - 5.2 g/dL Final   03/06/2023 3.3 (L) 3.5 - 5.2 g/dL Final   03/06/2023 3.4 (L) 3.5 - 5.2 g/dL Final   03/06/2023 2.6 (L) 3.5 - 5.2 g/dL Final   03/06/2023 3.0 (L) 3.5 - 5.2 g/dL Final   03/06/2023 3.0 (L) 3.5 - 5.2 g/dL Final   03/05/2023 3.2 (L) 3.5 - 5.2 g/dL Final      Magnesium Magnesium   Date Value Ref Range Status   03/08/2023 2.3 1.6 - 2.4 mg/dL Final   03/08/2023 2.3 1.6 - 2.4 mg/dL Final   03/07/2023 2.5 (H) 1.6 - 2.4 mg/dL Final   03/07/2023 2.4 1.6 - 2.4 mg/dL Final   03/07/2023 2.5 (H) 1.6 - 2.4 mg/dL Final   03/06/2023 2.6 (H) 1.6 - 2.4 mg/dL Final   03/06/2023 2.7 (H) 1.6 - 2.4 mg/dL Final   03/06/2023 2.6 (H) 1.6 - 2.4 mg/dL Final   03/05/2023 2.5 (H) 1.6 - 2.4 mg/dL Final      Uric Acid No results found for: URICACID        Results Review:     I reviewed the patient's new clinical results.    acetaminophen, 650 mg, Oral, Q8H  amiodarone, 200 mg, Oral, Q12H  arformoterol, 15 mcg, Nebulization, BID - RT  aspirin, 325 mg, Oral, Daily  budesonide, 0.5 mg, Nebulization, BID - RT  guaiFENesin, 1,200 mg, Oral, Q12H  insulin lispro, 0-9 Units, Subcutaneous, 4x Daily With Meals & Nightly  ipratropium-albuterol, 3 mL, Nebulization, Q4H - RT  metoprolol tartrate, 12.5 mg, Oral, Q12H  midodrine, 10 mg, Oral, Q8H  pharmacy consult - MTM, , Does not apply, BID  piperacillin-tazobactam, 3.375 g, Intravenous, Q8H  rosuvastatin, 10 mg, Oral, Nightly  senna-docusate sodium, 2 tablet,  Oral, BID      niCARdipine, 5-15 mg/hr  nitroglycerin, 5-200 mcg/min, Last Rate: Stopped (03/01/23 1431)  norepinephrine, 0.02-0.3 mcg/kg/min, Last Rate: Stopped (03/07/23 1800)  phenylephrine, 0.5-3 mcg/kg/min  Phoxillum BK4/2.5, 750 mL/hr, Last Rate: 750 mL/hr (03/08/23 1120)  Phoxillum BK4/2.5, 750 mL/hr, Last Rate: 750 mL/hr (03/08/23 1120)  Phoxillum BK4/2.5, 750 mL/hr, Last Rate: 750 mL/hr (03/08/23 1120)        Medication Review: Reviewed    Assessment & Plan       Coronary artery disease involving native coronary artery of native heart without angina pectoris    Hypertension    Hyperlipidemia LDL goal <70    Type 2 diabetes mellitus with hypoglycemia, with long-term current use of insulin (HCC)    Ischemic cardiomyopathy    Mucopurulent chronic bronchitis (HCC)    Prolonged Q-T interval on ECG    Acute renal failure (ARF) (HCC)    Shock (HCC)    Acute respiratory failure with hypoxia (HCC)     1.  GEORGIANA: ATN likely secondary to shock with systolic blood pressure postsurgery in the range of 40s to 80s, now on multiple vasopressors.  Admit creatinine 1.08, creatinine progressively getting worse.  Patient is oliguric.  C3 complement 18, C4 complement 83 within normal range.  Ultrasound of the kidney shows right kidney 11 cm left kidney 10.6 cm normal ultrasound, bladder ultrasound normal.  2.  S/p CABG x3  3.  Ischemic cardiomyopathy with ejection fraction 41-45%.  4.  Diabetes type 2.  5.  Cardiogenic shock.     Recommendations:  Labile hemodynamics. Low bp noted. Continue CRRT for optimization fo volume status. Increase midodrine as well. Will adjust CRRT prescription to prevent filter clotting.     Manjit Gomez MD  03/08/23  13:39 EST

## 2023-03-08 NOTE — PROGRESS NOTES
Intensive Care Follow-up     Hospital:  LOS: 7 days   Mr. Herbert Harley, 67 y.o. male is followed for:   Coronary artery disease involving native coronary artery of native heart without angina pectoris   Post op respiratory, electrolyte and hemodynamic management       History of present illness:   67-year-old male with past medical history of tobacco abuse, COPD, coronary disease status post PCI in 2014, hypertension and diabetes.  Patient was having problem with unstable angina and underwent stress test which was found to be abnormal.  Left heart cath revealed multivessel coronary disease.  Echocardiogram showed EF 41 to 45%, mild concentric left ventricular hypertrophy and RVSP of 35-45.  Preoperative carotid duplex did not show any significant carotid disease.  Preop PFTs were abnormal with mostly nonspecific defect and concerning for restrictive defect.  Hemoglobin A1c of 7.4.  Patient underwent  Coronary artery bypass graft surgery x3 by Dr. Saavedra on March 1.  Postop course complicated by worsening renal failure and persistent pressor requirements.  Patient was started on CRRT.    Subjective   Interval History:  Patient came off norepinephrine yesterday.  Blood pressure is marginal today.  Dose of midodrine increased.  Patient also having worsening leukocytosis and bandemia of 21%.  No fevers which could have been masked with CRRT.  Complaining of heartburn and will add Protonix.  Will work-up for sepsis as well.  Discussed with nephrology and they want to continue CRRT until hemodynamically more stable.  Platelet count dropped which is likely due to consumptive thrombocytopenia due to CRRT circuit clotting off.                 The patient's past medical, surgical and social history were reviewed and updated in Epic as appropriate.       Objective     Infusions:  DOBUTamine, 5 mcg/kg/min, Last Rate: Stopped (03/04/23 1803)  EPINEPHrine, 0.02-0.3 mcg/kg/min, Last Rate: Stopped (03/02/23  "1425)  niCARdipine, 5-15 mg/hr  nitroglycerin, 5-200 mcg/min, Last Rate: Stopped (03/01/23 1431)  norepinephrine, 0.02-0.3 mcg/kg/min, Last Rate: Stopped (03/07/23 1800)  phenylephrine, 0.5-3 mcg/kg/min  Phoxillum BK4/2.5, 750 mL/hr, Last Rate: 750 mL/hr (03/08/23 1120)  Phoxillum BK4/2.5, 750 mL/hr, Last Rate: 750 mL/hr (03/08/23 1120)  Phoxillum BK4/2.5, 750 mL/hr, Last Rate: 750 mL/hr (03/08/23 1120)      Medications:  acetaminophen, 650 mg, Oral, Q8H  amiodarone, 200 mg, Oral, Q12H  arformoterol, 15 mcg, Nebulization, BID - RT  aspirin, 325 mg, Oral, Daily  budesonide, 0.5 mg, Nebulization, BID - RT  guaiFENesin, 1,200 mg, Oral, Q12H  insulin lispro, 0-9 Units, Subcutaneous, 4x Daily With Meals & Nightly  ipratropium-albuterol, 3 mL, Nebulization, Q4H - RT  metoprolol tartrate, 12.5 mg, Oral, Q12H  midodrine, 10 mg, Oral, Q8H  pharmacy consult - MTM, , Does not apply, BID  piperacillin-tazobactam, 3.375 g, Intravenous, Q8H  rosuvastatin, 10 mg, Oral, Nightly  senna-docusate sodium, 2 tablet, Oral, BID        Vital Sign Min/Max for last 24 hours  Temp  Min: 97.6 °F (36.4 °C)  Max: 98.5 °F (36.9 °C)   BP  Min: 74/70  Max: 130/55   Pulse  Min: 65  Max: 95   Resp  Min: 16  Max: 18   SpO2  Min: 90 %  Max: 100 %   No data recorded       Input/Output for last 24 hour shift  03/07 0701 - 03/08 0700  In: 1459 [P.O.:480; I.V.:569]  Out: 1888       Objective:  Vital signs: (most recent): Blood pressure (!) 88/47, pulse 65, temperature 97.6 °F (36.4 °C), temperature source Oral, resp. rate 18, height 162.6 cm (64\"), weight 75.3 kg (166 lb), SpO2 98 %.            General Appearance: Awake, in no acute resp distress  Lungs:   B/L Breath sounds present with decreased breath sounds on bases, no wheezing heard,occ crackles.   Heart: S1 and S2 present, no murmur  Abdomen: Soft, nontender, no guarding or rigidity, bowel sounds positive.  Extremities:  no cyanosis or clubbing,  no edema, warm to touch.  Neurologic:  Moving all four " extremities. Good strength bilaterally.  Psychological: Somewhat anxious intermittently    Results from last 7 days   Lab Units 03/08/23  0422 03/07/23  1810 03/07/23  1117 03/07/23  0220 03/06/23 2057 03/06/23  0419   WBC 10*3/mm3 20.25*  --   --  17.42*  --  13.67*   HEMOGLOBIN g/dL 8.9* 8.4* 7.6* 8.2*   < > 7.7*   PLATELETS 10*3/mm3 69*  --   --  88*  --  95*    < > = values in this interval not displayed.     Results from last 7 days   Lab Units 03/08/23  0806 03/08/23  0422 03/08/23  0013 03/07/23  1810   SODIUM mmol/L 137 139 138  138 136   POTASSIUM mmol/L 4.6 4.8 4.5  4.5 4.3   CO2 mmol/L 21.0* 24.0 25.0  25.0 26.0   BUN mg/dL 22 21 20  20 24*   CREATININE mg/dL 1.19 1.04 1.02  1.02 0.98   MAGNESIUM mg/dL 2.3  --  2.3 2.5*   PHOSPHORUS mg/dL 4.8*  --  4.5 4.2   GLUCOSE mg/dL 103* 112* 136*  136* 118*     Estimated Creatinine Clearance: 55.9 mL/min (by C-G formula based on SCr of 1.19 mg/dL).    Results from last 7 days   Lab Units 03/05/23  0324   PH, ARTERIAL pH units 7.295*   PCO2, ARTERIAL mm Hg 44.5   PO2 ART mm Hg 105.0       Images:   Chest x-ray pending  I reviewed the patient's results and images.     Assessment & Plan   Impression        Coronary artery disease involving native coronary artery of native heart without angina pectoris    Hypertension    Hyperlipidemia LDL goal <70    Type 2 diabetes mellitus with hypoglycemia, with long-term current use of insulin (HCC)    Ischemic cardiomyopathy    Mucopurulent chronic bronchitis (HCC)    Prolonged Q-T interval on ECG    Acute renal failure (ARF) ([facility])    Shock (HCC)    Acute respiratory failure with hypoxia ([facility])       Plan        1.  Patient has postcoronary bypass graft surgery.  Postop management per CT surgery.  2.  Continue aspirin, statin.  Also has been started on low-dose beta-blocker.  Will monitor for now and hold beta-blocker if systolic blood pressure less than 100.  3.  Off pressors now.  Midodrine dose increased.  Blood pressure  is marginal.  Wondering if we are dealing with sepsis picture with worsening leukocytosis and bandemia.  Access site for central lines does not look infected.  No diarrhea or loose stools.  Has been coughing but not bringing up much sputum.  Will get chest x-ray to further evaluate.  Check procalcitonin level.  We will get blood cultures.  MRSA screen.  Empirically cover with Zosyn for now.  If able to produce sputum we will culture that as well.  4.  Continue CRRT per nephrology.  Not much urine output.  Once hemodynamically stable hopefully will be able to get off CRRT to intermittent hemodialysis.  5.  Amiodarone for atrial fibrillation.  Cardiology team is following.  6.  Patient with history of asthma and significant wheezing.  Wheezing seems to have improved with scheduling of nebulizers with DuoNeb and budesonide.  7.  Monitor electrolytes and replace per ICU protocol.  8.  Patient intermittent anxious and wants to get out of ICU.  Discussed the rationale for continued ICU stay due to hemodynamic instability and ongoing continuous renal placement therapy.  10. Sliding scale insulin coverage for hyperglycemia management.  11.  Oral diet as tolerated.    Patient remains with guarded prognosis and need continued ICU care.  Met with family and updated them about current plan of care as well.  Discussed with nephrology in detail.    Plan of care and goals reviewed with multidisciplinary/antibiotic stewardship team during rounds.   I discussed the patient's findings and my recommendations with patient, family, nursing staff and consulting provider       Time spent 38 min (exclusive of procedure time)  including high complexity decision making to assess, manipulate, and support vital organ system failure in this individual who has impairment of one or more vital organ systems such that there is a high probability of imminent or life threatening deterioration in the patient’s condition.      Thomas Singh MD,  FCCP  Pulmonary, Critical care and Sleep Medicine

## 2023-03-08 NOTE — PLAN OF CARE
Goal Outcome Evaluation:  Plan of Care Reviewed With: patient        Progress: improving  Outcome Evaluation: Pt performed STS x3 with modA x2 and ambulated 3ft +3ft +3ft with maxA x2 and B UE support. Required two seated rest breaks. Frequent cues for upright posture with wider JADYN and increased stride length. Pt continues to be unsteady with increased posterior and lateral lean to the L. Limited by fatigue and generalized weakness. Continue to recommend PT skilled care.

## 2023-03-09 LAB
ALBUMIN SERPL-MCNC: 2.4 G/DL (ref 3.5–5.2)
ALBUMIN SERPL-MCNC: 2.6 G/DL (ref 3.5–5.2)
ALBUMIN SERPL-MCNC: 2.6 G/DL (ref 3.5–5.2)
ALBUMIN SERPL-MCNC: 3.2 G/DL (ref 3.5–5.2)
ALBUMIN/GLOB SERPL: 1.6 G/DL
ALP SERPL-CCNC: 126 U/L (ref 39–117)
ALT SERPL W P-5'-P-CCNC: 56 U/L (ref 1–41)
ANION GAP SERPL CALCULATED.3IONS-SCNC: 10 MMOL/L (ref 5–15)
ANION GAP SERPL CALCULATED.3IONS-SCNC: 11 MMOL/L (ref 5–15)
ANION GAP SERPL CALCULATED.3IONS-SCNC: 12 MMOL/L (ref 5–15)
ANION GAP SERPL CALCULATED.3IONS-SCNC: 8 MMOL/L (ref 5–15)
AST SERPL-CCNC: 119 U/L (ref 1–40)
BASOPHILS # BLD MANUAL: 0 10*3/MM3 (ref 0–0.2)
BASOPHILS NFR BLD MANUAL: 0 % (ref 0–1.5)
BILIRUB SERPL-MCNC: 0.5 MG/DL (ref 0–1.2)
BUN SERPL-MCNC: 29 MG/DL (ref 8–23)
BUN SERPL-MCNC: 30 MG/DL (ref 8–23)
BUN SERPL-MCNC: 32 MG/DL (ref 8–23)
BUN SERPL-MCNC: 34 MG/DL (ref 8–23)
BUN/CREAT SERPL: 16.2 (ref 7–25)
BUN/CREAT SERPL: 16.5 (ref 7–25)
BUN/CREAT SERPL: 16.6 (ref 7–25)
BUN/CREAT SERPL: 18.2 (ref 7–25)
CA-I SERPL ISE-MCNC: 1.12 MMOL/L (ref 1.12–1.32)
CA-I SERPL ISE-MCNC: 1.14 MMOL/L (ref 1.12–1.32)
CA-I SERPL ISE-MCNC: 1.14 MMOL/L (ref 1.12–1.32)
CALCIUM SPEC-SCNC: 7 MG/DL (ref 8.6–10.5)
CALCIUM SPEC-SCNC: 7.1 MG/DL (ref 8.6–10.5)
CALCIUM SPEC-SCNC: 7.1 MG/DL (ref 8.6–10.5)
CALCIUM SPEC-SCNC: 7.3 MG/DL (ref 8.6–10.5)
CHLORIDE SERPL-SCNC: 103 MMOL/L (ref 98–107)
CO2 SERPL-SCNC: 22 MMOL/L (ref 22–29)
CO2 SERPL-SCNC: 23 MMOL/L (ref 22–29)
CREAT SERPL-MCNC: 1.75 MG/DL (ref 0.76–1.27)
CREAT SERPL-MCNC: 1.82 MG/DL (ref 0.76–1.27)
CREAT SERPL-MCNC: 1.87 MG/DL (ref 0.76–1.27)
CREAT SERPL-MCNC: 1.97 MG/DL (ref 0.76–1.27)
DEPRECATED RDW RBC AUTO: 49.9 FL (ref 37–54)
EGFRCR SERPLBLD CKD-EPI 2021: 36.6 ML/MIN/1.73
EGFRCR SERPLBLD CKD-EPI 2021: 38.9 ML/MIN/1.73
EGFRCR SERPLBLD CKD-EPI 2021: 40.2 ML/MIN/1.73
EGFRCR SERPLBLD CKD-EPI 2021: 42.1 ML/MIN/1.73
EOSINOPHIL # BLD MANUAL: 0.2 10*3/MM3 (ref 0–0.4)
EOSINOPHIL NFR BLD MANUAL: 1 % (ref 0.3–6.2)
ERYTHROCYTE [DISTWIDTH] IN BLOOD BY AUTOMATED COUNT: 15.8 % (ref 12.3–15.4)
GLOBULIN UR ELPH-MCNC: 1.6 GM/DL
GLUCOSE BLDC GLUCOMTR-MCNC: 129 MG/DL (ref 70–130)
GLUCOSE BLDC GLUCOMTR-MCNC: 143 MG/DL (ref 70–130)
GLUCOSE BLDC GLUCOMTR-MCNC: 177 MG/DL (ref 70–130)
GLUCOSE BLDC GLUCOMTR-MCNC: 191 MG/DL (ref 70–130)
GLUCOSE SERPL-MCNC: 142 MG/DL (ref 65–99)
GLUCOSE SERPL-MCNC: 154 MG/DL (ref 65–99)
GLUCOSE SERPL-MCNC: 157 MG/DL (ref 65–99)
GLUCOSE SERPL-MCNC: 195 MG/DL (ref 65–99)
HCT VFR BLD AUTO: 27.8 % (ref 37.5–51)
HGB BLD-MCNC: 9 G/DL (ref 13–17.7)
LYMPHOCYTES # BLD MANUAL: 3 10*3/MM3 (ref 0.7–3.1)
LYMPHOCYTES NFR BLD MANUAL: 8 % (ref 5–12)
MAGNESIUM SERPL-MCNC: 2.3 MG/DL (ref 1.6–2.4)
MAGNESIUM SERPL-MCNC: 2.4 MG/DL (ref 1.6–2.4)
MAGNESIUM SERPL-MCNC: 2.6 MG/DL (ref 1.6–2.4)
MCH RBC QN AUTO: 29.7 PG (ref 26.6–33)
MCHC RBC AUTO-ENTMCNC: 32.4 G/DL (ref 31.5–35.7)
MCV RBC AUTO: 91.7 FL (ref 79–97)
METAMYELOCYTES NFR BLD MANUAL: 1 % (ref 0–0)
MONOCYTES # BLD: 1.6 10*3/MM3 (ref 0.1–0.9)
MYELOCYTES NFR BLD MANUAL: 2 % (ref 0–0)
NEUTROPHILS # BLD AUTO: 14.58 10*3/MM3 (ref 1.7–7)
NEUTROPHILS NFR BLD MANUAL: 65 % (ref 42.7–76)
NEUTS BAND NFR BLD MANUAL: 8 % (ref 0–5)
NRBC SPEC MANUAL: 2 /100 WBC (ref 0–0.2)
PHOSPHATE SERPL-MCNC: 4.3 MG/DL (ref 2.5–4.5)
PHOSPHATE SERPL-MCNC: 4.6 MG/DL (ref 2.5–4.5)
PHOSPHATE SERPL-MCNC: 4.8 MG/DL (ref 2.5–4.5)
PLAT MORPH BLD: NORMAL
PLATELET # BLD AUTO: 85 10*3/MM3 (ref 140–450)
PMV BLD AUTO: 10.9 FL (ref 6–12)
POTASSIUM SERPL-SCNC: 4.2 MMOL/L (ref 3.5–5.2)
POTASSIUM SERPL-SCNC: 4.5 MMOL/L (ref 3.5–5.2)
POTASSIUM SERPL-SCNC: 4.5 MMOL/L (ref 3.5–5.2)
POTASSIUM SERPL-SCNC: 4.6 MMOL/L (ref 3.5–5.2)
PROT SERPL-MCNC: 4.2 G/DL (ref 6–8.5)
QT INTERVAL: 426 MS
QTC INTERVAL: 479 MS
RBC # BLD AUTO: 3.03 10*6/MM3 (ref 4.14–5.8)
RBC MORPH BLD: NORMAL
SODIUM SERPL-SCNC: 134 MMOL/L (ref 136–145)
SODIUM SERPL-SCNC: 135 MMOL/L (ref 136–145)
SODIUM SERPL-SCNC: 136 MMOL/L (ref 136–145)
SODIUM SERPL-SCNC: 137 MMOL/L (ref 136–145)
VARIANT LYMPHS NFR BLD MANUAL: 15 % (ref 19.6–45.3)
WBC MORPH BLD: NORMAL
WBC NRBC COR # BLD: 19.97 10*3/MM3 (ref 3.4–10.8)

## 2023-03-09 PROCEDURE — 83735 ASSAY OF MAGNESIUM: CPT | Performed by: INTERNAL MEDICINE

## 2023-03-09 PROCEDURE — 92610 EVALUATE SWALLOWING FUNCTION: CPT

## 2023-03-09 PROCEDURE — 84100 ASSAY OF PHOSPHORUS: CPT | Performed by: INTERNAL MEDICINE

## 2023-03-09 PROCEDURE — 82330 ASSAY OF CALCIUM: CPT | Performed by: INTERNAL MEDICINE

## 2023-03-09 PROCEDURE — 63710000001 INSULIN LISPRO (HUMAN) PER 5 UNITS: Performed by: INTERNAL MEDICINE

## 2023-03-09 PROCEDURE — 94664 DEMO&/EVAL PT USE INHALER: CPT

## 2023-03-09 PROCEDURE — 80053 COMPREHEN METABOLIC PANEL: CPT | Performed by: INTERNAL MEDICINE

## 2023-03-09 PROCEDURE — P9047 ALBUMIN (HUMAN), 25%, 50ML: HCPCS | Performed by: INTERNAL MEDICINE

## 2023-03-09 PROCEDURE — 97110 THERAPEUTIC EXERCISES: CPT

## 2023-03-09 PROCEDURE — 25010000002 ALBUMIN HUMAN 25% PER 50 ML: Performed by: INTERNAL MEDICINE

## 2023-03-09 PROCEDURE — 94799 UNLISTED PULMONARY SVC/PX: CPT

## 2023-03-09 PROCEDURE — 99231 SBSQ HOSP IP/OBS SF/LOW 25: CPT | Performed by: INTERNAL MEDICINE

## 2023-03-09 PROCEDURE — 94761 N-INVAS EAR/PLS OXIMETRY MLT: CPT

## 2023-03-09 PROCEDURE — 93005 ELECTROCARDIOGRAM TRACING: CPT | Performed by: PHYSICIAN ASSISTANT

## 2023-03-09 PROCEDURE — 82962 GLUCOSE BLOOD TEST: CPT

## 2023-03-09 PROCEDURE — 85007 BL SMEAR W/DIFF WBC COUNT: CPT | Performed by: INTERNAL MEDICINE

## 2023-03-09 PROCEDURE — 99024 POSTOP FOLLOW-UP VISIT: CPT | Performed by: THORACIC SURGERY (CARDIOTHORACIC VASCULAR SURGERY)

## 2023-03-09 PROCEDURE — 25010000002 PIPERACILLIN SOD-TAZOBACTAM PER 1 G: Performed by: INTERNAL MEDICINE

## 2023-03-09 PROCEDURE — 85025 COMPLETE CBC W/AUTO DIFF WBC: CPT | Performed by: INTERNAL MEDICINE

## 2023-03-09 PROCEDURE — 93010 ELECTROCARDIOGRAM REPORT: CPT | Performed by: STUDENT IN AN ORGANIZED HEALTH CARE EDUCATION/TRAINING PROGRAM

## 2023-03-09 PROCEDURE — 99233 SBSQ HOSP IP/OBS HIGH 50: CPT | Performed by: INTERNAL MEDICINE

## 2023-03-09 PROCEDURE — 97116 GAIT TRAINING THERAPY: CPT

## 2023-03-09 RX ORDER — BISACODYL 10 MG
10 SUPPOSITORY, RECTAL RECTAL DAILY PRN
Status: DISCONTINUED | OUTPATIENT
Start: 2023-03-09 | End: 2023-03-13

## 2023-03-09 RX ORDER — POLYETHYLENE GLYCOL 3350 17 G/17G
17 POWDER, FOR SOLUTION ORAL DAILY PRN
Status: DISCONTINUED | OUTPATIENT
Start: 2023-03-09 | End: 2023-03-15

## 2023-03-09 RX ORDER — AMOXICILLIN 250 MG
2 CAPSULE ORAL 2 TIMES DAILY
Status: DISCONTINUED | OUTPATIENT
Start: 2023-03-09 | End: 2023-03-15

## 2023-03-09 RX ORDER — BISACODYL 5 MG/1
5 TABLET, DELAYED RELEASE ORAL DAILY PRN
Status: DISCONTINUED | OUTPATIENT
Start: 2023-03-09 | End: 2023-03-13

## 2023-03-09 RX ORDER — ACETAMINOPHEN 325 MG/1
650 TABLET ORAL EVERY 6 HOURS PRN
Status: DISCONTINUED | OUTPATIENT
Start: 2023-03-09 | End: 2023-03-20 | Stop reason: HOSPADM

## 2023-03-09 RX ADMIN — CALCIUM CHLORIDE, MAGNESIUM CHLORIDE, SODIUM CHLORIDE, SODIUM BICARBONATE, POTASSIUM CHLORIDE AND SODIUM PHOSPHATE DIBASIC DIHYDRATE 750 ML/HR: 3.68; 3.05; 6.34; 3.09; .314; .187 INJECTION INTRAVENOUS at 21:52

## 2023-03-09 RX ADMIN — BUDESONIDE 0.5 MG: 0.5 SUSPENSION RESPIRATORY (INHALATION) at 19:17

## 2023-03-09 RX ADMIN — ACETAMINOPHEN 325MG 650 MG: 325 TABLET ORAL at 14:05

## 2023-03-09 RX ADMIN — GUAIFENESIN 1200 MG: 600 TABLET, EXTENDED RELEASE ORAL at 20:42

## 2023-03-09 RX ADMIN — SENNOSIDES AND DOCUSATE SODIUM 2 TABLET: 50; 8.6 TABLET ORAL at 20:42

## 2023-03-09 RX ADMIN — ASPIRIN 325 MG: 325 TABLET, COATED ORAL at 08:21

## 2023-03-09 RX ADMIN — CALCIUM CHLORIDE, MAGNESIUM CHLORIDE, SODIUM CHLORIDE, SODIUM BICARBONATE, POTASSIUM CHLORIDE AND SODIUM PHOSPHATE DIBASIC DIHYDRATE 750 ML/HR: 3.68; 3.05; 6.34; 3.09; .314; .187 INJECTION INTRAVENOUS at 01:08

## 2023-03-09 RX ADMIN — ACETAMINOPHEN 325MG 650 MG: 325 TABLET ORAL at 20:46

## 2023-03-09 RX ADMIN — CALCIUM CHLORIDE, MAGNESIUM CHLORIDE, SODIUM CHLORIDE, SODIUM BICARBONATE, POTASSIUM CHLORIDE AND SODIUM PHOSPHATE DIBASIC DIHYDRATE 750 ML/HR: 3.68; 3.05; 6.34; 3.09; .314; .187 INJECTION INTRAVENOUS at 01:07

## 2023-03-09 RX ADMIN — MIDODRINE HYDROCHLORIDE 10 MG: 10 TABLET ORAL at 20:42

## 2023-03-09 RX ADMIN — GUAIFENESIN 1200 MG: 600 TABLET, EXTENDED RELEASE ORAL at 08:21

## 2023-03-09 RX ADMIN — IPRATROPIUM BROMIDE AND ALBUTEROL SULFATE 3 ML: 2.5; .5 SOLUTION RESPIRATORY (INHALATION) at 14:44

## 2023-03-09 RX ADMIN — ARFORMOTEROL TARTRATE 15 MCG: 15 SOLUTION RESPIRATORY (INHALATION) at 19:17

## 2023-03-09 RX ADMIN — ARFORMOTEROL TARTRATE 15 MCG: 15 SOLUTION RESPIRATORY (INHALATION) at 06:58

## 2023-03-09 RX ADMIN — SENNOSIDES AND DOCUSATE SODIUM 2 TABLET: 50; 8.6 TABLET ORAL at 08:24

## 2023-03-09 RX ADMIN — CALCIUM CARBONATE (ANTACID) CHEW TAB 500 MG 1 TABLET: 500 CHEW TAB at 13:09

## 2023-03-09 RX ADMIN — INSULIN LISPRO 2 UNITS: 100 INJECTION, SOLUTION INTRAVENOUS; SUBCUTANEOUS at 13:09

## 2023-03-09 RX ADMIN — IPRATROPIUM BROMIDE AND ALBUTEROL SULFATE 3 ML: 2.5; .5 SOLUTION RESPIRATORY (INHALATION) at 06:58

## 2023-03-09 RX ADMIN — HYDROXYZINE HYDROCHLORIDE 25 MG: 25 TABLET ORAL at 20:42

## 2023-03-09 RX ADMIN — AMIODARONE HYDROCHLORIDE 200 MG: 200 TABLET ORAL at 08:20

## 2023-03-09 RX ADMIN — CALCIUM CHLORIDE, MAGNESIUM CHLORIDE, SODIUM CHLORIDE, SODIUM BICARBONATE, POTASSIUM CHLORIDE AND SODIUM PHOSPHATE DIBASIC DIHYDRATE 750 ML/HR: 3.68; 3.05; 6.34; 3.09; .314; .187 INJECTION INTRAVENOUS at 14:54

## 2023-03-09 RX ADMIN — ALBUMIN (HUMAN) 12.5 G: 0.25 INJECTION, SOLUTION INTRAVENOUS at 14:57

## 2023-03-09 RX ADMIN — PANTOPRAZOLE SODIUM 40 MG: 40 TABLET, DELAYED RELEASE ORAL at 06:20

## 2023-03-09 RX ADMIN — TAZOBACTAM SODIUM AND PIPERACILLIN SODIUM 3.38 G: 375; 3 INJECTION, SOLUTION INTRAVENOUS at 16:32

## 2023-03-09 RX ADMIN — Medication 0.02 MCG/KG/MIN: at 17:54

## 2023-03-09 RX ADMIN — CALCIUM CHLORIDE, MAGNESIUM CHLORIDE, SODIUM CHLORIDE, SODIUM BICARBONATE, POTASSIUM CHLORIDE AND SODIUM PHOSPHATE DIBASIC DIHYDRATE 750 ML/HR: 3.68; 3.05; 6.34; 3.09; .314; .187 INJECTION INTRAVENOUS at 14:55

## 2023-03-09 RX ADMIN — CALCIUM CARBONATE (ANTACID) CHEW TAB 500 MG 1 TABLET: 500 CHEW TAB at 17:12

## 2023-03-09 RX ADMIN — AMIODARONE HYDROCHLORIDE 200 MG: 200 TABLET ORAL at 20:41

## 2023-03-09 RX ADMIN — BUDESONIDE 0.5 MG: 0.5 SUSPENSION RESPIRATORY (INHALATION) at 06:58

## 2023-03-09 RX ADMIN — TAZOBACTAM SODIUM AND PIPERACILLIN SODIUM 3.38 G: 375; 3 INJECTION, SOLUTION INTRAVENOUS at 08:21

## 2023-03-09 RX ADMIN — CALCIUM CHLORIDE, MAGNESIUM CHLORIDE, SODIUM CHLORIDE, SODIUM BICARBONATE, POTASSIUM CHLORIDE AND SODIUM PHOSPHATE DIBASIC DIHYDRATE 750 ML/HR: 3.68; 3.05; 6.34; 3.09; .314; .187 INJECTION INTRAVENOUS at 14:56

## 2023-03-09 RX ADMIN — POLYETHYLENE GLYCOL 3350 17 G: 17 POWDER, FOR SOLUTION ORAL at 15:07

## 2023-03-09 RX ADMIN — IPRATROPIUM BROMIDE AND ALBUTEROL SULFATE 3 ML: 2.5; .5 SOLUTION RESPIRATORY (INHALATION) at 23:36

## 2023-03-09 RX ADMIN — INSULIN LISPRO 2 UNITS: 100 INJECTION, SOLUTION INTRAVENOUS; SUBCUTANEOUS at 17:44

## 2023-03-09 RX ADMIN — CALCIUM CHLORIDE, MAGNESIUM CHLORIDE, SODIUM CHLORIDE, SODIUM BICARBONATE, POTASSIUM CHLORIDE AND SODIUM PHOSPHATE DIBASIC DIHYDRATE 750 ML/HR: 3.68; 3.05; 6.34; 3.09; .314; .187 INJECTION INTRAVENOUS at 07:56

## 2023-03-09 RX ADMIN — MIDODRINE HYDROCHLORIDE 10 MG: 10 TABLET ORAL at 03:46

## 2023-03-09 RX ADMIN — IPRATROPIUM BROMIDE AND ALBUTEROL SULFATE 3 ML: 2.5; .5 SOLUTION RESPIRATORY (INHALATION) at 03:28

## 2023-03-09 RX ADMIN — IPRATROPIUM BROMIDE AND ALBUTEROL SULFATE 3 ML: 2.5; .5 SOLUTION RESPIRATORY (INHALATION) at 19:17

## 2023-03-09 RX ADMIN — CALCIUM CHLORIDE, MAGNESIUM CHLORIDE, SODIUM CHLORIDE, SODIUM BICARBONATE, POTASSIUM CHLORIDE AND SODIUM PHOSPHATE DIBASIC DIHYDRATE 750 ML/HR: 3.68; 3.05; 6.34; 3.09; .314; .187 INJECTION INTRAVENOUS at 01:06

## 2023-03-09 RX ADMIN — IPRATROPIUM BROMIDE AND ALBUTEROL SULFATE 3 ML: 2.5; .5 SOLUTION RESPIRATORY (INHALATION) at 11:36

## 2023-03-09 RX ADMIN — MIDODRINE HYDROCHLORIDE 10 MG: 10 TABLET ORAL at 13:09

## 2023-03-09 RX ADMIN — ALBUMIN (HUMAN) 12.5 G: 0.25 INJECTION, SOLUTION INTRAVENOUS at 17:54

## 2023-03-09 RX ADMIN — CALCIUM CHLORIDE, MAGNESIUM CHLORIDE, SODIUM CHLORIDE, SODIUM BICARBONATE, POTASSIUM CHLORIDE AND SODIUM PHOSPHATE DIBASIC DIHYDRATE 750 ML/HR: 3.68; 3.05; 6.34; 3.09; .314; .187 INJECTION INTRAVENOUS at 07:59

## 2023-03-09 RX ADMIN — ROSUVASTATIN CALCIUM 10 MG: 10 TABLET, FILM COATED ORAL at 20:42

## 2023-03-09 RX ADMIN — TAZOBACTAM SODIUM AND PIPERACILLIN SODIUM 3.38 G: 375; 3 INJECTION, SOLUTION INTRAVENOUS at 01:04

## 2023-03-09 RX ADMIN — ALBUMIN (HUMAN) 12.5 G: 0.25 INJECTION, SOLUTION INTRAVENOUS at 09:54

## 2023-03-09 RX ADMIN — CALCIUM CHLORIDE, MAGNESIUM CHLORIDE, SODIUM CHLORIDE, SODIUM BICARBONATE, POTASSIUM CHLORIDE AND SODIUM PHOSPHATE DIBASIC DIHYDRATE 750 ML/HR: 3.68; 3.05; 6.34; 3.09; .314; .187 INJECTION INTRAVENOUS at 08:00

## 2023-03-09 NOTE — PROGRESS NOTES
2nd visit this afternoon. BP labile still on pressors. UF increased to 80cc/hr. Target UF for next 24hr 0-100cc/hr as tolerated. No issues with filter clotting.

## 2023-03-09 NOTE — NURSING NOTE
CRRT rounds complete. Policy and procedure reviewed with ANDRÉS Jacobs. Orders and documentation reviewed. Hematocrit updated.     Supplies replenished. Denies further need at this time. Encouraged to call [phone] for assistance or for questions.

## 2023-03-09 NOTE — PROGRESS NOTES
CTS Progress Note       LOS: 8 days   Patient Care Team:  Yessica Ricci DO as PCP - General (Family Medicine)  Nima Peter MD as Consulting Physician (Endocrinology)  Nadya Ramirez MD as Cardiologist (Cardiology)  Yessica Ricci DO as Consulting Physician (Family Medicine)    Chief Complaint: Coronary artery disease involving native coronary artery of native heart without angina pectoris    Vital Signs:  Temp:  [97.5 °F (36.4 °C)-98 °F (36.7 °C)] 97.9 °F (36.6 °C)  Heart Rate:  [60-91] 77  Resp:  [14-18] 14  BP: ()/(40-76) 102/50    Physical Exam: Neuro intact       Results:   Results from last 7 days   Lab Units 03/09/23  0508   WBC 10*3/mm3 19.97*   HEMOGLOBIN g/dL 9.0*   HEMATOCRIT % 27.8*   PLATELETS 10*3/mm3 85*     Results from last 7 days   Lab Units 03/09/23  0508   SODIUM mmol/L 136   POTASSIUM mmol/L 4.5   CHLORIDE mmol/L 103   CO2 mmol/L 22.0   BUN mg/dL 29*   CREATININE mg/dL 1.75*   GLUCOSE mg/dL 154*   CALCIUM mg/dL 7.1*           Imaging Results (Last 24 Hours)     Procedure Component Value Units Date/Time    XR Chest 1 View [[id number]] Collected: 03/08/23 1238     Updated: 03/08/23 1243    Narrative:      XR CHEST 1 VW    Date of Exam: 3/8/2023 12:14 PM EST    Indication: post surgery.    Comparison: 3/6/2023    Findings:  Interval removal of left-sided chest tube. No evidence of pneumothorax. Mediastinal drains remain in place. Right IJ and left IJ catheters are unchanged in position. Small left effusion. Patchy areas of bibasilar opacities favored to represent   atelectasis. Interstitial pulmonary edema. Cardiac mediastinal contours within normal limits.      Impression:      Impression:    1. Interval removal of left-sided chest tube without evidence of the thorax.  2. Mild interstitial edema  3. Scattered airspace opacities favored to represent atelectasis.    Electronically Signed: Bret Vasquez    3/8/2023 12:40 PM EST    Workstation ID: [id number]          Assessment       Coronary artery disease involving native coronary artery of native heart without angina pectoris    Hypertension    Hyperlipidemia LDL goal <70    Type 2 diabetes mellitus with hypoglycemia, with long-term current use of insulin (HCC)    Ischemic cardiomyopathy    Mucopurulent chronic bronchitis (HCC)    Prolonged Q-T interval on ECG    Acute renal failure (ARF) (HCC)    Shock (HCC)    Acute respiratory failure with hypoxia (HCC)    Still with 360 mL drainage from chest tube so cannot remove  Plan   CBC CMP chest x-ray in the a.m.    Please note that portions of this note were completed with a voice recognition program. Efforts were made to edit the dictations, but occasionally words are mistranscribed.    Paresh Saavedra MD  03/09/23  06:38 EST

## 2023-03-09 NOTE — PLAN OF CARE
Goal Outcome Evaluation:  Plan of Care Reviewed With: patient           Outcome Evaluation: Pt neuro intact. Pt worked with physical therapy.  CT drainage 200ml. CRRT continues.  will continue to monitor. at 3/8/2023 1947

## 2023-03-09 NOTE — PROGRESS NOTES
2nd visit. Tolerating UF with CRRT. No issues with filter clotting since recent adjustments to CRRT prescription.

## 2023-03-09 NOTE — PLAN OF CARE
Goal Outcome Evaluation:           Progress: improving  Outcome Evaluation: VSS at this time.  Minimal complaints of pain.  Neuro intact, Levo gtt weaned off.  Continuing to pull fluid with CRRT, remains anuric.  160 mL chest tube output.  Plan for speech consult today.  No acute overnight events.

## 2023-03-09 NOTE — PLAN OF CARE
Goal Outcome Evaluation:     CRRT continues, net UF 50-100ml/hr.  25% Albumin given, Levophed restarted and currently infusing at 0.06mcg/kg/min.     Ambulation promoted, walked 9ft with assist x2-3, limited d/t CRRT lines. Stood and marched in place x3 today and sat in the chair the majority of the afternoon.      NSR. Room air. Pulmonary hygiene promoted.

## 2023-03-09 NOTE — PROGRESS NOTES
"   LOS: 8 days    Patient Care Team:  Yessica Ricci DO as PCP - General (Family Medicine)  Nima Peter MD as Consulting Physician (Endocrinology)  Nadya Ramirez MD as Cardiologist (Cardiology)  Yessica Ricci DO as Consulting Physician (Family Medicine)    Chief Complaint: Acute kidney injury s/p CABG  67-year-old with history of hypertension, diabetes, former smoker 1-1/2 pack/day for quite some time, CAD s/p PCI in 2014, underwent CABG x3, post CABG remain hypotensive blood pressure in the 40s systolic in 60s and 70s 80s range requiring IV pressors.  Developed GEORGIANA oliguric, now an uric  Subjective     Interval History:   Remains on CRRT no issues with filter clotting. currently UF~ 50cc/hr. Net fluid balance -1.5 liter. Still on pressors. Clinically stable.      Review of Systems:   No new complaints.    Objective     Vital Sign Min/Max for last 24 hours  Temp  Min: 97.4 °F (36.3 °C)  Max: 98 °F (36.7 °C)   BP  Min: 81/46  Max: 128/50   Pulse  Min: 67  Max: 86   Resp  Min: 14  Max: 24   SpO2  Min: 90 %  Max: 100 %   No data recorded   No data recorded     Flowsheet Rows    Flowsheet Row First Filed Value   Admission Height 162.6 cm (64\") Documented at 03/01/2023 0611   Admission Weight 75.3 kg (166 lb) Documented at 03/01/2023 0611          I/O this shift:  In: 947 [P.O.:720; I.V.:227]  Out: 878 [Other:698; Chest Tube:180]  I/O last 3 completed shifts:  In: 236 [I.V.:161; IV Piggyback:75]  Out: 3111 [Other:2511; Chest Tube:600]    Physical Exam:  General Appearance:  male awake alert oriented no obvious distress, sitting and eating at this time.  Eyes: PER, EOMI.  Neck: Supple no JVD.  Right-sided nontunneled catheter.  Left side old nontunneled catheter in place  Chest: Chest tube in place.  Lungs: Clear auscultation, no rales rhonchi's, equal chest movement, nonlabored.  Heart: No gallop, murmur, rub, RRR.  Abdomen: Soft, nontender, positive bowel sounds, no organomegaly.  Extremities: " Positive bilateral lower extremity dependent edema, no cyanosis.  Neuro: No focal deficit, moving all extremities, alert oriented X 3  : Solomon catheter in place.  Minimal urine output  Skin warm and dry.  Psych mood and affect appropriate  Nontunneled dialysis cath right IJ  WBC WBC   Date Value Ref Range Status   03/09/2023 19.97 (H) 3.40 - 10.80 10*3/mm3 Final   03/08/2023 20.25 (H) 3.40 - 10.80 10*3/mm3 Final   03/07/2023 17.42 (H) 3.40 - 10.80 10*3/mm3 Final      HGB Hemoglobin   Date Value Ref Range Status   03/09/2023 9.0 (L) 13.0 - 17.7 g/dL Final   03/08/2023 8.9 (L) 13.0 - 17.7 g/dL Final   03/07/2023 8.4 (L) 13.0 - 17.7 g/dL Final   03/07/2023 7.6 (L) 13.0 - 17.7 g/dL Final   03/07/2023 8.2 (L) 13.0 - 17.7 g/dL Final   03/06/2023 9.3 (L) 13.0 - 17.7 g/dL Final      HCT Hematocrit   Date Value Ref Range Status   03/09/2023 27.8 (L) 37.5 - 51.0 % Final   03/08/2023 26.9 (L) 37.5 - 51.0 % Final   03/07/2023 25.4 (L) 37.5 - 51.0 % Final   03/07/2023 24.0 (L) 37.5 - 51.0 % Final   03/07/2023 25.0 (L) 37.5 - 51.0 % Final   03/06/2023 28.5 (L) 37.5 - 51.0 % Final      Platlets No results found for: LABPLAT   MCV MCV   Date Value Ref Range Status   03/09/2023 91.7 79.0 - 97.0 fL Final   03/08/2023 89.7 79.0 - 97.0 fL Final   03/07/2023 89.6 79.0 - 97.0 fL Final          Sodium Sodium   Date Value Ref Range Status   03/09/2023 135 (L) 136 - 145 mmol/L Final   03/09/2023 136 136 - 145 mmol/L Final   03/09/2023 134 (L) 136 - 145 mmol/L Final   03/08/2023 134 (L) 136 - 145 mmol/L Final   03/08/2023 137 136 - 145 mmol/L Final   03/08/2023 139 136 - 145 mmol/L Final   03/08/2023 138 136 - 145 mmol/L Final   03/08/2023 138 136 - 145 mmol/L Final   03/07/2023 136 136 - 145 mmol/L Final   03/07/2023 138 136 - 145 mmol/L Final   03/07/2023 135 (L) 136 - 145 mmol/L Final   03/07/2023 139 136 - 145 mmol/L Final      Potassium Potassium   Date Value Ref Range Status   03/09/2023 4.5 3.5 - 5.2 mmol/L Final   03/09/2023 4.5  3.5 - 5.2 mmol/L Final     Comment:     Slight hemolysis detected by analyzer. Results may be affected.   03/09/2023 4.2 3.5 - 5.2 mmol/L Final   03/08/2023 5.0 3.5 - 5.2 mmol/L Final   03/08/2023 4.6 3.5 - 5.2 mmol/L Final   03/08/2023 4.8 3.5 - 5.2 mmol/L Final     Comment:     Slight hemolysis detected by analyzer. Results may be affected.   03/08/2023 4.5 3.5 - 5.2 mmol/L Final   03/08/2023 4.5 3.5 - 5.2 mmol/L Final   03/07/2023 4.3 3.5 - 5.2 mmol/L Final   03/07/2023 4.6 3.5 - 5.2 mmol/L Final   03/07/2023 4.3 3.5 - 5.2 mmol/L Final   03/07/2023 4.2 3.5 - 5.2 mmol/L Final      Chloride Chloride   Date Value Ref Range Status   03/09/2023 103 98 - 107 mmol/L Final   03/09/2023 103 98 - 107 mmol/L Final   03/09/2023 103 98 - 107 mmol/L Final   03/08/2023 103 98 - 107 mmol/L Final   03/08/2023 104 98 - 107 mmol/L Final   03/08/2023 104 98 - 107 mmol/L Final   03/08/2023 105 98 - 107 mmol/L Final   03/08/2023 105 98 - 107 mmol/L Final   03/07/2023 105 98 - 107 mmol/L Final   03/07/2023 104 98 - 107 mmol/L Final   03/07/2023 103 98 - 107 mmol/L Final   03/07/2023 106 98 - 107 mmol/L Final      CO2 CO2   Date Value Ref Range Status   03/09/2023 22.0 22.0 - 29.0 mmol/L Final   03/09/2023 22.0 22.0 - 29.0 mmol/L Final   03/09/2023 23.0 22.0 - 29.0 mmol/L Final   03/08/2023 23.0 22.0 - 29.0 mmol/L Final   03/08/2023 21.0 (L) 22.0 - 29.0 mmol/L Final   03/08/2023 24.0 22.0 - 29.0 mmol/L Final   03/08/2023 25.0 22.0 - 29.0 mmol/L Final   03/08/2023 25.0 22.0 - 29.0 mmol/L Final   03/07/2023 26.0 22.0 - 29.0 mmol/L Final   03/07/2023 23.0 22.0 - 29.0 mmol/L Final   03/07/2023 25.0 22.0 - 29.0 mmol/L Final   03/07/2023 24.0 22.0 - 29.0 mmol/L Final      BUN BUN   Date Value Ref Range Status   03/09/2023 32 (H) 8 - 23 mg/dL Final   03/09/2023 29 (H) 8 - 23 mg/dL Final   03/09/2023 30 (H) 8 - 23 mg/dL Final   03/08/2023 30 (H) 8 - 23 mg/dL Final   03/08/2023 22 8 - 23 mg/dL Final   03/08/2023 21 8 - 23 mg/dL Final    03/08/2023 20 8 - 23 mg/dL Final   03/08/2023 20 8 - 23 mg/dL Final   03/07/2023 24 (H) 8 - 23 mg/dL Final   03/07/2023 22 8 - 23 mg/dL Final   03/07/2023 22 8 - 23 mg/dL Final   03/07/2023 25 (H) 8 - 23 mg/dL Final      Creatinine Creatinine   Date Value Ref Range Status   03/09/2023 1.97 (H) 0.76 - 1.27 mg/dL Final   03/09/2023 1.75 (H) 0.76 - 1.27 mg/dL Final   03/09/2023 1.82 (H) 0.76 - 1.27 mg/dL Final   03/08/2023 1.40 (H) 0.76 - 1.27 mg/dL Final   03/08/2023 1.19 0.76 - 1.27 mg/dL Final   03/08/2023 1.04 0.76 - 1.27 mg/dL Final   03/08/2023 1.02 0.76 - 1.27 mg/dL Final   03/08/2023 1.02 0.76 - 1.27 mg/dL Final   03/07/2023 0.98 0.76 - 1.27 mg/dL Final   03/07/2023 1.09 0.76 - 1.27 mg/dL Final   03/07/2023 1.18 0.76 - 1.27 mg/dL Final   03/07/2023 1.08 0.76 - 1.27 mg/dL Final      Calcium Calcium   Date Value Ref Range Status   03/09/2023 7.1 (L) 8.6 - 10.5 mg/dL Final   03/09/2023 7.1 (L) 8.6 - 10.5 mg/dL Final   03/09/2023 7.0 (L) 8.6 - 10.5 mg/dL Final   03/08/2023 6.9 (L) 8.6 - 10.5 mg/dL Final   03/08/2023 6.9 (L) 8.6 - 10.5 mg/dL Final   03/08/2023 6.9 (L) 8.6 - 10.5 mg/dL Final   03/08/2023 6.8 (L) 8.6 - 10.5 mg/dL Final   03/08/2023 6.8 (L) 8.6 - 10.5 mg/dL Final   03/07/2023 6.7 (L) 8.6 - 10.5 mg/dL Final   03/07/2023 6.6 (L) 8.6 - 10.5 mg/dL Final   03/07/2023 6.7 (L) 8.6 - 10.5 mg/dL Final   03/07/2023 6.7 (L) 8.6 - 10.5 mg/dL Final      PO4 No results found for: CAPO4   Albumin Albumin   Date Value Ref Range Status   03/09/2023 2.6 (L) 3.5 - 5.2 g/dL Final   03/09/2023 2.6 (L) 3.5 - 5.2 g/dL Final   03/09/2023 2.4 (L) 3.5 - 5.2 g/dL Final   03/08/2023 2.6 (L) 3.5 - 5.2 g/dL Final   03/08/2023 2.3 (L) 3.5 - 5.2 g/dL Final   03/08/2023 2.5 (L) 3.5 - 5.2 g/dL Final   03/08/2023 2.5 (L) 3.5 - 5.2 g/dL Final   03/08/2023 2.5 (L) 3.5 - 5.2 g/dL Final   03/07/2023 2.5 (L) 3.5 - 5.2 g/dL Final   03/07/2023 2.7 (L) 3.5 - 5.2 g/dL Final   03/07/2023 2.6 (L) 3.5 - 5.2 g/dL Final   03/07/2023 2.6 (L) 3.5 -  5.2 g/dL Final      Magnesium Magnesium   Date Value Ref Range Status   03/09/2023 2.3 1.6 - 2.4 mg/dL Final   03/09/2023 2.6 (H) 1.6 - 2.4 mg/dL Final   03/08/2023 2.4 1.6 - 2.4 mg/dL Final   03/08/2023 2.3 1.6 - 2.4 mg/dL Final   03/08/2023 2.3 1.6 - 2.4 mg/dL Final   03/07/2023 2.5 (H) 1.6 - 2.4 mg/dL Final   03/07/2023 2.4 1.6 - 2.4 mg/dL Final   03/07/2023 2.5 (H) 1.6 - 2.4 mg/dL Final      Uric Acid No results found for: URICACID        Results Review:     I reviewed the patient's new clinical results.    amiodarone, 200 mg, Oral, Q12H  arformoterol, 15 mcg, Nebulization, BID - RT  aspirin, 325 mg, Oral, Daily  budesonide, 0.5 mg, Nebulization, BID - RT  guaiFENesin, 1,200 mg, Oral, Q12H  insulin lispro, 0-9 Units, Subcutaneous, 4x Daily With Meals & Nightly  ipratropium-albuterol, 3 mL, Nebulization, Q4H - RT  metoprolol tartrate, 12.5 mg, Oral, Q12H  midodrine, 10 mg, Oral, Q8H  pantoprazole, 40 mg, Oral, Q AM  pharmacy consult - [place], , Does not apply, BID  piperacillin-tazobactam, 3.375 g, Intravenous, Q8H  rosuvastatin, 10 mg, Oral, Nightly  senna-docusate sodium, 2 tablet, Oral, BID      niCARdipine, 5-15 mg/hr  nitroglycerin, 5-200 mcg/min, Last Rate: Stopped (03/01/23 1431)  norepinephrine, 0.02-0.3 mcg/kg/min, Last Rate: 0.06 mcg/kg/min (03/09/23 1135)  phenylephrine, 0.5-3 mcg/kg/min  Phoxillum BK4/2.5, 750 mL/hr, Last Rate: 750 mL/hr (03/09/23 1456)  Phoxillum BK4/2.5, 750 mL/hr, Last Rate: 750 mL/hr (03/09/23 1455)  Phoxillum BK4/2.5, 750 mL/hr, Last Rate: 750 mL/hr (03/09/23 1454)        Medication Review: Reviewed    Assessment & Plan       Coronary artery disease involving native coronary artery of native heart without angina pectoris    Hypertension    Hyperlipidemia LDL goal <70    Type 2 diabetes mellitus with hypoglycemia, with long-term current use of insulin (HCC)    Ischemic cardiomyopathy    Mucopurulent chronic bronchitis (HCC)    Prolonged Q-T interval on ECG    Acute renal failure  (ARF) (HCC)    Shock (HCC)    Acute respiratory failure with hypoxia (HCC)     1.  GEORGIANA: ATN likely secondary to shock with systolic blood pressure postsurgery in the range of 40s to 80s, now on multiple vasopressors.  Admit creatinine 1.08, creatinine progressively getting worse.  Patient is oliguric.  C3 complement 18, C4 complement 83 within normal range.  Ultrasound of the kidney shows right kidney 11 cm left kidney 10.6 cm normal ultrasound, bladder ultrasound normal.  2.  S/p CABG x3  3.  Ischemic cardiomyopathy with ejection fraction 41-45%.  4.  Diabetes type 2.  5.  Cardiogenic shock.     Recommendations:  Labile hemodynamics. Low bp noted. Continue CRRT for optimization fo volume status. On midodrine as well.     Manjit Gomez MD  03/09/23  17:07 EST

## 2023-03-09 NOTE — PROGRESS NOTES
"[facility]  IP Progress Note    PROBLEM LIST:  CARDIAC     Coronary Artery Disease:   Inferior STEMI with [facility],3/28/2014:  EF 50%, NILE to the mid-dominant RCA, NILE to the proximal LAD   Stress test, 2/13/2023: Inferolateral ischemia,   [facility], 2/17/2023: Severe triple-vessel disease, %, RCA 80%, LAD 80%     Myocardium:   Echocardiogram, 3/29/2014:   EF 50-55%, RVSP 30.   Echo 12/21/2022:Stage C, HFmrEF EF 41- 45%, mild LVH     Valvular:   Mild calcification to aortic valve     Electrical:   NSR, LVH, PVC     Percardium:   Normal     VASCULAR:   Arterial   Cerebrovascular disease:   Carotid Doppler: 2/2023 less than 50 %         CARDIAC RISK FACTORS:          Hypertension          Diabetes          Dyslipidemia          Tobacco Use, Quit after 75 years     NON-CARDIAC:   Asthma/COPD: Abnormal PFTs     SURGERIES:   Tonsillectomy       HOSPITAL COURSE:  Patient has successful revascularization performed that was complicated with acute kidney injury      CHIEF COMPLAINTS:  Triple-vessel disease      Subjective   Patient is feeling a little better today.  Patient did walk yesterday.  Off of pressors      Objective     Blood pressure 103/56, pulse 77, temperature 97.9 °F (36.6 °C), temperature source Oral, resp. rate 20, height 162.6 cm (64\"), weight 75.3 kg (166 lb), SpO2 96 %.     Intake/Output Summary (Last 24 hours) at 3/9/2023 0824  Last data filed at 3/9/2023 0700  Gross per 24 hour   Intake 236 ml   Output 2275 ml   Net -2039 ml       PHYSICAL EXAM:  Constitutional:       General: Not in acute distress.     Appearance: Healthy appearance. Not in distress.     Neck:     JVP: Not elevated     Carotid artery: No carotid bruit    Pulmonary:      Effort: Pulmonary effort is normal.      Breath sounds: Normal breath sounds.  Mild crackles.     Cardiovascular:      Normal rate. Regular rhythm. Normal S1. Normal S2.      Murmurs: There is no murmur.      No gallop. No click. No rub. "     Abdominal:      General: Bowel sounds are normal.      Palpations: Abdomen is soft.      Tenderness: There is no abdominal tenderness.    Extremities:     Pulses: Decreased pulses     Edema: No edema    RESULR REVIEW:    I reviewed the patient's new clinical results.      MEDICATIONS:    amiodarone, 200 mg, Oral, Q12H  arformoterol, 15 mcg, Nebulization, BID - RT  aspirin, 325 mg, Oral, Daily  budesonide, 0.5 mg, Nebulization, BID - RT  guaiFENesin, 1,200 mg, Oral, Q12H  insulin lispro, 0-9 Units, Subcutaneous, 4x Daily With Meals & Nightly  ipratropium-albuterol, 3 mL, Nebulization, Q4H - RT  metoprolol tartrate, 12.5 mg, Oral, Q12H  midodrine, 10 mg, Oral, Q8H  pantoprazole, 40 mg, Oral, Q AM  pharmacy consult - MTM, , Does not apply, BID  piperacillin-tazobactam, 3.375 g, Intravenous, Q8H  rosuvastatin, 10 mg, Oral, Nightly  senna-docusate sodium, 2 tablet, Oral, BID          Results from last 7 days   Lab Units 03/09/23  0508   WBC 10*3/mm3 19.97*   HEMOGLOBIN g/dL 9.0*   HEMATOCRIT % 27.8*   PLATELETS 10*3/mm3 85*     Results from last 7 days   Lab Units 03/09/23  0508   SODIUM mmol/L 136   POTASSIUM mmol/L 4.5   CHLORIDE mmol/L 103   CO2 mmol/L 22.0   BUN mg/dL 29*   CREATININE mg/dL 1.75*   CALCIUM mg/dL 7.1*   BILIRUBIN mg/dL 0.5   ALK PHOS U/L 126*   ALT (SGPT) U/L 56*   AST (SGOT) U/L 119*   GLUCOSE mg/dL 154*         Lab Results   Component Value Date    TROPONINI 15.31 (C) 03/30/2014    TROPONINT <0.010 12/11/2022                 No results found for: IRON, FERRITIN, LABIRON, TIBC   Hemoglobin A1C   Date Value Ref Range Status   02/28/2023 7.40 (H) 4.80 - 5.60 % Final     Magnesium   Date Value Ref Range Status   03/09/2023 2.6 (H) 1.6 - 2.4 mg/dL Final        Tele: Sinus Rythym      ASSESSMENT:     1. Coronary artery disease s/p coronary artery bypass graft  2. Acute kidney injury requiring dialysis  3. Chest tube still draining          PLAN:     1. Patient bladder scan did not reveal any  significant amount of urine.  He will continue on dialysis.  2. We will continue on current medications.  3. His dialysis regimen might change and he may be able to walk and do more physical therapy.

## 2023-03-09 NOTE — THERAPY EVALUATION
Acute Care - Speech Language Pathology   Swallow Initial Evaluation  [facility] [facility]   Clinical Swallow Evaluation     Patient Name: Herbert Harley  : 1955  MRN: [id number]  Today's Date: 3/9/2023               Admit Date: 3/1/2023    Visit Dx:     ICD-10-CM ICD-9-CM   1. Coronary artery disease involving native coronary artery of native heart without angina pectoris  I25.10 414.01   2. Coronary artery disease involving native coronary artery of native heart, unspecified whether angina present  I25.10 414.01   3. Acute renal failure, unspecified acute renal failure type (HCC)  N17.9 584.9   4. Acute respiratory failure with hypoxia (HCC)  J96.01 518.81     Patient Active Problem List   Diagnosis   • Coronary artery disease involving native coronary artery of native heart without angina pectoris   • Hypertension   • Hyperlipidemia LDL goal <70   • Type 2 diabetes mellitus with hypoglycemia, with long-term current use of insulin (HCC)   • Ischemic cardiomyopathy   • Allergic rhinitis   • Positive cardiac stress test   • Mucopurulent chronic bronchitis (HCC)   • Prolonged Q-T interval on ECG   • Acute renal failure (ARF) (HCC)   • Shock (HCC)   • Acute respiratory failure with hypoxia (HCC)     Past Medical History:   Diagnosis Date   • Acute renal failure (ARF) (HCC) 3/2/2023   • Asthma 2022   • Chronic systolic congestive heart failure (HCC) 3/1/2023   • Coronary artery disease 2014   • Elevated cholesterol    • History of tobacco abuse    • HLD (hyperlipidemia) 2023   • Hypertension    • Ischemic cardiomyopathy    • Myocardial infarction (HCC) 2014   • Prolonged Q-T interval on ECG 3/1/2023   • Type 2 diabetes mellitus (HCC)    • Wears glasses      Past Surgical History:   Procedure Laterality Date   • CARDIAC CATHETERIZATION  2014   • CARDIAC CATHETERIZATION Left 2023    Procedure: Left Heart Cath;  Surgeon: Nadya Ramirez MD;  Location: [facility] CATH INVASIVE  LOCATION;  Service: Cardiology;  Laterality: Left;   • CORONARY ARTERY BYPASS GRAFT N/A 3/1/2023    Procedure: MEDIAN STERNOTOMY CORONARY ARTERY BYPASS GRAFTING X3  UTILIZING THE LEFT INTERNAL MAMMERY GRAFT, AND EVH OF THE GREATER RIGHT SAPHENOUS VEIN;  Surgeon: Paresh Saavedra MD;  Location: [facility];  Service: Cardiothoracic;  Laterality: N/A;   • CORONARY STENT PLACEMENT  March 2014   • TONSILLECTOMY         SLP Recommendation and Plan  SLP Swallowing Diagnosis: swallow WFL/no suspected pharyngeal impairment, suspected esophageal dysphagia (03/09/23 0940)  SLP Diet Recommendation: regular textures, thin liquids (03/09/23 0940)  Recommended Precautions and Strategies: upright posture during/after eating, general aspiration precautions, reflux precautions (03/09/23 0940)  SLP Rec. for Method of Medication Administration: as tolerated (03/09/23 0940)     Monitor for Signs of Aspiration: notify SLP if any concerns (03/09/23 0940)     Swallow Criteria for Skilled Therapeutic Interventions Met: no problems identified which require skilled intervention (03/09/23 0940)  Anticipated Discharge Disposition (SLP): unknown (03/09/23 0940)     Therapy Frequency (Swallow): evaluation only (03/09/23 0940)     Demonstrates Need for Referral to Another Service: gastroenterology (03/09/23 0940)                                     Plan of Care Reviewed With: patient, spouse      SWALLOW EVALUATION (last 72 hours)     SLP Adult Swallow Evaluation     Row Name 03/09/23 0940                   Rehab Evaluation    Document Type evaluation  -MP        Subjective Information no complaints  -MP        Patient Observations alert;cooperative  -MP        Patient/Family/Caregiver Comments/Observations S/O present  -MP        Patient Effort good  -MP           General Information    Patient Profile Reviewed yes  -MP        Pertinent History Of Current Problem Adm 3/1 w/ CAD for planned CABG. Intubated for sx. Renal failure post-op requiring  CRRT. Hx former smoker, CAD, HTN, DM. Consulted 2' some throat irritation when swallowing. Upon report from pt, appears feeling material stuck/discomfort, belching, hiccups.  -MP        Current Method of Nutrition regular textures;thin liquids  -MP        Precautions/Limitations, Vision WFL  -MP        Precautions/Limitations, Hearing WFL  -MP        Prior Level of Function-Communication WFL  -MP        Prior Level of Function-Swallowing no diet consistency restrictions  -MP        Plans/Goals Discussed with patient;spouse/S.O.;agreed upon  -MP        Barriers to Rehab none identified  -MP        Patient's Goals for Discharge patient did not state  -MP        Family Goals for Discharge family did not state  -MP           Pain    Additional Documentation Pain Scale: FACES Pre/Post-Treatment (Group)  -MP           Pain Scale: FACES Pre/Post-Treatment    Pain: FACES Scale, Pretreatment 0-->no hurt  -MP        Posttreatment Pain Rating 0-->no hurt  -MP           Oral Motor Structure and Function    Dentition Assessment natural, present and adequate  -MP        Secretion Management WNL/WFL  -MP        Mucosal Quality moist, healthy  -MP           Oral Musculature and Cranial Nerve Assessment    Oral Motor General Assessment WFL  -MP           General Eating/Swallowing Observations    Respiratory Support Currently in Use room air  -MP        Eating/Swallowing Skills self-fed;fed by SLP  -MP        Positioning During Eating upright in bed  -MP        Utensils Used spoon;cup;straw  -MP        Consistencies Trialed thin liquids;pureed;regular textures  -MP           Clinical Swallow Eval    Pharyngeal Phase no overt signs/symptoms of pharyngeal impairment  -MP        Esophageal Phase suspected esophageal impairment  -MP        Clinical Swallow Evaluation Summary No overt clinical s/sxs aspiration/distress noted w/ any consistency. Pt belching t/o eval, reported feeling material sticking & pointed to sternal notch region.  Complaints and observations appear more esophageal in nature. Pt may benefit from GI w/u.  -MP           Esophageal Phase Concerns    Esophageal Phase Concerns belching;sensation of material sticking  -MP        Belching all consistencies  -MP        Sensation of Material Sticking all consistencies  -MP           SLP Evaluation Clinical Impression    SLP Swallowing Diagnosis swallow WFL/no suspected pharyngeal impairment;suspected esophageal dysphagia  -MP        Swallow Criteria for Skilled Therapeutic Interventions Met no problems identified which require skilled intervention  -MP           Recommendations    Therapy Frequency (Swallow) evaluation only  -MP        SLP Diet Recommendation regular textures;thin liquids  -MP        Recommended Precautions and Strategies upright posture during/after eating;general aspiration precautions;reflux precautions  -MP        Oral Care Recommendations Oral Care BID/PRN  -MP        SLP Rec. for Method of Medication Administration as tolerated  -MP        Monitor for Signs of Aspiration notify SLP if any concerns  -MP        Anticipated Discharge Disposition (SLP) unknown  -MP        Demonstrates Need for Referral to Another Service gastroenterology  -MP              User Key  (r) = Recorded By, (t) = Taken By, (c) = Cosigned By    Initials Name Effective Dates    MP Jean Russo, MS CCC-SLP 12/28/21 -                 EDUCATION  The patient has been educated in the following areas:   Dysphagia (Swallowing Impairment).              Time Calculation:    Time Calculation- SLP     Row Name 03/09/23 1033             Time Calculation- SLP    SLP Start Time 0940  -MP      SLP Received On 03/09/23  -MP         Untimed Charges    95697-[id number] Eval Oral Pharyng Swallow Minutes 40  -MP         Total Minutes    Untimed Charges Total Minutes 40  -MP       Total Minutes 40  -MP            User Key  (r) = Recorded By, (t) = Taken By, (c) = Cosigned By    Initials Name Provider Type    MP  Jean Russo, MS CCC-SLP Speech and Language Pathologist                Therapy Charges for Today     Code Description Service Date Service Provider Modifiers Qty    [id number] HC ST EVAL ORAL PHARYNG SWALLOW 3 3/9/2023 Jean Russo MS CCC-SLP GN 1               Jean Garner MS CCC-SLP  3/9/2023

## 2023-03-09 NOTE — PROGRESS NOTES
Intensive Care Follow-up     Hospital:  LOS: 8 days   Mr. Herbert Harley, 67 y.o. male is followed for:   Coronary artery disease involving native coronary artery of native heart without angina pectoris   Post op respiratory, electrolyte and hemodynamic management       History of present illness:   67-year-old male with past medical history of tobacco abuse, COPD, coronary disease status post PCI in 2014, hypertension and diabetes.  Patient was having problem with unstable angina and underwent stress test which was found to be abnormal.  Left heart cath revealed multivessel coronary disease.  Echocardiogram showed EF 41 to 45%, mild concentric left ventricular hypertrophy and RVSP of 35-45.  Preoperative carotid duplex did not show any significant carotid disease.  Preop PFTs were abnormal with mostly nonspecific defect and concerning for restrictive defect.  Hemoglobin A1c of 7.4.  Patient underwent  Coronary artery bypass graft surgery x3 by Dr. Saavedra on March 1.  Postop course complicated by worsening renal failure and persistent pressor requirements.  Patient was started on CRRT.    Subjective   Interval History:  Patient on and off Norepinephrine. On CRRT. Complains of GERD and belching.                The patient's past medical, surgical and social history were reviewed and updated in Epic as appropriate.       Objective     Infusions:  niCARdipine, 5-15 mg/hr  nitroglycerin, 5-200 mcg/min, Last Rate: Stopped (03/01/23 1431)  norepinephrine, 0.02-0.3 mcg/kg/min, Last Rate: 0.06 mcg/kg/min (03/09/23 1135)  phenylephrine, 0.5-3 mcg/kg/min  Phoxillum BK4/2.5, 750 mL/hr, Last Rate: 750 mL/hr (03/09/23 0800)  Phoxillum BK4/2.5, 750 mL/hr, Last Rate: 750 mL/hr (03/09/23 0759)  Phoxillum BK4/2.5, 750 mL/hr, Last Rate: 750 mL/hr (03/09/23 0756)      Medications:  amiodarone, 200 mg, Oral, Q12H  arformoterol, 15 mcg, Nebulization, BID - RT  aspirin, 325 mg, Oral, Daily  budesonide, 0.5 mg, Nebulization, BID -  "RT  guaiFENesin, 1,200 mg, Oral, Q12H  insulin lispro, 0-9 Units, Subcutaneous, 4x Daily With Meals & Nightly  ipratropium-albuterol, 3 mL, Nebulization, Q4H - RT  metoprolol tartrate, 12.5 mg, Oral, Q12H  midodrine, 10 mg, Oral, Q8H  pantoprazole, 40 mg, Oral, Q AM  pharmacy consult - MTM, , Does not apply, BID  piperacillin-tazobactam, 3.375 g, Intravenous, Q8H  rosuvastatin, 10 mg, Oral, Nightly  senna-docusate sodium, 2 tablet, Oral, BID        Vital Sign Min/Max for last 24 hours  Temp  Min: 97.4 °F (36.3 °C)  Max: 98 °F (36.7 °C)   BP  Min: 81/46  Max: 118/60   Pulse  Min: 67  Max: 86   Resp  Min: 14  Max: 24   SpO2  Min: 94 %  Max: 100 %   No data recorded       Input/Output for last 24 hour shift  03/08 0701 - 03/09 0700  In: 236 [I.V.:161]  Out: 2360       Objective:  Vital signs: (most recent): Blood pressure 95/45, pulse 80, temperature 97.4 °F (36.3 °C), temperature source Oral, resp. rate 22, height 162.6 cm (64\"), weight 75.3 kg (166 lb), SpO2 96 %.            General Appearance: Awake, alert, in no acute resp distress  Lungs:   B/L Breath sounds present with decreased breath sounds on bases, no wheezing heard, no crackles.   Heart: S1 and S2 present, no murmur  Abdomen: Soft, nontender, no guarding or rigidity, bowel sounds positive.  Extremities:  no cyanosis or clubbing,  no edema, warm to touch.  Neurologic:  Moving all four extremities. Good strength bilaterally.  Psychological: calm and cooperative    Results from last 7 days   Lab Units 03/09/23  0508 03/08/23  0422 03/07/23  1810 03/07/23  1117 03/07/23  0220   WBC 10*3/mm3 19.97* 20.25*  --   --  17.42*   HEMOGLOBIN g/dL 9.0* 8.9* 8.4*   < > 8.2*   PLATELETS 10*3/mm3 85* 69*  --   --  88*    < > = values in this interval not displayed.     Results from last 7 days   Lab Units 03/09/23  0830 03/09/23  0508 03/09/23  0009 03/08/23  1538   SODIUM mmol/L 135* 136 134* 134*   POTASSIUM mmol/L 4.5 4.5 4.2 5.0   CO2 mmol/L 22.0 22.0 23.0 23.0   BUN " mg/dL 32* 29* 30* 30*   CREATININE mg/dL 1.97* 1.75* 1.82* 1.40*   MAGNESIUM mg/dL 2.3  --  2.6* 2.4   PHOSPHORUS mg/dL 4.8*  --  4.6* 5.0*   GLUCOSE mg/dL 142* 154* 157* 212*     Estimated Creatinine Clearance: 33.8 mL/min (A) (by C-G formula based on SCr of 1.97 mg/dL (H)).    Results from last 7 days   Lab Units 03/05/23  0324   PH, ARTERIAL pH units 7.295*   PCO2, ARTERIAL mm Hg 44.5   PO2 ART mm Hg 105.0       Images:   None new.    I reviewed the patient's results and images.     Assessment & Plan   Impression        Coronary artery disease involving native coronary artery of native heart without angina pectoris    Hypertension    Hyperlipidemia LDL goal <70    Type 2 diabetes mellitus with hypoglycemia, with long-term current use of insulin (HCC)    Ischemic cardiomyopathy    Mucopurulent chronic bronchitis (HCC)    Prolonged Q-T interval on ECG    Acute renal failure (ARF) (HCC)    Shock (HCC)    Acute respiratory failure with hypoxia (HCC)       Plan        1.  Patient s/p coronary bypass graft surgery.  Postop management per CT surgery.  2.  Continue aspirin, statin. Will hold beta-blocker if systolic blood pressure less than 100.  3.  On and off pressors. Wondering if we are dealing with sepsis picture with worsening leukocytosis and bandemia.  Access sites for lines do not look infected.  No diarrhea or loose stools.  Has been coughing but not bringing up much sputum. Procalcitonin level elevated.  We will get blood cultures.  MRSA screen negative.  Empirically covering with Zosyn.  If able to produce sputum we will culture that as well.  4.  Continue CRRT per nephrology. Once hemodynamically stable hopefully will be able to get off CRRT to intermittent hemodialysis.  5.  Amiodarone for atrial fibrillation. Started on oral amiodarone. Cardiology team is following.  6.  Continue DuoNeb and budesonide with history of asthma and wheezing.  7.  Monitor electrolytes and replace per ICU protocol.  8.  Patient  intermittent anxious and wants to get out of ICU.  Discussed the rationale for continued ICU stay due to hemodynamic instability and ongoing CRRT.  9. Sliding scale insulin coverage for hyperglycemia management.  10.  Oral diet as tolerated.  11.  PPI for GERD and prn tums.     Patient remains with guarded prognosis and need continued ICU care.  Met with family and updated them about current plan of care as well.  Discussed with nephrology in detail.    Plan of care and goals reviewed with multidisciplinary/antibiotic stewardship team during rounds.   I discussed the patient's findings and my recommendations with patient, family, nursing staff and consulting provider     Time spent 35 min (exclusive of procedure time)  including high complexity decision making to assess, manipulate, and support vital organ system failure in this individual who has impairment of one or more vital organ systems such that there is a high probability of imminent or life threatening deterioration in the patient’s condition.        Thomas Singh MD, [facility]  Pulmonary, Critical care and Sleep Medicine

## 2023-03-09 NOTE — THERAPY TREATMENT NOTE
Patient Name: Herbert Harley  : 1955    MRN: [id number]                              Today's Date: 3/9/2023       Admit Date: 3/1/2023    Visit Dx:     ICD-10-CM ICD-9-CM   1. Coronary artery disease involving native coronary artery of native heart without angina pectoris  I25.10 414.01   2. Coronary artery disease involving native coronary artery of native heart, unspecified whether angina present  I25.10 414.01   3. Acute renal failure, unspecified acute renal failure type (HCC)  N17.9 584.9   4. Acute respiratory failure with hypoxia (HCC)  J96.01 518.81     Patient Active Problem List   Diagnosis   • Coronary artery disease involving native coronary artery of native heart without angina pectoris   • Hypertension   • Hyperlipidemia LDL goal <70   • Type 2 diabetes mellitus with hypoglycemia, with long-term current use of insulin (HCC)   • Ischemic cardiomyopathy   • Allergic rhinitis   • Positive cardiac stress test   • Mucopurulent chronic bronchitis (HCC)   • Prolonged Q-T interval on ECG   • Acute renal failure (ARF) (HCC)   • Shock (HCC)   • Acute respiratory failure with hypoxia (HCC)     Past Medical History:   Diagnosis Date   • Acute renal failure (ARF) (HCC) 3/2/2023   • Asthma 2022   • Chronic systolic congestive heart failure (HCC) 3/1/2023   • Coronary artery disease 2014   • Elevated cholesterol    • History of tobacco abuse    • HLD (hyperlipidemia) 2023   • Hypertension    • Ischemic cardiomyopathy    • Myocardial infarction (HCC) 2014   • Prolonged Q-T interval on ECG 3/1/2023   • Type 2 diabetes mellitus (HCC)    • Wears glasses      Past Surgical History:   Procedure Laterality Date   • CARDIAC CATHETERIZATION  2014   • CARDIAC CATHETERIZATION Left 2023    Procedure: Left Heart Cath;  Surgeon: Nadya Ramirez MD;  Location: [facility] CATH INVASIVE LOCATION;  Service: Cardiology;  Laterality: Left;   • CORONARY ARTERY BYPASS GRAFT N/A 3/1/2023     Procedure: MEDIAN STERNOTOMY CORONARY ARTERY BYPASS GRAFTING X3  UTILIZING THE LEFT INTERNAL MAMMERY GRAFT, AND EVH OF THE GREATER RIGHT SAPHENOUS VEIN;  Surgeon: Paresh Saavedra MD;  Location: [facility];  Service: Cardiothoracic;  Laterality: N/A;   • CORONARY STENT PLACEMENT  March 2014   • TONSILLECTOMY        General Information     Row Name 03/09/23 1408          Physical Therapy Time and Intention    Document Type therapy note (daily note)  -CM     Mode of Treatment physical therapy;individual therapy  -CM     Row Name 03/09/23 1408          General Information    Patient Profile Reviewed yes  -CM     Existing Precautions/Restrictions cardiac;fall;oxygen therapy device and L/min;sternal;other (see comments)   CRRT  -CM     Barriers to Rehab medically complex  -CM     Row Name 03/09/23 1408          Cognition    Orientation Status (Cognition) oriented x 3  -CM     Row Name 03/09/23 1408          Safety Issues, Functional Mobility    Safety Issues Affecting Function (Mobility) awareness of need for assistance;insight into deficits/self-awareness;safety precaution awareness;safety precautions follow-through/compliance  -CM     Impairments Affecting Function (Mobility) balance;coordination;endurance/activity tolerance;postural/trunk control;shortness of breath;strength  -CM           User Key  (r) = Recorded By, (t) = Taken By, (c) = Cosigned By    Initials Name Provider Type    CM Tory Kaye, PT Physical Therapist               Mobility     Row Name 03/09/23 1409          Bed Mobility    Bed Mobility supine-sit  -CM     Supine-Sit [place] (Bed Mobility) moderate assist (50% patient effort);2 person assist;verbal cues  -CM     Assistive Device (Bed Mobility) draw sheet;head of bed elevated  -CM     Comment, (Bed Mobility) VCs for sequencing and sternal precautions, able to bring BLE toward EOB requiring assist to complete BLE off the bed and to lift trunk from elevated HOB. Improved sitting  balance at EOB today requiring CGA  -CM     Row Name 03/09/23 1409          Bed-Chair Transfer    Bed-Chair [place] (Transfers) moderate assist (50% patient effort);2 person assist;verbal cues  -CM     Assistive Device (Bed-Chair Transfers) other (see comments)  BUE support  -CM     Comment, (Bed-Chair Transfer) sidesteps from bed to chair with posterior lean noted, VCs provided for anterior weighshifting with minimal improvement  -CM     Row Name 03/09/23 1409          Sit-Stand Transfer    Sit-Stand [place] (Transfers) 2 person assist;verbal cues;minimum assist (75% patient effort)  -CM     Comment, (Sit-Stand Transfer) STSx2 once from bed, once from chair. Cues for maintenance of sternal precautions, Jessi to lift hips from EOB/chair  -CM     Row Name 03/09/23 1409          Gait/Stairs (Locomotion)    [place] Level (Gait) moderate assist (50% patient effort);2 person assist;verbal cues;1 person to manage equipment  -CM     Assistive Device (Gait) other (see comments)  BUE support  -CM     Distance in Feet (Gait) 16  -CM     Deviations/Abnormal Patterns (Gait) base of support, narrow;deena decreased;festinating/shuffling;stride length decreased;bilateral deviations  -CM     Bilateral Gait Deviations heel strike decreased;weight shift ability decreased  -CM     Comment, (Gait/Stairs) Following transfer to chair, patient ambulated 8' forward and 8' backward in room with BUE support. He demonstrates narrow JADYN with shuffled gait pattern, slight posterior lean however improved from previous day. Cues provided for increased step length and wider JADYN with patient able to demonstrate improved gait mechanics. Gait distance limited by fatigue and environmental barriers with CRRT.  -CM           User Key  (r) = Recorded By, (t) = Taken By, (c) = Cosigned By    Initials Name Provider Type    Tory Ramirez PT Physical Therapist               Obj/Interventions     Row Name 03/09/23 2703           Motor Skills    Therapeutic Exercise hip;knee;ankle  -CM     Row Name 03/09/23 1416          Hip (Therapeutic Exercise)    Hip (Therapeutic Exercise) strengthening exercise  -CM     Hip Strengthening (Therapeutic Exercise) bilateral;marching while seated;20 repititions;aBduction;aDduction;10 repetitions  -CM     Row Name 03/09/23 1416          Knee (Therapeutic Exercise)    Knee (Therapeutic Exercise) strengthening exercise;isometric exercises  -CM     Knee Isometrics (Therapeutic Exercise) bilateral;quad sets;10 repetitions;3 second hold  -CM     Knee Strengthening (Therapeutic Exercise) bilateral;LAQ (long arc quad);20 repititions  -CM     Row Name 03/09/23 1416          Ankle (Therapeutic Exercise)    Ankle (Therapeutic Exercise) strengthening exercise  -CM     Ankle Strengthening (Therapeutic Exercise) bilateral;dorsiflexion;plantarflexion;20 repititions  -CM     Row Name 03/09/23 1416          Balance    Balance Assessment sitting static balance;standing static balance;standing dynamic balance;sitting dynamic balance  -CM     Static Sitting Balance contact guard  -CM     Dynamic Sitting Balance contact guard  -CM     Position, Sitting Balance unsupported;sitting in chair;sitting edge of bed  -CM     Static Standing Balance minimal assist;2-person assist;verbal cues  -CM     Dynamic Standing Balance moderate assist;2-person assist;verbal cues  -CM     Position/Device Used, Standing Balance supported;other (see comments)  BUE support  -     Comment, Balance posterior lean present today but improved from previous session  -           User Key  (r) = Recorded By, (t) = Taken By, (c) = Cosigned By    Initials Name Provider Type    Tory Ramirez PT Physical Therapist               Goals/Plan    No documentation.                Clinical Impression     Row Name 03/09/23 1417          Pain    Additional Documentation Pain Scale: FACES Pre/Post-Treatment (Group)  -     Row Name 03/09/23 1417           Pain Scale: FACES Pre/Post-Treatment    Pain: FACES Scale, Pretreatment 4-->hurts little more  -CM     Posttreatment Pain Rating 4-->hurts little more  -CM     Pain Location incisional  -CM     Pain Location - chest  -CM     Pre/Posttreatment Pain Comment increased pain with hiccups, RN present and aware  -CM     Row Name 03/09/23 1417          Plan of Care Review    Plan of Care Reviewed With patient  -CM     Progress improving  -CM     Outcome Evaluation Patient showing improvement requiring less assistance for all mobility. He performed sidesteps to chair with modAx2 and ambulated 8' forward and 8' backwards from chair with modAx2 and BUE support. Posterior lean present in standing but improved from previous session. Good effort with BLE therex with patient able to recall exercises provided from previous session. Patient remains below his baseline, will continue with current PT POC.  -CM     Row Name 03/09/23 1417          Vital Signs    Pre Systolic BP Rehab 99  -CM     Pre Treatment Diastolic BP 47  -CM     Post Systolic BP Rehab 108  -CM     Post Treatment Diastolic BP 44  -CM     Pretreatment Heart Rate (beats/min) 79  -CM     Posttreatment Heart Rate (beats/min) 82  -CM     Pre SpO2 (%) 94  -CM     O2 Delivery Pre Treatment room air  -CM     O2 Delivery Intra Treatment room air  -CM     Post SpO2 (%) 93  -CM     O2 Delivery Post Treatment room air  -CM     Pre Patient Position Supine  -CM     Intra Patient Position Standing  -CM     Post Patient Position Sitting  -CM     Row Name 03/09/23 1417          Positioning and Restraints    Pre-Treatment Position in bed  -CM     Post Treatment Position chair  -CM     In Chair reclined;call light within reach;encouraged to call for assist;exit alarm on;RUE elevated;LUE elevated;with nsg;notified nsg  -CM           User Key  (r) = Recorded By, (t) = Taken By, (c) = Cosigned By    Initials Name Provider Type    Tory Ramirez, PT Physical Therapist                Outcome Measures     Row Name 03/09/23 1421 03/09/23 0800       How much help from another person do you currently need...    Turning from your back to your side while in flat bed without using bedrails? 2  -CM 2  -JJ    Moving from lying on back to sitting on the side of a flat bed without bedrails? 2  -CM 2  -JJ    Moving to and from a bed to a chair (including a wheelchair)? 2  -CM 2  -JJ    Standing up from a chair using your arms (e.g., wheelchair, bedside chair)? 3  -CM 2  -JJ    Climbing 3-5 steps with a railing? 2  -CM 1  -JJ    To walk in hospital room? 2  -CM 2  -JJ    AM-PAC 6 Clicks Score (PT) 13  -CM 11  -JJ    Highest level of mobility 4 --> Transferred to chair/commode  -CM 4 --> Transferred to chair/commode  -JJ          User Key  (r) = Recorded By, (t) = Taken By, (c) = Cosigned By    Initials Name Provider Type    Reynaldo Alfred, RN Registered Nurse    Tory Ramirez, PT Physical Therapist                             Physical Therapy Education     Title: PT OT SLP Therapies (In Progress)     Topic: Physical Therapy (In Progress)     Point: Mobility training (In Progress)     Learning Progress Summary           Patient Acceptance, E, NR by CM at 3/9/2023 1421    Acceptance, E, NR by KG at 3/8/2023 0954    Acceptance, E, NR by KG at 3/7/2023 0919    Acceptance, E, NR by KG at 3/6/2023 1324                   Point: Home exercise program (In Progress)     Learning Progress Summary           Patient Acceptance, E, NR by CM at 3/9/2023 1421    Acceptance, E, NR by KG at 3/8/2023 0954    Acceptance, E, NR by KG at 3/7/2023 0919                   Point: Body mechanics (In Progress)     Learning Progress Summary           Patient Acceptance, E, NR by CM at 3/9/2023 1421    Acceptance, E, NR by KG at 3/8/2023 0954    Acceptance, E, NR by KG at 3/7/2023 0919    Acceptance, E, NR by KG at 3/6/2023 1324                   Point: Precautions (In Progress)     Learning Progress Summary            Patient Acceptance, E, NR by CM at 3/9/2023 1421    Acceptance, E, NR by KG at 3/8/2023 0954    Acceptance, E, NR by KG at 3/7/2023 0919    Acceptance, E, NR by KG at 3/6/2023 1324                               User Key     Initials Effective Dates Name Provider Type Discipline    KG 05/22/20 -  Whitney Mistry, PT Physical Therapist PT    CM 09/22/22 -  Tory Kaye PT Physical Therapist PT              PT Recommendation and Plan     Plan of Care Reviewed With: patient  Progress: improving  Outcome Evaluation: Patient showing improvement requiring less assistance for all mobility. He performed sidesteps to chair with modAx2 and ambulated 8' forward and 8' backwards from chair with modAx2 and BUE support. Posterior lean present in standing but improved from previous session. Good effort with BLE therex with patient able to recall exercises provided from previous session. Patient remains below his baseline, will continue with current PT POC.     Time Calculation:    PT Charges     Row Name 03/09/23 1422             Time Calculation    Start Time 1337  -CM      PT Received On 03/09/23  -CM      PT Goal Re-Cert Due Date 03/16/23  -CM         Timed Charges    14115 - PT Therapeutic Exercise Minutes 15  -CM      58592 - Gait Training Minutes  10  -CM         Total Minutes    Timed Charges Total Minutes 25  -CM       Total Minutes 25  -CM            User Key  (r) = Recorded By, (t) = Taken By, (c) = Cosigned By    Initials Name Provider Type    Tory Ramirez, PT Physical Therapist              Therapy Charges for Today     Code Description Service Date Service Provider Modifiers Qty    00960220918 HC PT THER PROC EA 15 MIN 3/9/2023 Tory Kaye, PT GP 1    82305338431 HC GAIT TRAINING EA 15 MIN 3/9/2023 Tory Kaye, PT GP 1          PT G-Codes  Outcome Measure Options: AM-PAC 6 Clicks Basic Mobility (PT)  AM-PAC 6 Clicks Score (PT): 13       Tory Kaye PT  3/9/2023

## 2023-03-10 ENCOUNTER — APPOINTMENT (OUTPATIENT)
Dept: GENERAL RADIOLOGY | Facility: HOSPITAL | Age: 68
DRG: 235 | End: 2023-03-10
Payer: MEDICARE

## 2023-03-10 LAB
ALBUMIN SERPL-MCNC: 2.5 G/DL (ref 3.5–5.2)
ALBUMIN SERPL-MCNC: 2.6 G/DL (ref 3.5–5.2)
ALBUMIN SERPL-MCNC: 3 G/DL (ref 3.5–5.2)
ALBUMIN SERPL-MCNC: 3 G/DL (ref 3.5–5.2)
ALBUMIN/GLOB SERPL: 1.6 G/DL
ALP SERPL-CCNC: 114 U/L (ref 39–117)
ALT SERPL W P-5'-P-CCNC: 36 U/L (ref 1–41)
ANION GAP SERPL CALCULATED.3IONS-SCNC: 11 MMOL/L (ref 5–15)
ANION GAP SERPL CALCULATED.3IONS-SCNC: 7 MMOL/L (ref 5–15)
AST SERPL-CCNC: 59 U/L (ref 1–40)
BASOPHILS # BLD AUTO: 0.08 10*3/MM3 (ref 0–0.2)
BASOPHILS NFR BLD AUTO: 0.5 % (ref 0–1.5)
BH BB BLOOD EXPIRATION DATE: NORMAL
BH BB BLOOD TYPE BARCODE: 5100
BH BB DISPENSE STATUS: NORMAL
BH BB PRODUCT CODE: NORMAL
BH BB UNIT NUMBER: NORMAL
BILIRUB SERPL-MCNC: 0.6 MG/DL (ref 0–1.2)
BUN SERPL-MCNC: 29 MG/DL (ref 8–23)
BUN SERPL-MCNC: 30 MG/DL (ref 8–23)
BUN SERPL-MCNC: 30 MG/DL (ref 8–23)
BUN SERPL-MCNC: 32 MG/DL (ref 8–23)
BUN/CREAT SERPL: 12.6 (ref 7–25)
BUN/CREAT SERPL: 12.7 (ref 7–25)
BUN/CREAT SERPL: 12.7 (ref 7–25)
BUN/CREAT SERPL: 14.3 (ref 7–25)
CA-I SERPL ISE-MCNC: 1.11 MMOL/L (ref 1.12–1.32)
CA-I SERPL ISE-MCNC: 1.14 MMOL/L (ref 1.12–1.32)
CA-I SERPL ISE-MCNC: 1.16 MMOL/L (ref 1.12–1.32)
CALCIUM SPEC-SCNC: 7.1 MG/DL (ref 8.6–10.5)
CALCIUM SPEC-SCNC: 7.1 MG/DL (ref 8.6–10.5)
CALCIUM SPEC-SCNC: 7.4 MG/DL (ref 8.6–10.5)
CALCIUM SPEC-SCNC: 7.4 MG/DL (ref 8.6–10.5)
CHLORIDE SERPL-SCNC: 102 MMOL/L (ref 98–107)
CHLORIDE SERPL-SCNC: 103 MMOL/L (ref 98–107)
CO2 SERPL-SCNC: 21 MMOL/L (ref 22–29)
CO2 SERPL-SCNC: 23 MMOL/L (ref 22–29)
CO2 SERPL-SCNC: 23 MMOL/L (ref 22–29)
CO2 SERPL-SCNC: 24 MMOL/L (ref 22–29)
CREAT SERPL-MCNC: 2.23 MG/DL (ref 0.76–1.27)
CREAT SERPL-MCNC: 2.3 MG/DL (ref 0.76–1.27)
CREAT SERPL-MCNC: 2.37 MG/DL (ref 0.76–1.27)
CREAT SERPL-MCNC: 2.37 MG/DL (ref 0.76–1.27)
CROSSMATCH INTERPRETATION: NORMAL
DEPRECATED RDW RBC AUTO: 49.7 FL (ref 37–54)
EGFRCR SERPLBLD CKD-EPI 2021: 29.3 ML/MIN/1.73
EGFRCR SERPLBLD CKD-EPI 2021: 29.3 ML/MIN/1.73
EGFRCR SERPLBLD CKD-EPI 2021: 30.4 ML/MIN/1.73
EGFRCR SERPLBLD CKD-EPI 2021: 31.5 ML/MIN/1.73
EOSINOPHIL # BLD AUTO: 0.52 10*3/MM3 (ref 0–0.4)
EOSINOPHIL NFR BLD AUTO: 3.2 % (ref 0.3–6.2)
ERYTHROCYTE [DISTWIDTH] IN BLOOD BY AUTOMATED COUNT: 16.9 % (ref 12.3–15.4)
GLOBULIN UR ELPH-MCNC: 1.9 GM/DL
GLUCOSE BLDC GLUCOMTR-MCNC: 198 MG/DL (ref 70–130)
GLUCOSE BLDC GLUCOMTR-MCNC: 199 MG/DL (ref 70–130)
GLUCOSE BLDC GLUCOMTR-MCNC: 223 MG/DL (ref 70–130)
GLUCOSE BLDC GLUCOMTR-MCNC: 315 MG/DL (ref 70–130)
GLUCOSE SERPL-MCNC: 150 MG/DL (ref 65–99)
GLUCOSE SERPL-MCNC: 150 MG/DL (ref 65–99)
GLUCOSE SERPL-MCNC: 217 MG/DL (ref 65–99)
GLUCOSE SERPL-MCNC: 223 MG/DL (ref 65–99)
HCT VFR BLD AUTO: 27.6 % (ref 37.5–51)
HGB BLD-MCNC: 8.9 G/DL (ref 13–17.7)
IMM GRANULOCYTES # BLD AUTO: 0.89 10*3/MM3 (ref 0–0.05)
IMM GRANULOCYTES NFR BLD AUTO: 5.5 % (ref 0–0.5)
LYMPHOCYTES # BLD AUTO: 2.68 10*3/MM3 (ref 0.7–3.1)
LYMPHOCYTES NFR BLD AUTO: 16.4 % (ref 19.6–45.3)
MAGNESIUM SERPL-MCNC: 2.3 MG/DL (ref 1.6–2.4)
MAGNESIUM SERPL-MCNC: 2.3 MG/DL (ref 1.6–2.4)
MAGNESIUM SERPL-MCNC: 2.4 MG/DL (ref 1.6–2.4)
MCH RBC QN AUTO: 29.7 PG (ref 26.6–33)
MCHC RBC AUTO-ENTMCNC: 32.2 G/DL (ref 31.5–35.7)
MCV RBC AUTO: 92 FL (ref 79–97)
MONOCYTES # BLD AUTO: 1.94 10*3/MM3 (ref 0.1–0.9)
MONOCYTES NFR BLD AUTO: 11.9 % (ref 5–12)
NEUTROPHILS NFR BLD AUTO: 10.19 10*3/MM3 (ref 1.7–7)
NEUTROPHILS NFR BLD AUTO: 62.5 % (ref 42.7–76)
NRBC BLD AUTO-RTO: 0.6 /100 WBC (ref 0–0.2)
PHOSPHATE SERPL-MCNC: 4.2 MG/DL (ref 2.5–4.5)
PHOSPHATE SERPL-MCNC: 4.6 MG/DL (ref 2.5–4.5)
PHOSPHATE SERPL-MCNC: 4.8 MG/DL (ref 2.5–4.5)
PLATELET # BLD AUTO: 90 10*3/MM3 (ref 140–450)
PMV BLD AUTO: 11 FL (ref 6–12)
POTASSIUM SERPL-SCNC: 4.3 MMOL/L (ref 3.5–5.2)
POTASSIUM SERPL-SCNC: 4.4 MMOL/L (ref 3.5–5.2)
PROCALCITONIN SERPL-MCNC: 1.36 NG/ML (ref 0–0.25)
PROT SERPL-MCNC: 4.9 G/DL (ref 6–8.5)
QT INTERVAL: 406 MS
QTC INTERVAL: 493 MS
RBC # BLD AUTO: 3 10*6/MM3 (ref 4.14–5.8)
SODIUM SERPL-SCNC: 134 MMOL/L (ref 136–145)
SODIUM SERPL-SCNC: 134 MMOL/L (ref 136–145)
SODIUM SERPL-SCNC: 136 MMOL/L (ref 136–145)
SODIUM SERPL-SCNC: 136 MMOL/L (ref 136–145)
UNIT  ABO: NORMAL
UNIT  RH: NORMAL
WBC NRBC COR # BLD: 16.3 10*3/MM3 (ref 3.4–10.8)

## 2023-03-10 PROCEDURE — 97166 OT EVAL MOD COMPLEX 45 MIN: CPT

## 2023-03-10 PROCEDURE — 25010000002 PIPERACILLIN SOD-TAZOBACTAM PER 1 G: Performed by: INTERNAL MEDICINE

## 2023-03-10 PROCEDURE — 82962 GLUCOSE BLOOD TEST: CPT

## 2023-03-10 PROCEDURE — 63710000001 INSULIN LISPRO (HUMAN) PER 5 UNITS: Performed by: INTERNAL MEDICINE

## 2023-03-10 PROCEDURE — 99024 POSTOP FOLLOW-UP VISIT: CPT | Performed by: THORACIC SURGERY (CARDIOTHORACIC VASCULAR SURGERY)

## 2023-03-10 PROCEDURE — 63710000001 INSULIN DETEMIR PER 5 UNITS: Performed by: INTERNAL MEDICINE

## 2023-03-10 PROCEDURE — 94799 UNLISTED PULMONARY SVC/PX: CPT

## 2023-03-10 PROCEDURE — 80053 COMPREHEN METABOLIC PANEL: CPT | Performed by: INTERNAL MEDICINE

## 2023-03-10 PROCEDURE — 85025 COMPLETE CBC W/AUTO DIFF WBC: CPT | Performed by: INTERNAL MEDICINE

## 2023-03-10 PROCEDURE — 97116 GAIT TRAINING THERAPY: CPT

## 2023-03-10 PROCEDURE — 84100 ASSAY OF PHOSPHORUS: CPT | Performed by: INTERNAL MEDICINE

## 2023-03-10 PROCEDURE — 71045 X-RAY EXAM CHEST 1 VIEW: CPT

## 2023-03-10 PROCEDURE — 82330 ASSAY OF CALCIUM: CPT | Performed by: INTERNAL MEDICINE

## 2023-03-10 PROCEDURE — 99232 SBSQ HOSP IP/OBS MODERATE 35: CPT | Performed by: INTERNAL MEDICINE

## 2023-03-10 PROCEDURE — 83735 ASSAY OF MAGNESIUM: CPT | Performed by: INTERNAL MEDICINE

## 2023-03-10 PROCEDURE — 84145 PROCALCITONIN (PCT): CPT | Performed by: INTERNAL MEDICINE

## 2023-03-10 PROCEDURE — 94761 N-INVAS EAR/PLS OXIMETRY MLT: CPT

## 2023-03-10 PROCEDURE — 99232 SBSQ HOSP IP/OBS MODERATE 35: CPT | Performed by: PHYSICIAN ASSISTANT

## 2023-03-10 RX ORDER — INSULIN LISPRO 100 [IU]/ML
2 INJECTION, SOLUTION INTRAVENOUS; SUBCUTANEOUS
Status: DISCONTINUED | OUTPATIENT
Start: 2023-03-10 | End: 2023-03-20 | Stop reason: HOSPADM

## 2023-03-10 RX ADMIN — CALCIUM CARBONATE (ANTACID) CHEW TAB 500 MG 1 TABLET: 500 CHEW TAB at 13:00

## 2023-03-10 RX ADMIN — ASPIRIN 325 MG: 325 TABLET, COATED ORAL at 08:04

## 2023-03-10 RX ADMIN — IPRATROPIUM BROMIDE AND ALBUTEROL SULFATE 3 ML: 2.5; .5 SOLUTION RESPIRATORY (INHALATION) at 19:17

## 2023-03-10 RX ADMIN — ARFORMOTEROL TARTRATE 15 MCG: 15 SOLUTION RESPIRATORY (INHALATION) at 07:46

## 2023-03-10 RX ADMIN — PANTOPRAZOLE SODIUM 40 MG: 40 TABLET, DELAYED RELEASE ORAL at 06:28

## 2023-03-10 RX ADMIN — GUAIFENESIN 1200 MG: 600 TABLET, EXTENDED RELEASE ORAL at 20:46

## 2023-03-10 RX ADMIN — ROSUVASTATIN CALCIUM 10 MG: 10 TABLET, FILM COATED ORAL at 20:45

## 2023-03-10 RX ADMIN — INSULIN DETEMIR 10 UNITS: 100 INJECTION, SOLUTION SUBCUTANEOUS at 20:46

## 2023-03-10 RX ADMIN — TAZOBACTAM SODIUM AND PIPERACILLIN SODIUM 3.38 G: 375; 3 INJECTION, SOLUTION INTRAVENOUS at 17:00

## 2023-03-10 RX ADMIN — CALCIUM CHLORIDE, MAGNESIUM CHLORIDE, SODIUM CHLORIDE, SODIUM BICARBONATE, POTASSIUM CHLORIDE AND SODIUM PHOSPHATE DIBASIC DIHYDRATE 750 ML/HR: 3.68; 3.05; 6.34; 3.09; .314; .187 INJECTION INTRAVENOUS at 03:28

## 2023-03-10 RX ADMIN — CYCLOBENZAPRINE 10 MG: 10 TABLET, FILM COATED ORAL at 20:46

## 2023-03-10 RX ADMIN — ANTICOAGULANT CITRATE DEXTROSE SOLUTION FORMULA A 1 ML: 12.25; 11; 3.65 SOLUTION INTRAVENOUS at 18:15

## 2023-03-10 RX ADMIN — IPRATROPIUM BROMIDE AND ALBUTEROL SULFATE 3 ML: 2.5; .5 SOLUTION RESPIRATORY (INHALATION) at 07:46

## 2023-03-10 RX ADMIN — CALCIUM CHLORIDE, MAGNESIUM CHLORIDE, SODIUM CHLORIDE, SODIUM BICARBONATE, POTASSIUM CHLORIDE AND SODIUM PHOSPHATE DIBASIC DIHYDRATE 750 ML/HR: 3.68; 3.05; 6.34; 3.09; .314; .187 INJECTION INTRAVENOUS at 10:07

## 2023-03-10 RX ADMIN — INSULIN LISPRO 2 UNITS: 100 INJECTION, SOLUTION INTRAVENOUS; SUBCUTANEOUS at 17:17

## 2023-03-10 RX ADMIN — GUAIFENESIN 1200 MG: 600 TABLET, EXTENDED RELEASE ORAL at 08:04

## 2023-03-10 RX ADMIN — INSULIN LISPRO 2 UNITS: 100 INJECTION, SOLUTION INTRAVENOUS; SUBCUTANEOUS at 12:07

## 2023-03-10 RX ADMIN — ARFORMOTEROL TARTRATE 15 MCG: 15 SOLUTION RESPIRATORY (INHALATION) at 19:18

## 2023-03-10 RX ADMIN — TAZOBACTAM SODIUM AND PIPERACILLIN SODIUM 3.38 G: 375; 3 INJECTION, SOLUTION INTRAVENOUS at 08:04

## 2023-03-10 RX ADMIN — IPRATROPIUM BROMIDE AND ALBUTEROL SULFATE 3 ML: 2.5; .5 SOLUTION RESPIRATORY (INHALATION) at 12:59

## 2023-03-10 RX ADMIN — MIDODRINE HYDROCHLORIDE 10 MG: 10 TABLET ORAL at 04:39

## 2023-03-10 RX ADMIN — CALCIUM CHLORIDE, MAGNESIUM CHLORIDE, SODIUM CHLORIDE, SODIUM BICARBONATE, POTASSIUM CHLORIDE AND SODIUM PHOSPHATE DIBASIC DIHYDRATE 750 ML/HR: 3.68; 3.05; 6.34; 3.09; .314; .187 INJECTION INTRAVENOUS at 16:16

## 2023-03-10 RX ADMIN — IPRATROPIUM BROMIDE AND ALBUTEROL SULFATE 3 ML: 2.5; .5 SOLUTION RESPIRATORY (INHALATION) at 16:39

## 2023-03-10 RX ADMIN — INSULIN LISPRO 7 UNITS: 100 INJECTION, SOLUTION INTRAVENOUS; SUBCUTANEOUS at 20:47

## 2023-03-10 RX ADMIN — AMIODARONE HYDROCHLORIDE 200 MG: 200 TABLET ORAL at 08:04

## 2023-03-10 RX ADMIN — AMIODARONE HYDROCHLORIDE 200 MG: 200 TABLET ORAL at 20:45

## 2023-03-10 RX ADMIN — MIDODRINE HYDROCHLORIDE 10 MG: 10 TABLET ORAL at 11:04

## 2023-03-10 RX ADMIN — SENNOSIDES AND DOCUSATE SODIUM 2 TABLET: 50; 8.6 TABLET ORAL at 08:04

## 2023-03-10 RX ADMIN — IPRATROPIUM BROMIDE AND ALBUTEROL SULFATE 3 ML: 2.5; .5 SOLUTION RESPIRATORY (INHALATION) at 22:40

## 2023-03-10 RX ADMIN — TAZOBACTAM SODIUM AND PIPERACILLIN SODIUM 3.38 G: 375; 3 INJECTION, SOLUTION INTRAVENOUS at 01:09

## 2023-03-10 RX ADMIN — ACETAMINOPHEN 325MG 650 MG: 325 TABLET ORAL at 10:07

## 2023-03-10 RX ADMIN — BUDESONIDE 0.5 MG: 0.5 SUSPENSION RESPIRATORY (INHALATION) at 19:17

## 2023-03-10 RX ADMIN — MIDODRINE HYDROCHLORIDE 10 MG: 10 TABLET ORAL at 20:46

## 2023-03-10 RX ADMIN — BUDESONIDE 0.5 MG: 0.5 SUSPENSION RESPIRATORY (INHALATION) at 07:46

## 2023-03-10 RX ADMIN — ACETAMINOPHEN 325MG 650 MG: 325 TABLET ORAL at 18:17

## 2023-03-10 RX ADMIN — INSULIN LISPRO 4 UNITS: 100 INJECTION, SOLUTION INTRAVENOUS; SUBCUTANEOUS at 17:17

## 2023-03-10 RX ADMIN — INSULIN LISPRO 2 UNITS: 100 INJECTION, SOLUTION INTRAVENOUS; SUBCUTANEOUS at 08:04

## 2023-03-10 RX ADMIN — IPRATROPIUM BROMIDE AND ALBUTEROL SULFATE 3 ML: 2.5; .5 SOLUTION RESPIRATORY (INHALATION) at 03:00

## 2023-03-10 RX ADMIN — CALCIUM CARBONATE (ANTACID) CHEW TAB 500 MG 1 TABLET: 500 CHEW TAB at 17:38

## 2023-03-10 RX ADMIN — CALCIUM CHLORIDE, MAGNESIUM CHLORIDE, SODIUM CHLORIDE, SODIUM BICARBONATE, POTASSIUM CHLORIDE AND SODIUM PHOSPHATE DIBASIC DIHYDRATE 750 ML/HR: 3.68; 3.05; 6.34; 3.09; .314; .187 INJECTION INTRAVENOUS at 09:15

## 2023-03-10 RX ADMIN — CYCLOBENZAPRINE 10 MG: 10 TABLET, FILM COATED ORAL at 10:07

## 2023-03-10 RX ADMIN — ANTICOAGULANT CITRATE DEXTROSE SOLUTION FORMULA A 1 ML: 12.25; 11; 3.65 SOLUTION INTRAVENOUS at 18:14

## 2023-03-10 RX ADMIN — SENNOSIDES AND DOCUSATE SODIUM 2 TABLET: 50; 8.6 TABLET ORAL at 20:45

## 2023-03-10 NOTE — THERAPY TREATMENT NOTE
Patient Name: Herbert Harley  : 1955    MRN: [id number]                              Today's Date: 3/10/2023       Admit Date: 3/1/2023    Visit Dx:     ICD-10-CM ICD-9-CM   1. Coronary artery disease involving native coronary artery of native heart without angina pectoris  I25.10 414.01   2. Coronary artery disease involving native coronary artery of native heart, unspecified whether angina present  I25.10 414.01   3. Acute renal failure, unspecified acute renal failure type (HCC)  N17.9 584.9   4. Acute respiratory failure with hypoxia (HCC)  J96.01 518.81     Patient Active Problem List   Diagnosis   • Coronary artery disease involving native coronary artery of native heart without angina pectoris   • Hypertension   • Hyperlipidemia LDL goal <70   • Type 2 diabetes mellitus with hypoglycemia, with long-term current use of insulin (HCC)   • Ischemic cardiomyopathy   • Allergic rhinitis   • Positive cardiac stress test   • Mucopurulent chronic bronchitis (HCC)   • Prolonged Q-T interval on ECG   • Acute renal failure (ARF) (HCC)   • Shock (HCC)   • Acute respiratory failure with hypoxia (HCC)     Past Medical History:   Diagnosis Date   • Acute renal failure (ARF) (HCC) 3/2/2023   • Asthma 2022   • Chronic systolic congestive heart failure (HCC) 3/1/2023   • Coronary artery disease 2014   • Elevated cholesterol    • History of tobacco abuse    • HLD (hyperlipidemia) 2023   • Hypertension    • Ischemic cardiomyopathy    • Myocardial infarction (HCC) 2014   • Prolonged Q-T interval on ECG 3/1/2023   • Type 2 diabetes mellitus (HCC)    • Wears glasses      Past Surgical History:   Procedure Laterality Date   • CARDIAC CATHETERIZATION  2014   • CARDIAC CATHETERIZATION Left 2023    Procedure: Left Heart Cath;  Surgeon: Nadya Ramirez MD;  Location: [facility] CATH INVASIVE LOCATION;  Service: Cardiology;  Laterality: Left;   • CORONARY ARTERY BYPASS GRAFT N/A 3/1/2023     Procedure: MEDIAN STERNOTOMY CORONARY ARTERY BYPASS GRAFTING X3  UTILIZING THE LEFT INTERNAL MAMMERY GRAFT, AND EVH OF THE GREATER RIGHT SAPHENOUS VEIN;  Surgeon: Paresh Saavedra MD;  Location: [facility];  Service: Cardiothoracic;  Laterality: N/A;   • CORONARY STENT PLACEMENT  March 2014   • TONSILLECTOMY        General Information     Row Name 03/10/23 1351          Physical Therapy Time and Intention    Document Type therapy note (daily note)  -CM     Mode of Treatment physical therapy  -CM     Row Name 03/10/23 2730          General Information    Patient Profile Reviewed yes  -CM     Existing Precautions/Restrictions cardiac;fall;oxygen therapy device and L/min;sternal;other (see comments)   CRRT  -CM     Barriers to Rehab medically complex  -CM     Row Name 03/10/23 8225          Cognition    Orientation Status (Cognition) oriented x 4  -CM     Row Name 03/10/23 [facility]6          Safety Issues, Functional Mobility    Safety Issues Affecting Function (Mobility) awareness of need for assistance;insight into deficits/self-awareness;safety precaution awareness;safety precautions follow-through/compliance;sequencing abilities  -CM     Impairments Affecting Function (Mobility) balance;coordination;endurance/activity tolerance;postural/trunk control;shortness of breath;strength  -CM           User Key  (r) = Recorded By, (t) = Taken By, (c) = Cosigned By    Initials Name Provider Type    CM Tory Kaye, PT Physical Therapist               Mobility     Row Name 03/10/23 1355          Bed Mobility    Comment, (Bed Mobility) [facility] pre/post treatment  -CM     Row Name 03/10/23 135          Sit-Stand Transfer    Sit-Stand [place] (Transfers) 2 person assist;verbal cues;minimum assist (75% patient effort);1 person to manage equipment  -CM     Assistive Device (Sit-Stand Transfers) walker, front-wheeled  -CM     Comment, (Sit-Stand Transfer) STSx1 from chair, cues for sternal precautions, grasps RW upon  standing with good static balance  -CM     Row Name 03/10/23 1355          Gait/Stairs (Locomotion)    [place] Level (Gait) minimum assist (75% patient effort);2 person assist;1 person to manage equipment;verbal cues  -CM     Assistive Device (Gait) walker, front-wheeled  -CM     Distance in Feet (Gait) 32  -CM     Deviations/Abnormal Patterns (Gait) base of support, narrow;deena decreased;festinating/shuffling;stride length decreased;bilateral deviations  -CM     Bilateral Gait Deviations heel strike decreased;weight shift ability decreased  -CM     Comment, (Gait/Stairs) Patient ambulated 8' forward and backward two times for a total of 32'. Balance impoved with use of RW walker today, however patient continues to demonstrate mild posterior lean primarily when ambulating backwards today. JADYN improved today but still narrow. Continues to be limited by fatigue and environmental barriers with CRRT.  -CM           User Key  (r) = Recorded By, (t) = Taken By, (c) = Cosigned By    Initials Name Provider Type    Tory Ramirez, HAIR Physical Therapist               Obj/Interventions     Row Name 03/10/23 1403          Balance    Balance Assessment sitting static balance;standing static balance;standing dynamic balance  -CM     Static Sitting Balance supervision  -CM     Position, Sitting Balance unsupported;sitting in chair  -CM     Static Standing Balance contact guard  -CM     Dynamic Standing Balance minimal assist;2-person assist;1 person to manage equipment  -CM     Position/Device Used, Standing Balance supported;walker, front-wheeled  -CM     Comment, Balance mild posterior lean with ambulation backwards  -CM           User Key  (r) = Recorded By, (t) = Taken By, (c) = Cosigned By    Initials Name Provider Type    Tory Ramirez, HAIR Physical Therapist               Goals/Plan    No documentation.                Clinical Impression     Row Name 03/10/23 1404          Pain    Pretreatment Pain  Rating 0/10 - no pain  -CM     Posttreatment Pain Rating 0/10 - no pain  -CM     Row Name 03/10/23 1404          Plan of Care Review    Plan of Care Reviewed With patient  -CM     Progress improving  -CM     Outcome Evaluation Patient continues to show improvement with ability to ambulate a total of 32' today with minAx2+1 and RW. His balance especially continues to improve although is still below his baseline. IPPT remains indicated to address deficits, will continue with current PT POC with D/C rec to IPR.  -CM     Row Name 03/10/23 1404          Vital Signs    Pre Systolic BP Rehab 103  -CM     Pre Treatment Diastolic BP 57  -CM     Post Systolic BP Rehab 101  -CM     Post Treatment Diastolic BP 62  -CM     Pretreatment Heart Rate (beats/min) 76  -CM     Posttreatment Heart Rate (beats/min) 76  -CM     Pre SpO2 (%) 99  -CM     O2 Delivery Pre Treatment room air  -CM     Post SpO2 (%) 99  -CM     O2 Delivery Post Treatment room air  -CM     Pre Patient Position Sitting  -CM     Intra Patient Position Standing  -CM     Post Patient Position Sitting  -CM     Row Name 03/10/23 1400          Positioning and Restraints    Pre-Treatment Position sitting in chair/recliner  -CM     Post Treatment Position chair  -CM     In Chair reclined;call light within reach;encouraged to call for assist;notified nsg;heels elevated  -CM           User Key  (r) = Recorded By, (t) = Taken By, (c) = Cosigned By    Initials Name Provider Type    Tory Ramirez, PT Physical Therapist               Outcome Measures     Row Name 03/10/23 1407          How much help from another person do you currently need...    Turning from your back to your side while in flat bed without using bedrails? 2  -CM     Moving from lying on back to sitting on the side of a flat bed without bedrails? 2  -CM     Moving to and from a bed to a chair (including a wheelchair)? 3  -CM     Standing up from a chair using your arms (e.g., wheelchair, bedside  chair)? 3  -CM     Climbing 3-5 steps with a railing? 2  -CM     To walk in hospital room? 3  -CM     AM-PAC 6 Clicks Score (PT) 15  -CM     Highest level of mobility 4 --> Transferred to chair/commode  -CM     Row Name 03/10/23 1355          Functional Assessment    Outcome Measure Options AM-PAC 6 Clicks Daily Activity (OT)  -KF           User Key  (r) = Recorded By, (t) = Taken By, (c) = Cosigned By    Initials Name Provider Type    KF Rosa Vazquez, OT Occupational Therapist    CM Tory Kaye, PT Physical Therapist                             Physical Therapy Education     Title: PT OT SLP Therapies (In Progress)     Topic: Physical Therapy (In Progress)     Point: Mobility training (In Progress)     Learning Progress Summary           Patient Acceptance, E, NR by CM at 3/10/2023 1410    Acceptance, E, NR by CM at 3/9/2023 1421    Acceptance, E, NR by KG at 3/8/2023 0954    Acceptance, E, NR by KG at 3/7/2023 0919    Acceptance, E, NR by KG at 3/6/2023 1324                   Point: Home exercise program (In Progress)     Learning Progress Summary           Patient Acceptance, E, NR by CM at 3/9/2023 1421    Acceptance, E, NR by KG at 3/8/2023 0954    Acceptance, E, NR by KG at 3/7/2023 0919                   Point: Body mechanics (In Progress)     Learning Progress Summary           Patient Acceptance, E, NR by CM at 3/10/2023 1410    Acceptance, E, NR by CM at 3/9/2023 1421    Acceptance, E, NR by KG at 3/8/2023 0954    Acceptance, E, NR by KG at 3/7/2023 0919    Acceptance, E, NR by KG at 3/6/2023 1324                   Point: Precautions (In Progress)     Learning Progress Summary           Patient Acceptance, E, NR by CM at 3/10/2023 1410    Acceptance, E, NR by CM at 3/9/2023 1421    Acceptance, E, NR by KG at 3/8/2023 0954    Acceptance, E, NR by KG at 3/7/2023 0919    Acceptance, E, NR by KG at 3/6/2023 1324                               User Key     Initials Effective Dates Name Provider  Type Discipline    KG 05/22/20 -  Whitney Mistry, PT Physical Therapist PT    CM 09/22/22 -  Tory Kaye PT Physical Therapist PT              PT Recommendation and Plan     Plan of Care Reviewed With: patient  Progress: improving  Outcome Evaluation: Patient continues to show improvement with ability to ambulate a total of 32' today with minAx2+1 and RW. His balance especially continues to improve although is still below his baseline. IPPT remains indicated to address deficits, will continue with current PT POC with D/C rec to IPR.     Time Calculation:    PT Charges     Row Name 03/10/23 1410             Time Calculation    Start Time 1310  -CM      PT Received On 03/10/23  -CM      PT Goal Re-Cert Due Date 03/16/23  -CM         Timed Charges    91440 - Gait Training Minutes  18  -CM         Total Minutes    Timed Charges Total Minutes 18  -CM       Total Minutes 18  -CM            User Key  (r) = Recorded By, (t) = Taken By, (c) = Cosigned By    Initials Name Provider Type    CM Tory Kaye, PT Physical Therapist              Therapy Charges for Today     Code Description Service Date Service Provider Modifiers Qty    62496540339 HC PT THER PROC EA 15 MIN 3/9/2023 Tory Kaye, PT GP 1    39209869397 HC GAIT TRAINING EA 15 MIN 3/9/2023 Tory Kaye, PT GP 1    66854431707 HC GAIT TRAINING EA 15 MIN 3/10/2023 Tory Kaye, PT GP 1          PT G-Codes  Outcome Measure Options: AM-PAC 6 Clicks Daily Activity (OT)  AM-PAC 6 Clicks Score (PT): 15  AM-PAC 6 Clicks Score (OT): 14       Tory Kaye PT  3/10/2023

## 2023-03-10 NOTE — NURSING NOTE
CRRT rounds complete. Policy and procedure reviewed with ANDRÉS Carias. Orders and documentation reviewed. Hematocrit updated. Supplies adequate. Denies further need at this time. Encouraged to call [phone] for assistance or for questions.

## 2023-03-10 NOTE — PLAN OF CARE
Goal Outcome Evaluation:      Pt neuro intact. VSS on RA. Remains off pressors. Encouraging pulmonary toilet. CRRT stopped, plan for HD tomorrow. 1 Large BM.

## 2023-03-10 NOTE — PROGRESS NOTES
Intensive Care Follow-up     Hospital:  LOS: 9 days   Mr. Herbert Harley, 67 y.o. male is followed for:   Coronary artery disease involving native coronary artery of native heart without angina pectoris   Post op respiratory, electrolyte and hemodynamic management       History of present illness:   67-year-old male with past medical history of tobacco abuse, COPD, coronary disease status post PCI in 2014, hypertension and diabetes.  Patient was having problem with unstable angina and underwent stress test which was found to be abnormal.  Left heart cath revealed multivessel coronary disease.  Echocardiogram showed EF 41 to 45%, mild concentric left ventricular hypertrophy and RVSP of 35-45.  Preoperative carotid duplex did not show any significant carotid disease.  Preop PFTs were abnormal with mostly nonspecific defect and concerning for restrictive defect.  Hemoglobin A1c of 7.4.  Patient underwent  Coronary artery bypass graft surgery x3 by Dr. Saavedra on March 1.  Postop course complicated by worsening renal failure and persistent pressor requirements.  Patient was started on CRRT.    Subjective   Interval History:  Patient came off norepinephrine today morning.  Midodrine dose was increased yesterday.  Overall feeling better compared to yesterday had no further reflux or belching today.  Had a bowel movement today morning as well.               The patient's past medical, surgical and social history were reviewed and updated in Epic as appropriate.       Objective     Infusions:  niCARdipine, 5-15 mg/hr  nitroglycerin, 5-200 mcg/min, Last Rate: Stopped (03/01/23 1431)  norepinephrine, 0.02-0.3 mcg/kg/min, Last Rate: Stopped (03/10/23 0638)  phenylephrine, 0.5-3 mcg/kg/min  Phoxillum BK4/2.5, 750 mL/hr, Last Rate: 750 mL/hr (03/10/23 1007)  Phoxillum BK4/2.5, 750 mL/hr, Last Rate: 750 mL/hr (03/10/23 1007)  Phoxillum BK4/2.5, 750 mL/hr, Last Rate: 750 mL/hr (03/10/23 0915)      Medications:  amiodarone,  "200 mg, Oral, Q12H  arformoterol, 15 mcg, Nebulization, BID - RT  aspirin, 325 mg, Oral, Daily  budesonide, 0.5 mg, Nebulization, BID - RT  guaiFENesin, 1,200 mg, Oral, Q12H  insulin detemir, 10 Units, Subcutaneous, Nightly  insulin lispro, 0-9 Units, Subcutaneous, 4x Daily With Meals & Nightly  Insulin Lispro, 2 Units, Subcutaneous, TID With Meals  ipratropium-albuterol, 3 mL, Nebulization, Q4H - RT  metoprolol tartrate, 12.5 mg, Oral, Q12H  midodrine, 10 mg, Oral, Q8H  pantoprazole, 40 mg, Oral, Q AM  pharmacy consult - MTM, , Does not apply, BID  piperacillin-tazobactam, 3.375 g, Intravenous, Q8H  rosuvastatin, 10 mg, Oral, Nightly  senna-docusate sodium, 2 tablet, Oral, BID        Vital Sign Min/Max for last 24 hours  Temp  Min: 97.4 °F (36.3 °C)  Max: 98.1 °F (36.7 °C)   BP  Min: 88/48  Max: 140/53   Pulse  Min: 67  Max: 94   Resp  Min: 12  Max: 22   SpO2  Min: 87 %  Max: 100 %   No data recorded       Input/Output for last 24 hour shift  03/09 0701 - 03/10 0700  In: 1567 [P.O.:960; I.V.:607]  Out: 2060       Objective:  Vital signs: (most recent): Blood pressure 104/51, pulse 86, temperature 97.4 °F (36.3 °C), temperature source Axillary, resp. rate 20, height 162.6 cm (64\"), weight 80.5 kg (177 lb 7.5 oz), SpO2 99 %.            General Appearance: Awake, alert, in no acute resp distress  Lungs:   B/L Breath sounds present with decreased breath sounds on bases, no wheezing heard, no crackles.   Heart: S1 and S2 present, no murmur  Abdomen: Soft, nontender, no guarding or rigidity, bowel sounds positive.  Extremities:  no cyanosis or clubbing,  no edema, warm to touch.  Neurologic:  Moving all four extremities. Good strength bilaterally.  Psychological: calm and cooperative    Results from last 7 days   Lab Units 03/10/23  0050 03/09/23  0508 03/08/23  0422   WBC 10*3/mm3 16.30* 19.97* 20.25*   HEMOGLOBIN g/dL 8.9* 9.0* 8.9*   PLATELETS 10*3/mm3 90* 85* 69*     Results from last 7 days   Lab Units " 03/10/23  0737 03/10/23  0050 03/09/23  1618   SODIUM mmol/L 134* 136  136 137   POTASSIUM mmol/L 4.4 4.3  4.3 4.6   CO2 mmol/L 21.0* 23.0  23.0 22.0   BUN mg/dL 32* 30*  30* 34*   CREATININE mg/dL 2.23* 2.37*  2.37* 1.87*   MAGNESIUM mg/dL 2.3 2.4 2.4   PHOSPHORUS mg/dL 4.6* 4.8* 4.3   GLUCOSE mg/dL 223* 150*  150* 195*     Estimated Creatinine Clearance: 30.8 mL/min (A) (by C-G formula based on SCr of 2.23 mg/dL (H)).    Results from last 7 days   Lab Units 03/05/23  0324   PH, ARTERIAL pH units 7.295*   PCO2, ARTERIAL mm Hg 44.5   PO2 ART mm Hg 105.0       Images:   Small bilateral pleural effusions with mild pulmonary vascular congestion.  Left lungChest x-ray reviewed and showed dialysis line and other support lines in good position.  No pneumothorax. atelectasis likely.  I reviewed the patient's results and images.     Assessment & Plan   Impression        Coronary artery disease involving native coronary artery of native heart without angina pectoris    Hypertension    Hyperlipidemia LDL goal <70    Type 2 diabetes mellitus with hypoglycemia, with long-term current use of insulin (HCC)    Ischemic cardiomyopathy    Mucopurulent chronic bronchitis (HCC)    Prolonged Q-T interval on ECG    Acute renal failure (ARF) (HCC)    Shock (HCC)    Acute respiratory failure with hypoxia (HCC)       Plan        1.  Patient s/p coronary bypass graft surgery.  Postop management per CT surgery.  2.  Continue aspirin, statin. Will hold beta-blocker if systolic blood pressure less than 100.  3.  On and off pressors. Wondering if we are dealing with sepsis picture with worsening leukocytosis and bandemia.  Access sites for lines do not look infected.  No diarrhea or loose stools.  Has been coughing but not bringing up much sputum. Procalcitonin level elevated.  Blood cultures negative thus far.  Empirically being covered with Zosyn.  Will complete 7 days of therapy.  WBC count improving.  No new fevers at this point.  On  midodrine and tolerating well.  Dose was increased yesterday.  4.  Continue CRRT per nephrology. Once hemodynamically stable hopefully will be able to get off CRRT to intermittent hemodialysis.  5.  Amiodarone for atrial fibrillation. Started on oral amiodarone. Cardiology team is following.  6.  Continue DuoNeb and budesonide with history of asthma and wheezing.  7.  Monitor electrolytes and replace per ICU protocol.  8.  Patient intermittently anxious and wants to get out of ICU.  Discussed the rationale for continued ICU stay due to hemodynamic instability and ongoing CRRT.  9.  Added sliding scale insulin coverage for hyperglycemia management.  10.  Tolerating oral diet well.  Today feeling more hungry hopefully he will be able to tolerate oral diet well.  11.  PPI for GERD and prn tums.     Family updated at bedside.  We will continue supportive care.  Check labs in the morning.    Plan of care and goals reviewed with multidisciplinary/antibiotic stewardship team during rounds.   I discussed the patient's findings and my recommendations with patient, family, nursing staff and consulting provider         Thomas Singh MD, [facility]  Pulmonary, Critical care and Sleep Medicine

## 2023-03-10 NOTE — THERAPY EVALUATION
Patient Name: Herbert Harley  : 1955    MRN: [id number]                              Today's Date: 3/10/2023       Admit Date: 3/1/2023    Visit Dx:     ICD-10-CM ICD-9-CM   1. Coronary artery disease involving native coronary artery of native heart without angina pectoris  I25.10 414.01   2. Coronary artery disease involving native coronary artery of native heart, unspecified whether angina present  I25.10 414.01   3. Acute renal failure, unspecified acute renal failure type (HCC)  N17.9 584.9   4. Acute respiratory failure with hypoxia (HCC)  J96.01 518.81     Patient Active Problem List   Diagnosis   • Coronary artery disease involving native coronary artery of native heart without angina pectoris   • Hypertension   • Hyperlipidemia LDL goal <70   • Type 2 diabetes mellitus with hypoglycemia, with long-term current use of insulin (HCC)   • Ischemic cardiomyopathy   • Allergic rhinitis   • Positive cardiac stress test   • Mucopurulent chronic bronchitis (HCC)   • Prolonged Q-T interval on ECG   • Acute renal failure (ARF) (HCC)   • Shock (HCC)   • Acute respiratory failure with hypoxia (HCC)     Past Medical History:   Diagnosis Date   • Acute renal failure (ARF) (HCC) 3/2/2023   • Asthma 2022   • Chronic systolic congestive heart failure (HCC) 3/1/2023   • Coronary artery disease 2014   • Elevated cholesterol    • History of tobacco abuse    • HLD (hyperlipidemia) 2023   • Hypertension    • Ischemic cardiomyopathy    • Myocardial infarction (HCC) 2014   • Prolonged Q-T interval on ECG 3/1/2023   • Type 2 diabetes mellitus (HCC)    • Wears glasses      Past Surgical History:   Procedure Laterality Date   • CARDIAC CATHETERIZATION  2014   • CARDIAC CATHETERIZATION Left 2023    Procedure: Left Heart Cath;  Surgeon: Nadya Ramirez MD;  Location: [facility] CATH INVASIVE LOCATION;  Service: Cardiology;  Laterality: Left;   • CORONARY ARTERY BYPASS GRAFT N/A 3/1/2023     Procedure: MEDIAN STERNOTOMY CORONARY ARTERY BYPASS GRAFTING X3  UTILIZING THE LEFT INTERNAL MAMMERY GRAFT, AND EVH OF THE GREATER RIGHT SAPHENOUS VEIN;  Surgeon: Paresh Saavedra MD;  Location: [facility];  Service: Cardiothoracic;  Laterality: N/A;   • CORONARY STENT PLACEMENT  March 2014   • TONSILLECTOMY        General Information     Row Name 03/10/23 1338          OT Time and Intention    Document Type evaluation  -KF     Mode of Treatment occupational therapy  -KF     Row Name 03/10/23 1338          General Information    Patient Profile Reviewed yes  -KF     Prior Level of Function independent:;transfer;bed mobility;ADL's;all household mobility  no DME at baseline  -KF     Existing Precautions/Restrictions cardiac;fall;oxygen therapy device and L/min;sternal;other (see comments)   CRRT; chest tube  -KF     Barriers to Rehab medically complex  -KF     Row Name 03/10/23 1334          Occupational Profile    Environmental Supports and Barriers (Occupational Profile) [place] available  -KF     Row Name 03/10/23 1338          Living Environment    People in Home spouse  -KF     Row Name 03/10/23 1338          Home Main Entrance    Number of Stairs, Main Entrance two;three  -KF     Row Name 03/10/23 1338          Stairs Within Home, Primary    Stairs, Within Home, Primary 1 flight to basement and 1 fight to upstairs bedrooms  -KF     Row Name 03/10/23 1338          Cognition    Orientation Status (Cognition) oriented x 4  -KF     Row Name 03/10/23 1331          Safety Issues, Functional Mobility    Safety Issues Affecting Function (Mobility) insight into deficits/self-awareness;awareness of need for assistance;safety precaution awareness  -KF     Impairments Affecting Function (Mobility) balance;coordination;endurance/activity tolerance;postural/trunk control;shortness of breath;strength  -KF           User Key  (r) = Recorded By, (t) = Taken By, (c) = Cosigned By    Initials Name Provider Type    KF Taylor  Rosa VALLADARES, OT Occupational Therapist                 Mobility/ADL's     Row Name 03/10/23 1344          Bed Mobility    Comment, (Bed Mobility) UIC  -     Row Name 03/10/23 1344          Transfers    Transfers sit-stand transfer;stand-sit transfer  -     Comment, (Transfers) STS x2 with good progress cues for sternal precautions and extra assist for line management  -KF     Row Name 03/10/23 1344          Sit-Stand Transfer    Sit-Stand [place] (Transfers) 2 person assist;verbal cues;minimum assist (75% patient effort)  -     Assistive Device (Sit-Stand Transfers) walker, front-wheeled  -KF     Row Name 03/10/23 1344          Stand-Sit Transfer    Stand-Sit [place] (Transfers) minimum assist (75% patient effort);2 person assist  -     Assistive Device (Stand-Sit Transfers) walker, front-wheeled  -     Row Name 03/10/23 1344          Functional Mobility    Functional Mobility- Ind. Level contact guard assist  -     Functional Mobility- Safety Issues supplemental O2;step length decreased;loses balance backward  -     Functional Mobility- Comment deferred to PT for specifics  -     Row Name 03/10/23 1344          Activities of Daily Living    BADL Assessment/Intervention upper body dressing;lower body dressing  -     Row Name 03/10/23 1344          Upper Body Dressing Assessment/Training    [place] Level (Upper Body Dressing) don;doff;front opening garment;maximum assist (25% patient effort)  -KF     Position (Upper Body Dressing) unsupported sitting  -KF     Comment, (Upper Body Dressing) max A for line management  -     Row Name 03/10/23 1344          Lower Body Dressing Assessment/Training    [place] Level (Lower Body Dressing) don;socks;moderate assist (50% patient effort)  -KF     Position (Lower Body Dressing) unsupported sitting  -KF           User Key  (r) = Recorded By, (t) = Taken By, (c) = Cosigned By    Initials Name Provider Type    KF Rosa Vazquez, OT  Occupational Therapist               Obj/Interventions     Row Name 03/10/23 1347          Sensory Assessment (Somatosensory)    Sensory Assessment (Somatosensory) UE sensation intact  -KF     Row Name 03/10/23 1347          Vision Assessment/Intervention    Visual Impairment/Limitations WFL;corrective lenses full-time  -KF     Row Name 03/10/23 1347          Range of Motion Comprehensive    General Range of Motion bilateral upper extremity ROM WFL  -KF     Row Name 03/10/23 1347          Strength Comprehensive (MMT)    General Manual Muscle Testing (MMT) Assessment upper extremity strength deficits identified  -KF     Comment, General Manual Muscle Testing (MMT) Assessment deferred MMT demonstrates 3/5 deferred beyond 90 degrees RUE due to CRRT; B  4/5  -KF     Row Name 03/10/23 1347          Balance    Balance Assessment sitting static balance;sitting dynamic balance;standing static balance;standing dynamic balance  -KF     Static Sitting Balance contact guard  -KF     Dynamic Sitting Balance contact guard  -KF     Position, Sitting Balance unsupported;sitting in chair  -KF     Static Standing Balance contact guard;2-person assist;1 person to manage equipment  -KF     Dynamic Standing Balance minimal assist;2-person assist;1 person to manage equipment  -KF     Balance Interventions standing;sit to stand;supported;minimal challenge;weight shifting activity  -KF     Comment, Balance decreased posterior lean compared to prior notes, unsteady with steps backward but tolerated well to progress  -KF           User Key  (r) = Recorded By, (t) = Taken By, (c) = Cosigned By    Initials Name Provider Type    KF Rosa Vazquez, OT Occupational Therapist               Goals/Plan     Row Name 03/10/23 3687          Bed Mobility Goal 1 (OT)    Activity/Assistive Device (Bed Mobility Goal 1, OT) sit to supine/supine to sit  -KF     [place] Level/Cues Needed (Bed Mobility Goal 1, OT) minimum assist (75% or more  patient effort)  -KF     Time Frame (Bed Mobility Goal 1, OT) long term goal (LTG);10 days  -KF     Strategies/Barriers (Bed Mobility Goal 1, OT) x2 assist  -KF     Progress/Outcomes (Bed Mobility Goal 1, OT) new goal;goal ongoing  -KF     Row Name 03/10/23 6827          Transfer Goal 1 (OT)    Activity/Assistive Device (Transfer Goal 1, OT) sit-to-stand/stand-to-sit;commode, bedside without drop arms;walker, rolling  -KF     [place] Level/Cues Needed (Transfer Goal 1, OT) contact guard required  -KF     Time Frame (Transfer Goal 1, OT) long term goal (LTG);10 days  -KF     Progress/Outcome (Transfer Goal 1, OT) goal ongoing;new goal  -KF     Row Name 03/10/23 6345          Grooming Goal 1 (OT)    Activity/Device (Grooming Goal 1, OT) oral care;hair care  -KF     [place] (Grooming Goal 1, OT) contact guard required  -KF     Time Frame (Grooming Goal 1, OT) long term goal (LTG);10 days  -KF     Strategies/Barriers (Grooming Goal 1, OT) supported standing  -KF     Progress/Outcome (Grooming Goal 1, OT) goal ongoing;new goal  -KF     Row Name 03/10/23 5000          Therapy Assessment/Plan (OT)    Planned Therapy Interventions (OT) activity tolerance training;adaptive equipment training;BADL retraining;occupation/activity based interventions;strengthening exercise;transfer/mobility retraining;functional balance retraining;patient/caregiver education/training;ROM/therapeutic exercise  -KF           User Key  (r) = Recorded By, (t) = Taken By, (c) = Cosigned By    Initials Name Provider Type    KF Rosa Vazquez, OT Occupational Therapist               Clinical Impression     Row Name 03/10/23 6890          Pain Assessment    Pretreatment Pain Rating 0/10 - no pain  -KF     Posttreatment Pain Rating 0/10 - no pain  -KF     Pre/Posttreatment Pain Comment tolerated  -KF     Pain Intervention(s) Repositioned;Ambulation/increased activity  -KF     Row Name 03/10/23 3107          Plan of Care Review    Plan of  Care Reviewed With patient  -KF     Progress improving  -KF     Outcome Evaluation Pt demonstrates deficits in strength, balance, activity tolerance, and AROM compared to baseline for ADL and functional transfers compared to baseline, education provided on sternal precaution management, recom IPOT DC IRF  -KF     Row Name 03/10/23 1350          Therapy Assessment/Plan (OT)    Rehab Potential (OT) good, to achieve stated therapy goals  -KF     Criteria for Skilled Therapeutic Interventions Met (OT) yes;meets criteria;skilled treatment is necessary  -KF     Therapy Frequency (OT) daily  -KF     Row Name 03/10/23 1350          Therapy Plan Review/Discharge Plan (OT)    Equipment Needs Upon Discharge (OT) --  TBD  -KF     Anticipated Discharge Disposition (OT) inpatient rehabilitation facility  -KF     Row Name 03/10/23 1350          Vital Signs    Pre Systolic BP Rehab 103  -KF     Pre Treatment Diastolic BP 57  -KF     Post Systolic BP Rehab 101  -KF     Post Treatment Diastolic BP 62  -KF     Pre SpO2 (%) 99  -KF     O2 Delivery Pre Treatment room air  -KF     Post SpO2 (%) 100  -KF     O2 Delivery Post Treatment room air  -KF     Pre Patient Position Sitting  -KF     Intra Patient Position Standing  -KF     Post Patient Position Sitting  -KF     Rest Breaks  1  -KF     Row Name 03/10/23 1350          Positioning and Restraints    Pre-Treatment Position sitting in chair/recliner  -KF     Post Treatment Position chair  -KF     In Chair notified nsg;reclined;sitting;call light within reach;encouraged to call for assist;with nsg;waffle cushion;RUE elevated;LUE elevated;heels elevated;legs elevated  -KF           User Key  (r) = Recorded By, (t) = Taken By, (c) = Cosigned By    Initials Name Provider Type    Rosa Ngo, OT Occupational Therapist               Outcome Measures     Row Name 03/10/23 4455          How much help from another is currently needed...    Putting on and taking off regular lower body  clothing? 2  -KF     Bathing (including washing, rinsing, and drying) 2  -KF     Toileting (which includes using toilet bed pan or urinal) 2  -KF     Putting on and taking off regular upper body clothing 2  -KF     Taking care of personal grooming (such as brushing teeth) 3  -KF     Eating meals 3  -KF     AM-PAC 6 Clicks Score (OT) 14  -KF     Row Name 03/10/23 1355          Functional Assessment    Outcome Measure Options AM-PAC 6 Clicks Daily Activity (OT)  -KF           User Key  (r) = Recorded By, (t) = Taken By, (c) = Cosigned By    Initials Name Provider Type    KF Rosa Vazquez OT Occupational Therapist                Occupational Therapy Education     Title: PT OT SLP Therapies (In Progress)     Topic: Occupational Therapy (In Progress)     Point: ADL training (Done)     Description:   Instruct learner(s) on proper safety adaptation and remediation techniques during self care or transfers.   Instruct in proper use of assistive devices.              Learning Progress Summary           Patient Acceptance, E,TB,D, VU,NR,DU by  at 3/10/2023 1356                   Point: Home exercise program (Not Started)     Description:   Instruct learner(s) on appropriate technique for monitoring, assisting and/or progressing therapeutic exercises/activities.              Learner Progress:  Not documented in this visit.          Point: Precautions (Done)     Description:   Instruct learner(s) on prescribed precautions during self-care and functional transfers.              Learning Progress Summary           Patient Acceptance, E,TB,D, VU,NR,DU by  at 3/10/2023 1356                   Point: Body mechanics (Done)     Description:   Instruct learner(s) on proper positioning and spine alignment during self-care, functional mobility activities and/or exercises.              Learning Progress Summary           Patient Acceptance, E,TB,D, VU,NR,DU by  at 3/10/2023 1356                               User Key      Initials Effective Dates Name Provider Type Discipline    KF 06/16/21 -  Rosa Vazquez OT Occupational Therapist OT              OT Recommendation and Plan  Planned Therapy Interventions (OT): activity tolerance training, adaptive equipment training, BADL retraining, occupation/activity based interventions, strengthening exercise, transfer/mobility retraining, functional balance retraining, patient/caregiver education/training, ROM/therapeutic exercise  Therapy Frequency (OT): daily  Plan of Care Review  Plan of Care Reviewed With: patient  Progress: improving  Outcome Evaluation: Pt demonstrates deficits in strength, balance, activity tolerance, and AROM compared to baseline for ADL and functional transfers compared to baseline, education provided on sternal precaution management, recom IPOT DC IRF     Time Calculation:    Time Calculation- OT     Row Name 03/10/23 1308             Time Calculation- OT    OT Start Time 1308  -KF      OT Received On 03/10/23  -KF      OT Goal Re-Cert Due Date 03/20/23  -KF         Untimed Charges    OT Eval/Re-eval Minutes 48  -KF         Total Minutes    Untimed Charges Total Minutes 48  -KF       Total Minutes 48  -KF            User Key  (r) = Recorded By, (t) = Taken By, (c) = Cosigned By    Initials Name Provider Type    KF Rosa Vazquez OT Occupational Therapist              Therapy Charges for Today     Code Description Service Date Service Provider Modifiers Qty    [id number] HC OT EVAL MOD COMPLEXITY 4 3/10/2023 Rosa Vazquez OT GO 1               Rosa Vazquez OT  3/10/2023

## 2023-03-10 NOTE — PROGRESS NOTES
"   LOS: 9 days    Patient Care Team:  Yessica Ricci DO as PCP - General (Family Medicine)  Nima Peter MD as Consulting Physician (Endocrinology)  Nadya Ramirez MD as Cardiologist (Cardiology)  Yessica Ricci DO as Consulting Physician (Family Medicine)    Chief Complaint: Acute kidney injury s/p CABG  67-year-old with history of hypertension, diabetes, former smoker 1-1/2 pack/day for quite some time, CAD s/p PCI in 2014, underwent CABG x3, post CABG remain hypotensive blood pressure in the 40s systolic in 60s and 70s 80s range requiring IV pressors.  Developed GEORGIANA oliguric, now an uric  Subjective     Interval History:   Off pressors but bp is labile. No issues w filter clotting. Labs stable. UF 70cc/hr.     Review of Systems:   No new complaints.    Objective     Vital Sign Min/Max for last 24 hours  Temp  Min: 97.4 °F (36.3 °C)  Max: 98.1 °F (36.7 °C)   BP  Min: 87/37  Max: 140/53   Pulse  Min: 64  Max: 94   Resp  Min: 12  Max: 22   SpO2  Min: 87 %  Max: 100 %   No data recorded   Weight  Min: 80.5 kg (177 lb 7.5 oz)  Max: 80.5 kg (177 lb 7.5 oz)     Flowsheet Rows    Flowsheet Row First Filed Value   Admission Height 162.6 cm (64\") Documented at 03/01/2023 0611   Admission Weight 75.3 kg (166 lb) Documented at 03/01/2023 0611          I/O this shift:  In: 65 [I.V.:15; IV Piggyback:50]  Out: 700 [Other:500; Chest Tube:200]  I/O last 3 completed shifts:  In: 1743 [P.O.:960; I.V.:708; IV Piggyback:75]  Out: 3519 [Other:2809; Chest Tube:710]    Physical Exam:  General Appearance:  male awake alert oriented no obvious distress, sitting and eating at this time.  Eyes: PER, EOMI.  Neck: Supple no JVD.  Right-sided nontunneled catheter.  Left side old nontunneled catheter in place  Chest: Chest tube in place.  Lungs: Clear auscultation, no rales rhonchi's, equal chest movement, nonlabored.  Heart: No gallop, murmur, rub, RRR.  Abdomen: Soft, nontender, positive bowel sounds, no " organomegaly.  Extremities: Positive bilateral lower extremity dependent edema, no cyanosis.  Neuro: No focal deficit, moving all extremities, alert oriented X 3  : Solomon catheter in place.  Minimal urine output  Skin warm and dry.  Psych mood and affect appropriate  Nontunneled dialysis cath right IJ  WBC WBC   Date Value Ref Range Status   03/10/2023 16.30 (H) 3.40 - 10.80 10*3/mm3 Final   03/09/2023 19.97 (H) 3.40 - 10.80 10*3/mm3 Final   03/08/2023 20.25 (H) 3.40 - 10.80 10*3/mm3 Final      HGB Hemoglobin   Date Value Ref Range Status   03/10/2023 8.9 (L) 13.0 - 17.7 g/dL Final   03/09/2023 9.0 (L) 13.0 - 17.7 g/dL Final   03/08/2023 8.9 (L) 13.0 - 17.7 g/dL Final   03/07/2023 8.4 (L) 13.0 - 17.7 g/dL Final      HCT Hematocrit   Date Value Ref Range Status   03/10/2023 27.6 (L) 37.5 - 51.0 % Final   03/09/2023 27.8 (L) 37.5 - 51.0 % Final   03/08/2023 26.9 (L) 37.5 - 51.0 % Final   03/07/2023 25.4 (L) 37.5 - 51.0 % Final      Platlets No results found for: LABPLAT   MCV MCV   Date Value Ref Range Status   03/10/2023 92.0 79.0 - 97.0 fL Final   03/09/2023 91.7 79.0 - 97.0 fL Final   03/08/2023 89.7 79.0 - 97.0 fL Final          Sodium Sodium   Date Value Ref Range Status   03/10/2023 134 (L) 136 - 145 mmol/L Final   03/10/2023 136 136 - 145 mmol/L Final   03/10/2023 136 136 - 145 mmol/L Final   03/09/2023 137 136 - 145 mmol/L Final   03/09/2023 135 (L) 136 - 145 mmol/L Final   03/09/2023 136 136 - 145 mmol/L Final   03/09/2023 134 (L) 136 - 145 mmol/L Final   03/08/2023 134 (L) 136 - 145 mmol/L Final   03/08/2023 137 136 - 145 mmol/L Final   03/08/2023 139 136 - 145 mmol/L Final   03/08/2023 138 136 - 145 mmol/L Final   03/08/2023 138 136 - 145 mmol/L Final   03/07/2023 136 136 - 145 mmol/L Final      Potassium Potassium   Date Value Ref Range Status   03/10/2023 4.4 3.5 - 5.2 mmol/L Final   03/10/2023 4.3 3.5 - 5.2 mmol/L Final   03/10/2023 4.3 3.5 - 5.2 mmol/L Final   03/09/2023 4.6 3.5 - 5.2 mmol/L Final    03/09/2023 4.5 3.5 - 5.2 mmol/L Final   03/09/2023 4.5 3.5 - 5.2 mmol/L Final     Comment:     Slight hemolysis detected by analyzer. Results may be affected.   03/09/2023 4.2 3.5 - 5.2 mmol/L Final   03/08/2023 5.0 3.5 - 5.2 mmol/L Final   03/08/2023 4.6 3.5 - 5.2 mmol/L Final   03/08/2023 4.8 3.5 - 5.2 mmol/L Final     Comment:     Slight hemolysis detected by analyzer. Results may be affected.   03/08/2023 4.5 3.5 - 5.2 mmol/L Final   03/08/2023 4.5 3.5 - 5.2 mmol/L Final   03/07/2023 4.3 3.5 - 5.2 mmol/L Final      Chloride Chloride   Date Value Ref Range Status   03/10/2023 102 98 - 107 mmol/L Final   03/10/2023 102 98 - 107 mmol/L Final   03/10/2023 102 98 - 107 mmol/L Final   03/09/2023 103 98 - 107 mmol/L Final   03/09/2023 103 98 - 107 mmol/L Final   03/09/2023 103 98 - 107 mmol/L Final   03/09/2023 103 98 - 107 mmol/L Final   03/08/2023 103 98 - 107 mmol/L Final   03/08/2023 104 98 - 107 mmol/L Final   03/08/2023 104 98 - 107 mmol/L Final   03/08/2023 105 98 - 107 mmol/L Final   03/08/2023 105 98 - 107 mmol/L Final   03/07/2023 105 98 - 107 mmol/L Final      CO2 CO2   Date Value Ref Range Status   03/10/2023 21.0 (L) 22.0 - 29.0 mmol/L Final   03/10/2023 23.0 22.0 - 29.0 mmol/L Final   03/10/2023 23.0 22.0 - 29.0 mmol/L Final   03/09/2023 22.0 22.0 - 29.0 mmol/L Final   03/09/2023 22.0 22.0 - 29.0 mmol/L Final   03/09/2023 22.0 22.0 - 29.0 mmol/L Final   03/09/2023 23.0 22.0 - 29.0 mmol/L Final   03/08/2023 23.0 22.0 - 29.0 mmol/L Final   03/08/2023 21.0 (L) 22.0 - 29.0 mmol/L Final   03/08/2023 24.0 22.0 - 29.0 mmol/L Final   03/08/2023 25.0 22.0 - 29.0 mmol/L Final   03/08/2023 25.0 22.0 - 29.0 mmol/L Final   03/07/2023 26.0 22.0 - 29.0 mmol/L Final      BUN BUN   Date Value Ref Range Status   03/10/2023 32 (H) 8 - 23 mg/dL Final   03/10/2023 30 (H) 8 - 23 mg/dL Final   03/10/2023 30 (H) 8 - 23 mg/dL Final   03/09/2023 34 (H) 8 - 23 mg/dL Final   03/09/2023 32 (H) 8 - 23 mg/dL Final   03/09/2023 29 (H)  8 - 23 mg/dL Final   03/09/2023 30 (H) 8 - 23 mg/dL Final   03/08/2023 30 (H) 8 - 23 mg/dL Final   03/08/2023 22 8 - 23 mg/dL Final   03/08/2023 21 8 - 23 mg/dL Final   03/08/2023 20 8 - 23 mg/dL Final   03/08/2023 20 8 - 23 mg/dL Final   03/07/2023 24 (H) 8 - 23 mg/dL Final      Creatinine Creatinine   Date Value Ref Range Status   03/10/2023 2.23 (H) 0.76 - 1.27 mg/dL Final   03/10/2023 2.37 (H) 0.76 - 1.27 mg/dL Final   03/10/2023 2.37 (H) 0.76 - 1.27 mg/dL Final   03/09/2023 1.87 (H) 0.76 - 1.27 mg/dL Final   03/09/2023 1.97 (H) 0.76 - 1.27 mg/dL Final   03/09/2023 1.75 (H) 0.76 - 1.27 mg/dL Final   03/09/2023 1.82 (H) 0.76 - 1.27 mg/dL Final   03/08/2023 1.40 (H) 0.76 - 1.27 mg/dL Final   03/08/2023 1.19 0.76 - 1.27 mg/dL Final   03/08/2023 1.04 0.76 - 1.27 mg/dL Final   03/08/2023 1.02 0.76 - 1.27 mg/dL Final   03/08/2023 1.02 0.76 - 1.27 mg/dL Final   03/07/2023 0.98 0.76 - 1.27 mg/dL Final      Calcium Calcium   Date Value Ref Range Status   03/10/2023 7.1 (L) 8.6 - 10.5 mg/dL Final   03/10/2023 7.4 (L) 8.6 - 10.5 mg/dL Final   03/10/2023 7.4 (L) 8.6 - 10.5 mg/dL Final   03/09/2023 7.3 (L) 8.6 - 10.5 mg/dL Final   03/09/2023 7.1 (L) 8.6 - 10.5 mg/dL Final   03/09/2023 7.1 (L) 8.6 - 10.5 mg/dL Final   03/09/2023 7.0 (L) 8.6 - 10.5 mg/dL Final   03/08/2023 6.9 (L) 8.6 - 10.5 mg/dL Final   03/08/2023 6.9 (L) 8.6 - 10.5 mg/dL Final   03/08/2023 6.9 (L) 8.6 - 10.5 mg/dL Final   03/08/2023 6.8 (L) 8.6 - 10.5 mg/dL Final   03/08/2023 6.8 (L) 8.6 - 10.5 mg/dL Final   03/07/2023 6.7 (L) 8.6 - 10.5 mg/dL Final      PO4 No results found for: CAPO4   Albumin Albumin   Date Value Ref Range Status   03/10/2023 2.6 (L) 3.5 - 5.2 g/dL Final   03/10/2023 3.0 (L) 3.5 - 5.2 g/dL Final   03/10/2023 3.0 (L) 3.5 - 5.2 g/dL Final   03/09/2023 3.2 (L) 3.5 - 5.2 g/dL Final   03/09/2023 2.6 (L) 3.5 - 5.2 g/dL Final   03/09/2023 2.6 (L) 3.5 - 5.2 g/dL Final   03/09/2023 2.4 (L) 3.5 - 5.2 g/dL Final   03/08/2023 2.6 (L) 3.5 - 5.2 g/dL  Final   03/08/2023 2.3 (L) 3.5 - 5.2 g/dL Final   03/08/2023 2.5 (L) 3.5 - 5.2 g/dL Final   03/08/2023 2.5 (L) 3.5 - 5.2 g/dL Final   03/08/2023 2.5 (L) 3.5 - 5.2 g/dL Final   03/07/2023 2.5 (L) 3.5 - 5.2 g/dL Final      Magnesium Magnesium   Date Value Ref Range Status   03/10/2023 2.3 1.6 - 2.4 mg/dL Final   03/10/2023 2.4 1.6 - 2.4 mg/dL Final   03/09/2023 2.4 1.6 - 2.4 mg/dL Final   03/09/2023 2.3 1.6 - 2.4 mg/dL Final   03/09/2023 2.6 (H) 1.6 - 2.4 mg/dL Final   03/08/2023 2.4 1.6 - 2.4 mg/dL Final   03/08/2023 2.3 1.6 - 2.4 mg/dL Final   03/08/2023 2.3 1.6 - 2.4 mg/dL Final   03/07/2023 2.5 (H) 1.6 - 2.4 mg/dL Final      Uric Acid No results found for: URICACID        Results Review:     I reviewed the patient's new clinical results.    amiodarone, 200 mg, Oral, Q12H  arformoterol, 15 mcg, Nebulization, BID - RT  aspirin, 325 mg, Oral, Daily  budesonide, 0.5 mg, Nebulization, BID - RT  guaiFENesin, 1,200 mg, Oral, Q12H  insulin detemir, 10 Units, Subcutaneous, Nightly  insulin lispro, 0-9 Units, Subcutaneous, 4x Daily With Meals & Nightly  Insulin Lispro, 2 Units, Subcutaneous, TID With Meals  ipratropium-albuterol, 3 mL, Nebulization, Q4H - RT  metoprolol tartrate, 12.5 mg, Oral, Q12H  midodrine, 10 mg, Oral, Q8H  pantoprazole, 40 mg, Oral, Q AM  pharmacy consult - MTM, , Does not apply, BID  piperacillin-tazobactam, 3.375 g, Intravenous, Q8H  rosuvastatin, 10 mg, Oral, Nightly  senna-docusate sodium, 2 tablet, Oral, BID      niCARdipine, 5-15 mg/hr  nitroglycerin, 5-200 mcg/min, Last Rate: Stopped (03/01/23 1431)  norepinephrine, 0.02-0.3 mcg/kg/min, Last Rate: Stopped (03/10/23 0638)  phenylephrine, 0.5-3 mcg/kg/min  Phoxillum BK4/2.5, 750 mL/hr, Last Rate: 750 mL/hr (03/10/23 1616)  Phoxillum BK4/2.5, 750 mL/hr, Last Rate: 750 mL/hr (03/10/23 1616)  Phoxillum BK4/2.5, 750 mL/hr, Last Rate: 750 mL/hr (03/10/23 1616)        Medication Review: Reviewed    Assessment & Plan       Coronary artery disease  involving native coronary artery of native heart without angina pectoris    Hypertension    Hyperlipidemia LDL goal <70    Type 2 diabetes mellitus with hypoglycemia, with long-term current use of insulin (HCC)    Ischemic cardiomyopathy    Mucopurulent chronic bronchitis (HCC)    Prolonged Q-T interval on ECG    Acute renal failure (ARF) (HCC)    Shock (HCC)    Acute respiratory failure with hypoxia (HCC)     1.  [place]NA: ATN likely secondary to shock with systolic blood pressure postsurgery in the range of 40s to 80s, now on multiple vasopressors.  Admit creatinine 1.08, creatinine progressively getting worse.  Patient is oliguric.  C3 complement 18, C4 complement 83 within normal range.  Ultrasound of the kidney shows right kidney 11 cm left kidney 10.6 cm normal ultrasound, bladder ultrasound normal.  2.  S/p CABG x3  3.  Ischemic cardiomyopathy with ejection fraction 41-45%.  4.  Diabetes type 2.  5.  Cardiogenic shock.     Recommendations:  Labile hemodynamics off pressors on midodrine. Will d/c CRRT if filter clots otherwise stop at the end of nursing shift change. Attempt iHD tomorrow if remains off pressors. No sign of renal recovery. Continue to monitor     Manjit Gomez MD  03/10/23  16:32 EST

## 2023-03-10 NOTE — PLAN OF CARE
Goal Outcome Evaluation:  Plan of Care Reviewed With: patient        Progress: improving  Outcome Evaluation: Pt demonstrates deficits in strength, balance, activity tolerance, and AROM compared to baseline for ADL and functional transfers compared to baseline, education provided on sternal precaution management, recom IPOT DC IRF

## 2023-03-10 NOTE — PLAN OF CARE
Goal Outcome Evaluation:  Plan of Care Reviewed With: patient        Progress: improving    Neuro- Patient remains AO x 4. Able to move all extremities with generalized weakness noted. Transfers to bedside chair with assist x 2. Able to stand and march in place at times.     Respiratory- Respirations even and unlabored. Remained on room air this shift with O2 sat noted to be %. + cough noted but 0 sputum seen. Encouraged splinting with coughing but patient needs reinforcement.     Cardiac- Remains in NSR at this time. HR- 70-80. Pericardial friction rub noted. MT 1/2 remains with sero-sang drainage noted. Total output from MT this shift- 260. SBP- . MAP 60-70's. Patient on norepinephrine most this shift but able to wean off at around 0630.     GI/- Abd firm but non-tender. ABS + x 4. 0 BM noted this shift. Pericolace given per MD orders. Patient remains anuric at this time. CRRT cont per MD orders. See flow sheets for details. Filter change noted around 0314 due to clotting. Unable to return blood due to clots. Patient tolerated without diff.     Other- 0 s/s of acute distress noted. Will cont to follow.

## 2023-03-10 NOTE — PLAN OF CARE
Goal Outcome Evaluation:  Plan of Care Reviewed With: patient        Progress: improving  Outcome Evaluation: Patient continues to show improvement with ability to ambulate a total of 32' today with minAx2+1 and RW. His balance especially continues to improve although is still below his baseline. IPPT remains indicated to address deficits, will continue with current PT POC with D/C rec to IPR.

## 2023-03-10 NOTE — PROGRESS NOTES
CTS Progress Note       LOS: 9 days   Patient Care Team:  Yessica Ricci DO as PCP - General (Family Medicine)  Nima Peter MD as Consulting Physician (Endocrinology)  Nadya Ramirez MD as Cardiologist (Cardiology)  Yessica Ricci DO as Consulting Physician (Family Medicine)    Chief Complaint: Coronary artery disease involving native coronary artery of native heart without angina pectoris    Vital Signs:  Temp:  [97.4 °F (36.3 °C)-98.1 °F (36.7 °C)] 98 °F (36.7 °C)  Heart Rate:  [67-88] 87  Resp:  [12-24] 18  BP: ()/(29-77) 122/55    Physical Exam: Alert conversant       Results:   Results from last 7 days   Lab Units 03/10/23  0050   WBC 10*3/mm3 16.30*   HEMOGLOBIN g/dL 8.9*   HEMATOCRIT % 27.6*   PLATELETS 10*3/mm3 90*     Results from last 7 days   Lab Units 03/10/23  0050   SODIUM mmol/L 136  136   POTASSIUM mmol/L 4.3  4.3   CHLORIDE mmol/L 102  102   CO2 mmol/L 23.0  23.0   BUN mg/dL 30*  30*   CREATININE mg/dL 2.37*  2.37*   GLUCOSE mg/dL 150*  150*   CALCIUM mg/dL 7.4*  7.4*           Imaging Results (Last 24 Hours)     Procedure Component Value Units Date/Time    XR Chest 1 View [[id number]] Resulted: 03/10/23 0522     Updated: 03/10/23 0523          Assessment      Coronary artery disease involving native coronary artery of native heart without angina pectoris    Hypertension    Hyperlipidemia LDL goal <70    Type 2 diabetes mellitus with hypoglycemia, with long-term current use of insulin (HCC)    Ischemic cardiomyopathy    Mucopurulent chronic bronchitis (HCC)    Prolonged Q-T interval on ECG    Acute renal failure (ARF) (HCC)    Shock (HCC)    Acute respiratory failure with hypoxia (HCC)    Patient's Levophed is currently off and he is on room air alert and conversant only held up at this point as his renal failure    Plan   As above    Please note that portions of this note were completed with a voice recognition program. Efforts were made to edit the dictations, but  occasionally words are mistranscribed.    Paresh Saavedra MD  03/10/23  06:50 EST

## 2023-03-10 NOTE — PROGRESS NOTES
"[facility]  IP Progress Note    PROBLEM LIST:  CARDIAC     Coronary Artery Disease:   Inferior STEMI with [facility],3/28/2014:  EF 50%, NILE to the mid-dominant RCA, NILE to the proximal LAD   Stress test, 2/13/2023: Inferolateral ischemia,   [facility], 2/17/2023: Severe triple-vessel disease, %, RCA 80%, LAD 80%     Myocardium:   Echocardiogram, 3/29/2014:   EF 50-55%, RVSP 30.   Echo 12/21/2022:Stage C, HFmrEF EF 41- 45%, mild LVH     Valvular:   Mild calcification to aortic valve     Electrical:   NSR, LVH, PVC     Percardium:   Normal     VASCULAR:   Arterial   Cerebrovascular disease:   Carotid Doppler: 2/2023 less than 50 %         CARDIAC RISK FACTORS:          Hypertension          Diabetes          Dyslipidemia          Tobacco Use, Quit after 75 years     NON-CARDIAC:   Asthma/COPD: Abnormal PFTs     SURGERIES:   Tonsillectomy       HOSPITAL COURSE:  Patient has successful revascularization performed that was complicated with acute kidney injury      CHIEF COMPLAINTS:  CAD s/p CABG x3, postop atrial fibrillation      Subjective   Patient sitting up in chair eating breakfast.  Feeling better.  Complains of some chest discomfort while swallowing.  Patient did have to go back on Levophed overnight but off this morning.      Objective     Blood pressure 113/50, pulse 80, temperature 97.4 °F (36.3 °C), temperature source Axillary, resp. rate 18, height 162.6 cm (64\"), weight 80.5 kg (177 lb 7.5 oz), SpO2 100 %.     Intake/Output Summary (Last 24 hours) at 3/10/2023 0946  Last data filed at 3/10/2023 0800  Gross per 24 hour   Intake 835 ml   Output 1952 ml   Net -1117 ml       PHYSICAL EXAM:  Constitutional:       General: Not in acute distress.     Appearance: Healthy appearance. Not in distress.     Neck:  IJ in place    Pulmonary:      Effort: Pulmonary effort is normal.      Breath sounds: Normal breath sounds.  Mild crackles.     Cardiovascular:   Regular rate and rhythm, " biphasic rub.  Sternal incision is clean dry and intact  Abdominal:      General: Bowel sounds are normal.      Palpations: Abdomen is soft.      Tenderness: There is no abdominal tenderness.    Extremities:  No peripheral edema    RESULR REVIEW:    I reviewed the patient's new clinical results.      MEDICATIONS:    amiodarone, 200 mg, Oral, Q12H  arformoterol, 15 mcg, Nebulization, BID - RT  aspirin, 325 mg, Oral, Daily  budesonide, 0.5 mg, Nebulization, BID - RT  guaiFENesin, 1,200 mg, Oral, Q12H  insulin lispro, 0-9 Units, Subcutaneous, 4x Daily With Meals & Nightly  ipratropium-albuterol, 3 mL, Nebulization, Q4H - RT  metoprolol tartrate, 12.5 mg, Oral, Q12H  midodrine, 10 mg, Oral, Q8H  pantoprazole, 40 mg, Oral, Q AM  pharmacy consult - MTM, , Does not apply, BID  piperacillin-tazobactam, 3.375 g, Intravenous, Q8H  rosuvastatin, 10 mg, Oral, Nightly  senna-docusate sodium, 2 tablet, Oral, BID          Results from last 7 days   Lab Units 03/10/23  0050   WBC 10*3/mm3 16.30*   HEMOGLOBIN g/dL 8.9*   HEMATOCRIT % 27.6*   PLATELETS 10*3/mm3 90*     Results from last 7 days   Lab Units 03/10/23  0737 03/10/23  0050   SODIUM mmol/L 134* 136  136   POTASSIUM mmol/L 4.4 4.3  4.3   CHLORIDE mmol/L 102 102  102   CO2 mmol/L 21.0* 23.0  23.0   BUN mg/dL 32* 30*  30*   CREATININE mg/dL 2.23* 2.37*  2.37*   CALCIUM mg/dL 7.1* 7.4*  7.4*   BILIRUBIN mg/dL  --  0.6   ALK PHOS U/L  --  114   ALT (SGPT) U/L  --  36   AST (SGOT) U/L  --  59*   GLUCOSE mg/dL 223* 150*  150*         Lab Results   Component Value Date    TROPONINI 15.31 (C) 03/30/2014    TROPONINT <0.010 12/11/2022                 No results found for: IRON, FERRITIN, LABIRON, TIBC   Hemoglobin A1C   Date Value Ref Range Status   02/28/2023 7.40 (H) 4.80 - 5.60 % Final     Magnesium   Date Value Ref Range Status   03/10/2023 2.3 1.6 - 2.4 mg/dL Final        Tele: Sinus Rythym      ASSESSMENT:      1. CAD s/p CABG x3, preop EF 40-45%  2. Postop atrial  fibrillation  3. Ischemic cardiomyopathy  4. History of hypertension with recent hypotension on dobutamine and Levophed.  5. Prolonged QT, improving  6. Hyperlipidemia, on Crestor 10 at home  7. Acute on chronic kidney failure, on CRRT  8. Type 2 diabetes              PLAN:      1. Continue amiodarone to 200 mg p.o. twice daily for A-fib.  Patient currently in normal sinus rhythm  2. Continue aspirin 325 mg and Crestor 10 mg  3. Continue metoprolol tartrate 12.5 twice daily for rate control.  4. Remain on midodrine 10 mg 3 times daily if patient's blood pressure remains lower although it is improved today.  5. Continue CRRT per nephrology, patient has over 200 on bladder scan today.    6. Chest tube remains in per CT surgery    Electronically signed by Rosangela Queen PA-C, 03/10/23, 9:32 AM EST.      Patient maintaining sinus rhythm for atrial fibrillation.  QTc acceptable on EKG    Continue midodrine for blood pressure support in the setting of GE[place]IANA requiring dialysis.    Patient's main complaint currently is dysphagia.  He is already established on Protonix.      We will plan to reevaluate on Monday but please call cardiology with any questions/concerns over the weekend    I, Abundio Ruiz M.D.,  have reviewed the notes, assessments, and/or procedures performed by TALON/PA, I CONCUR with the documentation of Herbert Harley.

## 2023-03-10 NOTE — CASE MANAGEMENT/SOCIAL WORK
Continued Stay Note  [facility]     Patient Name: Herbert Harley  MRN: [id number]  Today's Date: 3/10/2023    Admit Date: 3/1/2023    Plan: ongoing   Discharge Plan     Row Name 03/10/23 1124       Plan    Plan ongoing    Plan Comments Mr. Harley remains in the ICU on CRRT.   He will most likely need inpatient rehab at discharge but so far has refused it.  CM will continue to follow and assist with discharge planning.    Final Discharge Disposition Code 30 - still a patient               Discharge Codes    No documentation.               Expected Discharge Date and Time     Expected Discharge Date Expected Discharge Time    Mar 14, 2023             Martha Montanez RN

## 2023-03-11 ENCOUNTER — APPOINTMENT (OUTPATIENT)
Dept: NEPHROLOGY | Facility: HOSPITAL | Age: 68
DRG: 235 | End: 2023-03-11
Payer: MEDICARE

## 2023-03-11 LAB
ALBUMIN SERPL-MCNC: 2.4 G/DL (ref 3.5–5.2)
ALBUMIN/GLOB SERPL: 1.3 G/DL
ALP SERPL-CCNC: 124 U/L (ref 39–117)
ALT SERPL W P-5'-P-CCNC: 29 U/L (ref 1–41)
ANION GAP SERPL CALCULATED.3IONS-SCNC: 8 MMOL/L (ref 5–15)
AST SERPL-CCNC: 44 U/L (ref 1–40)
BASOPHILS # BLD AUTO: 0.01 10*3/MM3 (ref 0–0.2)
BASOPHILS # BLD AUTO: 0.02 10*3/MM3 (ref 0–0.2)
BASOPHILS NFR BLD AUTO: 0.1 % (ref 0–1.5)
BASOPHILS NFR BLD AUTO: 0.1 % (ref 0–1.5)
BILIRUB SERPL-MCNC: 0.3 MG/DL (ref 0–1.2)
BUN SERPL-MCNC: 34 MG/DL (ref 8–23)
BUN/CREAT SERPL: 10 (ref 7–25)
CALCIUM SPEC-SCNC: 7.4 MG/DL (ref 8.6–10.5)
CHLORIDE SERPL-SCNC: 103 MMOL/L (ref 98–107)
CO2 SERPL-SCNC: 23 MMOL/L (ref 22–29)
CREAT SERPL-MCNC: 3.39 MG/DL (ref 0.76–1.27)
DEPRECATED RDW RBC AUTO: 51 FL (ref 37–54)
DEPRECATED RDW RBC AUTO: 51.8 FL (ref 37–54)
EGFRCR SERPLBLD CKD-EPI 2021: 19.1 ML/MIN/1.73
EOSINOPHIL # BLD AUTO: 0.18 10*3/MM3 (ref 0–0.4)
EOSINOPHIL # BLD AUTO: 0.31 10*3/MM3 (ref 0–0.4)
EOSINOPHIL NFR BLD AUTO: 1.3 % (ref 0.3–6.2)
EOSINOPHIL NFR BLD AUTO: 2.6 % (ref 0.3–6.2)
ERYTHROCYTE [DISTWIDTH] IN BLOOD BY AUTOMATED COUNT: 17.1 % (ref 12.3–15.4)
ERYTHROCYTE [DISTWIDTH] IN BLOOD BY AUTOMATED COUNT: 17.7 % (ref 12.3–15.4)
GLOBULIN UR ELPH-MCNC: 1.8 GM/DL
GLUCOSE BLDC GLUCOMTR-MCNC: 104 MG/DL (ref 70–130)
GLUCOSE BLDC GLUCOMTR-MCNC: 144 MG/DL (ref 70–130)
GLUCOSE BLDC GLUCOMTR-MCNC: 163 MG/DL (ref 70–130)
GLUCOSE BLDC GLUCOMTR-MCNC: 168 MG/DL (ref 70–130)
GLUCOSE SERPL-MCNC: 128 MG/DL (ref 65–99)
HAV IGM SERPL QL IA: ABNORMAL
HBV CORE IGM SERPL QL IA: ABNORMAL
HBV SURFACE AG SERPL QL IA: ABNORMAL
HCT VFR BLD AUTO: 23.4 % (ref 37.5–51)
HCT VFR BLD AUTO: 25.2 % (ref 37.5–51)
HCV AB SER DONR QL: ABNORMAL
HGB BLD-MCNC: 7.3 G/DL (ref 13–17.7)
HGB BLD-MCNC: 8.1 G/DL (ref 13–17.7)
IMM GRANULOCYTES # BLD AUTO: 0.25 10*3/MM3 (ref 0–0.05)
IMM GRANULOCYTES # BLD AUTO: 0.26 10*3/MM3 (ref 0–0.05)
IMM GRANULOCYTES NFR BLD AUTO: 1.9 % (ref 0–0.5)
IMM GRANULOCYTES NFR BLD AUTO: 2.2 % (ref 0–0.5)
LYMPHOCYTES # BLD AUTO: 1.37 10*3/MM3 (ref 0.7–3.1)
LYMPHOCYTES # BLD AUTO: 2.05 10*3/MM3 (ref 0.7–3.1)
LYMPHOCYTES NFR BLD AUTO: 10.2 % (ref 19.6–45.3)
LYMPHOCYTES NFR BLD AUTO: 17.3 % (ref 19.6–45.3)
MAGNESIUM SERPL-MCNC: 2.3 MG/DL (ref 1.6–2.4)
MCH RBC QN AUTO: 29.6 PG (ref 26.6–33)
MCH RBC QN AUTO: 30.3 PG (ref 26.6–33)
MCHC RBC AUTO-ENTMCNC: 31.2 G/DL (ref 31.5–35.7)
MCHC RBC AUTO-ENTMCNC: 32.1 G/DL (ref 31.5–35.7)
MCV RBC AUTO: 94.4 FL (ref 79–97)
MCV RBC AUTO: 94.7 FL (ref 79–97)
MONOCYTES # BLD AUTO: 1.51 10*3/MM3 (ref 0.1–0.9)
MONOCYTES # BLD AUTO: 1.69 10*3/MM3 (ref 0.1–0.9)
MONOCYTES NFR BLD AUTO: 11.3 % (ref 5–12)
MONOCYTES NFR BLD AUTO: 14.2 % (ref 5–12)
NEUTROPHILS NFR BLD AUTO: 10.04 10*3/MM3 (ref 1.7–7)
NEUTROPHILS NFR BLD AUTO: 63.6 % (ref 42.7–76)
NEUTROPHILS NFR BLD AUTO: 7.56 10*3/MM3 (ref 1.7–7)
NEUTROPHILS NFR BLD AUTO: 75.2 % (ref 42.7–76)
NRBC BLD AUTO-RTO: 0.1 /100 WBC (ref 0–0.2)
NRBC BLD AUTO-RTO: 0.3 /100 WBC (ref 0–0.2)
PHOSPHATE SERPL-MCNC: 5.3 MG/DL (ref 2.5–4.5)
PLATELET # BLD AUTO: 78 10*3/MM3 (ref 140–450)
PLATELET # BLD AUTO: 91 10*3/MM3 (ref 140–450)
PMV BLD AUTO: 11.1 FL (ref 6–12)
PMV BLD AUTO: 11.7 FL (ref 6–12)
POTASSIUM SERPL-SCNC: 4.4 MMOL/L (ref 3.5–5.2)
PROT SERPL-MCNC: 4.2 G/DL (ref 6–8.5)
RBC # BLD AUTO: 2.47 10*6/MM3 (ref 4.14–5.8)
RBC # BLD AUTO: 2.67 10*6/MM3 (ref 4.14–5.8)
SODIUM SERPL-SCNC: 134 MMOL/L (ref 136–145)
WBC NRBC COR # BLD: 11.88 10*3/MM3 (ref 3.4–10.8)
WBC NRBC COR # BLD: 13.37 10*3/MM3 (ref 3.4–10.8)

## 2023-03-11 PROCEDURE — 25010000002 PIPERACILLIN SOD-TAZOBACTAM PER 1 G: Performed by: INTERNAL MEDICINE

## 2023-03-11 PROCEDURE — 85025 COMPLETE CBC W/AUTO DIFF WBC: CPT | Performed by: INTERNAL MEDICINE

## 2023-03-11 PROCEDURE — 94799 UNLISTED PULMONARY SVC/PX: CPT

## 2023-03-11 PROCEDURE — 25010000002 HEPARIN (PORCINE) PER 1000 UNITS: Performed by: INTERNAL MEDICINE

## 2023-03-11 PROCEDURE — 97530 THERAPEUTIC ACTIVITIES: CPT

## 2023-03-11 PROCEDURE — 63710000001 INSULIN LISPRO (HUMAN) PER 5 UNITS: Performed by: INTERNAL MEDICINE

## 2023-03-11 PROCEDURE — 94761 N-INVAS EAR/PLS OXIMETRY MLT: CPT

## 2023-03-11 PROCEDURE — 84100 ASSAY OF PHOSPHORUS: CPT | Performed by: INTERNAL MEDICINE

## 2023-03-11 PROCEDURE — 25010000002 PIPERACILLIN SOD-TAZOBACTAM PER 1 G

## 2023-03-11 PROCEDURE — 63710000001 INSULIN DETEMIR PER 5 UNITS: Performed by: INTERNAL MEDICINE

## 2023-03-11 PROCEDURE — 99024 POSTOP FOLLOW-UP VISIT: CPT | Performed by: STUDENT IN AN ORGANIZED HEALTH CARE EDUCATION/TRAINING PROGRAM

## 2023-03-11 PROCEDURE — 83735 ASSAY OF MAGNESIUM: CPT | Performed by: INTERNAL MEDICINE

## 2023-03-11 PROCEDURE — 99233 SBSQ HOSP IP/OBS HIGH 50: CPT | Performed by: INTERNAL MEDICINE

## 2023-03-11 PROCEDURE — 94664 DEMO&/EVAL PT USE INHALER: CPT

## 2023-03-11 PROCEDURE — 80053 COMPREHEN METABOLIC PANEL: CPT | Performed by: INTERNAL MEDICINE

## 2023-03-11 PROCEDURE — 82962 GLUCOSE BLOOD TEST: CPT

## 2023-03-11 PROCEDURE — 80074 ACUTE HEPATITIS PANEL: CPT | Performed by: INTERNAL MEDICINE

## 2023-03-11 RX ORDER — MANNITOL 250 MG/ML
25 INJECTION, SOLUTION INTRAVENOUS AS NEEDED
Status: DISPENSED | OUTPATIENT
Start: 2023-03-11 | End: 2023-03-11

## 2023-03-11 RX ORDER — HEPARIN SODIUM 1000 [USP'U]/ML
1300 INJECTION, SOLUTION INTRAVENOUS; SUBCUTANEOUS AS NEEDED
Status: DISCONTINUED | OUTPATIENT
Start: 2023-03-11 | End: 2023-03-20 | Stop reason: HOSPADM

## 2023-03-11 RX ADMIN — SENNOSIDES AND DOCUSATE SODIUM 2 TABLET: 50; 8.6 TABLET ORAL at 21:32

## 2023-03-11 RX ADMIN — GUAIFENESIN 1200 MG: 600 TABLET, EXTENDED RELEASE ORAL at 12:13

## 2023-03-11 RX ADMIN — CYCLOBENZAPRINE 10 MG: 10 TABLET, FILM COATED ORAL at 20:39

## 2023-03-11 RX ADMIN — INSULIN LISPRO 2 UNITS: 100 INJECTION, SOLUTION INTRAVENOUS; SUBCUTANEOUS at 20:01

## 2023-03-11 RX ADMIN — GUAIFENESIN 1200 MG: 600 TABLET, EXTENDED RELEASE ORAL at 21:32

## 2023-03-11 RX ADMIN — IPRATROPIUM BROMIDE AND ALBUTEROL SULFATE 3 ML: 2.5; .5 SOLUTION RESPIRATORY (INHALATION) at 19:19

## 2023-03-11 RX ADMIN — ARFORMOTEROL TARTRATE 15 MCG: 15 SOLUTION RESPIRATORY (INHALATION) at 19:19

## 2023-03-11 RX ADMIN — BUDESONIDE 0.5 MG: 0.5 SUSPENSION RESPIRATORY (INHALATION) at 07:26

## 2023-03-11 RX ADMIN — IPRATROPIUM BROMIDE AND ALBUTEROL SULFATE 3 ML: 2.5; .5 SOLUTION RESPIRATORY (INHALATION) at 23:36

## 2023-03-11 RX ADMIN — INSULIN LISPRO 2 UNITS: 100 INJECTION, SOLUTION INTRAVENOUS; SUBCUTANEOUS at 20:00

## 2023-03-11 RX ADMIN — ARFORMOTEROL TARTRATE 15 MCG: 15 SOLUTION RESPIRATORY (INHALATION) at 07:26

## 2023-03-11 RX ADMIN — BUDESONIDE 0.5 MG: 0.5 SUSPENSION RESPIRATORY (INHALATION) at 19:19

## 2023-03-11 RX ADMIN — INSULIN LISPRO 2 UNITS: 100 INJECTION, SOLUTION INTRAVENOUS; SUBCUTANEOUS at 12:14

## 2023-03-11 RX ADMIN — ASPIRIN 325 MG: 325 TABLET, COATED ORAL at 12:13

## 2023-03-11 RX ADMIN — HEPARIN SODIUM 1300 UNITS: 1000 INJECTION INTRAVENOUS; SUBCUTANEOUS at 08:24

## 2023-03-11 RX ADMIN — SENNOSIDES AND DOCUSATE SODIUM 2 TABLET: 50; 8.6 TABLET ORAL at 12:13

## 2023-03-11 RX ADMIN — INSULIN DETEMIR 10 UNITS: 100 INJECTION, SOLUTION SUBCUTANEOUS at 21:33

## 2023-03-11 RX ADMIN — AMIODARONE HYDROCHLORIDE 200 MG: 200 TABLET ORAL at 21:32

## 2023-03-11 RX ADMIN — TAZOBACTAM SODIUM AND PIPERACILLIN SODIUM 3.38 G: 375; 3 INJECTION, SOLUTION INTRAVENOUS at 21:32

## 2023-03-11 RX ADMIN — MIDODRINE HYDROCHLORIDE 10 MG: 10 TABLET ORAL at 20:38

## 2023-03-11 RX ADMIN — AMIODARONE HYDROCHLORIDE 200 MG: 200 TABLET ORAL at 12:13

## 2023-03-11 RX ADMIN — PANTOPRAZOLE SODIUM 40 MG: 40 TABLET, DELAYED RELEASE ORAL at 05:07

## 2023-03-11 RX ADMIN — IPRATROPIUM BROMIDE AND ALBUTEROL SULFATE 3 ML: 2.5; .5 SOLUTION RESPIRATORY (INHALATION) at 03:53

## 2023-03-11 RX ADMIN — MIDODRINE HYDROCHLORIDE 10 MG: 10 TABLET ORAL at 12:13

## 2023-03-11 RX ADMIN — Medication 5 MG: at 21:32

## 2023-03-11 RX ADMIN — TAZOBACTAM SODIUM AND PIPERACILLIN SODIUM 3.38 G: 375; 3 INJECTION, SOLUTION INTRAVENOUS at 01:41

## 2023-03-11 RX ADMIN — TAZOBACTAM SODIUM AND PIPERACILLIN SODIUM 3.38 G: 375; 3 INJECTION, SOLUTION INTRAVENOUS at 12:14

## 2023-03-11 RX ADMIN — MIDODRINE HYDROCHLORIDE 10 MG: 10 TABLET ORAL at 05:07

## 2023-03-11 RX ADMIN — IPRATROPIUM BROMIDE AND ALBUTEROL SULFATE 3 ML: 2.5; .5 SOLUTION RESPIRATORY (INHALATION) at 12:28

## 2023-03-11 RX ADMIN — ROSUVASTATIN CALCIUM 10 MG: 10 TABLET, FILM COATED ORAL at 21:32

## 2023-03-11 RX ADMIN — Medication 12.5 MG: at 12:14

## 2023-03-11 RX ADMIN — ACETAMINOPHEN 325MG 650 MG: 325 TABLET ORAL at 16:39

## 2023-03-11 RX ADMIN — IPRATROPIUM BROMIDE AND ALBUTEROL SULFATE 3 ML: 2.5; .5 SOLUTION RESPIRATORY (INHALATION) at 16:14

## 2023-03-11 RX ADMIN — IPRATROPIUM BROMIDE AND ALBUTEROL SULFATE 3 ML: 2.5; .5 SOLUTION RESPIRATORY (INHALATION) at 07:25

## 2023-03-11 NOTE — PLAN OF CARE
Goal Outcome Evaluation:  Plan of Care Reviewed With: patient        Progress: improving  Outcome Evaluation: Pt increased ambulation distance to 80ft with Jessi x2 and B UE support on tele monitor. Pt demonstrated slow deena with decreased step length. Moderate instability throughout ambulation. Pt required one standing rest break. Declined additional ambulation due to c/o increased fatigue. Continue to recommend PT skilled care.

## 2023-03-11 NOTE — PROGRESS NOTES
Intensive Care Follow-up     Hospital:  LOS: 10 days   Mr. Herbert Harley, 67 y.o. male is followed for:   Coronary artery disease involving native coronary artery of native heart without angina pectoris   Post op respiratory, electrolyte and hemodynamic management       History of present illness:   67-year-old male with past medical history of tobacco abuse, COPD, coronary disease status post PCI in 2014, hypertension and diabetes.  Patient was having problem with unstable angina and underwent stress test which was found to be abnormal.  Left heart cath revealed multivessel coronary disease.  Echocardiogram showed EF 41 to 45%, mild concentric left ventricular hypertrophy and RVSP of 35-45.  Preoperative carotid duplex did not show any significant carotid disease.  Preop PFTs were abnormal with mostly nonspecific defect and concerning for restrictive defect.  Hemoglobin A1c of 7.4.  Patient underwent  Coronary artery bypass graft surgery x3 by Dr. Saavedra on March 1.  Postop course complicated by worsening renal failure and persistent pressor requirements.  Patient was started on CRRT.    Subjective   Interval History:  Patient remained off Norepi. Undergoing HD today. More awake and less impulsive.               The patient's past medical, surgical and social history were reviewed and updated in Epic as appropriate.       Objective     Infusions:  niCARdipine, 5-15 mg/hr  nitroglycerin, 5-200 mcg/min, Last Rate: Stopped (03/01/23 1431)  norepinephrine, 0.02-0.3 mcg/kg/min, Last Rate: Stopped (03/10/23 0638)  phenylephrine, 0.5-3 mcg/kg/min      Medications:  amiodarone, 200 mg, Oral, Q12H  arformoterol, 15 mcg, Nebulization, BID - RT  aspirin, 325 mg, Oral, Daily  budesonide, 0.5 mg, Nebulization, BID - RT  guaiFENesin, 1,200 mg, Oral, Q12H  insulin detemir, 10 Units, Subcutaneous, Nightly  insulin lispro, 0-9 Units, Subcutaneous, 4x Daily With Meals & Nightly  Insulin Lispro, 2 Units, Subcutaneous, TID  "With Meals  ipratropium-albuterol, 3 mL, Nebulization, Q4H - RT  metoprolol tartrate, 12.5 mg, Oral, Q12H  midodrine, 10 mg, Oral, Q8H  pantoprazole, 40 mg, Oral, Q AM  pharmacy consult - MTM, , Does not apply, BID  piperacillin-tazobactam, 3.375 g, Intravenous, Q12H  rosuvastatin, 10 mg, Oral, Nightly  senna-docusate sodium, 2 tablet, Oral, BID        Vital Sign Min/Max for last 24 hours  Temp  Min: 97.7 °F (36.5 °C)  Max: 98.5 °F (36.9 °C)   BP  Min: 86/51  Max: 128/54   Pulse  Min: 64  Max: 82   Resp  Min: 15  Max: 20   SpO2  Min: 92 %  Max: 100 %   No data recorded       Input/Output for last 24 hour shift  03/10 0701 - 03/11 0700  In: 329 [P.O.:120; I.V.:159]  Out: 1164       Objective:  Vital signs: (most recent): Blood pressure 120/53, pulse 82, temperature 98 °F (36.7 °C), temperature source Axillary, resp. rate 18, height 162.6 cm (64\"), weight 81.9 kg (180 lb 8.9 oz), SpO2 97 %.            General Appearance: Awake, alert, in no acute resp distress  Lungs:   B/L Breath sounds present with decreased breath sounds on bases, no wheezing heard, no crackles. Chest tubes in place.   Heart: S1 and S2 present, no murmur  Abdomen: Soft, nontender, no guarding or rigidity, bowel sounds positive.  Extremities:   no edema, warm to touch.  Neurologic:  Moving all four extremities. Good strength bilaterally.  Psychological: calm and cooperative    Results from last 7 days   Lab Units 03/11/23  0420 03/10/23  0050 03/09/23  0508   WBC 10*3/mm3 11.88* 16.30* 19.97*   HEMOGLOBIN g/dL 7.3* 8.9* 9.0*   PLATELETS 10*3/mm3 78* 90* 85*     Results from last 7 days   Lab Units 03/11/23  0420 03/10/23  1558 03/10/23  0737   SODIUM mmol/L 134* 134* 134*   POTASSIUM mmol/L 4.4 4.3 4.4   CO2 mmol/L 23.0 24.0 21.0*   BUN mg/dL 34* 29* 32*   CREATININE mg/dL 3.39* 2.30* 2.23*   MAGNESIUM mg/dL 2.3 2.3 2.3   PHOSPHORUS mg/dL 5.3* 4.2 4.6*   GLUCOSE mg/dL 128* 217* 223*     Estimated Creatinine Clearance: 20.4 mL/min (A) (by C-G formula " based on SCr of 3.39 mg/dL (H)).    Results from last 7 days   Lab Units 03/05/23  0324   PH, ARTERIAL pH units 7.295*   PCO2, ARTERIAL mm Hg 44.5   PO2 ART mm Hg 105.0       Images:   None new    I reviewed the patient's results and images.     Assessment & Plan   Impression        Coronary artery disease involving native coronary artery of native heart without angina pectoris    Hypertension    Hyperlipidemia LDL goal <70    Type 2 diabetes mellitus with hypoglycemia, with long-term current use of insulin (HCC)    Ischemic cardiomyopathy    Mucopurulent chronic bronchitis (HCC)    Prolonged Q-T interval on ECG    Acute renal failure (ARF) (HCC)    Shock (HCC)    Acute respiratory failure with hypoxia (HCC)       Plan        1.  Patient s/p coronary bypass graft surgery.  Postop management per CT surgery.  2.  Continue aspirin, statin. Will hold beta-blocker if systolic blood pressure less than 100.  3.  Off pressors. Wondering if we are dealing with sepsis picture with worsening leukocytosis and bandemia.  Access sites for lines do not look infected.  No diarrhea or loose stools.  Has been coughing but not bringing up much sputum. Procalcitonin level elevated.  Blood cultures negative thus far.  Empirically being covered with Zosyn.  Will complete 7 days of therapy.  WBC count improving.  No new fevers. On midodrine.  4.  Off  CRRT  And now on intermittent hemodialysis.Tolerating well.   5.  Amiodarone for atrial fibrillation. Started on oral amiodarone. Cardiology team is following.  6.  Continue DuoNeb and budesonide with history of asthma and wheezing. No wheezing heard today.  7.  Monitor electrolytes.  8.  Patient intermittently anxious and wants to get out of ICU.  If remain stable then hopefully can be downgraded in next couple of days.   9.  Sliding scale insulin coverage for hyperglycemia management.  10.  Tolerating oral diet well.   11.  PPI for GERD and prn tums.   12. Drop in hemoglobin and platelets.  Will monitor for now. Will recheck later on today.     We will continue supportive care.  Check labs in the morning.    Plan of care and goals reviewed with multidisciplinary/antibiotic stewardship team during rounds.   I discussed the patient's findings and my recommendations with patient, family, nursing staff and consulting provider         Thomas Singh MD, [facility]  Pulmonary, Critical care and Sleep Medicine

## 2023-03-11 NOTE — PROGRESS NOTES
"CARDIOTHORACIC AND VASCULAR SURGERY PROGRESS NOTE    OVERNIGHT EVENTS  No acute events overnight      SUBJECTIVE  Pain controlled, denies nausea, vomiting, shortness of breath      OBJECTIVE  /52 (BP Location: Left arm, Patient Position: Lying)   Pulse 76   Temp 98.2 °F (36.8 °C) (Axillary)   Resp 16   Ht 162.6 cm (64\")   Wt 81.9 kg (180 lb 8.9 oz)   SpO2 96%   BMI 30.99 kg/m²   General no acute distress, pleasant, interactive  Head normocephalic, atraumatic  Eyes clear sclera  ENT no discharge, neck supple  Mouth mucous membranes moist  Cardiac regular rate rhythm, no murmurs rubs gallops  Vascular warm well perfused x4 extremities  Pulmonary lungs clear to auscultation bilaterally  Abdomen soft nontender nondistended  Lymphatic no edema bilateral lower extremities  Neurological grossly intact  Psychological appropriate  Dermatological no lesions in sun exposed areas  Musculoskeletal normal tone and bulk    WBC   Date Value Ref Range Status   03/11/2023 11.88 (H) 3.40 - 10.80 10*3/mm3 Final     RBC   Date Value Ref Range Status   03/11/2023 2.47 (L) 4.14 - 5.80 10*6/mm3 Final     Hemoglobin   Date Value Ref Range Status   03/11/2023 7.3 (L) 13.0 - 17.7 g/dL Final     Hematocrit   Date Value Ref Range Status   03/11/2023 23.4 (L) 37.5 - 51.0 % Final     MCV   Date Value Ref Range Status   03/11/2023 94.7 79.0 - 97.0 fL Final     MCH   Date Value Ref Range Status   03/11/2023 29.6 26.6 - 33.0 pg Final     MCHC   Date Value Ref Range Status   03/11/2023 31.2 (L) 31.5 - 35.7 g/dL Final     RDW   Date Value Ref Range Status   03/11/2023 17.1 (H) 12.3 - 15.4 % Final     RDW-SD   Date Value Ref Range Status   03/11/2023 51.0 37.0 - 54.0 fl Final     MPV   Date Value Ref Range Status   03/11/2023 11.7 6.0 - 12.0 fL Final     Platelets   Date Value Ref Range Status   03/11/2023 78 (L) 140 - 450 10*3/mm3 Final     Neutrophil %   Date Value Ref Range Status   03/11/2023 63.6 42.7 - 76.0 % Final     Lymphocyte % "   Date Value Ref Range Status   03/11/2023 17.3 (L) 19.6 - 45.3 % Final     Monocyte %   Date Value Ref Range Status   03/11/2023 14.2 (H) 5.0 - 12.0 % Final     Eosinophil %   Date Value Ref Range Status   03/11/2023 2.6 0.3 - 6.2 % Final     Basophil %   Date Value Ref Range Status   03/11/2023 0.1 0.0 - 1.5 % Final     Immature Grans %   Date Value Ref Range Status   03/11/2023 2.2 (H) 0.0 - 0.5 % Final     Neutrophils, Absolute   Date Value Ref Range Status   03/11/2023 7.56 (H) 1.70 - 7.00 10*3/mm3 Final     Lymphocytes, Absolute   Date Value Ref Range Status   03/11/2023 2.05 0.70 - 3.10 10*3/mm3 Final     Monocytes, Absolute   Date Value Ref Range Status   03/11/2023 1.69 (H) 0.10 - 0.90 10*3/mm3 Final     Eosinophils, Absolute   Date Value Ref Range Status   03/11/2023 0.31 0.00 - 0.40 10*3/mm3 Final     Basophils, Absolute   Date Value Ref Range Status   03/11/2023 0.01 0.00 - 0.20 10*3/mm3 Final     Immature Grans, Absolute   Date Value Ref Range Status   03/11/2023 0.26 (H) 0.00 - 0.05 10*3/mm3 Final     nRBC   Date Value Ref Range Status   03/11/2023 0.3 (H) 0.0 - 0.2 /100 WBC Final       Basic Metabolic Panel    Sodium Sodium   Date Value Ref Range Status   03/11/2023 134 (L) 136 - 145 mmol/L Final   03/10/2023 134 (L) 136 - 145 mmol/L Final   03/10/2023 134 (L) 136 - 145 mmol/L Final   03/10/2023 136 136 - 145 mmol/L Final   03/10/2023 136 136 - 145 mmol/L Final   03/09/2023 137 136 - 145 mmol/L Final   03/09/2023 135 (L) 136 - 145 mmol/L Final   03/09/2023 136 136 - 145 mmol/L Final   03/09/2023 134 (L) 136 - 145 mmol/L Final   03/08/2023 134 (L) 136 - 145 mmol/L Final      Potassium Potassium   Date Value Ref Range Status   03/11/2023 4.4 3.5 - 5.2 mmol/L Final   03/10/2023 4.3 3.5 - 5.2 mmol/L Final   03/10/2023 4.4 3.5 - 5.2 mmol/L Final   03/10/2023 4.3 3.5 - 5.2 mmol/L Final   03/10/2023 4.3 3.5 - 5.2 mmol/L Final   03/09/2023 4.6 3.5 - 5.2 mmol/L Final   03/09/2023 4.5 3.5 - 5.2 mmol/L Final    03/09/2023 4.5 3.5 - 5.2 mmol/L Final     Comment:     Slight hemolysis detected by analyzer. Results may be affected.   03/09/2023 4.2 3.5 - 5.2 mmol/L Final   03/08/2023 5.0 3.5 - 5.2 mmol/L Final      Chloride Chloride   Date Value Ref Range Status   03/11/2023 103 98 - 107 mmol/L Final   03/10/2023 103 98 - 107 mmol/L Final   03/10/2023 102 98 - 107 mmol/L Final   03/10/2023 102 98 - 107 mmol/L Final   03/10/2023 102 98 - 107 mmol/L Final   03/09/2023 103 98 - 107 mmol/L Final   03/09/2023 103 98 - 107 mmol/L Final   03/09/2023 103 98 - 107 mmol/L Final   03/09/2023 103 98 - 107 mmol/L Final   03/08/2023 103 98 - 107 mmol/L Final      Bicarbonate No results found for: PLASMABICARB   BUN BUN   Date Value Ref Range Status   03/11/2023 34 (H) 8 - 23 mg/dL Final   03/10/2023 29 (H) 8 - 23 mg/dL Final   03/10/2023 32 (H) 8 - 23 mg/dL Final   03/10/2023 30 (H) 8 - 23 mg/dL Final   03/10/2023 30 (H) 8 - 23 mg/dL Final   03/09/2023 34 (H) 8 - 23 mg/dL Final   03/09/2023 32 (H) 8 - 23 mg/dL Final   03/09/2023 29 (H) 8 - 23 mg/dL Final   03/09/2023 30 (H) 8 - 23 mg/dL Final   03/08/2023 30 (H) 8 - 23 mg/dL Final      Creatinine Creatinine   Date Value Ref Range Status   03/11/2023 3.39 (H) 0.76 - 1.27 mg/dL Final   03/10/2023 2.30 (H) 0.76 - 1.27 mg/dL Final   03/10/2023 2.23 (H) 0.76 - 1.27 mg/dL Final   03/10/2023 2.37 (H) 0.76 - 1.27 mg/dL Final   03/10/2023 2.37 (H) 0.76 - 1.27 mg/dL Final   03/09/2023 1.87 (H) 0.76 - 1.27 mg/dL Final   03/09/2023 1.97 (H) 0.76 - 1.27 mg/dL Final   03/09/2023 1.75 (H) 0.76 - 1.27 mg/dL Final   03/09/2023 1.82 (H) 0.76 - 1.27 mg/dL Final   03/08/2023 1.40 (H) 0.76 - 1.27 mg/dL Final      Calcium Calcium   Date Value Ref Range Status   03/11/2023 7.4 (L) 8.6 - 10.5 mg/dL Final   03/10/2023 7.1 (L) 8.6 - 10.5 mg/dL Final   03/10/2023 7.1 (L) 8.6 - 10.5 mg/dL Final   03/10/2023 7.4 (L) 8.6 - 10.5 mg/dL Final   03/10/2023 7.4 (L) 8.6 - 10.5 mg/dL Final   03/09/2023 7.3 (L) 8.6 - 10.5  mg/dL Final   2023 7.1 (L) 8.6 - 10.5 mg/dL Final   2023 7.1 (L) 8.6 - 10.5 mg/dL Final   2023 7.0 (L) 8.6 - 10.5 mg/dL Final   2023 6.9 (L) 8.6 - 10.5 mg/dL Final      Glucose      No components found for: GLUCOSE.*         Scheduled Meds:amiodarone, 200 mg, Oral, Q12H  arformoterol, 15 mcg, Nebulization, BID - RT  aspirin, 325 mg, Oral, Daily  budesonide, 0.5 mg, Nebulization, BID - RT  guaiFENesin, 1,200 mg, Oral, Q12H  insulin detemir, 10 Units, Subcutaneous, Nightly  insulin lispro, 0-9 Units, Subcutaneous, 4x Daily With Meals & Nightly  Insulin Lispro, 2 Units, Subcutaneous, TID With Meals  ipratropium-albuterol, 3 mL, Nebulization, Q4H - RT  metoprolol tartrate, 12.5 mg, Oral, Q12H  midodrine, 10 mg, Oral, Q8H  pantoprazole, 40 mg, Oral, Q AM  pharmacy consult - MTM, , Does not apply, BID  piperacillin-tazobactam, 3.375 g, Intravenous, Q12H  rosuvastatin, 10 mg, Oral, Nightly  senna-docusate sodium, 2 tablet, Oral, BID      Continuous Infusions:niCARdipine, 5-15 mg/hr  nitroglycerin, 5-200 mcg/min, Last Rate: Stopped (23 1431)  norepinephrine, 0.02-0.3 mcg/kg/min, Last Rate: Stopped (03/10/23 0638)  phenylephrine, 0.5-3 mcg/kg/min      PRN Meds:.•  acetaminophen  •  albumin human  •  anticoagulant citrate (ACD) formula A  •  senna-docusate sodium **AND** polyethylene glycol **AND** bisacodyl **AND** bisacodyl  •  calcium carbonate  •  Calcium Replacement - Initiate Nurse / BPA Driven Protocol  •  cyclobenzaprine  •  dextrose  •  glucagon (human recombinant)  •  guaifenesin  •  heparin (porcine)  •  hydrOXYzine  •  ipratropium-albuterol  •  Magnesium Standard Dose Replacement - Initiate Nurse / BPA Driven Protocol  •  mannitol  •  melatonin  •  [] Morphine **AND** naloxone  •  [DISCONTINUED] fentaNYL citrate (PF) **AND** naloxone  •  niCARdipine  •  nitroglycerin  •  norepinephrine  •  ondansetron  •  phenylephrine  •  Phosphorus Replacement - Initiate Nurse / BPA Driven  Protocol  •  Potassium Replacement - Initiate Nurse / BPA Driven Protocol  •  sodium chloride    IMAGING  CXR pending    ASSESSMENT  67 year old man with history of tobacco abuse, COPD, DM, HTN, CAD STEMI 2014:  EF 50%, NILE to the mid-dominant RCA, NILE to the proximal LAD, now stress test +, 2/17/2023: Severe triple-vessel disease, %, RCA 80%, LAD 80%   Status-post CABG x3 on 3/1/2023 complicated by postop renal failure and hypotension requiring vasoactive medications, now off levo on midodrine  There is a question of possible sepsis due to coughing and leukocytosis, on zosyn empiric  Afib on amiodarone      Coronary artery disease involving native coronary artery of native heart without angina pectoris    Hypertension    Hyperlipidemia LDL goal <70    Type 2 diabetes mellitus with hypoglycemia, with long-term current use of insulin (HCC)    Ischemic cardiomyopathy    Mucopurulent chronic bronchitis (HCC)    Prolonged Q-T interval on ECG    Acute renal failure (ARF) (HCC)    Shock (HCC)    Acute respiratory failure with hypoxia (HCC)    PLAN  Daily CXR  Appreciate ICU supportive care  RRT per Nephrology  Aspirin  Statin  Continue chest tubes -20 mmHg    Kevin Casarez M.D., R.P.V.I.  Cardiothoracic and Vascular Surgeon  [facility]    Kevin Casarez MD  03/11/23  08:41 EST

## 2023-03-11 NOTE — THERAPY TREATMENT NOTE
Patient Name: Herbert Harley  : 1955    MRN: [id number]                              Today's Date: 3/11/2023       Admit Date: 3/1/2023    Visit Dx:     ICD-10-CM ICD-9-CM   1. Coronary artery disease involving native coronary artery of native heart without angina pectoris  I25.10 414.01   2. Coronary artery disease involving native coronary artery of native heart, unspecified whether angina present  I25.10 414.01   3. Acute renal failure, unspecified acute renal failure type (HCC)  N17.9 584.9   4. Acute respiratory failure with hypoxia (HCC)  J96.01 518.81     Patient Active Problem List   Diagnosis   • Coronary artery disease involving native coronary artery of native heart without angina pectoris   • Hypertension   • Hyperlipidemia LDL goal <70   • Type 2 diabetes mellitus with hypoglycemia, with long-term current use of insulin (HCC)   • Ischemic cardiomyopathy   • Allergic rhinitis   • Positive cardiac stress test   • Mucopurulent chronic bronchitis (HCC)   • Prolonged Q-T interval on ECG   • Acute renal failure (ARF) (HCC)   • Shock (HCC)   • Acute respiratory failure with hypoxia (HCC)     Past Medical History:   Diagnosis Date   • Acute renal failure (ARF) (HCC) 3/2/2023   • Asthma 2022   • Chronic systolic congestive heart failure (HCC) 3/1/2023   • Coronary artery disease 2014   • Elevated cholesterol    • History of tobacco abuse    • HLD (hyperlipidemia) 2023   • Hypertension    • Ischemic cardiomyopathy    • Myocardial infarction (HCC) 2014   • Prolonged Q-T interval on ECG 3/1/2023   • Type 2 diabetes mellitus (HCC)    • Wears glasses      Past Surgical History:   Procedure Laterality Date   • CARDIAC CATHETERIZATION  2014   • CARDIAC CATHETERIZATION Left 2023    Procedure: Left Heart Cath;  Surgeon: Nadya Ramirez MD;  Location: [facility] CATH INVASIVE LOCATION;  Service: Cardiology;  Laterality: Left;   • CORONARY ARTERY BYPASS GRAFT N/A 3/1/2023     Procedure: MEDIAN STERNOTOMY CORONARY ARTERY BYPASS GRAFTING X3  UTILIZING THE LEFT INTERNAL MAMMERY GRAFT, AND EVH OF THE GREATER RIGHT SAPHENOUS VEIN;  Surgeon: Paresh Saavedra MD;  Location: [facility];  Service: Cardiothoracic;  Laterality: N/A;   • CORONARY STENT PLACEMENT  March 2014   • TONSILLECTOMY        General Information     Row Name 03/11/23 1418          Physical Therapy Time and Intention    Document Type therapy note (daily note)  -KG     Mode of Treatment physical therapy  -KG     Row Name 03/11/23 1418          General Information    Existing Precautions/Restrictions cardiac;fall;sternal  -KG     Row Name 03/11/23 1418          Cognition    Orientation Status (Cognition) oriented x 3  -KG     Row Name 03/11/23 1418          Safety Issues, Functional Mobility    Safety Issues Affecting Function (Mobility) awareness of need for assistance;insight into deficits/self-awareness;safety precaution awareness;safety precautions follow-through/compliance  -KG     Impairments Affecting Function (Mobility) balance;coordination;endurance/activity tolerance;postural/trunk control;strength  -KG           User Key  (r) = Recorded By, (t) = Taken By, (c) = Cosigned By    Initials Name Provider Type    KG Whitney Mistry N, PT Physical Therapist               Mobility     Row Name 03/11/23 1418          Bed Mobility    Bed Mobility supine-sit  -KG     Supine-Sit [place] (Bed Mobility) moderate assist (50% patient effort);2 person assist;verbal cues  -KG     Assistive Device (Bed Mobility) draw sheet;head of bed elevated  -KG     Comment, (Bed Mobility) VC's for sequencing and safe hand placement to maintain sternal precautions. Pt required assistance at trunk and BLEs.  -KG     Row Name 03/11/23 1418          Transfers    Comment, (Transfers) VC's for sequencing and safe hand placement to maintain sternal precautions.  -KG     Row Name 03/11/23 1418          Sit-Stand Transfer    Sit-Stand  [place] (Transfers) minimum assist (75% patient effort);verbal cues  -KG     Row Name 03/11/23 1418          Gait/Stairs (Locomotion)    [place] Level (Gait) minimum assist (75% patient effort);2 person assist;verbal cues  -KG     Assistive Device (Gait) other (see comments)  B UE support on tele monitor  -KG     Distance in Feet (Gait) 80  -KG     Deviations/Abnormal Patterns (Gait) base of support, narrow;deena decreased;stride length decreased  -KG     Bilateral Gait Deviations forward flexed posture;heel strike decreased  -KG     Comment, (Gait/Stairs) Pt demonstrated step through gait pattern with slow deena and decreased step length. Pt with moderate instability throughout ambulation. Required frequent cues for upright posture and increased stride length. Pt declined additional ambulation due to increased fatigue.  -KG           User Key  (r) = Recorded By, (t) = Taken By, (c) = Cosigned By    Initials Name Provider Type    KG Whitney Mistry, PT Physical Therapist               Obj/Interventions     Row Name 03/11/23 1420          Balance    Balance Assessment sitting static balance;standing static balance;standing dynamic balance  -KG     Static Sitting Balance standby assist  -KG     Position, Sitting Balance unsupported;sitting edge of bed  -KG     Static Standing Balance minimal assist  -KG     Dynamic Standing Balance minimal assist;2-person assist  -KG     Position/Device Used, Standing Balance supported  -KG           User Key  (r) = Recorded By, (t) = Taken By, (c) = Cosigned By    Initials Name Provider Type    KG Whitney Mistry, PT Physical Therapist               Goals/Plan    No documentation.                Clinical Impression     Row Name 03/11/23 1420          Pain    Pretreatment Pain Rating 0/10 - no pain  -KG     Posttreatment Pain Rating 0/10 - no pain  -KG     Row Name 03/11/23 1420          Plan of Care Review    Plan of Care Reviewed With patient  -KG      Progress improving  -KG     Outcome Evaluation Pt increased ambulation distance to 80ft with Jessi x2 and B UE support on tele monitor. Pt demonstrated slow deena with decreased step length. Moderate instability throughout ambulation. Pt required one standing rest break. Declined additional ambulation due to c/o increased fatigue. Continue to recommend PT skilled care.  -KG     Row Name 03/11/23 1420          Vital Signs    Pre Systolic BP Rehab 127  -KG     Pre Treatment Diastolic BP 67  -KG     Post Systolic BP Rehab 121  -KG     Post Treatment Diastolic BP 50  -KG     Pretreatment Heart Rate (beats/min) 85  -KG     Posttreatment Heart Rate (beats/min) 81  -KG     Pre SpO2 (%) 97  -KG     O2 Delivery Pre Treatment room air  -KG     Post SpO2 (%) 98  -KG     O2 Delivery Post Treatment room air  -KG     Pre Patient Position Supine  -KG     Intra Patient Position Standing  -KG     Post Patient Position Sitting  -KG     Row Name 03/11/23 1420          Positioning and Restraints    Pre-Treatment Position in bed  -KG     Post Treatment Position chair  -KG     In Chair notified nsg;reclined;call light within reach;encouraged to call for assist;with family/caregiver;RUE elevated;LUE elevated;legs elevated  -KG           User Key  (r) = Recorded By, (t) = Taken By, (c) = Cosigned By    Initials Name Provider Type    KG Whitney Mistry, PT Physical Therapist               Outcome Measures     Row Name 03/11/23 1426          How much help from another person do you currently need...    Turning from your back to your side while in flat bed without using bedrails? 2  -KG     Moving from lying on back to sitting on the side of a flat bed without bedrails? 2  -KG     Moving to and from a bed to a chair (including a wheelchair)? 3  -KG     Standing up from a chair using your arms (e.g., wheelchair, bedside chair)? 3  -KG     Climbing 3-5 steps with a railing? 2  -KG     To walk in hospital room? 3  -KG     AM-PAC 6 Clicks  Score (PT) 15  -KG     Highest level of mobility 4 --> Transferred to chair/commode  -KG     Row Name 03/11/23 1422          Functional Assessment    Outcome Measure Options AM-PAC 6 Clicks Basic Mobility (PT)  -KG           User Key  (r) = Recorded By, (t) = Taken By, (c) = Cosigned By    Initials Name Provider Type    KG Whitney Mistry N, PT Physical Therapist                             Physical Therapy Education     Title: PT OT SLP Therapies (In Progress)     Topic: Physical Therapy (In Progress)     Point: Mobility training (In Progress)     Learning Progress Summary           Patient Acceptance, E, NR by KG at 3/11/2023 1306    Acceptance, E, NR by CM at 3/10/2023 1410    Acceptance, E, NR by CM at 3/9/2023 1421    Acceptance, E, NR by KG at 3/8/2023 0954    Acceptance, E, NR by KG at 3/7/2023 0919    Acceptance, E, NR by KG at 3/6/2023 1324                   Point: Home exercise program (In Progress)     Learning Progress Summary           Patient Acceptance, E, NR by KG at 3/11/2023 1306    Acceptance, E, NR by CM at 3/9/2023 1421    Acceptance, E, NR by KG at 3/8/2023 0954    Acceptance, E, NR by KG at 3/7/2023 0919                   Point: Body mechanics (In Progress)     Learning Progress Summary           Patient Acceptance, E, NR by KG at 3/11/2023 1306    Acceptance, E, NR by CM at 3/10/2023 1410    Acceptance, E, NR by CM at 3/9/2023 1421    Acceptance, E, NR by KG at 3/8/2023 0954    Acceptance, E, NR by KG at 3/7/2023 0919    Acceptance, E, NR by KG at 3/6/2023 1324                   Point: Precautions (In Progress)     Learning Progress Summary           Patient Acceptance, E, NR by KG at 3/11/2023 1306    Acceptance, E, NR by CM at 3/10/2023 1410    Acceptance, E, NR by CM at 3/9/2023 1421    Acceptance, E, NR by KG at 3/8/2023 0954    Acceptance, E, NR by KG at 3/7/2023 0919    Acceptance, E, NR by KG at 3/6/2023 8474                               User Key     Initials Effective Dates Name  Provider Type Discipline    KG 05/22/20 -  Whitney Mistry PT Physical Therapist PT    CM 09/22/22 -  Tory Kaye PT Physical Therapist PT              PT Recommendation and Plan  Planned Therapy Interventions (PT): balance training, bed mobility training, gait training, strengthening, transfer training  Plan of Care Reviewed With: patient  Progress: improving  Outcome Evaluation: Pt increased ambulation distance to 80ft with Jessi x2 and B UE support on tele monitor. Pt demonstrated slow deena with decreased step length. Moderate instability throughout ambulation. Pt required one standing rest break. Declined additional ambulation due to c/o increased fatigue. Continue to recommend PT skilled care.     Time Calculation:    PT Charges     Row Name 03/11/23 1306             Time Calculation    Start Time 1306  -KG      PT Received On 03/11/23  -KG      PT Goal Re-Cert Due Date 03/16/23  -KG         Time Calculation- PT    Total Timed Code Minutes- PT 25 minute(s)  -KG         Timed Charges    63287 - PT Therapeutic Activity Minutes 25  -KG         Total Minutes    Timed Charges Total Minutes 25  -KG       Total Minutes 25  -KG            User Key  (r) = Recorded By, (t) = Taken By, (c) = Cosigned By    Initials Name Provider Type    KG Whitney Mistry, PT Physical Therapist              Therapy Charges for Today     Code Description Service Date Service Provider Modifiers Qty    28346889161 HC PT THERAPEUTIC ACT EA 15 MIN 3/11/2023 Whitney Msitry, PT GP 2    86955625505 HC PT THER SUPP EA 15 MIN 3/11/2023 Whitney Mistry, PT GP 2          PT G-Codes  Outcome Measure Options: AM-PAC 6 Clicks Basic Mobility (PT)  AM-PAC 6 Clicks Score (PT): 15  AM-PAC 6 Clicks Score (OT): 14  PT Discharge Summary  Anticipated Discharge Disposition (PT): inpatient rehabilitation facility    Shelly Mistry PT  3/11/2023

## 2023-03-11 NOTE — PLAN OF CARE
Problem: Device-Related Complication Risk (Hemodialysis)  Goal: Safe, Effective Therapy Delivery  Outcome: Ongoing, Progressing  Intervention: Optimize Device Care and Function  Recent Flowsheet Documentation  Taken 3/11/2023 1140 by Windy Nguyen, RN  Medication Review/Management:   medications reviewed   high-risk medications identified  Taken 3/11/2023 0810 by Windy Nguyen, RN  Medication Review/Management:   medications reviewed   high-risk medications identified     Problem: Hemodynamic Instability (Hemodialysis)  Goal: Effective Tissue Perfusion  Outcome: Ongoing, Progressing     Problem: Infection (Hemodialysis)  Goal: Absence of Infection Signs and Symptoms  Outcome: Ongoing, Progressing   Goal Outcome Evaluation:      HD tx complete, blood reinfused. Tx tolerated well, report given to primary ANDRÉS Baum. Next tx Monday.

## 2023-03-11 NOTE — PROGRESS NOTES
"   LOS: 10 days    Patient Care Team:  Yessica Ricci DO as PCP - General (Family Medicine)  Nima Peter MD as Consulting Physician (Endocrinology)  Nadya Ramirez MD as Cardiologist (Cardiology)  Yessica Ricci DO as Consulting Physician (Family Medicine)    Chief Complaint: Acute kidney injury s/p CABG  67-year-old with history of hypertension, diabetes, former smoker 1-1/2 pack/day for quite some time, CAD s/p PCI in 2014, underwent CABG x3, post CABG remain hypotensive blood pressure in the 40s systolic in 60s and 70s 80s range requiring IV pressors.  Developed [place] oliguric, now an uric  Subjective     Interval History:   Tolerating hemodialysis well. Off pressors. UF 1.5 liter. Next HD session on Monday     Review of Systems:   No new complaints.    Objective     Vital Sign Min/Max for last 24 hours  Temp  Min: 97.7 °F (36.5 °C)  Max: 98.6 °F (37 °C)   BP  Min: 86/51  Max: 128/54   Pulse  Min: 64  Max: 87   Resp  Min: 15  Max: 20   SpO2  Min: 92 %  Max: 100 %   No data recorded   Weight  Min: 81.9 kg (180 lb 8.9 oz)  Max: 81.9 kg (180 lb 8.9 oz)     Flowsheet Rows    Flowsheet Row First Filed Value   Admission Height 162.6 cm (64\") Documented at 03/01/2023 0611   Admission Weight 75.3 kg (166 lb) Documented at 03/01/2023 0611          I/O this shift:  In: 310 [Other:310]  Out: 1960 [Other:1760; Chest Tube:200]  I/O last 3 completed shifts:  In: 568 [P.O.:120; I.V.:398; IV Piggyback:50]  Out: 2041 [Other:1351; Chest Tube:690]    Physical Exam:  General Appearance:  male awake alert oriented no obvious distress, sitting and eating at this time.  Eyes: PER, EOMI.  Neck: Supple no JVD.  Right-sided nontunneled catheter.  Left side old nontunneled catheter in place  Chest: Chest tube in place.  Lungs: Clear auscultation, no rales rhonchi's, equal chest movement, nonlabored.  Heart: No gallop, murmur, rub, RRR.  Abdomen: Soft, nontender, positive bowel sounds, no organomegaly.  Extremities: " Positive bilateral lower extremity dependent edema, no cyanosis.  Neuro: No focal deficit, moving all extremities, alert oriented X 3  : Solomon catheter in place.  Minimal urine output  Skin warm and dry.  Psych mood and affect appropriate  Nontunneled dialysis cath right IJ  WBC WBC   Date Value Ref Range Status   03/11/2023 11.88 (H) 3.40 - 10.80 10*3/mm3 Final   03/10/2023 16.30 (H) 3.40 - 10.80 10*3/mm3 Final   03/09/2023 19.97 (H) 3.40 - 10.80 10*3/mm3 Final      HGB Hemoglobin   Date Value Ref Range Status   03/11/2023 7.3 (L) 13.0 - 17.7 g/dL Final   03/10/2023 8.9 (L) 13.0 - 17.7 g/dL Final   03/09/2023 9.0 (L) 13.0 - 17.7 g/dL Final      HCT Hematocrit   Date Value Ref Range Status   03/11/2023 23.4 (L) 37.5 - 51.0 % Final   03/10/2023 27.6 (L) 37.5 - 51.0 % Final   03/09/2023 27.8 (L) 37.5 - 51.0 % Final      Platlets No results found for: LABPLAT   MCV MCV   Date Value Ref Range Status   03/11/2023 94.7 79.0 - 97.0 fL Final   03/10/2023 92.0 79.0 - 97.0 fL Final   03/09/2023 91.7 79.0 - 97.0 fL Final          Sodium Sodium   Date Value Ref Range Status   03/11/2023 134 (L) 136 - 145 mmol/L Final   03/10/2023 134 (L) 136 - 145 mmol/L Final   03/10/2023 134 (L) 136 - 145 mmol/L Final   03/10/2023 136 136 - 145 mmol/L Final   03/10/2023 136 136 - 145 mmol/L Final   03/09/2023 137 136 - 145 mmol/L Final   03/09/2023 135 (L) 136 - 145 mmol/L Final   03/09/2023 136 136 - 145 mmol/L Final   03/09/2023 134 (L) 136 - 145 mmol/L Final   03/08/2023 134 (L) 136 - 145 mmol/L Final      Potassium Potassium   Date Value Ref Range Status   03/11/2023 4.4 3.5 - 5.2 mmol/L Final   03/10/2023 4.3 3.5 - 5.2 mmol/L Final   03/10/2023 4.4 3.5 - 5.2 mmol/L Final   03/10/2023 4.3 3.5 - 5.2 mmol/L Final   03/10/2023 4.3 3.5 - 5.2 mmol/L Final   03/09/2023 4.6 3.5 - 5.2 mmol/L Final   03/09/2023 4.5 3.5 - 5.2 mmol/L Final   03/09/2023 4.5 3.5 - 5.2 mmol/L Final     Comment:     Slight hemolysis detected by analyzer. Results may  be affected.   03/09/2023 4.2 3.5 - 5.2 mmol/L Final   03/08/2023 5.0 3.5 - 5.2 mmol/L Final      Chloride Chloride   Date Value Ref Range Status   03/11/2023 103 98 - 107 mmol/L Final   03/10/2023 103 98 - 107 mmol/L Final   03/10/2023 102 98 - 107 mmol/L Final   03/10/2023 102 98 - 107 mmol/L Final   03/10/2023 102 98 - 107 mmol/L Final   03/09/2023 103 98 - 107 mmol/L Final   03/09/2023 103 98 - 107 mmol/L Final   03/09/2023 103 98 - 107 mmol/L Final   03/09/2023 103 98 - 107 mmol/L Final   03/08/2023 103 98 - 107 mmol/L Final      CO2 CO2   Date Value Ref Range Status   03/11/2023 23.0 22.0 - 29.0 mmol/L Final   03/10/2023 24.0 22.0 - 29.0 mmol/L Final   03/10/2023 21.0 (L) 22.0 - 29.0 mmol/L Final   03/10/2023 23.0 22.0 - 29.0 mmol/L Final   03/10/2023 23.0 22.0 - 29.0 mmol/L Final   03/09/2023 22.0 22.0 - 29.0 mmol/L Final   03/09/2023 22.0 22.0 - 29.0 mmol/L Final   03/09/2023 22.0 22.0 - 29.0 mmol/L Final   03/09/2023 23.0 22.0 - 29.0 mmol/L Final   03/08/2023 23.0 22.0 - 29.0 mmol/L Final      BUN BUN   Date Value Ref Range Status   03/11/2023 34 (H) 8 - 23 mg/dL Final   03/10/2023 29 (H) 8 - 23 mg/dL Final   03/10/2023 32 (H) 8 - 23 mg/dL Final   03/10/2023 30 (H) 8 - 23 mg/dL Final   03/10/2023 30 (H) 8 - 23 mg/dL Final   03/09/2023 34 (H) 8 - 23 mg/dL Final   03/09/2023 32 (H) 8 - 23 mg/dL Final   03/09/2023 29 (H) 8 - 23 mg/dL Final   03/09/2023 30 (H) 8 - 23 mg/dL Final   03/08/2023 30 (H) 8 - 23 mg/dL Final      Creatinine Creatinine   Date Value Ref Range Status   03/11/2023 3.39 (H) 0.76 - 1.27 mg/dL Final   03/10/2023 2.30 (H) 0.76 - 1.27 mg/dL Final   03/10/2023 2.23 (H) 0.76 - 1.27 mg/dL Final   03/10/2023 2.37 (H) 0.76 - 1.27 mg/dL Final   03/10/2023 2.37 (H) 0.76 - 1.27 mg/dL Final   03/09/2023 1.87 (H) 0.76 - 1.27 mg/dL Final   03/09/2023 1.97 (H) 0.76 - 1.27 mg/dL Final   03/09/2023 1.75 (H) 0.76 - 1.27 mg/dL Final   03/09/2023 1.82 (H) 0.76 - 1.27 mg/dL Final   03/08/2023 1.40 (H) 0.76 -  1.27 mg/dL Final      Calcium Calcium   Date Value Ref Range Status   03/11/2023 7.4 (L) 8.6 - 10.5 mg/dL Final   03/10/2023 7.1 (L) 8.6 - 10.5 mg/dL Final   03/10/2023 7.1 (L) 8.6 - 10.5 mg/dL Final   03/10/2023 7.4 (L) 8.6 - 10.5 mg/dL Final   03/10/2023 7.4 (L) 8.6 - 10.5 mg/dL Final   03/09/2023 7.3 (L) 8.6 - 10.5 mg/dL Final   03/09/2023 7.1 (L) 8.6 - 10.5 mg/dL Final   03/09/2023 7.1 (L) 8.6 - 10.5 mg/dL Final   03/09/2023 7.0 (L) 8.6 - 10.5 mg/dL Final   03/08/2023 6.9 (L) 8.6 - 10.5 mg/dL Final      PO4 No results found for: CAPO4   Albumin Albumin   Date Value Ref Range Status   03/11/2023 2.4 (L) 3.5 - 5.2 g/dL Final   03/10/2023 2.5 (L) 3.5 - 5.2 g/dL Final   03/10/2023 2.6 (L) 3.5 - 5.2 g/dL Final   03/10/2023 3.0 (L) 3.5 - 5.2 g/dL Final   03/10/2023 3.0 (L) 3.5 - 5.2 g/dL Final   03/09/2023 3.2 (L) 3.5 - 5.2 g/dL Final   03/09/2023 2.6 (L) 3.5 - 5.2 g/dL Final   03/09/2023 2.6 (L) 3.5 - 5.2 g/dL Final   03/09/2023 2.4 (L) 3.5 - 5.2 g/dL Final   03/08/2023 2.6 (L) 3.5 - 5.2 g/dL Final      Magnesium Magnesium   Date Value Ref Range Status   03/11/2023 2.3 1.6 - 2.4 mg/dL Final   03/10/2023 2.3 1.6 - 2.4 mg/dL Final   03/10/2023 2.3 1.6 - 2.4 mg/dL Final   03/10/2023 2.4 1.6 - 2.4 mg/dL Final   03/09/2023 2.4 1.6 - 2.4 mg/dL Final   03/09/2023 2.3 1.6 - 2.4 mg/dL Final   03/09/2023 2.6 (H) 1.6 - 2.4 mg/dL Final   03/08/2023 2.4 1.6 - 2.4 mg/dL Final      Uric Acid No results found for: URICACID        Results Review:     I reviewed the patient's new clinical results.    amiodarone, 200 mg, Oral, Q12H  arformoterol, 15 mcg, Nebulization, BID - RT  aspirin, 325 mg, Oral, Daily  budesonide, 0.5 mg, Nebulization, BID - RT  guaiFENesin, 1,200 mg, Oral, Q12H  insulin detemir, 10 Units, Subcutaneous, Nightly  insulin lispro, 0-9 Units, Subcutaneous, 4x Daily With Meals & Nightly  Insulin Lispro, 2 Units, Subcutaneous, TID With Meals  ipratropium-albuterol, 3 mL, Nebulization, Q4H - RT  metoprolol tartrate, 12.5  mg, Oral, Q12H  midodrine, 10 mg, Oral, Q8H  pantoprazole, 40 mg, Oral, Q AM  pharmacy consult - MT, , Does not apply, BID  piperacillin-tazobactam, 3.375 g, Intravenous, Q12H  rosuvastatin, 10 mg, Oral, Nightly  senna-docusate sodium, 2 tablet, Oral, BID      niCARdipine, 5-15 mg/hr  nitroglycerin, 5-200 mcg/min, Last Rate: Stopped (03/01/23 1431)  norepinephrine, 0.02-0.3 mcg/kg/min, Last Rate: Stopped (03/10/23 0638)  phenylephrine, 0.5-3 mcg/kg/min        Medication Review: Reviewed    Assessment & Plan       Coronary artery disease involving native coronary artery of native heart without angina pectoris    Hypertension    Hyperlipidemia LDL goal <70    Type 2 diabetes mellitus with hypoglycemia, with long-term current use of insulin ([facility])    Ischemic cardiomyopathy    Mucopurulent chronic bronchitis ([facility])    Prolonged Q-T interval on ECG    Acute renal failure (ARF) ([facility])    Shock ([facility])    Acute respiratory failure with hypoxia ([facility])     1.  [place]: ATN likely secondary to shock with systolic blood pressure postsurgery in the range of 40s to 80s, now on multiple vasopressors.  Admit creatinine 1.08, creatinine progressively getting worse.  Patient is oliguric.  C3 complement 18, C4 complement 83 within normal range.  Ultrasound of the kidney shows right kidney 11 cm left kidney 10.6 cm normal ultrasound, bladder ultrasound normal.  2.  S/p CABG x3  3.  Ischemic cardiomyopathy with ejection fraction 41-45%.  4.  Diabetes type 2.  5.  Cardiogenic shock.     Recommendations:  Tolerating HD well. Plan to Continue with 3x weekly HD. Will need TDC on Monday Monitor for renal recovery     Manjit Gomez MD  03/11/23  13:44 EST

## 2023-03-11 NOTE — PLAN OF CARE
Goal Outcome Evaluation:  Plan of Care Reviewed With: patient        Progress: improving    Neuro- Patient remains AO x 4. Able to move all extremities with generalized weakness noted. Patient ambulated 60' this shift with assist x 2. Gait noted to be unsteady at times. Gait belt and walker in use.     Respiratory- Remains on room air with O2 sat 95-96%. Weak non-productive cough noted.     Cardiac- Patient remains in NSR, with HR- 70. SBP greater than 100 and MAP greater than 65 this shift. Patient has remained off norepinephrine gtt this shift. MT continues to drain sero-sang fluid. Total output of 180 this shift.     GI/- Abd non-tender with ABS + x 4. 0 BM noted this shift. Remains anuric at this time. Patient for HD this am.     Other- 0 s/s of acute distress noted. Will cont to follow.

## 2023-03-11 NOTE — NURSING NOTE
Prior to central line removal, order for the removal of catheter was verified, patient was assessed, necessary materials were gathered and patient was educated regarding procedure .  .    Patient was positioned flat to ensure that the insertion site was at or below the level of the heart.    Hands were washed, clean gloves were applied and central line dressing was gently removed. Catheter exit site was not cultured.     A new pair of clean gloves were then applied. Insertion site was cleansed with 2% Chlorhexidine swab using a circular motion beginning at the insertion site and moving outward for 30 seconds and allowed to dry.     Clamp on line was present and clamped.     Patient was instructed to perform Valsalva maneuver as catheter was withdrawn.     The central line was grasped at the insertion site and slowly pulled outward parallel to the skin. Resistance was not met.    After central line was completely removed, a sterile 4x4 gauze pad was used to apply light pressure until bleeding stopped. At that time, petroleum-based gauze and a sterile occlusive dressing was applied to exit site.     Patient was instructed to keep dressing in place for at least 24 hours and to remain in a flat or reclining position for 30 minutes post-removal.     Catheter was inspected after removal and was intact. Tip of central line was not sent for culture. Patient tolerated procedure.

## 2023-03-12 LAB
ALBUMIN SERPL-MCNC: 2.4 G/DL (ref 3.5–5.2)
ALBUMIN/GLOB SERPL: 1.3 G/DL
ALP SERPL-CCNC: 137 U/L (ref 39–117)
ALT SERPL W P-5'-P-CCNC: 31 U/L (ref 1–41)
ANION GAP SERPL CALCULATED.3IONS-SCNC: 11 MMOL/L (ref 5–15)
AST SERPL-CCNC: 51 U/L (ref 1–40)
BASOPHILS # BLD AUTO: 0.03 10*3/MM3 (ref 0–0.2)
BASOPHILS NFR BLD AUTO: 0.2 % (ref 0–1.5)
BILIRUB SERPL-MCNC: 0.4 MG/DL (ref 0–1.2)
BUN SERPL-MCNC: 29 MG/DL (ref 8–23)
BUN/CREAT SERPL: 7.6 (ref 7–25)
CALCIUM SPEC-SCNC: 7.4 MG/DL (ref 8.6–10.5)
CHLORIDE SERPL-SCNC: 100 MMOL/L (ref 98–107)
CO2 SERPL-SCNC: 22 MMOL/L (ref 22–29)
CREAT SERPL-MCNC: 3.8 MG/DL (ref 0.76–1.27)
DEPRECATED RDW RBC AUTO: 54.3 FL (ref 37–54)
DEPRECATED RDW RBC AUTO: 56.1 FL (ref 37–54)
EGFRCR SERPLBLD CKD-EPI 2021: 16.6 ML/MIN/1.73
EOSINOPHIL # BLD AUTO: 0.18 10*3/MM3 (ref 0–0.4)
EOSINOPHIL NFR BLD AUTO: 1.3 % (ref 0.3–6.2)
ERYTHROCYTE [DISTWIDTH] IN BLOOD BY AUTOMATED COUNT: 17.7 % (ref 12.3–15.4)
ERYTHROCYTE [DISTWIDTH] IN BLOOD BY AUTOMATED COUNT: 17.9 % (ref 12.3–15.4)
GLOBULIN UR ELPH-MCNC: 1.9 GM/DL
GLUCOSE BLDC GLUCOMTR-MCNC: 126 MG/DL (ref 70–130)
GLUCOSE BLDC GLUCOMTR-MCNC: 157 MG/DL (ref 70–130)
GLUCOSE BLDC GLUCOMTR-MCNC: 163 MG/DL (ref 70–130)
GLUCOSE BLDC GLUCOMTR-MCNC: 170 MG/DL (ref 70–130)
GLUCOSE SERPL-MCNC: 125 MG/DL (ref 65–99)
HCT VFR BLD AUTO: 21.8 % (ref 37.5–51)
HCT VFR BLD AUTO: 23.7 % (ref 37.5–51)
HGB BLD-MCNC: 7 G/DL (ref 13–17.7)
HGB BLD-MCNC: 7.5 G/DL (ref 13–17.7)
IMM GRANULOCYTES # BLD AUTO: 0.16 10*3/MM3 (ref 0–0.05)
IMM GRANULOCYTES NFR BLD AUTO: 1.2 % (ref 0–0.5)
LYMPHOCYTES # BLD AUTO: 1.37 10*3/MM3 (ref 0.7–3.1)
LYMPHOCYTES NFR BLD AUTO: 10.3 % (ref 19.6–45.3)
MCH RBC QN AUTO: 30 PG (ref 26.6–33)
MCH RBC QN AUTO: 30.4 PG (ref 26.6–33)
MCHC RBC AUTO-ENTMCNC: 31.6 G/DL (ref 31.5–35.7)
MCHC RBC AUTO-ENTMCNC: 32.1 G/DL (ref 31.5–35.7)
MCV RBC AUTO: 94.8 FL (ref 79–97)
MCV RBC AUTO: 94.8 FL (ref 79–97)
MONOCYTES # BLD AUTO: 1.66 10*3/MM3 (ref 0.1–0.9)
MONOCYTES NFR BLD AUTO: 12.4 % (ref 5–12)
NEUTROPHILS NFR BLD AUTO: 74.6 % (ref 42.7–76)
NEUTROPHILS NFR BLD AUTO: 9.95 10*3/MM3 (ref 1.7–7)
NRBC BLD AUTO-RTO: 0 /100 WBC (ref 0–0.2)
PLATELET # BLD AUTO: 117 10*3/MM3 (ref 140–450)
PLATELET # BLD AUTO: 97 10*3/MM3 (ref 140–450)
PMV BLD AUTO: 11 FL (ref 6–12)
PMV BLD AUTO: 11 FL (ref 6–12)
POTASSIUM SERPL-SCNC: 4.5 MMOL/L (ref 3.5–5.2)
PROT SERPL-MCNC: 4.3 G/DL (ref 6–8.5)
RBC # BLD AUTO: 2.3 10*6/MM3 (ref 4.14–5.8)
RBC # BLD AUTO: 2.5 10*6/MM3 (ref 4.14–5.8)
SODIUM SERPL-SCNC: 133 MMOL/L (ref 136–145)
WBC NRBC COR # BLD: 12.09 10*3/MM3 (ref 3.4–10.8)
WBC NRBC COR # BLD: 13.35 10*3/MM3 (ref 3.4–10.8)

## 2023-03-12 PROCEDURE — 63710000001 INSULIN LISPRO (HUMAN) PER 5 UNITS: Performed by: INTERNAL MEDICINE

## 2023-03-12 PROCEDURE — 94761 N-INVAS EAR/PLS OXIMETRY MLT: CPT

## 2023-03-12 PROCEDURE — 82962 GLUCOSE BLOOD TEST: CPT

## 2023-03-12 PROCEDURE — 85025 COMPLETE CBC W/AUTO DIFF WBC: CPT | Performed by: INTERNAL MEDICINE

## 2023-03-12 PROCEDURE — 63710000001 INSULIN DETEMIR PER 5 UNITS: Performed by: INTERNAL MEDICINE

## 2023-03-12 PROCEDURE — 99024 POSTOP FOLLOW-UP VISIT: CPT | Performed by: STUDENT IN AN ORGANIZED HEALTH CARE EDUCATION/TRAINING PROGRAM

## 2023-03-12 PROCEDURE — 92610 EVALUATE SWALLOWING FUNCTION: CPT

## 2023-03-12 PROCEDURE — 94664 DEMO&/EVAL PT USE INHALER: CPT

## 2023-03-12 PROCEDURE — 99233 SBSQ HOSP IP/OBS HIGH 50: CPT | Performed by: INTERNAL MEDICINE

## 2023-03-12 PROCEDURE — 94799 UNLISTED PULMONARY SVC/PX: CPT

## 2023-03-12 PROCEDURE — 25010000002 PIPERACILLIN SOD-TAZOBACTAM PER 1 G

## 2023-03-12 PROCEDURE — 80053 COMPREHEN METABOLIC PANEL: CPT | Performed by: INTERNAL MEDICINE

## 2023-03-12 PROCEDURE — 85027 COMPLETE CBC AUTOMATED: CPT | Performed by: THORACIC SURGERY (CARDIOTHORACIC VASCULAR SURGERY)

## 2023-03-12 RX ORDER — PANTOPRAZOLE SODIUM 40 MG/10ML
40 INJECTION, POWDER, LYOPHILIZED, FOR SOLUTION INTRAVENOUS
Status: DISCONTINUED | OUTPATIENT
Start: 2023-03-12 | End: 2023-03-13

## 2023-03-12 RX ADMIN — NYSTATIN 500000 UNITS: 100000 SUSPENSION ORAL at 17:23

## 2023-03-12 RX ADMIN — ARFORMOTEROL TARTRATE 15 MCG: 15 SOLUTION RESPIRATORY (INHALATION) at 06:53

## 2023-03-12 RX ADMIN — IPRATROPIUM BROMIDE AND ALBUTEROL SULFATE 3 ML: 2.5; .5 SOLUTION RESPIRATORY (INHALATION) at 04:07

## 2023-03-12 RX ADMIN — PANTOPRAZOLE SODIUM 40 MG: 40 INJECTION, POWDER, LYOPHILIZED, FOR SOLUTION INTRAVENOUS at 17:23

## 2023-03-12 RX ADMIN — GUAIFENESIN 1200 MG: 600 TABLET, EXTENDED RELEASE ORAL at 20:27

## 2023-03-12 RX ADMIN — Medication 5 MG: at 20:28

## 2023-03-12 RX ADMIN — ASPIRIN 325 MG: 325 TABLET, COATED ORAL at 08:18

## 2023-03-12 RX ADMIN — AMIODARONE HYDROCHLORIDE 200 MG: 200 TABLET ORAL at 20:28

## 2023-03-12 RX ADMIN — MIDODRINE HYDROCHLORIDE 10 MG: 10 TABLET ORAL at 20:29

## 2023-03-12 RX ADMIN — NYSTATIN 500000 UNITS: 100000 SUSPENSION ORAL at 12:01

## 2023-03-12 RX ADMIN — TAZOBACTAM SODIUM AND PIPERACILLIN SODIUM 3.38 G: 375; 3 INJECTION, SOLUTION INTRAVENOUS at 08:17

## 2023-03-12 RX ADMIN — SENNOSIDES AND DOCUSATE SODIUM 2 TABLET: 50; 8.6 TABLET ORAL at 08:18

## 2023-03-12 RX ADMIN — GUAIFENESIN 1200 MG: 600 TABLET, EXTENDED RELEASE ORAL at 08:18

## 2023-03-12 RX ADMIN — INSULIN LISPRO 2 UNITS: 100 INJECTION, SOLUTION INTRAVENOUS; SUBCUTANEOUS at 12:01

## 2023-03-12 RX ADMIN — ROSUVASTATIN CALCIUM 10 MG: 10 TABLET, FILM COATED ORAL at 20:27

## 2023-03-12 RX ADMIN — AMIODARONE HYDROCHLORIDE 200 MG: 200 TABLET ORAL at 08:17

## 2023-03-12 RX ADMIN — NYSTATIN 500000 UNITS: 100000 SUSPENSION ORAL at 20:28

## 2023-03-12 RX ADMIN — PANTOPRAZOLE SODIUM 40 MG: 40 TABLET, DELAYED RELEASE ORAL at 06:35

## 2023-03-12 RX ADMIN — INSULIN LISPRO 2 UNITS: 100 INJECTION, SOLUTION INTRAVENOUS; SUBCUTANEOUS at 17:23

## 2023-03-12 RX ADMIN — BUDESONIDE 0.5 MG: 0.5 SUSPENSION RESPIRATORY (INHALATION) at 19:09

## 2023-03-12 RX ADMIN — INSULIN DETEMIR 10 UNITS: 100 INJECTION, SOLUTION SUBCUTANEOUS at 20:28

## 2023-03-12 RX ADMIN — BUDESONIDE 0.5 MG: 0.5 SUSPENSION RESPIRATORY (INHALATION) at 06:54

## 2023-03-12 RX ADMIN — IPRATROPIUM BROMIDE AND ALBUTEROL SULFATE 3 ML: 2.5; .5 SOLUTION RESPIRATORY (INHALATION) at 06:54

## 2023-03-12 RX ADMIN — MIDODRINE HYDROCHLORIDE 10 MG: 10 TABLET ORAL at 04:36

## 2023-03-12 RX ADMIN — MIDODRINE HYDROCHLORIDE 10 MG: 10 TABLET ORAL at 12:01

## 2023-03-12 RX ADMIN — ARFORMOTEROL TARTRATE 15 MCG: 15 SOLUTION RESPIRATORY (INHALATION) at 19:09

## 2023-03-12 RX ADMIN — INSULIN LISPRO 2 UNITS: 100 INJECTION, SOLUTION INTRAVENOUS; SUBCUTANEOUS at 08:17

## 2023-03-12 RX ADMIN — SENNOSIDES AND DOCUSATE SODIUM 2 TABLET: 50; 8.6 TABLET ORAL at 20:27

## 2023-03-12 RX ADMIN — INSULIN LISPRO 2 UNITS: 100 INJECTION, SOLUTION INTRAVENOUS; SUBCUTANEOUS at 22:00

## 2023-03-12 RX ADMIN — IPRATROPIUM BROMIDE AND ALBUTEROL SULFATE 3 ML: 2.5; .5 SOLUTION RESPIRATORY (INHALATION) at 16:46

## 2023-03-12 RX ADMIN — IPRATROPIUM BROMIDE AND ALBUTEROL SULFATE 3 ML: 2.5; .5 SOLUTION RESPIRATORY (INHALATION) at 19:09

## 2023-03-12 RX ADMIN — TAZOBACTAM SODIUM AND PIPERACILLIN SODIUM 3.38 G: 375; 3 INJECTION, SOLUTION INTRAVENOUS at 22:00

## 2023-03-12 RX ADMIN — IPRATROPIUM BROMIDE AND ALBUTEROL SULFATE 3 ML: 2.5; .5 SOLUTION RESPIRATORY (INHALATION) at 12:29

## 2023-03-12 NOTE — PROGRESS NOTES
"   LOS: 11 days    Patient Care Team:  Yessica Ricci DO as PCP - General (Family Medicine)  Nima Peter MD as Consulting Physician (Endocrinology)  Nadya Ramirez MD as Cardiologist (Cardiology)  Yessica Ricci DO as Consulting Physician (Family Medicine)    Chief Complaint: Acute kidney injury s/p CABG  67-year-old with history of hypertension, diabetes, former smoker 1-1/2 pack/day for quite some time, CAD s/p PCI in 2014, underwent CABG x3, post CABG remain hypotensive blood pressure in the 40s systolic in 60s and 70s 80s range requiring IV pressors.  Developed [place]NA oliguric, now an uric  Subjective     Interval History:   HD yesterday tolerated well. UF 1.5 liter. Doing well otherwise.      Review of Systems:   No new complaints.    Objective     Vital Sign Min/Max for last 24 hours  Temp  Min: 98.3 °F (36.8 °C)  Max: 98.8 °F (37.1 °C)   BP  Min: 97/43  Max: 130/58   Pulse  Min: 63  Max: 77   Resp  Min: 14  Max: 20   SpO2  Min: 91 %  Max: 100 %   No data recorded   No data recorded     Flowsheet Rows    Flowsheet Row First Filed Value   Admission Height 162.6 cm (64\") Documented at 03/01/2023 0611   Admission Weight 75.3 kg (166 lb) Documented at 03/01/2023 0611          I/O this shift:  In: 927.7 [P.O.:840; I.V.:37.7; IV Piggyback:50]  Out: 480 [Urine:200; Chest Tube:280]  I/O last 3 completed shifts:  In: 694 [P.O.:240; I.V.:144; Other:310]  Out: 2630 [Urine:150; Other:1760; Chest Tube:720]    Physical Exam:  General Appearance:  male awake alert oriented no obvious distress, sitting and eating at this time.  Eyes: PER, EOMI.  Neck: Supple no JVD.  Right-sided nontunneled catheter.  Left side old nontunneled catheter in place  Chest: Chest tube in place.  Lungs: Clear auscultation, no rales rhonchi's, equal chest movement, nonlabored.  Heart: No gallop, murmur, rub, RRR.  Abdomen: Soft, nontender, positive bowel sounds, no organomegaly.  Extremities: Positive bilateral lower extremity " dependent edema, no cyanosis.  Neuro: No focal deficit, moving all extremities, alert oriented X 3  : Solomon catheter in place.  Minimal urine output  Skin warm and dry.  Psych mood and affect appropriate  Nontunneled dialysis cath right IJ  WBC WBC   Date Value Ref Range Status   03/12/2023 13.35 (H) 3.40 - 10.80 10*3/mm3 Final   03/12/2023 12.09 (H) 3.40 - 10.80 10*3/mm3 Final   03/11/2023 13.37 (H) 3.40 - 10.80 10*3/mm3 Final   03/11/2023 11.88 (H) 3.40 - 10.80 10*3/mm3 Final   03/10/2023 16.30 (H) 3.40 - 10.80 10*3/mm3 Final      HGB Hemoglobin   Date Value Ref Range Status   03/12/2023 7.5 (L) 13.0 - 17.7 g/dL Final   03/12/2023 7.0 (L) 13.0 - 17.7 g/dL Final   03/11/2023 8.1 (L) 13.0 - 17.7 g/dL Final   03/11/2023 7.3 (L) 13.0 - 17.7 g/dL Final   03/10/2023 8.9 (L) 13.0 - 17.7 g/dL Final      HCT Hematocrit   Date Value Ref Range Status   03/12/2023 23.7 (L) 37.5 - 51.0 % Final   03/12/2023 21.8 (L) 37.5 - 51.0 % Final   03/11/2023 25.2 (L) 37.5 - 51.0 % Final   03/11/2023 23.4 (L) 37.5 - 51.0 % Final   03/10/2023 27.6 (L) 37.5 - 51.0 % Final      Platlets No results found for: LABPLAT   MCV MCV   Date Value Ref Range Status   03/12/2023 94.8 79.0 - 97.0 fL Final   03/12/2023 94.8 79.0 - 97.0 fL Final   03/11/2023 94.4 79.0 - 97.0 fL Final   03/11/2023 94.7 79.0 - 97.0 fL Final   03/10/2023 92.0 79.0 - 97.0 fL Final          Sodium Sodium   Date Value Ref Range Status   03/12/2023 133 (L) 136 - 145 mmol/L Final   03/11/2023 134 (L) 136 - 145 mmol/L Final   03/10/2023 134 (L) 136 - 145 mmol/L Final   03/10/2023 134 (L) 136 - 145 mmol/L Final   03/10/2023 136 136 - 145 mmol/L Final   03/10/2023 136 136 - 145 mmol/L Final      Potassium Potassium   Date Value Ref Range Status   03/12/2023 4.5 3.5 - 5.2 mmol/L Final   03/11/2023 4.4 3.5 - 5.2 mmol/L Final   03/10/2023 4.3 3.5 - 5.2 mmol/L Final   03/10/2023 4.4 3.5 - 5.2 mmol/L Final   03/10/2023 4.3 3.5 - 5.2 mmol/L Final   03/10/2023 4.3 3.5 - 5.2 mmol/L Final       Chloride Chloride   Date Value Ref Range Status   03/12/2023 100 98 - 107 mmol/L Final   03/11/2023 103 98 - 107 mmol/L Final   03/10/2023 103 98 - 107 mmol/L Final   03/10/2023 102 98 - 107 mmol/L Final   03/10/2023 102 98 - 107 mmol/L Final   03/10/2023 102 98 - 107 mmol/L Final      CO2 CO2   Date Value Ref Range Status   03/12/2023 22.0 22.0 - 29.0 mmol/L Final   03/11/2023 23.0 22.0 - 29.0 mmol/L Final   03/10/2023 24.0 22.0 - 29.0 mmol/L Final   03/10/2023 21.0 (L) 22.0 - 29.0 mmol/L Final   03/10/2023 23.0 22.0 - 29.0 mmol/L Final   03/10/2023 23.0 22.0 - 29.0 mmol/L Final      BUN BUN   Date Value Ref Range Status   03/12/2023 29 (H) 8 - 23 mg/dL Final   03/11/2023 34 (H) 8 - 23 mg/dL Final   03/10/2023 29 (H) 8 - 23 mg/dL Final   03/10/2023 32 (H) 8 - 23 mg/dL Final   03/10/2023 30 (H) 8 - 23 mg/dL Final   03/10/2023 30 (H) 8 - 23 mg/dL Final      Creatinine Creatinine   Date Value Ref Range Status   03/12/2023 3.80 (H) 0.76 - 1.27 mg/dL Final   03/11/2023 3.39 (H) 0.76 - 1.27 mg/dL Final   03/10/2023 2.30 (H) 0.76 - 1.27 mg/dL Final   03/10/2023 2.23 (H) 0.76 - 1.27 mg/dL Final   03/10/2023 2.37 (H) 0.76 - 1.27 mg/dL Final   03/10/2023 2.37 (H) 0.76 - 1.27 mg/dL Final      Calcium Calcium   Date Value Ref Range Status   03/12/2023 7.4 (L) 8.6 - 10.5 mg/dL Final   03/11/2023 7.4 (L) 8.6 - 10.5 mg/dL Final   03/10/2023 7.1 (L) 8.6 - 10.5 mg/dL Final   03/10/2023 7.1 (L) 8.6 - 10.5 mg/dL Final   03/10/2023 7.4 (L) 8.6 - 10.5 mg/dL Final   03/10/2023 7.4 (L) 8.6 - 10.5 mg/dL Final      PO4 No results found for: CAPO4   Albumin Albumin   Date Value Ref Range Status   03/12/2023 2.4 (L) 3.5 - 5.2 g/dL Final   03/11/2023 2.4 (L) 3.5 - 5.2 g/dL Final   03/10/2023 2.5 (L) 3.5 - 5.2 g/dL Final   03/10/2023 2.6 (L) 3.5 - 5.2 g/dL Final   03/10/2023 3.0 (L) 3.5 - 5.2 g/dL Final   03/10/2023 3.0 (L) 3.5 - 5.2 g/dL Final      Magnesium Magnesium   Date Value Ref Range Status   03/11/2023 2.3 1.6 - 2.4 mg/dL Final    03/10/2023 2.3 1.6 - 2.4 mg/dL Final   03/10/2023 2.3 1.6 - 2.4 mg/dL Final   03/10/2023 2.4 1.6 - 2.4 mg/dL Final      Uric Acid No results found for: URICACID        Results Review:     I reviewed the patient's new clinical results.    amiodarone, 200 mg, Oral, Q12H  arformoterol, 15 mcg, Nebulization, BID - RT  aspirin, 325 mg, Oral, Daily  budesonide, 0.5 mg, Nebulization, BID - RT  guaiFENesin, 1,200 mg, Oral, Q12H  insulin detemir, 10 Units, Subcutaneous, Nightly  insulin lispro, 0-9 Units, Subcutaneous, 4x Daily With Meals & Nightly  Insulin Lispro, 2 Units, Subcutaneous, TID With Meals  ipratropium-albuterol, 3 mL, Nebulization, Q4H - RT  metoprolol tartrate, 12.5 mg, Oral, Q12H  midodrine, 10 mg, Oral, Q8H  nystatin, 5 mL, Swish & Swallow, 4x Daily  pantoprazole, 40 mg, Intravenous, BID AC  pharmacy consult - MTM, , Does not apply, BID  piperacillin-tazobactam, 3.375 g, Intravenous, Q12H  rosuvastatin, 10 mg, Oral, Nightly  senna-docusate sodium, 2 tablet, Oral, BID      niCARdipine, 5-15 mg/hr  nitroglycerin, 5-200 mcg/min, Last Rate: Stopped (03/01/23 1431)  norepinephrine, 0.02-0.3 mcg/kg/min, Last Rate: Stopped (03/10/23 0638)        Medication Review: Reviewed    Assessment & Plan       Coronary artery disease involving native coronary artery of native heart without angina pectoris    Hypertension    Hyperlipidemia LDL goal <70    Type 2 diabetes mellitus with hypoglycemia, with long-term current use of insulin (HCC)    Ischemic cardiomyopathy    Mucopurulent chronic bronchitis (HCC)    Prolonged Q-T interval on ECG    Acute renal failure (ARF) (HCC)    Shock (HCC)    Acute respiratory failure with hypoxia (HCC)     1.  [place]: ATN likely secondary to shock with systolic blood pressure postsurgery in the range of 40s to 80s, now on multiple vasopressors.  Admit creatinine 1.08, creatinine progressively getting worse.  Patient is oliguric.  C3 complement 18, C4 complement 83 within normal range.   Ultrasound of the kidney shows right kidney 11 cm left kidney 10.6 cm normal ultrasound, bladder ultrasound normal.  2.  S/p CABG x3  3.  Ischemic cardiomyopathy with ejection fraction 41-45%.  4.  Diabetes type 2.  5.  Cardiogenic shock. Resolved.     Recommendations:  TDC in AM and HD afterwards  NPO after midnight  Bladder scan Q48hr.   Monitor for renal recovery   Manjit Gomez MD  03/12/23  18:19 EDT

## 2023-03-12 NOTE — PROGRESS NOTES
Intensive Care Follow-up     Hospital:  LOS: 11 days   Mr. Herbert Harley, 67 y.o. male is followed for:   Coronary artery disease involving native coronary artery of native heart without angina pectoris   Post op respiratory, electrolyte and hemodynamic management       History of present illness:   67-year-old male with past medical history of tobacco abuse, COPD, coronary disease status post PCI in 2014, hypertension and diabetes.  Patient was having problem with unstable angina and underwent stress test which was found to be abnormal.  Left heart cath revealed multivessel coronary disease.  Echocardiogram showed EF 41 to 45%, mild concentric left ventricular hypertrophy and RVSP of 35-45.  Preoperative carotid duplex did not show any significant carotid disease.  Preop PFTs were abnormal with mostly nonspecific defect and concerning for restrictive defect.  Hemoglobin A1c of 7.4.  Patient underwent  Coronary artery bypass graft surgery x3 by Dr. Saavedra on March 1.  Postop course complicated by worsening renal failure and persistent pressor requirements.  Patient was started on CRRT.  Patient was started on midodrine and came off pressors.  Subsequently was switched over from CRRT to HD on March 10.    Subjective   Interval History:  Tolerated hemodialysis yesterday.  Today morning seen sitting up in chair.  States that he is having pain in the center part of his chest while eating.  Wondering if he has some sort of esophagitis.  I do not see any oral candidiasis.  He has been taking Tums.  I will increase his PPI to twice daily.  If things do not improve will have GI team to evaluate and may need endoscopy to further evaluate.               The patient's past medical, surgical and social history were reviewed and updated in Epic as appropriate.       Objective     Infusions:  niCARdipine, 5-15 mg/hr  nitroglycerin, 5-200 mcg/min, Last Rate: Stopped (03/01/23 1431)  norepinephrine, 0.02-0.3 mcg/kg/min,  "Last Rate: Stopped (03/10/23 0638)  phenylephrine, 0.5-3 mcg/kg/min      Medications:  amiodarone, 200 mg, Oral, Q12H  arformoterol, 15 mcg, Nebulization, BID - RT  aspirin, 325 mg, Oral, Daily  budesonide, 0.5 mg, Nebulization, BID - RT  guaiFENesin, 1,200 mg, Oral, Q12H  insulin detemir, 10 Units, Subcutaneous, Nightly  insulin lispro, 0-9 Units, Subcutaneous, 4x Daily With Meals & Nightly  Insulin Lispro, 2 Units, Subcutaneous, TID With Meals  ipratropium-albuterol, 3 mL, Nebulization, Q4H - RT  metoprolol tartrate, 12.5 mg, Oral, Q12H  midodrine, 10 mg, Oral, Q8H  nystatin, 5 mL, Swish & Swallow, 4x Daily  pantoprazole, 40 mg, Intravenous, BID AC  pharmacy consult - MTM, , Does not apply, BID  piperacillin-tazobactam, 3.375 g, Intravenous, Q12H  rosuvastatin, 10 mg, Oral, Nightly  senna-docusate sodium, 2 tablet, Oral, BID        Vital Sign Min/Max for last 24 hours  Temp  Min: 98.3 °F (36.8 °C)  Max: 98.8 °F (37.1 °C)   BP  Min: 97/43  Max: 125/53   Pulse  Min: 63  Max: 77   Resp  Min: 14  Max: 20   SpO2  Min: 91 %  Max: 100 %   No data recorded       Input/Output for last 24 hour shift  03/11 0701 - 03/12 0700  In: 430 [P.O.:120]  Out: 2450 [Urine:150]      Objective:  Vital signs: (most recent): Blood pressure 121/68, pulse 75, temperature 98.8 °F (37.1 °C), temperature source Axillary, resp. rate 20, height 162.6 cm (64\"), weight 81.9 kg (180 lb 8.9 oz), SpO2 96 %.            General Appearance: Awake, alert, in no acute resp distress  Lungs:   B/L Breath sounds present with decreased breath sounds on bases, no wheezing heard, no crackles. Chest tubes in place.   Heart: S1 and S2 present, no murmur  Abdomen: Soft, nontender, no guarding or rigidity, bowel sounds positive.  Extremities:   no edema, warm to touch.  Neurologic:  Moving all four extremities. Good strength bilaterally.  Psychological: calm and cooperative    Results from last 7 days   Lab Units 03/12/23  0436 03/11/23  1513 03/11/23  0420   WBC " 10*3/mm3 12.09* 13.37* 11.88*   HEMOGLOBIN g/dL 7.0* 8.1* 7.3*   PLATELETS 10*3/mm3 97* 91* 78*     Results from last 7 days   Lab Units 03/12/23  0436 03/11/23  0420 03/10/23  1558 03/10/23  0737   SODIUM mmol/L 133* 134* 134* 134*   POTASSIUM mmol/L 4.5 4.4 4.3 4.4   CO2 mmol/L 22.0 23.0 24.0 21.0*   BUN mg/dL 29* 34* 29* 32*   CREATININE mg/dL 3.80* 3.39* 2.30* 2.23*   MAGNESIUM mg/dL  --  2.3 2.3 2.3   PHOSPHORUS mg/dL  --  5.3* 4.2 4.6*   GLUCOSE mg/dL 125* 128* 217* 223*     Estimated Creatinine Clearance: 18.2 mL/min (A) (by C-G formula based on SCr of 3.8 mg/dL (H)).          Images:   None new    I reviewed the patient's results and images.     Assessment & Plan   Impression        Coronary artery disease involving native coronary artery of native heart without angina pectoris    Hypertension    Hyperlipidemia LDL goal <70    Type 2 diabetes mellitus with hypoglycemia, with long-term current use of insulin (HCC)    Ischemic cardiomyopathy    Mucopurulent chronic bronchitis (HCC)    Prolonged Q-T interval on ECG    Acute renal failure (ARF) (HCC)    Shock (HCC)    Acute respiratory failure with hypoxia (HCC)       Plan        1.  Patient s/p coronary bypass graft surgery.  Postop management per CT surgery.  2.  Continue aspirin, statin. Will hold beta-blocker if systolic blood pressure less than 100.  3.  Off pressors. Wondering if we are dealing with sepsis picture with worsening leukocytosis and bandemia.  Access sites for lines do not look infected.  No diarrhea or loose stools.  Has been coughing but not bringing up much sputum. Procalcitonin level elevated.  Blood cultures negative thus far.  Empirically being covered with Zosyn.  Will complete 7 days of therapy.  WBC count improving.  No new fevers. On midodrine.  Hemoglobin dropped to 7 today.  Platelet count is slowly improving.  No acute bleed noted.  Will recheck hemoglobin later on and if it drops below 7 will need blood transfusion.  Unclear  etiology of drop in hemoglobin.  No evidence of GI bleed currently.  4.  Remains off CRRT.  5.  On oral amiodarone per cardiology. Cardiology team is following.  6.  Continue DuoNeb and budesonide with history of asthma and wheezing. No wheezing heard today.  7.  Monitor electrolytes.  8.  Sliding scale insulin coverage for hyperglycemia management.  9.  Patient is having pain while eating.  Wondering if he has significant esophagitis.  Increase PPI to twice daily.  Tums as needed.  Will add nystatin swish and swallow to see if that will make a difference.  If things do not improve we will get GI team to evaluate the patient.  If he has drop in hemoglobin further then may need endoscopy as well.    We will continue supportive care.  Check labs in the morning.  Family updated at bedside.    Plan of care and goals reviewed with multidisciplinary/antibiotic stewardship team during rounds.   I discussed the patient's findings and my recommendations with patient, family, nursing staff and consulting provider   Above documentation reviewed and reflect accurate information as of 3/12/2023 including copied elements of the note.         Thomas Singh MD, [facility]  Pulmonary, Critical care and Sleep Medicine

## 2023-03-12 NOTE — PLAN OF CARE
Goal Outcome Evaluation:  Plan of Care Reviewed With: patient, spouse            SLP re-evaluation completed. Will continue to address dysphagia with MBS + limited UGI to follow tomorrow. Please see note for further details and recommendations.

## 2023-03-12 NOTE — PLAN OF CARE
Goal Outcome Evaluation:  Plan of Care Reviewed With: patient        Progress: improving    Neuro- Patient remains AO x 4. Moves all extremities with generalized weakness noted. Patient ambulated 120' this shift with unsteady gait noted at times.     Respiratory- Respirations even and unlabored. Remains on room air at this time with O2 sat at %. MT 1/2 remain with total output of 200ml sero-sang drainage.     Cardiac- Remains in NSR, with HR 60-70. SBP- . Norepinephrine remains off at this time. + pericardial rub noted.     GI/- Abd firm but non-tender. ABS + x 4. 0 BM noted this shift. Patient continues to c/o pain while swallowing which patient states is contributing to poor appetite. Informed patient that I would notify the oncoming shift to discuss with MD. Patient voided 50cc dark tea colored urine this shift. Tolerated HD yesterday without diff.     Other- 0 s/s of acute distress noted. Will cont to follow.

## 2023-03-12 NOTE — PROGRESS NOTES
"CARDIOTHORACIC AND VASCULAR SURGERY PROGRESS NOTE    OVERNIGHT EVENTS  No acute events overnight    SUBJECTIVE  Pain controlled, denies nausea, vomiting, shortness of breath    OBJECTIVE  /53   Pulse 70   Temp 98.3 °F (36.8 °C) (Oral)   Resp 16   Ht 162.6 cm (64\")   Wt 81.9 kg (180 lb 8.9 oz)   SpO2 100%   BMI 30.99 kg/m²   General no acute distress, pleasant, interactive  Head normocephalic, atraumatic  Eyes clear sclera  ENT no discharge, neck supple  Mouth mucous membranes moist  Cardiac regular rate rhythm, no murmurs rubs gallops  Vascular warm well perfused x4 extremities  Pulmonary lungs clear to auscultation bilaterally  Abdomen soft nontender nondistended  Lymphatic no edema bilateral lower extremities  Neurological grossly intact  Psychological appropriate  Dermatological no lesions in sun exposed areas  Musculoskeletal normal tone and bulk    WBC   Date Value Ref Range Status   03/12/2023 12.09 (H) 3.40 - 10.80 10*3/mm3 Final     RBC   Date Value Ref Range Status   03/12/2023 2.30 (L) 4.14 - 5.80 10*6/mm3 Final     Hemoglobin   Date Value Ref Range Status   03/12/2023 7.0 (L) 13.0 - 17.7 g/dL Final     Hematocrit   Date Value Ref Range Status   03/12/2023 21.8 (L) 37.5 - 51.0 % Final     MCV   Date Value Ref Range Status   03/12/2023 94.8 79.0 - 97.0 fL Final     MCH   Date Value Ref Range Status   03/12/2023 30.4 26.6 - 33.0 pg Final     MCHC   Date Value Ref Range Status   03/12/2023 32.1 31.5 - 35.7 g/dL Final     RDW   Date Value Ref Range Status   03/12/2023 17.9 (H) 12.3 - 15.4 % Final     RDW-SD   Date Value Ref Range Status   03/12/2023 54.3 (H) 37.0 - 54.0 fl Final     MPV   Date Value Ref Range Status   03/12/2023 11.0 6.0 - 12.0 fL Final     Platelets   Date Value Ref Range Status   03/12/2023 97 (L) 140 - 450 10*3/mm3 Final     Neutrophil %   Date Value Ref Range Status   03/11/2023 75.2 42.7 - 76.0 % Final     Lymphocyte %   Date Value Ref Range Status   03/11/2023 10.2 (L) 19.6 " - 45.3 % Final     Monocyte %   Date Value Ref Range Status   03/11/2023 11.3 5.0 - 12.0 % Final     Eosinophil %   Date Value Ref Range Status   03/11/2023 1.3 0.3 - 6.2 % Final     Basophil %   Date Value Ref Range Status   03/11/2023 0.1 0.0 - 1.5 % Final     Immature Grans %   Date Value Ref Range Status   03/11/2023 1.9 (H) 0.0 - 0.5 % Final     Neutrophils, Absolute   Date Value Ref Range Status   03/11/2023 10.04 (H) 1.70 - 7.00 10*3/mm3 Final     Lymphocytes, Absolute   Date Value Ref Range Status   03/11/2023 1.37 0.70 - 3.10 10*3/mm3 Final     Monocytes, Absolute   Date Value Ref Range Status   03/11/2023 1.51 (H) 0.10 - 0.90 10*3/mm3 Final     Eosinophils, Absolute   Date Value Ref Range Status   03/11/2023 0.18 0.00 - 0.40 10*3/mm3 Final     Basophils, Absolute   Date Value Ref Range Status   03/11/2023 0.02 0.00 - 0.20 10*3/mm3 Final     Immature Grans, Absolute   Date Value Ref Range Status   03/11/2023 0.25 (H) 0.00 - 0.05 10*3/mm3 Final     nRBC   Date Value Ref Range Status   03/11/2023 0.1 0.0 - 0.2 /100 WBC Final       Basic Metabolic Panel    Sodium Sodium   Date Value Ref Range Status   03/12/2023 133 (L) 136 - 145 mmol/L Final   03/11/2023 134 (L) 136 - 145 mmol/L Final   03/10/2023 134 (L) 136 - 145 mmol/L Final   03/10/2023 134 (L) 136 - 145 mmol/L Final   03/10/2023 136 136 - 145 mmol/L Final   03/10/2023 136 136 - 145 mmol/L Final   03/09/2023 137 136 - 145 mmol/L Final   03/09/2023 135 (L) 136 - 145 mmol/L Final      Potassium Potassium   Date Value Ref Range Status   03/12/2023 4.5 3.5 - 5.2 mmol/L Final   03/11/2023 4.4 3.5 - 5.2 mmol/L Final   03/10/2023 4.3 3.5 - 5.2 mmol/L Final   03/10/2023 4.4 3.5 - 5.2 mmol/L Final   03/10/2023 4.3 3.5 - 5.2 mmol/L Final   03/10/2023 4.3 3.5 - 5.2 mmol/L Final   03/09/2023 4.6 3.5 - 5.2 mmol/L Final   03/09/2023 4.5 3.5 - 5.2 mmol/L Final      Chloride Chloride   Date Value Ref Range Status   03/12/2023 100 98 - 107 mmol/L Final   03/11/2023 103 98  - 107 mmol/L Final   03/10/2023 103 98 - 107 mmol/L Final   03/10/2023 102 98 - 107 mmol/L Final   03/10/2023 102 98 - 107 mmol/L Final   03/10/2023 102 98 - 107 mmol/L Final   03/09/2023 103 98 - 107 mmol/L Final   03/09/2023 103 98 - 107 mmol/L Final      Bicarbonate No results found for: PLASMABICARB   BUN BUN   Date Value Ref Range Status   03/12/2023 29 (H) 8 - 23 mg/dL Final   03/11/2023 34 (H) 8 - 23 mg/dL Final   03/10/2023 29 (H) 8 - 23 mg/dL Final   03/10/2023 32 (H) 8 - 23 mg/dL Final   03/10/2023 30 (H) 8 - 23 mg/dL Final   03/10/2023 30 (H) 8 - 23 mg/dL Final   03/09/2023 34 (H) 8 - 23 mg/dL Final   03/09/2023 32 (H) 8 - 23 mg/dL Final      Creatinine Creatinine   Date Value Ref Range Status   03/12/2023 3.80 (H) 0.76 - 1.27 mg/dL Final   03/11/2023 3.39 (H) 0.76 - 1.27 mg/dL Final   03/10/2023 2.30 (H) 0.76 - 1.27 mg/dL Final   03/10/2023 2.23 (H) 0.76 - 1.27 mg/dL Final   03/10/2023 2.37 (H) 0.76 - 1.27 mg/dL Final   03/10/2023 2.37 (H) 0.76 - 1.27 mg/dL Final   03/09/2023 1.87 (H) 0.76 - 1.27 mg/dL Final   03/09/2023 1.97 (H) 0.76 - 1.27 mg/dL Final      Calcium Calcium   Date Value Ref Range Status   03/12/2023 7.4 (L) 8.6 - 10.5 mg/dL Final   03/11/2023 7.4 (L) 8.6 - 10.5 mg/dL Final   03/10/2023 7.1 (L) 8.6 - 10.5 mg/dL Final   03/10/2023 7.1 (L) 8.6 - 10.5 mg/dL Final   03/10/2023 7.4 (L) 8.6 - 10.5 mg/dL Final   03/10/2023 7.4 (L) 8.6 - 10.5 mg/dL Final   03/09/2023 7.3 (L) 8.6 - 10.5 mg/dL Final   03/09/2023 7.1 (L) 8.6 - 10.5 mg/dL Final      Glucose      No components found for: GLUCOSE.*         Scheduled Meds:amiodarone, 200 mg, Oral, Q12H  arformoterol, 15 mcg, Nebulization, BID - RT  aspirin, 325 mg, Oral, Daily  budesonide, 0.5 mg, Nebulization, BID - RT  guaiFENesin, 1,200 mg, Oral, Q12H  insulin detemir, 10 Units, Subcutaneous, Nightly  insulin lispro, 0-9 Units, Subcutaneous, 4x Daily With Meals & Nightly  Insulin Lispro, 2 Units, Subcutaneous, TID With Meals  ipratropium-albuterol, 3  mL, Nebulization, Q4H - RT  metoprolol tartrate, 12.5 mg, Oral, Q12H  midodrine, 10 mg, Oral, Q8H  pantoprazole, 40 mg, Oral, Q AM  pharmacy consult - MTM, , Does not apply, BID  piperacillin-tazobactam, 3.375 g, Intravenous, Q12H  rosuvastatin, 10 mg, Oral, Nightly  senna-docusate sodium, 2 tablet, Oral, BID      Continuous Infusions:niCARdipine, 5-15 mg/hr  nitroglycerin, 5-200 mcg/min, Last Rate: Stopped (23 1431)  norepinephrine, 0.02-0.3 mcg/kg/min, Last Rate: Stopped (03/10/23 0638)  phenylephrine, 0.5-3 mcg/kg/min      PRN Meds:.•  acetaminophen  •  albumin human  •  anticoagulant citrate (ACD) formula A  •  senna-docusate sodium **AND** polyethylene glycol **AND** bisacodyl **AND** bisacodyl  •  calcium carbonate  •  Calcium Replacement - Initiate Nurse / BPA Driven Protocol  •  cyclobenzaprine  •  dextrose  •  glucagon (human recombinant)  •  guaifenesin  •  heparin (porcine)  •  hydrOXYzine  •  ipratropium-albuterol  •  Magnesium Standard Dose Replacement - Initiate Nurse / BPA Driven Protocol  •  melatonin  •  [] Morphine **AND** naloxone  •  [DISCONTINUED] fentaNYL citrate (PF) **AND** naloxone  •  niCARdipine  •  nitroglycerin  •  norepinephrine  •  ondansetron  •  phenylephrine  •  Phosphorus Replacement - Initiate Nurse / BPA Driven Protocol  •  Potassium Replacement - Initiate Nurse / BPA Driven Protocol  •  sodium chloride    IMAGING  CXR pending    ASSESSMENT  67 year old man with history of tobacco abuse, COPD, DM, HTN, CAD STEMI :  EF 50%, NILE to the mid-dominant RCA, NILE to the proximal LAD, now stress test +, 2023: Severe triple-vessel disease, %, RCA 80%, LAD 80%   Status-post CABG x3 on 3/1/2023 complicated by postop renal failure and hypotension requiring vasoactive medications, now off levo on midodrine  There is a question of possible sepsis due to coughing and leukocytosis, on zosyn empiric  Dialysis 3/11/23  Afib on amiodarone  Hgb 7 today, no vasoactive  meds      Coronary artery disease involving native coronary artery of native heart without angina pectoris    Hypertension    Hyperlipidemia LDL goal <70    Type 2 diabetes mellitus with hypoglycemia, with long-term current use of insulin (HCC)    Ischemic cardiomyopathy    Mucopurulent chronic bronchitis (HCC)    Prolonged Q-T interval on ECG    Acute renal failure (ARF) (HCC)    Shock (HCC)    Acute respiratory failure with hypoxia (HCC)    PLAN  Daily CXR  RRT per Nephrology  Aspirin  Statin  Continue chest tubes -20 mmHg (>500ml/24h)  SQH  SCDs  Renal diet  PT/OT/out of bed  Ok for telemetry transfer if continues to improve clinically    Kevin Casarez M.D., R.P.V.I.  Cardiothoracic and Vascular Surgeon  [facility]    Kevin Casarez MD  03/12/23  08:12 EDT

## 2023-03-12 NOTE — THERAPY RE-EVALUATION
Acute Care - Speech Language Pathology   Swallow Re-Evaluation [facility]   Clinical Swallow Re-Evaluation     Patient Name: Herbert Harley  : 1955  MRN: [id number]  Today's Date: 3/12/2023               Admit Date: 3/1/2023    Visit Dx:     ICD-10-CM ICD-9-CM   1. Coronary artery disease involving native coronary artery of native heart without angina pectoris  I25.10 414.01   2. Coronary artery disease involving native coronary artery of native heart, unspecified whether angina present  I25.10 414.01   3. Acute renal failure, unspecified acute renal failure type (HCC)  N17.9 584.9   4. Acute respiratory failure with hypoxia (HCC)  J96.01 518.81   5. Dysphagia, unspecified type  R13.10 787.20     Patient Active Problem List   Diagnosis   • Coronary artery disease involving native coronary artery of native heart without angina pectoris   • Hypertension   • Hyperlipidemia LDL goal <70   • Type 2 diabetes mellitus with hypoglycemia, with long-term current use of insulin (HCC)   • Ischemic cardiomyopathy   • Allergic rhinitis   • Positive cardiac stress test   • Mucopurulent chronic bronchitis (HCC)   • Prolonged Q-T interval on ECG   • Acute renal failure (ARF) (HCC)   • Shock (HCC)   • Acute respiratory failure with hypoxia (HCC)     Past Medical History:   Diagnosis Date   • Acute renal failure (ARF) (HCC) 3/2/2023   • Asthma 2022   • Chronic systolic congestive heart failure (HCC) 3/1/2023   • Coronary artery disease 2014   • Elevated cholesterol    • History of tobacco abuse    • HLD (hyperlipidemia) 2023   • Hypertension    • Ischemic cardiomyopathy    • Myocardial infarction (HCC) 2014   • Prolonged Q-T interval on ECG 3/1/2023   • Type 2 diabetes mellitus (HCC)    • Wears glasses      Past Surgical History:   Procedure Laterality Date   • CARDIAC CATHETERIZATION  2014   • CARDIAC CATHETERIZATION Left 2023    Procedure: Left Heart Cath;  Surgeon: Ashley  "MD Nadya;  Location:  [facility];  Service: Cardiology;  Laterality: Left;   • CORONARY ARTERY BYPASS GRAFT N/A 3/1/2023    Procedure: MEDIAN STERNOTOMY CORONARY ARTERY BYPASS GRAFTING X3  UTILIZING THE LEFT INTERNAL MAMMERY GRAFT, AND EVH OF THE GREATER RIGHT SAPHENOUS VEIN;  Surgeon: Paresh Saavedra MD;  [facility];  Service: Cardiothoracic;  Laterality: N/A;   • CORONARY STENT PLACEMENT  March 2014   • TONSILLECTOMY         SLP Recommendation and Plan  SLP Swallowing Diagnosis: R/O pharyngeal dysphagia, suspected esophageal dysphagia (03/12/23 1100)  SLP Diet Recommendation: regular textures, thin liquids, other (see comments) (food/liquids as most comfortable to pt) (03/12/23 1100)  Recommended Precautions and Strategies: upright posture during/after eating, small bites of food and sips of liquid, reflux precautions (03/12/23 1100)  SLP Rec. for Method of Medication Administration: as tolerated (03/12/23 1100)        Recommended Diagnostics: VFSS (MBS), other (see comments) (+ limited UGI) (03/12/23 1100)              Plan of Care Reviewed With: patient, spouse      SWALLOW EVALUATION (last 72 hours)     SLP Adult Swallow Evaluation     Row Name 03/12/23 1100          Document Type re-evaluation  -SM    Patient Observations alert;cooperative  -SM          Pertinent History Of Current Problem Reconsulted and continued \"stuck\" of foods and liquids.  -SM    Current Method of Nutrition regular textures;thin liquids  -SM    Prior Level of Function-Swallowing safe, efficient swallowing in all situations  -    Plans/Goals Discussed with patient;spouse/S.O.;agreed upon  -    Barriers to Rehab none identified  -    Patient's Goals for Discharge --  improved comfort and intake  -SM    Family Goals for Discharge other (see comments)  same as pt  -SM          Pain: FACES Scale, Pretreatment 2-->hurts little bit  -SM    Posttreatment Pain Rating 2-->hurts little bit  -SM    Pain " Location incisional  -    Pain Location - chest  -    Pre/Posttreatment Pain Comment Increased pain with swallow  -          Esophageal Phase suspected esophageal impairment  -    Clinical Swallow Evaluation Summary Significant pain/discomfort with swallow. Appears as passing through esophagus. Tested thin and nectar-thick liquids, no signifiant difference. Delayed coughing with ? of esophageal retrograde, pt did not sense any material come to throat. DNT solids per pt preference out of fear of pain. Reports pain/discomfort occurring with all bites/sips.  -          SLP Swallowing Diagnosis R/O pharyngeal dysphagia;suspected esophageal dysphagia  -    Functional Impact risk of malnutrition;risk of dehydration  -          SLP Diet Recommendation regular textures;thin liquids;other (see comments)  food/liquids as most comfortable to pt  -    Recommended Diagnostics VFSS (MBS);other (see comments)  + limited UGI  -    Recommended Precautions and Strategies upright posture during/after eating;small bites of food and sips of liquid;reflux precautions  -    Oral Care Recommendations Oral Care BID/PRN  -    SLP Rec. for Method of Medication Administration as tolerated  -          User Key  (r) = Recorded By, (t) = Taken By, (c) = Cosigned By    Initials Name Effective Dates     Mee Munoz, MS CCC-SLP 02/03/23 -                 EDUCATION  The patient has been educated in the following areas:   Dysphagia (Swallowing Impairment) Modified Diet Instruction.              Time Calculation:    Time Calculation- SLP     Row Name 03/12/23 1327             Time Calculation- [facility]    SLP Start Time 1100  -      SLP Received On 03/12/23  -         Untimed Charges    97040-AG Eval Oral Pharyng Swallow Minutes 41  -         Total Minutes    Untimed Charges Total Minutes 41  -SM       Total Minutes 41  -SM            User Key  (r) = Recorded By, (t) = Taken By, (c) = Cosigned By    Initials Name  Provider Type     Mee Munoz, MS CCC-SLP Speech and Language Pathologist                Therapy Charges for Today     Code Description Service Date Service Provider Modifiers Qty    [id number] HC ST EVAL ORAL PHARYNG SWALLOW 3 3/12/2023 Mee Munoz, MS CCC-SLP GN 1               Mee Munoz MS CCC-SLP  3/12/2023

## 2023-03-12 NOTE — PLAN OF CARE
Goal Outcome Evaluation:  Plan of Care Reviewed With: patient, spouse        Progress: improving  Outcome Evaluation: Ambulated x2 240 feet with minimal assistance with support from the tele monitor and gait belt. Voided 200ml. H/H 7.0/21.8, recheck was 7.5/23.7. Main complaint is pain in esophag. with swallowing. Nystatin swish and swallow started, protonix switched to IV BID. MT output was 280, atrium changed. Plan is for tunnelled HD access placement tomorrow. Barium swallow tomorrow.

## 2023-03-13 ENCOUNTER — APPOINTMENT (OUTPATIENT)
Dept: NEPHROLOGY | Facility: HOSPITAL | Age: 68
DRG: 235 | End: 2023-03-13
Payer: MEDICARE

## 2023-03-13 ENCOUNTER — APPOINTMENT (OUTPATIENT)
Dept: GENERAL RADIOLOGY | Facility: HOSPITAL | Age: 68
DRG: 235 | End: 2023-03-13
Payer: MEDICARE

## 2023-03-13 LAB
ABO GROUP BLD: NORMAL
ALBUMIN SERPL-MCNC: 2.5 G/DL (ref 3.5–5.2)
ALBUMIN/GLOB SERPL: 1.4 G/DL
ALP SERPL-CCNC: 134 U/L (ref 39–117)
ALT SERPL W P-5'-P-CCNC: 29 U/L (ref 1–41)
ANION GAP SERPL CALCULATED.3IONS-SCNC: 12 MMOL/L (ref 5–15)
AST SERPL-CCNC: 39 U/L (ref 1–40)
BACTERIA SPEC AEROBE CULT: NORMAL
BACTERIA SPEC AEROBE CULT: NORMAL
BASOPHILS # BLD AUTO: 0.04 10*3/MM3 (ref 0–0.2)
BASOPHILS NFR BLD AUTO: 0.3 % (ref 0–1.5)
BILIRUB SERPL-MCNC: 0.3 MG/DL (ref 0–1.2)
BLD GP AB SCN SERPL QL: NEGATIVE
BUN SERPL-MCNC: 42 MG/DL (ref 8–23)
BUN/CREAT SERPL: 7.2 (ref 7–25)
CALCIUM SPEC-SCNC: 7.5 MG/DL (ref 8.6–10.5)
CHLORIDE SERPL-SCNC: 98 MMOL/L (ref 98–107)
CO2 SERPL-SCNC: 21 MMOL/L (ref 22–29)
CREAT SERPL-MCNC: 5.82 MG/DL (ref 0.76–1.27)
DEPRECATED RDW RBC AUTO: 58.6 FL (ref 37–54)
EGFRCR SERPLBLD CKD-EPI 2021: 10 ML/MIN/1.73
EOSINOPHIL # BLD AUTO: 0.29 10*3/MM3 (ref 0–0.4)
EOSINOPHIL NFR BLD AUTO: 2.1 % (ref 0.3–6.2)
ERYTHROCYTE [DISTWIDTH] IN BLOOD BY AUTOMATED COUNT: 17.7 % (ref 12.3–15.4)
GLOBULIN UR ELPH-MCNC: 1.8 GM/DL
GLUCOSE BLDC GLUCOMTR-MCNC: 127 MG/DL (ref 70–130)
GLUCOSE BLDC GLUCOMTR-MCNC: 131 MG/DL (ref 70–130)
GLUCOSE BLDC GLUCOMTR-MCNC: 147 MG/DL (ref 70–130)
GLUCOSE BLDC GLUCOMTR-MCNC: 96 MG/DL (ref 70–130)
GLUCOSE SERPL-MCNC: 121 MG/DL (ref 65–99)
HCT VFR BLD AUTO: 21.9 % (ref 37.5–51)
HGB BLD-MCNC: 6.9 G/DL (ref 13–17.7)
IMM GRANULOCYTES # BLD AUTO: 0.13 10*3/MM3 (ref 0–0.05)
IMM GRANULOCYTES NFR BLD AUTO: 1 % (ref 0–0.5)
LYMPHOCYTES # BLD AUTO: 1.57 10*3/MM3 (ref 0.7–3.1)
LYMPHOCYTES NFR BLD AUTO: 11.5 % (ref 19.6–45.3)
MAGNESIUM SERPL-MCNC: 2.1 MG/DL (ref 1.6–2.4)
MCH RBC QN AUTO: 30.4 PG (ref 26.6–33)
MCHC RBC AUTO-ENTMCNC: 31.5 G/DL (ref 31.5–35.7)
MCV RBC AUTO: 96.5 FL (ref 79–97)
MONOCYTES # BLD AUTO: 1.84 10*3/MM3 (ref 0.1–0.9)
MONOCYTES NFR BLD AUTO: 13.5 % (ref 5–12)
NEUTROPHILS NFR BLD AUTO: 71.6 % (ref 42.7–76)
NEUTROPHILS NFR BLD AUTO: 9.79 10*3/MM3 (ref 1.7–7)
NRBC BLD AUTO-RTO: 0 /100 WBC (ref 0–0.2)
PHOSPHATE SERPL-MCNC: 6.2 MG/DL (ref 2.5–4.5)
PLATELET # BLD AUTO: 132 10*3/MM3 (ref 140–450)
PMV BLD AUTO: 10.9 FL (ref 6–12)
POTASSIUM SERPL-SCNC: 4.8 MMOL/L (ref 3.5–5.2)
PROT SERPL-MCNC: 4.3 G/DL (ref 6–8.5)
QT INTERVAL: 404 MS
QT INTERVAL: 406 MS
QTC INTERVAL: 448 MS
QTC INTERVAL: 483 MS
RBC # BLD AUTO: 2.27 10*6/MM3 (ref 4.14–5.8)
RH BLD: POSITIVE
SODIUM SERPL-SCNC: 131 MMOL/L (ref 136–145)
T&S EXPIRATION DATE: NORMAL
WBC NRBC COR # BLD: 13.66 10*3/MM3 (ref 3.4–10.8)

## 2023-03-13 PROCEDURE — 86900 BLOOD TYPING SEROLOGIC ABO: CPT | Performed by: THORACIC SURGERY (CARDIOTHORACIC VASCULAR SURGERY)

## 2023-03-13 PROCEDURE — 80053 COMPREHEN METABOLIC PANEL: CPT | Performed by: INTERNAL MEDICINE

## 2023-03-13 PROCEDURE — 92611 MOTION FLUOROSCOPY/SWALLOW: CPT

## 2023-03-13 PROCEDURE — 94761 N-INVAS EAR/PLS OXIMETRY MLT: CPT

## 2023-03-13 PROCEDURE — 85025 COMPLETE CBC W/AUTO DIFF WBC: CPT | Performed by: INTERNAL MEDICINE

## 2023-03-13 PROCEDURE — 94664 DEMO&/EVAL PT USE INHALER: CPT

## 2023-03-13 PROCEDURE — 86900 BLOOD TYPING SEROLOGIC ABO: CPT

## 2023-03-13 PROCEDURE — 84100 ASSAY OF PHOSPHORUS: CPT | Performed by: INTERNAL MEDICINE

## 2023-03-13 PROCEDURE — 63710000001 INSULIN LISPRO (HUMAN) PER 5 UNITS: Performed by: INTERNAL MEDICINE

## 2023-03-13 PROCEDURE — 25010000002 PIPERACILLIN SOD-TAZOBACTAM PER 1 G

## 2023-03-13 PROCEDURE — 99233 SBSQ HOSP IP/OBS HIGH 50: CPT | Performed by: INTERNAL MEDICINE

## 2023-03-13 PROCEDURE — 86901 BLOOD TYPING SEROLOGIC RH(D): CPT | Performed by: THORACIC SURGERY (CARDIOTHORACIC VASCULAR SURGERY)

## 2023-03-13 PROCEDURE — 25010000002 HEPARIN (PORCINE) PER 1000 UNITS: Performed by: INTERNAL MEDICINE

## 2023-03-13 PROCEDURE — 97530 THERAPEUTIC ACTIVITIES: CPT

## 2023-03-13 PROCEDURE — 82962 GLUCOSE BLOOD TEST: CPT

## 2023-03-13 PROCEDURE — 74240 X-RAY XM UPR GI TRC 1CNTRST: CPT

## 2023-03-13 PROCEDURE — 83735 ASSAY OF MAGNESIUM: CPT | Performed by: INTERNAL MEDICINE

## 2023-03-13 PROCEDURE — 74230 X-RAY XM SWLNG FUNCJ C+: CPT

## 2023-03-13 PROCEDURE — P9016 RBC LEUKOCYTES REDUCED: HCPCS

## 2023-03-13 PROCEDURE — 99024 POSTOP FOLLOW-UP VISIT: CPT | Performed by: THORACIC SURGERY (CARDIOTHORACIC VASCULAR SURGERY)

## 2023-03-13 PROCEDURE — 99232 SBSQ HOSP IP/OBS MODERATE 35: CPT | Performed by: INTERNAL MEDICINE

## 2023-03-13 PROCEDURE — 93005 ELECTROCARDIOGRAM TRACING: CPT | Performed by: PHYSICIAN ASSISTANT

## 2023-03-13 PROCEDURE — 86923 COMPATIBILITY TEST ELECTRIC: CPT

## 2023-03-13 PROCEDURE — 25010000002 FLUCONAZOLE PER 200 MG: Performed by: INTERNAL MEDICINE

## 2023-03-13 PROCEDURE — 94799 UNLISTED PULMONARY SVC/PX: CPT

## 2023-03-13 PROCEDURE — 86850 RBC ANTIBODY SCREEN: CPT | Performed by: THORACIC SURGERY (CARDIOTHORACIC VASCULAR SURGERY)

## 2023-03-13 PROCEDURE — 63710000001 INSULIN DETEMIR PER 5 UNITS: Performed by: INTERNAL MEDICINE

## 2023-03-13 RX ORDER — PANTOPRAZOLE SODIUM 40 MG/1
40 TABLET, DELAYED RELEASE ORAL
Status: DISCONTINUED | OUTPATIENT
Start: 2023-03-14 | End: 2023-03-20 | Stop reason: HOSPADM

## 2023-03-13 RX ORDER — IPRATROPIUM BROMIDE AND ALBUTEROL SULFATE 2.5; .5 MG/3ML; MG/3ML
3 SOLUTION RESPIRATORY (INHALATION)
Status: DISCONTINUED | OUTPATIENT
Start: 2023-03-14 | End: 2023-03-15

## 2023-03-13 RX ORDER — FLUCONAZOLE 2 MG/ML
200 INJECTION, SOLUTION INTRAVENOUS ONCE
Status: COMPLETED | OUTPATIENT
Start: 2023-03-13 | End: 2023-03-13

## 2023-03-13 RX ORDER — ALBUTEROL SULFATE 2.5 MG/3ML
2.5 SOLUTION RESPIRATORY (INHALATION) EVERY 6 HOURS PRN
Status: DISCONTINUED | OUTPATIENT
Start: 2023-03-13 | End: 2023-03-20 | Stop reason: HOSPADM

## 2023-03-13 RX ORDER — MANNITOL 250 MG/ML
25 INJECTION, SOLUTION INTRAVENOUS AS NEEDED
Status: DISCONTINUED | OUTPATIENT
Start: 2023-03-13 | End: 2023-03-13

## 2023-03-13 RX ADMIN — MIDODRINE HYDROCHLORIDE 10 MG: 10 TABLET ORAL at 03:50

## 2023-03-13 RX ADMIN — BUDESONIDE 0.5 MG: 0.5 SUSPENSION RESPIRATORY (INHALATION) at 07:52

## 2023-03-13 RX ADMIN — IPRATROPIUM BROMIDE AND ALBUTEROL SULFATE 3 ML: 2.5; .5 SOLUTION RESPIRATORY (INHALATION) at 07:52

## 2023-03-13 RX ADMIN — IPRATROPIUM BROMIDE AND ALBUTEROL SULFATE 3 ML: 2.5; .5 SOLUTION RESPIRATORY (INHALATION) at 16:35

## 2023-03-13 RX ADMIN — MIDODRINE HYDROCHLORIDE 10 MG: 10 TABLET ORAL at 11:58

## 2023-03-13 RX ADMIN — AMIODARONE HYDROCHLORIDE 200 MG: 200 TABLET ORAL at 11:58

## 2023-03-13 RX ADMIN — MIDODRINE HYDROCHLORIDE 10 MG: 10 TABLET ORAL at 20:50

## 2023-03-13 RX ADMIN — FLUCONAZOLE 200 MG: 200 INJECTION, SOLUTION INTRAVENOUS at 18:40

## 2023-03-13 RX ADMIN — NYSTATIN 500000 UNITS: 100000 SUSPENSION ORAL at 07:49

## 2023-03-13 RX ADMIN — ROSUVASTATIN CALCIUM 10 MG: 10 TABLET, FILM COATED ORAL at 20:50

## 2023-03-13 RX ADMIN — ARFORMOTEROL TARTRATE 15 MCG: 15 SOLUTION RESPIRATORY (INHALATION) at 07:53

## 2023-03-13 RX ADMIN — AMIODARONE HYDROCHLORIDE 200 MG: 200 TABLET ORAL at 20:50

## 2023-03-13 RX ADMIN — PANTOPRAZOLE SODIUM 40 MG: 40 INJECTION, POWDER, LYOPHILIZED, FOR SOLUTION INTRAVENOUS at 12:04

## 2023-03-13 RX ADMIN — Medication 12.5 MG: at 11:59

## 2023-03-13 RX ADMIN — INSULIN DETEMIR 10 UNITS: 100 INJECTION, SOLUTION SUBCUTANEOUS at 20:50

## 2023-03-13 RX ADMIN — Medication 12.5 MG: at 20:50

## 2023-03-13 RX ADMIN — NYSTATIN 500000 UNITS: 100000 SUSPENSION ORAL at 18:40

## 2023-03-13 RX ADMIN — ASPIRIN 325 MG: 325 TABLET, COATED ORAL at 11:59

## 2023-03-13 RX ADMIN — BARIUM SULFATE 100 ML: 0.81 POWDER, FOR SUSPENSION ORAL at 15:55

## 2023-03-13 RX ADMIN — NYSTATIN 500000 UNITS: 100000 SUSPENSION ORAL at 11:57

## 2023-03-13 RX ADMIN — ARFORMOTEROL TARTRATE 15 MCG: 15 SOLUTION RESPIRATORY (INHALATION) at 20:10

## 2023-03-13 RX ADMIN — GUAIFENESIN 1200 MG: 600 TABLET, EXTENDED RELEASE ORAL at 11:58

## 2023-03-13 RX ADMIN — TAZOBACTAM SODIUM AND PIPERACILLIN SODIUM 3.38 G: 375; 3 INJECTION, SOLUTION INTRAVENOUS at 22:00

## 2023-03-13 RX ADMIN — BARIUM SULFATE 20 ML: 400 PASTE ORAL at 15:54

## 2023-03-13 RX ADMIN — TAZOBACTAM SODIUM AND PIPERACILLIN SODIUM 3.38 G: 375; 3 INJECTION, SOLUTION INTRAVENOUS at 11:57

## 2023-03-13 RX ADMIN — SENNOSIDES AND DOCUSATE SODIUM 2 TABLET: 50; 8.6 TABLET ORAL at 11:57

## 2023-03-13 RX ADMIN — INSULIN LISPRO 2 UNITS: 100 INJECTION, SOLUTION INTRAVENOUS; SUBCUTANEOUS at 18:40

## 2023-03-13 RX ADMIN — HEPARIN SODIUM 1300 UNITS: 1000 INJECTION INTRAVENOUS; SUBCUTANEOUS at 10:59

## 2023-03-13 RX ADMIN — INSULIN LISPRO 2 UNITS: 100 INJECTION, SOLUTION INTRAVENOUS; SUBCUTANEOUS at 07:48

## 2023-03-13 RX ADMIN — SENNOSIDES AND DOCUSATE SODIUM 2 TABLET: 50; 8.6 TABLET ORAL at 20:50

## 2023-03-13 RX ADMIN — IPRATROPIUM BROMIDE AND ALBUTEROL SULFATE 3 ML: 2.5; .5 SOLUTION RESPIRATORY (INHALATION) at 12:18

## 2023-03-13 NOTE — THERAPY TREATMENT NOTE
Patient Name: Herbert Harley  : 1955    MRN: [id number]                              Today's Date: 3/13/2023       Admit Date: 3/1/2023    Visit Dx:     ICD-10-CM ICD-9-CM   1. Coronary artery disease involving native coronary artery of native heart without angina pectoris  I25.10 414.01   2. Coronary artery disease involving native coronary artery of native heart, unspecified whether angina present  I25.10 414.01   3. Acute renal failure, unspecified acute renal failure type (HCC)  N17.9 584.9   4. Acute respiratory failure with hypoxia (HCC)  J96.01 518.81   5. Dysphagia, unspecified type  R13.10 787.20     Patient Active Problem List   Diagnosis   • Coronary artery disease involving native coronary artery of native heart without angina pectoris   • Hypertension   • Hyperlipidemia LDL goal <70   • Type 2 diabetes mellitus with hypoglycemia, with long-term current use of insulin (HCC)   • Ischemic cardiomyopathy   • Allergic rhinitis   • Positive cardiac stress test   • Mucopurulent chronic bronchitis (HCC)   • Prolonged Q-T interval on ECG   • Acute renal failure (ARF) (HCC)   • Shock (HCC)   • Acute respiratory failure with hypoxia (HCC)     Past Medical History:   Diagnosis Date   • Acute renal failure (ARF) (HCC) 3/2/2023   • Asthma 2022   • Chronic systolic congestive heart failure (HCC) 3/1/2023   • Coronary artery disease 2014   • Elevated cholesterol    • History of tobacco abuse    • HLD (hyperlipidemia) 2023   • Hypertension    • Ischemic cardiomyopathy    • Myocardial infarction (HCC) 2014   • Prolonged Q-T interval on ECG 3/1/2023   • Type 2 diabetes mellitus (HCC)    • Wears glasses      Past Surgical History:   Procedure Laterality Date   • CARDIAC CATHETERIZATION  2014   • CARDIAC CATHETERIZATION Left 2023    Procedure: Left Heart Cath;  Surgeon: Nadya Ramirez MD;  Location: [facility] CATH INVASIVE LOCATION;  Service: Cardiology;  Laterality: Left;    • CORONARY ARTERY BYPASS GRAFT N/A 3/1/2023    Procedure: MEDIAN STERNOTOMY CORONARY ARTERY BYPASS GRAFTING X3  UTILIZING THE LEFT INTERNAL MAMMERY GRAFT, AND EVH OF THE GREATER RIGHT SAPHENOUS VEIN;  Surgeon: Paresh Saavedra MD;  Location: [facility];  Service: Cardiothoracic;  Laterality: N/A;   • CORONARY STENT PLACEMENT  March 2014   • TONSILLECTOMY        General Information     Row Name 03/13/23 1502          Physical Therapy Time and Intention    Document Type therapy note (daily note)  -AE     Mode of Treatment physical therapy  -AE     Row Name 03/13/23 1502          General Information    Existing Precautions/Restrictions cardiac;fall;sternal  -AE     Barriers to Rehab medically complex  -AE     Row Name 03/13/23 1502          Cognition    Orientation Status (Cognition) oriented x 3  -AE     Row Name 03/13/23 1502          Safety Issues, Functional Mobility    Safety Issues Affecting Function (Mobility) awareness of need for assistance;insight into deficits/self-awareness;safety precaution awareness;safety precautions follow-through/compliance;sequencing abilities  -AE     Impairments Affecting Function (Mobility) balance;coordination;endurance/activity tolerance;postural/trunk control;strength  -AE           User Key  (r) = Recorded By, (t) = Taken By, (c) = Cosigned By    Initials Name Provider Type    AE Priyank Saenz PT Physical Therapist               Mobility     Row Name 03/13/23 1506          Bed Mobility    Bed Mobility supine-sit;sit-supine;scooting/bridging  -AE     Scooting/Bridging [place] (Bed Mobility) moderate assist (50% patient effort);1 person assist;verbal cues;nonverbal cues (demo/gesture)  -AE     Supine-Sit [place] (Bed Mobility) minimum assist (75% patient effort);1 person assist;verbal cues;nonverbal cues (demo/gesture)  -AE     Sit-Supine [place] (Bed Mobility) minimum assist (75% patient effort);1 person assist;verbal cues;nonverbal cues (demo/gesture)   -AE     Assistive Device (Bed Mobility) draw sheet;head of bed elevated  -AE     Comment, (Bed Mobility) VCs for hand placement and sequencing to maintain sternal precautions. Pt required increased assistance at trunk to improve.  -AE     Row Name 03/13/23 1506          Transfers    Comment, (Transfers) VCs for hand placement and sequencing to maintain sternal precautions.  -AE     Row Name 03/13/23 1506          Sit-Stand Transfer    Sit-Stand [place] (Transfers) minimum assist (75% patient effort);1 person assist;verbal cues;nonverbal cues (demo/gesture)  -AE     Row Name 03/13/23 1506          Gait/Stairs (Locomotion)    [place] Level (Gait) minimum assist (75% patient effort);1 person assist;verbal cues;nonverbal cues (demo/gesture)  -AE     Assistive Device (Gait) other (see comments)  BUE support on tele monitor  -AE     Distance in Feet (Gait) 130  -AE     Deviations/Abnormal Patterns (Gait) bilateral deviations;deena decreased;gait speed decreased;stride length decreased  -AE     Bilateral Gait Deviations forward flexed posture;heel strike decreased  -AE     Comment, (Gait/Stairs) Pt demo step through gait pattern with slowed deena, decreased step length, and decreased balance with mobility. Pt demo mild decrease in safety awareness with decreased balance. Pt demo a few LOB this session but able to self correct. Further distance limited by fatigue.  -AE           User Key  (r) = Recorded By, (t) = Taken By, (c) = Cosigned By    Initials Name Provider Type    AE Priyank Saenz PT Physical Therapist               Obj/Interventions     Row Name 03/13/23 1515          Balance    Balance Assessment sitting static balance;sitting dynamic balance;sit to stand dynamic balance;standing static balance;standing dynamic balance  -AE     Static Sitting Balance standby assist  -AE     Dynamic Sitting Balance contact guard  -AE     Position, Sitting Balance unsupported;sitting edge of bed  -AE     Sit to  Stand Dynamic Balance minimal assist;1-person assist;verbal cues  -AE     Static Standing Balance minimal assist  -AE     Dynamic Standing Balance minimal assist  -AE     Position/Device Used, Standing Balance supported  -AE           User Key  (r) = Recorded By, (t) = Taken By, (c) = Cosigned By    Initials Name Provider Type    AE Priyank Saenz, PT Physical Therapist               Goals/Plan    No documentation.                Clinical Impression     Row Name 03/13/23 1512          Pain    Pretreatment Pain Rating 0/10 - no pain  -AE     Posttreatment Pain Rating 0/10 - no pain  -AE     Row Name 03/13/23 1512          Plan of Care Review    Plan of Care Reviewed With patient  -AE     Progress improving  -AE     Outcome Evaluation Pt continues to demo decreased balance and decreased endurance with activity. Pt ambulated 130ft with min A and BUE support. Pt limited by fatigue this session. Continue to progress per pt tolerance. Continue to recommend D/C to IRF.  -AE     Row Name 03/13/23 1512          Vital Signs    Pre Systolic BP Rehab 110  -AE     Pre Treatment Diastolic BP 50  -AE     Post Systolic BP Rehab 130  -AE     Post Treatment Diastolic BP 54  -AE     Pretreatment Heart Rate (beats/min) 71  -AE     Posttreatment Heart Rate (beats/min) 73  -AE     Pre SpO2 (%) 97  -AE     O2 Delivery Pre Treatment room air  -AE     O2 Delivery Intra Treatment room air  -AE     Post SpO2 (%) 95  -AE     O2 Delivery Post Treatment room air  -AE     Pre Patient Position Supine  -AE     Intra Patient Position Standing  -AE     Post Patient Position Supine  -AE     Row Name 03/13/23 1512          Positioning and Restraints    Pre-Treatment Position in bed  -AE     Post Treatment Position bed  -AE     In Bed notified nsg;fowlers;call light within reach;encouraged to call for assist;side rails up x2;LUE elevated;RUE elevated;SCD pump applied  -AE           User Key  (r) = Recorded By, (t) = Taken By, (c) = Cosigned By     Initials Name Provider Type    Priyank Dutton PT Physical Therapist               Outcome Measures     Row Name 03/13/23 1515          How much help from another person do you currently need...    Turning from your back to your side while in flat bed without using bedrails? 3  -AE     Moving from lying on back to sitting on the side of a flat bed without bedrails? 3  -AE     Moving to and from a bed to a chair (including a wheelchair)? 3  -AE     Standing up from a chair using your arms (e.g., wheelchair, bedside chair)? 3  -AE     Climbing 3-5 steps with a railing? 2  -AE     To walk in hospital room? 3  -AE     AM-PAC 6 Clicks Score (PT) 17  -AE     Highest level of mobility 5 --> Static standing  -AE     Row Name 03/13/23 1515          Functional Assessment    Outcome Measure Options AM-PAC 6 Clicks Basic Mobility (PT)  -AE           User Key  (r) = Recorded By, (t) = Taken By, (c) = Cosigned By    Initials Name Provider Type    AE Priyank Saenz, PT Physical Therapist                             Physical Therapy Education     Title: PT OT SLP Therapies (In Progress)     Topic: Physical Therapy (In Progress)     Point: Mobility training (In Progress)     Learning Progress Summary           Patient Acceptance, E, NR by AE at 3/13/2023 1340    Acceptance, E, NR by KG at 3/11/2023 1306    Acceptance, E, NR by CM at 3/10/2023 1410    Acceptance, E, NR by CM at 3/9/2023 1421    Acceptance, E, NR by KG at 3/8/2023 0954    Acceptance, E, NR by KG at 3/7/2023 0919    Acceptance, E, NR by KG at 3/6/2023 1324                   Point: Home exercise program (In Progress)     Learning Progress Summary           Patient Acceptance, E, NR by AE at 3/13/2023 1340    Acceptance, E, NR by KG at 3/11/2023 1306    Acceptance, E, NR by CM at 3/9/2023 1421    Acceptance, E, NR by KG at 3/8/2023 0954    Acceptance, E, NR by KG at 3/7/2023 0919                   Point: Body mechanics (In Progress)     Learning Progress Summary            Patient Acceptance, E, NR by AE at 3/13/2023 1340    Acceptance, E, NR by KG at 3/11/2023 1306    Acceptance, E, NR by CM at 3/10/2023 1410    Acceptance, E, NR by CM at 3/9/2023 1421    Acceptance, E, NR by KG at 3/8/2023 0954    Acceptance, E, NR by KG at 3/7/2023 0919    Acceptance, E, NR by KG at 3/6/2023 1324                   Point: Precautions (In Progress)     Learning Progress Summary           Patient Acceptance, E, NR by AE at 3/13/2023 1340    Acceptance, E, NR by KG at 3/11/2023 1306    Acceptance, E, NR by CM at 3/10/2023 1410    Acceptance, E, NR by CM at 3/9/2023 1421    Acceptance, E, NR by KG at 3/8/2023 0954    Acceptance, E, NR by KG at 3/7/2023 0919    Acceptance, E, NR by KG at 3/6/2023 1324                               User Key     Initials Effective Dates Name Provider Type Discipline    KG 05/22/20 -  Whitney Mistry PT Physical Therapist PT    AE 09/21/21 -  Priyank Saenz, PT Physical Therapist PT    CM 09/22/22 -  Tory Kaye, PT Physical Therapist PT              PT Recommendation and Plan     Plan of Care Reviewed With: patient  Progress: improving  Outcome Evaluation: Pt continues to demo decreased balance and decreased endurance with activity. Pt ambulated 130ft with min A and BUE support. Pt limited by fatigue this session. Continue to progress per pt tolerance. Continue to recommend D/C to IRF.     Time Calculation:    PT Charges     Row Name 03/13/23 1516             Time Calculation    Start Time 1340  -AE      PT Received On 03/13/23  -AE      PT Goal Re-Cert Due Date 03/16/23  -AE         Time Calculation- PT    Total Timed Code Minutes- PT 25 minute(s)  -AE         Timed Charges    24602 - PT Therapeutic Activity Minutes 25  -AE         Total Minutes    Timed Charges Total Minutes 25  -AE       Total Minutes 25  -AE            User Key  (r) = Recorded By, (t) = Taken By, (c) = Cosigned By    Initials Name Provider Type    AE Priyank Saenz, PT  Physical Therapist              Therapy Charges for Today     Code Description Service Date Service Provider Modifiers Qty    [id number] HC PT THERAPEUTIC ACT EA 15 MIN 3/13/2023 Priyank Saenz, PT GP 2          PT G-Codes  Outcome Measure Options: AM-PAC 6 Clicks Basic Mobility (PT)  AM-PAC 6 Clicks Score (PT): 17  AM-PAC 6 Clicks Score (OT): 14  PT Discharge Summary  Anticipated Discharge Disposition (PT): inpatient rehabilitation facility    Priyank Saenz PT  3/13/2023

## 2023-03-13 NOTE — MBS/VFSS/FEES
Acute Care - Speech Language Pathology   Swallow Initial Evaluation  Thomas   Modified Barium Swallow Study (MBS)     Patient Name: Herbert Harley  : 1955  MRN: [id number]  Today's Date: 3/13/2023               Admit Date: 3/1/2023    Visit Dx:     ICD-10-CM ICD-9-CM   1. Coronary artery disease involving native coronary artery of native heart without angina pectoris  I25.10 414.01   2. Coronary artery disease involving native coronary artery of native heart, unspecified whether angina present  I25.10 414.01   3. Acute renal failure, unspecified acute renal failure type (HCC)  N17.9 584.9   4. Acute respiratory failure with hypoxia (HCC)  J96.01 518.81   5. Dysphagia, unspecified type  R13.10 787.20     Patient Active Problem List   Diagnosis   • Coronary artery disease involving native coronary artery of native heart without angina pectoris   • Hypertension   • Hyperlipidemia LDL goal <70   • Type 2 diabetes mellitus with hypoglycemia, with long-term current use of insulin (HCC)   • Ischemic cardiomyopathy   • Allergic rhinitis   • Positive cardiac stress test   • Mucopurulent chronic bronchitis (HCC)   • Prolonged Q-T interval on ECG   • Acute renal failure (ARF) (HCC)   • Shock (HCC)   • Acute respiratory failure with hypoxia (HCC)     Past Medical History:   Diagnosis Date   • Acute renal failure (ARF) (HCC) 3/2/2023   • Asthma 2022   • Chronic systolic congestive heart failure (HCC) 3/1/2023   • Coronary artery disease 2014   • Elevated cholesterol    • History of tobacco abuse    • HLD (hyperlipidemia) 2023   • Hypertension    • Ischemic cardiomyopathy    • Myocardial infarction (HCC) 2014   • Prolonged Q-T interval on ECG 3/1/2023   • Type 2 diabetes mellitus (HCC)    • Wears glasses      Past Surgical History:   Procedure Laterality Date   • CARDIAC CATHETERIZATION  2014   • CARDIAC CATHETERIZATION Left 2023    Procedure: Left Heart Cath;  Surgeon:  Nadya Ramirez MD;  Location:  [facility];  Service: Cardiology;  Laterality: Left;   • CORONARY ARTERY BYPASS GRAFT N/A 3/1/2023    Procedure: MEDIAN STERNOTOMY CORONARY ARTERY BYPASS GRAFTING X3  UTILIZING THE LEFT INTERNAL MAMMERY GRAFT, AND EVH OF THE GREATER RIGHT SAPHENOUS VEIN;  Surgeon: Paresh Saavedra MD;  Location:  [facility];  Service: Cardiothoracic;  Laterality: N/A;   • CORONARY STENT PLACEMENT  March 2014   • TONSILLECTOMY         SLP Recommendation and Plan  SLP Swallowing Diagnosis: mild, pharyngeal dysphagia (03/13/23 1515)  SLP Diet Recommendation: regular textures, thin liquids (03/13/23 1515)  Recommended Precautions and Strategies: upright posture during/after eating, small bites of food and sips of liquid, general aspiration precautions, other (see comments) (single cup/straw sips) (03/13/23 1515)  SLP Rec. for Method of Medication Administration: meds whole, with puree, as tolerated (03/13/23 1515)     Monitor for Signs of Aspiration: notify SLP if any concerns (03/13/23 1515)     Swallow Criteria for Skilled Therapeutic Interventions Met: no problems identified which require skilled intervention (03/13/23 1515)  Anticipated Discharge Disposition (SLP): anticipate therapy at next level of care (03/13/23 1515)  Rehab Potential/Prognosis, Swallowing: good, to achieve stated therapy goals (03/13/23 1515)  Therapy Frequency (Swallow): 3 days per week (03/13/23 1515)  Predicted Duration Therapy Intervention (Days): until discharge (03/13/23 1515)  Demonstrates Need for Referral to Another Service: gastroenterology, other (see comments) (pt would likely still benefit from GI consult given cont'd complaints of pain in chest, specifically w/ solid foods) (03/13/23 1515)                                     Plan of Care Reviewed With: patient      SWALLOW EVALUATION (last 72 hours)     SLP Adult Swallow Evaluation     Row Name 03/13/23 1515 03/12/23 1100                Rehab  "Evaluation    Document Type evaluation  - re-evaluation  -       Subjective Information no complaints  -MP --       Patient Observations alert;cooperative  - alert;cooperative  -       Patient/Family/Caregiver Comments/Observations No family present  - --       Patient Effort good  - --          General Information    Patient Profile Reviewed yes  -MP --       Pertinent History Of Current Problem MBS + ltd upper GI to further assess swallow function  - Reconsulted and continued \"stuck\" of foods and liquids.  -       Current Method of Nutrition regular textures;thin liquids  - regular textures;thin liquids  -       Precautions/Limitations, Vision WFL  - --       Precautions/Limitations, Hearing WFL  - --       Prior Level of Function-Communication [facility]  - --       Prior Level of Function-Swallowing safe, efficient swallowing in all situations  - safe, efficient swallowing in all situations  -       Plans/Goals Discussed with patient;agreed upon  - patient;spouse/S.O.;agreed upon  -       Barriers to Rehab none identified  - none identified  -       Patient's Goals for Discharge -- --  improved comfort and intake  -       Family Goals for Discharge -- other (see comments)  same as pt  -          Pain    Additional Documentation Pain Scale: FACES Pre/Post-Treatment (Group)  - --          Pain Scale: FACES Pre/Post-Treatment    Pain: FACES Scale, Pretreatment 0-->no hurt  - 2-->hurts little bit  -       Posttreatment Pain Rating 0-->no hurt  -MP 2-->hurts little bit  -       Pain Location -- incisional  -       Pain Location - -- chest  -       Pre/Posttreatment Pain Comment -- Increased pain with swallow  -          Clinical Swallow Eval    Esophageal Phase -- suspected esophageal impairment  -       Clinical Swallow Evaluation Summary -- Significant pain/discomfort with swallow. Appears as passing through esophagus. Tested thin and nectar-thick liquids, no " signifiant difference. Delayed coughing with ? of esophageal retrograde, pt did not sense any material come to throat. DNT solids per pt preference out of fear of pain. Reports pain/discomfort occurring with all bites/sips.  -SM          MBS/VFSS    Utensils Used spoon;cup;straw  -MP --       Consistencies Trialed thin liquids;pudding thick;regular textures  -MP --          MBS/VFSS Interpretation    Oral Prep Phase WFL  -MP --       Oral Transit Phase WFL  -MP --       Oral Residue WFL  -MP --       VFSS Summary Mild pharyngeal dysphagia. Penetration before & aspiration during w/ large bolus of thin liquid. Immediate cough response effective @ clearing subglottic material. Prevented w/ single cup/straw sips. No penetration/aspiration w/ pudding/solid. No significant pharyngeal residue. Per Radiology PA, limited upper GI unremarkable. Rec regular diet, thin liquids, single sips, meds in pudding/puree. Pt may benefit from further GI w/u given cont'd complaints of pain in chest region when swallowing.  -MP --          Initiation of Pharyngeal Swallow    Initiation of Pharyngeal Swallow bolus in pyriform sinuses  -MP --       Pharyngeal Phase impaired pharyngeal phase of swallowing  -MP --       Penetration Before the Swallow thin liquids;secondary to delayed swallow initiation or mistiming  -MP --       Aspiration During the Swallow thin liquids;secondary to delayed swallow initiation or mistiming;secondary to reduced vestibular closure  -MP --       Response to Aspiration Yes  -MP --       Responded to aspiration with cough;effective  -MP --       Attempted Compensatory Maneuvers bolus size;bolus presentation style  -MP --       Response to Attempted Compensatory Maneuvers prevented aspiration  -MP --          Esophageal Phase    Esophageal Phase no impairments;see radiology report for further details  -MP --          SLP Evaluation Clinical Impression    SLP Swallowing Diagnosis mild;pharyngeal dysphagia  -MP R/O  pharyngeal dysphagia;suspected esophageal dysphagia  -       Functional Impact risk of aspiration/pneumonia  - risk of malnutrition;risk of dehydration  -       Rehab Potential/Prognosis, Swallowing good, to achieve stated therapy goals  -MP --       Swallow Criteria for Skilled Therapeutic Interventions Met no problems identified which require skilled intervention  - --          Recommendations    Therapy Frequency (Swallow) 3 days per week  -MP --       Predicted Duration Therapy Intervention (Days) until discharge  -MP --       SLP Diet Recommendation regular textures;thin liquids  - regular textures;thin liquids;other (see comments)  food/liquids as most comfortable to pt  -       Recommended Diagnostics -- VFSS (MBS);other (see comments)  + limited UGI  -       Recommended Precautions and Strategies upright posture during/after eating;small bites of food and sips of liquid;general aspiration precautions;other (see comments)  single cup/straw sips  - upright posture during/after eating;small bites of food and sips of liquid;reflux precautions  -       Oral Care Recommendations Oral Care BID/PRN  - Oral Care BID/PRN  -       SLP Rec. for Method of Medication Administration meds whole;with puree;as tolerated  -MP as tolerated  -       Monitor for Signs of Aspiration notify SLP if any concerns  -MP --       Anticipated Discharge Disposition (SLP) anticipate therapy at next level of care  -MP --       Demonstrates Need for Referral to Another Service gastroenterology;other (see comments)  pt would likely still benefit from GI consult given cont'd complaints of pain in chest, specifically w/ solid foods  - --             User Key  (r) = Recorded By, (t) = Taken By, (c) = Cosigned By    Initials Name Effective Dates    Mee Paula MS CCC-SLP 02/03/23 -     Jean Dominguez MS CCC-SLP 12/28/21 -                 EDUCATION  The patient has been educated in the following  areas:   Dysphagia (Swallowing Impairment).        SLP GOALS     Row Name 03/13/23 1515             (LTG) Patient will demonstrate functional swallow for    Diet Texture (Demonstrate functional swallow) regular textures  -MP      Liquid viscosity (Demonstrate functional swallow) thin liquids  -MP      [place] (Demonstrate functional swallow) independently (over 90% accuracy)  -MP      Time Frame (Demonstrate functional swallow) by discharge  -MP         (STG) Patient will tolerate trials of    Consistencies Trialed (Tolerate trials) regular textures;thin liquids  -MP      Desired Outcome (Tolerate trials) without signs/symptoms of aspiration  -MP      [place] (Tolerate trials) independently (over 90% accuracy)  -MP      Time Frame (Tolerate trials) 1 week  -MP         (STG) Pharyngeal Strengthening Exercise Goal 1 (SLP)    Activity (Pharyngeal Strengthening Goal 1, SLP) increase timing;increase closure at entrance to airway/closure of airway at glottis  -MP      Increase Timing prepping - 3 second prep or suck swallow or 3-step swallow  -MP      Increase Closure at Entrance to Airway/Closure of Airway at Glottis supraglottic swallow;hard effortful swallow  -MP      [place]/Accuracy (Pharyngeal Strengthening Goal 1, SLP) with minimal cues (75-90% accuracy)  -MP      Time Frame (Pharyngeal Strengthening Goal 1, SLP) short term goal (STG)  -MP         (STG) Swallow Compensatory Strategies Goal 1 (SLP)    Activity (Swallow Compensatory Strategies/Techniques Goal 1, SLP) small cup sips;small straw sips;during p.o. trials;during meal intake  -MP      [place]/Accuracy (Swallow Compensatory Strategies/Techniques Goal 1, SLP) with minimal cues (75-90% accuracy)  -MP      Time Frame (Swallow Compensatory Strategies/Techniques Goal 1, SLP) short term goal (STG)  -MP            User Key  (r) = Recorded By, (t) = Taken By, (c) = Cosigned By    Initials Name Provider Type    MP Jean Russo, MS  CCC-SLP Speech and Language Pathologist                   Time Calculation:    Time Calculation- SLP     Row Name 03/13/23 1607             Time Calculation- SLP    SLP Start Time 1510  -MP      SLP Received On 03/13/23  -MP         Untimed Charges    77440-WB Motion Fluoro Eval Swallow Minutes 70  -MP         Total Minutes    Untimed Charges Total Minutes 70  -MP       Total Minutes 70  -MP            User Key  (r) = Recorded By, (t) = Taken By, (c) = Cosigned By    Initials Name Provider Type     Jean Russo MS CCC-SLP Speech and Language Pathologist                Therapy Charges for Today     Code Description Service Date Service Provider Modifiers Qty    [id number]  ST MOTION FLUORO EVAL SWALLOW 5 3/13/2023 Jean Russo MS CCC-SLP GN 1               MS BETZY Swanson  3/13/2023

## 2023-03-13 NOTE — PLAN OF CARE
HD completed. Tolerated well. UF goal reached. Blood returned. Report given to primary RN.   Problem: Device-Related Complication Risk (Hemodialysis)  Goal: Safe, Effective Therapy Delivery  Outcome: Ongoing, Progressing  Intervention: Optimize Device Care and Function  Description: Maintain flow rate, anticoagulation parameters, pressure ranges and prescribed fluid balance with gradual adjustments to maintain hemodynamic stability and achieve therapy goals.  Monitor laboratory results (e.g., electrolytes, glucose, albumin, coagulation studies, hemoglobin, hematocrit) and clinical status (e.g., cramping; nausea, vomiting, blood pressure fluctuations) for response to therapy; advocate for treatment or adju  Assess vascular access site for patency, securement and position to meet flow demands. Assess perfusion distal to access site to ensure adequate tissue oxygenation.  For arteriovenous fistula, assess presence of thrill to ensure presence of blood flow. Avoid blood pressure readings, lab draws, tight clothing or jewelry on the AV (arteriovenous) fistula extremity to prevent trauma.  Maintain circuit monitoring (e.g., arterial, venous and transmembrane pressure; ultrafiltrate removal; dialysate flow, conductivity and temperature); address alarms promptly to decrease risk to patient and preserve circuit function.  Evaluate circuit for disconnections, cracks, clotting, malfunction, leaks or rupture of hemofilter; intervene promptly to prevent risk to patient.  Consider distal vascular access for antibiotic or vasoactive medication administration to prevent filtration of medication effect prior to being delivered systemically to the patient.  Maintain infusion of anticoagulation; adjust to keep laboratory values within ordered range.  Provide emergency equipment, such as clamps, replacement devices and resuscitative supplies, if malfunction, tubing rupture, clot formation or migration occur.  Recent Flowsheet  Documentation  Taken 3/13/2023 0745 by Cj Morin, RN  Medication Review/Management: medications reviewed     Problem: Hemodynamic Instability (Hemodialysis)  Goal: Effective Tissue Perfusion  Outcome: Ongoing, Progressing     Problem: Infection (Hemodialysis)  Goal: Absence of Infection Signs and Symptoms  Outcome: Ongoing, Progressing   Goal Outcome Evaluation:

## 2023-03-13 NOTE — SIGNIFICANT NOTE
03/13/23 1411   SLP Deferred Reason   SLP Deferred Reason   (Pt still awaiting IP/procedure. Will have to push MBS + limited UGI (esophageal scan) to tomorrow, 3/14. Discussed with RN. Thanks)

## 2023-03-13 NOTE — PLAN OF CARE
Goal Outcome Evaluation:  Plan of Care Reviewed With: patient            SLP evaluation completed. Will continue to address dysphagia. Please see note for further details and recommendations.

## 2023-03-13 NOTE — PLAN OF CARE
Goal Outcome Evaluation:  Plan of Care Reviewed With: patient        Progress: improving  Outcome Evaluation: Pt continues to demo decreased balance and decreased endurance with activity. Pt ambulated 130ft with min A and BUE support. Pt limited by fatigue this session. Continue to progress per pt tolerance. Continue to recommend D/C to IRF.

## 2023-03-13 NOTE — PROGRESS NOTES
CTS Progress Note       LOS: 12 days   Patient Care Team:  Yessica Ricci DO as PCP - General (Family Medicine)  Nima Peter MD as Consulting Physician (Endocrinology)  Nadya Ramirez MD as Cardiologist (Cardiology)  Yessica Ricci DO as Consulting Physician (Family Medicine)    Chief Complaint: Coronary artery disease involving native coronary artery of native heart without angina pectoris    Vital Signs:  Temp:  [98 °F (36.7 °C)-99 °F (37.2 °C)] 98 °F (36.7 °C)  Heart Rate:  [64-77] 70  Resp:  [16-20] 16  BP: (105-130)/(48-68) 109/56    Physical Exam:       Results:   Results from last 7 days   Lab Units 03/13/23  0356   WBC 10*3/mm3 13.66*   HEMOGLOBIN g/dL 6.9*   HEMATOCRIT % 21.9*   PLATELETS 10*3/mm3 132*     Results from last 7 days   Lab Units 03/13/23  0356   SODIUM mmol/L 131*   POTASSIUM mmol/L 4.8   CHLORIDE mmol/L 98   CO2 mmol/L 21.0*   BUN mg/dL 42*   CREATININE mg/dL 5.82*   GLUCOSE mg/dL 121*   CALCIUM mg/dL 7.5*           Imaging Results (Last 24 Hours)     ** No results found for the last 24 hours. **          Assessment      Coronary artery disease involving native coronary artery of native heart without angina pectoris    Hypertension    Hyperlipidemia LDL goal <70    Type 2 diabetes mellitus with hypoglycemia, with long-term current use of insulin (HCC)    Ischemic cardiomyopathy    Mucopurulent chronic bronchitis (HCC)    Prolonged Q-T interval on ECG    Acute renal failure (ARF) (HCC)    Shock (HCC)    Acute respiratory failure with hypoxia (HCC)    Going to the operating room today for dialysis catheter.  So far has tolerated hemodialysis.  Leave chest tube in place until tomorrow as patient is returning to the operating room    Plan   As above    Please note that portions of this note were completed with a voice recognition program. Efforts were made to edit the dictations, but occasionally words are mistranscribed.    Paresh Saavedra MD  03/13/23  06:43 EDT

## 2023-03-13 NOTE — PROGRESS NOTES
Intensive Care Follow-up     Hospital:  LOS: 12 days   Mr. Herbert Harley, 67 y.o. male is followed for:   Coronary artery disease involving native coronary artery of native heart without angina pectoris   Post op respiratory, electrolyte and hemodynamic management       History of present illness:     67-year-old male with past medical history of tobacco abuse, COPD, coronary disease status post PCI in 2014, hypertension and diabetes.      He was having problem with unstable angina, his stress test was abnormal.  LHC revealed multivessel coronary disease.    Echocardiogram showed EF 41 to 45%, mild concentric left ventricular hypertrophy and RVSP of 35-45.    Preoperative carotid duplex did not show any significant carotid disease.    Preop PFTs were abnormal with mostly nonspecific defect and concerning for restrictive defect.  Patient underwent Coronary artery bypass graft surgery x3 by Dr. Saavedra on March 1.  Postop course complicated by worsening renal failure and persistent pressor requirements.  Patient was started on CRRT. Patient was started on midodrine and came off pressors.  Subsequently was switched over from CRRT to IHD on March 10.    Subjective   Interval History:    Uneventful night.  Still difficulty eating due to odynophagia.  MBS: Negative.    Temp  Min: 98 °F (36.7 °C)  Max: 99 °F (37.2 °C)        History     Last Reviewed by Jam Shah MD on 3/13/2023 at  7:07 PM    Sections Reviewed    Medical, Family, Surgical, Tobacco, Alcohol, Drug Use, Sexual Activity,   Social Documentation    Problem list reviewed by Jam Shah MD on 3/13/2023 at  7:06 PM  Medicines reviewed by Jam Shah MD on 3/13/2023 at  7:06 PM  Allergies reviewed by Jam Shah MD on 3/13/2023 at  7:06 PM        Objective       Vital Sign Min/Max for last 24 hours  Temp  Min: 98 °F (36.7 °C)  Max: 99 °F (37.2 °C)   BP  Min: 105/56  Max: 133/57   Pulse  Min: 64  Max: 88   Resp  Min: 16  Max: 20   SpO2   Min: 94 %  Max: 100 %   No data recorded       Input/Output for last 24 hour shift  03/12 0701 - 03/13 0700  In: 927.7 [P.O.:840; I.V.:37.7]  Out: 600 [Urine:200]          General Appearance:  Awake, alert, in no acute resp distress  Lungs:    B/L Breath sounds present with decreased breath sounds on bases, no wheezing heard, no crackles.  Heart:  S1 and S2 present, RRR.  Abdomen:  Soft, nontender.  Extremities:    (+) Edema  Neurologic:   Moving all four extremities. Good strength bilaterally.  Psychological:  Calm and cooperative    Results Reviewed:  Laboratory  Microbiology  Radiology  Pathology    Hematology:  Results from last 7 days   Lab Units 03/13/23  0356 03/12/23  1406 03/10/23  0050 03/09/23  0508 03/08/23  0422   WBC 10*3/mm3 13.66* 13.35*   < > 19.97* 20.25*   HEMOGLOBIN g/dL 6.9* 7.5*   < > 9.0* 8.9*   MCV fL 96.5 94.8   < > 91.7 89.7   PLATELETS 10*3/mm3 132* 117*   < > 85* 69*   NEUTROS ABS 10*3/mm3 9.79* 9.95*   < > 14.58* 16.40*   LYMPHS ABS 10*3/mm3  --   --   --  3.00 2.03   EOS ABS 10*3/mm3 0.29 0.18   < > 0.20 0.00    < > = values in this interval not displayed.       Chemistry:  Estimated Creatinine Clearance: 11.9 mL/min (A) (by C-G formula based on SCr of 5.82 mg/dL (H)).    Results from last 7 days   Lab Units 03/13/23 0356 03/12/23  0436   SODIUM mmol/L 131* 133*   POTASSIUM mmol/L 4.8 4.5   CHLORIDE mmol/L 98 100   CO2 mmol/L 21.0* 22.0   BUN mg/dL 42* 29*   CREATININE mg/dL 5.82* 3.80*   GLUCOSE mg/dL 121* 125*     Results from last 7 days   Lab Units 03/13/23  0356 03/12/23  0436 03/11/23  0420 03/10/23  1558 03/10/23  0737   IONIZED CALCIUM mmol/L  --   --   --  1.11* 1.14   CALCIUM mg/dL 7.5* 7.4* 7.4* 7.1* 7.1*   MAGNESIUM mg/dL 2.1  --  2.3 2.3 2.3   PHOSPHORUS mg/dL 6.2*  --  5.3* 4.2 4.6*     Results from last 7 days   Lab Units 03/13/23  0356 03/12/23  0436   BILIRUBIN mg/dL 0.3 0.4   AST (SGOT) U/L 39 51*   ALT (SGPT) U/L 29 31   ALK PHOS U/L 134* 137*        Biomarkers:  Results from last 7 days   Lab Units 03/10/23  0050 03/08/23  0806   PROCALCITONIN ng/mL 1.36* 1.55*     COVID-19  Lab Results   Component Value Date    COVID19 Not Detected 12/11/2022       Images:  No radiology results for the last day    Echo:  Results for orders placed during the hospital encounter of 03/01/23    Adult Transthoracic Echo Complete W/ Cont if Necessary Per Protocol    Interpretation Summary  •  Left ventricular systolic function is mildly decreased. Calculated left ventricular EF = 40.4% Left ventricular ejection fraction appears to be 41 - 45%.  •  The right ventricular cavity is dilated.  •  Left atrial volume is mildly increased.  •  There is calcification of the aortic valve.  •  Estimated right ventricular systolic pressure from tricuspid regurgitation is normal (<35 mmHg).  •  There is a left pleural effusion.      Results: Reviewed.  I reviewed the patient's new laboratory and imaging results.  I independently reviewed the patient's new images.    Medications: Reviewed.    Assessment & Plan   Impression        I25.10, CABG 03/01/23    Hypertension    Hyperlipidemia LDL goal <70    Type 2 diabetes mellitus with hypoglycemia, with long-term current use of insulin (HCC)    Ischemic cardiomyopathy    Mucopurulent chronic bronchitis (HCC)    Prolonged Q-T interval on ECG    Acute renal failure (ARF) (HCC)    Shock (HCC)    Acute respiratory failure with hypoxia (HCC)    H/O Asthma    Lab Results   Lab Value Date/Time    HGBA1C 7.40 (H) 02/28/2023 0835    HGBA1C 7.1 12/13/2022 1330    HGBA1C 7.1 06/16/2022 0944     Results from last 7 days   Lab Units 03/13/23  1616 03/13/23  1121 03/13/23  0716 03/12/23  2035 03/12/23  1610 03/12/23  1117 03/12/23  0728 03/11/23 2013   GLUCOSE mg/dL 131* 96 127 170* 157* 163* 126 168*      Diet: NPO Diet NPO Type: Strict NPO   Advance Directives: Code Status and Medical Interventions:   Ordered at: 03/01/23 1117     Code Status (Patient has  no pulse and is not breathing):    CPR (Attempt to Resuscitate)     Medical Interventions (Patient has pulse or is breathing):    Full Support     Release to patient:    Routine Release        In brief:  1. No improvement with Nistatin S/S. Fluconazole x 1 dose today.  1. May need further w/u including GI evaluation  2. Tunneled Dialysis access (probably tomorrow, by IR)  3. MBS done today: negative.  4. Probably remove CTs tomorrow.  5. Transfusion PRBC today  6. iHD per Renal  7. ABs: Piperacillin-Tazobactam   8. Goal: Glucose < 180 mg/dL.   1. Insulin: Basal, Prandial and Correction  9. Disposition: Keep in ICU.    Plan of care and goals reviewed during interdisciplinary rounds.  I discussed the patient's findings and my recommendations with patient and nursing staff    MDM:    Problem(s) High due to: Acute or Chronic illness or injury that may poses a threat to life or bodily function  Data: Moderate due to: Review or results of each unique test and Ordering of each unique test  Risk: High due to: drug(s) requiring intensive monitoring for toxicity    High      [] Primary Attending Intensive Care Medicine - Nutrition Support   [x] Consultant    Copied text in this note has been reviewed and is accurate as of 03/13/23

## 2023-03-13 NOTE — PROGRESS NOTES
"   LOS: 12 days    Patient Care Team:  Yessica Ricci DO as PCP - General (Family Medicine)  Nima Peter MD as Consulting Physician (Endocrinology)  Nadya Ramirez MD as Cardiologist (Cardiology)  Yessica Ricci DO as Consulting Physician (Family Medicine)    Chief Complaint: Acute kidney injury s/p CABG  67-year-old with history of hypertension, diabetes, former smoker 1-1/2 pack/day for quite some time, CAD s/p PCI in 2014, underwent CABG x3, post CABG remain hypotensive blood pressure in the 40s systolic in 60s and 70s 80s range requiring IV pressors.  Developed [place]NA oliguric, now an uric  Subjective     Interval History:   HD today tolerated well UF 2 liter/day      Review of Systems:   No new complaints.    Objective     Vital Sign Min/Max for last 24 hours  Temp  Min: 98 °F (36.7 °C)  Max: 99 °F (37.2 °C)   BP  Min: 106/82  Max: 133/57   Pulse  Min: 64  Max: 88   Resp  Min: 16  Max: 20   SpO2  Min: 94 %  Max: 100 %   No data recorded   No data recorded     Flowsheet Rows    Flowsheet Row First Filed Value   Admission Height 162.6 cm (64\") Documented at 03/01/2023 0611   Admission Weight 75.3 kg (166 lb) Documented at 03/01/2023 0611          I/O this shift:  In: 289 [Blood:289]  Out: 2020 [Other:2000; Chest Tube:20]  I/O last 3 completed shifts:  In: 1047.7 [P.O.:960; I.V.:37.7; IV Piggyback:50]  Out: 850 [Urine:250; Chest Tube:600]    Physical Exam:  General Appearance:  male awake alert oriented no obvious distress, sitting and eating at this time.  Eyes: PER, EOMI.  Neck: Supple no JVD.  Right-sided nontunneled catheter.  Left side old nontunneled catheter in place  Chest: Chest tube in place.  Lungs: Clear auscultation, no rales rhonchi's, equal chest movement, nonlabored.  Heart: No gallop, murmur, rub, RRR.  Abdomen: Soft, nontender, positive bowel sounds, no organomegaly.  Extremities: Positive bilateral lower extremity dependent edema, no cyanosis.  Neuro: No focal deficit, " moving all extremities, alert oriented X 3  : Solomon catheter in place.  Minimal urine output  Skin warm and dry.  Psych mood and affect appropriate  Nontunneled dialysis cath right IJ  WBC WBC   Date Value Ref Range Status   03/13/2023 13.66 (H) 3.40 - 10.80 10*3/mm3 Final   03/12/2023 13.35 (H) 3.40 - 10.80 10*3/mm3 Final   03/12/2023 12.09 (H) 3.40 - 10.80 10*3/mm3 Final   03/11/2023 13.37 (H) 3.40 - 10.80 10*3/mm3 Final   03/11/2023 11.88 (H) 3.40 - 10.80 10*3/mm3 Final      HGB Hemoglobin   Date Value Ref Range Status   03/13/2023 6.9 (C) 13.0 - 17.7 g/dL Final   03/12/2023 7.5 (L) 13.0 - 17.7 g/dL Final   03/12/2023 7.0 (L) 13.0 - 17.7 g/dL Final   03/11/2023 8.1 (L) 13.0 - 17.7 g/dL Final   03/11/2023 7.3 (L) 13.0 - 17.7 g/dL Final      HCT Hematocrit   Date Value Ref Range Status   03/13/2023 21.9 (L) 37.5 - 51.0 % Final   03/12/2023 23.7 (L) 37.5 - 51.0 % Final   03/12/2023 21.8 (L) 37.5 - 51.0 % Final   03/11/2023 25.2 (L) 37.5 - 51.0 % Final   03/11/2023 23.4 (L) 37.5 - 51.0 % Final      Platlets No results found for: LABPLAT   MCV MCV   Date Value Ref Range Status   03/13/2023 96.5 79.0 - 97.0 fL Final   03/12/2023 94.8 79.0 - 97.0 fL Final   03/12/2023 94.8 79.0 - 97.0 fL Final   03/11/2023 94.4 79.0 - 97.0 fL Final   03/11/2023 94.7 79.0 - 97.0 fL Final          Sodium Sodium   Date Value Ref Range Status   03/13/2023 131 (L) 136 - 145 mmol/L Final   03/12/2023 133 (L) 136 - 145 mmol/L Final   03/11/2023 134 (L) 136 - 145 mmol/L Final   03/10/2023 134 (L) 136 - 145 mmol/L Final      Potassium Potassium   Date Value Ref Range Status   03/13/2023 4.8 3.5 - 5.2 mmol/L Final   03/12/2023 4.5 3.5 - 5.2 mmol/L Final   03/11/2023 4.4 3.5 - 5.2 mmol/L Final   03/10/2023 4.3 3.5 - 5.2 mmol/L Final      Chloride Chloride   Date Value Ref Range Status   03/13/2023 98 98 - 107 mmol/L Final   03/12/2023 100 98 - 107 mmol/L Final   03/11/2023 103 98 - 107 mmol/L Final   03/10/2023 103 98 - 107 mmol/L Final      CO2  CO2   Date Value Ref Range Status   03/13/2023 21.0 (L) 22.0 - 29.0 mmol/L Final   03/12/2023 22.0 22.0 - 29.0 mmol/L Final   03/11/2023 23.0 22.0 - 29.0 mmol/L Final   03/10/2023 24.0 22.0 - 29.0 mmol/L Final      BUN BUN   Date Value Ref Range Status   03/13/2023 42 (H) 8 - 23 mg/dL Final   03/12/2023 29 (H) 8 - 23 mg/dL Final   03/11/2023 34 (H) 8 - 23 mg/dL Final   03/10/2023 29 (H) 8 - 23 mg/dL Final      Creatinine Creatinine   Date Value Ref Range Status   03/13/2023 5.82 (H) 0.76 - 1.27 mg/dL Final   03/12/2023 3.80 (H) 0.76 - 1.27 mg/dL Final   03/11/2023 3.39 (H) 0.76 - 1.27 mg/dL Final   03/10/2023 2.30 (H) 0.76 - 1.27 mg/dL Final      Calcium Calcium   Date Value Ref Range Status   03/13/2023 7.5 (L) 8.6 - 10.5 mg/dL Final   03/12/2023 7.4 (L) 8.6 - 10.5 mg/dL Final   03/11/2023 7.4 (L) 8.6 - 10.5 mg/dL Final   03/10/2023 7.1 (L) 8.6 - 10.5 mg/dL Final      PO4 No results found for: CAPO4   Albumin Albumin   Date Value Ref Range Status   03/13/2023 2.5 (L) 3.5 - 5.2 g/dL Final   03/12/2023 2.4 (L) 3.5 - 5.2 g/dL Final   03/11/2023 2.4 (L) 3.5 - 5.2 g/dL Final   03/10/2023 2.5 (L) 3.5 - 5.2 g/dL Final      Magnesium Magnesium   Date Value Ref Range Status   03/13/2023 2.1 1.6 - 2.4 mg/dL Final   03/11/2023 2.3 1.6 - 2.4 mg/dL Final   03/10/2023 2.3 1.6 - 2.4 mg/dL Final      Uric Acid No results found for: URICACID        Results Review:     I reviewed the patient's new clinical results.    amiodarone, 200 mg, Oral, Q12H  arformoterol, 15 mcg, Nebulization, BID - RT  aspirin, 325 mg, Oral, Daily  budesonide, 0.5 mg, Nebulization, BID - RT  guaiFENesin, 1,200 mg, Oral, Q12H  insulin detemir, 10 Units, Subcutaneous, Nightly  insulin lispro, 0-9 Units, Subcutaneous, 4x Daily With Meals & Nightly  Insulin Lispro, 2 Units, Subcutaneous, TID With Meals  ipratropium-albuterol, 3 mL, Nebulization, Q4H - RT  metoprolol tartrate, 12.5 mg, Oral, Q12H  midodrine, 10 mg, Oral, Q8H  nystatin, 5 mL, Swish & Swallow, 4x  Daily  [START ON 3/14/2023] pantoprazole, 40 mg, Oral, Q AM  pharmacy consult - MTM, , Does not apply, BID  piperacillin-tazobactam, 3.375 g, Intravenous, Q12H  rosuvastatin, 10 mg, Oral, Nightly  senna-docusate sodium, 2 tablet, Oral, BID      niCARdipine, 5-15 mg/hr  nitroglycerin, 5-200 mcg/min, Last Rate: Stopped (03/01/23 1431)  norepinephrine, 0.02-0.3 mcg/kg/min, Last Rate: Stopped (03/10/23 0638)        Medication Review: Reviewed    Assessment & Plan       Coronary artery disease involving native coronary artery of native heart without angina pectoris    Hypertension    Hyperlipidemia LDL goal <70    Type 2 diabetes mellitus with hypoglycemia, with long-term current use of insulin (HCC)    Ischemic cardiomyopathy    Mucopurulent chronic bronchitis (HCC)    Prolonged Q-T interval on ECG    Acute renal failure (ARF) (HCC)    Shock (HCC)    Acute respiratory failure with hypoxia (HCC)     1.  [place]: ATN likely secondary to shock with systolic blood pressure postsurgery in the range of 40s to 80s, now on multiple vasopressors.  Admit creatinine 1.08, creatinine progressively getting worse.  Patient is oliguric.  C3 complement 18, C4 complement 83 within normal range.  Ultrasound of the kidney shows right kidney 11 cm left kidney 10.6 cm normal ultrasound, bladder ultrasound normal.  2.  S/p CABG x3  3.  Ischemic cardiomyopathy with ejection fraction 41-45%.  4.  Diabetes type 2.  5.  Cardiogenic shock. Resolved.     Recommendations:  TDC in AM and HD afterwards  NPO after midnight  Bladder scan Q48hr.   Monitor for renal recovery   Manjit Gomez MD  03/13/23  14:07 EDT

## 2023-03-13 NOTE — PROGRESS NOTES
"[facility]  IP Progress Note    PROBLEM LIST:  CARDIAC     Coronary Artery Disease:   Inferior STEMI with [facility],3/28/2014:  EF 50%, NILE to the mid-dominant RCA, NILE to the proximal LAD   Stress test, 2/13/2023: Inferolateral ischemia,   [facility], 2/17/2023: Severe triple-vessel disease, %, RCA 80%, LAD 80%     Myocardium:   Echocardiogram, 3/29/2014:   EF 50-55%, RVSP 30.   Echo 12/21/2022:Stage C, HFmrEF EF 41- 45%, mild LVH     Valvular:   Mild calcification to aortic valve     Electrical:   NSR, LVH, PVC     Percardium:   Normal     VASCULAR:   Arterial   Cerebrovascular disease:   Carotid Doppler: 2/2023 less than 50 %         CARDIAC RISK FACTORS:          Hypertension          Diabetes          Dyslipidemia          Tobacco Use, Quit after 75 years     NON-CARDIAC:   Asthma/COPD: Abnormal PFTs     SURGERIES:   Tonsillectomy       HOSPITAL COURSE:  Patient has successful revascularization performed that was complicated with acute kidney injury      CHIEF COMPLAINTS:  CAD s/p CABG x3, postop atrial fibrillation      Subjective   Awake and alert.  Currently on dialysis.  Having difficulty eating and even swallowing liquids.  Says that it is painful when it gets down just below his lower esophagus/stomach.    Objective     Blood pressure 106/82, pulse 70, temperature 98.7 °F (37.1 °C), temperature source Oral, resp. rate 16, height 162.6 cm (64\"), weight 81.9 kg (180 lb 8.9 oz), SpO2 98 %.     Intake/Output Summary (Last 24 hours) at 3/13/2023 0802  Last data filed at 3/13/2023 0600  Gross per 24 hour   Intake 807.7 ml   Output 600 ml   Net 207.7 ml       PHYSICAL EXAM:    General: Alert and oriented   Cardiovascular: Heart has a nondisplaced focal PMI. Regular rate and rhythm without murmur, gallop or rub.  Lungs: Clear without rales or wheezes. Equal expansion is noted.   Extremities: Show no edema.  Skin: warm and dry.  Neurologic: nonfocal    RESULR REVIEW:    I reviewed " the patient's new clinical results.      MEDICATIONS:    amiodarone, 200 mg, Oral, Q12H  arformoterol, 15 mcg, Nebulization, BID - RT  aspirin, 325 mg, Oral, Daily  budesonide, 0.5 mg, Nebulization, BID - RT  guaiFENesin, 1,200 mg, Oral, Q12H  insulin detemir, 10 Units, Subcutaneous, Nightly  insulin lispro, 0-9 Units, Subcutaneous, 4x Daily With Meals & Nightly  Insulin Lispro, 2 Units, Subcutaneous, TID With Meals  ipratropium-albuterol, 3 mL, Nebulization, Q4H - RT  metoprolol tartrate, 12.5 mg, Oral, Q12H  midodrine, 10 mg, Oral, Q8H  nystatin, 5 mL, Swish & Swallow, 4x Daily  pantoprazole, 40 mg, Intravenous, BID AC  pharmacy consult - MTM, , Does not apply, BID  piperacillin-tazobactam, 3.375 g, Intravenous, Q12H  rosuvastatin, 10 mg, Oral, Nightly  senna-docusate sodium, 2 tablet, Oral, BID          Results from last 7 days   Lab Units 03/13/23  0356   WBC 10*3/mm3 13.66*   HEMOGLOBIN g/dL 6.9*   HEMATOCRIT % 21.9*   PLATELETS 10*3/mm3 132*     Results from last 7 days   Lab Units 03/13/23  0356   SODIUM mmol/L 131*   POTASSIUM mmol/L 4.8   CHLORIDE mmol/L 98   CO2 mmol/L 21.0*   BUN mg/dL 42*   CREATININE mg/dL 5.82*   CALCIUM mg/dL 7.5*   BILIRUBIN mg/dL 0.3   ALK PHOS U/L 134*   ALT (SGPT) U/L 29   AST (SGOT) U/L 39   GLUCOSE mg/dL 121*         Lab Results   Component Value Date    TROPONINI 15.31 (C) 03/30/2014    TROPONINT <0.010 12/11/2022                 No results found for: IRON, FERRITIN, LABIRON, TIBC   Hemoglobin A1C   Date Value Ref Range Status   02/28/2023 7.40 (H) 4.80 - 5.60 % Final     Magnesium   Date Value Ref Range Status   03/13/2023 2.1 1.6 - 2.4 mg/dL Final        Tele: Sinus Rythym      ASSESSMENT:      1. CAD s/p CABG x3, preop EF 40-45%  2. Postop atrial fibrillation  3. Ischemic cardiomyopathy  4. History of hypertension with recent hypotension on dobutamine and Levophed.  5. Prolonged QT, improving  6. Hyperlipidemia, on Crestor 10 at home  7. Acute on chronic kidney failure, on  HD, started 3/11  8. Type 2 diabetes  9. Painful swallowing              PLAN:      1. Continue amiodarone 200 mg twice daily for A-fib.  2. Check QTc today  3. Continue aspirin and Crestor for CAD  4. Continue metoprolol tartrate 12.5 mg twice daily for rate control.  We will transition to bisoprolol or metoprolol succinate in the future given low EF  5. No ACE/ARB/ARN I secondary to renal insufficiency/failure  6. Continue midodrine 10 mg 3 times daily for hypotension  7. Chest tubes per CT surgery.   8. Sounds like upper endoscopy is planned for today to evaluate swallowing difficulties..        Electronically signed by Rosangela Queen PA-C, 03/13/23, 8:26 AM EDT.    I have seen and examined the patient, case was discussed with the physician extender, reviewed the above note, necessary changes were made and I agree with the final note.  Adenike Herron MD, FACC

## 2023-03-14 ENCOUNTER — APPOINTMENT (OUTPATIENT)
Dept: INTERVENTIONAL RADIOLOGY/VASCULAR | Facility: HOSPITAL | Age: 68
DRG: 235 | End: 2023-03-14
Payer: MEDICARE

## 2023-03-14 LAB
ALBUMIN SERPL-MCNC: 2.8 G/DL (ref 3.5–5.2)
ALBUMIN/GLOB SERPL: 1.1 G/DL
ALP SERPL-CCNC: 161 U/L (ref 39–117)
ALT SERPL W P-5'-P-CCNC: 33 U/L (ref 1–41)
ANION GAP SERPL CALCULATED.3IONS-SCNC: 16 MMOL/L (ref 5–15)
AST SERPL-CCNC: 42 U/L (ref 1–40)
BASOPHILS # BLD AUTO: 0.05 10*3/MM3 (ref 0–0.2)
BASOPHILS NFR BLD AUTO: 0.4 % (ref 0–1.5)
BH BB BLOOD EXPIRATION DATE: NORMAL
BH BB BLOOD TYPE BARCODE: 5100
BH BB DISPENSE STATUS: NORMAL
BH BB PRODUCT CODE: NORMAL
BH BB UNIT NUMBER: NORMAL
BILIRUB SERPL-MCNC: 0.4 MG/DL (ref 0–1.2)
BUN SERPL-MCNC: 31 MG/DL (ref 8–23)
BUN/CREAT SERPL: 7.3 (ref 7–25)
CALCIUM SPEC-SCNC: 7.8 MG/DL (ref 8.6–10.5)
CHLORIDE SERPL-SCNC: 97 MMOL/L (ref 98–107)
CO2 SERPL-SCNC: 19 MMOL/L (ref 22–29)
CREAT SERPL-MCNC: 4.24 MG/DL (ref 0.76–1.27)
CROSSMATCH INTERPRETATION: NORMAL
DEPRECATED RDW RBC AUTO: 63.7 FL (ref 37–54)
EGFRCR SERPLBLD CKD-EPI 2021: 14.6 ML/MIN/1.73
EOSINOPHIL # BLD AUTO: 0.28 10*3/MM3 (ref 0–0.4)
EOSINOPHIL NFR BLD AUTO: 2 % (ref 0.3–6.2)
ERYTHROCYTE [DISTWIDTH] IN BLOOD BY AUTOMATED COUNT: 19.4 % (ref 12.3–15.4)
GLOBULIN UR ELPH-MCNC: 2.5 GM/DL
GLUCOSE BLDC GLUCOMTR-MCNC: 123 MG/DL (ref 70–130)
GLUCOSE BLDC GLUCOMTR-MCNC: 136 MG/DL (ref 70–130)
GLUCOSE BLDC GLUCOMTR-MCNC: 164 MG/DL (ref 70–130)
GLUCOSE BLDC GLUCOMTR-MCNC: 169 MG/DL (ref 70–130)
GLUCOSE SERPL-MCNC: 122 MG/DL (ref 65–99)
HCT VFR BLD AUTO: 29.5 % (ref 37.5–51)
HGB BLD-MCNC: 9.2 G/DL (ref 13–17.7)
IMM GRANULOCYTES # BLD AUTO: 0.11 10*3/MM3 (ref 0–0.05)
IMM GRANULOCYTES NFR BLD AUTO: 0.8 % (ref 0–0.5)
LYMPHOCYTES # BLD AUTO: 1.34 10*3/MM3 (ref 0.7–3.1)
LYMPHOCYTES NFR BLD AUTO: 9.7 % (ref 19.6–45.3)
MCH RBC QN AUTO: 29.1 PG (ref 26.6–33)
MCHC RBC AUTO-ENTMCNC: 31.2 G/DL (ref 31.5–35.7)
MCV RBC AUTO: 93.4 FL (ref 79–97)
MONOCYTES # BLD AUTO: 2.01 10*3/MM3 (ref 0.1–0.9)
MONOCYTES NFR BLD AUTO: 14.6 % (ref 5–12)
NEUTROPHILS NFR BLD AUTO: 72.5 % (ref 42.7–76)
NEUTROPHILS NFR BLD AUTO: 9.98 10*3/MM3 (ref 1.7–7)
NRBC BLD AUTO-RTO: 0 /100 WBC (ref 0–0.2)
PLATELET # BLD AUTO: 187 10*3/MM3 (ref 140–450)
PMV BLD AUTO: 10.7 FL (ref 6–12)
POTASSIUM SERPL-SCNC: 4.9 MMOL/L (ref 3.5–5.2)
PROCALCITONIN SERPL-MCNC: 0.61 NG/ML (ref 0–0.25)
PROT SERPL-MCNC: 5.3 G/DL (ref 6–8.5)
RBC # BLD AUTO: 3.16 10*6/MM3 (ref 4.14–5.8)
SODIUM SERPL-SCNC: 132 MMOL/L (ref 136–145)
UNIT  ABO: NORMAL
UNIT  RH: NORMAL
WBC NRBC COR # BLD: 13.77 10*3/MM3 (ref 3.4–10.8)

## 2023-03-14 PROCEDURE — 99233 SBSQ HOSP IP/OBS HIGH 50: CPT | Performed by: INTERNAL MEDICINE

## 2023-03-14 PROCEDURE — 99152 MOD SED SAME PHYS/QHP 5/>YRS: CPT

## 2023-03-14 PROCEDURE — C1750 CATH, HEMODIALYSIS,LONG-TERM: HCPCS

## 2023-03-14 PROCEDURE — 85025 COMPLETE CBC W/AUTO DIFF WBC: CPT | Performed by: INTERNAL MEDICINE

## 2023-03-14 PROCEDURE — 99024 POSTOP FOLLOW-UP VISIT: CPT | Performed by: THORACIC SURGERY (CARDIOTHORACIC VASCULAR SURGERY)

## 2023-03-14 PROCEDURE — 77001 FLUOROGUIDE FOR VEIN DEVICE: CPT

## 2023-03-14 PROCEDURE — 36558 INSERT TUNNELED CV CATH: CPT

## 2023-03-14 PROCEDURE — 25010000002 PIPERACILLIN SOD-TAZOBACTAM PER 1 G

## 2023-03-14 PROCEDURE — 76937 US GUIDE VASCULAR ACCESS: CPT

## 2023-03-14 PROCEDURE — 63710000001 INSULIN DETEMIR PER 5 UNITS: Performed by: INTERNAL MEDICINE

## 2023-03-14 PROCEDURE — 94664 DEMO&/EVAL PT USE INHALER: CPT

## 2023-03-14 PROCEDURE — 82962 GLUCOSE BLOOD TEST: CPT

## 2023-03-14 PROCEDURE — 84145 PROCALCITONIN (PCT): CPT | Performed by: INTERNAL MEDICINE

## 2023-03-14 PROCEDURE — 99221 1ST HOSP IP/OBS SF/LOW 40: CPT | Performed by: INTERNAL MEDICINE

## 2023-03-14 PROCEDURE — 80053 COMPREHEN METABOLIC PANEL: CPT | Performed by: INTERNAL MEDICINE

## 2023-03-14 PROCEDURE — 25010000002 FENTANYL CITRATE (PF) 50 MCG/ML SOLUTION: Performed by: RADIOLOGY

## 2023-03-14 PROCEDURE — C1894 INTRO/SHEATH, NON-LASER: HCPCS

## 2023-03-14 PROCEDURE — 99232 SBSQ HOSP IP/OBS MODERATE 35: CPT | Performed by: INTERNAL MEDICINE

## 2023-03-14 PROCEDURE — 94799 UNLISTED PULMONARY SVC/PX: CPT

## 2023-03-14 PROCEDURE — 25010000002 MIDAZOLAM PER 1 MG: Performed by: RADIOLOGY

## 2023-03-14 PROCEDURE — 63710000001 INSULIN LISPRO (HUMAN) PER 5 UNITS: Performed by: INTERNAL MEDICINE

## 2023-03-14 PROCEDURE — 94761 N-INVAS EAR/PLS OXIMETRY MLT: CPT

## 2023-03-14 PROCEDURE — 97530 THERAPEUTIC ACTIVITIES: CPT

## 2023-03-14 RX ORDER — FLUCONAZOLE 100 MG/1
50 TABLET ORAL ONCE
Status: COMPLETED | OUTPATIENT
Start: 2023-03-14 | End: 2023-03-14

## 2023-03-14 RX ORDER — HEPARIN SODIUM 200 [USP'U]/100ML
INJECTION, SOLUTION INTRAVENOUS
Status: DISPENSED
Start: 2023-03-14 | End: 2023-03-15

## 2023-03-14 RX ORDER — MIDAZOLAM HYDROCHLORIDE 1 MG/ML
INJECTION INTRAMUSCULAR; INTRAVENOUS
Status: DISPENSED
Start: 2023-03-14 | End: 2023-03-15

## 2023-03-14 RX ORDER — FENTANYL CITRATE 50 UG/ML
INJECTION, SOLUTION INTRAMUSCULAR; INTRAVENOUS
Status: DISPENSED
Start: 2023-03-14 | End: 2023-03-15

## 2023-03-14 RX ORDER — HEPARIN SODIUM 1000 [USP'U]/ML
INJECTION, SOLUTION INTRAVENOUS; SUBCUTANEOUS
Status: DISPENSED
Start: 2023-03-14 | End: 2023-03-15

## 2023-03-14 RX ORDER — METOPROLOL SUCCINATE 25 MG/1
25 TABLET, EXTENDED RELEASE ORAL
Status: DISCONTINUED | OUTPATIENT
Start: 2023-03-15 | End: 2023-03-20 | Stop reason: HOSPADM

## 2023-03-14 RX ORDER — MIDAZOLAM HYDROCHLORIDE 1 MG/ML
INJECTION INTRAMUSCULAR; INTRAVENOUS AS NEEDED
Status: COMPLETED | OUTPATIENT
Start: 2023-03-14 | End: 2023-03-14

## 2023-03-14 RX ORDER — FENTANYL CITRATE 50 UG/ML
INJECTION, SOLUTION INTRAMUSCULAR; INTRAVENOUS AS NEEDED
Status: COMPLETED | OUTPATIENT
Start: 2023-03-14 | End: 2023-03-14

## 2023-03-14 RX ORDER — LIDOCAINE HYDROCHLORIDE 10 MG/ML
INJECTION, SOLUTION EPIDURAL; INFILTRATION; INTRACAUDAL; PERINEURAL
Status: COMPLETED
Start: 2023-03-14 | End: 2023-03-14

## 2023-03-14 RX ADMIN — SENNOSIDES AND DOCUSATE SODIUM 2 TABLET: 50; 8.6 TABLET ORAL at 21:38

## 2023-03-14 RX ADMIN — ROSUVASTATIN CALCIUM 10 MG: 10 TABLET, FILM COATED ORAL at 21:38

## 2023-03-14 RX ADMIN — MIDAZOLAM HYDROCHLORIDE 1 MG: 1 INJECTION, SOLUTION INTRAMUSCULAR; INTRAVENOUS at 13:50

## 2023-03-14 RX ADMIN — LIDOCAINE HYDROCHLORIDE 2 ML: 10; .005 INJECTION, SOLUTION EPIDURAL; INFILTRATION; INTRACAUDAL; PERINEURAL at 14:02

## 2023-03-14 RX ADMIN — ARFORMOTEROL TARTRATE 15 MCG: 15 SOLUTION RESPIRATORY (INHALATION) at 08:40

## 2023-03-14 RX ADMIN — SENNOSIDES AND DOCUSATE SODIUM 2 TABLET: 50; 8.6 TABLET ORAL at 08:06

## 2023-03-14 RX ADMIN — MIDODRINE HYDROCHLORIDE 10 MG: 10 TABLET ORAL at 04:44

## 2023-03-14 RX ADMIN — INSULIN LISPRO 2 UNITS: 100 INJECTION, SOLUTION INTRAVENOUS; SUBCUTANEOUS at 18:52

## 2023-03-14 RX ADMIN — TAZOBACTAM SODIUM AND PIPERACILLIN SODIUM 3.38 G: 375; 3 INJECTION, SOLUTION INTRAVENOUS at 21:42

## 2023-03-14 RX ADMIN — INSULIN DETEMIR 10 UNITS: 100 INJECTION, SOLUTION SUBCUTANEOUS at 21:38

## 2023-03-14 RX ADMIN — TAZOBACTAM SODIUM AND PIPERACILLIN SODIUM 3.38 G: 375; 3 INJECTION, SOLUTION INTRAVENOUS at 08:14

## 2023-03-14 RX ADMIN — INSULIN LISPRO 2 UNITS: 100 INJECTION, SOLUTION INTRAVENOUS; SUBCUTANEOUS at 21:38

## 2023-03-14 RX ADMIN — LIDOCAINE HYDROCHLORIDE 5 ML: 10 INJECTION, SOLUTION EPIDURAL; INFILTRATION; INTRACAUDAL; PERINEURAL at 14:02

## 2023-03-14 RX ADMIN — Medication 12.5 MG: at 21:38

## 2023-03-14 RX ADMIN — Medication 12.5 MG: at 08:06

## 2023-03-14 RX ADMIN — FENTANYL CITRATE 50 MCG: 50 INJECTION, SOLUTION INTRAMUSCULAR; INTRAVENOUS at 13:50

## 2023-03-14 RX ADMIN — FLUCONAZOLE 50 MG: 100 TABLET ORAL at 14:49

## 2023-03-14 RX ADMIN — IPRATROPIUM BROMIDE AND ALBUTEROL SULFATE 3 ML: 2.5; .5 SOLUTION RESPIRATORY (INHALATION) at 08:40

## 2023-03-14 RX ADMIN — MIDAZOLAM HYDROCHLORIDE 0.5 MG: 1 INJECTION, SOLUTION INTRAMUSCULAR; INTRAVENOUS at 13:54

## 2023-03-14 RX ADMIN — MIDODRINE HYDROCHLORIDE 10 MG: 10 TABLET ORAL at 11:47

## 2023-03-14 RX ADMIN — PANTOPRAZOLE SODIUM 40 MG: 40 TABLET, DELAYED RELEASE ORAL at 05:15

## 2023-03-14 RX ADMIN — ASPIRIN 325 MG: 325 TABLET, COATED ORAL at 08:06

## 2023-03-14 RX ADMIN — INSULIN LISPRO 2 UNITS: 100 INJECTION, SOLUTION INTRAVENOUS; SUBCUTANEOUS at 11:48

## 2023-03-14 RX ADMIN — AMIODARONE HYDROCHLORIDE 200 MG: 200 TABLET ORAL at 21:38

## 2023-03-14 RX ADMIN — AMIODARONE HYDROCHLORIDE 200 MG: 200 TABLET ORAL at 08:06

## 2023-03-14 NOTE — PLAN OF CARE
Goal Outcome Evaluation:  Plan of Care Reviewed With: patient           Outcome Evaluation:   Pt remains alert and oriented x4. Ambulated 180ft- tolerated well.   NPO in preparation for tunneled HD catheter. VSS. CT output: 16mL. PT up in chair. AM labs drawn, awaiting results.

## 2023-03-14 NOTE — PROGRESS NOTES
"[facility] at [facility]  IP Progress Note    PROBLEM LIST:  CARDIAC     Coronary Artery Disease:   Inferior STEMI with [facility],3/28/2014:  EF 50%, NILE to the mid-dominant RCA, NILE to the proximal LAD   Stress test, 2/13/2023: Inferolateral ischemia,   [facility], 2/17/2023: Severe triple-vessel disease, %, RCA 80%, LAD 80%     Myocardium:   Echocardiogram, 3/29/2014:   EF 50-55%, RVSP 30.   Echo 12/21/2022:Stage C, HFmrEF EF 41- 45%, mild LVH     Valvular:   Mild calcification to aortic valve     Electrical:   NSR, LVH, PVC     Percardium:   Normal     VASCULAR:   Arterial   Cerebrovascular disease:   Carotid Doppler: 2/2023 less than 50 %     CARDIAC RISK FACTORS:          Hypertension          Diabetes          Dyslipidemia          Tobacco Use, Quit after 75 years     NON-CARDIAC:   Asthma/COPD: Abnormal PFTs     SURGERIES:   Tonsillectomy       HOSPITAL COURSE:  Patient has successful revascularization performed that was complicated with acute kidney injury.  Patient did have brief episode of postop atrial fibrillation which was abated with amiodarone.  Patient currently on hemodialysis.      CHIEF COMPLAINTS:  CAD s/p CABG x3, postop atrial fibrillation      Subjective   Patient sitting up in chair.  States swallowing is improved.  Plan for permanent hemodialysis catheter today and patient states that he was told someone would take a look at his esophagus as well.  Objective     Blood pressure 134/56, pulse 71, temperature 98.8 °F (37.1 °C), temperature source Oral, resp. rate 22, height 162.6 cm (64\"), weight 81.9 kg (180 lb 8.9 oz), SpO2 95 %.     Intake/Output Summary (Last 24 hours) at 3/14/2023 0811  Last data filed at 3/14/2023 0400  Gross per 24 hour   Intake 839 ml   Output 2076 ml   Net -1237 ml       PHYSICAL EXAM:    General: Alert and oriented   Cardiovascular: Heart has a nondisplaced focal PMI. Regular rate and rhythm without murmur, gallop or rub.  1 chest tube still in " place.  Lungs: Clear without rales or wheezes. Equal expansion is noted.   Extremities: Trace bilateral lower extremity edema  Skin: warm and dry.  Neurologic: nonfocal    RESULR REVIEW:    I reviewed the patient's new clinical results.      MEDICATIONS:    amiodarone, 200 mg, Oral, Q12H  arformoterol, 15 mcg, Nebulization, BID - RT  aspirin, 325 mg, Oral, Daily  insulin detemir, 10 Units, Subcutaneous, Nightly  insulin lispro, 0-9 Units, Subcutaneous, 4x Daily With Meals & Nightly  Insulin Lispro, 2 Units, Subcutaneous, TID With Meals  ipratropium-albuterol, 3 mL, Nebulization, 4x Daily - RT  metoprolol tartrate, 12.5 mg, Oral, Q12H  midodrine, 10 mg, Oral, Q8H  pantoprazole, 40 mg, Oral, Q AM  pharmacy consult - MTM, , Does not apply, BID  piperacillin-tazobactam, 3.375 g, Intravenous, Q12H  rosuvastatin, 10 mg, Oral, Nightly  senna-docusate sodium, 2 tablet, Oral, BID          Results from last 7 days   Lab Units 03/14/23  0454   WBC 10*3/mm3 13.77*   HEMOGLOBIN g/dL 9.2*   HEMATOCRIT % 29.5*   PLATELETS 10*3/mm3 187     Results from last 7 days   Lab Units 03/14/23  0454   SODIUM mmol/L 132*   POTASSIUM mmol/L 4.9   CHLORIDE mmol/L 97*   CO2 mmol/L 19.0*   BUN mg/dL 31*   CREATININE mg/dL 4.24*   CALCIUM mg/dL 7.8*   BILIRUBIN mg/dL 0.4   ALK PHOS U/L 161*   ALT (SGPT) U/L 33   AST (SGOT) U/L 42*   GLUCOSE mg/dL 122*         Lab Results   Component Value Date    TROPONINI 15.31 (C) 03/30/2014    TROPONINT <0.010 12/11/2022                 No results found for: IRON, FERRITIN, LABIRON, TIBC   Hemoglobin A1C   Date Value Ref Range Status   02/28/2023 7.40 (H) 4.80 - 5.60 % Final     Magnesium   Date Value Ref Range Status   03/13/2023 2.1 1.6 - 2.4 mg/dL Final        Tele: Sinus Rythym heart rate in the 70s    EKG: 3/13/2023, normal sinus rhythm, QTc 448      ASSESSMENT:      1. CAD s/p CABG x3, preop EF 40-45%  2. Postop atrial fibrillation, currently in normal sinus rhythm  3. Ischemic  "cardiomyopathy  4. Hypertension, recent hypotension, currently on midodrine  5. Prolonged QT, improving  6. Hyperlipidemia, on Crestor 10 at home  7. Acute on chronic kidney failure, on HD, started 3/11  8. Type 2 diabetes  9. Painful swallowing  10. Acute anemia, received 1 unit PRBC 3/13              PLAN:      1. Continue amiodarone 200 mg twice daily for A-fib, will transition to 200 mg once a day likely after hemodialysis tomorrow.  2. Continue aspirin and Crestor for CAD  3. Continue metoprolol tartrate 12.5 mg twice daily for rate control.  With improvement to blood pressure will transition to metoprolol succinate 25 mg tomorrow  4. No ACE/ARB/ARN I secondary to renal insufficiency/failure  5. Continue midodrine 10 mg 3 times daily for hypotension, may start to decrease dose given patient's improvement of blood pressure.  6. Chest tubes per CT surgery.   7. Plan for permanent hemodialysis catheter placement.  Patient also states plan for possible EGD today as well.      Electronically signed by Rosangela Queen PA-C, 03/14/23, 8:19 AM EDT.    ___________________________________________________________  The patient was seen and examined by me.  Agree and verified/discussed key components of E/M as outlined by the Nurse practitioner/PA.    /53   Pulse 60   Temp 97.5 °F (36.4 °C) (Oral)   Resp 13   Ht 162.6 cm (64\")   Wt 81.9 kg (180 lb 8.9 oz)   SpO2 100%   BMI 30.99 kg/m²     General Appearance:   · well developed  · well nourished  Neck:  · thyroid not enlarged  · supple  Respiratory:  · no respiratory distress  · normal breath sounds  · no rales  Cardiovascular:  · no jugular venous distention  · regular rhythm  · apical impulse normal  · S1 normal, S2 normal  · no S3, no S4   · no murmur  · no rub, no thrill  · carotid pulses normal; no bruit  · pedal pulses normal  · lower extremity edema: none  .   Skin:   · warm, dry        Plan:    Plans for hemodialysis noted  Transition to amiodarone 200 mg " p.o. daily tomorrow  Holding RAAS inhibitors due to renal insufficiency.  Midodrine for blood pressure support    Paresh Chowdhury MD, FACC  [facility]

## 2023-03-14 NOTE — PROGRESS NOTES
Clinical Nutrition     Nutrition Support Assessment  Reason for Visit: MDR, Follow-up protocol      Patient Name: Herbert Harley  YOB: 1955  MRN: [id number]  Date of Encounter: 03/14/23 11:28 EDT  Admission date: 3/1/2023    Comments:  -Continue Boost Breeze 2x daily  -Premier protein daily   -Recommend KUB or other abdominal imaging to determine possible cause of post prandial pain.     Nutrition Assessment   Admission Diagnosis:  Coronary artery disease involving native coronary artery of native heart, unspecified whether angina present [I25.10]      Problem List:    I25.10, CABG 03/01/23    Hypertension    Hyperlipidemia LDL goal <70    Type 2 diabetes mellitus with hypoglycemia, with long-term current use of insulin (HCC)    Ischemic cardiomyopathy    Mucopurulent chronic bronchitis (HCC)    Prolonged Q-T interval on ECG    Acute renal failure (ARF) (HCC)    Shock (HCC)    Acute respiratory failure with hypoxia (HCC)        PMH:   He  has a past medical history of Acute renal failure (ARF) (HCC) (3/2/2023), Asthma (December 2022), Chronic systolic congestive heart failure (HCC) (3/1/2023), Coronary artery disease (March 2014), Elevated cholesterol, History of tobacco abuse, HLD (hyperlipidemia) (01/26/2023), Hypertension, Ischemic cardiomyopathy, Myocardial infarction (HCC) (March 2014), Prolonged Q-T interval on ECG (3/1/2023), Type 2 diabetes mellitus (HCC), and Wears glasses.    PSH:  He  has a past surgical history that includes Tonsillectomy; Cardiac catheterization (March 2014); Coronary stent placement (March 2014); Cardiac catheterization (Left, 02/17/2023); and Coronary artery bypass graft (N/A, 3/1/2023).      Applicable Nutrition Concerns:   Skin:  Oral:  GI:      Applicable Interval History:   (3/1) s/p CABG, intubated, extubated  (3/3) placement of non-tunneled catheter for dialysis  (3/4) CRRT initiated, NG tube removed  (3/5) solid food diet order  initiated  (3/10) Switched from CRRT to HD   (3/13) MBS with recommendation for regular diet with thin liquids       Reported/Observed/Food/Nutrition Related History:   3/14  NPO this morning for HD access placement.     Wife at bedside at time of visit. Patient c/o post parandial pain that is limiting his intake. The pain has been going on for about 6 days.  He is not sure what the cause of the pain might be. Last KUB obtained was 3/4.  No other abominable imaging noted.  + BM yesterday and two days. Typically has daily BM.  No issue with appetite prior to surgery/hopsital admission.  Has been drinking Boost breeze. Willing to drink 1 premier protein a day in attempt to increase his protein intake.       3/8  Patient sleeping, wife at bedside. Pt continues on CRRT. Wife reports patient continues to eat very little at meals. Throat sore and not tolerating swallowing meat well at this time. Likes and is drinking the wildberry Boost Breeze. RD recommends clear Premier Protein at home as this contains more protein and significantly less carbohydrate. Wife provided RD with meal orders/preferences. Requests oatmeal (maple/brown sugar) daily with breakfast as patient enjoys eating this every morning at home. Enjoys cottage cheese with pears. Dislikes salads. RN reports she has requested something be ordered for patient's indigestion.    3/6  Note patient has been requiring nasal cannula and bipap. Patient on CRRT, wife at bedside during visit. Wife reports patient is eating but very little (RD notes solid food diet just began yesterday). Patient likes the mandarin oranges. Does not like full liquid ONS drinks, so is not drinking the Premier Protein that is currently ordered. Wife would like patient to try the wildberry Boost Breeze. Patient enjoys hot tea between meals.      Labs    Labs Reviewed: Yes     Results from last 7 days   Lab Units 03/14/23  0454 03/13/23  0356 03/12/23  0436 03/11/23  0420 03/10/23  9228  "  GLUCOSE mg/dL 122* 121* 125* 128* 217*   BUN mg/dL 31* 42* 29* 34* 29*   CREATININE mg/dL 4.24* 5.82* 3.80* 3.39* 2.30*   SODIUM mmol/L 132* 131* 133* 134* 134*   CHLORIDE mmol/L 97* 98 100 103 103   POTASSIUM mmol/L 4.9 4.8 4.5 4.4 4.3   PHOSPHORUS mg/dL  --  6.2*  --  5.3* 4.2   MAGNESIUM mg/dL  --  2.1  --  2.3 2.3   ALT (SGPT) U/L 33 29 31 29  --        Results from last 7 days   Lab Units 03/14/23  0454 03/13/23  0356 03/12/23  0436 03/11/23  0420 03/10/23  1558 03/10/23  0737 03/10/23  0050   ALBUMIN g/dL 2.8* 2.5* 2.4*   < > 2.5* 2.6* 3.0*  3.0*   IONIZED CALCIUM mmol/L  --   --   --   --  1.11* 1.14 1.16    < > = values in this interval not displayed.       Results from last 7 days   Lab Units 03/14/23  1122 03/14/23  0712 03/13/23 2007 03/13/23  1616 03/13/23  1121 03/13/23  0716   GLUCOSE mg/dL 123 136* 147* 131* 96 127     Lab Results   Lab Value Date/Time    HGBA1C 7.40 (H) 02/28/2023 0835    HGBA1C 7.1 12/13/2022 1330    HGBA1C 7.1 06/16/2022 0944             Medications    Medications Reviewed: Yes  Pertinent: insulin, pericolace, antibiotic, midodrine  Infusion:  PRN:     Intake/Ouptut 24 hrs (0701 - 0700)   I&O's Reviewed: Yes     + 1MB yesterday     Anthropometrics     Flowsheet Rows    Flowsheet Row First Filed Value   Admission Height 162.6 cm (64\") Documented at 03/01/2023 0611   Admission Weight 75.3 kg (166 lb) Documented at 03/01/2023 0611        Height: Height: 162.6 cm (64\")  Last Filed Weight: Weight: 81.9 kg (180 lb 8.9 oz) (03/11/23 0513)  Method:   standing scale (2/28/23) per EMR  BMI: BMI (Calculated): 31  BMI classification: Overweight: 25.0-29.9kg/m2   IBW:   130 lbs      Nutrition Focused Physical Exam     Date:   Unable to perform exam due to: Defer pending indication    Needs Assessment   Date: 3/6    Height used:Height: 162.6 cm (64\")  Weights used: 166 lbs/75.5 kg (actual wt) 130 lbs/59 (IBW)      Estimated Calorie needs: ~1800 calories daily  Method: 22-25 Kcals/KG actual " wt = 3822-7282    Estimated Protein needs: ~148 g protein daily  Method: 2.0-2.5 g/Kg IBW = 118-148    Estimated Fluid needs:  Per clinical status      Current Nutrition Prescription     PO: NPO Diet NPO Type: Strict NPO  Oral Nutrition Supplement: Boost Breeze 2x daily    Intake: 25%x 8 meals.       Nutrition Diagnosis     Date: 3/6 Updated: 3/8  Problem Inadequate oral intake   Etiology Diet orders r/t CABG, poor appetite, food preferences   Signs/Symptoms NPO/Liquids from admission until (3/5); 42% x 3 meals per nsg doc   Status: ongoing    Date:  3/6 Updated: 3/8  Problem Increased nutrient needs   Etiology Condition associated with increased caloric/protein needs   Signs/Symptoms [place] requiring CRRT   Status: ongoing    Goal:   General: Nutrition to support treatment  PO: Increase intake  EN/PN: N/A    Nutrition Intervention      Follow treatment progress, Care plan reviewed, Advise alternate selection, Interview for preferences, Menu provided, Adjusted supplement, Encourage intake    -Continue Boost Breeze 2x daily  -Premier protein daily   -Recommend KUB or other abdominal imaging to determine possible cause of post prandial pain.     Monitoring/Evaluation:   Per protocol, I&O, PO intake, Supplement intake, Pertinent labs, Weight, Skin status, GI status, Symptoms, Hemodynamic stability      Yenny Luu RD  Time Spent: 30 min

## 2023-03-14 NOTE — PROGRESS NOTES
CTS Progress Note       LOS: 13 days   Patient Care Team:  Yessica Ricci DO as PCP - General (Family Medicine)  Nima Peter MD as Consulting Physician (Endocrinology)  Nadya Ramirez MD as Cardiologist (Cardiology)  Yessica Ricci DO as Consulting Physician (Family Medicine)    Chief Complaint: Coronary artery disease involving native coronary artery of native heart without angina pectoris    Vital Signs:  Temp:  [98 °F (36.7 °C)-98.8 °F (37.1 °C)] 98.8 °F (37.1 °C)  Heart Rate:  [61-88] 68  Resp:  [16-22] 22  BP: (105-135)/(48-82) 126/58    Physical Exam:       Results:   Results from last 7 days   Lab Units 03/14/23  0454   WBC 10*3/mm3 13.77*   HEMOGLOBIN g/dL 9.2*   HEMATOCRIT % 29.5*   PLATELETS 10*3/mm3 187     Results from last 7 days   Lab Units 03/13/23  0356   SODIUM mmol/L 131*   POTASSIUM mmol/L 4.8   CHLORIDE mmol/L 98   CO2 mmol/L 21.0*   BUN mg/dL 42*   CREATININE mg/dL 5.82*   GLUCOSE mg/dL 121*   CALCIUM mg/dL 7.5*           Imaging Results (Last 24 Hours)     Procedure Component Value Units Date/Time    FL Limited Ugi For Mbs Reflux Single-Contrast [[id number]] Resulted: 03/13/23 1534     Updated: 03/13/23 1556    FL Video Swallow With Speech Single Contrast [[id number]] Resulted: 03/13/23 1535     Updated: 03/13/23 1555          Assessment      I25.10, CABG 03/01/23    Hypertension    Hyperlipidemia LDL goal <70    Type 2 diabetes mellitus with hypoglycemia, with long-term current use of insulin (HCC)    Ischemic cardiomyopathy    Mucopurulent chronic bronchitis (HCC)    Prolonged Q-T interval on ECG    Acute renal failure (ARF) (HCC)    Shock (HCC)    Acute respiratory failure with hypoxia (HCC)        Plan   Awaiting dialysis catheter nothing to add from my standpoint    Please note that portions of this note were completed with a voice recognition program. Efforts were made to edit the dictations, but occasionally words are mistranscribed.    Paresh Saavedra,  MD  03/14/23  06:38 EDT

## 2023-03-14 NOTE — PROGRESS NOTES
"   LOS: 13 days    Patient Care Team:  Yessica Ricci DO as PCP - General (Family Medicine)  Nima Peter MD as Consulting Physician (Endocrinology)  Nadya Ramirez MD as Cardiologist (Cardiology)  Yessica Ricci DO as Consulting Physician (Family Medicine)    Chief Complaint: Acute kidney injury s/p CABG  67-year-old with history of hypertension, diabetes, former smoker 1-1/2 pack/day for quite some time, CAD s/p PCI in 2014, underwent CABG x3, post CABG remain hypotensive blood pressure in the 40s systolic in 60s and 70s 80s range requiring IV pressors.  Developed [place] oliguric, now an uric  Subjective . F/U [place].    Interval History:   HD yesterday tolerated well. UF 1.5 liter. Doing well otherwise.      Review of Systems:   No new complaints. NO CP/SOB/N/V/D/ITCHING.    Objective     Vital Sign Min/Max for last 24 hours  Temp  Min: 98 °F (36.7 °C)  Max: 98.8 °F (37.1 °C)   BP  Min: 105/56  Max: 135/62   Pulse  Min: 61  Max: 88   Resp  Min: 16  Max: 22   SpO2  Min: 92 %  Max: 100 %   No data recorded   No data recorded     Flowsheet Rows    Flowsheet Row First Filed Value   Admission Height 162.6 cm (64\") Documented at 03/01/2023 0611   Admission Weight 75.3 kg (166 lb) Documented at 03/01/2023 0611          No intake/output data recorded.  I/O last 3 completed shifts:  In: 839 [P.O.:240; I.V.:210; Blood:289; IV Piggyback:100]  Out: 2216 [Other:2000; Chest Tube:216]    Physical Exam:  General Appearance:  male awake alert oriented no obvious distress, sitting and eating at this time.  Eyes: PER, EOMI.  Neck: Supple no JVD.  Right-sided nontunneled catheter.  Left side old nontunneled catheter in place  Chest: Chest tube in place.  Lungs: Clear auscultation, no rales rhonchi's, equal chest movement, nonlabored.  Heart: No gallop, murmur, rub, RRR.  Abdomen: Soft, nontender, positive bowel sounds, no organomegaly.  Extremities: 2+ Positive bilateral lower extremity dependent edema, no " cyanosis.  Neuro: No focal deficit, moving all extremities, alert oriented X 3  : Solomon catheter in place.  Minimal urine output  Skin warm and dry.  Psych mood and affect appropriate  Nontunneled dialysis cath right IJ  WBC WBC   Date Value Ref Range Status   03/14/2023 13.77 (H) 3.40 - 10.80 10*3/mm3 Final   03/13/2023 13.66 (H) 3.40 - 10.80 10*3/mm3 Final   03/12/2023 13.35 (H) 3.40 - 10.80 10*3/mm3 Final   03/12/2023 12.09 (H) 3.40 - 10.80 10*3/mm3 Final   03/11/2023 13.37 (H) 3.40 - 10.80 10*3/mm3 Final      HGB Hemoglobin   Date Value Ref Range Status   03/14/2023 9.2 (L) 13.0 - 17.7 g/dL Final   03/13/2023 6.9 (C) 13.0 - 17.7 g/dL Final   03/12/2023 7.5 (L) 13.0 - 17.7 g/dL Final   03/12/2023 7.0 (L) 13.0 - 17.7 g/dL Final   03/11/2023 8.1 (L) 13.0 - 17.7 g/dL Final      HCT Hematocrit   Date Value Ref Range Status   03/14/2023 29.5 (L) 37.5 - 51.0 % Final   03/13/2023 21.9 (L) 37.5 - 51.0 % Final   03/12/2023 23.7 (L) 37.5 - 51.0 % Final   03/12/2023 21.8 (L) 37.5 - 51.0 % Final   03/11/2023 25.2 (L) 37.5 - 51.0 % Final      Platlets No results found for: LABPLAT   MCV MCV   Date Value Ref Range Status   03/14/2023 93.4 79.0 - 97.0 fL Final   03/13/2023 96.5 79.0 - 97.0 fL Final   03/12/2023 94.8 79.0 - 97.0 fL Final   03/12/2023 94.8 79.0 - 97.0 fL Final   03/11/2023 94.4 79.0 - 97.0 fL Final          Sodium Sodium   Date Value Ref Range Status   03/14/2023 132 (L) 136 - 145 mmol/L Final   03/13/2023 131 (L) 136 - 145 mmol/L Final   03/12/2023 133 (L) 136 - 145 mmol/L Final      Potassium Potassium   Date Value Ref Range Status   03/14/2023 4.9 3.5 - 5.2 mmol/L Final   03/13/2023 4.8 3.5 - 5.2 mmol/L Final   03/12/2023 4.5 3.5 - 5.2 mmol/L Final      Chloride Chloride   Date Value Ref Range Status   03/14/2023 97 (L) 98 - 107 mmol/L Final   03/13/2023 98 98 - 107 mmol/L Final   03/12/2023 100 98 - 107 mmol/L Final      CO2 CO2   Date Value Ref Range Status   03/14/2023 19.0 (L) 22.0 - 29.0 mmol/L Final    03/13/2023 21.0 (L) 22.0 - 29.0 mmol/L Final   03/12/2023 22.0 22.0 - 29.0 mmol/L Final      BUN BUN   Date Value Ref Range Status   03/14/2023 31 (H) 8 - 23 mg/dL Final   03/13/2023 42 (H) 8 - 23 mg/dL Final   03/12/2023 29 (H) 8 - 23 mg/dL Final      Creatinine Creatinine   Date Value Ref Range Status   03/14/2023 4.24 (H) 0.76 - 1.27 mg/dL Final   03/13/2023 5.82 (H) 0.76 - 1.27 mg/dL Final   03/12/2023 3.80 (H) 0.76 - 1.27 mg/dL Final      Calcium Calcium   Date Value Ref Range Status   03/14/2023 7.8 (L) 8.6 - 10.5 mg/dL Final   03/13/2023 7.5 (L) 8.6 - 10.5 mg/dL Final   03/12/2023 7.4 (L) 8.6 - 10.5 mg/dL Final      PO4 No results found for: CAPO4   Albumin Albumin   Date Value Ref Range Status   03/14/2023 2.8 (L) 3.5 - 5.2 g/dL Final   03/13/2023 2.5 (L) 3.5 - 5.2 g/dL Final   03/12/2023 2.4 (L) 3.5 - 5.2 g/dL Final      Magnesium Magnesium   Date Value Ref Range Status   03/13/2023 2.1 1.6 - 2.4 mg/dL Final      Uric Acid No results found for: URICACID        Results Review:     I reviewed the patient's new clinical results.    amiodarone, 200 mg, Oral, Q12H  arformoterol, 15 mcg, Nebulization, BID - RT  aspirin, 325 mg, Oral, Daily  insulin detemir, 10 Units, Subcutaneous, Nightly  insulin lispro, 0-9 Units, Subcutaneous, 4x Daily With Meals & Nightly  Insulin Lispro, 2 Units, Subcutaneous, TID With Meals  ipratropium-albuterol, 3 mL, Nebulization, 4x Daily - RT  [START ON 3/15/2023] metoprolol succinate XL, 25 mg, Oral, Q24H  metoprolol tartrate, 12.5 mg, Oral, Q12H  midodrine, 10 mg, Oral, Q8H  pantoprazole, 40 mg, Oral, Q AM  pharmacy consult - [place], , Does not apply, BID  piperacillin-tazobactam, 3.375 g, Intravenous, Q12H  rosuvastatin, 10 mg, Oral, Nightly  senna-docusate sodium, 2 tablet, Oral, BID      norepinephrine, 0.02-0.3 mcg/kg/min, Last Rate: Stopped (03/10/23 0638)        Medication Review: Reviewed    Assessment & Plan       I25.10, CABG 03/01/23    Hypertension    Hyperlipidemia LDL  goal <70    Type 2 diabetes mellitus with hypoglycemia, with long-term current use of insulin (HCC)    Ischemic cardiomyopathy    Mucopurulent chronic bronchitis (HCC)    Prolonged Q-T interval on ECG    Acute renal failure (ARF) (HCC)    Shock (HCC)    Acute respiratory failure with hypoxia (HCC)     1.  [place]: ATN likely secondary to shock with systolic blood pressure postsurgery in the range of 40s to 80s, now on multiple vasopressors.  Admit creatinine 1.08, creatinine progressively getting worse.  Patient is oliguric.  C3 complement 18, C4 complement 83 within normal range.  Ultrasound of the kidney shows right kidney 11 cm left kidney 10.6 cm normal ultrasound, bladder ultrasound normal. NO RECOVERY GFR YET.   2.  S/p CABG x3  3.  Ischemic cardiomyopathy with ejection fraction 41-45%.  4.  Diabetes type 2.  5.  Cardiogenic shock. Resolved.     Recommendations:  THD CATH TODAY. HD 3/15/23. START TO MAKE ARRANGEMENTS FOR OUTPATIENT HD HERE IN [place]. D/W PATIENT AND ICU NURSE.   Nicolas Pritchett MD  03/14/23  08:48 EDT

## 2023-03-14 NOTE — CONSULTS
[facility]    Referring Provider: Paresh Saavedra MD    Primary Care Provider: Yessica Ricci DO    Reason for Consultation: Odynophagia    Chief complaint painful swallowing, hiccups        History of present illness:  Herbert Harley is a 67 y.o. male who is admitted with CAD.  He underwent CABG.  Over the last 6 days has increased odynophagia.  Increased hiccups.  Mild dysphagia.  No prior symptoms.  No prior EGD.  He has never had a colonoscopy.  He has had a negative Cologuard earlier this year.  There is no family history colon cancer.  He has been on antibiotics..  He was started on Diflucan yesterday.  States today's his symptoms are starting to improve.  He is able to small amounts of soft food down.    Physical history positive for diabetes, CAD, asthma, hyperlipidemia    Allergies:  Wellbutrin [bupropion]    Scheduled Meds:  amiodarone, 200 mg, Oral, Q12H  arformoterol, 15 mcg, Nebulization, BID - RT  aspirin, 325 mg, Oral, Daily  heparin (porcine), , ,   heparin (porcine), , ,   insulin detemir, 10 Units, Subcutaneous, Nightly  insulin lispro, 0-9 Units, Subcutaneous, 4x Daily With Meals & Nightly  Insulin Lispro, 2 Units, Subcutaneous, TID With Meals  ipratropium-albuterol, 3 mL, Nebulization, 4x Daily - RT  [START ON 3/15/2023] metoprolol succinate XL, 25 mg, Oral, Q24H  metoprolol tartrate, 12.5 mg, Oral, Q12H  midodrine, 10 mg, Oral, Q8H  pantoprazole, 40 mg, Oral, Q AM  pharmacy consult - MTM, , Does not apply, BID  piperacillin-tazobactam, 3.375 g, Intravenous, Q12H  rosuvastatin, 10 mg, Oral, Nightly  senna-docusate sodium, 2 tablet, Oral, BID         Infusions:  norepinephrine, 0.02-0.3 mcg/kg/min, Last Rate: Stopped (03/10/23 0638)        PRN Meds:  •  acetaminophen  •  Albuterol Sulfate NEB Orderable  •  calcium carbonate  •  cyclobenzaprine  •  dextrose  •  heparin (porcine)  •  melatonin  •  norepinephrine  •  ondansetron  •  senna-docusate sodium **AND** polyethylene  glycol **AND** [DISCONTINUED] bisacodyl **AND** [DISCONTINUED] bisacodyl    Home Meds:  Medications Prior to Admission   Medication Sig Dispense Refill Last Dose   • albuterol sulfate  (90 Base) MCG/ACT inhaler Inhale 2 puffs Every 4 (Four) Hours As Needed for Wheezing. 18 g 2 Past Week   • aspirin 81 MG chewable tablet Chew 1 tablet Daily.   Past Week at 2000   • Collagen-Vitamin C-Biotin (COLLAGEN 1500/C PO) Take 1 tablet by mouth Daily. OTC supplement   Past Week   • ezetimibe (ZETIA) 10 MG tablet Take 1 tablet by mouth Daily for 30 days. 30 tablet 11 Past Week   • Fluticasone-Umeclidin-Vilant (TRELEGY) 100-62.5-25 MCG/ACT inhaler Inhale 1 puff Daily. (Patient taking differently: Inhale 1 puff Daily. Patient is taking only as needed (not daily)) 1 each 5 Past Month   • furosemide (LASIX) 20 MG tablet Take 1 tablet by mouth 2 (Two) Times a Day.   Past Week   • Lantus 100 UNIT/ML injection INJECT 60 UNITS SUBCUTANEOUSLY ONCE DAILY (Patient taking differently: Inject 60 Units under the skin into the appropriate area as directed Every Night. INJECT 60 UNITS SUBCUTANEOUSLY ONCE DAILY) 20 mL 5 Past Week   • rosuvastatin (CRESTOR) 10 MG tablet Take 1 tablet by mouth Daily. 30 tablet 11 Past Week   • sacubitril-valsartan (ENTRESTO) 24-26 MG tablet Take 1 tablet by mouth 2 (Two) Times a Day.   Past Week   • dapagliflozin Propanediol (Farxiga) 10 MG tablet Take 10 mg by mouth Daily for 30 days. 30 tablet 3        ROS: Review of Systems   Constitutional: Positive for appetite change. Negative for fever and unexpected weight change.   HENT: Positive for trouble swallowing.    Respiratory: Negative for shortness of breath.    Cardiovascular: Negative for chest pain.   Gastrointestinal: Positive for constipation. Negative for abdominal distention and abdominal pain.   All other systems reviewed and are negative.      PAST MED HX: Pt  has a past medical history of Acute renal failure (ARF) ([facility]) (3/2/2023), Asthma  "(December 2022), Chronic systolic congestive heart failure (HCC) (3/1/2023), Coronary artery disease (March 2014), Elevated cholesterol, History of tobacco abuse, HLD (hyperlipidemia) (01/26/2023), Hypertension, Ischemic cardiomyopathy, Myocardial infarction (HCC) (March 2014), Prolonged Q-T interval on ECG (3/1/2023), Type 2 diabetes mellitus (HCC), and Wears glasses.  PAST SURG HX: Pt  has a past surgical history that includes Tonsillectomy; Cardiac catheterization (March 2014); Coronary stent placement (March 2014); Cardiac catheterization (Left, 02/17/2023); and Coronary artery bypass graft (N/A, 3/1/2023).  FAM HX: family history includes Brain cancer in his sister; Cancer in his father; Diabetes in his mother; Heart attack in his brother; Prostate cancer in his brother.  SOC HX: Pt  reports that he quit smoking about 3 months ago. His smoking use included cigarettes. He started smoking about 53 years ago. He has a 78.00 pack-year smoking history. He has been exposed to tobacco smoke. He has never used smokeless tobacco. He reports that he does not drink alcohol and does not use drugs.    /53   Pulse 60   Temp 97.5 °F (36.4 °C) (Oral)   Resp 13   Ht 162.6 cm (64\")   Wt 81.9 kg (180 lb 8.9 oz)   SpO2 100%   BMI 30.99 kg/m²     Physical Exam  Wt Readings from Last 3 Encounters:   03/11/23 81.9 kg (180 lb 8.9 oz)   02/28/23 75.4 kg (166 lb 5.4 oz)   02/17/23 74.6 kg (164 lb 8.5 oz)   ,body mass index is 30.99 kg/m².    General Well developed; well nourished; no acute distress.   ENT Good dentition.  Oral mucosa pink & moist without thrush or lesions.    Neck Neck supple; trachea midline. No thyromegaly  Resp CTA; no rhonchi, rales, or wheezes.  Respiration effort normal chest tube noted   CV RRR; ; no M/R/G. No lower extremity edema  GI Abd soft, NT, ND, normal active bowel sounds.  No HSM.  No abd hernia  Skin No rash; no lesions; no bruises.  Skin turgor normal  MSK No clubbing; no cyanosis.  "   Psych Oriented to time, place, and person.  Appropriate affect      Results Review:   I reviewed the patient's new clinical results.    Lab Results   Component Value Date    WBC 13.77 (H) 03/14/2023    HGB 9.2 (L) 03/14/2023    HCT 29.5 (L) 03/14/2023    MCV 93.4 03/14/2023     03/14/2023       Lab Results   Component Value Date    GLUCOSE 122 (H) 03/14/2023    BUN 31 (H) 03/14/2023    CREATININE 4.24 (H) 03/14/2023    EGFRIFNONA 66 12/14/2021    BCR 7.3 03/14/2023    CO2 19.0 (L) 03/14/2023    CALCIUM 7.8 (L) 03/14/2023    ALBUMIN 2.8 (L) 03/14/2023    AST 42 (H) 03/14/2023    ALT 33 03/14/2023       ASSESSMENTS/PLANS    1.  Odynophagia  2.  Diabetes type 2  3.  CAD.,  4 acute renal failure  5 ischemic cardiomyopathy    <<Currently patient states his odynophagia is improved.  He is receiving Diflucan.  We will continue a 10-day course of this.  <<As he seems to be improving will defer EGD.  <<If symptoms persist will reconsider.  <<Continue PPI    I discussed the patient's findings and my recommendations with patient, family and nursing staff    Mark Mckeon MD  03/14/23  15:00 EDT

## 2023-03-14 NOTE — PLAN OF CARE
Goal Outcome Evaluation:  Plan of Care Reviewed With: patient        Progress: improving  Outcome Evaluation: patient was able to increase ambulation to 220 ft with CGA using rolling walker. patient is limited by fatigue and decreased endurance for activity. patient is progressing toward all mobility goals he has been held up due to medical complications

## 2023-03-14 NOTE — THERAPY TREATMENT NOTE
Patient Name: Herbert Harley  : 1955    MRN: [id number]                              Today's Date: 3/14/2023       Admit Date: 3/1/2023    Visit Dx:     ICD-10-CM ICD-9-CM   1. Coronary artery disease involving native coronary artery of native heart without angina pectoris  I25.10 414.01   2. Coronary artery disease involving native coronary artery of native heart, unspecified whether angina present  I25.10 414.01   3. Acute renal failure, unspecified acute renal failure type (HCC)  N17.9 584.9   4. Acute respiratory failure with hypoxia (HCC)  J96.01 518.81   5. Dysphagia, unspecified type  R13.10 787.20     Patient Active Problem List   Diagnosis   • I25.10, CABG 23   • Hypertension   • Hyperlipidemia LDL goal <70   • Type 2 diabetes mellitus with hypoglycemia, with long-term current use of insulin (HCC)   • Ischemic cardiomyopathy   • Allergic rhinitis   • Positive cardiac stress test   • Mucopurulent chronic bronchitis (HCC)   • Prolonged Q-T interval on ECG   • Acute renal failure (ARF) (HCC)   • Shock (HCC)   • Acute respiratory failure with hypoxia (HCC)     Past Medical History:   Diagnosis Date   • Acute renal failure (ARF) (HCC) 3/2/2023   • Asthma 2022   • Chronic systolic congestive heart failure (HCC) 3/1/2023   • Coronary artery disease 2014   • Elevated cholesterol    • History of tobacco abuse    • HLD (hyperlipidemia) 2023   • Hypertension    • Ischemic cardiomyopathy    • Myocardial infarction (HCC) 2014   • Prolonged Q-T interval on ECG 3/1/2023   • Type 2 diabetes mellitus (HCC)    • Wears glasses      Past Surgical History:   Procedure Laterality Date   • CARDIAC CATHETERIZATION  2014   • CARDIAC CATHETERIZATION Left 2023    Procedure: Left Heart Cath;  Surgeon: Nadya Ramirez MD;  Location: [facility] CATH INVASIVE LOC[facility];  Service: Cardiology;  Laterality: Left;   • CORONARY ARTERY BYPASS GRAFT N/A 3/1/2023    Procedure: MEDIAN  STERNOTOMY CORONARY ARTERY BYPASS GRAFTING X3  UTILIZING THE LEFT INTERNAL MAMMERY GRAFT, AND EVH OF THE GREATER RIGHT SAPHENOUS VEIN;  Surgeon: Paresh Saavedra MD;  Location: [facility];  Service: Cardiothoracic;  Laterality: N/A;   • CORONARY STENT PLACEMENT  March 2014   • TONSILLECTOMY        General Information     Row Name 03/14/23 1119          Physical Therapy Time and Intention    Document Type therapy note (daily note)  -FLIP     Mode of Treatment physical therapy  -FLIP     Row Name 03/14/23 1119          General Information    Patient Profile Reviewed yes  -FLIP     Prior Level of Function independent:;bed mobility;ADL's;gait;transfer  -FLIP     Existing Precautions/Restrictions cardiac  -FLIP     Barriers to Rehab medically complex  -FLIP     Row Name 03/14/23 1119          Living Environment    People in Home spouse  -FLIP     Row Name 03/14/23 1119          Home Main Entrance    Number of Stairs, Main Entrance two  -FLIP     Row Name 03/14/23 1119          Cognition    Orientation Status (Cognition) oriented x 3  -FLIP     Row Name 03/14/23 1119          Safety Issues, Functional Mobility    Safety Issues Affecting Function (Mobility) safety precautions follow-through/compliance;safety precaution awareness  -FLIP     Impairments Affecting Function (Mobility) balance;endurance/activity tolerance;shortness of breath;strength  -FLIP     Cognitive Impairments, Mobility Safety/Performance safety precaution awareness;safety precaution follow-through  -FLIP           User Key  (r) = Recorded By, (t) = Taken By, (c) = Cosigned By    Initials Name Provider Type    FLIP Marlyn Torres PT Physical Therapist               Mobility     Row Name 03/14/23 1120          Bed Mobility    Comment, (Bed Mobility) patient is OOB and returns to the chair  -FLIP     Row Name 03/14/23 1120          Transfers    Comment, (Transfers) cues for sternal precautions  -FLIP     Row Name 03/14/23 1120          Bed-Chair Transfer    Bed-Chair [place]  (Transfers) contact guard  -FLIP     Assistive Device (Bed-Chair Transfers) walker, front-wheeled  -FLIP     Row Name 03/14/23 1120          Sit-Stand Transfer    Sit-Stand [place] (Transfers) contact guard  -FLPI     Assistive Device (Sit-Stand Transfers) walker, front-wheeled  -FLIP     Row Name 03/14/23 1120          Gait/Stairs (Locomotion)    [place] Level (Gait) contact guard  -FLIP     Assistive Device (Gait) walker, front-wheeled  -FLIP     Distance in Feet (Gait) 220  -FLIP     Deviations/Abnormal Patterns (Gait) stride length decreased  -FLIP     Bilateral Gait Deviations forward flexed posture  -FLIP     Comment, (Gait/Stairs) on initially standing patient still has a tendency to get his weight shifted backward he is able to correct with use of a walker  -FLIP           User Key  (r) = Recorded By, (t) = Taken By, (c) = Cosigned By    Initials Name Provider Type    Marlyn Ochoa PT Physical Therapist               Obj/Interventions     Row Name 03/14/23 1122          Balance    Balance Assessment sitting static balance;sitting dynamic balance;standing static balance;standing dynamic balance  -FLIP     Static Sitting Balance independent  -FLIP     Dynamic Sitting Balance independent  -FLIP     Position, Sitting Balance unsupported;sitting in chair  -FLIP     Static Standing Balance contact guard  -FLIP     Dynamic Standing Balance contact guard  -FLIP     Position/Device Used, Standing Balance supported;walker, front-wheeled  -FLIP     Balance Interventions standing;dynamic;weight shifting activity  -FLIP           User Key  (r) = Recorded By, (t) = Taken By, (c) = Cosigned By    Initials Name Provider Type    Marlyn Ochoa PT Physical Therapist               Goals/Plan     Row Name 03/14/23 1125          Bed Mobility Goal 1 (PT)    Activity/Assistive Device (Bed Mobility Goal 1, PT) sit to supine;supine to sit  -FLIP     [place] Level/Cues Needed (Bed Mobility Goal 1, PT) independent  -FLIP     Time Frame (Bed  Mobility Goal 1, PT) long term goal (LTG);10 days  -FLIP     Progress/Outcomes (Bed Mobility Goal 1, PT) goal met;goal revised this date;goal ongoing  -FLIP     Row Name 03/14/23 1125          Transfer Goal 1 (PT)    Activity/Assistive Device (Transfer Goal 1, PT) sit-to-stand/stand-to-sit;bed-to-chair/chair-to-bed;walker, rolling  -FLIP     [place] Level/Cues Needed (Transfer Goal 1, PT) independent  -FLIP     Time Frame (Transfer Goal 1, PT) long term goal (LTG);10 days  -FLIP     Progress/Outcome (Transfer Goal 1, PT) goal met;goal revised this date;goal ongoing  -FLIP     Row Name 03/14/23 1125          Gait Training Goal 1 (PT)    Activity/Assistive Device (Gait Training Goal 1, PT) gait (walking locomotion);assistive device use;walker, rolling  -FLIP     [place] Level (Gait Training Goal 1, PT) contact guard required  -FLIP     Distance (Gait Training Goal 1, PT) 400  -FLIP     Time Frame (Gait Training Goal 1, PT) long term goal (LTG);10 days  -FLIP     Progress/Outcome (Gait Training Goal 1, PT) goal met;goal revised this date;goal ongoing  -FLIP     Row Name 03/14/23 1125          Therapy Assessment/Plan (PT)    Planned Therapy Interventions (PT) balance training;bed mobility training;gait training;home exercise program;strengthening;transfer training  -FLIP           User Key  (r) = Recorded By, (t) = Taken By, (c) = Cosigned By    Initials Name Provider Type    Marlyn Ochoa, PT Physical Therapist               Clinical Impression     Row Name 03/14/23 1122          Pain    Pretreatment Pain Rating 0/10 - no pain  -FLIP     Posttreatment Pain Rating 0/10 - no pain  -FLIP     Row Name 03/14/23 1122          Plan of Care Review    Plan of Care Reviewed With patient  -FLIP     Progress improving  -FLIP     Outcome Evaluation patient was able to increase ambulation to 220 ft with CGA using rolling walker. patient is limited by fatigue and decreased endurance for activity. patient is progressing toward all mobility goals he  has been held up due to medical complications  -FLIP     Row Name 03/14/23 1122          Therapy Assessment/Plan (PT)    Patient/Family Therapy Goals Statement (PT) return to PLOF  -FLIP     Rehab Potential (PT) good, to achieve stated therapy goals  -FLIP     Criteria for Skilled Interventions Met (PT) yes;skilled treatment is necessary  -FLIP     Therapy Frequency (PT) daily  -FLIP     Row Name 03/14/23 1122          Vital Signs    Pre Systolic BP Rehab 139  -FLIP     Pre Treatment Diastolic BP 64  -FLIP     Post Systolic BP Rehab 138  -FLIP     Post Treatment Diastolic BP 59  -FLIP     Pretreatment Heart Rate (beats/min) 67  -FLIP     Posttreatment Heart Rate (beats/min) 68  -FLIP     Pre SpO2 (%) 96  -FLIP     O2 Delivery Pre Treatment room air  -FLIP     Post SpO2 (%) 98  -FLIP     O2 Delivery Post Treatment room air  -FLIP     Pre Patient Position Sitting  -FLIP     Intra Patient Position Standing  -FLIP     Post Patient Position Sitting  -FLIP     Row Name 03/14/23 1122          Positioning and Restraints    Pre-Treatment Position sitting in chair/recliner  -FLIP     Post Treatment Position chair  -FLIP     In Chair notified nsg;reclined;sitting;call light within reach;encouraged to call for assist  -FLIP           User Key  (r) = Recorded By, (t) = Taken By, (c) = Cosigned By    Initials Name Provider Type    FLIP Marlyn Torres, PT Physical Therapist               Outcome Measures     Row Name 03/14/23 1126          How much help from another person do you currently need...    Turning from your back to your side while in flat bed without using bedrails? 3  -FLIP     Moving from lying on back to sitting on the side of a flat bed without bedrails? 3  -FLIP     Moving to and from a bed to a chair (including a wheelchair)? 3  -FLIP     Standing up from a chair using your arms (e.g., wheelchair, bedside chair)? 3  -FLIP     Climbing 3-5 steps with a railing? 2  -FLIP     To walk in hospital room? 3  -FLIP     AM-PAC 6 Clicks Score (PT) 17  -FLIP     Highest level  of mobility 5 --> Static standing  -FLIP           User Key  (r) = Recorded By, (t) = Taken By, (c) = Cosigned By    Initials Name Provider Type    Marlyn Ochoa PT Physical Therapist                             Physical Therapy Education     Title: PT OT SLP Therapies (In Progress)     Topic: Physical Therapy (In Progress)     Point: Mobility training (In Progress)     Learning Progress Summary           Patient Acceptance, E, NR by FLIP at 3/14/2023 0940    Acceptance, E, NR by AE at 3/13/2023 1340    Acceptance, E, NR by KG at 3/11/2023 1306    Acceptance, E, NR by CM at 3/10/2023 1410    Acceptance, E, NR by CM at 3/9/2023 1421    Acceptance, E, NR by KG at 3/8/2023 0954    Acceptance, E, NR by KG at 3/7/2023 0919    Acceptance, E, NR by KG at 3/6/2023 1324                   Point: Home exercise program (In Progress)     Learning Progress Summary           Patient Acceptance, E, NR by FLIP at 3/14/2023 0940    Acceptance, E, NR by AE at 3/13/2023 1340    Acceptance, E, NR by KG at 3/11/2023 1306    Acceptance, E, NR by CM at 3/9/2023 1421    Acceptance, E, NR by KG at 3/8/2023 0954    Acceptance, E, NR by KG at 3/7/2023 0919                   Point: Body mechanics (In Progress)     Learning Progress Summary           Patient Acceptance, E, NR by FLIP at 3/14/2023 0940    Acceptance, E, NR by AE at 3/13/2023 1340    Acceptance, E, NR by KG at 3/11/2023 1306    Acceptance, E, NR by CM at 3/10/2023 1410    Acceptance, E, NR by CM at 3/9/2023 1421    Acceptance, E, NR by KG at 3/8/2023 0954    Acceptance, E, NR by KG at 3/7/2023 0919    Acceptance, E, NR by KG at 3/6/2023 1324                   Point: Precautions (In Progress)     Learning Progress Summary           Patient Acceptance, E, NR by FLIP at 3/14/2023 0940    Acceptance, E, NR by AE at 3/13/2023 1340    Acceptance, E, NR by KG at 3/11/2023 1306    Acceptance, E, NR by CM at 3/10/2023 1410    Acceptance, E, NR by CM at 3/9/2023 1421    Acceptance, E, NR by  KG at 3/8/2023 0954    Acceptance, E, NR by KG at 3/7/2023 0919    Acceptance, E, NR by KG at 3/6/2023 1324                               User Key     Initials Effective Dates Name Provider Type Discipline    FLIP 02/03/23 -  Marlyn Torres, PT Physical Therapist PT    KG 05/22/20 -  Whitney Mistry, PT Physical Therapist PT    AE 09/21/21 -  Priyank Saenz, PT Physical Therapist PT    CM 09/22/22 -  Tory Kaye, PT Physical Therapist PT              PT Recommendation and Plan  Planned Therapy Interventions (PT): balance training, bed mobility training, gait training, home exercise program, strengthening, transfer training  Plan of Care Reviewed With: patient  Progress: improving  Outcome Evaluation: patient was able to increase ambulation to 220 ft with CGA using rolling walker. patient is limited by fatigue and decreased endurance for activity. patient is progressing toward all mobility goals he has been held up due to medical complications     Time Calculation:    PT Charges     Row Name 03/14/23 1127             Time Calculation    Start Time 0940  -FLIP      PT Received On 03/14/23  -FLIP      PT Goal Re-Cert Due Date 03/16/23  -FLIP         Time Calculation- PT    Total Timed Code Minutes- PT 17 minute(s)  -FLIP         Timed Charges    94086 - PT Therapeutic Activity Minutes 17  -FLIP         Total Minutes    Timed Charges Total Minutes 17  -FLIP       Total Minutes 17  -FLIP            User Key  (r) = Recorded By, (t) = Taken By, (c) = Cosigned By    Initials Name Provider Type    Marlyn Ochoa, PT Physical Therapist              Therapy Charges for Today     Code Description Service Date Service Provider Modifiers Qty    [id number]  PT THERAPEUTIC ACT EA 15 MIN 3/14/2023 Marlyn Torres, PT GP 1          PT G-Codes  Outcome Measure Options: AM-PAC 6 Clicks Basic Mobility (PT)  AM-PAC 6 Clicks Score (PT): 17  AM-PAC 6 Clicks Score (OT): 14  PT Discharge Summary  Anticipated Discharge  Disposition (PT): inpatient rehabilitation facility    Marlyn Torres, PT  3/14/2023

## 2023-03-14 NOTE — CASE MANAGEMENT/SOCIAL WORK
Continued Stay Note  [facility]     Patient Name: Herbert Harley  MRN: [id number]  Today's Date: 3/14/2023    Admit Date: 3/1/2023    Plan: ongoing   Discharge Plan     Row Name 03/14/23 1047       Plan    Plan ongoing    Plan Comments I spoke with Mr. Harley in room.  He was getting ready to walk with PT.  They are still recommending IPR at discharge.  He is going for tunneled dialysis catheter today.  CM will follow up if patient is going to do outpatient dialysis or go to inpatient rehab at discharge.    Final Discharge Disposition Code 30 - still a patient               Discharge Codes    No documentation.               Expected Discharge Date and Time     Expected Discharge Date Expected Discharge Time    Mar 18, 2023             Martha Montanez RN     [FreeTextEntry1] : Psoriasis - [place]. [de-identified] : DVW, without flares.  Mild pruritus.  No self tx.

## 2023-03-14 NOTE — PROGRESS NOTES
"INTENSIVIST   PROGRESS NOTE        SUBJECTIVE     Herbert \"Herb\" 67 y.o. male is followed for: No chief complaint on file.       I25.10, CABG 23    Hypertension    Hyperlipidemia LDL goal <70    Type 2 diabetes mellitus with hypoglycemia, with long-term current use of insulin (HCC)    Ischemic cardiomyopathy    Mucopurulent chronic bronchitis (HCC)    Prolonged Q-T interval on ECG    Acute renal failure (ARF) (HCC)    Shock (HCC)    Acute respiratory failure with hypoxia (HCC)    As an Intensivist, we provide an integrated approach to the ICU patient and family, medical management of comorbid conditions, including but not limited to electrolytes, glycemic control, organ dysfunction, lead interdisciplinary rounds and coordinate the care with all other services, including those from other specialists.     Interval History:  POD: 13 Days Post-Op (CABG)    Doing well.    Still hurts when he eats. He points to his mid-chest. Pain about same as yesterday, but better than 2 days ago.    Temp  Min: 97.5 °F (36.4 °C)  Max: 98.8 °F (37.1 °C)       History     Last Reviewed by Jam Shah MD on 3/13/2023 at  7:07 PM    Sections Reviewed    Medical, Family, Surgical, Tobacco, Alcohol, Drug Use, Sexual Activity,   Social Documentation    Problem list reviewed by Jam Shah MD on 3/13/2023 at  7:06 PM  Medicines reviewed by Jam Shah MD on 3/13/2023 at  7:06 PM  Allergies reviewed by Jam Shah MD on 3/13/2023 at  7:06 PM     The patient's relevant past medical, surgical and social history were reviewed and updated in Epic as appropriate.        OBJECTIVE     Vitals:  Temp: 97.5 °F (36.4 °C) (23 0800) Temp  Min: 97.5 °F (36.4 °C)  Max: 98.8 °F (37.1 °C)   Temp core:      BP: 113/54 (23 1147) BP  Min: 106/50  Max: 139/64   MAP (non-invasive) Noninvasive MAP (mmHg): 77 (23 1100) Noninvasive MAP (mmHg)  Av.1  Min: 41  Max: 123   Pulse: 62 (23 1147) Pulse  Min: 61  Max: 77   Resp: " 16 (03/14/23 0800) Resp  Min: 16  Max: 22   SpO2: 94 % (03/14/23 1100) SpO2  Min: 92 %  Max: 100 %   Device: room air (03/14/23 1100)    Flow Rate: 2 (03/07/23 0400) No data recorded         03/10/23  0600 03/11/23  0513   Weight: 80.5 kg (177 lb 7.5 oz) 81.9 kg (180 lb 8.9 oz)        Intake/Ouptut 24 hrs (7:00AM - 6:59 AM)  Intake & Output (last 3 days)       03/11 0701 03/12 0700 03/12 0701  03/13 0700 03/13 0701 03/14 0700 03/14 0701  03/15 0700    P.O. 120 840 240     I.V. (mL/kg)  37.7 (0.5) 210 (2.6)     Blood   289     Other 310       IV Piggyback  50 100     Total Intake(mL/kg) 430 (5.3) 927.7 (11.3) 839 (10.2)     Urine (mL/kg/hr) 150 (0.1) 200 (0.1) 0 (0) 100 (0.2)    Other 1760  2000     Stool   0     Chest Tube 540 400 96 0    Total Output 2450 600 2096 100    Net -2020 +327.7 -1257 -100            Urine Unmeasured Occurrence   1 x     Stool Unmeasured Occurrence   1 x         Medications (drips):  norepinephrine, Last Rate: Stopped (03/10/23 0638)      Physical Examination  Telemetry:  Rhythm: normal sinus rhythm (03/14/23 1000)  Atrial Rhythm: atrial fibrillation (03/06/23 1400)      Constitutional:  No acute distress.   Cardiovascular: RRR.    Respiratory: Normal breath sounds  No adventitious sounds.   Abdominal:  Soft with no tenderness.   Extremities: Edema   Neurological:   Alert, Oriented, Cooperative.  Best Eye Response: 4-->(E4) spontaneous (03/14/23 1000)  Best Motor Response: 6-->(M6) obeys commands (03/14/23 1000)  Best Verbal Response: 5-->(V5) oriented (03/14/23 1000)  [place] Coma Scale Score: 15 (03/14/23 1000)      Results Reviewed:  Laboratory  Microbiology  Radiology  Pathology    Hematology:  Results from last 7 days   Lab Units 03/14/23  0454 03/13/23  0356 03/10/23  0050 03/09/23  0508 03/08/23  0422   WBC 10*3/mm3 13.77* 13.66*   < > 19.97* 20.25*   HEMOGLOBIN g/dL 9.2* 6.9*   < > 9.0* 8.9*   MCV fL 93.4 96.5   < > 91.7 89.7   PLATELETS 10*3/mm3 187 132*   < > 85* 69*   NEUTROS  ABS 10*3/mm3 9.98* 9.79*   < > 14.58* 16.40*   LYMPHS ABS 10*3/mm3  --   --   --  3.00 2.03   EOS ABS 10*3/mm3 0.28 0.29   < > 0.20 0.00    < > = values in this interval not displayed.       Chemistry:  Estimated Creatinine Clearance: 16.3 mL/min (A) (by C-G formula based on SCr of 4.24 mg/dL (H)).    Results from last 7 days   Lab Units 03/14/23 0454 03/13/23  0356   SODIUM mmol/L 132* 131*   POTASSIUM mmol/L 4.9 4.8   CHLORIDE mmol/L 97* 98   CO2 mmol/L 19.0* 21.0*   BUN mg/dL 31* 42*   CREATININE mg/dL 4.24* 5.82*   GLUCOSE mg/dL 122* 121*     Results from last 7 days   Lab Units 03/14/23 0454 03/13/23  0356 03/12/23  0436 03/11/23  0420 03/10/23  1558 03/10/23  0737   IONIZED CALCIUM mmol/L  --   --   --   --  1.11* 1.14   CALCIUM mg/dL 7.8* 7.5*   < > 7.4* 7.1* 7.1*   MAGNESIUM mg/dL  --  2.1  --  2.3 2.3 2.3   PHOSPHORUS mg/dL  --  6.2*  --  5.3* 4.2 4.6*    < > = values in this interval not displayed.     Results from last 7 days   Lab Units 03/14/23 0454 03/13/23  0356   BILIRUBIN mg/dL 0.4 0.3   AST (SGOT) U/L 42* 39   ALT (SGPT) U/L 33 29   ALK PHOS U/L 161* 134*       Biomarkers:  Results from last 7 days   Lab Units 03/14/23  0454 03/10/23  0050 03/08/23  0806   PROCALCITONIN ng/mL 0.61* 1.36* 1.55*     COVID-19  Lab Results   Component Value Date    COVID19 Not Detected 12/11/2022     Images:  FL Video Swallow With Speech Single Contrast    Result Date: 3/14/2023  Impression: Fluoroscopy provided for a modified barium swallow, and limited upper GI series. Please see speech therapy report for full details and recommendations. Limited upper GI series appeared within normal limits. Electronically Signed: Buddy Chandler  3/14/2023 7:52 AM EDT  Workstation ID: [id number]    FL Limited Ugi For Mbs Reflux Single-Contrast    Result Date: 3/14/2023  Impression: Fluoroscopy provided for a modified barium swallow, and limited upper GI series. Please see speech therapy report for full details and  recommendations. Limited upper GI series appeared within normal limits. Electronically Signed: Buddy Chandler  3/14/2023 7:52 AM EDT  Workstation ID: [id number]    Echo:  Results for orders placed during the hospital encounter of 03/01/23    Adult Transthoracic Echo Complete W/ Cont if Necessary Per Protocol    Interpretation Summary  •  Left ventricular systolic function is mildly decreased. Calculated left ventricular EF = 40.4% Left ventricular ejection fraction appears to be 41 - 45%.  •  The right ventricular cavity is dilated.  •  Left atrial volume is mildly increased.  •  There is calcification of the aortic valve.  •  Estimated right ventricular systolic pressure from tricuspid regurgitation is normal (<35 mmHg).  •  There is a left pleural effusion.      Results: Reviewed.  I reviewed the patient's new laboratory and imaging results.  I independently reviewed the patient's new images.    Medications: Reviewed.    Assessment & Plan   Impression        I25.10, CABG 03/01/23    Hypertension    Hyperlipidemia LDL goal <70    Type 2 diabetes mellitus with hypoglycemia, with long-term current use of insulin (HCC)    Ischemic cardiomyopathy    Mucopurulent chronic bronchitis (HCC)    Prolonged Q-T interval on ECG    Acute renal failure (ARF) (HCC)    Shock (HCC)    Acute respiratory failure with hypoxia (HCC)    H/O Asthma    Lab Results   Lab Value Date/Time    HGBA1C 7.40 (H) 02/28/2023 0835    HGBA1C 7.1 12/13/2022 1330    HGBA1C 7.1 06/16/2022 0944     Results from last 7 days   Lab Units 03/14/23  1122 03/14/23  0712 03/13/23 2007 03/13/23  1616 03/13/23  1121 03/13/23  0716 03/12/23  2035 03/12/23  1610   GLUCOSE mg/dL 123 136* 147* 131* 96 127 170* 157*      Diet: NPO Diet NPO Type: Strict NPO   Advance Directives: Code Status and Medical Interventions:   Ordered at: 03/01/23 1117     Code Status (Patient has no pulse and is not breathing):    CPR (Attempt to Resuscitate)     Medical Interventions  (Patient has pulse or is breathing):    Full Support     Release to patient:    Routine Release        In brief:  1. Fluconazole x 1 dose 03/14/23  1. GI evaluation r/o Esophagitis  2. Tunneled Dialysis access (probably today, by IR)  3. iHD per Renal  4. ABs: Piperacillin-Tazobactam + Fluconazole (100 mg dose on 03/13/23) (We will schedule 50 mg today, since it is his non-dialysis day).  5. Goal: Glucose < 180 mg/dL.   1. Insulin: Basal, Prandial and Correction  6. Disposition: Keep in ICU.    Plan of care and goals reviewed during interdisciplinary rounds.  I discussed the patient's findings and my recommendations with patient and nursing staff    MDM:    Problem(s) High due to: Acute or Chronic illness or injury that may poses a threat to life or bodily function  Data: Moderate due to: Review or results of each unique test and Ordering of each unique test  Risk: High due to: drug(s) requiring intensive monitoring for toxicity    High      [] Primary Attending Intensive Care Medicine - Nutrition Support   [x] Consultant    Copied text in this note has been reviewed and is accurate as of 03/14/23

## 2023-03-14 NOTE — NURSING NOTE
15.5 Hebrew Image guided tunneled dialysis catheter placed to right internal jugular by Dr Valenzuela. Patient tolerated well. Sedation time of 16 minutes. Patient was given 50 mcg Fentanyl & 1.5 mg Versed. Report given to nurse Shafer.

## 2023-03-15 ENCOUNTER — APPOINTMENT (OUTPATIENT)
Dept: NEPHROLOGY | Facility: HOSPITAL | Age: 68
DRG: 235 | End: 2023-03-15
Payer: MEDICARE

## 2023-03-15 LAB
ALBUMIN SERPL-MCNC: 2.9 G/DL (ref 3.5–5.2)
ALBUMIN/GLOB SERPL: 1.4 G/DL
ALP SERPL-CCNC: 158 U/L (ref 39–117)
ALT SERPL W P-5'-P-CCNC: 33 U/L (ref 1–41)
ANION GAP SERPL CALCULATED.3IONS-SCNC: 13 MMOL/L (ref 5–15)
AST SERPL-CCNC: 37 U/L (ref 1–40)
BASOPHILS # BLD AUTO: 0.06 10*3/MM3 (ref 0–0.2)
BASOPHILS NFR BLD AUTO: 0.5 % (ref 0–1.5)
BILIRUB SERPL-MCNC: 0.3 MG/DL (ref 0–1.2)
BUN SERPL-MCNC: 42 MG/DL (ref 8–23)
BUN/CREAT SERPL: 7.4 (ref 7–25)
CALCIUM SPEC-SCNC: 7.7 MG/DL (ref 8.6–10.5)
CHLORIDE SERPL-SCNC: 97 MMOL/L (ref 98–107)
CO2 SERPL-SCNC: 23 MMOL/L (ref 22–29)
CREAT SERPL-MCNC: 5.69 MG/DL (ref 0.76–1.27)
DEPRECATED RDW RBC AUTO: 61.5 FL (ref 37–54)
EGFRCR SERPLBLD CKD-EPI 2021: 10.2 ML/MIN/1.73
EOSINOPHIL # BLD AUTO: 0.28 10*3/MM3 (ref 0–0.4)
EOSINOPHIL NFR BLD AUTO: 2.2 % (ref 0.3–6.2)
ERYTHROCYTE [DISTWIDTH] IN BLOOD BY AUTOMATED COUNT: 18.4 % (ref 12.3–15.4)
GLOBULIN UR ELPH-MCNC: 2.1 GM/DL
GLUCOSE BLDC GLUCOMTR-MCNC: 130 MG/DL (ref 70–130)
GLUCOSE BLDC GLUCOMTR-MCNC: 143 MG/DL (ref 70–130)
GLUCOSE BLDC GLUCOMTR-MCNC: 152 MG/DL (ref 70–130)
GLUCOSE BLDC GLUCOMTR-MCNC: 161 MG/DL (ref 70–130)
GLUCOSE SERPL-MCNC: 158 MG/DL (ref 65–99)
HCT VFR BLD AUTO: 27.5 % (ref 37.5–51)
HGB BLD-MCNC: 8.6 G/DL (ref 13–17.7)
IMM GRANULOCYTES # BLD AUTO: 0.1 10*3/MM3 (ref 0–0.05)
IMM GRANULOCYTES NFR BLD AUTO: 0.8 % (ref 0–0.5)
LYMPHOCYTES # BLD AUTO: 1.19 10*3/MM3 (ref 0.7–3.1)
LYMPHOCYTES NFR BLD AUTO: 9.2 % (ref 19.6–45.3)
MCH RBC QN AUTO: 29.5 PG (ref 26.6–33)
MCHC RBC AUTO-ENTMCNC: 31.3 G/DL (ref 31.5–35.7)
MCV RBC AUTO: 94.2 FL (ref 79–97)
MONOCYTES # BLD AUTO: 2.08 10*3/MM3 (ref 0.1–0.9)
MONOCYTES NFR BLD AUTO: 16.1 % (ref 5–12)
NEUTROPHILS NFR BLD AUTO: 71.2 % (ref 42.7–76)
NEUTROPHILS NFR BLD AUTO: 9.19 10*3/MM3 (ref 1.7–7)
NRBC BLD AUTO-RTO: 0 /100 WBC (ref 0–0.2)
PHOSPHATE SERPL-MCNC: 6.6 MG/DL (ref 2.5–4.5)
PLATELET # BLD AUTO: 244 10*3/MM3 (ref 140–450)
PMV BLD AUTO: 10.7 FL (ref 6–12)
POTASSIUM SERPL-SCNC: 5.1 MMOL/L (ref 3.5–5.2)
PROT SERPL-MCNC: 5 G/DL (ref 6–8.5)
RBC # BLD AUTO: 2.92 10*6/MM3 (ref 4.14–5.8)
SODIUM SERPL-SCNC: 133 MMOL/L (ref 136–145)
WBC NRBC COR # BLD: 12.9 10*3/MM3 (ref 3.4–10.8)

## 2023-03-15 PROCEDURE — 84100 ASSAY OF PHOSPHORUS: CPT | Performed by: INTERNAL MEDICINE

## 2023-03-15 PROCEDURE — 63710000001 INSULIN LISPRO (HUMAN) PER 5 UNITS: Performed by: STUDENT IN AN ORGANIZED HEALTH CARE EDUCATION/TRAINING PROGRAM

## 2023-03-15 PROCEDURE — 99024 POSTOP FOLLOW-UP VISIT: CPT | Performed by: THORACIC SURGERY (CARDIOTHORACIC VASCULAR SURGERY)

## 2023-03-15 PROCEDURE — 97535 SELF CARE MNGMENT TRAINING: CPT

## 2023-03-15 PROCEDURE — 94799 UNLISTED PULMONARY SVC/PX: CPT

## 2023-03-15 PROCEDURE — 85025 COMPLETE CBC W/AUTO DIFF WBC: CPT | Performed by: INTERNAL MEDICINE

## 2023-03-15 PROCEDURE — 63710000001 INSULIN LISPRO (HUMAN) PER 5 UNITS: Performed by: INTERNAL MEDICINE

## 2023-03-15 PROCEDURE — 99232 SBSQ HOSP IP/OBS MODERATE 35: CPT | Performed by: INTERNAL MEDICINE

## 2023-03-15 PROCEDURE — 25010000002 HEPARIN (PORCINE) PER 1000 UNITS: Performed by: INTERNAL MEDICINE

## 2023-03-15 PROCEDURE — 80053 COMPREHEN METABOLIC PANEL: CPT | Performed by: INTERNAL MEDICINE

## 2023-03-15 PROCEDURE — 97530 THERAPEUTIC ACTIVITIES: CPT

## 2023-03-15 PROCEDURE — 94664 DEMO&/EVAL PT USE INHALER: CPT

## 2023-03-15 PROCEDURE — 82962 GLUCOSE BLOOD TEST: CPT

## 2023-03-15 PROCEDURE — 94761 N-INVAS EAR/PLS OXIMETRY MLT: CPT

## 2023-03-15 PROCEDURE — 63710000001 INSULIN DETEMIR PER 5 UNITS: Performed by: STUDENT IN AN ORGANIZED HEALTH CARE EDUCATION/TRAINING PROGRAM

## 2023-03-15 RX ORDER — FLUCONAZOLE 100 MG/1
100 TABLET ORAL 3 TIMES WEEKLY
Status: DISCONTINUED | OUTPATIENT
Start: 2023-03-15 | End: 2023-03-20 | Stop reason: HOSPADM

## 2023-03-15 RX ORDER — IPRATROPIUM BROMIDE AND ALBUTEROL SULFATE 2.5; .5 MG/3ML; MG/3ML
3 SOLUTION RESPIRATORY (INHALATION) 4 TIMES DAILY PRN
Status: DISCONTINUED | OUTPATIENT
Start: 2023-03-15 | End: 2023-03-20 | Stop reason: HOSPADM

## 2023-03-15 RX ORDER — ARFORMOTEROL TARTRATE 15 UG/2ML
15 SOLUTION RESPIRATORY (INHALATION) 2 TIMES DAILY PRN
Status: DISCONTINUED | OUTPATIENT
Start: 2023-03-15 | End: 2023-03-20 | Stop reason: HOSPADM

## 2023-03-15 RX ORDER — AMOXICILLIN 250 MG
2 CAPSULE ORAL 2 TIMES DAILY
Status: DISCONTINUED | OUTPATIENT
Start: 2023-03-15 | End: 2023-03-20 | Stop reason: HOSPADM

## 2023-03-15 RX ORDER — POLYETHYLENE GLYCOL 3350 17 G/17G
17 POWDER, FOR SOLUTION ORAL DAILY
Status: DISCONTINUED | OUTPATIENT
Start: 2023-03-15 | End: 2023-03-20 | Stop reason: HOSPADM

## 2023-03-15 RX ORDER — SODIUM CHLORIDE 0.9 % (FLUSH) 0.9 %
10 SYRINGE (ML) INJECTION EVERY 12 HOURS SCHEDULED
Status: DISCONTINUED | OUTPATIENT
Start: 2023-03-15 | End: 2023-03-20 | Stop reason: HOSPADM

## 2023-03-15 RX ORDER — FLUCONAZOLE 100 MG/1
50 TABLET ORAL
Status: DISCONTINUED | OUTPATIENT
Start: 2023-03-16 | End: 2023-03-20 | Stop reason: HOSPADM

## 2023-03-15 RX ORDER — AMIODARONE HYDROCHLORIDE 200 MG/1
200 TABLET ORAL
Status: DISCONTINUED | OUTPATIENT
Start: 2023-03-16 | End: 2023-03-20 | Stop reason: HOSPADM

## 2023-03-15 RX ADMIN — ASPIRIN 325 MG: 325 TABLET, COATED ORAL at 10:05

## 2023-03-15 RX ADMIN — INSULIN LISPRO 2 UNITS: 100 INJECTION, SOLUTION INTRAVENOUS; SUBCUTANEOUS at 08:05

## 2023-03-15 RX ADMIN — Medication 10 ML: at 20:59

## 2023-03-15 RX ADMIN — INSULIN LISPRO 2 UNITS: 100 INJECTION, SOLUTION INTRAVENOUS; SUBCUTANEOUS at 11:41

## 2023-03-15 RX ADMIN — Medication 5 MG: at 20:59

## 2023-03-15 RX ADMIN — HEPARIN SODIUM 2000 UNITS: 1000 INJECTION INTRAVENOUS; SUBCUTANEOUS at 15:12

## 2023-03-15 RX ADMIN — ACETAMINOPHEN 325MG 650 MG: 325 TABLET ORAL at 22:00

## 2023-03-15 RX ADMIN — METOPROLOL SUCCINATE 25 MG: 25 TABLET, EXTENDED RELEASE ORAL at 08:05

## 2023-03-15 RX ADMIN — INSULIN LISPRO 2 UNITS: 100 INJECTION, SOLUTION INTRAVENOUS; SUBCUTANEOUS at 20:59

## 2023-03-15 RX ADMIN — SENNOSIDES AND DOCUSATE SODIUM 2 TABLET: 50; 8.6 TABLET ORAL at 08:05

## 2023-03-15 RX ADMIN — IPRATROPIUM BROMIDE AND ALBUTEROL SULFATE 3 ML: 2.5; .5 SOLUTION RESPIRATORY (INHALATION) at 06:52

## 2023-03-15 RX ADMIN — PANTOPRAZOLE SODIUM 40 MG: 40 TABLET, DELAYED RELEASE ORAL at 05:26

## 2023-03-15 RX ADMIN — AMIODARONE HYDROCHLORIDE 200 MG: 200 TABLET ORAL at 08:05

## 2023-03-15 RX ADMIN — IPRATROPIUM BROMIDE AND ALBUTEROL SULFATE 3 ML: 2.5; .5 SOLUTION RESPIRATORY (INHALATION) at 11:10

## 2023-03-15 RX ADMIN — ARFORMOTEROL TARTRATE 15 MCG: 15 SOLUTION RESPIRATORY (INHALATION) at 06:52

## 2023-03-15 RX ADMIN — FLUCONAZOLE 100 MG: 100 TABLET ORAL at 16:21

## 2023-03-15 RX ADMIN — MIDODRINE HYDROCHLORIDE 10 MG: 10 TABLET ORAL at 20:58

## 2023-03-15 RX ADMIN — INSULIN LISPRO 2 UNITS: 100 INJECTION, SOLUTION INTRAVENOUS; SUBCUTANEOUS at 11:42

## 2023-03-15 RX ADMIN — ROSUVASTATIN CALCIUM 10 MG: 10 TABLET, FILM COATED ORAL at 20:58

## 2023-03-15 RX ADMIN — MIDODRINE HYDROCHLORIDE 10 MG: 10 TABLET ORAL at 16:21

## 2023-03-15 RX ADMIN — INSULIN DETEMIR 10 UNITS: 100 INJECTION, SOLUTION SUBCUTANEOUS at 20:59

## 2023-03-15 RX ADMIN — CYCLOBENZAPRINE 10 MG: 10 TABLET, FILM COATED ORAL at 22:00

## 2023-03-15 RX ADMIN — INSULIN LISPRO 2 UNITS: 100 INJECTION, SOLUTION INTRAVENOUS; SUBCUTANEOUS at 17:52

## 2023-03-15 NOTE — PLAN OF CARE
Goal Outcome Evaluation:  Plan of Care Reviewed With: patient        Progress: improving  Outcome Evaluation: Patient continues to show improvement ambulating 220' CGA with BUE support on tele monitor, limited by fatigue. Balance continues to improve as well, no posterior lean noted with mobility today. Patient remains below his baseline indicating IPPT, however, based on progress will change D/C rec to home with assist.

## 2023-03-15 NOTE — PROGRESS NOTES
Multidisciplinary Rounds    Patient Name: Herbert Harley  Date of Encounter: 03/15/23 09:05 EDT  MRN: [id number]  Admission date: 3/1/2023    Reason for visit: MDR. RD to continue to follow per protocol.     Additional information obtained during MDR: S/P GI consult yesterday, no significant finding.  CT was removed this morning. Patient reported tolerating food better.     Patient reports he is feeling much better with chest tube removed. No c/o post prandial abdominal pain.  He ate better for breakfast.  He drank about 1/2 premier protein supplement last night.     Current diet: Renal   Supplement: Boost Breeze BID, Premier protein daily     Evaluation of Received Nutrient/Fluid Intake:  Insufficient data     EMR reviewed     Intervention:  Follow treatment plan  Care plan reviewed  Review menu options '  Provide supplements as ordered.     Follow up:   Per protocol      Yenny Luu RD  09:05 EDT  Time: 20min

## 2023-03-15 NOTE — PROGRESS NOTES
"INTENSIVIST   PROGRESS NOTE        SUBJECTIVE     Herbert \"Herb\" 67 y.o. male is followed for: No chief complaint on file.       I25.10, CABG 23    Hypertension    Hyperlipidemia LDL goal <70    Type 2 diabetes mellitus with hypoglycemia, with long-term current use of insulin (HCC)    Ischemic cardiomyopathy    Mucopurulent chronic bronchitis (HCC)    Prolonged Q-T interval on ECG    Acute renal failure (ARF) (HCC)    Shock (HCC)    Acute respiratory failure with hypoxia (HCC)    As an Intensivist, we provide an integrated approach to the ICU patient and family, medical management of comorbid conditions, including but not limited to electrolytes, glycemic control, organ dysfunction, lead interdisciplinary rounds and coordinate the care with all other services, including those from other specialists.     Interval History:  POD: 14 Days Post-Op (CABG)    Eating better. Less pain.  On ambient air.  Tunneled catheter placed yesterday.    Temp  Min: 98.3 °F (36.8 °C)  Max: 98.6 °F (37 °C)       History     Last Reviewed by Jam Shah MD on 3/13/2023 at  7:07 PM    Sections Reviewed    Medical, Family, Surgical, Tobacco, Alcohol, Drug Use, Sexual Activity,   Social Documentation      Problem list reviewed by Jam Shah MD on 3/13/2023 at  7:06 PM  Medicines reviewed by Jam Shah MD on 3/13/2023 at  7:06 PM  Allergies reviewed by Jam Shah MD on 3/13/2023 at  7:06 PM     The patient's relevant past medical, surgical and social history were reviewed and updated in Epic as appropriate.        OBJECTIVE     Vitals:  Temp: 98.6 °F (37 °C) (03/15/23 0800) Temp  Min: 98.3 °F (36.8 °C)  Max: 98.6 °F (37 °C)   Temp core:      BP: 123/55 (03/15/23 1000) BP  Min: 107/52  Max: 143/56   MAP (non-invasive) Noninvasive MAP (mmHg): 78 (03/15/23 1000) Noninvasive MAP (mmHg)  Av.3  Min: 41  Max: 123   Pulse: 73 (03/15/23 1000) Pulse  Min: 58  Max: 74   Resp: 16 (03/15/23 0800) Resp  Min: 12  Max: 26   SpO2: " 95 % (03/15/23 1000) SpO2  Min: 92 %  Max: 100 %   Device: room air (03/15/23 1000)    Flow Rate: 2 (03/07/23 0400) No data recorded         03/10/23  0600 03/11/23  0513   Weight: 80.5 kg (177 lb 7.5 oz) 81.9 kg (180 lb 8.9 oz)        Intake/Ouptut 24 hrs (7:00AM - 6:59 AM)  Intake & Output (last 3 days)       03/12 0701 03/13 0700 03/13 0701  03/14 0700 03/14 0701  03/15 0700 03/15 0701  03/16 0700    P.O. 840 240  240    I.V. (mL/kg) 37.7 (0.5) 210 (2.6)      Blood  289      Other        IV Piggyback 50 100      Total Intake(mL/kg) 927.7 (11.3) 839 (10.2)  240 (2.9)    Urine (mL/kg/hr) 200 (0.1) 0 (0) 600 (0.3)     Other  2000      Stool  0      Chest Tube 400 96 35     Total Output 600 2096 635     Net +327.7 -1257 -635 +240            Urine Unmeasured Occurrence  1 x      Stool Unmeasured Occurrence  1 x          Physical Examination  Telemetry:  Rhythm: normal sinus rhythm (03/15/23 1000)  Atrial Rhythm: atrial fibrillation (03/06/23 1400)      Constitutional:  No acute distress.   Cardiovascular: RRR.    Respiratory: Normal breath sounds  No adventitious sounds.   Abdominal:  Soft with no tenderness.   Extremities: Edema   Neurological:   Alert, Oriented, Cooperative.  Best Eye Response: 4-->(E4) spontaneous (03/14/23 2000)  Best Motor Response: 6-->(M6) obeys commands (03/14/23 2000)  Best Verbal Response: 5-->(V5) oriented (03/14/23 2000)  [place] Coma Scale Score: 15 (03/14/23 2000)      Results Reviewed:  Laboratory  Microbiology  Radiology  Pathology    Hematology:  Results from last 7 days   Lab Units 03/15/23  0525 03/14/23  0454 03/10/23  0050 03/09/23  0508   WBC 10*3/mm3 12.90* 13.77*   < > 19.97*   HEMOGLOBIN g/dL 8.6* 9.2*   < > 9.0*   MCV fL 94.2 93.4   < > 91.7   PLATELETS 10*3/mm3 244 187   < > 85*   NEUTROS ABS 10*3/mm3 9.19* 9.98*   < > 14.58*   LYMPHS ABS 10*3/mm3  --   --   --  3.00   EOS ABS 10*3/mm3 0.28 0.28   < > 0.20    < > = values in this interval not displayed.        Chemistry:  Estimated Creatinine Clearance: 12.2 mL/min (A) (by C-G formula based on SCr of 5.69 mg/dL (H)).    Results from last 7 days   Lab Units 03/15/23  0525 03/14/23 0454   SODIUM mmol/L 133* 132*   POTASSIUM mmol/L 5.1 4.9   CHLORIDE mmol/L 97* 97*   CO2 mmol/L 23.0 19.0*   BUN mg/dL 42* 31*   CREATININE mg/dL 5.69* 4.24*   GLUCOSE mg/dL 158* 122*     Results from last 7 days   Lab Units 03/15/23  0525 03/14/23  0454 03/13/23  0356 03/12/23  0436 03/11/23  0420 03/10/23  1558 03/10/23  0737   IONIZED CALCIUM mmol/L  --   --   --   --   --  1.11* 1.14   CALCIUM mg/dL 7.7* 7.8* 7.5*   < > 7.4* 7.1* 7.1*   MAGNESIUM mg/dL  --   --  2.1  --  2.3 2.3 2.3   PHOSPHORUS mg/dL 6.6*  --  6.2*  --  5.3* 4.2 4.6*    < > = values in this interval not displayed.     Results from last 7 days   Lab Units 03/15/23  0525 03/14/23  0454   BILIRUBIN mg/dL 0.3 0.4   AST (SGOT) U/L 37 42*   ALT (SGPT) U/L 33 33   ALK PHOS U/L 158* 161*     Biomarkers:  Results from last 7 days   Lab Units 03/14/23  0454 03/10/23  0050   PROCALCITONIN ng/mL 0.61* 1.36*     COVID-19  Lab Results   Component Value Date    COVID19 Not Detected 12/11/2022     Images:  IR Tunneled Catheter    Result Date: 3/14/2023  Impression: Technically successful placement of a tunneled dialysis catheter with the tip at just below the cavoatrial junction under ultrasound and fluoroscopy. Electronically Signed: Juanjo Valenzuela  3/14/2023 2:19 PM EDT  Workstation ID: [id number]    FL Video Swallow With Speech Single Contrast    Result Date: 3/14/2023  Impression: Fluoroscopy provided for a modified barium swallow, and limited upper GI series. Please see speech therapy report for full details and recommendations. Limited upper GI series appeared within normal limits. Electronically Signed: Buddy Chandler  3/14/2023 7:52 AM EDT  Workstation ID: [id number]    FL Limited Ugi For Mbs Reflux Single-Contrast    Result Date: 3/14/2023  Impression: Fluoroscopy provided for  a modified barium swallow, and limited upper GI series. Please see speech therapy report for full details and recommendations. Limited upper GI series appeared within normal limits. Electronically Signed: Buddy Chandler  3/14/2023 7:52 AM EDT  Workstation ID: [id number]    Echo:  Results for orders placed during the hospital encounter of 03/01/23    Adult Transthoracic Echo Complete W/ Cont if Necessary Per Protocol    Interpretation Summary  •  Left ventricular systolic function is mildly decreased. Calculated left ventricular EF = 40.4% Left ventricular ejection fraction appears to be 41 - 45%.  •  The right ventricular cavity is dilated.  •  Left atrial volume is mildly increased.  •  There is calcification of the aortic valve.  •  Estimated right ventricular systolic pressure from tricuspid regurgitation is normal (<35 mmHg).  •  There is a left pleural effusion.      Results: Reviewed.  I reviewed the patient's new laboratory and imaging results.  I independently reviewed the patient's new images.    Medications: Reviewed.    Assessment & Plan   Impression        I25.10, CABG 03/01/23    Hypertension    Hyperlipidemia LDL goal <70    Type 2 diabetes mellitus with hypoglycemia, with long-term current use of insulin (HCC)    Ischemic cardiomyopathy    Mucopurulent chronic bronchitis (HCC)    Prolonged Q-T interval on ECG    Acute renal failure (ARF) (HCC)    Shock (HCC)    Acute respiratory failure with hypoxia (HCC)    H/O Asthma    Lab Results   Lab Value Date/Time    HGBA1C 7.40 (H) 02/28/2023 0835    HGBA1C 7.1 12/13/2022 1330    HGBA1C 7.1 06/16/2022 0944     Results from last 7 days   Lab Units 03/15/23  1102 03/15/23  0705 03/14/23  1957 03/14/23  1633 03/14/23  1122 03/14/23  0712 03/13/23 2007 03/13/23  1616   GLUCOSE mg/dL 161* 143* 164* 169* 123 136* 147* 131*      Diet: Diet: Cardiac Diets, Regular/House Diet, Diabetic Diets, Renal Diets; Healthy Heart (2-3 Na+); Consistent Carbohydrate; Low Sodium  (2-3g), Low Potassium, Low Phosphorus; Texture: Regular Texture (IDDSI 7); Fluid Consistency: Thin (IDDSI 0)   Advance Directives: Code Status and Medical Interventions:   Ordered at: 03/01/23 1117     Code Status (Patient has no pulse and is not breathing):    CPR (Attempt to Resuscitate)     Medical Interventions (Patient has pulse or is breathing):    Full Support     Release to patient:    Routine Release        In brief:  1. GI concurs to continue with Fluconazole. No EGD at present  1. Fluconazole 100 mg after dialysis (on dialysis days) and 50 mg on the other days. Total at least 10 days.  2. Tunneled Dialysis access (probably today, by IR)  3. iHD Today as per Renal  4. Goal: Glucose < 180 mg/dL.   1. Insulin: Basal, Prandial and Correction  2. No changes in doses.  5. Disposition: Transfer to Telemetry Unit. Agree with CT surgery. Ready to transfer to floor.    Plan of care and goals reviewed during interdisciplinary rounds.  I discussed the patient's findings and my recommendations with patient and nursing staff    MDM:    Problem(s) High due to: Acute or Chronic illness or injury that may poses a threat to life or bodily function  Data: Moderate due to: Review or results of each unique test  Risk: Moderate due to: prescription drug management    Moderate      [] Primary Attending Intensive Care Medicine - Nutrition Support   [x] Consultant    Copied text in this note has been reviewed and is accurate as of 03/15/23

## 2023-03-15 NOTE — PLAN OF CARE
Goal Outcome Evaluation:  Plan of Care Reviewed With: patient        Progress: improving  Outcome Evaluation: Pt with improved functional mobility, ADLs, and activity tolerance this session. Pt continues to require cues to maintain sternal precautions with bed mobility and transfers. Rec home with assist and home health OT at [place].

## 2023-03-15 NOTE — PROGRESS NOTES
"   LOS: 14 days    Patient Care Team:  Yessica Ricci DO as PCP - General (Family Medicine)  Nima Peter MD as Consulting Physician (Endocrinology)  Nadya Ramirez MD as Cardiologist (Cardiology)  Yessica Ricci DO as Consulting Physician (Family Medicine)    Chief Complaint: Acute kidney injury s/p CABG  67-year-old with history of hypertension, diabetes, former smoker 1-1/2 pack/day for quite some time, CAD s/p PCI in 2014, underwent CABG x3, post CABG remain hypotensive blood pressure in the 40s systolic in 60s and 70s 80s range requiring IV pressors.  Developed [place] oliguric, now an uric  Subjective . F/U [place].    Interval History:   HD yesterday tolerated well. UF 1.5 liter. Doing well otherwise.      Review of Systems:   No new complaints. NO CP/SOB/N/V/D/ITCHING.    Objective     Vital Sign Min/Max for last 24 hours  Temp  Min: 98.3 °F (36.8 °C)  Max: 98.6 °F (37 °C)   BP  Min: 107/52  Max: 143/56   Pulse  Min: 58  Max: 74   Resp  Min: 12  Max: 26   SpO2  Min: 92 %  Max: 100 %   No data recorded   No data recorded     Flowsheet Rows    Flowsheet Row First Filed Value   Admission Height 162.6 cm (64\") Documented at 03/01/2023 0611   Admission Weight 75.3 kg (166 lb) Documented at 03/01/2023 0611          No intake/output data recorded.  I/O last 3 completed shifts:  In: 234 [I.V.:184; IV Piggyback:50]  Out: 651 [Urine:600; Chest Tube:51]    Physical Exam:  General Appearance:  male awake alert oriented no obvious distress, sitting and eating at this time.  Eyes: PER, EOMI.  Neck: Supple no JVD.  Right-sided nontunneled catheter.  Left side old nontunneled catheter in place  Chest: Chest tube in place.  Lungs: Clear auscultation, no rales rhonchi's, equal chest movement, nonlabored.  Heart: No gallop, murmur, rub, RRR.  Abdomen: Soft, nontender, positive bowel sounds, no organomegaly.  Extremities: 2+ Positive bilateral lower extremity dependent edema, no cyanosis.  Neuro: No focal " deficit, moving all extremities, alert oriented X 3  : Solomon catheter in place.  Minimal urine output  Skin warm and dry.  Psych mood and affect appropriate  THD CATH  WBC WBC   Date Value Ref Range Status   03/15/2023 12.90 (H) 3.40 - 10.80 10*3/mm3 Final   03/14/2023 13.77 (H) 3.40 - 10.80 10*3/mm3 Final   03/13/2023 13.66 (H) 3.40 - 10.80 10*3/mm3 Final   03/12/2023 13.35 (H) 3.40 - 10.80 10*3/mm3 Final      HGB Hemoglobin   Date Value Ref Range Status   03/15/2023 8.6 (L) 13.0 - 17.7 g/dL Final   03/14/2023 9.2 (L) 13.0 - 17.7 g/dL Final   03/13/2023 6.9 (C) 13.0 - 17.7 g/dL Final   03/12/2023 7.5 (L) 13.0 - 17.7 g/dL Final      HCT Hematocrit   Date Value Ref Range Status   03/15/2023 27.5 (L) 37.5 - 51.0 % Final   03/14/2023 29.5 (L) 37.5 - 51.0 % Final   03/13/2023 21.9 (L) 37.5 - 51.0 % Final   03/12/2023 23.7 (L) 37.5 - 51.0 % Final      Platlets No results found for: LABPLAT   MCV MCV   Date Value Ref Range Status   03/15/2023 94.2 79.0 - 97.0 fL Final   03/14/2023 93.4 79.0 - 97.0 fL Final   03/13/2023 96.5 79.0 - 97.0 fL Final   03/12/2023 94.8 79.0 - 97.0 fL Final          Sodium Sodium   Date Value Ref Range Status   03/15/2023 133 (L) 136 - 145 mmol/L Final   03/14/2023 132 (L) 136 - 145 mmol/L Final   03/13/2023 131 (L) 136 - 145 mmol/L Final      Potassium Potassium   Date Value Ref Range Status   03/15/2023 5.1 3.5 - 5.2 mmol/L Final   03/14/2023 4.9 3.5 - 5.2 mmol/L Final   03/13/2023 4.8 3.5 - 5.2 mmol/L Final      Chloride Chloride   Date Value Ref Range Status   03/15/2023 97 (L) 98 - 107 mmol/L Final   03/14/2023 97 (L) 98 - 107 mmol/L Final   03/13/2023 98 98 - 107 mmol/L Final      CO2 CO2   Date Value Ref Range Status   03/15/2023 23.0 22.0 - 29.0 mmol/L Final   03/14/2023 19.0 (L) 22.0 - 29.0 mmol/L Final   03/13/2023 21.0 (L) 22.0 - 29.0 mmol/L Final      BUN BUN   Date Value Ref Range Status   03/15/2023 42 (H) 8 - 23 mg/dL Final   03/14/2023 31 (H) 8 - 23 mg/dL Final   03/13/2023 42  (H) 8 - 23 mg/dL Final      Creatinine Creatinine   Date Value Ref Range Status   03/15/2023 5.69 (H) 0.76 - 1.27 mg/dL Final   03/14/2023 4.24 (H) 0.76 - 1.27 mg/dL Final   03/13/2023 5.82 (H) 0.76 - 1.27 mg/dL Final      Calcium Calcium   Date Value Ref Range Status   03/15/2023 7.7 (L) 8.6 - 10.5 mg/dL Final   03/14/2023 7.8 (L) 8.6 - 10.5 mg/dL Final   03/13/2023 7.5 (L) 8.6 - 10.5 mg/dL Final      PO4 No results found for: CAPO4   Albumin Albumin   Date Value Ref Range Status   03/15/2023 2.9 (L) 3.5 - 5.2 g/dL Final   03/14/2023 2.8 (L) 3.5 - 5.2 g/dL Final   03/13/2023 2.5 (L) 3.5 - 5.2 g/dL Final      Magnesium Magnesium   Date Value Ref Range Status   03/13/2023 2.1 1.6 - 2.4 mg/dL Final      Uric Acid No results found for: URICACID        Results Review:     I reviewed the patient's new clinical results.    [START ON 3/16/2023] amiodarone, 200 mg, Oral, Q24H  arformoterol, 15 mcg, Nebulization, BID - RT  aspirin, 325 mg, Oral, Daily  insulin detemir, 10 Units, Subcutaneous, Nightly  insulin lispro, 0-9 Units, Subcutaneous, 4x Daily With Meals & Nightly  Insulin Lispro, 2 Units, Subcutaneous, TID With Meals  ipratropium-albuterol, 3 mL, Nebulization, 4x Daily - RT  metoprolol succinate XL, 25 mg, Oral, Q24H  midodrine, 10 mg, Oral, Q8H  pantoprazole, 40 mg, Oral, Q AM  pharmacy consult - MTM, , Does not apply, BID  rosuvastatin, 10 mg, Oral, Nightly  senna-docusate sodium, 2 tablet, Oral, BID      norepinephrine, 0.02-0.3 mcg/kg/min, Last Rate: Stopped (03/10/23 0638)        Medication Review: Reviewed    Assessment & Plan       I25.10, CABG 03/01/23    Hypertension    Hyperlipidemia LDL goal <70    Type 2 diabetes mellitus with hypoglycemia, with long-term current use of insulin (HCC)    Ischemic cardiomyopathy    Mucopurulent chronic bronchitis (HCC)    Prolonged Q-T interval on ECG    Acute renal failure (ARF) (HCC)    Shock (HCC)    Acute respiratory failure with hypoxia (HCC)     1.  [place]: ATN likely  secondary to shock with systolic blood pressure postsurgery in the range of 40s to 80s, now on multiple vasopressors.  Admit creatinine 1.08, creatinine progressively getting worse.  Patient is oliguric.  C3 complement 18, C4 complement 83 within normal range.  Ultrasound of the kidney shows right kidney 11 cm left kidney 10.6 cm normal ultrasound, bladder ultrasound normal. NONOLIGURIC BUT  NO RECOVERY GFR YET.   2.  S/p CABG x3  3.  Ischemic cardiomyopathy with ejection fraction 41-45%.  4.  Diabetes type 2.  5.  Cardiogenic shock. Resolved.     Recommendations:  THD CATH YESTERDAY.. HD TODAY 3/15/23. START TO MAKE ARRANGEMENTS FOR OUTPATIENT HD HERE IN [place]. D/W PATIENT/ICU NURSE/DIALYSIS NURSE/DR HERNANDEZ. .   Nicolas Pritchett MD  03/15/23  09:10 EDT

## 2023-03-15 NOTE — PROGRESS NOTES
Odynophagia is improving.  He ate 100% of breakfast and lunch.  Will defer endoscopy.      Recommend continue 14-day course of Diflucan.  If he has recurrent symptoms he can contact the office for further evaluation    GI will sign off at this time

## 2023-03-15 NOTE — PLAN OF CARE
Goal Outcome Evaluation:    Neuro intact. VSS on RA. NSR/SB, HR 58-70's. Ambulated 220 ft with one assist. No complaints at this time. Plan for HD today.     Urine output 500 mL voided in urinal    MT output = 30 mL

## 2023-03-15 NOTE — THERAPY TREATMENT NOTE
Patient Name: Herbert Harley  : 1955    MRN: [id number]                              Today's Date: 3/15/2023       Admit Date: 3/1/2023    Visit Dx:     ICD-10-CM ICD-9-CM   1. Coronary artery disease involving native coronary artery of native heart without angina pectoris  I25.10 414.01   2. Coronary artery disease involving native coronary artery of native heart, unspecified whether angina present  I25.10 414.01   3. Acute renal failure, unspecified acute renal failure type (HCC)  N17.9 584.9   4. Acute respiratory failure with hypoxia (HCC)  J96.01 518.81   5. Dysphagia, unspecified type  R13.10 787.20     Patient Active Problem List   Diagnosis   • I25.10, CABG 23   • Hypertension   • Hyperlipidemia LDL goal <70   • Type 2 diabetes mellitus with hypoglycemia, with long-term current use of insulin (HCC)   • Ischemic cardiomyopathy   • Allergic rhinitis   • Positive cardiac stress test   • Mucopurulent chronic bronchitis (HCC)   • Prolonged Q-T interval on ECG   • Acute renal failure (ARF) (HCC)   • Shock (HCC)   • Acute respiratory failure with hypoxia (HCC)     Past Medical History:   Diagnosis Date   • Acute renal failure (ARF) (HCC) 3/2/2023   • Asthma 2022   • Chronic systolic congestive heart failure (HCC) 3/1/2023   • Coronary artery disease 2014   • Elevated cholesterol    • History of tobacco abuse    • HLD (hyperlipidemia) 2023   • Hypertension    • Ischemic cardiomyopathy    • Myocardial infarction (HCC) 2014   • Prolonged Q-T interval on ECG 3/1/2023   • Type 2 diabetes mellitus (HCC)    • Wears glasses      Past Surgical History:   Procedure Laterality Date   • CARDIAC CATHETERIZATION  2014   • CARDIAC CATHETERIZATION Left 2023    Procedure: Left Heart Cath;  Surgeon: Nadya Ramirez MD;  Location: [facility] CATH INVASIVE LOCATION;  Service: Cardiology;  Laterality: Left;   • CORONARY ARTERY BYPASS GRAFT N/A 3/1/2023    Procedure: MEDIAN  STERNOTOMY CORONARY ARTERY BYPASS GRAFTING X3  UTILIZING THE LEFT INTERNAL MAMMERY GRAFT, AND EVH OF THE GREATER RIGHT SAPHENOUS VEIN;  Surgeon: Paresh Saavedra MD;  Location: [facility];  Service: Cardiothoracic;  Laterality: N/A;   • CORONARY STENT PLACEMENT  March 2014   • TONSILLECTOMY        General Information     Row Name 03/15/23 1348          OT Time and Intention    Document Type therapy note (daily note)  -AN     Mode of Treatment occupational therapy  -AN     Row Name 03/15/23 1348          General Information    Patient Profile Reviewed yes  -AN     Existing Precautions/Restrictions cardiac;sternal;fall  -AN     Barriers to Rehab medically complex  -AN     Row Name 03/15/23 1348          Cognition    Orientation Status (Cognition) oriented x 4  -AN     Row Name 03/15/23 1348          Safety Issues, Functional Mobility    Safety Issues Affecting Function (Mobility) safety precautions follow-through/compliance;safety precaution awareness;judgment;insight into deficits/self-awareness;awareness of need for assistance;problem-solving;sequencing abilities  -AN     Impairments Affecting Function (Mobility) balance;endurance/activity tolerance;shortness of breath;strength  -AN           User Key  (r) = Recorded By, (t) = Taken By, (c) = Cosigned By    Initials Name Provider Type    AN Rosa De La Paz OT Occupational Therapist                 Mobility/ADL's     Row Name 03/15/23 1349          Bed Mobility    Bed Mobility supine-sit;sit-supine  -AN     Supine-Sit Person (Bed Mobility) minimum assist (75% patient effort);1 person assist;verbal cues  -AN     Sit-Supine Person (Bed Mobility) minimum assist (75% patient effort);1 person assist;verbal cues  -AN     Bed Mobility, Safety Issues decreased use of arms for pushing/pulling;impaired trunk control for bed mobility  -AN     Assistive Device (Bed Mobility) head of bed elevated;draw sheet  -AN     Comment, (Bed Mobility) cues for sequencing of  steps, assist provided at trunk; cues to maintain sternal precautions  -AN     Row Name 03/15/23 1349          Transfers    Transfers sit-stand transfer;stand-sit transfer  -AN     Comment, (Transfers) cues to maintain sternal precautions  -AN     Row Name 03/15/23 1349          Sit-Stand Transfer    Sit-Stand [place] (Transfers) contact guard;verbal cues  -AN     Row Name 03/15/23 1349          Stand-Sit Transfer    Stand-Sit [place] (Transfers) verbal cues;contact guard  -AN     Row Name 03/15/23 1349          Functional Mobility    Functional Mobility- Ind. Level minimum assist (75% patient effort);1 person  -AN     Functional Mobility-Distance (Feet) --  >household distance  -AN     Functional Mobility- Safety Issues sequencing ability decreased;step length decreased;balance decreased during turns  -AN     Functional Mobility- Comment pt ambulated >household distance with Min A provided at gait belt and intermittent hand held; would benefit from RW use for balance next session  -AN     Row Name 03/15/23 1349          Activities of Daily Living    BADL Assessment/Intervention bathing;lower body dressing;upper body dressing  -AN     Row Name 03/15/23 1349          Upper Body Dressing Assessment/Training    [place] Level (Upper Body Dressing) don;doff;set up  gown  -AN     Position (Upper Body Dressing) edge of bed sitting  -AN     Row Name 03/15/23 1349          Lower Body Dressing Assessment/Training    [place] Level (Lower Body Dressing) don;socks;minimum assist (75% patient effort)  -AN     Position (Lower Body Dressing) edge of bed sitting  -AN     Row Name 03/15/23 1349          Bathing Assessment/Intervention    Comment, (Bathing) educated on use of shower chair for safety and fall prevention; verbalized understanding  -AN           User Key  (r) = Recorded By, (t) = Taken By, (c) = Cosigned By    Initials Name Provider Type    Rosa Goddard OT Occupational Therapist                Obj/Interventions     Row Name 03/15/23 1401          Balance    Balance Assessment sitting static balance;sitting dynamic balance;sit to stand dynamic balance;standing static balance;standing dynamic balance  -AN     Static Sitting Balance independent  -AN     Dynamic Sitting Balance independent  -AN     Position, Sitting Balance unsupported;sitting edge of bed  -AN     Sit to Stand Dynamic Balance contact guard  -AN     Static Standing Balance supervision  -AN     Dynamic Standing Balance contact guard  -AN     Position/Device Used, Standing Balance unsupported  -AN           User Key  (r) = Recorded By, (t) = Taken By, (c) = Cosigned By    Initials Name Provider Type    Rosa Goddard OT Occupational Therapist               Goals/Plan    No documentation.                Clinical Impression     Row Name 03/15/23 1402          Pain Assessment    Pretreatment Pain Rating 0/10 - no pain  -AN     Posttreatment Pain Rating 0/10 - no pain  -AN     Pre/Posttreatment Pain Comment asymptomatic  -AN     Pain Intervention(s) Repositioned;Ambulation/increased activity  -AN     Row Name 03/15/23 1402          Plan of Care Review    Plan of Care Reviewed With patient  -AN     Progress improving  -AN     Outcome Evaluation Pt with improved functional mobility, ADLs, and activity tolerance this session. Pt continues to require cues to maintain sternal precautions with bed mobility and transfers. Rec home with assist and home health OT at [place].  -AN     Row Name 03/15/23 1402          Therapy Plan Review/Discharge Plan (OT)    Anticipated Discharge Disposition (OT) home with assist;home with home health  -AN     [facility] Name 03/15/23 1402          Vital Signs    Pre Systolic BP Rehab --  VSS  -AN     O2 Delivery Pre Treatment room air  -AN     O2 Delivery Intra Treatment room air  -AN     O2 Delivery Post Treatment room air  -AN     Pre Patient Position Supine  -AN     Intra Patient Position Standing  -AN     Post Patient  Position Supine  -AN     Row Name 03/15/23 1402          Positioning and Restraints    Pre-Treatment Position in bed  -AN     Post Treatment Position bed  -AN     In Bed notified nsg;supine;with family/caregiver;call light within reach;encouraged to call for assist;side rails up x2  -AN           User Key  (r) = Recorded By, (t) = Taken By, (c) = Cosigned By    Initials Name Provider Type    Rosa Goddard, [place] Occupational Therapist               Outcome Measures     Row Name 03/15/23 1409          How much help from another is currently needed...    Putting on and taking off regular lower body clothing? 3  -AN     Bathing (including washing, rinsing, and drying) 3  -AN     Toileting (which includes using toilet bed pan or urinal) 3  -AN     Putting on and taking off regular upper body clothing 3  -AN     Taking care of personal grooming (such as brushing teeth) 4  -AN     Eating meals 4  -AN     AM-PAC 6 Clicks Score (OT) 20  -AN     Row Name 03/15/23 1017          How much help from another person do you currently need...    Turning from your back to your side while in flat bed without using bedrails? 3  -CM     Moving from lying on back to sitting on the side of a flat bed without bedrails? 3  -CM     Moving to and from a bed to a chair (including a wheelchair)? 3  -CM     Standing up from a chair using your arms (e.g., wheelchair, bedside chair)? 3  -CM     Climbing 3-5 steps with a railing? 3  -CM     To walk in hospital room? 3  -CM     AM-PAC 6 Clicks Score (PT) 18  -CM     Highest level of mobility 6 --> Walked 10 steps or more  -CM     Row Name 03/15/23 1409 03/15/23 1017       Functional Assessment    Outcome Measure Options AM-PAC 6 Clicks Daily Activity (OT)  -AN AM-PAC 6 Clicks Basic Mobility (PT)  -CM          User Key  (r) = Recorded By, (t) = Taken By, (c) = Cosigned By    Initials Name Provider Type    Rosa Goddard OT Occupational Therapist    Tory Ramirez, PT Physical  Therapist                Occupational Therapy Education     Title: PT OT SLP Therapies (In Progress)     Topic: Occupational Therapy (In Progress)     Point: ADL training (Done)     Description:   Instruct learner(s) on proper safety adaptation and remediation techniques during self care or transfers.   Instruct in proper use of assistive devices.              Learning Progress Summary           Patient Acceptance, E, VU by AN at 3/15/2023 1410    Acceptance, E,TB,D, VU,NR,DU by KF at 3/10/2023 1356   Family Acceptance, E, VU by AN at 3/15/2023 1410                   Point: Home exercise program (Not Started)     Description:   Instruct learner(s) on appropriate technique for monitoring, assisting and/or progressing therapeutic exercises/activities.              Learner Progress:  Not documented in this visit.          Point: Precautions (Done)     Description:   Instruct learner(s) on prescribed precautions during self-care and functional transfers.              Learning Progress Summary           Patient Acceptance, E, VU by AN at 3/15/2023 1410    Acceptance, E,TB,D, VU,NR,DU by KF at 3/10/2023 1356   Family Acceptance, E, VU by AN at 3/15/2023 1410                   Point: Body mechanics (Done)     Description:   Instruct learner(s) on proper positioning and spine alignment during self-care, functional mobility activities and/or exercises.              Learning Progress Summary           Patient Acceptance, E, VU by AN at 3/15/2023 1410    Acceptance, E,TB,D, VU,NR,DU by  at 3/10/2023 1356   Family Acceptance, E, VU by AN at 3/15/2023 1410                               User Key     Initials Effective Dates Name Provider Type Discipline     06/16/21 -  Rosa Vazquez OT Occupational Therapist OT     09/21/21 -  Rosa De La Paz OT Occupational Therapist OT              OT Recommendation and Plan     Plan of Care Review  Plan of Care Reviewed With: patient  Progress: improving  Outcome Evaluation: Pt  with improved functional mobility, ADLs, and activity tolerance this session. Pt continues to require cues to maintain sternal precautions with bed mobility and transfers. Rec home with assist and home health OT at [place].     Time Calculation:    Time Calculation- OT     Row Name 03/15/23 1410             Time Calculation- OT    OT Start Time 1000  -AN      OT Received On 03/15/23  -AN         Timed Charges    49420 - OT Self Care/Mgmt Minutes 16  -AN         Total Minutes    Timed Charges Total Minutes 16  -AN       Total Minutes 16  -AN            User Key  (r) = Recorded By, (t) = Taken By, (c) = Cosigned By    Initials Name Provider Type    AN Rosa De La Paz OT Occupational Therapist              Therapy Charges for Today     Code Description Service Date Service Provider Modifiers Qty    [id number] HC OT SELF CARE/MGMT/TRAIN EA 15 MIN 3/15/2023 Rosa De La Paz OT GO 1               Rosa De La Paz OT  3/15/2023

## 2023-03-15 NOTE — PROGRESS NOTES
"[facility]  IP Progress Note    PROBLEM LIST:  CARDIAC     Coronary Artery Disease:   Inferior STEMI with [facility],3/28/2014:  EF 50%, NILE to the mid-dominant RCA, NILE to the proximal LAD   Stress test, 2/13/2023: Inferolateral ischemia,   [facility], 2/17/2023: Severe triple-vessel disease, %, RCA 80%, LAD 80%     Myocardium:   Echocardiogram, 3/29/2014:   EF 50-55%, RVSP 30.   Echo 12/21/2022:Stage C, HFmrEF EF 41- 45%, mild LVH     Valvular:   Mild calcification to aortic valve     Electrical:   NSR, LVH, PVC     Percardium:   Normal     VASCULAR:   Arterial   Cerebrovascular disease:   Carotid Doppler: 2/2023 less than 50 %     CARDIAC RISK FACTORS:          Hypertension          Diabetes          Dyslipidemia          Tobacco Use, Quit after 75 years     NON-CARDIAC:   Asthma/COPD: Abnormal PFTs     SURGERIES:   Tonsillectomy       HOSPITAL COURSE:  Patient has successful revascularization performed that was complicated with acute kidney injury.  Patient did have brief episode of postop atrial fibrillation which was abated with amiodarone.  Patient currently on hemodialysis.      CHIEF COMPLAINTS:  CAD s/p CABG x3, postop atrial fibrillation      Subjective   Patient sitting up in bed today doing well.  Was able to eat his eggs but still having a little bit of pain with swallowing.  No chest pain.  Objective     Blood pressure 121/59, pulse 74, temperature 98.4 °F (36.9 °C), temperature source Axillary, resp. rate 16, height 162.6 cm (64\"), weight 81.9 kg (180 lb 8.9 oz), SpO2 96 %.     Intake/Output Summary (Last 24 hours) at 3/15/2023 0828  Last data filed at 3/15/2023 0500  Gross per 24 hour   Intake --   Output 560 ml   Net -560 ml       PHYSICAL EXAM:    General: Alert and oriented   Cardiovascular: Heart has a nondisplaced focal PMI. Regular rate and rhythm without murmur, gallop or rub.    Lungs: Clear without rales or wheezes. Equal expansion is noted.   Extremities: " Trace bilateral lower extremity edema  Skin: warm and dry.  Neurologic: nonfocal    RESULR REVIEW:    I reviewed the patient's new clinical results.      MEDICATIONS:    amiodarone, 200 mg, Oral, Q12H  arformoterol, 15 mcg, Nebulization, BID - RT  aspirin, 325 mg, Oral, Daily  insulin detemir, 10 Units, Subcutaneous, Nightly  insulin lispro, 0-9 Units, Subcutaneous, 4x Daily With Meals & Nightly  Insulin Lispro, 2 Units, Subcutaneous, TID With Meals  ipratropium-albuterol, 3 mL, Nebulization, 4x Daily - RT  metoprolol succinate XL, 25 mg, Oral, Q24H  midodrine, 10 mg, Oral, Q8H  pantoprazole, 40 mg, Oral, Q AM  pharmacy consult - MTM, , Does not apply, BID  rosuvastatin, 10 mg, Oral, Nightly  senna-docusate sodium, 2 tablet, Oral, BID          Results from last 7 days   Lab Units 03/15/23  0525   WBC 10*3/mm3 12.90*   HEMOGLOBIN g/dL 8.6*   HEMATOCRIT % 27.5*   PLATELETS 10*3/mm3 244     Results from last 7 days   Lab Units 03/15/23  0525   SODIUM mmol/L 133*   POTASSIUM mmol/L 5.1   CHLORIDE mmol/L 97*   CO2 mmol/L 23.0   BUN mg/dL 42*   CREATININE mg/dL 5.69*   CALCIUM mg/dL 7.7*   BILIRUBIN mg/dL 0.3   ALK PHOS U/L 158*   ALT (SGPT) U/L 33   AST (SGOT) U/L 37   GLUCOSE mg/dL 158*         Lab Results   Component Value Date    TROPONINI 15.31 (C) 03/30/2014    TROPONINT <0.010 12/11/2022                 No results found for: IRON, FERRITIN, LABIRON, TIBC   Hemoglobin A1C   Date Value Ref Range Status   02/28/2023 7.40 (H) 4.80 - 5.60 % Final     Magnesium   Date Value Ref Range Status   03/13/2023 2.1 1.6 - 2.4 mg/dL Final        Tele: Sinus Rythym heart rate in the 70s    EKG: 3/13/2023, normal sinus rhythm, QTc 448      ASSESSMENT:      1. CAD s/p CABG x3, preop EF 40-45%  2. Postop atrial fibrillation, currently in normal sinus rhythm  3. Chronic systolic heart failure,ischemic cardiomyopathy  4. Hypertension, recent hypotension, currently on midodrine  5. Prolonged QT, improving  6. Hyperlipidemia, on Crestor  "10 at home  7. Acute on chronic kidney failure, on HD, started 3/11  8. Type 2 diabetes  9. Painful swallowing, GI consulted yesterday.  10. Acute anemia, received 1 unit PRBC 3/13              PLAN:      1. Amiodarone 200 mg daily.  We will continue to monitor QTc as he is on Diflucan now.  2. Continue aspirin and Crestor for CAD  3. Continue metoprolol succinate 25 mg daily.  4. Due to brief episode of A-fib we will hold on any full anticoagulation for now.  5. No ACE/ARB/ARN I secondary to renal insufficiency/failure  6. Continue midodrine 10 mg 3 times daily for hypotension, may start to decrease dose given patient's improvement of blood pressure.  7. Continue with hemodialysis per nephrology      Electronically signed by Rosangela Queen PA-C, 03/15/23, 8:36 AM EDT.    ___________________________________________________________  The patient was seen and examined by me.  Agree and verified/discussed key components of E/M as outlined by the Nurse practitioner/PA.    /56 (BP Location: Left arm, Patient Position: Lying)   Pulse 76   Temp 97.6 °F (36.4 °C) (Temporal)   Resp 16   Ht 162.6 cm (64\")   Wt 81.9 kg (180 lb 8.9 oz)   SpO2 98%   BMI 30.99 kg/m²     General Appearance:   · well developed  · well nourished  Neck:  · thyroid not enlarged  · supple  Respiratory:  · no respiratory distress  · normal breath sounds  · no rales  Cardiovascular:  · no jugular venous distention  · regular rhythm  · apical impulse normal  · S1 normal, S2 normal  · no S3, no S4   · no murmur  · no rub, no thrill  · carotid pulses normal; no bruit  · pedal pulses normal  · lower extremity edema: none  .   Skin:   · warm, dry        Plan:    Stable today, planning for hemodialysis  Continue all current cardiac medications.  Continue aggressive physical therapy.    Paresh Chowdhury MD, [facility]                                         "

## 2023-03-15 NOTE — PROGRESS NOTES
CTS Progress Note       LOS: 14 days   Patient Care Team:  Yessica Ricci DO as PCP - General (Family Medicine)  Nima Peter MD as Consulting Physician (Endocrinology)  Nadya Ramirez MD as Cardiologist (Cardiology)  Yessica Ricci DO as Consulting Physician (Family Medicine)    Chief Complaint: Coronary artery disease involving native coronary artery of native heart without angina pectoris    Vital Signs:  Temp:  [97.5 °F (36.4 °C)-98.6 °F (37 °C)] 98.3 °F (36.8 °C)  Heart Rate:  [58-72] 68  Resp:  [12-26] 18  BP: (107-143)/(47-64) 122/55    Physical Exam:       Results:   Results from last 7 days   Lab Units 03/15/23  0525   WBC 10*3/mm3 12.90*   HEMOGLOBIN g/dL 8.6*   HEMATOCRIT % 27.5*   PLATELETS 10*3/mm3 244     Results from last 7 days   Lab Units 03/15/23  0525   SODIUM mmol/L 133*   POTASSIUM mmol/L 5.1   CHLORIDE mmol/L 97*   CO2 mmol/L 23.0   BUN mg/dL 42*   CREATININE mg/dL 5.69*   GLUCOSE mg/dL 158*   CALCIUM mg/dL 7.7*           Imaging Results (Last 24 Hours)     Procedure Component Value Units Date/Time    IR Tunneled Catheter [[id number]] Collected: 03/14/23 1416     Updated: 03/14/23 1422    Narrative:      IR TUNNELED CATHETER    Date of Exam: 3/14/2023 1:16 PM EDT    Indication: HD depedent [place].    Comparison: None available.    Technique: The procedure, risks options were discussed with the patient and informed written consent was obtained. The patient's indwelling temporary dialysis catheter was removed and hemostasis achieved. The right neck around anterior chest wall were   prepped and draped. IV conscious sedation was a  consisting of Versed and fentanyl. The patient was monitored by the IR nurse during the entire procedure. Using maximal sterile barrier technique and under local anesthesia and ultrasound guidance,   a micropuncture was performed of the right internal jugular vein. Following placement of an 035 wire, serial dilatation was performed and a 16 [place]  introducer sheath placed. A site was chosen over the right anterior chest wall and the skin and tract   was anesthetized with 1% Xylocaine with epinephrine. The tunnel dialysis catheter was then tunneled to the right IJ site, and deployed with the tip just below the cavoatrial junction. The right IJ site was closed with a single 3-0 Vicryl suture and the   deep fascia. The catheter was affixed to the skin utilizing a nylon suture. The catheter was flushed with heparin solution. A single spot radiograph was obtained documenting placement.    Fluoroscopy time: 24 seconds    Position monitored conscious sedation time: 16 minutes        Impression:      Impression:  Technically successful placement of a tunneled dialysis catheter with the tip at just below the cavoatrial junction under ultrasound and fluoroscopy.    Electronically Signed: Juanjo Valenzuela    3/14/2023 2:19 PM EDT    Workstation ID: [id number]    FL Video Swallow With Speech Single Contrast [[id number]] Collected: 03/13/23 1609     Updated: 03/14/23 0756    Narrative:      FL VIDEO SWALLOW W SPEECH SINGLE-CONTRAST, FL LIMITED UGI FOR MBS REFLUX SINGLE-CONTRAST    Date of Exam: 3/13/2023 3:34 PM EDT    Indication: dysphagia.    Comparison: None available.    Technique:  This examination was performed in conjunction with speech pathology. Lateral video fluoroscopic evaluation of the swallowing mechanism was performed while correlate administering to the patient various consistency food items mixed with   barium.    Fluoroscopic Time:  Min and 12 seconds    Number of Images: 10 associated fluoroscopic loops were saved.    Findings:  Please see speech therapy report for full details and recommendations. A limited view of the esophagus with the patient in the seated lateral position demonstrating no signs of a high-grade focal esophageal stricture, or significant gastroesophageal   reflux.      Impression:      Impression:  Fluoroscopy provided for a modified barium  swallow, and limited upper GI series. Please see speech therapy report for full details and recommendations. Limited upper GI series appeared within normal limits.    Electronically Signed: Buddy Chandler    3/14/2023 7:52 AM EDT    Workstation ID: [id number]    FL Limited Ugi For Mbs Reflux Single-Contrast [[id number]] Collected: 03/13/23 1609     Updated: 03/14/23 0756    Narrative:      FL VIDEO SWALLOW W SPEECH SINGLE-CONTRAST, FL LIMITED UGI FOR MBS REFLUX SINGLE-CONTRAST    Date of Exam: 3/13/2023 3:34 PM EDT    Indication: dysphagia.    Comparison: None available.    Technique:  This examination was performed in conjunction with speech pathology. Lateral video fluoroscopic evaluation of the swallowing mechanism was performed while correlate administering to the patient various consistency food items mixed with   barium.    Fluoroscopic Time:  Min and 12 seconds    Number of Images: 10 associated fluoroscopic loops were saved.    Findings:  Please see speech therapy report for full details and recommendations. A limited view of the esophagus with the patient in the seated lateral position demonstrating no signs of a high-grade focal esophageal stricture, or significant gastroesophageal   reflux.      Impression:      Impression:  Fluoroscopy provided for a modified barium swallow, and limited upper GI series. Please see speech therapy report for full details and recommendations. Limited upper GI series appeared within normal limits.    Electronically Signed: Buddy Chandler    3/14/2023 7:52 AM EDT    Workstation ID: [id number]          Assessment      I25.10, CABG 03/01/23    Hypertension    Hyperlipidemia LDL goal <70    Type 2 diabetes mellitus with hypoglycemia, with long-term current use of insulin (HCC)    Ischemic cardiomyopathy    Mucopurulent chronic bronchitis (HCC)    Prolonged Q-T interval on ECG    Acute renal failure (ARF) (HCC)    Shock (HCC)    Acute respiratory failure with hypoxia  (HCC)    Voided 500 mL of urine.    Plan   Discontinue chest tube  May transfer to telemetry if satisfactory with nephrology and intensivist  Chest x-ray BMP CBC in the a.m.    Please note that portions of this note were completed with a voice recognition program. Efforts were made to edit the dictations, but occasionally words are mistranscribed.    Paresh Saavedra MD  03/15/23  06:32 EDT

## 2023-03-15 NOTE — THERAPY TREATMENT NOTE
Patient Name: Herbert Harley  : 1955    MRN: [id number]                              Today's Date: 3/15/2023       Admit Date: 3/1/2023    Visit Dx:     ICD-10-CM ICD-9-CM   1. Coronary artery disease involving native coronary artery of native heart without angina pectoris  I25.10 414.01   2. Coronary artery disease involving native coronary artery of native heart, unspecified whether angina present  I25.10 414.01   3. Acute renal failure, unspecified acute renal failure type (HCC)  N17.9 584.9   4. Acute respiratory failure with hypoxia (HCC)  J96.01 518.81   5. Dysphagia, unspecified type  R13.10 787.20     Patient Active Problem List   Diagnosis   • I25.10, CABG 23   • Hypertension   • Hyperlipidemia LDL goal <70   • Type 2 diabetes mellitus with hypoglycemia, with long-term current use of insulin (HCC)   • Ischemic cardiomyopathy   • Allergic rhinitis   • Positive cardiac stress test   • Mucopurulent chronic bronchitis (HCC)   • Prolonged Q-T interval on ECG   • Acute renal failure (ARF) (HCC)   • Shock (HCC)   • Acute respiratory failure with hypoxia (HCC)     Past Medical History:   Diagnosis Date   • Acute renal failure (ARF) (HCC) 3/2/2023   • Asthma 2022   • Chronic systolic congestive heart failure (HCC) 3/1/2023   • Coronary artery disease 2014   • Elevated cholesterol    • History of tobacco abuse    • HLD (hyperlipidemia) 2023   • Hypertension    • Ischemic cardiomyopathy    • Myocardial infarction (HCC) 2014   • Prolonged Q-T interval on ECG 3/1/2023   • Type 2 diabetes mellitus (HCC)    • Wears glasses      Past Surgical History:   Procedure Laterality Date   • CARDIAC CATHETERIZATION  2014   • CARDIAC CATHETERIZATION Left 2023    Procedure: Left Heart Cath;  Surgeon: Nadya Ramirez MD;  Location: [facility] CATH INVASIVE LOCATION;  Service: Cardiology;  Laterality: Left;   • CORONARY ARTERY BYPASS GRAFT N/A 3/1/2023    Procedure: MEDIAN  STERNOTOMY CORONARY ARTERY BYPASS GRAFTING X3  UTILIZING THE LEFT INTERNAL MAMMERY GRAFT, AND EVH OF THE GREATER RIGHT SAPHENOUS VEIN;  Surgeon: Paresh Saavedra MD;  Location[facility];  Service: Cardiothoracic;  Laterality: N/A;   • CORONARY STENT PLACEMENT  March 2014   • TONSILLECTOMY        General Information     Row Name 03/15/23 1009          Physical Therapy Time and Intention    Document Type therapy note (daily note)  -CM     Mode of Treatment physical therapy;individual therapy  -CM     Row Name 03/15/23 1009          General Information    Patient Profile Reviewed yes  -CM     Existing Precautions/Restrictions cardiac;sternal  -CM     Barriers to Rehab medically complex  -CM     Row Name 03/15/23 1009          Cognition    Orientation Status (Cognition) oriented x 4  -CM     Row Name 03/15/23 1009          Safety Issues, Functional Mobility    Safety Issues Affecting Function (Mobility) safety precaution awareness;safety precautions follow-through/compliance  -CM     Impairments Affecting Function (Mobility) balance;endurance/activity tolerance;shortness of breath;strength  -CM           User Key  (r) = Recorded By, (t) = Taken By, (c) = Cosigned By    Initials Name Provider Type    CM Tory Kaye, PT Physical Therapist               Mobility     Row Name 03/15/23 1009          Bed Mobility    Bed Mobility supine-sit;sit-supine  -CM     Supine-Sit [place] (Bed Mobility) minimum assist (75% patient effort);1 person assist;verbal cues  -CM     Sit-Supine [place] (Bed Mobility) minimum assist (75% patient effort);1 person assist;verbal cues  -CM     Assistive Device (Bed Mobility) head of bed elevated;draw sheet  -CM     Comment, (Bed Mobility) cues for sequencing with maintenance of sternal precautions. Jessi to scoot to EOB for sup to sit, Jessi at BLEs for sit to sup  -CM     Row Name 03/15/23 1009          Sit-Stand Transfer    Sit-Stand [place] (Transfers) contact guard  -CM      Comment, (Sit-Stand Transfer) cues for sternal precautions, grasps tele monitor upon standing  -CM     Row Name 03/15/23 1009          Gait/Stairs (Locomotion)    [place] Level (Gait) contact guard  -CM     Assistive Device (Gait) other (see comments)  BUE support on tele monitor  -CM     Distance in Feet (Gait) 220  -CM     Deviations/Abnormal Patterns (Gait) base of support, wide;gait speed decreased;stride length decreased  -CM     Comment, (Gait/Stairs) Patient ambulated in hallway on RA with step through gait pattern. He requires 1 standing rest break due to SOA with O2 sat at 96%. Cues for PLB provided throughout. Gait distance limited by fatigue.  -CM           User Key  (r) = Recorded By, (t) = Taken By, (c) = Cosigned By    Initials Name Provider Type    Tory Ramirez, HAIR Physical Therapist               Obj/Interventions     Row Name 03/15/23 1012          Balance    Balance Assessment sitting static balance;standing static balance;standing dynamic balance  -CM     Static Sitting Balance independent  -CM     Position, Sitting Balance unsupported;sitting edge of bed  -CM     Static Standing Balance contact guard  -CM     Dynamic Standing Balance contact guard  -CM     Position/Device Used, Standing Balance supported;other (see comments)  BUE support on tele monitor  -CM     Comment, Balance no posterior lean noted with mobility today  -CM           User Key  (r) = Recorded By, (t) = Taken By, (c) = Cosigned By    Initials Name Provider Type    Tory Ramirez, PT Physical Therapist               Goals/Plan    No documentation.                Clinical Impression     Row Name 03/15/23 1013          Pain    Pretreatment Pain Rating 0/10 - no pain  -CM     Posttreatment Pain Rating 0/10 - no pain  -CM     Row Name 03/15/23 1013          Plan of Care Review    Plan of Care Reviewed With patient  -CM     Progress improving  -CM     Outcome Evaluation Patient continues to show improvement  ambulating 220' CGA with BUE support on tele monitor, limited by fatigue. Balance continues to improve as well, no posterior lean noted with mobility today. Patient remains below his baseline indicating IPPT, however, based on progress will change D/C rec to home with assist.  -CM     Row Name 03/15/23 1013          Vital Signs    Pre Systolic BP Rehab 121  -CM     Pre Treatment Diastolic BP 59  -CM     Post Systolic BP Rehab 142  -CM     Post Treatment Diastolic BP 63  -CM     Pretreatment Heart Rate (beats/min) 74  -CM     Posttreatment Heart Rate (beats/min) 77  -CM     Pre SpO2 (%) 98  -CM     O2 Delivery Pre Treatment room air  -CM     Intra SpO2 (%) 96  -CM     O2 Delivery Intra Treatment room air  -CM     Post SpO2 (%) 98  -CM     O2 Delivery Post Treatment room air  -CM     Pre Patient Position Supine  -CM     Intra Patient Position Standing  -CM     Post Patient Position Supine  -CM     Row Name 03/15/23 1013          Positioning and Restraints    Pre-Treatment Position in bed  -CM     Post Treatment Position bed  -CM     In Bed supine;call light within reach;encouraged to call for assist;RUE elevated;LUE elevated;notified [facility]  -CM           User Key  (r) = Recorded By, (t) = Taken By, (c) = Cosigned By    Initials Name Provider Type    Tory Ramirez, PT Physical Therapist               Outcome Measures     Row Name 03/15/23 1017          How much help from another person do you currently need...    Turning from your back to your side while in flat bed without using bedrails? 3  -CM     Moving from lying on back to sitting on the side of a flat bed without bedrails? 3  -CM     Moving to and from a bed to a chair (including a wheelchair)? 3  -CM     Standing up from a chair using your arms (e.g., wheelchair, bedside chair)? 3  -CM     Climbing 3-5 steps with a railing? 3  -CM     To walk in hospital room? 3  -CM     AM-PAC 6 Clicks Score (PT) 18  -CM     Highest level of mobility 6 --> Walked 10  steps or more  -CM     Row Name 03/15/23 1017          Functional Assessment    Outcome Measure Options AM-PAC 6 Clicks Basic Mobility (PT)  -CM           User Key  (r) = Recorded By, (t) = Taken By, (c) = Cosigned By    Initials Name Provider Type    Tory Ramirez, PT Physical Therapist                             Physical Therapy Education     Title: PT OT SLP Therapies (In Progress)     Topic: Physical Therapy (In Progress)     Point: Mobility training (In Progress)     Learning Progress Summary           Patient Acceptance, E, NR by CM at 3/15/2023 1017    Acceptance, E, NR by FLIP at 3/14/2023 0940    Acceptance, E, NR by AE at 3/13/2023 1340    Acceptance, E, NR by KG at 3/11/2023 1306    Acceptance, E, NR by CM at 3/10/2023 1410    Acceptance, E, NR by CM at 3/9/2023 1421    Acceptance, E, NR by KG at 3/8/2023 0954    Acceptance, E, NR by KG at 3/7/2023 0919    Acceptance, E, NR by KG at 3/6/2023 1324                   Point: Home exercise program (In Progress)     Learning Progress Summary           Patient Acceptance, E, NR by FLIP at 3/14/2023 0940    Acceptance, E, NR by AE at 3/13/2023 1340    Acceptance, E, NR by KG at 3/11/2023 1306    Acceptance, E, NR by CM at 3/9/2023 1421    Acceptance, E, NR by KG at 3/8/2023 0954    Acceptance, E, NR by KG at 3/7/2023 0919                   Point: Body mechanics (In Progress)     Learning Progress Summary           Patient Acceptance, E, NR by CM at 3/15/2023 1017    Acceptance, E, NR by FLIP at 3/14/2023 0940    Acceptance, E, NR by AE at 3/13/2023 1340    Acceptance, E, NR by KG at 3/11/2023 1306    Acceptance, E, NR by CM at 3/10/2023 1410    Acceptance, E, NR by CM at 3/9/2023 1421    Acceptance, E, NR by KG at 3/8/2023 0954    Acceptance, E, NR by KG at 3/7/2023 0919    Acceptance, E, NR by KG at 3/6/2023 1324                   Point: Precautions (In Progress)     Learning Progress Summary           Patient Acceptance, E, NR by CM at 3/15/2023 1017     Acceptance, E, NR by FLIP at 3/14/2023 0940    Acceptance, E, NR by AE at 3/13/2023 1340    Acceptance, E, NR by KG at 3/11/2023 1306    Acceptance, E, NR by CM at 3/10/2023 1410    Acceptance, E, NR by CM at 3/9/2023 1421    Acceptance, E, NR by KG at 3/8/2023 0954    Acceptance, E, NR by KG at 3/7/2023 0919    Acceptance, E, NR by KG at 3/6/2023 1324                               User Key     Initials Effective Dates Name Provider Type Discipline    FLIP 02/03/23 -  Marlyn Torres, PT Physical Therapist PT    KG 05/22/20 -  Whitney Mistry, PT Physical Therapist PT    AE 09/21/21 -  Priyank Saenz, PT Physical Therapist PT    CM 09/22/22 -  Tory Kaye, HAIR Physical Therapist PT              PT Recommendation and Plan     Plan of Care Reviewed With: patient  Progress: improving  Outcome Evaluation: Patient continues to show improvement ambulating 220' CGA with BUE support on tele monitor, limited by fatigue. Balance continues to improve as well, no posterior lean noted with mobility today. Patient remains below his baseline indicating IPPT, however, based on progress will change D/C rec to home with assist.     Time Calculation:    PT Charges     Row Name 03/15/23 1018             Time Calculation    Start Time 0848  -CM      PT Received On 03/15/23  -CM      PT Goal Re-Cert Due Date 03/16/23  -CM         Timed Charges    39649 - PT Therapeutic Activity Minutes 18  -CM         Total Minutes    Timed Charges Total Minutes 18  -CM       Total Minutes 18  -CM            User Key  (r) = Recorded By, (t) = Taken By, (c) = Cosigned By    Initials Name Provider Type    CM Tory Kaye, PT Physical Therapist              Therapy Charges for Today     Code Description Service Date Service Provider Modifiers Qty    17020509572 HC PT THERAPEUTIC ACT EA 15 MIN 3/15/2023 Tory Kaye, PT GP 1          PT G-Codes  Outcome Measure Options: AM-PAC 6 Clicks Basic Mobility (PT)  AM-PAC 6 Clicks Score  (PT): 18  AM-PAC 6 Clicks Score (OT): 14  PT Discharge Summary  Anticipated Discharge Disposition (PT): home with assist    Tory Kaye, PT  3/15/2023

## 2023-03-16 ENCOUNTER — APPOINTMENT (OUTPATIENT)
Dept: GENERAL RADIOLOGY | Facility: HOSPITAL | Age: 68
DRG: 235 | End: 2023-03-16
Payer: MEDICARE

## 2023-03-16 LAB
ANION GAP SERPL CALCULATED.3IONS-SCNC: 11 MMOL/L (ref 5–15)
BUN SERPL-MCNC: 34 MG/DL (ref 8–23)
BUN/CREAT SERPL: 7.2 (ref 7–25)
CALCIUM SPEC-SCNC: 7.9 MG/DL (ref 8.6–10.5)
CHLORIDE SERPL-SCNC: 98 MMOL/L (ref 98–107)
CO2 SERPL-SCNC: 24 MMOL/L (ref 22–29)
CREAT SERPL-MCNC: 4.75 MG/DL (ref 0.76–1.27)
EGFRCR SERPLBLD CKD-EPI 2021: 12.7 ML/MIN/1.73
GEN 5 2HR TROPONIN T REFLEX: 701 NG/L
GLUCOSE BLDC GLUCOMTR-MCNC: 123 MG/DL (ref 70–130)
GLUCOSE BLDC GLUCOMTR-MCNC: 137 MG/DL (ref 70–130)
GLUCOSE BLDC GLUCOMTR-MCNC: 180 MG/DL (ref 70–130)
GLUCOSE BLDC GLUCOMTR-MCNC: 208 MG/DL (ref 70–130)
GLUCOSE SERPL-MCNC: 102 MG/DL (ref 65–99)
HCT VFR BLD AUTO: 24.5 % (ref 37.5–51)
HGB BLD-MCNC: 7.8 G/DL (ref 13–17.7)
NT-PROBNP SERPL-MCNC: ABNORMAL PG/ML (ref 0–900)
POTASSIUM SERPL-SCNC: 4.3 MMOL/L (ref 3.5–5.2)
QT INTERVAL: 430 MS
QTC INTERVAL: 436 MS
SODIUM SERPL-SCNC: 133 MMOL/L (ref 136–145)
TROPONIN T DELTA: 11 NG/L
TROPONIN T SERPL HS-MCNC: 690 NG/L

## 2023-03-16 PROCEDURE — 97110 THERAPEUTIC EXERCISES: CPT

## 2023-03-16 PROCEDURE — 97530 THERAPEUTIC ACTIVITIES: CPT

## 2023-03-16 PROCEDURE — 85014 HEMATOCRIT: CPT | Performed by: STUDENT IN AN ORGANIZED HEALTH CARE EDUCATION/TRAINING PROGRAM

## 2023-03-16 PROCEDURE — 85018 HEMOGLOBIN: CPT | Performed by: STUDENT IN AN ORGANIZED HEALTH CARE EDUCATION/TRAINING PROGRAM

## 2023-03-16 PROCEDURE — 63710000001 INSULIN DETEMIR PER 5 UNITS: Performed by: STUDENT IN AN ORGANIZED HEALTH CARE EDUCATION/TRAINING PROGRAM

## 2023-03-16 PROCEDURE — 82962 GLUCOSE BLOOD TEST: CPT

## 2023-03-16 PROCEDURE — 93010 ELECTROCARDIOGRAM REPORT: CPT | Performed by: INTERNAL MEDICINE

## 2023-03-16 PROCEDURE — 99024 POSTOP FOLLOW-UP VISIT: CPT | Performed by: PHYSICIAN ASSISTANT

## 2023-03-16 PROCEDURE — 99232 SBSQ HOSP IP/OBS MODERATE 35: CPT | Performed by: PHYSICIAN ASSISTANT

## 2023-03-16 PROCEDURE — 71045 X-RAY EXAM CHEST 1 VIEW: CPT

## 2023-03-16 PROCEDURE — 84484 ASSAY OF TROPONIN QUANT: CPT | Performed by: INTERNAL MEDICINE

## 2023-03-16 PROCEDURE — 80048 BASIC METABOLIC PNL TOTAL CA: CPT | Performed by: STUDENT IN AN ORGANIZED HEALTH CARE EDUCATION/TRAINING PROGRAM

## 2023-03-16 PROCEDURE — 83880 ASSAY OF NATRIURETIC PEPTIDE: CPT | Performed by: INTERNAL MEDICINE

## 2023-03-16 PROCEDURE — 93005 ELECTROCARDIOGRAM TRACING: CPT | Performed by: PHYSICIAN ASSISTANT

## 2023-03-16 PROCEDURE — 99232 SBSQ HOSP IP/OBS MODERATE 35: CPT | Performed by: INTERNAL MEDICINE

## 2023-03-16 PROCEDURE — 63710000001 INSULIN LISPRO (HUMAN) PER 5 UNITS: Performed by: STUDENT IN AN ORGANIZED HEALTH CARE EDUCATION/TRAINING PROGRAM

## 2023-03-16 RX ADMIN — MIDODRINE HYDROCHLORIDE 10 MG: 10 TABLET ORAL at 12:34

## 2023-03-16 RX ADMIN — INSULIN LISPRO 2 UNITS: 100 INJECTION, SOLUTION INTRAVENOUS; SUBCUTANEOUS at 12:34

## 2023-03-16 RX ADMIN — ROSUVASTATIN CALCIUM 10 MG: 10 TABLET, FILM COATED ORAL at 21:20

## 2023-03-16 RX ADMIN — INSULIN LISPRO 4 UNITS: 100 INJECTION, SOLUTION INTRAVENOUS; SUBCUTANEOUS at 12:34

## 2023-03-16 RX ADMIN — AMIODARONE HYDROCHLORIDE 200 MG: 200 TABLET ORAL at 08:36

## 2023-03-16 RX ADMIN — MIDODRINE HYDROCHLORIDE 10 MG: 10 TABLET ORAL at 21:20

## 2023-03-16 RX ADMIN — INSULIN LISPRO 2 UNITS: 100 INJECTION, SOLUTION INTRAVENOUS; SUBCUTANEOUS at 09:53

## 2023-03-16 RX ADMIN — INSULIN DETEMIR 10 UNITS: 100 INJECTION, SOLUTION SUBCUTANEOUS at 21:22

## 2023-03-16 RX ADMIN — INSULIN LISPRO 2 UNITS: 100 INJECTION, SOLUTION INTRAVENOUS; SUBCUTANEOUS at 21:22

## 2023-03-16 RX ADMIN — Medication 5 MG: at 21:20

## 2023-03-16 RX ADMIN — Medication 10 ML: at 21:21

## 2023-03-16 RX ADMIN — Medication 10 ML: at 08:36

## 2023-03-16 RX ADMIN — METOPROLOL SUCCINATE 25 MG: 25 TABLET, EXTENDED RELEASE ORAL at 08:36

## 2023-03-16 RX ADMIN — FLUCONAZOLE 50 MG: 100 TABLET ORAL at 14:49

## 2023-03-16 RX ADMIN — MIDODRINE HYDROCHLORIDE 10 MG: 10 TABLET ORAL at 05:00

## 2023-03-16 RX ADMIN — PANTOPRAZOLE SODIUM 40 MG: 40 TABLET, DELAYED RELEASE ORAL at 05:00

## 2023-03-16 RX ADMIN — INSULIN LISPRO 2 UNITS: 100 INJECTION, SOLUTION INTRAVENOUS; SUBCUTANEOUS at 17:40

## 2023-03-16 RX ADMIN — ASPIRIN 325 MG: 325 TABLET, COATED ORAL at 08:36

## 2023-03-16 NOTE — PLAN OF CARE
Goal Outcome Evaluation:              Uneventful shift. VSS. Pain well controlled. Pt ambulated in halls. Wife at bedside.

## 2023-03-16 NOTE — CASE MANAGEMENT/SOCIAL WORK
Continued Stay Note   Thomas     Patient Name: Herbert Harley  MRN: [id number]  Today's Date: 3/16/2023    Admit Date: 3/1/2023    Plan: update   Discharge Plan     Row Name 03/16/23 0620       Plan    Plan update    Patient/Family in Agreement with Plan yes    Plan Comments Spoke with patient at bedside regarding discharge plan and advised that a packet has been faxed to [facility] on his behalf to request dialysis services and advised that it can take several days to received acceptance and approval.  Patient verbalizes understanding and agreement with plan.  No other discharge needs verbalized.  CM following.  Patient plan is to discharge home with OPHD.    Final Discharge Disposition Code 01 - home or self-care               Discharge Codes    No documentation.               Expected Discharge Date and Time     Expected Discharge Date Expected Discharge Time    Mar 20, 2023             Aurea Pemberton RN

## 2023-03-16 NOTE — PROGRESS NOTES
[facility] Medicine Services  PROGRESS NOTE    Patient Name: Herbert Harley  : 1955  MRN: [id number]    Date of Admission: 3/1/2023  Primary Care Physician: Yessica Ricci DO    Subjective   Subjective     CC: Med management s/p CABG    HPI: No acute events overnight, patient said that he rested some, does endorse some mild chest discomfort, otherwise says he ate all his breakfast today.    ROS:  Gen- No fevers, chills  CV- No chest pain, palpitations  Resp- No cough, dyspnea  GI- No N/V/D, abd pain       Objective   Objective     Vital Signs:   Temp:  [97.4 °F (36.3 °C)-98.5 °F (36.9 °C)] 97.9 °F (36.6 °C)  Heart Rate:  [60-84] 65  Resp:  [16-18] 18  BP: (103-139)/(49-65) 139/62  Flow (L/min):  [0-2] 2     Physical Exam:  Constitutional: No acute distress, awake, alert  HENT: NCAT, mucous membranes moist  Respiratory: Clear to auscultation bilaterally, respiratory effort normal   Cardiovascular: RRR, no murmurs, rubs, or gallops  Gastrointestinal: Positive bowel sounds, soft, nontender, nondistended  Musculoskeletal:  bilateral ankle edema, 2+  Psychiatric: Appropriate affect, cooperative  Neurologic: Oriented x 3, nonfocal  Skin: No rashes, sternal incision CDI    Results Reviewed:  LAB RESULTS:      Lab 23  0459 03/15/23  0525 23  0454 23  0356 23  1406 23  0436 23  1513 23  0420 03/10/23  0050   WBC  --  12.90* 13.77* 13.66* 13.35* 12.09* 13.37*   < > 16.30*   HEMOGLOBIN 7.8* 8.6* 9.2* 6.9* 7.5* 7.0* 8.1*   < > 8.9*   HEMATOCRIT 24.5* 27.5* 29.5* 21.9* 23.7* 21.8* 25.2*   < > 27.6*   PLATELETS  --  244 187 132* 117* 97* 91*   < > 90*   NEUTROS ABS  --  9.19* 9.98* 9.79* 9.95*  --  10.04*   < > 10.19*   IMMATURE GRANS (ABS)  --  0.10* 0.11* 0.13* 0.16*  --  0.25*   < > 0.89*   LYMPHS ABS  --  1.19 1.34 1.57 1.37  --  1.37   < > 2.68   MONOS ABS  --  2.08* 2.01* 1.84* 1.66*  --  1.51*   < > 1.94*   EOS ABS  --  0.28 0.28 0.29 0.18   --  0.18   < > 0.52*   MCV  --  94.2 93.4 96.5 94.8 94.8 94.4   < > 92.0   PROCALCITONIN  --   --  0.61*  --   --   --   --   --  1.36*    < > = values in this interval not displayed.         Lab 03/16/23  0459 03/15/23  0525 03/14/23  0454 03/13/23  0356 03/12/23  0436 03/11/23  0420 03/10/23  1558 03/10/23  0737 03/10/23  0050 03/09/23  1618 03/09/23  0830   SODIUM 133* 133* 132* 131* 133* 134* 134* 134* 136  136 137 135*   POTASSIUM 4.3 5.1 4.9 4.8 4.5 4.4 4.3 4.4 4.3  4.3 4.6 4.5   CHLORIDE 98 97* 97* 98 100 103 103 102 102  102 103 103   CO2 24.0 23.0 19.0* 21.0* 22.0 23.0 24.0 21.0* 23.0  23.0 22.0 22.0   ANION GAP 11.0 13.0 16.0* 12.0 11.0 8.0 7.0 11.0 11.0  11.0 12.0 10.0   BUN 34* 42* 31* 42* 29* 34* 29* 32* 30*  30* 34* 32*   CREATININE 4.75* 5.69* 4.24* 5.82* 3.80* 3.39* 2.30* 2.23* 2.37*  2.37* 1.87* 1.97*   EGFR 12.7* 10.2* 14.6* 10.0* 16.6* 19.1* 30.4* 31.5* 29.3*  29.3* 38.9* 36.6*   GLUCOSE 102* 158* 122* 121* 125* 128* 217* 223* 150*  150* 195* 142*   CALCIUM 7.9* 7.7* 7.8* 7.5* 7.4* 7.4* 7.1* 7.1* 7.4*  7.4* 7.3* 7.1*   IONIZED CALCIUM  --   --   --   --   --   --  1.11* 1.14 1.16 1.12 1.14   MAGNESIUM  --   --   --  2.1  --  2.3 2.3 2.3 2.4 2.4 2.3   PHOSPHORUS  --  6.6*  --  6.2*  --  5.3* 4.2 4.6* 4.8* 4.3 4.8*         Lab 03/15/23  0525 03/14/23  0454 03/13/23  0356 03/12/23  0436 03/11/23  0420   TOTAL PROTEIN 5.0* 5.3* 4.3* 4.3* 4.2*   ALBUMIN 2.9* 2.8* 2.5* 2.4* 2.4*   GLOBULIN 2.1 2.5 1.8 1.9 1.8   ALT (SGPT) 33 33 29 31 29   AST (SGOT) 37 42* 39 51* 44*   BILIRUBIN 0.3 0.4 0.3 0.4 0.3   ALK PHOS 158* 161* 134* 137* 124*         Lab 03/16/23  0459   PROBNP 27,176.0*   HSTROP T 690*             Lab 03/13/23  0630   ABO TYPING O   RH TYPING Positive   ANTIBODY SCREEN Negative         Brief Urine Lab Results  (Last result in the past 365 days)      Color   Clarity   Blood   Leuk Est   Nitrite   Protein   CREAT   Urine HCG        03/02/23 1502             58.8                Microbiology Results Abnormal     Procedure Component Value - Date/Time    Blood Culture - Blood, Arm, Right [[id number]]  (Normal) Collected: 03/08/23 1403    Lab Status: Final result Specimen: Blood from Arm, Right Updated: 03/13/23 1616     Blood Culture No growth at 5 days    Blood Culture - Blood, Hand, Right [[id number]]  (Normal) Collected: 03/08/23 1403    Lab Status: Final result Specimen: Blood from Hand, Right Updated: 03/13/23 1616     Blood Culture No growth at 5 days    MRSA Screen, PCR (Inpatient) - Swab, Nares [[id number]]  (Normal) Collected: 03/08/23 1115    Lab Status: Final result Specimen: Swab from Nares Updated: 03/08/23 1437     MRSA PCR Negative    Narrative:      The negative predictive value of this diagnostic test is high and should only be used to consider de-escalating anti-MRSA therapy. A positive result may indicate colonization with MRSA and must be correlated clinically.  MRSA Negative    Eosinophil Smear - Urine, Indwelling Urethral Catheter [[id number]]  (Normal) Collected: 03/02/23 1502    Lab Status: Final result Specimen: Urine from Indwelling Urethral Catheter Updated: 03/02/23 1647     Eosinophil Smear 0 % EOS/100 Cells     Narrative:      No eosinophil seen          IR Tunneled Catheter    Result Date: 3/14/2023  IR TUNNELED CATHETER Date of Exam: 3/14/2023 1:16 PM EDT Indication: HD depedent [place]NA. Comparison: None available. Technique: The procedure, risks options were discussed with the patient and informed written consent was obtained. The patient's indwelling temporary dialysis catheter was removed and hemostasis achieved. The right neck around anterior chest wall were prepped and draped. IV conscious sedation was a  consisting of Versed and fentanyl. The patient was monitored by the IR nurse during the entire procedure. Using maximal sterile barrier technique and under local anesthesia and ultrasound guidance,  a micropuncture was performed of the right  internal jugular vein. Following placement of an 035 wire, serial dilatation was performed and a 16 Macedonian introducer sheath placed. A site was chosen over the right anterior chest wall and the skin and tract was anesthetized with 1% Xylocaine with epinephrine. The tunnel dialysis catheter was then tunneled to the right IJ site, and deployed with the tip just below the cavoatrial junction. The right IJ site was closed with a single 3-0 Vicryl suture and the deep fascia. The catheter was affixed to the skin utilizing a nylon suture. The catheter was flushed with heparin solution. A single spot radiograph was obtained documenting placement. Fluoroscopy time: 24 seconds Position monitored conscious sedation time: 16 minutes     Impression: Impression: Technically successful placement of a tunneled dialysis catheter with the tip at just below the cavoatrial junction under ultrasound and fluoroscopy. Electronically Signed: Juanjo Valenzuela  3/14/2023 2:19 PM EDT  Workstation ID: [id number]      Results for orders placed during the hospital encounter of 03/01/23    Adult Transthoracic Echo Complete W/ Cont if Necessary Per Protocol    Interpretation Summary  •  Left ventricular systolic function is mildly decreased. Calculated left ventricular EF = 40.4% Left ventricular ejection fraction appears to be 41 - 45%.  •  The right ventricular cavity is dilated.  •  Left atrial volume is mildly increased.  •  There is calcification of the aortic valve.  •  Estimated right ventricular systolic pressure from tricuspid regurgitation is normal (<35 mmHg).  •  There is a left pleural effusion.      Current medications:  Scheduled Meds:amiodarone, 200 mg, Oral, Q24H  aspirin, 325 mg, Oral, Daily  fluconazole, 100 mg, Oral, Once per day on Mon Wed Fri  fluconazole, 50 mg, Oral, Once per day on Sun Tue Thu Sat  insulin detemir, 10 Units, Subcutaneous, Nightly  insulin lispro, 0-9 Units, Subcutaneous, 4x Daily With Meals & Nightly  Insulin  Lispro, 2 Units, Subcutaneous, TID With Meals  metoprolol succinate XL, 25 mg, Oral, Q24H  midodrine, 10 mg, Oral, Q8H  pantoprazole, 40 mg, Oral, Q AM  pharmacy consult - [place], , Does not apply, BID  senna-docusate sodium, 2 tablet, Oral, BID   And  polyethylene glycol, 17 g, Oral, Daily  rosuvastatin, 10 mg, Oral, Nightly  sodium chloride, 10 mL, Intravenous, Q12H      Continuous Infusions:   PRN Meds:.•  acetaminophen  •  Albuterol Sulfate NEB Orderable  •  arformoterol  •  calcium carbonate  •  cyclobenzaprine  •  dextrose  •  heparin (porcine)  •  ipratropium-albuterol  •  melatonin  •  ondansetron    Assessment & Plan   Assessment & Plan     Active Hospital Problems    Diagnosis  POA   • **I25.10, CABG 03/01/23 [I25.10]  Yes   • Acute respiratory failure with hypoxia (HCC) [J96.01]  Yes   • Acute renal failure (ARF) (HCC) [N17.9]  Yes   • Shock (HCC) [R57.9]  Yes   • Mucopurulent chronic bronchitis (HCC) [J41.1]  Yes   • Prolonged Q-T interval on ECG [R94.31]  Yes   • Ischemic cardiomyopathy [I25.5]  Yes   • Type 2 diabetes mellitus with hypoglycemia, with long-term current use of insulin (HCC) [E11.649, Z79.4]  Not Applicable   • Hyperlipidemia LDL goal <70 [E78.5]  Yes   • Hypertension [I10]  Yes      Resolved Hospital Problems    Diagnosis Date Resolved POA   • Tobacco use [Z72.0] 03/01/2023 Yes        Brief Hospital Course to date:  Herbert Harley is a 67 y.o. male with history of type 2 diabetes, hypertension, hyperlipidemia, CAD status post stents, found to have severe triple-vessel CAD, s/p CABG 3/1/2023, has required ICU care, with stay has been complicated with acute on chronic renal failure, requiring initiation of HD.  Patient also had odynophagia, started on Diflucan, he has been anemic and has required 1 unit PRBC.  He otherwise progressed well and was deemed stable for transfer to [facility], hospital medicine was asked to follow and assist with medical management, we assumed this on  3/16/2023    CAD multivessel CAD s/p CABG x3  Hyperlipidemia  -Continue postop care per CTS  -Continue aspirin, statin, beta-blocker    Postoperative fibrillation  Prolonged QTc, improving  Hypotension  -Currently on NSR  -Cardiology following, currently on amiodarone  -Closely monitor QTc as patient is now on Diflucan  -Continue midodrine    Acute on chronic renal failure  -Renal function remains worse, peaked to 5.82  -nephrology following, s/p tunneled dialysis access placement  -Continue intermittent HD    Anemia  -Suspect postop blood loss  -S/p unit PRBC 3/13  -H&H remains low, continue to monitor    Odynophagia  -GI followed, recommend continue 14-day course of Diflucan  -Defer endoscopy at this time  -He did tolerate his diet today    Well-controlled type 2 diabetes with A1c 7.4%  -FSBG's reviewed and currently appropriate,  -Continue basal, prandial and SSI, adjust as warranted    Expected Discharge Location and Transportation: Rehab  Expected Discharge   Expected Discharge Date and Time     Expected Discharge Date Expected Discharge Time    Mar 18, 2023            DVT prophylaxis:  Medical and mechanical DVT prophylaxis orders are present.     AM-PAC 6 Clicks Score (PT): 18 (03/15/23 1646)    CODE STATUS:   Code Status and Medical Interventions:   Ordered at: 03/01/23 1117     Code Status (Patient has no pulse and is not breathing):    CPR (Attempt to Resuscitate)     Medical Interventions (Patient has pulse or is breathing):    Full Support     Release to patient:    Routine Release       Nelson Bose MD  03/16/23

## 2023-03-16 NOTE — PROGRESS NOTES
"   LOS: 15 days    Patient Care Team:  Yessica Ricci DO as PCP - General (Family Medicine)  Nima Peter MD as Consulting Physician (Endocrinology)  Nadya Ramirez MD as Cardiologist (Cardiology)  Yessica Ricci DO as Consulting Physician (Family Medicine)    Chief Complaint: Acute kidney injury s/p CABG  67-year-old with history of hypertension, diabetes, former smoker 1-1/2 pack/day for quite some time, CAD s/p PCI in 2014, underwent CABG x3, post CABG remain hypotensive blood pressure in the 40s systolic in 60s and 70s 80s range requiring IV pressors.  Developed GE[place]NA oliguric, now an uric  Subjective . F/U [place].    Interval History:   HD yesterday tolerated well. .      Review of Systems:   No new complaints. NO CP/SOB/N/V/D/ITCHING.    Objective     Vital Sign Min/Max for last 24 hours  Temp  Min: 97.4 °F (36.3 °C)  Max: 98.5 °F (36.9 °C)   BP  Min: 103/49  Max: 139/62   Pulse  Min: 60  Max: 81   Resp  Min: 16  Max: 18   SpO2  Min: 93 %  Max: 100 %   Flow (L/min)  Min: 0  Max: 2   Weight  Min: 80.7 kg (177 lb 14.4 oz)  Max: 80.7 kg (177 lb 14.4 oz)     Flowsheet Rows    Flowsheet Row First Filed Value   Admission Height 162.6 cm (64\") Documented at 03/01/2023 0611   Admission Weight 75.3 kg (166 lb) Documented at 03/01/2023 0611          I/O this shift:  In: 360 [P.O.:360]  Out: -   I/O last 3 completed shifts:  In: 480 [P.O.:480]  Out: 3530 [Urine:500; Other:3000; Chest Tube:30]    Physical Exam:  General Appearance:  male awake alert oriented no obvious distress, sitting and eating at this time.  Eyes: PER, EOMI.  Neck: Supple no JVD.  Right-sided nontunneled catheter.  Left side old nontunneled catheter in place  Chest: Chest tube in place.  Lungs: Clear auscultation, no rales rhonchi's, equal chest movement, nonlabored.  Heart: No gallop, murmur, rub, RRR.  Abdomen: Soft, nontender, positive bowel sounds, no organomegaly.  Extremities: NO bilateral lower extremity dependent edema, no " cyanosis.  Neuro: No focal deficit, moving all extremities, alert oriented X 3  : Solomon catheter in place.  Minimal urine output  Skin warm and dry.  Psych mood and affect appropriate  THD CATH  WBC WBC   Date Value Ref Range Status   03/15/2023 12.90 (H) 3.40 - 10.80 10*3/mm3 Final   03/14/2023 13.77 (H) 3.40 - 10.80 10*3/mm3 Final      HGB Hemoglobin   Date Value Ref Range Status   03/16/2023 7.8 (L) 13.0 - 17.7 g/dL Final   03/15/2023 8.6 (L) 13.0 - 17.7 g/dL Final   03/14/2023 9.2 (L) 13.0 - 17.7 g/dL Final      HCT Hematocrit   Date Value Ref Range Status   03/16/2023 24.5 (L) 37.5 - 51.0 % Final   03/15/2023 27.5 (L) 37.5 - 51.0 % Final   03/14/2023 29.5 (L) 37.5 - 51.0 % Final      Platlets No results found for: LABPLAT   MCV MCV   Date Value Ref Range Status   03/15/2023 94.2 79.0 - 97.0 fL Final   03/14/2023 93.4 79.0 - 97.0 fL Final          Sodium Sodium   Date Value Ref Range Status   03/16/2023 133 (L) 136 - 145 mmol/L Final   03/15/2023 133 (L) 136 - 145 mmol/L Final   03/14/2023 132 (L) 136 - 145 mmol/L Final      Potassium Potassium   Date Value Ref Range Status   03/16/2023 4.3 3.5 - 5.2 mmol/L Final     Comment:     Slight hemolysis detected by analyzer. Results may be affected.   03/15/2023 5.1 3.5 - 5.2 mmol/L Final   03/14/2023 4.9 3.5 - 5.2 mmol/L Final      Chloride Chloride   Date Value Ref Range Status   03/16/2023 98 98 - 107 mmol/L Final   03/15/2023 97 (L) 98 - 107 mmol/L Final   03/14/2023 97 (L) 98 - 107 mmol/L Final      CO2 CO2   Date Value Ref Range Status   03/16/2023 24.0 22.0 - 29.0 mmol/L Final   03/15/2023 23.0 22.0 - 29.0 mmol/L Final   03/14/2023 19.0 (L) 22.0 - 29.0 mmol/L Final      BUN BUN   Date Value Ref Range Status   03/16/2023 34 (H) 8 - 23 mg/dL Final   03/15/2023 42 (H) 8 - 23 mg/dL Final   03/14/2023 31 (H) 8 - 23 mg/dL Final      Creatinine Creatinine   Date Value Ref Range Status   03/16/2023 4.75 (H) 0.76 - 1.27 mg/dL Final   03/15/2023 5.69 (H) 0.76 - 1.27  mg/dL Final   03/14/2023 4.24 (H) 0.76 - 1.27 mg/dL Final      Calcium Calcium   Date Value Ref Range Status   03/16/2023 7.9 (L) 8.6 - 10.5 mg/dL Final   03/15/2023 7.7 (L) 8.6 - 10.5 mg/dL Final   03/14/2023 7.8 (L) 8.6 - 10.5 mg/dL Final      PO4 No results found for: CAPO4   Albumin Albumin   Date Value Ref Range Status   03/15/2023 2.9 (L) 3.5 - 5.2 g/dL Final   03/14/2023 2.8 (L) 3.5 - 5.2 g/dL Final      Magnesium No results found for: MG   Uric Acid No results found for: URICACID        Results Review:     I reviewed the patient's new clinical results.    amiodarone, 200 mg, Oral, Q24H  aspirin, 325 mg, Oral, Daily  fluconazole, 100 mg, Oral, Once per day on Mon Wed Fri  fluconazole, 50 mg, Oral, Once per day on Sun Tue Thu Sat  insulin detemir, 10 Units, Subcutaneous, Nightly  insulin lispro, 0-9 Units, Subcutaneous, 4x Daily With Meals & Nightly  Insulin Lispro, 2 Units, Subcutaneous, TID With Meals  metoprolol succinate XL, 25 mg, Oral, Q24H  midodrine, 10 mg, Oral, Q8H  pantoprazole, 40 mg, Oral, Q AM  pharmacy consult - MTM, , Does not apply, BID  senna-docusate sodium, 2 tablet, Oral, BID   And  polyethylene glycol, 17 g, Oral, Daily  rosuvastatin, 10 mg, Oral, Nightly  sodium chloride, 10 mL, Intravenous, Q12H           Medication Review: Reviewed    Assessment & Plan       I25.10, CABG 03/01/23    Hypertension    Hyperlipidemia LDL goal <70    Type 2 diabetes mellitus with hypoglycemia, with long-term current use of insulin (HCC)    Ischemic cardiomyopathy    Mucopurulent chronic bronchitis (HCC)    Prolonged Q-T interval on ECG    Acute renal failure (ARF) (HCC)    Shock (HCC)    Acute respiratory failure with hypoxia (HCC)     1.  [place]: ATN likely secondary to shock with systolic blood pressure postsurgery in the range of 40s to 80s, now on multiple vasopressors.  Admit creatinine 1.08, creatinine progressively getting worse.  Patient is oliguric.  C3 complement 18, C4 complement 83 within normal  range.  Ultrasound of the kidney shows right kidney 11 cm left kidney 10.6 cm normal ultrasound, bladder ultrasound normal. NONOLIGURIC BUT  NO RECOVERY GFR YET.   2.  S/p CABG x3  3.  Ischemic cardiomyopathy with ejection fraction 41-45%.  4.  Diabetes type 2.  5.  Cardiogenic shock. Resolved.  6. HYPONATREMIA   Recommendations:  . HD  3/17/23 WITH PARAMETERS. START TO MAKE ARRANGEMENTS FOR OUTPATIENT HD HERE IN [place]. D/W PATIENT.. .   Nicolas Pritchett MD  03/16/23  15:12 EDT

## 2023-03-16 NOTE — PROGRESS NOTES
CTS Progress Note      POD 15 s/p CABG x3     LOS: 15 days     Subjective  Transferred to telemetry yesterday. Odynophagia improving.  Endoscopy deferred. HD yesterday. Ambulated 220 ft with PT. VSS On 2L NC.    Objective    Vital Signs  Temp:  [97.4 °F (36.3 °C)-98.6 °F (37 °C)] 97.9 °F (36.6 °C)  Heart Rate:  [60-84] 65  Resp:  [16-18] 18  BP: (103-139)/(49-65) 139/62    Physical Exam:   General Appearance: alert, appears stated age and cooperative   Lungs: clear to auscultation     Heart: regular rhythm & normal rate, normal S1, S2 and no murmur, no gallop, no rub   Chest: sternum stable   Skin: Incision c/d/i     Results   Results from last 7 days   Lab Units 03/16/23 0459 03/15/23  0525   WBC 10*3/mm3  --  12.90*   HEMOGLOBIN g/dL 7.8* 8.6*   HEMATOCRIT % 24.5* 27.5*   PLATELETS 10*3/mm3  --  244     Results from last 7 days   Lab Units 03/16/23 0459   SODIUM mmol/L 133*   POTASSIUM mmol/L 4.3   CHLORIDE mmol/L 98   CO2 mmol/L 24.0   BUN mg/dL 34*   CREATININE mg/dL 4.75*   GLUCOSE mg/dL 102*   CALCIUM mg/dL 7.9*       Imaging Results (Last 24 Hours)     Procedure Component Value Units Date/Time    XR Chest 1 View [[id number]] Resulted: 03/16/23 0427     Updated: 03/16/23 0428          Assessment    I25.10, CABG 03/01/23    Hypertension    Hyperlipidemia LDL goal <70    Type 2 diabetes mellitus with hypoglycemia, with long-term current use of insulin (HCC)    Ischemic cardiomyopathy    Mucopurulent chronic bronchitis (HCC)    Prolonged Q-T interval on ECG    Acute renal failure (ARF) (HCC)    Shock (HCC)    Acute respiratory failure with hypoxia (HCC)      Plan   Watch H&H-repeat in am  HD per Nephrology  Ambulate TID  Pulm toilet  Arrangements will need to be made for outpatient HD or inpatient rehab. Case management.    Stacy De La Torre PA-C  03/16/23  07:37 EDT

## 2023-03-16 NOTE — PLAN OF CARE
Goal Outcome Evaluation:  Plan of Care Reviewed With: patient        Progress: improving  Outcome Evaluation: Physical therapy treatment complete. The patient required less assistance for bed mobility and for transfers. Patient just finished ambulating with nursing staff. Patient completed seated therex tolerated well. The patient continues to present below baseline for functional mobility. The patient would continue to benefit from skilled PT to address strength, balance and activity tolerance deficits. Continue to recommend home with assist and outpatient cardiac rehab at hospital discharge.

## 2023-03-16 NOTE — THERAPY PROGRESS REPORT/RE-CERT
Patient Name: Herbert Harley  : 1955    MRN: [id number]                              Today's Date: 3/16/2023       Admit Date: 3/1/2023    Visit Dx:     ICD-10-CM ICD-9-CM   1. Coronary artery disease involving native coronary artery of native heart without angina pectoris  I25.10 414.01   2. Coronary artery disease involving native coronary artery of native heart, unspecified whether angina present  I25.10 414.01   3. Acute renal failure, unspecified acute renal failure type (HCC)  N17.9 584.9   4. Acute respiratory failure with hypoxia (HCC)  J96.01 518.81   5. Dysphagia, unspecified type  R13.10 787.20     Patient Active Problem List   Diagnosis   • I25.10, CABG 23   • Hypertension   • Hyperlipidemia LDL goal <70   • Type 2 diabetes mellitus with hypoglycemia, with long-term current use of insulin (HCC)   • Ischemic cardiomyopathy   • Allergic rhinitis   • Positive cardiac stress test   • Mucopurulent chronic bronchitis (HCC)   • Prolonged Q-T interval on ECG   • Acute renal failure (ARF) (HCC)   • Shock (HCC)   • Acute respiratory failure with hypoxia (HCC)     Past Medical History:   Diagnosis Date   • Acute renal failure (ARF) (HCC) 3/2/2023   • Asthma 2022   • Chronic systolic congestive heart failure (HCC) 3/1/2023   • Coronary artery disease 2014   • Elevated cholesterol    • History of tobacco abuse    • HLD (hyperlipidemia) 2023   • Hypertension    • Ischemic cardiomyopathy    • Myocardial infarction (HCC) 2014   • Prolonged Q-T interval on ECG 3/1/2023   • Type 2 diabetes mellitus (HCC)    • Wears glasses      Past Surgical History:   Procedure Laterality Date   • CARDIAC CATHETERIZATION  2014   • CARDIAC CATHETERIZATION Left 2023    Procedure: Left Heart Cath;  Surgeon: Nadya Ramirez MD;  Location: [facility] CATH INVASIVE LOCATION;  Service: Cardiology;  Laterality: Left;   • CORONARY ARTERY BYPASS GRAFT N/A 3/1/2023    Procedure: MEDIAN  STERNOTOMY CORONARY ARTERY BYPASS GRAFTING X3  UTILIZING THE LEFT INTERNAL MAMMERY GRAFT, AND EVH OF THE GREATER RIGHT SAPHENOUS VEIN;  Surgeon: Paresh Saavedra MD;  Location: [facility];  Service: Cardiothoracic;  Laterality: N/A;   • CORONARY STENT PLACEMENT  March 2014   • TONSILLECTOMY        General Information     Row Name 03/16/23 1557          Physical Therapy Time and Intention    Document Type progress note/recertification  -ML     Mode of Treatment physical therapy  -ML     Row Name 03/16/23 1557          General Information    Patient Profile Reviewed yes  -ML     Existing Precautions/Restrictions cardiac;sternal;fall  -ML     Barriers to Rehab medically complex  -ML     Row Name 03/16/23 1557          Cognition    Orientation Status (Cognition) oriented x 4  -ML     Row Name 03/16/23 1557          Safety Issues, Functional Mobility    Safety Issues Affecting Function (Mobility) safety precaution awareness;safety precautions follow-through/compliance;insight into deficits/self-awareness  -ML     Impairments Affecting Function (Mobility) balance;endurance/activity tolerance;shortness of breath;strength  -ML           User Key  (r) = Recorded By, (t) = Taken By, (c) = Cosigned By    Initials Name Provider Type    ML Kristie Zaldivar Physical Therapist               Mobility     Row Name 03/16/23 1559          Bed Mobility    Bed Mobility supine-sit;sit-supine  -ML     Supine-Sit [place] (Bed Mobility) supervision;verbal cues  -ML     Sit-Supine [place] (Bed Mobility) supervision;verbal cues  -ML     Assistive Device (Bed Mobility) head of bed elevated  -ML     Comment, (Bed Mobility) Patient instructed in log roll technique to complete supine to sit/sit to supine transfer. Patient instructed in bridging in bed to reposition.  -ML     Row Name 03/16/23 1559          Sit-Stand Transfer    Sit-Stand [place] (Transfers) supervision;verbal cues  -ML     Assistive Device (Sit-Stand Transfers) other  (see comments)  no AD  -ML     Comment, (Sit-Stand Transfer) Patient completes 3x sit to stand transfer without BUE support, verbal cues for forward weight shift.  -ML     Row Name 03/16/23 1559          Gait/Stairs (Locomotion)    [place] Level (Gait) not tested  -ML     Comment, (Gait/Stairs) Patient just finished walking with nursing staff.  -ML           User Key  (r) = Recorded By, (t) = Taken By, (c) = Cosigned By    Initials Name Provider Type     Kristie Zaldivar Physical Therapist               Obj/Interventions     Row Name 03/16/23 1601          Range of Motion Comprehensive    General Range of Motion bilateral lower extremity ROM WFL  -ML     Row Name 03/16/23 1601          Strength Comprehensive (MMT)    General Manual Muscle Testing (MMT) Assessment lower extremity strength deficits identified  -ML     Comment, General Manual Muscle Testing (MMT) Assessment BLE at least 3/5 observed with mobility  -ML     Row Name 03/16/23 1601          Motor Skills    Therapeutic Exercise shoulder;hip;knee;ankle  -ML     Row Name 03/16/23 1601          Shoulder (Therapeutic Exercise)    Shoulder (Therapeutic Exercise) AROM (active range of motion)  -ML     Shoulder AROM (Therapeutic Exercise) bilateral;scapular retraction;10 repetitions;3 second hold;other (see comments)  elbows at chest wall, retractions to neutral position. Paced breathing  -ML     Row Name 03/16/23 1601          Hip (Therapeutic Exercise)    Hip (Therapeutic Exercise) strengthening exercise  -ML     Hip Strengthening (Therapeutic Exercise) bilateral;marching while standing;20 repititions;2 sets  -ML     Row Name 03/16/23 1601          Knee (Therapeutic Exercise)    Knee (Therapeutic Exercise) strengthening exercise  -ML     Knee Strengthening (Therapeutic Exercise) bilateral;LAQ (long arc quad);20 repititions;2 sets  -ML     Row Name 03/16/23 1601          Ankle (Therapeutic Exercise)    Ankle (Therapeutic Exercise) AROM (active range of motion)   -ML     Ankle AROM (Therapeutic Exercise) bilateral;dorsiflexion;plantarflexion;15 repititions  -ML     Row Name 03/16/23 1601          Balance    Balance Assessment sitting static balance;sitting dynamic balance;sit to stand dynamic balance;standing static balance;standing dynamic balance  -ML     Static Sitting Balance independent  -ML     Dynamic Sitting Balance independent  -ML     Position, Sitting Balance unsupported;sitting edge of bed  -ML     Sit to Stand Dynamic Balance supervision;verbal cues  -ML     Static Standing Balance supervision  -ML     Dynamic Standing Balance supervision  -ML     Position/Device Used, Standing Balance supported  -ML     Balance Interventions sitting;standing;sit to stand;supported;static;dynamic  -ML           User Key  (r) = Recorded By, (t) = Taken By, (c) = Cosigned By    Initials Name Provider Type    ML Kristie Zaldivar Physical Therapist               Goals/Plan     Row Name 03/16/23 1607          Bed Mobility Goal 1 (PT)    Progress/Outcomes (Bed Mobility Goal 1, PT) goal ongoing  -     Row Name 03/16/23 1607          Transfer Goal 1 (PT)    Activity/Assistive Device (Transfer Goal 1, PT) sit-to-stand/stand-to-sit;bed-to-chair/chair-to-bed  -ML     [place] Level/Cues Needed (Transfer Goal 1, PT) independent  -ML     Time Frame (Transfer Goal 1, PT) long term goal (LTG);10 days  -ML     Progress/Outcome (Transfer Goal 1, PT) goal ongoing  -     Row Name 03/16/23 1607          Gait Training Goal 1 (PT)    Activity/Assistive Device (Gait Training Goal 1, PT) gait (walking locomotion);improve balance and speed;increase endurance/gait distance  -ML     [place] Level (Gait Training Goal 1, PT) independent  -ML     Distance (Gait Training Goal 1, PT) 500  -ML     Time Frame (Gait Training Goal 1, PT) long term goal (LTG);10 days  -ML     Progress/Outcome (Gait Training Goal 1, PT) goal revised this date  -     Row Name 03/16/23 1607          Stairs Goal 1 (PT)     Activity/Assistive Device (Stairs Goal 1, PT) stairs, all skills;ascending stairs;descending stairs;step-over step;step-to-step;using handrail, left;using handrail, right  -ML     [place] Level/Cues Needed (Stairs Goal 1, PT) standby assist  -ML     Number of Stairs (Stairs Goal 1, PT) 10  -ML     Time Frame (Stairs Goal 1, PT) 10 days;long term goal (LTG)  -     Row Name 03/16/23 1607          Therapy Assessment/Plan (PT)    Planned Therapy Interventions (PT) balance training;bed mobility training;gait training;home exercise program;neuromuscular re-education;patient/family education;postural re-education;ROM (range of motion);stair training;strengthening;stretching;transfer training  -           User Key  (r) = Recorded By, (t) = Taken By, (c) = Cosigned By    Initials Name Provider Type     Kristie Zaldivar Physical Therapist               Clinical Impression     Row Name 03/16/23 1604          Pain    Pretreatment Pain Rating 0/10 - no pain  -ML     Posttreatment Pain Rating 0/10 - no pain  -ML     Row Name 03/16/23 1604          Plan of Care Review    Plan of Care Reviewed With patient  -ML     Progress improving  -     Outcome Evaluation Physical therapy treatment complete. The patient requierd less assistance for bed mobility and for transfers. Patiet just finished ambulating with nursing staff. Patient completed seated therex tolerated well. The patient continues to present below baseline for funcitonal mobility. The patient would continue to benefit from skilled PT to adderss strength, balance and activity tolerance deficits. Continue to recommend home with assist and outpatient cardiac rehab at hospital discharge.  -     Row Name 03/16/23 1604          Therapy Assessment/Plan (PT)    Rehab Potential (PT) good, to achieve stated therapy goals  -     Criteria for Skilled Interventions Met (PT) yes;skilled treatment is necessary  -     Therapy Frequency (PT) daily  -     Row Name 03/16/23  1604          Vital Signs    Pre Patient Position Supine  -ML     Intra Patient Position Standing  -ML     Post Patient Position Supine  -ML     Row Name 03/16/23 1604          Positioning and Restraints    Pre-Treatment Position in bed  -ML     Post Treatment Position bed  -ML     In Bed notified nsg;fowlers;call light within reach;encouraged to call for assist;with family/caregiver;side rails up x2  -ML           User Key  (r) = Recorded By, (t) = Taken By, (c) = Cosigned By    Initials Name Provider Type    Kristie Baez Physical Therapist               Outcome Measures     Row Name 03/16/23 1609 03/16/23 0838       How much help from another person do you currently need...    Turning from your back to your side while in flat bed without using bedrails? 3  -ML 3  -MP    Moving from lying on back to sitting on the side of a flat bed without bedrails? 4  -ML 3  -MP    Moving to and from a bed to a chair (including a wheelchair)? 4  -ML 3  -MP    Standing up from a chair using your arms (e.g., wheelchair, bedside chair)? 4  -ML 3  -MP    Climbing 3-5 steps with a railing? 3  -ML 3  -MP    To walk in hospital room? 3  -ML 3  -MP    AM-PAC 6 Clicks Score (PT) 21  -ML 18  -MP    Highest level of mobility 6 --> Walked 10 steps or more  -ML 6 --> Walked 10 steps or more  -MP    Row Name 03/16/23 1609          Functional Assessment    Outcome Measure Options AM-PAC 6 Clicks Basic Mobility (PT)  -ML           User Key  (r) = Recorded By, (t) = Taken By, (c) = Cosigned By    Initials Name Provider Type    Kristie Baez Physical Therapist    Mariama Maxwell, RN Registered Nurse                             Physical Therapy Education     Title: PT OT SLP Therapies (In Progress)     Topic: Physical Therapy (Done)     Point: Mobility training (Done)     Learning Progress Summary           Patient Acceptance, E, VU by JUANIS at 3/16/2023 1609    Acceptance, E, NR by CM at 3/15/2023 1017    Acceptance, E, NR by FLIP at 3/14/2023  0940    Acceptance, E, NR by AE at 3/13/2023 1340    Acceptance, E, NR by KG at 3/11/2023 1306    Acceptance, E, NR by CM at 3/10/2023 1410    Acceptance, E, NR by CM at 3/9/2023 1421    Acceptance, E, NR by KG at 3/8/2023 0954    Acceptance, E, NR by KG at 3/7/2023 0919    Acceptance, E, NR by KG at 3/6/2023 1324   Family Acceptance, E, VU by ML at 3/16/2023 1609                   Point: Home exercise program (Done)     Learning Progress Summary           Patient Acceptance, E, VU by ML at 3/16/2023 1609    Acceptance, E, NR by FLIP at 3/14/2023 0940    Acceptance, E, NR by AE at 3/13/2023 1340    Acceptance, E, NR by KG at 3/11/2023 1306    Acceptance, E, NR by CM at 3/9/2023 1421    Acceptance, E, NR by KG at 3/8/2023 0954    Acceptance, E, NR by KG at 3/7/2023 0919   Family Acceptance, E, VU by ML at 3/16/2023 1609                   Point: Body mechanics (Done)     Learning Progress Summary           Patient Acceptance, E, VU by ML at 3/16/2023 1609    Acceptance, E, NR by CM at 3/15/2023 1017    Acceptance, E, NR by FLIP at 3/14/2023 0940    Acceptance, E, NR by AE at 3/13/2023 1340    Acceptance, E, NR by KG at 3/11/2023 1306    Acceptance, E, NR by CM at 3/10/2023 1410    Acceptance, E, NR by CM at 3/9/2023 1421    Acceptance, E, NR by KG at 3/8/2023 0954    Acceptance, E, NR by KG at 3/7/2023 0919    Acceptance, E, NR by KG at 3/6/2023 1324   Family Acceptance, E, VU by ML at 3/16/2023 1609                   Point: Precautions (Done)     Learning Progress Summary           Patient Acceptance, E, VU by ML at 3/16/2023 1609    Acceptance, E, NR by CM at 3/15/2023 1017    Acceptance, E, NR by FLIP at 3/14/2023 0940    Acceptance, E, NR by AE at 3/13/2023 1340    Acceptance, E, NR by KG at 3/11/2023 1306    Acceptance, E, NR by CM at 3/10/2023 1410    Acceptance, E, NR by CM at 3/9/2023 1421    Acceptance, E, NR by KG at 3/8/2023 0954    Acceptance, E, NR by KG at 3/7/2023 0919    Acceptance, E, NR by KG at 3/6/2023  1324   Family Acceptance, E, VU by ML at 3/16/2023 1609                               User Key     Initials Effective Dates Name Provider Type Discipline    FLIP 02/03/23 -  Marlyn Torres, PT Physical Therapist PT    KG 05/22/20 -  Whitney Mistry, PT Physical Therapist PT    ML 04/22/21 -  Kristie Zaldivar Physical Therapist PT    AE 09/21/21 -  Priyank Saenz, PT Physical Therapist PT    CM 09/22/22 -  Tory Kaye, PT Physical Therapist PT              PT Recommendation and Plan  Planned Therapy Interventions (PT): balance training, bed mobility training, gait training, home exercise program, neuromuscular re-education, patient/family education, postural re-education, ROM (range of motion), stair training, strengthening, stretching, transfer training  Plan of Care Reviewed With: patient  Progress: improving  Outcome Evaluation: Physical therapy treatment complete. The patient requierd less assistance for bed mobility and for transfers. Pat[place] just finished ambulating with nursing staff. Patient completed seated therex tolerated well. The patient continues to present below baseline for funcitonal mobility. The patient would continue to benefit from skilled PT to adderss strength, balance and activity tolerance deficits. Continue to recommend home with assist and outpatient cardiac rehab at hospital discharge.     Time Calculation:    PT Charges     Row Name 03/16/23 1610             Time Calculation    Start Time 1528  -ML      Stop Time 1555  -ML      Time Calculation (min) 27 min  -ML      PT Received On 03/16/23  -ML      PT Goal Re-Cert Due Date 03/26/23  -ML         Timed Charges    20225 - PT Therapeutic Exercise Minutes 15  -ML      98370 - PT Therapeutic Activity Minutes 12  -ML         Total Minutes    Timed Charges Total Minutes 27  -ML       Total Minutes 27  -ML            User Key  (r) = Recorded By, (t) = Taken By, (c) = Cosigned By    Initials Name Provider Type    ML Kristie Zaldivar  Physical Therapist              Therapy Charges for Today     Code Description Service Date Service Provider Modifiers Qty    81500711058 HC PT THER PROC EA 15 MIN 3/16/2023 Kristie Zaldivar GP 1    05904375723 HC PT THERAPEUTIC ACT EA 15 MIN 3/16/2023 Kristie Zaldivar GP 1          PT G-Codes  Outcome Measure Options: AM-PAC 6 Clicks Basic Mobility (PT)  AM-PAC 6 Clicks Score (PT): 21  AM-PAC 6 Clicks Score (OT): 20  PT Discharge Summary  Anticipated Discharge Disposition (PT): home with assist, other (see comments) (outpatient cardiac rehab)    Kristie Zaldivar  3/16/2023

## 2023-03-17 ENCOUNTER — APPOINTMENT (OUTPATIENT)
Dept: NEPHROLOGY | Facility: HOSPITAL | Age: 68
DRG: 235 | End: 2023-03-17
Payer: MEDICARE

## 2023-03-17 LAB
ALBUMIN SERPL-MCNC: 2.7 G/DL (ref 3.5–5.2)
ALBUMIN/GLOB SERPL: 1.2 G/DL
ALP SERPL-CCNC: 128 U/L (ref 39–117)
ALT SERPL W P-5'-P-CCNC: 29 U/L (ref 1–41)
ANION GAP SERPL CALCULATED.3IONS-SCNC: 13 MMOL/L (ref 5–15)
AST SERPL-CCNC: 30 U/L (ref 1–40)
BILIRUB SERPL-MCNC: 0.2 MG/DL (ref 0–1.2)
BUN SERPL-MCNC: 50 MG/DL (ref 8–23)
BUN/CREAT SERPL: 7.6 (ref 7–25)
CALCIUM SPEC-SCNC: 8.2 MG/DL (ref 8.6–10.5)
CHLORIDE SERPL-SCNC: 96 MMOL/L (ref 98–107)
CO2 SERPL-SCNC: 24 MMOL/L (ref 22–29)
CREAT SERPL-MCNC: 6.58 MG/DL (ref 0.76–1.27)
DEPRECATED RDW RBC AUTO: 57.8 FL (ref 37–54)
EGFRCR SERPLBLD CKD-EPI 2021: 8.6 ML/MIN/1.73
ERYTHROCYTE [DISTWIDTH] IN BLOOD BY AUTOMATED COUNT: 17.4 % (ref 12.3–15.4)
GLOBULIN UR ELPH-MCNC: 2.3 GM/DL
GLUCOSE BLDC GLUCOMTR-MCNC: 110 MG/DL (ref 70–130)
GLUCOSE BLDC GLUCOMTR-MCNC: 152 MG/DL (ref 70–130)
GLUCOSE BLDC GLUCOMTR-MCNC: 159 MG/DL (ref 70–130)
GLUCOSE BLDC GLUCOMTR-MCNC: 336 MG/DL (ref 70–130)
GLUCOSE SERPL-MCNC: 140 MG/DL (ref 65–99)
HCT VFR BLD AUTO: 26.8 % (ref 37.5–51)
HGB BLD-MCNC: 8.4 G/DL (ref 13–17.7)
MCH RBC QN AUTO: 29.3 PG (ref 26.6–33)
MCHC RBC AUTO-ENTMCNC: 31.3 G/DL (ref 31.5–35.7)
MCV RBC AUTO: 93.4 FL (ref 79–97)
PLATELET # BLD AUTO: 406 10*3/MM3 (ref 140–450)
PMV BLD AUTO: 10 FL (ref 6–12)
POTASSIUM SERPL-SCNC: 4.6 MMOL/L (ref 3.5–5.2)
PROT SERPL-MCNC: 5 G/DL (ref 6–8.5)
RBC # BLD AUTO: 2.87 10*6/MM3 (ref 4.14–5.8)
SODIUM SERPL-SCNC: 133 MMOL/L (ref 136–145)
WBC NRBC COR # BLD: 13.81 10*3/MM3 (ref 3.4–10.8)

## 2023-03-17 PROCEDURE — 99024 POSTOP FOLLOW-UP VISIT: CPT | Performed by: PHYSICIAN ASSISTANT

## 2023-03-17 PROCEDURE — 80053 COMPREHEN METABOLIC PANEL: CPT | Performed by: INTERNAL MEDICINE

## 2023-03-17 PROCEDURE — 82962 GLUCOSE BLOOD TEST: CPT

## 2023-03-17 PROCEDURE — 63710000001 INSULIN LISPRO (HUMAN) PER 5 UNITS: Performed by: STUDENT IN AN ORGANIZED HEALTH CARE EDUCATION/TRAINING PROGRAM

## 2023-03-17 PROCEDURE — 85027 COMPLETE CBC AUTOMATED: CPT | Performed by: PHYSICIAN ASSISTANT

## 2023-03-17 PROCEDURE — 25010000002 EPOETIN ALFA-EPBX 10000 UNIT/ML SOLUTION: Performed by: INTERNAL MEDICINE

## 2023-03-17 PROCEDURE — 99232 SBSQ HOSP IP/OBS MODERATE 35: CPT | Performed by: INTERNAL MEDICINE

## 2023-03-17 PROCEDURE — 63710000001 INSULIN DETEMIR PER 5 UNITS: Performed by: STUDENT IN AN ORGANIZED HEALTH CARE EDUCATION/TRAINING PROGRAM

## 2023-03-17 RX ADMIN — INSULIN LISPRO 2 UNITS: 100 INJECTION, SOLUTION INTRAVENOUS; SUBCUTANEOUS at 17:45

## 2023-03-17 RX ADMIN — SENNOSIDES AND DOCUSATE SODIUM 2 TABLET: 8.6; 5 TABLET ORAL at 20:21

## 2023-03-17 RX ADMIN — ASPIRIN 325 MG: 325 TABLET, COATED ORAL at 13:16

## 2023-03-17 RX ADMIN — FLUCONAZOLE 100 MG: 100 TABLET ORAL at 13:15

## 2023-03-17 RX ADMIN — EPOETIN ALFA-EPBX 10000 UNITS: 10000 INJECTION, SOLUTION INTRAVENOUS; SUBCUTANEOUS at 12:27

## 2023-03-17 RX ADMIN — Medication 10 ML: at 20:23

## 2023-03-17 RX ADMIN — MIDODRINE HYDROCHLORIDE 10 MG: 10 TABLET ORAL at 05:02

## 2023-03-17 RX ADMIN — PANTOPRAZOLE SODIUM 40 MG: 40 TABLET, DELAYED RELEASE ORAL at 05:02

## 2023-03-17 RX ADMIN — INSULIN DETEMIR 10 UNITS: 100 INJECTION, SOLUTION SUBCUTANEOUS at 20:21

## 2023-03-17 RX ADMIN — INSULIN LISPRO 7 UNITS: 100 INJECTION, SOLUTION INTRAVENOUS; SUBCUTANEOUS at 17:45

## 2023-03-17 RX ADMIN — INSULIN LISPRO 2 UNITS: 100 INJECTION, SOLUTION INTRAVENOUS; SUBCUTANEOUS at 20:35

## 2023-03-17 RX ADMIN — MIDODRINE HYDROCHLORIDE 10 MG: 10 TABLET ORAL at 20:21

## 2023-03-17 RX ADMIN — METOPROLOL SUCCINATE 25 MG: 25 TABLET, EXTENDED RELEASE ORAL at 13:15

## 2023-03-17 RX ADMIN — Medication 10 ML: at 13:17

## 2023-03-17 RX ADMIN — AMIODARONE HYDROCHLORIDE 200 MG: 200 TABLET ORAL at 13:17

## 2023-03-17 RX ADMIN — ROSUVASTATIN CALCIUM 10 MG: 10 TABLET, FILM COATED ORAL at 20:21

## 2023-03-17 NOTE — PROGRESS NOTES
[facility] Medicine Services  PROGRESS NOTE    Patient Name: Herbert Harley  : 1955  MRN: [id number]    Date of Admission: 3/1/2023  Primary Care Physician: Yessica Ricci DO    Subjective   Subjective     CC: Med management s/p CABG    HPI: Patient seen in dialysis, states that he did not get much sleep, tired this morning    ROS:  Gen- No fevers, chills  CV- No chest pain, palpitations  Resp- No cough, dyspnea  GI- No N/V/D, abd pain      Objective   Objective     Vital Signs:   Temp:  [98 °F (36.7 °C)-98.7 °F (37.1 °C)] 98.3 °F (36.8 °C)  Heart Rate:  [63-85] 84  Resp:  [16-20] 16  BP: (105-140)/(52-96) 115/58     Physical Exam:  Constitutional: No acute distress, awake, alert  HENT: NCAT, mucous membranes moist  Respiratory: Clear to auscultation bilaterally, respiratory effort normal   Cardiovascular: RRR, no murmurs, rubs, or gallops  Gastrointestinal: Positive bowel sounds, soft, nontender, nondistended  Musculoskeletal: No bilateral ankle edema  Psychiatric: Appropriate affect, cooperative  Neurologic: Nonfocal, incision CDI  Skin: No rashes      Results Reviewed:  LAB RESULTS:      Lab 23  0729 23  0459 03/15/23  0525 23  0454 23  0356 23  1406 23  0436 23  1513   WBC 13.81*  --  12.90* 13.77* 13.66* 13.35*   < > 13.37*   HEMOGLOBIN 8.4* 7.8* 8.6* 9.2* 6.9* 7.5*   < > 8.1*   HEMATOCRIT 26.8* 24.5* 27.5* 29.5* 21.9* 23.7*   < > 25.2*   PLATELETS 406  --  244 187 132* 117*   < > 91*   NEUTROS ABS  --   --  9.19* 9.98* 9.79* 9.95*  --  10.04*   IMMATURE GRANS (ABS)  --   --  0.10* 0.11* 0.13* 0.16*  --  0.25*   LYMPHS ABS  --   --  1.19 1.34 1.57 1.37  --  1.37   MONOS ABS  --   --  2.08* 2.01* 1.84* 1.66*  --  1.51*   EOS ABS  --   --  0.28 0.28 0.29 0.18  --  0.18   MCV 93.4  --  94.2 93.4 96.5 94.8   < > 94.4   PROCALCITONIN  --   --   --  0.61*  --   --   --   --     < > = values in this interval not displayed.         Lab  03/17/23  0729 03/16/23  0459 03/15/23  0525 03/14/23 0454 03/13/23  0356 03/12/23  0436 03/11/23  0420 03/10/23  1558   SODIUM 133* 133* 133* 132* 131*   < > 134* 134*   POTASSIUM 4.6 4.3 5.1 4.9 4.8   < > 4.4 4.3   CHLORIDE 96* 98 97* 97* 98   < > 103 103   CO2 24.0 24.0 23.0 19.0* 21.0*   < > 23.0 24.0   ANION GAP 13.0 11.0 13.0 16.0* 12.0   < > 8.0 7.0   BUN 50* 34* 42* 31* 42*   < > 34* 29*   CREATININE 6.58* 4.75* 5.69* 4.24* 5.82*   < > 3.39* 2.30*   EGFR 8.6* 12.7* 10.2* 14.6* 10.0*   < > 19.1* 30.4*   GLUCOSE 140* 102* 158* 122* 121*   < > 128* 217*   CALCIUM 8.2* 7.9* 7.7* 7.8* 7.5*   < > 7.4* 7.1*   IONIZED CALCIUM  --   --   --   --   --   --   --  1.11*   MAGNESIUM  --   --   --   --  2.1  --  2.3 2.3   PHOSPHORUS  --   --  6.6*  --  6.2*  --  5.3* 4.2    < > = values in this interval not displayed.         Lab 03/17/23  0729 03/15/23  0525 03/14/23 0454 03/13/23 0356 03/12/23 0436   TOTAL PROTEIN 5.0* 5.0* 5.3* 4.3* 4.3*   ALBUMIN 2.7* 2.9* 2.8* 2.5* 2.4*   GLOBULIN 2.3 2.1 2.5 1.8 1.9   ALT (SGPT) 29 33 33 29 31   AST (SGOT) 30 37 42* 39 51*   BILIRUBIN 0.2 0.3 0.4 0.3 0.4   ALK PHOS 128* 158* 161* 134* 137*         Lab 03/16/23  0801 03/16/23  0459   PROBNP  --  27,176.0*   HSTROP T 701* 690*             Lab 03/13/23  0630   ABO TYPING O   RH TYPING Positive   ANTIBODY SCREEN Negative         Brief Urine Lab Results  (Last result in the past 365 days)      Color   Clarity   Blood   Leuk Est   Nitrite   Protein   CREAT   Urine HCG        03/02/23 1502             58.8               Microbiology Results Abnormal     Procedure Component Value - Date/Time    Blood Culture - Blood, Arm, Right [[id number]]  (Normal) Collected: 03/08/23 1403    Lab Status: Final result Specimen: Blood from Arm, Right Updated: 03/13/23 1616     Blood Culture No growth at 5 days    Blood Culture - Blood, Hand, Right [[id number]]  (Normal) Collected: 03/08/23 1403    Lab Status: Final result Specimen: Blood from Hand, Right  Updated: 03/13/23 1616     Blood Culture No growth at 5 days    MRSA Screen, PCR (Inpatient) - Swab, Nares [[id number]]  (Normal) Collected: 03/08/23 1115    Lab Status: Final result Specimen: Swab from Nares Updated: 03/08/23 1437     MRSA PCR Negative    Narrative:      The negative predictive value of this diagnostic test is high and should only be used to consider de-escalating anti-MRSA therapy. A positive result may indicate colonization with MRSA and must be correlated clinically.  MRSA Negative    Eosinophil Smear - Urine, Indwelling Urethral Catheter [[id number]]  (Normal) Collected: 03/02/23 1502    Lab Status: Final result Specimen: Urine from Indwelling Urethral Catheter Updated: 03/02/23 1647     Eosinophil Smear 0 % EOS/100 Cells     Narrative:      No eosinophil seen          XR Chest 1 View    Result Date: 3/16/2023  XR CHEST 1 VW Date of Exam: 3/16/2023 4:00 AM EDT Indication: CABG. Comparison: 3/10/2023 Findings: Interval removal of left-sided CVC, with a new right-sided catheter terminating in the SVC. There is no pneumothorax. There is a small left-sided pleural effusion and scattered linear atelectasis. Unchanged heart and mediastinal contours.     Impression: Impression: Interval removal of left-sided CVC, with a new right-sided catheter terminating in the SVC. There is no pneumothorax. There is a small left-sided pleural effusion and scattered linear atelectasis. Unchanged heart and mediastinal contours. Electronically Signed: Dashawn Dover  3/16/2023 8:34 AM EDT  Workstation ID: [id number]      Results for orders placed during the hospital encounter of 03/01/23    Adult Transthoracic Echo Complete W/ Cont if Necessary Per Protocol    Interpretation Summary  •  Left ventricular systolic function is mildly decreased. Calculated left ventricular EF = 40.4% Left ventricular ejection fraction appears to be 41 - 45%.  •  The right ventricular cavity is dilated.  •  Left atrial volume is mildly  increased.  •  There is calcification of the aortic valve.  •  Estimated right ventricular systolic pressure from tricuspid regurgitation is normal (<35 mmHg).  •  There is a left pleural effusion.      Current medications:  Scheduled Meds:amiodarone, 200 mg, Oral, Q24H  aspirin, 325 mg, Oral, Daily  epoetin andrew/andrew-epbx, 10,000 Units, Subcutaneous, Once per day on Mon Wed Fri  fluconazole, 100 mg, Oral, Once per day on Mon Wed Fri  fluconazole, 50 mg, Oral, Once per day on Sun Tue Thu Sat  insulin detemir, 10 Units, Subcutaneous, Nightly  insulin lispro, 0-9 Units, Subcutaneous, 4x Daily With Meals & Nightly  Insulin Lispro, 2 Units, Subcutaneous, TID With Meals  metoprolol succinate XL, 25 mg, Oral, Q24H  midodrine, 10 mg, Oral, Q8H  pantoprazole, 40 mg, Oral, Q AM  pharmacy consult - MTM, , Does not apply, BID  senna-docusate sodium, 2 tablet, Oral, BID   And  polyethylene glycol, 17 g, Oral, Daily  rosuvastatin, 10 mg, Oral, Nightly  sodium chloride, 10 mL, Intravenous, Q12H      Continuous Infusions:   PRN Meds:.•  acetaminophen  •  Albuterol Sulfate NEB Orderable  •  arformoterol  •  calcium carbonate  •  cyclobenzaprine  •  dextrose  •  heparin (porcine)  •  ipratropium-albuterol  •  melatonin  •  ondansetron    Assessment & Plan   Assessment & Plan     Active Hospital Problems    Diagnosis  POA   • **I25.10, CABG 03/01/23 [I25.10]  Yes   • Acute respiratory failure with hypoxia (HCC) [J96.01]  Yes   • Acute renal failure (ARF) ([facility]) [N17.9]  Yes   • Shock (HCC) [R57.9]  Yes   • Mucopurulent chronic bronchitis ([facility]) [J41.1]  Yes   • Prolonged Q-T interval on ECG [R94.31]  Yes   • Ischemic cardiomyopathy [I25.5]  Yes   • Type 2 diabetes mellitus with hypoglycemia, with long-term current use of insulin ([facility]) [E11.649, Z79.4]  Not Applicable   • Hyperlipidemia LDL goal <70 [E78.5]  Yes   • Hypertension [I10]  Yes      Resolved Hospital Problems    Diagnosis Date Resolved POA   • Tobacco use [Z72.0] 03/01/2023  Yes        Brief Hospital Course to date:  Herbert Harley is a 67 y.o. male with history of type 2 diabetes, hypertension, hyperlipidemia, CAD status post stents, found to have severe triple-vessel CAD, s/p CABG 3/1/2023, has required ICU care, with stay has been complicated with acute on chronic renal failure, requiring initiation of HD.  Patient also had odynophagia, started on Diflucan, he has been anemic and has required 1 unit PRBC.  He otherwise progressed well and was deemed stable for transfer to [facility], [facility] medicine was asked to follow and assist with medical management, we assumed this on 3/16/2023     CAD multivessel CAD s/p CABG x3  Hyperlipidemia  -Continue postop care per CTS  -Continue aspirin, statin, beta-blocker     Postoperative fibrillation  Prolonged QTc, improving  Hypotension  -Currently on NSR  -Cardiology following, currently on amiodarone  -Closely monitor QTc as patient is now on Diflucan  -Continue midodrine     Acute on chronic renal failure  -Renal function remains worse, peaked to 5.82  -nephrology following, s/p tunneled dialysis access placement  -Continue intermittent HD, patient will need outpatient dialysis, CM to arrange for chair     Anemia  -Suspect postop blood loss  -S/p unit PRBC 3/13  -H&H remains low, continue to monitor     Odynophagia  -GI followed, recommend continue 14-day course of Diflucan  -Defer endoscopy at this time  -He is now tolerating his diet     Well-controlled type 2 diabetes with A1c 7.4%  -FSBG's reviewed and currently appropriate,  -Continue basal, prandial and SSI, adjust as warranted     Expected Discharge Location and Transportation: Rehab  Expected Discharge   Expected Discharge Date and Time     Expected Discharge Date Expected Discharge Time    Mar 20, 2023            DVT prophylaxis:  Medical and mechanical DVT prophylaxis orders are present.     AM-PAC 6 Clicks Score (PT): 21 (03/16/23 3207)    CODE STATUS:   Code Status and  Medical Interventions:   Ordered at: 03/01/23 1117     Code Status (Patient has no pulse and is not breathing):    CPR (Attempt to Resuscitate)     Medical Interventions (Patient has pulse or is breathing):    Full Support     Release to patient:    Routine Release       Nelson Bose MD  03/17/23

## 2023-03-17 NOTE — CASE MANAGEMENT/SOCIAL WORK
Continued Stay Note   [facility]     Patient Name: Herbert Harley  MRN: [id number]  Today's Date: 3/17/2023    Admit Date: 3/1/2023    Plan: update   Discharge Plan     Row Name 03/17/23 1558       Plan    Plan update    Patient/Family in Agreement with Plan yes    Plan Comments Spoke with patient and wife at bedside regarding discharge plan.  Patient wife indicates that she received a call from [facility] stating he was approved to go to their clinic on [address].  Called [facility] SW and spoke with Suzie who advises patient has not yet been cleared and once cleared will not be able to accept until Wednesday.  Spoke with patient and wife at bedside to advise who verbalized understanding and agreement with plan.  RN updated on OPHD chair time/status.  CM following.  Patient plan is to discharge home, awaiting OPHD approval and chair time.    Final Discharge Disposition Code 01 - home or self-care               Discharge Codes    No documentation.               Expected Discharge Date and Time     Expected Discharge Date Expected Discharge Time    Mar 20, 2023             Aurea Pemberton, RN

## 2023-03-17 NOTE — PROGRESS NOTES
"   LOS: 16 days    Patient Care Team:  Yessica Ricci DO as PCP - General (Family Medicine)  Nima Peter MD as Consulting Physician (Endocrinology)  Nadya Ramirez MD as Cardiologist (Cardiology)  Yessica Ricci DO as Consulting Physician (Family Medicine)    Chief Complaint: Acute kidney injury s/p CABG  67-year-old with history of hypertension, diabetes, former smoker 1-1/2 pack/day for quite some time, CAD s/p PCI in 2014, underwent CABG x3, post CABG remain hypotensive blood pressure in the 40s systolic in 60s and 70s 80s range requiring IV pressors.  Developed [place] oliguric, now an uric  Subjective . F/U [place].    Interval History:   SEEN ON DIALYSIS .      Review of Systems:   No new complaints. NO CP/SOB/N/V/D/ITCHING.    Objective     Vital Sign Min/Max for last 24 hours  Temp  Min: 98 °F (36.7 °C)  Max: 98.7 °F (37.1 °C)   BP  Min: 105/52  Max: 136/57   Pulse  Min: 63  Max: 77   Resp  Min: 16  Max: 20   SpO2  Min: 90 %  Max: 98 %   No data recorded   Weight  Min: 77.4 kg (170 lb 11.2 oz)  Max: 77.4 kg (170 lb 11.2 oz)     Flowsheet Rows    Flowsheet Row First Filed Value   Admission Height 162.6 cm (64\") Documented at 03/01/2023 0611   Admission Weight 75.3 kg (166 lb) Documented at 03/01/2023 0611          I/O this shift:  In: -   Out: 175 [Urine:175]  I/O last 3 completed shifts:  In: 600 [P.O.:600]  Out: 425 [Urine:425]    Physical Exam:  General Appearance:  male awake alert oriented no obvious distress, sitting and eating at this time.  Eyes: PER, EOMI.  Neck: Supple no JVD.  Right-sided nontunneled catheter.  tunneled catheter in place  Chest: Chest tube in place.  Lungs: Clear auscultation, no rales rhonchi's, equal chest movement, nonlabored.  Heart: No gallop, murmur, rub, RRR.  Abdomen: Soft, nontender, positive bowel sounds, no organomegaly.  Extremities: NO bilateral lower extremity dependent edema, no cyanosis.  Neuro: No focal deficit, moving all extremities, alert " oriented X 3  : Solomon catheter in place.  Minimal urine output  Skin warm and dry.  Psych mood and affect appropriate  THD CATH  WBC WBC   Date Value Ref Range Status   03/17/2023 13.81 (H) 3.40 - 10.80 10*3/mm3 Final   03/15/2023 12.90 (H) 3.40 - 10.80 10*3/mm3 Final      HGB Hemoglobin   Date Value Ref Range Status   03/17/2023 8.4 (L) 13.0 - 17.7 g/dL Final   03/16/2023 7.8 (L) 13.0 - 17.7 g/dL Final   03/15/2023 8.6 (L) 13.0 - 17.7 g/dL Final      HCT Hematocrit   Date Value Ref Range Status   03/17/2023 26.8 (L) 37.5 - 51.0 % Final   03/16/2023 24.5 (L) 37.5 - 51.0 % Final   03/15/2023 27.5 (L) 37.5 - 51.0 % Final      Platlets No results found for: LABPLAT   MCV MCV   Date Value Ref Range Status   03/17/2023 93.4 79.0 - 97.0 fL Final   03/15/2023 94.2 79.0 - 97.0 fL Final          Sodium Sodium   Date Value Ref Range Status   03/17/2023 133 (L) 136 - 145 mmol/L Final   03/16/2023 133 (L) 136 - 145 mmol/L Final   03/15/2023 133 (L) 136 - 145 mmol/L Final      Potassium Potassium   Date Value Ref Range Status   03/17/2023 4.6 3.5 - 5.2 mmol/L Final   03/16/2023 4.3 3.5 - 5.2 mmol/L Final     Comment:     Slight hemolysis detected by analyzer. Results may be affected.   03/15/2023 5.1 3.5 - 5.2 mmol/L Final      Chloride Chloride   Date Value Ref Range Status   03/17/2023 96 (L) 98 - 107 mmol/L Final   03/16/2023 98 98 - 107 mmol/L Final   03/15/2023 97 (L) 98 - 107 mmol/L Final      CO2 CO2   Date Value Ref Range Status   03/17/2023 24.0 22.0 - 29.0 mmol/L Final   03/16/2023 24.0 22.0 - 29.0 mmol/L Final   03/15/2023 23.0 22.0 - 29.0 mmol/L Final      BUN BUN   Date Value Ref Range Status   03/17/2023 50 (H) 8 - 23 mg/dL Final   03/16/2023 34 (H) 8 - 23 mg/dL Final   03/15/2023 42 (H) 8 - 23 mg/dL Final      Creatinine Creatinine   Date Value Ref Range Status   03/17/2023 6.58 (H) 0.76 - 1.27 mg/dL Final   03/16/2023 4.75 (H) 0.76 - 1.27 mg/dL Final   03/15/2023 5.69 (H) 0.76 - 1.27 mg/dL Final      Calcium  Calcium   Date Value Ref Range Status   03/17/2023 8.2 (L) 8.6 - 10.5 mg/dL Final   03/16/2023 7.9 (L) 8.6 - 10.5 mg/dL Final   03/15/2023 7.7 (L) 8.6 - 10.5 mg/dL Final      PO4 No results found for: CAPO4   Albumin Albumin   Date Value Ref Range Status   03/17/2023 2.7 (L) 3.5 - 5.2 g/dL Final   03/15/2023 2.9 (L) 3.5 - 5.2 g/dL Final      Magnesium No results found for: MG   Uric Acid No results found for: URICACID        Results Review:     I reviewed the patient's new clinical results.    amiodarone, 200 mg, Oral, Q24H  aspirin, 325 mg, Oral, Daily  epoetin andrew/andrew-epbx, 10,000 Units, Subcutaneous, Once per day on Mon Wed Fri  fluconazole, 100 mg, Oral, Once per day on Mon Wed Fri  fluconazole, 50 mg, Oral, Once per day on Sun Tue Thu Sat  insulin detemir, 10 Units, Subcutaneous, Nightly  insulin lispro, 0-9 Units, Subcutaneous, 4x Daily With Meals & Nightly  Insulin Lispro, 2 Units, Subcutaneous, TID With Meals  metoprolol succinate XL, 25 mg, Oral, Q24H  midodrine, 10 mg, Oral, Q8H  pantoprazole, 40 mg, Oral, Q AM  pharmacy consult - MTM, , Does not apply, BID  senna-docusate sodium, 2 tablet, Oral, BID   And  polyethylene glycol, 17 g, Oral, Daily  rosuvastatin, 10 mg, Oral, Nightly  sodium chloride, 10 mL, Intravenous, Q12H           Medication Review: Reviewed    Assessment & Plan       I25.10, CABG 03/01/23    Hypertension    Hyperlipidemia LDL goal <70    Type 2 diabetes mellitus with hypoglycemia, with long-term current use of insulin (HCC)    Ischemic cardiomyopathy    Mucopurulent chronic bronchitis (HCC)    Prolonged Q-T interval on ECG    Acute renal failure (ARF) (HCC)    Shock (HCC)    Acute respiratory failure with hypoxia (HCC)     1.  [place]: ATN likely secondary to shock with systolic blood pressure postsurgery in the range of 40s to 80s, now on multiple vasopressors.  Admit creatinine 1.08, creatinine progressively getting worse.  Patient is oliguric.  C3 complement 18, C4 complement 83 within  normal range.  Ultrasound of the kidney shows right kidney 11 cm left kidney 10.6 cm normal ultrasound, bladder ultrasound normal. NONOLIGURIC BUT  NO RECOVERY GFR YET.   2.  S/p CABG x3  3.  Ischemic cardiomyopathy with ejection fraction 41-45%.  4.  Diabetes type 2.  5.  Cardiogenic shock. Resolved.  6. HYPONATREMIA   Recommendations:  . HD TODAY 3/17/23. PATIENT FAILED PARAMETERS. START EPO.  START TO MAKE ARRANGEMENTS FOR OUTPATIENT HD HERE IN [place]. D/W PATIENT.. .   Nicolas Pritchett MD  03/17/23  08:57 EDT

## 2023-03-17 NOTE — DISCHARGE PLACEMENT REQUEST
"Aurea Pemberton  [phone]  Case Management        Cecy Acosta \"Herb\" (67 y.o. Male)     Date of Birth   1955    Social Security Number       Address   [address] [place], [place] [address]    Home Phone   [phone]    MRN   [id number]       [facility]   Gnosticism    Marital Status                               Admission Date   3/1/23    Admission Type   Elective    Admitting Provider   Paresh Saavedra MD    Attending Provider   Paresh Saavedra MD    Department, Room/Bed   [address], S467/1       Discharge Date       Discharge Disposition       Discharge Destination                               Attending Provider: Paresh Saavedra MD    Allergies: Wellbutrin [Bupropion]    Isolation: None   Infection: None   Code Status: CPR    Ht: 162.6 cm (64\")   Wt: 77.4 kg (170 lb 11.2 oz)    Admission Cmt: None   Principal Problem: I25.10, CABG 03/01/23 [I25.10]                 Active Insurance as of 3/1/2023     Primary Coverage     Payor Plan Insurance Group Employer/Plan Group    ANTHEM MEDICARE REPLACEMENT ANTH MEDICARE ADVANTAGE [id number]     Payor Plan Address Payor Plan Phone Number Payor Plan Fax Number Effective Dates    [address] [phone]  1/1/2021 - None Entered    [facility] [id number]       Subscriber Name Subscriber Birth Date Member ID       CECY ACOSTA 1955 [id number]                 Emergency Contacts      (Rel.) Home Phone Work Phone Mobile Phone    CricketVivian (Spouse) [phone] -- [phone]            Insurance Information                ANTHEM MEDICARE REPLACEMENT/ANTHEM MEDICARE ADVANTAGE Phone: [phone]    Subscriber: Cecy Acosta Subscriber#: [id number]    Group#: [id number] Precert#: --             History & Physical      Paresh Saavedra MD at 03/01/23 0626            Pre-Op H&P  Cecy Acosta  [id number]  1955      Chief complaint: " "Fatigue      Subjective:  Patient is a 67 y.o.male presents for scheduled surgery by Dr. Saavedra. He anticipates a CORONARY ARTERY BYPASS GRAFTING, EVH today.  The patient endorsed having extreme fatigue recently.  He denied any chest pain, shortness of breath, or any other related symptoms.  The cardiac cath on 2/17/2023 demonstrated severe triple-vessel disease.      Review of Systems:  Constitutional-- No fever, chills or sweats. No fatigue.  CV-- No chest pain, palpitation or syncope. +HTN, HLD. Previous MI (2014)  Resp-- No SOB, cough, hemoptysis  Skin--No rashes or lesions      Allergies:   Allergies   Allergen Reactions   • Wellbutrin [Bupropion] Swelling     Facial swelling, severe         Home Meds:  Medications Prior to Admission   Medication Sig Dispense Refill Last Dose   • albuterol sulfate  (90 Base) MCG/ACT inhaler Inhale 2 puffs Every 4 (Four) Hours As Needed for Wheezing. 18 g 2 Past Week   • aspirin 81 MG chewable tablet Chew 1 tablet Daily.   2/28/2023 at 2000   • ezetimibe (ZETIA) 10 MG tablet Take 1 tablet by mouth Daily for 30 days. 30 tablet 11 2/28/2023   • Fluticasone-Umeclidin-Vilant (TRELEGY) 100-62.5-25 MCG/ACT inhaler Inhale 1 puff Daily. (Patient taking differently: Inhale 1 puff Daily. Patient is taking only as needed (not daily)) 1 each 5 Past Month   • Lantus 100 UNIT/ML injection INJECT 60 UNITS SUBCUTANEOUSLY ONCE DAILY (Patient taking differently: Inject 60 Units under the skin into the appropriate area as directed Every Night. INJECT 60 UNITS SUBCUTANEOUSLY ONCE DAILY) 20 mL 5 2/27/2023   • rosuvastatin (CRESTOR) 10 MG tablet Take 1 tablet by mouth Daily. 30 tablet 11 2/28/2023   • sacubitril-valsartan (ENTRESTO) 24-26 MG tablet Take 1 tablet by mouth 2 (Two) Times a Day.   2/28/2023   • dapagliflozin Propanediol (Farxiga) 10 MG tablet Take 10 mg by mouth Daily for 30 days. 30 tablet 3    • Insulin Syringe 29G X 1/2\" 1 ML misc For daily use 100 each 3          PMH:   Past " "Medical History:   Diagnosis Date   • Asthma 2022   • Coronary artery disease 2014   • Elevated cholesterol    • History of tobacco abuse    • HLD (hyperlipidemia) 2023   • Hypertension    • Ischemic cardiomyopathy    • Myocardial infarction (HCC) 2014   • Type 2 diabetes mellitus (HCC)    • Wears glasses      PSH:    Past Surgical History:   Procedure Laterality Date   • CARDIAC CATHETERIZATION  2014   • CARDIAC CATHETERIZATION Left 2023    Procedure: Left Heart Cath;  Surgeon: Nadya Ramirez MD;  Location: [facility] INVASIVE LOCATION;  Service: Cardiology;  Laterality: Left;   • CORONARY STENT PLACEMENT  2014   • TONSILLECTOMY         Immunization History:  Influenza: No  Pneumococcal: No  Tetanus: Yes  Covid : No    Social History:   Tobacco:   Social History     Tobacco Use   Smoking Status Former   • Packs/day: 1.50   • Years: 52.00   • Pack years: 78.00   • Types: Cigarettes   • Start date:    • Quit date: 2022   • Years since quittin.2   • Passive exposure: Past   Smokeless Tobacco Never      Alcohol:     Social History     Substance and Sexual Activity   Alcohol Use No         Physical Exam:/80 (BP Location: Right arm, Patient Position: Lying) Comment: 161/86 left  Pulse 61   Temp 97.6 °F (36.4 °C) (Temporal)   Resp 16   Ht 162.6 cm (64\")   Wt 75.3 kg (166 lb)   SpO2 96%   BMI 28.49 kg/m²       General Appearance:    Alert, cooperative, no distress, appears stated age   Head:    Normocephalic, without obvious abnormality, atraumatic   Lungs:     Clear to auscultation bilaterally, respirations unlabored    Heart:   Regular rate and rhythm, S1 and S2 normal    Abdomen:    Soft without tenderness   Extremities:   Extremities normal, atraumatic, no cyanosis or edema   Skin:   Skin color, texture, turgor normal, no rashes or lesions   Neurologic:   Grossly intact     Results Review:     LABS:  Lab Results   Component Value Date    WBC 8 " 02/28/2023    HGB 17.0 02/28/2023    HCT 51.5 (H) 02/28/2023    MCV 89.1 02/28/2023     02/28/2023    NEUTROABS 5.06 02/28/2023    GLUCOSE 91 02/28/2023    BUN 18 02/28/2023    CREATININE 1.03 02/28/2023    EGFRIFNONA 66 12/14/2021     02/28/2023    K 4.2 02/28/2023     02/28/2023    CO2 28.0 02/28/2023    MG 2.3 02/28/2023    CALCIUM 9.1 02/28/2023    ALBUMIN 4.5 02/28/2023    AST 22 02/28/2023    ALT 32 02/28/2023    BILITOT 0.6 02/28/2023       RADIOLOGY:  Cardiac Catheterization 2/17/23    Conclusion       •  Severe triple-vessel disease     Evaluation for coronary artery bypass graft     Procedure Narrative    FINAL IMPRESSION:  Severe coronary artery disease       I reviewed the patient's new clinical results.        Impression: Coronary Artery Disease      Plan: CORONARY ARTERY BYPASS GRAFTING, EVH  Agree the above.  I discussed surgery with the patient and his wife and I discussed his risk of stroke bleeding infection and death based upon the STS database which is recorded on the chart.  We have actually done this database on 2 occasions and on both occasions we have the same results.  The patient is agreeable to proceed    Marek Clarke, APRN   3/1/2023   06:26 EST    Electronically signed by Paresh Saavedra MD at 03/01/23 0733         Current Facility-Administered Medications   Medication Dose Route Frequency Provider Last Rate Last Admin   • acetaminophen (TYLENOL) tablet 650 mg  650 mg Oral Q6H PRN Charlee Montgomery PA-C   650 mg at 03/15/23 2200   • albuterol (PROVENTIL) nebulizer solution 0.083% 2.5 mg/3mL  2.5 mg Nebulization Q6H PRN Charlee Montgomery PA-C       • amiodarone (PACERONE) tablet 200 mg  200 mg Oral Q24H Rosangela Queen PA-C   200 mg at 03/16/23 0836   • arformoterol (BROVANA) nebulizer solution 15 mcg  15 mcg Nebulization BID PRN Paresh Saavedra MD       • aspirin EC tablet 325 mg  325 mg Oral Daily Charlee Montgomery PA-C   325 mg at 03/16/23 0852   •  calcium carbonate (TUMS) chewable tablet 500 mg (200 mg elemental)  1 tablet Oral TID PRN Charlee Montgomery PA-C   1 tablet at 03/10/23 1738   • cyclobenzaprine (FLEXERIL) tablet 10 mg  10 mg Oral TID PRN Charlee Montgomery PA-C   10 mg at 03/15/23 2200   • dextrose (D50W) (25 g/50 mL) IV injection 25 g  25 g Intravenous Q15 Min PRN Charlee Montgomery PA-C   25 g at 03/05/23 0513   • epoetin andrew-epbx (RETACRIT) injection 10,000 Units  10,000 Units Subcutaneous Once per day on Mon Wed Fri Nicolas Pritchett MD       • fluconazole (DIFLUCAN) tablet 100 mg  100 mg Oral Once per day on Mon Wed Fri Jam Shah MD   100 mg at 03/15/23 1621   • fluconazole (DIFLUCAN) tablet 50 mg  50 mg Oral Once per day on Sun Tue Thu Sat Jam Shah MD   50 mg at 03/16/23 1449   • heparin (porcine) injection 1,300 Units  1,300 Units Intracatheter PRN Charlee Montgomery PA-C   2,000 Units at 03/15/23 1512   • insulin detemir (LEVEMIR) injection 10 Units  10 Units Subcutaneous Nightly Charlee Montgomery PA-C   10 Units at 03/16/23 2122   • Insulin Lispro (humaLOG) injection 0-9 Units  0-9 Units Subcutaneous 4x Daily With Meals & Nightly Charlee Montgomery PA-C   2 Units at 03/16/23 2122   • Insulin Lispro (humaLOG) injection 2 Units  2 Units Subcutaneous TID With Meals Charlee Montgomery PA-C   2 Units at 03/16/23 1740   • ipratropium-albuterol (DUO-NEB) nebulizer solution 3 mL  3 mL Nebulization 4x Daily PRN Paresh Saavedra MD       • melatonin tablet 5 mg  5 mg Oral Nightly PRN Charlee Montgomery PA-C   5 mg at 03/16/23 2120   • metoprolol succinate XL (TOPROL-XL) 24 hr tablet 25 mg  25 mg Oral Q24H Charlee Montgomery PA-C   25 mg at 03/16/23 0836   • midodrine (PROAMATINE) tablet 10 mg  10 mg Oral Q8H Charlee Montgomery PA-C   10 mg at 03/17/23 0502   • ondansetron (ZOFRAN) injection 4 mg  4 mg Intravenous Q6H PRN Charlee Montgomery PA-C   4 mg at 03/04/23 1147   • pantoprazole (PROTONIX) EC tablet 40 mg   "40 mg Oral Q AM Charlee Montgomery PA-C   40 mg at 03/17/23 0502   • Pharmacy Consult - MTM   Does not apply BID Charlee Montgomery PA-C       • sennosides-docusate (PERICOLACE) 8.6-50 MG per tablet 2 tablet  2 tablet Oral BID Jozef Quiroga APRN        And   • polyethylene glycol (MIRALAX) packet 17 g  17 g Oral Daily Jozef Quiroga APRN       • rosuvastatin (CRESTOR) tablet 10 mg  10 mg Oral Nightly Charlee Montgomery PA-C   10 mg at 03/16/23 2120   • sodium chloride 0.9 % flush 10 mL  10 mL Intravenous Q12H Charlee Montgomery PA-C   10 mL at 03/16/23 2121        Physician Progress Notes (most recent note)      Nicolas Pritchett MD at 03/17/23 0857             LOS: 16 days    Patient Care Team:  Yessica Ricci DO as PCP - General (Family Medicine)  Nima Peter MD as Consulting Physician (Endocrinology)  Nadya Ramirez MD as Cardiologist (Cardiology)  Yessica Ricci DO as Consulting Physician (Family Medicine)    Chief Complaint: Acute kidney injury s/p CABG  67-year-old with history of hypertension, diabetes, former smoker 1-1/2 pack/day for quite some time, CAD s/p PCI in 2014, underwent CABG x3, post CABG remain hypotensive blood pressure in the 40s systolic in 60s and 70s 80s range requiring IV pressors.  Developed GEORGIANA oliguric, now an uric  Subjective . F/U [place]IANA.    Interval History:   SEEN ON DIALYSIS .      Review of Systems:   No new complaints. NO CP/SOB/N/V/D/ITCHING.    Objective     Vital Sign Min/Max for last 24 hours  Temp  Min: 98 °F (36.7 °C)  Max: 98.7 °F (37.1 °C)   BP  Min: 105/52  Max: 136/57   Pulse  Min: 63  Max: 77   Resp  Min: 16  Max: 20   SpO2  Min: 90 %  Max: 98 %   No data recorded   Weight  Min: 77.4 kg (170 lb 11.2 oz)  Max: 77.4 kg (170 lb 11.2 oz)     Flowsheet Rows    Flowsheet Row First Filed Value   Admission Height 162.6 cm (64\") Documented at 03/01/2023 0611   Admission Weight 75.3 kg (166 lb) Documented at 03/01/2023 0611          I/O this " shift:  In: -   Out: 175 [Urine:175]  I/O last 3 completed shifts:  In: 600 [P.O.:600]  Out: 425 [Urine:425]    Physical Exam:  General Appearance:  male awake alert oriented no obvious distress, sitting and eating at this time.  Eyes: PER, EOMI.  Neck: Supple no JVD.  Right-sided nontunneled catheter.  tunneled catheter in place  Chest: Chest tube in place.  Lungs: Clear auscultation, no rales rhonchi's, equal chest movement, nonlabored.  Heart: No gallop, murmur, rub, RRR.  Abdomen: Soft, nontender, positive bowel sounds, no organomegaly.  Extremities: NO bilateral lower extremity dependent edema, no cyanosis.  Neuro: No focal deficit, moving all extremities, alert oriented X 3  : Solomon catheter in place.  Minimal urine output  Skin warm and dry.  Psych mood and affect appropriate  THD CATH  WBC WBC   Date Value Ref Range Status   03/17/2023 13.81 (H) 3.40 - 10.80 10*3/mm3 Final   03/15/2023 12.90 (H) 3.40 - 10.80 10*3/mm3 Final      HGB Hemoglobin   Date Value Ref Range Status   03/17/2023 8.4 (L) 13.0 - 17.7 g/dL Final   03/16/2023 7.8 (L) 13.0 - 17.7 g/dL Final   03/15/2023 8.6 (L) 13.0 - 17.7 g/dL Final      HCT Hematocrit   Date Value Ref Range Status   03/17/2023 26.8 (L) 37.5 - 51.0 % Final   03/16/2023 24.5 (L) 37.5 - 51.0 % Final   03/15/2023 27.5 (L) 37.5 - 51.0 % Final      Platlets No results found for: LABPLAT   MCV MCV   Date Value Ref Range Status   03/17/2023 93.4 79.0 - 97.0 fL Final   03/15/2023 94.2 79.0 - 97.0 fL Final          Sodium Sodium   Date Value Ref Range Status   03/17/2023 133 (L) 136 - 145 mmol/L Final   03/16/2023 133 (L) 136 - 145 mmol/L Final   03/15/2023 133 (L) 136 - 145 mmol/L Final      Potassium Potassium   Date Value Ref Range Status   03/17/2023 4.6 3.5 - 5.2 mmol/L Final   03/16/2023 4.3 3.5 - 5.2 mmol/L Final     Comment:     Slight hemolysis detected by analyzer. Results may be affected.   03/15/2023 5.1 3.5 - 5.2 mmol/L Final      Chloride Chloride   Date  Value Ref Range Status   03/17/2023 96 (L) 98 - 107 mmol/L Final   03/16/2023 98 98 - 107 mmol/L Final   03/15/2023 97 (L) 98 - 107 mmol/L Final      CO2 CO2   Date Value Ref Range Status   03/17/2023 24.0 22.0 - 29.0 mmol/L Final   03/16/2023 24.0 22.0 - 29.0 mmol/L Final   03/15/2023 23.0 22.0 - 29.0 mmol/L Final      BUN BUN   Date Value Ref Range Status   03/17/2023 50 (H) 8 - 23 mg/dL Final   03/16/2023 34 (H) 8 - 23 mg/dL Final   03/15/2023 42 (H) 8 - 23 mg/dL Final      Creatinine Creatinine   Date Value Ref Range Status   03/17/2023 6.58 (H) 0.76 - 1.27 mg/dL Final   03/16/2023 4.75 (H) 0.76 - 1.27 mg/dL Final   03/15/2023 5.69 (H) 0.76 - 1.27 mg/dL Final      Calcium Calcium   Date Value Ref Range Status   03/17/2023 8.2 (L) 8.6 - 10.5 mg/dL Final   03/16/2023 7.9 (L) 8.6 - 10.5 mg/dL Final   03/15/2023 7.7 (L) 8.6 - 10.5 mg/dL Final      PO4 No results found for: CAPO4   Albumin Albumin   Date Value Ref Range Status   03/17/2023 2.7 (L) 3.5 - 5.2 g/dL Final   03/15/2023 2.9 (L) 3.5 - 5.2 g/dL Final      Magnesium No results found for: MG   Uric Acid No results found for: URICACID        Results Review:     I reviewed the patient's new clinical results.    amiodarone, 200 mg, Oral, Q24H  aspirin, 325 mg, Oral, Daily  epoetin andrew/andrew-epbx, 10,000 Units, Subcutaneous, Once per day on Mon Wed Fri  fluconazole, 100 mg, Oral, Once per day on Mon Wed Fri  fluconazole, 50 mg, Oral, Once per day on Sun Tue Thu Sat  insulin detemir, 10 Units, Subcutaneous, Nightly  insulin lispro, 0-9 Units, Subcutaneous, 4x Daily With Meals & Nightly  Insulin Lispro, 2 Units, Subcutaneous, TID With Meals  metoprolol succinate XL, 25 mg, Oral, Q24H  midodrine, 10 mg, Oral, Q8H  pantoprazole, 40 mg, Oral, Q AM  pharmacy consult - [facility], , Does not apply, BID  senna-docusate sodium, 2 tablet, Oral, BID   And  polyethylene glycol, 17 g, Oral, Daily  rosuvastatin, 10 mg, Oral, Nightly  sodium chloride, 10 mL, Intravenous, Q12H            Medication Review: Reviewed    Assessment & Plan       I25.10, CABG 23    Hypertension    Hyperlipidemia LDL goal <70    Type 2 diabetes mellitus with hypoglycemia, with long-term current use of insulin (HCC)    Ischemic cardiomyopathy    Mucopurulent chronic bronchitis (HCC)    Prolonged Q-T interval on ECG    Acute renal failure (ARF) (HCC)    Shock (HCC)    Acute respiratory failure with hypoxia (HCC)     1.  GEORGIANA: ATN likely secondary to shock with systolic blood pressure postsurgery in the range of 40s to 80s, now on multiple vasopressors.  Admit creatinine 1.08, creatinine progressively getting worse.  Patient is oliguric.  C3 complement 18, C4 complement 83 within normal range.  Ultrasound of the kidney shows right kidney 11 cm left kidney 10.6 cm normal ultrasound, bladder ultrasound normal. NONOLIGURIC BUT  NO RECOVERY GFR YET.   2.  S/p CABG x3  3.  Ischemic cardiomyopathy with ejection fraction 41-45%.  4.  Diabetes type 2.  5.  Cardiogenic shock. Resolved.  6. HYPONATREMIA   Recommendations:  . HD TODAY 3/17/23. PATIENT FAILED PARAMETERS. START EPO.  START TO MAKE ARRANGEMENTS FOR OUTPATIENT HD HERE IN [place]. D/W PATIENT.. .   Nicolas Pritchett MD  23  08:57 EDT         Electronically signed by Nicolas Pritchett MD at 23 0859          Physical Therapy Notes (most recent note)      Kristie Zaldivar at 23 1528  Version 1 of 1         Patient Name: Herbert Harley  : 1955    MRN: [id number]                              Today's Date: 3/16/2023       Admit Date: 3/1/2023    Visit Dx:     ICD-10-CM ICD-9-CM   1. Coronary artery disease involving native coronary artery of native heart without angina pectoris  I25.10 414.01   2. Coronary artery disease involving native coronary artery of native heart, unspecified whether angina present  I25.10 414.01   3. Acute renal failure, unspecified acute renal failure type (HCC)  N17.9 584.9   4. Acute respiratory  failure with hypoxia (HCC)  J96.01 518.81   5. Dysphagia, unspecified type  R13.10 787.20     Patient Active Problem List   Diagnosis   • I25.10, CABG 03/01/23   • Hypertension   • Hyperlipidemia LDL goal <70   • Type 2 diabetes mellitus with hypoglycemia, with long-term current use of insulin (HCC)   • Ischemic cardiomyopathy   • Allergic rhinitis   • Positive cardiac stress test   • Mucopurulent chronic bronchitis (HCC)   • Prolonged Q-T interval on ECG   • Acute renal failure (ARF) (HCC)   • Shock (HCC)   • Acute respiratory failure with hypoxia (HCC)     Past Medical History:   Diagnosis Date   • Acute renal failure (ARF) (HCC) 3/2/2023   • Asthma December 2022   • Chronic systolic congestive heart failure (HCC) 3/1/2023   • Coronary artery disease March 2014   • Elevated cholesterol    • History of tobacco abuse    • HLD (hyperlipidemia) 01/26/2023   • Hypertension    • Ischemic cardiomyopathy    • Myocardial infarction (HCC) March 2014   • Prolonged Q-T interval on ECG 3/1/2023   • Type 2 diabetes mellitus (HCC)    • Wears glasses      Past Surgical History:   Procedure Laterality Date   • CARDIAC CATHETERIZATION  March 2014   • CARDIAC CATHETERIZATION Left 02/17/2023    Procedure: Left Heart Cath;  Surgeon: Nadya Ramirez MD;  Location:  [facility] INVAS[facility] LOCATION;  Service: Cardiology;  Laterality: Left;   • CORONARY ARTERY BYPASS GRAFT N/A 3/1/2023    Procedure: MEDIAN STERNOTOMY CORONARY ARTERY BYPASS GRAFTING X3  UTILIZING THE LEFT INTERNAL MAMMERY GRAFT, AND EVH OF THE GREATER RIGHT SAPHENOUS VEIN;  Surgeon: Paresh Saavedra MD;  Location: [facility] OR;  Service: Cardiothoracic;  Laterality: N/A;   • CORONARY STENT PLACEMENT  March 2014   • TONSILLECTOMY        General Information     Row Name 03/16/23 1557          Physical Therapy Time and Intention    Document Type progress note/recertification  -ML     Mode of Treatment physical therapy  -ML     Row Name 03/16/23 1557          General Information     Patient Profile Reviewed yes  -ML     Existing Precautions/Restrictions cardiac;sternal;fall  -ML     Barriers to Rehab medically complex  -ML     Row Name 03/16/23 1557          Cognition    Orientation Status (Cognition) oriented x 4  -ML     Row Name 03/16/23 1557          Safety Issues, Functional Mobility    Safety Issues Affecting Function (Mobility) safety precaution awareness;safety precautions follow-through/compliance;insight into deficits/self-awareness  -ML     Impairments Affecting Function (Mobility) balance;endurance/activity tolerance;shortness of breath;strength  -ML           User Key  (r) = Recorded By, (t) = Taken By, (c) = Cosigned By    Initials Name Provider Type    ML Kristie Zaldivar Physical Therapist               Mobility     Row Name 03/16/23 1559          Bed Mobility    Bed Mobility supine-sit;sit-supine  -ML     Supine-Sit Yancey (Bed Mobility) supervision;verbal cues  -ML     Sit-Supine Yancey (Bed Mobility) supervision;verbal cues  -ML     Assistive Device (Bed Mobility) head of bed elevated  -ML     Comment, (Bed Mobility) Patient instructed in log roll technique to complete supine to sit/sit to supine transfer. Patient instructed in bridging in bed to reposition.  -ML     Row Name 03/16/23 1559          Sit-Stand Transfer    Sit-Stand Yancey (Transfers) supervision;verbal cues  -ML     Assistive Device (Sit-Stand Transfers) other (see comments)  no AD  -ML     Comment, (Sit-Stand Transfer) Patient completes 3x sit to stand transfer without BUE support, verbal cues for forward weight shift.  -ML     Row Name 03/16/23 1559          Gait/Stairs (Locomotion)    Yancey Level (Gait) not tested  -ML     Comment, (Gait/Stairs) Patient just finished walking with nursing staff.  -ML           User Key  (r) = Recorded By, (t) = Taken By, (c) = Cosigned By    Initials Name Provider Type    Kristie Baez Physical Therapist               Obj/Interventions     Row Name  03/16/23 1601          Range of Motion Comprehensive    General Range of Motion bilateral lower extremity ROM WFL  -ML     Row Name 03/16/23 1601          Strength Comprehensive (MMT)    General Manual Muscle Testing (MMT) Assessment lower extremity strength deficits identified  -ML     Comment, General Manual Muscle Testing (MMT) Assessment BLE at least 3/5 observed with mobility  -ML     Row Name 03/16/23 1601          Motor Skills    Therapeutic Exercise shoulder;hip;knee;ankle  -ML     Row Name 03/16/23 1601          Shoulder (Therapeutic Exercise)    Shoulder (Therapeutic Exercise) AROM (active range of motion)  -ML     Shoulder AROM (Therapeutic Exercise) bilateral;scapular retraction;10 repetitions;3 second hold;other (see comments)  elbows at chest wall, retractions to neutral position. Paced breathing  -ML     Row Name 03/16/23 1601          Hip (Therapeutic Exercise)    Hip (Therapeutic Exercise) strengthening exercise  -ML     Hip Strengthening (Therapeutic Exercise) bilateral;marching while standing;20 repititions;2 sets  -ML     Row Name 03/16/23 1601          Knee (Therapeutic Exercise)    Knee (Therapeutic Exercise) strengthening exercise  -ML     Knee Strengthening (Therapeutic Exercise) bilateral;LAQ (long arc quad);20 repititions;2 sets  -ML     Row Name 03/16/23 1601          Ankle (Therapeutic Exercise)    Ankle (Therapeutic Exercise) AROM (active range of motion)  -ML     Ankle AROM (Therapeutic Exercise) bilateral;dorsiflexion;plantarflexion;15 repititions  -ML     Row Name 03/16/23 1601          Balance    Balance Assessment sitting static balance;sitting dynamic balance;sit to stand dynamic balance;standing static balance;standing dynamic balance  -ML     Static Sitting Balance independent  -ML     Dynamic Sitting Balance independent  -ML     Position, Sitting Balance unsupported;sitting edge of bed  -ML     Sit to Stand Dynamic Balance supervision;verbal cues  -ML     Static Standing Balance  supervision  -ML     Dynamic Standing Balance supervision  -ML     Position/Device Used, Standing Balance supported  -ML     Balance Interventions sitting;standing;sit to stand;supported;static;dynamic  -ML           User Key  (r) = Recorded By, (t) = Taken By, (c) = Cosigned By    Initials Name Provider Type     ZenRichardn Physical Therapist               Goals/Plan     Row Name 03/16/23 1607          Bed Mobility Goal 1 (PT)    Progress/Outcomes (Bed Mobility Goal 1, PT) goal ongoing  -     Row Name 03/16/23 1607          Transfer Goal 1 (PT)    Activity/Assistive Device (Transfer Goal 1, PT) sit-to-stand/stand-to-sit;bed-to-chair/chair-to-bed  -ML     [place] Level/Cues Needed (Transfer Goal 1, PT) independent  -ML     Time Frame (Transfer Goal 1, PT) long term goal (LTG);10 days  -ML     Progress/Outcome (Transfer Goal 1, PT) goal ongoing  -     Row Name 03/16/23 1607          Gait Training Goal 1 (PT)    Activity/Assistive Device (Gait Training Goal 1, PT) gait (walking locomotion);improve balance and speed;increase endurance/gait distance  -ML     [place] Level (Gait Training Goal 1, PT) independent  -ML     Distance (Gait Training Goal 1, PT) 500  -ML     Time Frame (Gait Training Goal 1, PT) long term goal (LTG);10 days  -ML     Progress/Outcome (Gait Training Goal 1, PT) goal revised this date  -     Row Name 03/16/23 1607          Stairs Goal 1 (PT)    Activity/Assistive Device (Stairs Goal 1, PT) stairs, all skills;ascending stairs;descending stairs;step-over step;step-to-step;using handrail, left;using handrail, right  -ML     [place] Level/Cues Needed (Stairs Goal 1, PT) standby assist  -ML     Number of Stairs (Stairs Goal 1, PT) 10  -ML     Time Frame (Stairs Goal 1, PT) 10 days;long term goal (LTG)  -     Row Name 03/16/23 1607          Therapy Assessment/Plan (PT)    Planned Therapy Interventions (PT) balance training;bed mobility training;gait training;home exercise  program;neuromuscular re-education;patient/family education;postural re-education;ROM (range of motion);stair training;strengthening;stretching;transfer training  -           User Key  (r) = Recorded By, (t) = Taken By, (c) = Cosigned By    Initials Name Provider Type    ML Kristie Zaldivar Physical Therapist               Clinical Impression     Row Name 03/16/23 1604          Pain    Pretreatment Pain Rating 0/10 - no pain  -ML     Posttreatment Pain Rating 0/10 - no pain  -ML     Row Name 03/16/23 1604          Plan of Care Review    Plan of Care Reviewed With patient  -ML     Progress improving  -     Outcome Evaluation Physical therapy treatment complete. The patient requierd less assistance for bed mobility and for transfers. Patiet just finished ambulating with nursing staff. Patient completed seated therex tolerated well. The patient continues to present below baseline for funcitonal mobility. The patient would continue to benefit from skilled PT to adderss strength, balance and activity tolerance deficits. Continue to recommend home with assist and outpatient cardiac rehab at hospital discharge.  -     Row Name 03/16/23 1604          Therapy Assessment/Plan (PT)    Rehab Potential (PT) good, to achieve stated therapy goals  -     Criteria for Skilled Interventions Met (PT) yes;skilled treatment is necessary  -     Therapy Frequency (PT) daily  -     Row Name 03/16/23 1604          Vital Signs    Pre Patient Position Supine  -ML     Intra Patient Position Standing  -ML     Post Patient Position Supine  -ML     Row Name 03/16/23 1604          Positioning and Restraints    Pre-Treatment Position in bed  -ML     Post Treatment Position bed  -ML     In Bed notified nsg;fowlers;call light within reach;encouraged to call for assist;with family/caregiver;side rails up x2  -ML           User Key  (r) = Recorded By, (t) = Taken By, (c) = Cosigned By    Initials Name Provider Type    ML Kristie Zaldivar Physical  Therapist               Outcome Measures     Row Name 03/16/23 1609 03/16/23 0838       How much help from another person do you currently need...    Turning from your back to your side while in flat bed without using bedrails? 3  -ML 3  -MP    Moving from lying on back to sitting on the side of a flat bed without bedrails? 4  -ML 3  -MP    Moving to and from a bed to a chair (including a wheelchair)? 4  -ML 3  -MP    Standing up from a chair using your arms (e.g., wheelchair, bedside chair)? 4  -ML 3  -MP    Climbing 3-5 steps with a railing? 3  -ML 3  -MP    To walk in hospital room? 3  -ML 3  -MP    AM-PAC 6 Clicks Score (PT) 21  -ML 18  -MP    Highest level of mobility 6 --> Walked 10 steps or more  -ML 6 --> Walked 10 steps or more  -MP    Row Name 03/16/23 1609          Functional Assessment    Outcome Measure Options AM-PAC 6 Clicks Basic Mobility (PT)  -ML           User Key  (r) = Recorded By, (t) = Taken By, (c) = Cosigned By    Initials Name Provider Type    Kristie Baez Physical Therapist    Mariama Maxwell, RN Registered Nurse                             Physical Therapy Education     Title: PT OT SLP Therapies (In Progress)     Topic: Physical Therapy (Done)     Point: Mobility training (Done)     Learning Progress Summary           Patient Acceptance, E, VU by ML at 3/16/2023 1609    Acceptance, E, NR by CM at 3/15/2023 1017    Acceptance, E, NR by FLIP at 3/14/2023 0940    Acceptance, E, NR by AE at 3/13/2023 1340    Acceptance, E, NR by KG at 3/11/2023 1306    Acceptance, E, NR by CM at 3/10/2023 1410    Acceptance, E, NR by CM at 3/9/2023 1421    Acceptance, E, NR by KG at 3/8/2023 0954    Acceptance, E, NR by KG at 3/7/2023 0919    Acceptance, E, NR by KG at 3/6/2023 1324   Family Acceptance, E, VU by ML at 3/16/2023 1609                   Point: Home exercise program (Done)     Learning Progress Summary           Patient Acceptance, E, VU by ML at 3/16/2023 1609    Acceptance, E, NR by FLIP at  3/14/2023 0940    Acceptance, E, NR by AE at 3/13/2023 1340    Acceptance, E, NR by KG at 3/11/2023 1306    Acceptance, E, NR by CM at 3/9/2023 1421    Acceptance, E, NR by KG at 3/8/2023 0954    Acceptance, E, NR by KG at 3/7/2023 0919   Family Acceptance, E, VU by ML at 3/16/2023 1609                   Point: Body mechanics (Done)     Learning Progress Summary           Patient Acceptance, E, VU by ML at 3/16/2023 1609    Acceptance, E, NR by CM at 3/15/2023 1017    Acceptance, E, NR by FLIP at 3/14/2023 0940    Acceptance, E, NR by AE at 3/13/2023 1340    Acceptance, E, NR by KG at 3/11/2023 1306    Acceptance, E, NR by CM at 3/10/2023 1410    Acceptance, E, NR by CM at 3/9/2023 1421    Acceptance, E, NR by KG at 3/8/2023 0954    Acceptance, E, NR by KG at 3/7/2023 0919    Acceptance, E, NR by KG at 3/6/2023 1324   Family Acceptance, E, VU by ML at 3/16/2023 1609                   Point: Precautions (Done)     Learning Progress Summary           Patient Acceptance, E, VU by ML at 3/16/2023 1609    Acceptance, E, NR by CM at 3/15/2023 1017    Acceptance, E, NR by FLIP at 3/14/2023 0940    Acceptance, E, NR by AE at 3/13/2023 1340    Acceptance, E, NR by KG at 3/11/2023 1306    Acceptance, E, NR by CM at 3/10/2023 1410    Acceptance, E, NR by CM at 3/9/2023 1421    Acceptance, E, NR by KG at 3/8/2023 0954    Acceptance, E, NR by KG at 3/7/2023 0919    Acceptance, E, NR by KG at 3/6/2023 1324   Family Acceptance, E, VU by ML at 3/16/2023 1609                               User Key     Initials Effective Dates Name Provider Type Discipline    FLIP 02/03/23 -  Marlyn Torres, PT Physical Therapist PT    KG 05/22/20 -  Whiteny Mistry, PT Physical Therapist PT    ML 04/22/21 -  Kristie Zaldivar Physical Therapist PT    AE 09/21/21 -  Priyank Saenz, PT Physical Therapist PT    CM 09/22/22 -  Tory Kaye, PT Physical Therapist PT              PT Recommendation and Plan  Planned Therapy Interventions (PT):  balance training, bed mobility training, gait training, home exercise program, neuromuscular re-education, patient/family education, postural re-education, ROM (range of motion), stair training, strengthening, stretching, transfer training  Plan of Care Reviewed With: patient  Progress: improving  Outcome Evaluation: Physical therapy treatment complete. The patient requierd less assistance for bed mobility and for transfers. Patiet just finished ambulating with nursing staff. Patient completed seated therex tolerated well. The patient continues to present below baseline for funcitonal mobility. The patient would continue to benefit from skilled PT to adderss strength, balance and activity tolerance deficits. Continue to recommend home with assist and outpatient cardiac rehab at hospital discharge.     Time Calculation:    PT Charges     Row Name 03/16/23 1610             Time Calculation    Start Time 1528  -ML      Stop Time 1555  -ML      Time Calculation (min) 27 min  -ML      PT Received On 03/16/23  -ML      PT Goal Re-Cert Due Date 03/26/23  -ML         Timed Charges    95530 - PT Therapeutic Exercise Minutes 15  -ML      83217 - PT Therapeutic Activity Minutes 12  -ML         Total Minutes    Timed Charges Total Minutes 27  -ML       Total Minutes 27  -ML            User Key  (r) = Recorded By, (t) = Taken By, (c) = Cosigned By    Initials Name Provider Type    Kristie Baez Physical Therapist              Therapy Charges for Today     Code Description Service Date Service Provider Modifiers Qty    24284701977 HC PT THER PROC EA 15 MIN 3/16/2023 Kristie Zaldivar GP 1    90051240121 HC PT THERAPEUTIC ACT EA 15 MIN 3/16/2023 Kristie Zaldivar GP 1          PT G-Codes  Outcome Measure Options: AM-PAC 6 Clicks Basic Mobility (PT)  AM-PAC 6 Clicks Score (PT): 21  AM-PAC 6 Clicks Score (OT): 20  PT Discharge Summary  Anticipated Discharge Disposition (PT): home with assist, other (see comments) (outpatient cardiac  rehab)    Kristie Zaldivar  3/16/2023      Electronically signed by Kristie Zaldivar at 23 1611          Occupational Therapy Notes (most recent note)      Rosa De La Paz OT at 03/15/23 1000          Patient Name: Herbert Harley  : 1955    MRN: [id number]                              Today's Date: 3/15/2023       Admit Date: 3/1/2023    Visit Dx:     ICD-10-CM ICD-9-CM   1. Coronary artery disease involving native coronary artery of native heart without angina pectoris  I25.10 414.01   2. Coronary artery disease involving native coronary artery of native heart, unspecified whether angina present  I25.10 414.01   3. Acute renal failure, unspecified acute renal failure type (HCC)  N17.9 584.9   4. Acute respiratory failure with hypoxia (HCC)  J96.01 518.81   5. Dysphagia, unspecified type  R13.10 787.20     Patient Active Problem List   Diagnosis   • I25.10, CABG 23   • Hypertension   • Hyperlipidemia LDL goal <70   • Type 2 diabetes mellitus with hypoglycemia, with long-term current use of insulin (HCC)   • Ischemic cardiomyopathy   • Allergic rhinitis   • Positive cardiac stress test   • Mucopurulent chronic bronchitis (HCC)   • Prolonged Q-T interval on ECG   • Acute renal failure (ARF) (HCC)   • Shock (HCC)   • Acute respiratory failure with hypoxia (HCC)     Past Medical History:   Diagnosis Date   • Acute renal failure (ARF) (HCC) 3/2/2023   • Asthma 2022   • Chronic systolic congestive heart failure (HCC) 3/1/2023   • Coronary artery disease 2014   • Elevated cholesterol    • History of tobacco abuse    • HLD (hyperlipidemia) 2023   • Hypertension    • Ischemic cardiomyopathy    • Myocardial infarction (HCC) 2014   • Prolonged Q-T interval on ECG 3/1/2023   • Type 2 diabetes mellitus (HCC)    • Wears glasses      Past Surgical History:   Procedure Laterality Date   • CARDIAC CATHETERIZATION  2014   • CARDIAC CATHETERIZATION Left 2023    Procedure: Left  Heart Cath;  Surgeon: Nadya Ramirez MD;  Location:  [facility];  Service: Cardiology;  Laterality: Left;   • CORONARY ARTERY BYPASS GRAFT N/A 3/1/2023    Procedure: MEDIAN STERNOTOMY CORONARY ARTERY BYPASS GRAFTING X3  UTILIZING THE LEFT INTERNAL MAMMERY GRAFT, AND EVH OF THE GREATER RIGHT SAPHENOUS VEIN;  Surgeon: Paresh Saavedra MD;  Location: [facility] OR;  Service: Cardiothoracic;  Laterality: N/A;   • CORONARY STENT PLACEMENT  March 2014   • TONSILLECTOMY        General Information     Row Name 03/15/23 1348          OT Time and Intention    Document Type therapy note (daily note)  -AN     Mode of Treatment occupational therapy  -AN     Row Name 03/15/23 1348          General Information    Patient Profile Reviewed yes  -AN     Existing Precautions/Restrictions cardiac;sternal;fall  -AN     Barriers to Rehab medically complex  -AN     Row Name 03/15/23 1348          Cognition    Orientation Status (Cognition) oriented x 4  -AN     Row Name 03/15/23 1348          Safety Issues, Functional Mobility    Safety Issues Affecting Function (Mobility) safety precautions follow-through/compliance;safety precaution awareness;judgment;insight into deficits/self-awareness;awareness of need for assistance;problem-solving;sequencing abilities  -AN     Impairments Affecting Function (Mobility) balance;endurance/activity tolerance;shortness of breath;strength  -AN           User Key  (r) = Recorded By, (t) = Taken By, (c) = Cosigned By    Initials Name Provider Type    AN Rosa De La Paz OT Occupational Therapist                 Mobility/ADL's     Row Name 03/15/23 1349          Bed Mobility    Bed Mobility supine-sit;sit-supine  -AN     Supine-Sit [place] (Bed Mobility) minimum assist (75% patient effort);1 person assist;verbal cues  -AN     Sit-Supine [place] (Bed Mobility) minimum assist (75% patient effort);1 person assist;verbal cues  -AN     Bed Mobility, Safety Issues decreased use of  arms for pushing/pulling;impaired trunk control for bed mobility  -AN     Assistive Device (Bed Mobility) head of bed elevated;draw sheet  -AN     Comment, (Bed Mobility) cues for sequencing of steps, assist provided at trunk; cues to maintain sternal precautions  -AN     Row Name 03/15/23 1349          Transfers    Transfers sit-stand transfer;stand-sit transfer  -AN     Comment, (Transfers) cues to maintain sternal precautions  -AN     Row Name 03/15/23 1349          Sit-Stand Transfer    Sit-Stand [place] (Transfers) contact guard;verbal cues  -AN     Row Name 03/15/23 1349          Stand-Sit Transfer    Stand-Sit [place] (Transfers) verbal cues;contact guard  -AN     Row Name 03/15/23 1349          Functional Mobility    Functional Mobility- Ind. Level minimum assist (75% patient effort);1 person  -AN     Functional Mobility-Distance (Feet) --  >household distance  -AN     Functional Mobility- Safety Issues sequencing ability decreased;step length decreased;balance decreased during turns  -AN     Functional Mobility- Comment pt ambulated >household distance with Min A provided at gait belt and intermittent hand held; would benefit from RW use for balance next session  -AN     Row Name 03/15/23 1349          Activities of Daily Living    BADL Assessment/Intervention bathing;lower body dressing;upper body dressing  -AN     Row Name 03/15/23 1349          Upper Body Dressing Assessment/Training    [place] Level (Upper Body Dressing) don;doff;set up  gown  -AN     Position (Upper Body Dressing) edge of bed sitting  -AN     Row Name 03/15/23 1349          Lower Body Dressing Assessment/Training    [place] Level (Lower Body Dressing) don;socks;minimum assist (75% patient effort)  -AN     Position (Lower Body Dressing) edge of bed sitting  -AN     Row Name 03/15/23 1349          Bathing Assessment/Intervention    Comment, (Bathing) educated on use of shower chair for safety and fall prevention;  verbalized understanding  -AN           User Key  (r) = Recorded By, (t) = Taken By, (c) = Cosigned By    Initials Name Provider Type    Rosa Goddard OT Occupational Therapist               Obj/Interventions     Row Name 03/15/23 1401          Balance    Balance Assessment sitting static balance;sitting dynamic balance;sit to stand dynamic balance;standing static balance;standing dynamic balance  -AN     Static Sitting Balance independent  -AN     Dynamic Sitting Balance independent  -AN     Position, Sitting Balance unsupported;sitting edge of bed  -AN     Sit to Stand Dynamic Balance contact guard  -AN     Static Standing Balance supervision  -AN     Dynamic Standing Balance contact guard  -AN     Position/Device Used, Standing Balance unsupported  -AN           User Key  (r) = Recorded By, (t) = Taken By, (c) = Cosigned By    Initials Name Provider Type    Rosa Goddard OT Occupational Therapist               Goals/Plan    No documentation.                Clinical Impression     Row Name 03/15/23 1402          Pain Assessment    Pretreatment Pain Rating 0/10 - no pain  -AN     Posttreatment Pain Rating 0/10 - no pain  -AN     Pre/Posttreatment Pain Comment asymptomatic  -AN     Pain Intervention(s) Repositioned;Ambulation/increased activity  -AN     Row Name 03/15/23 1402          Plan of Care Review    Plan of Care Reviewed With patient  -AN     Progress improving  -AN     Outcome Evaluation Pt with improved functional mobility, ADLs, and activity tolerance this session. Pt continues to require cues to maintain sternal precautions with bed mobility and transfers. Rec home with assist and home health OT at [place].  -AN     Row Name 03/15/23 1402          Therapy Plan Review/Discharge Plan (OT)    Anticipated Discharge Disposition (OT) home with assist;home with home health  -AN     Row Name 03/15/23 1402          Vital Signs    Pre Systolic BP Rehab --  VSS  -AN     O2 Delivery Pre Treatment room air   -AN     O2 Delivery Intra Treatment room air  -AN     O2 Delivery Post Treatment room air  -AN     Pre Patient Position Supine  -AN     Intra Patient Position Standing  -AN     Post Patient Position Supine  -AN     Row Name 03/15/23 1402          Positioning and Restraints    Pre-Treatment Position in bed  -AN     Post Treatment Position bed  -AN     In Bed notified nsg;supine;with family/caregiver;call light within reach;encouraged to call for assist;side rails up x2  -AN           User Key  (r) = Recorded By, (t) = Taken By, (c) = Cosigned By    Initials Name Provider Type    Rosa Goddard OT Occupational Therapist               Outcome Measures     Row Name 03/15/23 1409          How much help from another is currently needed...    Putting on and taking off regular lower body clothing? 3  -AN     Bathing (including washing, rinsing, and drying) 3  -AN     Toileting (which includes using toilet bed pan or urinal) 3  -AN     Putting on and taking off regular upper body clothing 3  -AN     Taking care of personal grooming (such as brushing teeth) 4  -AN     Eating meals 4  -AN     AM-PAC 6 Clicks Score (OT) 20  -AN     Row Name 03/15/23 1017          How much help from another person do you currently need...    Turning from your back to your side while in flat bed without using bedrails? 3  -CM     Moving from lying on back to sitting on the side of a flat bed without bedrails? 3  -CM     Moving to and from a bed to a chair (including a wheelchair)? 3  -CM     Standing up from a chair using your arms (e.g., wheelchair, bedside chair)? 3  -CM     Climbing 3-5 steps with a railing? 3  -CM     To walk in hospital room? 3  -CM     AM-PAC 6 Clicks Score (PT) 18  -CM     Highest level of mobility 6 --> Walked 10 steps or more  -CM     Row Name 03/15/23 1409 03/15/23 1017       Functional Assessment    Outcome Measure Options AM-PAC 6 Clicks Daily Activity (OT)  -AN AM-PAC 6 Clicks Basic Mobility (PT)  -CM           User Key  (r) = Recorded By, (t) = Taken By, (c) = Cosigned By    Initials Name Provider Type    Rosa Goddard, [place] Occupational Therapist    Tory Ramirez PT Physical Therapist                Occupational Therapy Education     Title: PT OT SLP Therapies (In Progress)     Topic: Occupational Therapy (In Progress)     Point: ADL training (Done)     Description:   Instruct learner(s) on proper safety adaptation and remediation techniques during self care or transfers.   Instruct in proper use of assistive devices.              Learning Progress Summary           Patient Acceptance, E, VU by AN at 3/15/2023 1410    Acceptance, E,TB,D, VU,NR,DU by KF at 3/10/2023 1356   Family Acceptance, E, VU by AN at 3/15/2023 1410                   Point: Home exercise program (Not Started)     Description:   Instruct learner(s) on appropriate technique for monitoring, assisting and/or progressing therapeutic exercises/activities.              Learner Progress:  Not documented in this visit.          Point: Precautions (Done)     Description:   Instruct learner(s) on prescribed precautions during self-care and functional transfers.              Learning Progress Summary           Patient Acceptance, E, VU by AN at 3/15/2023 1410    Acceptance, E,TB,D, VU,NR,DU by KF at 3/10/2023 1356   Family Acceptance, E, VU by AN at 3/15/2023 1410                   Point: Body mechanics (Done)     Description:   Instruct learner(s) on proper positioning and spine alignment during self-care, functional mobility activities and/or exercises.              Learning Progress Summary           Patient Acceptance, E, VU by AN at 3/15/2023 1410    Acceptance, E,TB,D, VU,NR,DU by KF at 3/10/2023 1356   Family Acceptance, E, VU by AN at 3/15/2023 1410                               User Key     Initials Effective Dates Name Provider Type Discipline    DOUGIE 06/16/21 -  Rosa Vazquez OT Occupational Therapist OT    JHONATAN 09/21/21 -   Rosa De La Paz OT Occupational Therapist OT              OT Recommendation and Plan     Plan of Care Review  Plan of Care Reviewed With: patient  Progress: improving  Outcome Evaluation: Pt with improved functional mobility, ADLs, and activity tolerance this session. Pt continues to require cues to maintain sternal precautions with bed mobility and transfers. Rec home with assist and home health OT at [place].     Time Calculation:    Time Calculation- OT     Row Name 03/15/23 1410             Time Calculation- OT    OT Start Time 1000  -AN      OT Received On 03/15/23  -AN         Timed Charges    35750 - OT Self Care/Mgmt Minutes 16  -AN         Total Minutes    Timed Charges Total Minutes 16  -AN       Total Minutes 16  -AN            User Key  (r) = Recorded By, (t) = Taken By, (c) = Cosigned By    Initials Name Provider Type    AN Rosa De La Paz OT Occupational Therapist              Therapy Charges for Today     Code Description Service Date Service Provider Modifiers Qty    92137656990 HC OT SELF CARE/MGMT/TRAIN EA 15 MIN 3/15/2023 Rosa De La Paz OT GO 1               Rosa De La Paz OT  3/15/2023    Electronically signed by Rosa De La Paz OT at 03/15/23 1411

## 2023-03-17 NOTE — PLAN OF CARE
Problem: Device-Related Complication Risk (Hemodialysis)  Goal: Safe, Effective Therapy Delivery  Outcome: Ongoing, Progressing     Problem: Hemodynamic Instability (Hemodialysis)  Goal: Effective Tissue Perfusion  Outcome: Ongoing, Progressing     Problem: Infection (Hemodialysis)  Goal: Absence of Infection Signs and Symptoms  Outcome: Ongoing, Progressing   Goal Outcome Evaluation:               HD completed, Pj well. UF goal reached. Blood returned. Report to primary nurse.

## 2023-03-17 NOTE — PROGRESS NOTES
CTS Progress Note      POD 16 s/p CABG x3     LOS: 16 days     Subjective  No acute events overnight per nursing. Patient off the floor in dialysis     Objective    Vital Signs  Temp:  [98.1 °F (36.7 °C)-98.7 °F (37.1 °C)] 98.7 °F (37.1 °C)  Heart Rate:  [63-77] 64  Resp:  [16-20] 16  BP: (105-121)/(52-63) 119/52    Physical Exam:       Results     Results from last 7 days   Lab Units 03/16/23  0459 03/15/23  0525   WBC 10*3/mm3  --  12.90*   HEMOGLOBIN g/dL 7.8* 8.6*   HEMATOCRIT % 24.5* 27.5*   PLATELETS 10*3/mm3  --  244     Results from last 7 days   Lab Units 03/16/23  0459   SODIUM mmol/L 133*   POTASSIUM mmol/L 4.3   CHLORIDE mmol/L 98   CO2 mmol/L 24.0   BUN mg/dL 34*   CREATININE mg/dL 4.75*   GLUCOSE mg/dL 102*   CALCIUM mg/dL 7.9*       Imaging Results (Last 24 Hours)     Procedure Component Value Units Date/Time    XR Chest 1 View [[id number]] Collected: 03/16/23 0821     Updated: 03/16/23 0837    Narrative:      XR CHEST 1 VW    Date of Exam: 3/16/2023 4:00 AM EDT    Indication: CABG.    Comparison: 3/10/2023    Findings:  Interval removal of left-sided CVC, with a new right-sided catheter terminating in the SVC. There is no pneumothorax. There is a small left-sided pleural effusion and scattered linear atelectasis. Unchanged heart and mediastinal contours.      Impression:      Impression:  Interval removal of left-sided CVC, with a new right-sided catheter terminating in the SVC. There is no pneumothorax. There is a small left-sided pleural effusion and scattered linear atelectasis. Unchanged heart and mediastinal contours.    Electronically Signed: Dashawn Dover    3/16/2023 8:34 AM EDT    Workstation ID: [id number]          Assessment    I25.10, CABG 03/01/23    Hypertension    Hyperlipidemia LDL goal <70    Type 2 diabetes mellitus with hypoglycemia, with long-term current use of insulin (HCC)    Ischemic cardiomyopathy    Mucopurulent chronic bronchitis (HCC)    Prolonged Q-T interval on ECG    Acute  renal failure (ARF) (HCC)    Shock (HCC)    Acute respiratory failure with hypoxia (HCC)      Plan   Watch H&H-awaiting AM labs  HD per Nephrology  Ambulate TID  Pulm toilet  CM working on outpatient dialysis referral    Eva Pino PA-C  03/17/23  08:06 EDT

## 2023-03-17 NOTE — SIGNIFICANT NOTE
03/17/23 1606   SLP Deferred Reason   SLP Deferred Reason Patient unavailable for treatment  (Attempted to see pt x2 this date, pt in dialysis.)

## 2023-03-17 NOTE — PROGRESS NOTES
"                    Clinical Nutrition       Patient Name: Herbert Harley  YOB: 1955  MRN: [id number]  Date of Encounter: 03/17/23 13:01 EDT  Admission date: 3/1/2023      Reason for Visit   Follow-up protocol     PO intakes improving (75% x 4 meals charted). Premier Protein BID.    Diet modified to GI/low irritant 2/2 to pt preference    EMR Reviewed     EMR Reviewed: yes     Admission Diagnosis:  Coronary artery disease involving native coronary artery of native heart, unspecified whether angina present [I25.10]    Problem List:    I25.10, CABG 03/01/23    Hypertension    Hyperlipidemia LDL goal <70    Type 2 diabetes mellitus with hypoglycemia, with long-term current use of insulin (HCC)    Ischemic cardiomyopathy    Mucopurulent chronic bronchitis (HCC)    Prolonged Q-T interval on ECG    Acute renal failure (ARF) (HCC)    Shock (HCC)    Acute respiratory failure with hypoxia (HCC)          Anthropometric      Flowsheet Rows    Flowsheet Row First Filed Value   Admission Height 162.6 cm (64\") Documented at 03/01/2023 0611   Admission Weight 75.3 kg (166 lb) Documented at 03/01/2023 0611            Height: 162.6 cm (64\")  Last Filed Weight: Weight: 77.4 kg (170 lb 11.2 oz) (03/17/23 0345)  Weight Method: Standing scale  BMI: BMI (Calculated): 29.3  BMI classification: Overweight: 25.0-29.9kg/m2    IBW:  130lbs      Reported/Observed/Food/Nutrition Related - Comments     Pt out of room for dialysis during RD visit. Intakes reviewed per EMR-pt intake improving (avg 75% x 4 meals). Pt has ONS in place TID.     Addendum:  RD rec'd call from nsg, pt's wife w/questions regarding ONS. RD spoke w/pt's wife and all questions/concerns answered. Pt now agreeable to Premier Protein and likes vanilla. Pt's wife also requesting softer diet 2/2 pt doesn't like salads and prefers softer foods/cooked vegetables/oatmeal. Pt and wife in agreement w/modifications to diet order and ONS.      Current Nutrition " Prescription     Diet: Cardiac Diets, Regular/House Diet, Diabetic Diets, Renal Diets; Healthy Heart (2-3 Na+); Consistent Carbohydrate; Low Sodium (2-3g), Low Potassium, Low Phosphorus; Texture: Regular Texture (IDDSI 7); Fluid Consistency: Thin (IDDSI 0)  Orders Placed This Encounter      Dietary Nutrition Supplements Boost Breeze (Clear Liquid); mixed berry      DIET MESSAGE Daily - HOT TEA with ALL meals. Thank you, RD.      DIET MESSAGE DAILY (breakfast) - add a bowl of oatmeal with brown sugar and side of syrup to the breakfast meal tray. Thanks, RD.      Dietary Nutrition Supplements Premier Protein      Average Intake from [facility] Days: 75% x 4 meals     Nutrition Diagnosis     Problem Increased nutrient needs   Etiology 2/2 hospital course   Signs/Symptoms S/p GABG x 3   Status:    Actions     Follow treatment progress, Care plan reviewed, Encourage intake, Supplement provided     Premier Protein BID-noted pt's daily protein needs ~93g protein daily (1.2g/kg actual wt)    Monitor Per Protocol      Marge Castillo RD,   Time Spent: 15

## 2023-03-18 LAB
ALBUMIN SERPL-MCNC: 2.6 G/DL (ref 3.5–5.2)
ANION GAP SERPL CALCULATED.3IONS-SCNC: 9 MMOL/L (ref 5–15)
BUN SERPL-MCNC: 33 MG/DL (ref 8–23)
BUN/CREAT SERPL: 8.6 (ref 7–25)
CALCIUM SPEC-SCNC: 7.9 MG/DL (ref 8.6–10.5)
CHLORIDE SERPL-SCNC: 98 MMOL/L (ref 98–107)
CO2 SERPL-SCNC: 27 MMOL/L (ref 22–29)
CREAT SERPL-MCNC: 3.84 MG/DL (ref 0.76–1.27)
EGFRCR SERPLBLD CKD-EPI 2021: 16.4 ML/MIN/1.73
GLUCOSE BLDC GLUCOMTR-MCNC: 125 MG/DL (ref 70–130)
GLUCOSE BLDC GLUCOMTR-MCNC: 195 MG/DL (ref 70–130)
GLUCOSE BLDC GLUCOMTR-MCNC: 221 MG/DL (ref 70–130)
GLUCOSE BLDC GLUCOMTR-MCNC: 237 MG/DL (ref 70–130)
GLUCOSE SERPL-MCNC: 111 MG/DL (ref 65–99)
PHOSPHATE SERPL-MCNC: 4.8 MG/DL (ref 2.5–4.5)
POTASSIUM SERPL-SCNC: 4 MMOL/L (ref 3.5–5.2)
SODIUM SERPL-SCNC: 134 MMOL/L (ref 136–145)

## 2023-03-18 PROCEDURE — 93010 ELECTROCARDIOGRAM REPORT: CPT | Performed by: INTERNAL MEDICINE

## 2023-03-18 PROCEDURE — 63710000001 INSULIN DETEMIR PER 5 UNITS: Performed by: STUDENT IN AN ORGANIZED HEALTH CARE EDUCATION/TRAINING PROGRAM

## 2023-03-18 PROCEDURE — 93005 ELECTROCARDIOGRAM TRACING: CPT | Performed by: NURSE PRACTITIONER

## 2023-03-18 PROCEDURE — 99232 SBSQ HOSP IP/OBS MODERATE 35: CPT | Performed by: INTERNAL MEDICINE

## 2023-03-18 PROCEDURE — 99024 POSTOP FOLLOW-UP VISIT: CPT | Performed by: PHYSICIAN ASSISTANT

## 2023-03-18 PROCEDURE — 80069 RENAL FUNCTION PANEL: CPT | Performed by: INTERNAL MEDICINE

## 2023-03-18 PROCEDURE — 82962 GLUCOSE BLOOD TEST: CPT

## 2023-03-18 PROCEDURE — 63710000001 INSULIN LISPRO (HUMAN) PER 5 UNITS: Performed by: STUDENT IN AN ORGANIZED HEALTH CARE EDUCATION/TRAINING PROGRAM

## 2023-03-18 PROCEDURE — 99232 SBSQ HOSP IP/OBS MODERATE 35: CPT | Performed by: NURSE PRACTITIONER

## 2023-03-18 RX ADMIN — MIDODRINE HYDROCHLORIDE 10 MG: 10 TABLET ORAL at 03:27

## 2023-03-18 RX ADMIN — INSULIN LISPRO 2 UNITS: 100 INJECTION, SOLUTION INTRAVENOUS; SUBCUTANEOUS at 12:06

## 2023-03-18 RX ADMIN — POLYETHYLENE GLYCOL 3350 17 G: 17 POWDER, FOR SOLUTION ORAL at 08:43

## 2023-03-18 RX ADMIN — Medication 10 ML: at 08:43

## 2023-03-18 RX ADMIN — INSULIN LISPRO 2 UNITS: 100 INJECTION, SOLUTION INTRAVENOUS; SUBCUTANEOUS at 08:43

## 2023-03-18 RX ADMIN — INSULIN DETEMIR 10 UNITS: 100 INJECTION, SOLUTION SUBCUTANEOUS at 20:47

## 2023-03-18 RX ADMIN — ROSUVASTATIN CALCIUM 10 MG: 10 TABLET, FILM COATED ORAL at 20:46

## 2023-03-18 RX ADMIN — FLUCONAZOLE 50 MG: 100 TABLET ORAL at 14:36

## 2023-03-18 RX ADMIN — AMIODARONE HYDROCHLORIDE 200 MG: 200 TABLET ORAL at 08:43

## 2023-03-18 RX ADMIN — INSULIN LISPRO 4 UNITS: 100 INJECTION, SOLUTION INTRAVENOUS; SUBCUTANEOUS at 17:27

## 2023-03-18 RX ADMIN — INSULIN LISPRO 2 UNITS: 100 INJECTION, SOLUTION INTRAVENOUS; SUBCUTANEOUS at 17:27

## 2023-03-18 RX ADMIN — PANTOPRAZOLE SODIUM 40 MG: 40 TABLET, DELAYED RELEASE ORAL at 05:20

## 2023-03-18 RX ADMIN — METOPROLOL SUCCINATE 25 MG: 25 TABLET, EXTENDED RELEASE ORAL at 08:43

## 2023-03-18 RX ADMIN — INSULIN LISPRO 2 UNITS: 100 INJECTION, SOLUTION INTRAVENOUS; SUBCUTANEOUS at 20:46

## 2023-03-18 RX ADMIN — SENNOSIDES AND DOCUSATE SODIUM 2 TABLET: 8.6; 5 TABLET ORAL at 08:43

## 2023-03-18 RX ADMIN — MIDODRINE HYDROCHLORIDE 10 MG: 10 TABLET ORAL at 12:06

## 2023-03-18 RX ADMIN — ASPIRIN 325 MG: 325 TABLET, COATED ORAL at 08:43

## 2023-03-18 RX ADMIN — INSULIN LISPRO 4 UNITS: 100 INJECTION, SOLUTION INTRAVENOUS; SUBCUTANEOUS at 12:07

## 2023-03-18 NOTE — PROGRESS NOTES
CTS Progress Note      POD 17 s/p CABG x3     LOS: 17 days     Subjective  No acute events overnight per nursing. Patient off the floor in dialysis     Objective    Vital Signs  Temp:  [98.3 °F (36.8 °C)-99.5 °F (37.5 °C)] 98.9 °F (37.2 °C)  Heart Rate:  [59-94] 59  Resp:  [15-18] 16  BP: ()/(42-96) 117/57    Physical Exam:       Results     Results from last 7 days   Lab Units 03/17/23  0729   WBC 10*3/mm3 13.81*   HEMOGLOBIN g/dL 8.4*   HEMATOCRIT % 26.8*   PLATELETS 10*3/mm3 406     Results from last 7 days   Lab Units 03/18/23  0516   SODIUM mmol/L 134*   POTASSIUM mmol/L 4.0   CHLORIDE mmol/L 98   CO2 mmol/L 27.0   BUN mg/dL 33*   CREATININE mg/dL 3.84*   GLUCOSE mg/dL 111*   CALCIUM mg/dL 7.9*       Imaging Results (Last 24 Hours)     ** No results found for the last 24 hours. **          Assessment    I25.10, CABG 03/01/23    Hypertension    Hyperlipidemia LDL goal <70    Type 2 diabetes mellitus with hypoglycemia, with long-term current use of insulin (HCC)    Ischemic cardiomyopathy    Mucopurulent chronic bronchitis (HCC)    Prolonged Q-T interval on ECG    Acute renal failure (ARF) (HCC)    Shock (HCC)    Acute respiratory failure with hypoxia (HCC)  H&H stable  Mild leukocytosis    Plan   Awaiting rehab dialysis outpatient this appears is going to be Wednesday at the earliest  Could be able to discharge to home when medically stable  HD per Nephrology  Ambulate TID  Pulm toilet  CM working on outpatient dialysis referral    Benton Yost PA-C  03/18/23  07:42 EDT

## 2023-03-18 NOTE — PROGRESS NOTES
"   LOS: 17 days    Patient Care Team:  Yessica Ricci DO as PCP - General (Family Medicine)  Nima Peter MD as Consulting Physician (Endocrinology)  Nadya Ramirez MD as Cardiologist (Cardiology)  Yessica Ricci DO as Consulting Physician (Family Medicine)    Chief Complaint: Acute kidney injury s/p CABG  67-year-old with history of hypertension, diabetes, former smoker 1-1/2 pack/day for quite some time, CAD s/p PCI in 2014, underwent CABG x3, post CABG remain hypotensive blood pressure in the 40s systolic in 60s and 70s 80s range requiring IV pressors.  Developed [place] oliguric, now an uric  Subjective . F/U [place].    Interval History:       Review of Systems:   No new complaints. NO CP/SOB/N/V/D/ITCHING.    Objective     Vital Sign Min/Max for last 24 hours  Temp  Min: 98.2 °F (36.8 °C)  Max: 99.5 °F (37.5 °C)   BP  Min: 99/42  Max: 127/59   Pulse  Min: 59  Max: 85   Resp  Min: 15  Max: 18   SpO2  Min: 91 %  Max: 96 %   No data recorded   Weight  Min: 79.5 kg (175 lb 3.2 oz)  Max: 79.5 kg (175 lb 3.2 oz)     Flowsheet Rows    Flowsheet Row First Filed Value   Admission Height 162.6 cm (64\") Documented at 03/01/2023 0611   Admission Weight 75.3 kg (166 lb) Documented at 03/01/2023 0611          I/O this shift:  In: 1200 [P.O.:1200]  Out: -   I/O last 3 completed shifts:  In: 600 [P.O.:600]  Out: 2625 [Urine:625; Other:2000]    Physical Exam:  General Appearance:  male awake alert oriented no obvious distress, sitting and eating at this time.  Eyes: PER, EOMI.  Neck: Supple no JVD.  Right-sided nontunneled catheter.  tunneled catheter in place  Chest: Chest tube in place.  Lungs: Clear auscultation, no rales rhonchi's, equal chest movement, nonlabored.  Heart: No gallop, murmur, rub, RRR.  Abdomen: Soft, nontender, positive bowel sounds, no organomegaly.  Extremities: TR bilateral lower extremity dependent edema, no cyanosis.  Neuro: No focal deficit, moving all extremities, alert oriented X " 3  : Solomon catheter in place.  Minimal urine output  Skin warm and dry.  Psych mood and affect appropriate  THD CATH  WBC WBC   Date Value Ref Range Status   03/17/2023 13.81 (H) 3.40 - 10.80 10*3/mm3 Final      HGB Hemoglobin   Date Value Ref Range Status   03/17/2023 8.4 (L) 13.0 - 17.7 g/dL Final   03/16/2023 7.8 (L) 13.0 - 17.7 g/dL Final      HCT Hematocrit   Date Value Ref Range Status   03/17/2023 26.8 (L) 37.5 - 51.0 % Final   03/16/2023 24.5 (L) 37.5 - 51.0 % Final      Platlets No results found for: LABPLAT   MCV MCV   Date Value Ref Range Status   03/17/2023 93.4 79.0 - 97.0 fL Final          Sodium Sodium   Date Value Ref Range Status   03/18/2023 134 (L) 136 - 145 mmol/L Final   03/17/2023 133 (L) 136 - 145 mmol/L Final   03/16/2023 133 (L) 136 - 145 mmol/L Final      Potassium Potassium   Date Value Ref Range Status   03/18/2023 4.0 3.5 - 5.2 mmol/L Final   03/17/2023 4.6 3.5 - 5.2 mmol/L Final   03/16/2023 4.3 3.5 - 5.2 mmol/L Final     Comment:     Slight hemolysis detected by analyzer. Results may be affected.      Chloride Chloride   Date Value Ref Range Status   03/18/2023 98 98 - 107 mmol/L Final   03/17/2023 96 (L) 98 - 107 mmol/L Final   03/16/2023 98 98 - 107 mmol/L Final      CO2 CO2   Date Value Ref Range Status   03/18/2023 27.0 22.0 - 29.0 mmol/L Final   03/17/2023 24.0 22.0 - 29.0 mmol/L Final   03/16/2023 24.0 22.0 - 29.0 mmol/L Final      BUN BUN   Date Value Ref Range Status   03/18/2023 33 (H) 8 - 23 mg/dL Final   03/17/2023 50 (H) 8 - 23 mg/dL Final   03/16/2023 34 (H) 8 - 23 mg/dL Final      Creatinine Creatinine   Date Value Ref Range Status   03/18/2023 3.84 (H) 0.76 - 1.27 mg/dL Final   03/17/2023 6.58 (H) 0.76 - 1.27 mg/dL Final   03/16/2023 4.75 (H) 0.76 - 1.27 mg/dL Final      Calcium Calcium   Date Value Ref Range Status   03/18/2023 7.9 (L) 8.6 - 10.5 mg/dL Final   03/17/2023 8.2 (L) 8.6 - 10.5 mg/dL Final   03/16/2023 7.9 (L) 8.6 - 10.5 mg/dL Final      PO4 No results  found for: CAPO4   Albumin Albumin   Date Value Ref Range Status   03/18/2023 2.6 (L) 3.5 - 5.2 g/dL Final   03/17/2023 2.7 (L) 3.5 - 5.2 g/dL Final      Magnesium No results found for: MG   Uric Acid No results found for: URICACID        Results Review:     I reviewed the patient's new clinical results.    amiodarone, 200 mg, Oral, Q24H  aspirin, 325 mg, Oral, Daily  epoetin andrew/andrew-epbx, 10,000 Units, Subcutaneous, Once per day on Mon Wed Fri  fluconazole, 100 mg, Oral, Once per day on Mon Wed Fri  fluconazole, 50 mg, Oral, Once per day on Sun Tue Thu Sat  insulin detemir, 10 Units, Subcutaneous, Nightly  insulin lispro, 0-9 Units, Subcutaneous, 4x Daily With Meals & Nightly  Insulin Lispro, 2 Units, Subcutaneous, TID With Meals  metoprolol succinate XL, 25 mg, Oral, Q24H  midodrine, 10 mg, Oral, Q8H  pantoprazole, 40 mg, Oral, Q AM  pharmacy consult - MTM, , Does not apply, BID  senna-docusate sodium, 2 tablet, Oral, BID   And  polyethylene glycol, 17 g, Oral, Daily  rosuvastatin, 10 mg, Oral, Nightly  sodium chloride, 10 mL, Intravenous, Q12H           Medication Review: Reviewed    Assessment & Plan       I25.10, CABG 03/01/23    Hypertension    Hyperlipidemia LDL goal <70    Type 2 diabetes mellitus with hypoglycemia, with long-term current use of insulin (HCC)    Ischemic cardiomyopathy    Mucopurulent chronic bronchitis (HCC)    Prolonged Q-T interval on ECG    Acute renal failure (ARF) (HCC)    Shock (HCC)    Acute respiratory failure with hypoxia (HCC)     1.  [place]: ATN likely secondary to shock with systolic blood pressure postsurgery in the range of 40s to 80s, now on multiple vasopressors.  Admit creatinine 1.08, creatinine progressively getting worse.  Patient is oliguric.  C3 complement 18, C4 complement 83 within normal range.  Ultrasound of the kidney shows right kidney 11 cm left kidney 10.6 cm normal ultrasound, bladder ultrasound normal. NONOLIGURIC BUT  NO RECOVERY GFR YET.   2.  S/p CABG  x3  3.  Ischemic cardiomyopathy with ejection fraction 41-45%.  4.  Diabetes type 2.  5.  Cardiogenic shock. Resolved.  6. HYPONATREMIA   Recommendations:  . MONITOR OVER THE WEEKEND FOR POSSIBLE RENAL RECOVERY. IF NOT THEN NEXT HD LIKELY 3/20/23.   START TO MAKE ARRANGEMENTS FOR OUTPATIENT HD HERE IN [place]. D/W PATIENT.. .   Nicolas Pritchett MD  03/18/23  14:28 EDT

## 2023-03-18 NOTE — PROGRESS NOTES
"  [facility]   Inpatient Progress Note       LOS: 17 days   Patient Care Team:  Yessica Ricci DO as PCP - General (Family Medicine)  Nima Peter MD as Consulting Physician (Endocrinology)  Nadya Ramirez MD as Cardiologist (Cardiology)  Yessica Ricci DO as Consulting Physician (Family Medicine)    Chief Complaint:  Follow-up for AF, HF, dyslipidemia    Subjective     Interval History:   Patient in bed. No current CV complaints. Notes that he ambulated in the [facility] yesterday. Still awaiting HD chair. Still in SR      Review of Systems:   Pertinent positives noted in history, exam, and assessment. Otherwise reviewed and negative.      Objective     Vitals:  Blood pressure 127/59, pulse 63, temperature 98.2 °F (36.8 °C), temperature source Oral, resp. rate 16, height 162.6 cm (64\"), weight 79.5 kg (175 lb 3.2 oz), SpO2 93 %.     Intake/Output Summary (Last 24 hours) at 3/18/2023 0946  Last data filed at 3/17/2023 2053  Gross per 24 hour   Intake 600 ml   Output 2200 ml   Net -1600 ml     Vitals reviewed.   Constitutional:       Appearance: Well-developed and not in distress.   Neck:      Vascular: No JVD.      Trachea: No tracheal deviation.   Pulmonary:      Effort: Pulmonary effort is normal.      Breath sounds: Normal breath sounds.   Cardiovascular:      Normal rate. Regular rhythm.   Pulses:     Intact distal pulses.   Edema:     Peripheral edema absent.   Abdominal:      General: Bowel sounds are normal.      Palpations: Abdomen is soft.      Tenderness: There is no abdominal tenderness.   Musculoskeletal:         General: No deformity. Skin:     General: Skin is warm and dry.   Neurological:      Mental Status: Alert and oriented to person, place, and time.            Results Review:     I reviewed the patient's new clinical results.    Results from last 7 days   Lab Units 03/17/23  0729   WBC 10*3/mm3 13.81*   HEMOGLOBIN g/dL 8.4*   HEMATOCRIT % 26.8*   PLATELETS " 10*3/mm3 406     Results from last 7 days   Lab Units 03/18/23  0516 03/17/23  0729   SODIUM mmol/L 134* 133*   POTASSIUM mmol/L 4.0 4.6   CHLORIDE mmol/L 98 96*   CO2 mmol/L 27.0 24.0   BUN mg/dL 33* 50*   CREATININE mg/dL 3.84* 6.58*   CALCIUM mg/dL 7.9* 8.2*   BILIRUBIN mg/dL  --  0.2   ALK PHOS U/L  --  128*   ALT (SGPT) U/L  --  29   AST (SGOT) U/L  --  30   GLUCOSE mg/dL 111* 140*     Results from last 7 days   Lab Units 03/18/23  0516   SODIUM mmol/L 134*   POTASSIUM mmol/L 4.0   CHLORIDE mmol/L 98   CO2 mmol/L 27.0   BUN mg/dL 33*   CREATININE mg/dL 3.84*   GLUCOSE mg/dL 111*   CALCIUM mg/dL 7.9*         Lab Results   Lab Value Date/Time    TROPONINT 701 (C) 03/16/2023 0801    TROPONINT 690 (C) 03/16/2023 0459    TROPONINT <0.010 12/11/2022 1705             Results from last 7 days   Lab Units 03/16/23  0459   PROBNP pg/mL 27,176.0*     Results from last 7 days   Lab Units 03/16/23  0801 03/16/23  0459   HSTROP T ng/L 701* 690*         Tele:  SR    Assessment:      I25.10, CABG 03/01/23    Hypertension    Hyperlipidemia LDL goal <70    Type 2 diabetes mellitus with hypoglycemia, with long-term current use of insulin ([facility])    Ischemic cardiomyopathy    Mucopurulent chronic bronchitis ([facility])    Prolonged Q-T interval on ECG    Acute renal failure (ARF) ([facility])    Shock ([facility])    Acute respiratory failure with hypoxia ([facility])      1. CAD s/p CABG x3, preop EF 40-45%  2. Postop atrial fibrillation, currently in normal sinus rhythm  1. On amiodarone and Toprol  3. Chronic systolic heart failure,ischemic cardiomyopathy  4. Hypertension, recent hypotension, currently on midodrine  5. Prolonged QT, improving  6. Hyperlipidemia, on Crestor 10 at home  7. Acute on chronic kidney failure, on HD, started 3/11  8. Type 2 diabetes  9. Painful swallowing, GI consulted.  1. Speech therapy following as well.   10. Acute anemia, received 1 unit PRBC 3/13    Plan:  · Continue current CV regimen  · Patient stable from CV  standpoint  · Check EKG today to follow-up on QTC  · No ACE/ARB/ARNI due to renal failure and marginal BP (requiring midodrine)   · No Aldactone due to the above.   · If QTC stable will see again on Monday. Please call tomorrow if any questions.     TALON Vivar   Dictated utilizing Dragon dictation

## 2023-03-18 NOTE — PROGRESS NOTES
[facility] Medicine Services  PROGRESS NOTE    Patient Name: Herbert Harley  : 1955  MRN: [id number]    Date of Admission: 3/1/2023  Primary Care Physician: Yessica Ricci DO    Subjective   Subjective     CC: Med management status post CABG    HPI: No acute events overnight, patient stated he rested some, no new complaints this morning.    ROS:  Gen- No fevers, chills  CV- No chest pain, palpitations  Resp- No cough, dyspnea  GI- No N/V/D, abd pain       Objective   Objective     Vital Signs:   Temp:  [98.2 °F (36.8 °C)-99.5 °F (37.5 °C)] 98.2 °F (36.8 °C)  Heart Rate:  [59-94] 63  Resp:  [15-18] 16  BP: ()/(42-96) 127/59     Physical Exam:  Constitutional: No acute distress, awake, alert  HENT: NCAT, mucous membranes moist  Respiratory: Clear to auscultation bilaterally, respiratory effort normal   Cardiovascular: RRR, no murmurs, rubs, or gallops  Gastrointestinal: Positive bowel sounds, soft, nontender, nondistended  Musculoskeletal: No bilateral ankle edema  Psychiatric: Appropriate affect, cooperative  Neurologic: Oriented x 3, nonfocal   Skin: No rashes, incision CDI    Results Reviewed:  LAB RESULTS:      Lab 23  0729 23  0459 03/15/23  0525 23  0454 23  0356 23  1406 23  0436 23  1513   WBC 13.81*  --  12.90* 13.77* 13.66* 13.35*   < > 13.37*   HEMOGLOBIN 8.4* 7.8* 8.6* 9.2* 6.9* 7.5*   < > 8.1*   HEMATOCRIT 26.8* 24.5* 27.5* 29.5* 21.9* 23.7*   < > 25.2*   PLATELETS 406  --  244 187 132* 117*   < > 91*   NEUTROS ABS  --   --  9.19* 9.98* 9.79* 9.95*  --  10.04*   IMMATURE GRANS (ABS)  --   --  0.10* 0.11* 0.13* 0.16*  --  0.25*   LYMPHS ABS  --   --  1.19 1.34 1.57 1.37  --  1.37   MONOS ABS  --   --  2.08* 2.01* 1.84* 1.66*  --  1.51*   EOS ABS  --   --  0.28 0.28 0.29 0.18  --  0.18   MCV 93.4  --  94.2 93.4 96.5 94.8   < > 94.4   PROCALCITONIN  --   --   --  0.61*  --   --   --   --     < > = values in this interval  not displayed.         Lab 03/18/23  0516 03/17/23  0729 03/16/23  0459 03/15/23  0525 03/14/23  0454 03/13/23  0356   SODIUM 134* 133* 133* 133* 132* 131*   POTASSIUM 4.0 4.6 4.3 5.1 4.9 4.8   CHLORIDE 98 96* 98 97* 97* 98   CO2 27.0 24.0 24.0 23.0 19.0* 21.0*   ANION GAP 9.0 13.0 11.0 13.0 16.0* 12.0   BUN 33* 50* 34* 42* 31* 42*   CREATININE 3.84* 6.58* 4.75* 5.69* 4.24* 5.82*   EGFR 16.4* 8.6* 12.7* 10.2* 14.6* 10.0*   GLUCOSE 111* 140* 102* 158* 122* 121*   CALCIUM 7.9* 8.2* 7.9* 7.7* 7.8* 7.5*   MAGNESIUM  --   --   --   --   --  2.1   PHOSPHORUS 4.8*  --   --  6.6*  --  6.2*         Lab 03/18/23  0516 03/17/23  0729 03/15/23  0525 03/14/23  0454 03/13/23  0356 03/12/23  0436   TOTAL PROTEIN  --  5.0* 5.0* 5.3* 4.3* 4.3*   ALBUMIN 2.6* 2.7* 2.9* 2.8* 2.5* 2.4*   GLOBULIN  --  2.3 2.1 2.5 1.8 1.9   ALT (SGPT)  --  29 33 33 29 31   AST (SGOT)  --  30 37 42* 39 51*   BILIRUBIN  --  0.2 0.3 0.4 0.3 0.4   ALK PHOS  --  128* 158* 161* 134* 137*         Lab 03/16/23  0801 03/16/23  0459   PROBNP  --  27,176.0*   HSTROP T 701* 690*             Lab 03/13/23  0630   ABO TYPING O   RH TYPING Positive   ANTIBODY SCREEN Negative         Brief Urine Lab Results  (Last result in the past 365 days)      Color   Clarity   Blood   Leuk Est   Nitrite   Protein   CREAT   Urine HCG        03/02/23 1502             58.8               Microbiology Results Abnormal     Procedure Component Value - Date/Time    Blood Culture - Blood, Arm, Right [[id number]]  (Normal) Collected: 03/08/23 1403    Lab Status: Final result Specimen: Blood from Arm, Right Updated: 03/13/23 1616     Blood Culture No growth at 5 days    Blood Culture - Blood, Hand, Right [[id number]]  (Normal) Collected: 03/08/23 1403    Lab Status: Final result Specimen: Blood from Hand, Right Updated: 03/13/23 1616     Blood Culture No growth at 5 days    MRSA Screen, PCR (Inpatient) - Swab, Nares [[id number]]  (Normal) Collected: 03/08/23 1115    Lab Status: Final result  Specimen: Swab from Nares Updated: 03/08/23 1437     MRSA PCR Negative    Narrative:      The negative predictive value of this diagnostic test is high and should only be used to consider de-escalating anti-MRSA therapy. A positive result may indicate colonization with MRSA and must be correlated clinically.  MRSA Negative    Eosinophil Smear - Urine, Indwelling Urethral Catheter [[id number]]  (Normal) Collected: 03/02/23 1502    Lab Status: Final result Specimen: Urine from Indwelling Urethral Catheter Updated: 03/02/23 1647     Eosinophil Smear 0 % EOS/100 Cells     Narrative:      No eosinophil seen          No radiology results from the last 24 hrs    Results for orders placed during the hospital encounter of 03/01/23    Adult Transthoracic Echo Complete W/ Cont if Necessary Per Protocol    Interpretation Summary  •  Left ventricular systolic function is mildly decreased. Calculated left ventricular EF = 40.4% Left ventricular ejection fraction appears to be 41 - 45%.  •  The right ventricular cavity is dilated.  •  Left atrial volume is mildly increased.  •  There is calcification of the aortic valve.  •  Estimated right ventricular systolic pressure from tricuspid regurgitation is normal (<35 mmHg).  •  There is a left pleural effusion.      Current medications:  Scheduled Meds:amiodarone, 200 mg, Oral, Q24H  aspirin, 325 mg, Oral, Daily  epoetin andrew/andrew-epbx, 10,000 Units, Subcutaneous, Once per day on Mon Wed Fri  fluconazole, 100 mg, Oral, Once per day on Mon Wed Fri  fluconazole, 50 mg, Oral, Once per day on Sun Tue Thu Sat  insulin detemir, 10 Units, Subcutaneous, Nightly  insulin lispro, 0-9 Units, Subcutaneous, 4x Daily With Meals & Nightly  Insulin Lispro, 2 Units, Subcutaneous, TID With Meals  metoprolol succinate XL, 25 mg, Oral, Q24H  midodrine, 10 mg, Oral, Q8H  pantoprazole, 40 mg, Oral, Q AM  pharmacy consult - [place], , Does not apply, BID  senna-docusate sodium, 2 tablet, Oral, BID    And  polyethylene glycol, 17 g, Oral, Daily  rosuvastatin, 10 mg, Oral, Nightly  sodium chloride, 10 mL, Intravenous, Q12H      Continuous Infusions:   PRN Meds:.•  acetaminophen  •  Albuterol Sulfate NEB Orderable  •  arformoterol  •  calcium carbonate  •  cyclobenzaprine  •  dextrose  •  heparin (porcine)  •  ipratropium-albuterol  •  melatonin  •  ondansetron    Assessment & Plan   Assessment & Plan     Active Hospital Problems    Diagnosis  POA   • **I25.10, CABG 03/01/23 [I25.10]  Yes   • Acute respiratory failure with hypoxia (HCC) [J96.01]  Yes   • Acute renal failure (ARF) (HCC) [N17.9]  Yes   • Shock (HCC) [R57.9]  Yes   • Mucopurulent chronic bronchitis (HCC) [J41.1]  Yes   • Prolonged Q-T interval on ECG [R94.31]  Yes   • Ischemic cardiomyopathy [I25.5]  Yes   • Type 2 diabetes mellitus with hypoglycemia, with long-term current use of insulin ([facility]) [E11.649, Z79.4]  Not Applicable   • Hyperlipidemia LDL goal <70 [E78.5]  Yes   • Hypertension [I10]  Yes      Resolved Hospital Problems    Diagnosis Date Resolved POA   • Tobacco use [Z72.0] 03/01/2023 Yes        Brief Hospital Course to date:  Herbert Harley is a 67 y.o. male with history of type 2 diabetes, hypertension, hyperlipidemia, CAD status post stents, found to have severe triple-vessel CAD, s/p CABG 3/1/2023, has required ICU care, with stay has been complicated with acute on chronic renal failure, requiring initiation of HD.  Patient also had odynophagia, started on Diflucan, he has been anemic and has required 1 unit PRBC.  He otherwise progressed well and was deemed stable for transfer to telemetry, [facility] medicine was asked to follow and assist with medical management, we assumed this on 3/16/2023     CAD multivessel CAD s/p CABG x3  Hyperlipidemia  -Continue postop care per CTS  -Continue aspirin, statin, beta-blocker     Postoperative fibrillation  Prolonged QTc, improving  Hypotension  -Currently on NSR  -Cardiology following,  currently on amiodarone  -Closely monitor QTc as patient is now on Diflucan  -Continue midodrine     Acute on chronic renal failure  -Renal function remains worse, peaked to 5.82  -nephrology following, s/p tunneled dialysis access placement  -Continue intermittent HD, patient will need outpatient dialysis, CM to arrange for chair     Anemia  -Suspect postop blood loss  -S/p unit PRBC 3/13  -H&H remains low but stable, continue to monitor     Odynophagia  -GI followed, recommend continue 14-day course of Diflucan  -Defer endoscopy at this time  -He is now tolerating his diet     Well-controlled type 2 diabetes with A1c 7.4%  -FSBG's reviewed and currently appropriate,  -Continue basal, prandial and SSI, adjust as warranted    Expected Discharge Location and Transportation: Rehab  Expected Discharge   Expected Discharge Date and Time     Expected Discharge Date Expected Discharge Time    Mar 22, 2023            DVT prophylaxis:  Medical and mechanical DVT prophylaxis orders are present.     AM-PAC 6 Clicks Score (PT): 21 (03/16/23 3320)    CODE STATUS:   Code Status and Medical Interventions:   Ordered at: 03/01/23 1117     Code Status (Patient has no pulse and is not breathing):    CPR (Attempt to Resuscitate)     Medical Interventions (Patient has pulse or is breathing):    Full Support     Release to patient:    Routine Release       Nelson Bose MD  03/18/23

## 2023-03-19 ENCOUNTER — ANESTHESIA (OUTPATIENT)
Dept: TELEMETRY | Facility: HOSPITAL | Age: 68
DRG: 235 | End: 2023-03-19
Payer: MEDICARE

## 2023-03-19 ENCOUNTER — ANESTHESIA EVENT (OUTPATIENT)
Dept: TELEMETRY | Facility: HOSPITAL | Age: 68
DRG: 235 | End: 2023-03-19
Payer: MEDICARE

## 2023-03-19 LAB
ALBUMIN SERPL-MCNC: 2.6 G/DL (ref 3.5–5.2)
ANION GAP SERPL CALCULATED.3IONS-SCNC: 10 MMOL/L (ref 5–15)
BUN SERPL-MCNC: 43 MG/DL (ref 8–23)
BUN/CREAT SERPL: 8.5 (ref 7–25)
CALCIUM SPEC-SCNC: 8.1 MG/DL (ref 8.6–10.5)
CHLORIDE SERPL-SCNC: 98 MMOL/L (ref 98–107)
CO2 SERPL-SCNC: 26 MMOL/L (ref 22–29)
CREAT SERPL-MCNC: 5.03 MG/DL (ref 0.76–1.27)
EGFRCR SERPLBLD CKD-EPI 2021: 11.9 ML/MIN/1.73
GLUCOSE BLDC GLUCOMTR-MCNC: 137 MG/DL (ref 70–130)
GLUCOSE BLDC GLUCOMTR-MCNC: 148 MG/DL (ref 70–130)
GLUCOSE BLDC GLUCOMTR-MCNC: 206 MG/DL (ref 70–130)
GLUCOSE BLDC GLUCOMTR-MCNC: 250 MG/DL (ref 70–130)
GLUCOSE SERPL-MCNC: 134 MG/DL (ref 65–99)
PHOSPHATE SERPL-MCNC: 4.9 MG/DL (ref 2.5–4.5)
POTASSIUM SERPL-SCNC: 4.1 MMOL/L (ref 3.5–5.2)
QT INTERVAL: 414 MS
QTC INTERVAL: 427 MS
SODIUM SERPL-SCNC: 134 MMOL/L (ref 136–145)

## 2023-03-19 PROCEDURE — 63710000001 INSULIN LISPRO (HUMAN) PER 5 UNITS: Performed by: STUDENT IN AN ORGANIZED HEALTH CARE EDUCATION/TRAINING PROGRAM

## 2023-03-19 PROCEDURE — 80069 RENAL FUNCTION PANEL: CPT | Performed by: INTERNAL MEDICINE

## 2023-03-19 PROCEDURE — 99232 SBSQ HOSP IP/OBS MODERATE 35: CPT | Performed by: INTERNAL MEDICINE

## 2023-03-19 PROCEDURE — 99024 POSTOP FOLLOW-UP VISIT: CPT | Performed by: PHYSICIAN ASSISTANT

## 2023-03-19 PROCEDURE — 82962 GLUCOSE BLOOD TEST: CPT

## 2023-03-19 PROCEDURE — 63710000001 INSULIN DETEMIR PER 5 UNITS: Performed by: STUDENT IN AN ORGANIZED HEALTH CARE EDUCATION/TRAINING PROGRAM

## 2023-03-19 RX ORDER — SUCCINYLCHOLINE/SOD CL,ISO/PF 200MG/10ML
SYRINGE (ML) INTRAVENOUS AS NEEDED
Status: DISCONTINUED | OUTPATIENT
Start: 2023-03-19 | End: 2023-03-19

## 2023-03-19 RX ORDER — ETOMIDATE 2 MG/ML
INJECTION INTRAVENOUS AS NEEDED
Status: DISCONTINUED | OUTPATIENT
Start: 2023-03-19 | End: 2023-03-19

## 2023-03-19 RX ORDER — LIDOCAINE HYDROCHLORIDE 10 MG/ML
0.5 INJECTION, SOLUTION EPIDURAL; INFILTRATION; INTRACAUDAL; PERINEURAL ONCE AS NEEDED
Status: CANCELLED | OUTPATIENT
Start: 2023-03-19

## 2023-03-19 RX ORDER — FAMOTIDINE 20 MG/1
20 TABLET, FILM COATED ORAL ONCE
Status: CANCELLED | OUTPATIENT
Start: 2023-03-19 | End: 2023-03-19

## 2023-03-19 RX ORDER — SODIUM CHLORIDE 9 MG/ML
INJECTION, SOLUTION INTRAVENOUS CONTINUOUS PRN
Status: DISCONTINUED | OUTPATIENT
Start: 2023-03-19 | End: 2023-03-19

## 2023-03-19 RX ORDER — SODIUM CHLORIDE 9 MG/ML
40 INJECTION, SOLUTION INTRAVENOUS AS NEEDED
Status: CANCELLED | OUTPATIENT
Start: 2023-03-19

## 2023-03-19 RX ORDER — PROPOFOL 10 MG/ML
VIAL (ML) INTRAVENOUS AS NEEDED
Status: DISCONTINUED | OUTPATIENT
Start: 2023-03-19 | End: 2023-03-19

## 2023-03-19 RX ORDER — MIDAZOLAM HYDROCHLORIDE 1 MG/ML
0.5 INJECTION INTRAMUSCULAR; INTRAVENOUS
Status: CANCELLED | OUTPATIENT
Start: 2023-03-19

## 2023-03-19 RX ORDER — HYDROXYZINE HYDROCHLORIDE 10 MG/1
10 TABLET, FILM COATED ORAL ONCE
Status: COMPLETED | OUTPATIENT
Start: 2023-03-19 | End: 2023-03-19

## 2023-03-19 RX ORDER — FAMOTIDINE 10 MG/ML
20 INJECTION, SOLUTION INTRAVENOUS ONCE
Status: CANCELLED | OUTPATIENT
Start: 2023-03-19 | End: 2023-03-19

## 2023-03-19 RX ORDER — SODIUM CHLORIDE 0.9 % (FLUSH) 0.9 %
10 SYRINGE (ML) INJECTION AS NEEDED
Status: CANCELLED | OUTPATIENT
Start: 2023-03-19

## 2023-03-19 RX ORDER — SODIUM CHLORIDE 0.9 % (FLUSH) 0.9 %
10 SYRINGE (ML) INJECTION EVERY 12 HOURS SCHEDULED
Status: CANCELLED | OUTPATIENT
Start: 2023-03-19

## 2023-03-19 RX ORDER — SODIUM CHLORIDE, SODIUM LACTATE, POTASSIUM CHLORIDE, CALCIUM CHLORIDE 600; 310; 30; 20 MG/100ML; MG/100ML; MG/100ML; MG/100ML
9 INJECTION, SOLUTION INTRAVENOUS CONTINUOUS
Status: CANCELLED | OUTPATIENT
Start: 2023-03-19

## 2023-03-19 RX ORDER — LIDOCAINE HYDROCHLORIDE 10 MG/ML
INJECTION, SOLUTION EPIDURAL; INFILTRATION; INTRACAUDAL; PERINEURAL AS NEEDED
Status: DISCONTINUED | OUTPATIENT
Start: 2023-03-19 | End: 2023-03-19

## 2023-03-19 RX ADMIN — INSULIN LISPRO 2 UNITS: 100 INJECTION, SOLUTION INTRAVENOUS; SUBCUTANEOUS at 09:17

## 2023-03-19 RX ADMIN — INSULIN LISPRO 6 UNITS: 100 INJECTION, SOLUTION INTRAVENOUS; SUBCUTANEOUS at 12:40

## 2023-03-19 RX ADMIN — FLUCONAZOLE 50 MG: 100 TABLET ORAL at 15:32

## 2023-03-19 RX ADMIN — SENNOSIDES AND DOCUSATE SODIUM 2 TABLET: 8.6; 5 TABLET ORAL at 21:40

## 2023-03-19 RX ADMIN — POLYETHYLENE GLYCOL 3350 17 G: 17 POWDER, FOR SOLUTION ORAL at 09:18

## 2023-03-19 RX ADMIN — METOPROLOL SUCCINATE 25 MG: 25 TABLET, EXTENDED RELEASE ORAL at 09:16

## 2023-03-19 RX ADMIN — CYCLOBENZAPRINE 10 MG: 10 TABLET, FILM COATED ORAL at 21:40

## 2023-03-19 RX ADMIN — Medication 10 ML: at 09:19

## 2023-03-19 RX ADMIN — MIDODRINE HYDROCHLORIDE 10 MG: 10 TABLET ORAL at 12:07

## 2023-03-19 RX ADMIN — ROSUVASTATIN CALCIUM 10 MG: 10 TABLET, FILM COATED ORAL at 21:40

## 2023-03-19 RX ADMIN — ASPIRIN 325 MG: 325 TABLET, COATED ORAL at 09:17

## 2023-03-19 RX ADMIN — PANTOPRAZOLE SODIUM 40 MG: 40 TABLET, DELAYED RELEASE ORAL at 05:27

## 2023-03-19 RX ADMIN — AMIODARONE HYDROCHLORIDE 200 MG: 200 TABLET ORAL at 09:17

## 2023-03-19 RX ADMIN — HYDROXYZINE HYDROCHLORIDE 10 MG: 10 TABLET ORAL at 22:31

## 2023-03-19 RX ADMIN — INSULIN LISPRO 2 UNITS: 100 INJECTION, SOLUTION INTRAVENOUS; SUBCUTANEOUS at 17:31

## 2023-03-19 RX ADMIN — INSULIN LISPRO 2 UNITS: 100 INJECTION, SOLUTION INTRAVENOUS; SUBCUTANEOUS at 12:40

## 2023-03-19 RX ADMIN — SENNOSIDES AND DOCUSATE SODIUM 2 TABLET: 8.6; 5 TABLET ORAL at 09:16

## 2023-03-19 RX ADMIN — MIDODRINE HYDROCHLORIDE 10 MG: 10 TABLET ORAL at 21:40

## 2023-03-19 RX ADMIN — INSULIN DETEMIR 10 UNITS: 100 INJECTION, SOLUTION SUBCUTANEOUS at 21:40

## 2023-03-19 RX ADMIN — Medication 5 MG: at 21:40

## 2023-03-19 RX ADMIN — Medication 10 ML: at 21:43

## 2023-03-19 NOTE — PROGRESS NOTES
CTS Progress Note      POD 18 s/p CABG x3     LOS: 18 days     Subjective  No acute events overnight per nursing. Patient off the floor in dialysis     Objective    Vital Signs  Temp:  [97.8 °F (36.6 °C)-98.3 °F (36.8 °C)] 98.1 °F (36.7 °C)  Heart Rate:  [60-75] 65  Resp:  [16-18] 18  BP: (111-135)/(52-63) 132/63    Physical Exam:       Results     Results from last 7 days   Lab Units 03/17/23  0729   WBC 10*3/mm3 13.81*   HEMOGLOBIN g/dL 8.4*   HEMATOCRIT % 26.8*   PLATELETS 10*3/mm3 406     Results from last 7 days   Lab Units 03/18/23  0516   SODIUM mmol/L 134*   POTASSIUM mmol/L 4.0   CHLORIDE mmol/L 98   CO2 mmol/L 27.0   BUN mg/dL 33*   CREATININE mg/dL 3.84*   GLUCOSE mg/dL 111*   CALCIUM mg/dL 7.9*       Imaging Results (Last 24 Hours)     ** No results found for the last 24 hours. **          Assessment    I25.10, CABG 03/01/23    Hypertension    Hyperlipidemia LDL goal <70    Type 2 diabetes mellitus with hypoglycemia, with long-term current use of insulin (HCC)    Ischemic cardiomyopathy    Mucopurulent chronic bronchitis (HCC)    Prolonged Q-T interval on ECG    Acute renal failure (ARF) (HCC)    Shock (HCC)    Acute respiratory failure with hypoxia (HCC)      Plan   A.m. labs pending  Awaiting rehab dialysis outpatient this appears is going to be Wednesday at the earliest  Could be able to discharge to home when medically stable  HD per Nephrology  Ambulate TID  Pulm toilet  CM working on outpatient dialysis referral    Benton Yost PA-C  03/19/23  07:35 EDT

## 2023-03-19 NOTE — PROGRESS NOTES
[facility] Medicine Services  PROGRESS NOTE    Patient Name: Herbert Harley  : 1955  MRN: [id number]    Date of Admission: 3/1/2023  Primary Care Physician: Yessica Ricci DO    Subjective   Subjective     CC: Follow-up med management    HPI: No acute events overnight, patient stated he did not sleep much, he does endorse some left pleuritic    ROS:  Gen- No fevers, chills  CV- No chest pain, palpitations  Resp- No cough, dyspnea  GI- No N/V/D, abd pain      Objective   Objective     Vital Signs:   Temp:  [97.8 °F (36.6 °C)-98.3 °F (36.8 °C)] 97.8 °F (36.6 °C)  Heart Rate:  [60-75] 73  Resp:  [16-18] 18  BP: (111-135)/(52-63) 135/61     Physical Exam:  Constitutional: No acute distress, awake, alert  HENT: NCAT, mucous membranes moist  Respiratory: Clear to auscultation bilaterally, respiratory effort normal   Cardiovascular: RRR, no murmurs, rubs, or gallops  Gastrointestinal: Positive bowel sounds, soft, nontender, nondistended  Musculoskeletal: No bilateral ankle edema  Psychiatric: Appropriate affect, cooperative  Neurologic: Oriented x 3, nonfocal  Skin: No rashes, sternal incision CDI    Results Reviewed:  LAB RESULTS:      Lab 23  0729 23  0459 03/15/23  0525 23  0454 23  0356 23  1406   WBC 13.81*  --  12.90* 13.77* 13.66* 13.35*   HEMOGLOBIN 8.4* 7.8* 8.6* 9.2* 6.9* 7.5*   HEMATOCRIT 26.8* 24.5* 27.5* 29.5* 21.9* 23.7*   PLATELETS 406  --  244 187 132* 117*   NEUTROS ABS  --   --  9.19* 9.98* 9.79* 9.95*   IMMATURE GRANS (ABS)  --   --  0.10* 0.11* 0.13* 0.16*   LYMPHS ABS  --   --  1.19 1.34 1.57 1.37   MONOS ABS  --   --  2.08* 2.01* 1.84* 1.66*   EOS ABS  --   --  0.28 0.28 0.29 0.18   MCV 93.4  --  94.2 93.4 96.5 94.8   PROCALCITONIN  --   --   --  0.61*  --   --          Lab 23  0516 23  0729 23  0459 03/15/23  0525 23  0454 23  0356   SODIUM 134* 133* 133* 133* 132* 131*   POTASSIUM 4.0 4.6 4.3 5.1 4.9  4.8   CHLORIDE 98 96* 98 97* 97* 98   CO2 27.0 24.0 24.0 23.0 19.0* 21.0*   ANION GAP 9.0 13.0 11.0 13.0 16.0* 12.0   BUN 33* 50* 34* 42* 31* 42*   CREATININE 3.84* 6.58* 4.75* 5.69* 4.24* 5.82*   EGFR 16.4* 8.6* 12.7* 10.2* 14.6* 10.0*   GLUCOSE 111* 140* 102* 158* 122* 121*   CALCIUM 7.9* 8.2* 7.9* 7.7* 7.8* 7.5*   MAGNESIUM  --   --   --   --   --  2.1   PHOSPHORUS 4.8*  --   --  6.6*  --  6.2*         Lab 03/18/23  0516 03/17/23  0729 03/15/23  0525 03/14/23  0454 03/13/23  0356   TOTAL PROTEIN  --  5.0* 5.0* 5.3* 4.3*   ALBUMIN 2.6* 2.7* 2.9* 2.8* 2.5*   GLOBULIN  --  2.3 2.1 2.5 1.8   ALT (SGPT)  --  29 33 33 29   AST (SGOT)  --  30 37 42* 39   BILIRUBIN  --  0.2 0.3 0.4 0.3   ALK PHOS  --  128* 158* 161* 134*         Lab 03/16/23  0801 03/16/23  0459   PROBNP  --  27,176.0*   HSTROP T 701* 690*             Lab 03/13/23  0630   ABO TYPING O   RH TYPING Positive   ANTIBODY SCREEN Negative         Brief Urine Lab Results  (Last result in the past 365 days)      Color   Clarity   Blood   Leuk Est   Nitrite   Protein   CREAT   Urine HCG        03/02/23 1502             58.8               Microbiology Results Abnormal     Procedure Component Value - Date/Time    Blood Culture - Blood, Arm, Right [[id number]]  (Normal) Collected: 03/08/23 1403    Lab Status: Final result Specimen: Blood from Arm, Right Updated: 03/13/23 1616     Blood Culture No growth at 5 days    Blood Culture - Blood, Hand, Right [[id number]]  (Normal) Collected: 03/08/23 1403    Lab Status: Final result Specimen: Blood from Hand, Right Updated: 03/13/23 1616     Blood Culture No growth at 5 days    MRSA Screen, PCR (Inpatient) - Swab, Nares [[id number]]  (Normal) Collected: 03/08/23 1115    Lab Status: Final result Specimen: Swab from Nares Updated: 03/08/23 1437     MRSA PCR Negative    Narrative:      The negative predictive value of this diagnostic test is high and should only be used to consider de-escalating anti-MRSA therapy. A positive  result may indicate colonization with MRSA and must be correlated clinically.  MRSA Negative    Eosinophil Smear - Urine, Indwelling Urethral Catheter [[id number]]  (Normal) Collected: 03/02/23 1502    Lab Status: Final result Specimen: Urine from Indwelling Urethral Catheter Updated: 03/02/23 1647     Eosinophil Smear 0 % EOS/100 Cells     Narrative:      No eosinophil seen          No radiology results from the last 24 hrs    Results for orders placed during the hospital encounter of 03/01/23    Adult Transthoracic Echo Complete W/ Cont if Necessary Per Protocol    Interpretation Summary  •  Left ventricular systolic function is mildly decreased. Calculated left ventricular EF = 40.4% Left ventricular ejection fraction appears to be 41 - 45%.  •  The right ventricular cavity is dilated.  •  Left atrial volume is mildly increased.  •  There is calcification of the aortic valve.  •  Estimated right ventricular systolic pressure from tricuspid regurgitation is normal (<35 mmHg).  •  There is a left pleural effusion.      Current medications:  Scheduled Meds:amiodarone, 200 mg, Oral, Q24H  aspirin, 325 mg, Oral, Daily  epoetin andrew/andrew-epbx, 10,000 Units, Subcutaneous, Once per day on Mon Wed Fri  fluconazole, 100 mg, Oral, Once per day on Mon Wed Fri  fluconazole, 50 mg, Oral, Once per day on Sun Tue Thu Sat  insulin detemir, 10 Units, Subcutaneous, Nightly  insulin lispro, 0-9 Units, Subcutaneous, 4x Daily With Meals & Nightly  Insulin Lispro, 2 Units, Subcutaneous, TID With Meals  metoprolol succinate XL, 25 mg, Oral, Q24H  midodrine, 10 mg, Oral, Q8H  pantoprazole, 40 mg, Oral, Q AM  pharmacy consult - [place], , Does not apply, BID  senna-docusate sodium, 2 tablet, Oral, BID   And  polyethylene glycol, 17 g, Oral, Daily  rosuvastatin, 10 mg, Oral, Nightly  sodium chloride, 10 mL, Intravenous, Q12H      Continuous Infusions:   PRN Meds:.•  acetaminophen  •  Albuterol Sulfate NEB Orderable  •  arformoterol  •  calcium  carbonate  •  cyclobenzaprine  •  dextrose  •  heparin (porcine)  •  ipratropium-albuterol  •  melatonin  •  ondansetron    Assessment & Plan   Assessment & Plan     Active Hospital Problems    Diagnosis  POA   • **I25.10, CABG 03/01/23 [I25.10]  Yes   • Acute respiratory failure with hypoxia (HCC) [J96.01]  Yes   • Acute renal failure (ARF) (HCC) [N17.9]  Yes   • Shock (HCC) [R57.9]  Yes   • Mucopurulent chronic bronchitis (HCC) [J41.1]  Yes   • Prolonged Q-T interval on ECG [R94.31]  Yes   • Ischemic cardiomyopathy [I25.5]  Yes   • Type 2 diabetes mellitus with hypoglycemia, with long-term current use of insulin (HCC) [E11.649, Z79.4]  Not Applicable   • Hyperlipidemia LDL goal <70 [E78.5]  Yes   • Hypertension [I10]  Yes      Resolved Hospital Problems    Diagnosis Date Resolved POA   • Tobacco use [Z72.0] 03/01/2023 Yes      Brief Hospital Course to date:  Herbert Harley is a 67 y.o. male with history of type 2 diabetes, hypertension, hyperlipidemia, CAD status post stents, found to have severe triple-vessel CAD, s/p CABG 3/1/2023, has required ICU care, with stay has been complicated with acute on chronic renal failure, requiring initiation of HD.  Patient also had odynophagia, started on Diflucan, he has been anemic and has required 1 unit PRBC.  He otherwise progressed well and was deemed stable for transfer to telemetry, hospital medicine was asked to follow and assist with medical management, we assumed this on 3/16/2023     CAD multivessel CAD s/p CABG x3  Hyperlipidemia  -Continue postop care per CTS  -Continue aspirin, statin, beta-blocker     Postoperative fibrillation  Prolonged QTc, improving  Hypotension  -Currently on NSR  -Cardiology following, currently on amiodarone  -Closely monitor QTc as patient is now on Diflucan  -Continue midodrine     Acute on chronic renal failure  -Renal function remains worse, peaked to 5.82  -nephrology following, s/p tunneled dialysis access placement  -Continue  intermittent HD, patient will need outpatient dialysis, CM to arrange for chair     Anemia  -Suspect postop blood loss  -S/p unit PRBC 3/13  -H&H remains low but stable, continue to monitor     Odynophagia, resolving  -GI followed, recommend continue 14-day course of Diflucan  -Defer endoscopy at this time  -He is now tolerating his diet well     Well-controlled type 2 diabetes with A1c 7.4%  -FSBG's reviewed and currently appropriate,  -Continue basal, prandial and SSI, adjust as warranted     Expected Discharge Location and Transportation: Rehab  Expected Discharge  Per primary team      DVT prophylaxis:  Medical and mechanical DVT prophylaxis orders are present.     AM-PAC 6 Clicks Score (PT): 18 (03/18/23 2046)    CODE STATUS:   Code Status and Medical Interventions:   Ordered at: 03/01/23 1117     Code Status (Patient has no pulse and is not breathing):    CPR (Attempt to Resuscitate)     Medical Interventions (Patient has pulse or is breathing):    Full Support     Release to patient:    Routine Release       Nelson Bose MD  03/19/23

## 2023-03-19 NOTE — ANESTHESIA PREPROCEDURE EVALUATION
Anesthesia Evaluation     Patient summary reviewed and Nursing notes reviewed                Airway   Mallampati: II  TM distance: >3 FB  Neck ROM: full  Possible difficult intubation  Dental - normal exam     Pulmonary - normal exam   (+) a smoker (12/22) Former, asthma,  Cardiovascular - normal exam    (+) hypertension, CAD, CABG (3/1/23), cardiac stents dysrhythmias (post op CABG) Paroxysmal Atrial Fib, CHF , hyperlipidemia,     ROS comment:   Echo 3/23: ef 40-45%, no significant valve disease    Neuro/Psych- negative ROS  GI/Hepatic/Renal/Endo    (+)  GI bleeding (variceal bleed; HCT 27) , renal disease (HD started 3/11/23) CRI and ARF, diabetes mellitus,     Musculoskeletal (-) negative ROS    Abdominal  - normal exam    Bowel sounds: normal.   Substance History - negative use     OB/GYN negative ob/gyn ROS         Other                        Anesthesia Plan    ASA 5 - emergent     general   Rapid sequence  (+/- a line)  intravenous induction     Anesthetic plan, risks, benefits, and alternatives have been provided, discussed and informed consent has been obtained with: patient.    Plan discussed with CRNA.        CODE STATUS:    Code Status (Patient has no pulse and is not breathing): CPR (Attempt to Resuscitate)  Medical Interventions (Patient has pulse or is breathing): Full Support  Release to patient: Routine Release

## 2023-03-19 NOTE — PROGRESS NOTES
"   LOS: 18 days    Patient Care Team:  Yessica Ricci DO as PCP - General (Family Medicine)  Nima Peter MD as Consulting Physician (Endocrinology)  Nadya Ramirez MD as Cardiologist (Cardiology)  Yessica Ricci DO as Consulting Physician (Family Medicine)    Chief Complaint: Acute kidney injury s/p CABG  67-year-old with history of hypertension, diabetes, former smoker 1-1/2 pack/day for quite some time, CAD s/p PCI in 2014, underwent CABG x3, post CABG remain hypotensive blood pressure in the 40s systolic in 60s and 70s 80s range requiring IV pressors.  Developed [place] oliguric, now an uric  Subjective . F/U [place].    Interval History:       Review of Systems:   No new complaints. NO CP/SOB/N/V/D/ITCHING.    Objective     Vital Sign Min/Max for last 24 hours  Temp  Min: 97.8 °F (36.6 °C)  Max: 98.3 °F (36.8 °C)   BP  Min: 111/52  Max: 135/61   Pulse  Min: 60  Max: 75   Resp  Min: 16  Max: 18   SpO2  Min: 92 %  Max: 99 %   No data recorded   Weight  Min: 78.8 kg (173 lb 12.8 oz)  Max: 78.8 kg (173 lb 12.8 oz)     Flowsheet Rows    Flowsheet Row First Filed Value   Admission Height 162.6 cm (64\") Documented at 03/01/2023 0611   Admission Weight 75.3 kg (166 lb) Documented at 03/01/2023 0611          I/O this shift:  In: 120 [P.O.:120]  Out: -   I/O last 3 completed shifts:  In: 1200 [P.O.:1200]  Out: 475 [Urine:475]    Physical Exam:  General Appearance:  male awake alert oriented no obvious distress, sitting and eating at this time.  Eyes: PER, EOMI.  Neck: Supple no JVD.  Right-sided nontunneled catheter.  tunneled catheter in place  Chest: Chest tube in place.  Lungs: Clear auscultation, no rales rhonchi's, equal chest movement, nonlabored.  Heart: No gallop, murmur, rub, RRR.  Abdomen: Soft, nontender, positive bowel sounds, no organomegaly.  Extremities: TR bilateral lower extremity dependent edema, no cyanosis.  Neuro: No focal deficit, moving all extremities, alert oriented X 3  : Solomon " catheter in place.  Minimal urine output  Skin warm and dry.  Psych mood and affect appropriate  THD CATH  WBC WBC   Date Value Ref Range Status   03/17/2023 13.81 (H) 3.40 - 10.80 10*3/mm3 Final      HGB Hemoglobin   Date Value Ref Range Status   03/17/2023 8.4 (L) 13.0 - 17.7 g/dL Final      HCT Hematocrit   Date Value Ref Range Status   03/17/2023 26.8 (L) 37.5 - 51.0 % Final      Platlets No results found for: LABPLAT   MCV MCV   Date Value Ref Range Status   03/17/2023 93.4 79.0 - 97.0 fL Final          Sodium Sodium   Date Value Ref Range Status   03/19/2023 134 (L) 136 - 145 mmol/L Final   03/18/2023 134 (L) 136 - 145 mmol/L Final   03/17/2023 133 (L) 136 - 145 mmol/L Final      Potassium Potassium   Date Value Ref Range Status   03/19/2023 4.1 3.5 - 5.2 mmol/L Final   03/18/2023 4.0 3.5 - 5.2 mmol/L Final   03/17/2023 4.6 3.5 - 5.2 mmol/L Final      Chloride Chloride   Date Value Ref Range Status   03/19/2023 98 98 - 107 mmol/L Final   03/18/2023 98 98 - 107 mmol/L Final   03/17/2023 96 (L) 98 - 107 mmol/L Final      CO2 CO2   Date Value Ref Range Status   03/19/2023 26.0 22.0 - 29.0 mmol/L Final   03/18/2023 27.0 22.0 - 29.0 mmol/L Final   03/17/2023 24.0 22.0 - 29.0 mmol/L Final      BUN BUN   Date Value Ref Range Status   03/19/2023 43 (H) 8 - 23 mg/dL Final   03/18/2023 33 (H) 8 - 23 mg/dL Final   03/17/2023 50 (H) 8 - 23 mg/dL Final      Creatinine Creatinine   Date Value Ref Range Status   03/19/2023 5.03 (H) 0.76 - 1.27 mg/dL Final   03/18/2023 3.84 (H) 0.76 - 1.27 mg/dL Final   03/17/2023 6.58 (H) 0.76 - 1.27 mg/dL Final      Calcium Calcium   Date Value Ref Range Status   03/19/2023 8.1 (L) 8.6 - 10.5 mg/dL Final   03/18/2023 7.9 (L) 8.6 - 10.5 mg/dL Final   03/17/2023 8.2 (L) 8.6 - 10.5 mg/dL Final      PO4 No results found for: CAPO4   Albumin Albumin   Date Value Ref Range Status   03/19/2023 2.6 (L) 3.5 - 5.2 g/dL Final   03/18/2023 2.6 (L) 3.5 - 5.2 g/dL Final   03/17/2023 2.7 (L) 3.5 - 5.2  g/dL Final      Magnesium No results found for: MG   Uric Acid No results found for: URICACID        Results Review:     I reviewed the patient's new clinical results.    amiodarone, 200 mg, Oral, Q24H  aspirin, 325 mg, Oral, Daily  epoetin andrew/andrew-epbx, 10,000 Units, Subcutaneous, Once per day on Mon Wed Fri  fluconazole, 100 mg, Oral, Once per day on Mon Wed Fri  fluconazole, 50 mg, Oral, Once per day on Sun Tue Thu Sat  insulin detemir, 10 Units, Subcutaneous, Nightly  insulin lispro, 0-9 Units, Subcutaneous, 4x Daily With Meals & Nightly  Insulin Lispro, 2 Units, Subcutaneous, TID With Meals  metoprolol succinate XL, 25 mg, Oral, Q24H  midodrine, 10 mg, Oral, Q8H  pantoprazole, 40 mg, Oral, Q AM  pharmacy consult - MTM, , Does not apply, BID  senna-docusate sodium, 2 tablet, Oral, BID   And  polyethylene glycol, 17 g, Oral, Daily  rosuvastatin, 10 mg, Oral, Nightly  sodium chloride, 10 mL, Intravenous, Q12H           Medication Review: Reviewed    Assessment & Plan       I25.10, CABG 03/01/23    Hypertension    Hyperlipidemia LDL goal <70    Type 2 diabetes mellitus with hypoglycemia, with long-term current use of insulin (HCC)    Ischemic cardiomyopathy    Mucopurulent chronic bronchitis (HCC)    Prolonged Q-T interval on ECG    Acute renal failure (ARF) (HCC)    Shock (HCC)    Acute respiratory failure with hypoxia (HCC)     1.  [place]: ATN likely secondary to shock with systolic blood pressure postsurgery in the range of 40s to 80s, now on multiple vasopressors.  Admit creatinine 1.08, creatinine progressively getting worse.  Patient is oliguric.  C3 complement 18, C4 complement 83 within normal range.  Ultrasound of the kidney shows right kidney 11 cm left kidney 10.6 cm normal ultrasound, bladder ultrasound normal. NONOLIGURIC BUT  NO RECOVERY GFR YET.   2.  S/p CABG x3  3.  Ischemic cardiomyopathy with ejection fraction 41-45%.  4.  Diabetes type 2.  5.  Cardiogenic shock. Resolved.  6.  HYPONATREMIA   Recommendations:  . MONITOR OVER THE WEEKEND FOR POSSIBLE RENAL RECOVERY. IF NOT THEN NEXT HD LIKELY 3/20/23 ( WITH PARAMETERS ).   START TO MAKE ARRANGEMENTS FOR OUTPATIENT HD HERE IN [place]. D/W PATIENT.. .   Nicolas Pritchett MD  03/19/23  10:57 EDT

## 2023-03-19 NOTE — ANESTHESIA PROCEDURE NOTES
Airway  Urgency: elective    Date/Time: 3/19/2023 12:39 PM  Airway not difficult    General Information and Staff    Patient location during procedure: OR  Anesthesiologist: Rey Elizondo MD    Indications and Patient Condition  Indications for airway management: airway protection    Preoxygenated: yes  MILS not maintained throughout  Mask difficulty assessment: 0 - not attempted    Final Airway Details  Final airway type: endotracheal airway      Successful airway: ETT  Cuffed: yes   Successful intubation technique: RSI and video laryngoscopy  Facilitating devices/methods: intubating stylet  Endotracheal tube insertion site: oral  ETT size (mm): 7.5  Cormack-Lehane Classification: grade I - full view of glottis  Placement verified by: chest auscultation and capnometry   Measured from: lips  ETT/EBT  to lips (cm): 20  Number of attempts at approach: 1  Assessment: lips, teeth, and gum same as pre-op and atraumatic intubation    Additional Comments  Negative epigastric sounds, Breath sound equal bilaterally with symmetric chest rise and fall

## 2023-03-20 ENCOUNTER — APPOINTMENT (OUTPATIENT)
Dept: NEPHROLOGY | Facility: HOSPITAL | Age: 68
DRG: 235 | End: 2023-03-20
Payer: MEDICARE

## 2023-03-20 ENCOUNTER — HOME HEALTH ADMISSION (OUTPATIENT)
Dept: HOME HEALTH SERVICES | Facility: HOME HEALTHCARE | Age: 68
End: 2023-03-20
Payer: COMMERCIAL

## 2023-03-20 ENCOUNTER — READMISSION MANAGEMENT (OUTPATIENT)
Dept: CALL CENTER | Facility: HOSPITAL | Age: 68
End: 2023-03-20
Payer: MEDICARE

## 2023-03-20 VITALS
RESPIRATION RATE: 16 BRPM | TEMPERATURE: 97.8 F | BODY MASS INDEX: 27.62 KG/M2 | DIASTOLIC BLOOD PRESSURE: 58 MMHG | OXYGEN SATURATION: 96 % | SYSTOLIC BLOOD PRESSURE: 141 MMHG | HEART RATE: 83 BPM | HEIGHT: 64 IN | WEIGHT: 161.8 LBS

## 2023-03-20 PROBLEM — I50.21 ACUTE SYSTOLIC HEART FAILURE: Status: ACTIVE | Noted: 2023-03-20

## 2023-03-20 LAB
ANION GAP SERPL CALCULATED.3IONS-SCNC: 10 MMOL/L (ref 5–15)
BUN SERPL-MCNC: 51 MG/DL (ref 8–23)
BUN/CREAT SERPL: 8.7 (ref 7–25)
CALCIUM SPEC-SCNC: 8.3 MG/DL (ref 8.6–10.5)
CHLORIDE SERPL-SCNC: 97 MMOL/L (ref 98–107)
CO2 SERPL-SCNC: 26 MMOL/L (ref 22–29)
CREAT SERPL-MCNC: 5.86 MG/DL (ref 0.76–1.27)
DEPRECATED RDW RBC AUTO: 58.4 FL (ref 37–54)
EGFRCR SERPLBLD CKD-EPI 2021: 9.9 ML/MIN/1.73
ERYTHROCYTE [DISTWIDTH] IN BLOOD BY AUTOMATED COUNT: 17.2 % (ref 12.3–15.4)
GLUCOSE BLDC GLUCOMTR-MCNC: 138 MG/DL (ref 70–130)
GLUCOSE BLDC GLUCOMTR-MCNC: 143 MG/DL (ref 70–130)
GLUCOSE SERPL-MCNC: 153 MG/DL (ref 65–99)
HCT VFR BLD AUTO: 27.3 % (ref 37.5–51)
HGB BLD-MCNC: 8.6 G/DL (ref 13–17.7)
MCH RBC QN AUTO: 29.8 PG (ref 26.6–33)
MCHC RBC AUTO-ENTMCNC: 31.5 G/DL (ref 31.5–35.7)
MCV RBC AUTO: 94.5 FL (ref 79–97)
PLATELET # BLD AUTO: 384 10*3/MM3 (ref 140–450)
PMV BLD AUTO: 9.3 FL (ref 6–12)
POTASSIUM SERPL-SCNC: 4.4 MMOL/L (ref 3.5–5.2)
RBC # BLD AUTO: 2.89 10*6/MM3 (ref 4.14–5.8)
SODIUM SERPL-SCNC: 133 MMOL/L (ref 136–145)
WBC NRBC COR # BLD: 11.02 10*3/MM3 (ref 3.4–10.8)

## 2023-03-20 PROCEDURE — 99239 HOSP IP/OBS DSCHRG MGMT >30: CPT | Performed by: INTERNAL MEDICINE

## 2023-03-20 PROCEDURE — 85027 COMPLETE CBC AUTOMATED: CPT | Performed by: PHYSICIAN ASSISTANT

## 2023-03-20 PROCEDURE — 82962 GLUCOSE BLOOD TEST: CPT

## 2023-03-20 PROCEDURE — 63710000001 INSULIN LISPRO (HUMAN) PER 5 UNITS: Performed by: STUDENT IN AN ORGANIZED HEALTH CARE EDUCATION/TRAINING PROGRAM

## 2023-03-20 PROCEDURE — 99232 SBSQ HOSP IP/OBS MODERATE 35: CPT | Performed by: PHYSICIAN ASSISTANT

## 2023-03-20 PROCEDURE — 99024 POSTOP FOLLOW-UP VISIT: CPT | Performed by: PHYSICIAN ASSISTANT

## 2023-03-20 PROCEDURE — 25010000002 EPOETIN ALFA-EPBX 10000 UNIT/ML SOLUTION: Performed by: INTERNAL MEDICINE

## 2023-03-20 PROCEDURE — 80048 BASIC METABOLIC PNL TOTAL CA: CPT | Performed by: INTERNAL MEDICINE

## 2023-03-20 PROCEDURE — 92526 ORAL FUNCTION THERAPY: CPT

## 2023-03-20 PROCEDURE — 25010000002 HEPARIN (PORCINE) PER 1000 UNITS: Performed by: STUDENT IN AN ORGANIZED HEALTH CARE EDUCATION/TRAINING PROGRAM

## 2023-03-20 RX ORDER — AMIODARONE HYDROCHLORIDE 200 MG/1
200 TABLET ORAL
Qty: 30 TABLET | Refills: 1 | Status: SHIPPED | OUTPATIENT
Start: 2023-03-20 | End: 2023-04-19

## 2023-03-20 RX ORDER — METOPROLOL SUCCINATE 25 MG/1
25 TABLET, EXTENDED RELEASE ORAL
Qty: 30 TABLET | Refills: 1 | Status: SHIPPED | OUTPATIENT
Start: 2023-03-21 | End: 2023-04-20

## 2023-03-20 RX ORDER — FLUCONAZOLE 100 MG/1
100 TABLET ORAL 3 TIMES WEEKLY
Qty: 4 TABLET | Refills: 0 | Status: SHIPPED | OUTPATIENT
Start: 2023-03-20 | End: 2023-03-27 | Stop reason: SDUPTHER

## 2023-03-20 RX ORDER — FLUCONAZOLE 50 MG/1
TABLET ORAL
Qty: 4 TABLET | Refills: 0 | Status: SHIPPED | OUTPATIENT
Start: 2023-03-21 | End: 2023-03-30

## 2023-03-20 RX ORDER — ASPIRIN 325 MG
325 TABLET, DELAYED RELEASE (ENTERIC COATED) ORAL DAILY
Qty: 30 TABLET | Refills: 1 | Status: SHIPPED | OUTPATIENT
Start: 2023-03-21 | End: 2023-03-30

## 2023-03-20 RX ORDER — MIDODRINE HYDROCHLORIDE 5 MG/1
5 TABLET ORAL EVERY 8 HOURS
Qty: 90 TABLET | Refills: 0 | OUTPATIENT
Start: 2023-03-20 | End: 2023-03-27

## 2023-03-20 RX ADMIN — HEPARIN SODIUM 1300 UNITS: 1000 INJECTION INTRAVENOUS; SUBCUTANEOUS at 11:15

## 2023-03-20 RX ADMIN — Medication 10 ML: at 11:37

## 2023-03-20 RX ADMIN — EPOETIN ALFA-EPBX 10000 UNITS: 10000 INJECTION, SOLUTION INTRAVENOUS; SUBCUTANEOUS at 10:12

## 2023-03-20 RX ADMIN — METOPROLOL SUCCINATE 25 MG: 25 TABLET, EXTENDED RELEASE ORAL at 11:35

## 2023-03-20 RX ADMIN — AMIODARONE HYDROCHLORIDE 200 MG: 200 TABLET ORAL at 11:35

## 2023-03-20 RX ADMIN — SENNOSIDES AND DOCUSATE SODIUM 2 TABLET: 8.6; 5 TABLET ORAL at 11:35

## 2023-03-20 RX ADMIN — MIDODRINE HYDROCHLORIDE 10 MG: 10 TABLET ORAL at 05:29

## 2023-03-20 RX ADMIN — POLYETHYLENE GLYCOL 3350 17 G: 17 POWDER, FOR SOLUTION ORAL at 11:36

## 2023-03-20 RX ADMIN — MIDODRINE HYDROCHLORIDE 10 MG: 10 TABLET ORAL at 11:36

## 2023-03-20 RX ADMIN — INSULIN LISPRO 2 UNITS: 100 INJECTION, SOLUTION INTRAVENOUS; SUBCUTANEOUS at 11:38

## 2023-03-20 RX ADMIN — FLUCONAZOLE 100 MG: 100 TABLET ORAL at 14:59

## 2023-03-20 RX ADMIN — ASPIRIN 325 MG: 325 TABLET, COATED ORAL at 11:35

## 2023-03-20 RX ADMIN — PANTOPRAZOLE SODIUM 40 MG: 40 TABLET, DELAYED RELEASE ORAL at 05:29

## 2023-03-20 NOTE — DISCHARGE PLACEMENT REQUEST
"Cecy Acosta \"Herb\" (67 y.o. Male)     Date of Birth   1955    Social Security Number       Address   [address] [place] [address]    Home Phone   [phone]    MRN   [id number]       [facility]    Marital Status                               Admission Date   3/1/23    Admission Type   Elective    Admitting Provider   Paresh Saavedra MD    Attending Provider   Paresh Saavedra MD    Department, Room/Bed   [address], S467/1       Discharge Date       Discharge Disposition   Home or Self Care    Discharge Destination                               Attending Provider: Paresh Saavedra MD    Allergies: Wellbutrin [Bupropion]    Isolation: None   Infection: None   Code Status: CPR    Ht: 162.6 cm (64\")   Wt: 73.4 kg (161 lb 12.8 oz)    Admission Cmt: None   Principal Problem: I25.10, CABG 23 [I25.10]                 Active Insurance as of 3/1/2023     Primary Coverage     Payor Plan Insurance Group Employer/Plan Group    ANTH MEDICARE REPLACEMENT ANTH MEDICARE ADVANTAGE [id number]     Payor Plan Address Payor Plan Phone Number Payor Plan Fax Number Effective Dates    [address] [phone]  2021 - None Entered    [facility] [id number]       Subscriber Name Subscriber Birth Date Member ID       CECY ACOSTA 1955 [id number]                 Emergency Contacts      (Rel.) Home Phone Work Phone Mobile Phone    Vivian Acosta (Spouse) [phone] -- [phone]               Discharge Summary      Nelson Bose MD at 23 [facility]              [facility] Medicine Services  DISCHARGE SUMMARY    Patient Name: Cecy Acosta  : 1955  MRN: [id number]    Date of Admission: 3/1/2023  5:36 AM  Date of Discharge: 3/20/2023  Primary Care Physician: Yessica Ricci DO    Consults     Date and Time Order Name Status Description    3/15/2023  4:25 PM Inpatient Consult to " Hospitalist TALON for Medical Management      3/14/2023 12:43 PM Inpatient Gastroenterology Consult Completed     3/2/2023  9:09 AM Inpatient Nephrology Consult Completed     3/1/2023 11:17 AM Inpatient Consult to Cardiology Completed     2/17/2023 11:30 AM Inpatient Cardiothoracic Surgery Consult Completed           Hospital Course     Presenting Problem:   Coronary artery disease involving native coronary artery of native heart, unspecified whether angina present [I25.10]    Active Hospital Problems    Diagnosis  POA   • **I25.10, CABG 03/01/23 [I25.10]  Yes   • Acute systolic heart failure (CMS/HCC) [I50.21]  Unknown   • Acute respiratory failure with hypoxia (HCC) [J96.01]  Yes   • Acute renal failure (ARF) (HCC) [N17.9]  Yes   • Shock (HCC) [R57.9]  Yes   • Mucopurulent chronic bronchitis (HCC) [J41.1]  Yes   • Prolonged Q-T interval on ECG [R94.31]  Yes   • Ischemic cardiomyopathy [I25.5]  Yes   • Type 2 diabetes mellitus with hypoglycemia, with long-term current use of insulin ([facility]) [E11.649, Z79.4]  Not Applicable   • Hyperlipidemia LDL goal <70 [E78.5]  Yes   • Hypertension [I10]  Yes      Resolved Hospital Problems    Diagnosis Date Resolved POA   • Tobacco use [Z72.0] 03/01/2023 Yes      Hospital Course:  Herbert Harley is a 67 y.o. male with history of type 2 diabetes, hypertension, hyperlipidemia, CAD status post stents, found to have severe triple-vessel CAD, s/p CABG 3/1/2023, has required ICU care, with stay has been complicated with acute on chronic renal failure, requiring initiation of HD.  Patient also had odynophagia, started on Diflucan, he has been anemic and has required 1 unit PRBC.  He otherwise progressed well and was deemed stable for transfer to [facility], hospitals medicine was asked to follow and assist with medical management, we assumed this on 3/16/2023     CAD multivessel CAD s/p CABG x3  Hyperlipidemia  -Continue aspirin, statin, beta-blocker  -Follow-up with CTS as previously  scheduled     Postoperative fibrillation  Prolonged QTc, improving  Hypotension  -Currently on NSR  -Cardiology followed, recommend to continue amiodarone, will decrease midodrine to 5 mg 3 times daily   --Closely monitor QTc as patient is now on Diflucan     Acute on chronic renal failure  -Cr peaked to 5.82  -nephrology followed, s/p tunneled dialysis access placement  -Continue intermittent HD, patient has outpatient dialysis set up for MWF.     Anemia  -Suspect this was postop blood loss  -He is s/p unit PRBC 3/13  -H&H remains low but stable.     Odynophagia, resolving  -GI followed, recommend continue 14-day course of Diflucan  -Defer endoscopy at this time  -He is currently tolerating his diet well     Well-controlled type 2 diabetes with A1c 7.4%  -FSBG's reviewed and currently appropriate,  -Continue home regimen at discharge    Discharge Follow Up Recommendations for outpatient labs/diagnostics:  Follow-up with PCP in 1 week    Day of Discharge     HPI: No acute events overnight, patient rested well did not have dialysis today feels much better and wants to go home.      Review of Systems  Gen- No fevers, chills  CV- No chest pain, palpitations  Resp- No cough, dyspnea  GI- No N/V/D, abd pain      Vital Signs:   Temp:  [96.8 °F (36 °C)-98.3 °F (36.8 °C)] 96.8 °F (36 °C)  Heart Rate:  [60-96] 85  Resp:  [16-18] 16  BP: (123-165)/(55-99) 141/58      Physical Exam:  Constitutional: No acute distress, awake, alert  HENT: NCAT, mucous membranes moist  Respiratory: Clear to auscultation bilaterally, respiratory effort normal   Cardiovascular: RRR, no murmurs, rubs, or gallops  Gastrointestinal: Positive bowel sounds, soft, nontender, nondistended  Musculoskeletal: No bilateral ankle edema  Psychiatric: Appropriate affect, cooperative  Neurologic: Oriented x 3, nonfocal  Skin: No rashes, sternal incision CDI      Pertinent  and/or Most Recent Results     LAB RESULTS:      Lab 03/17/23  0729 03/16/23  0459  03/15/23  0525 03/14/23  0454   WBC 13.81*  --  12.90* 13.77*   HEMOGLOBIN 8.4* 7.8* 8.6* 9.2*   HEMATOCRIT 26.8* 24.5* 27.5* 29.5*   PLATELETS 406  --  244 187   NEUTROS ABS  --   --  9.19* 9.98*   IMMATURE GRANS (ABS)  --   --  0.10* 0.11*   LYMPHS ABS  --   --  1.19 1.34   MONOS ABS  --   --  2.08* 2.01*   EOS ABS  --   --  0.28 0.28   MCV 93.4  --  94.2 93.4   PROCALCITONIN  --   --   --  0.61*         Lab 03/20/23  0504 03/19/23  0712 03/18/23  0516 03/17/23  0729 03/16/23  0459 03/15/23  0525   SODIUM 133* 134* 134* 133* 133* 133*   POTASSIUM 4.4 4.1 4.0 4.6 4.3 5.1   CHLORIDE 97* 98 98 96* 98 97*   CO2 26.0 26.0 27.0 24.0 24.0 23.0   ANION GAP 10.0 10.0 9.0 13.0 11.0 13.0   BUN 51* 43* 33* 50* 34* 42*   CREATININE 5.86* 5.03* 3.84* 6.58* 4.75* 5.69*   EGFR 9.9* 11.9* 16.4* 8.6* 12.7* 10.2*   GLUCOSE 153* 134* 111* 140* 102* 158*   CALCIUM 8.3* 8.1* 7.9* 8.2* 7.9* 7.7*   PHOSPHORUS  --  4.9* 4.8*  --   --  6.6*         Lab 03/19/23  0712 03/18/23  0516 03/17/23  0729 03/15/23  0525 03/14/23  0454   TOTAL PROTEIN  --   --  5.0* 5.0* 5.3*   ALBUMIN 2.6* 2.6* 2.7* 2.9* 2.8*   GLOBULIN  --   --  2.3 2.1 2.5   ALT (SGPT)  --   --  29 33 33   AST (SGOT)  --   --  30 37 42*   BILIRUBIN  --   --  0.2 0.3 0.4   ALK PHOS  --   --  128* 158* 161*         Lab 03/16/23  0801 03/16/23  0459   PROBNP  --  27,176.0*   HSTROP T 701* 690*                 Brief Urine Lab Results  (Last result in the past 365 days)      Color   Clarity   Blood   Leuk Est   Nitrite   Protein   CREAT   Urine HCG        03/02/23 1502             58.8             Microbiology Results (last 10 days)     ** No results found for the last 240 hours. **          IR Tunneled Catheter    Result Date: 3/14/2023  IR TUNNELED CATHETER Date of Exam: 3/14/2023 1:16 PM EDT Indication: HD depedent [place]. Comparison: None available. Technique: The procedure, risks options were discussed with the patient and informed written consent was obtained. The patient's  indwelling temporary dialysis catheter was removed and hemostasis achieved. The right neck around anterior chest wall were prepped and draped. IV conscious sedation was a  consisting of Versed and fentanyl. The patient was monitored by the IR nurse during the entire procedure. Using maximal sterile barrier technique and under local anesthesia and ultrasound guidance,  a micropuncture was performed of the right internal jugular vein. Following placement of an 035 wire, serial dilatation was performed and a 16 Uzbek introducer sheath placed. A site was chosen over the right anterior chest wall and the skin and tract was anesthetized with 1% Xylocaine with epinephrine. The tunnel dialysis catheter was then tunneled to the right IJ site, and deployed with the tip just below the cavoatrial junction. The right IJ site was closed with a single 3-0 Vicryl suture and the deep fascia. The catheter was affixed to the skin utilizing a nylon suture. The catheter was flushed with heparin solution. A single spot radiograph was obtained documenting placement. Fluoroscopy time: 24 seconds Position monitored conscious sedation time: 16 minutes     Impression: Technically successful placement of a tunneled dialysis catheter with the tip at just below the cavoatrial junction under ultrasound and fluoroscopy. Electronically Signed: Juanjo Valenzuela  3/14/2023 2:19 PM EDT  Workstation ID: [id number]    FL Video Swallow With Speech Single Contrast    Result Date: 3/14/2023  FL VIDEO SWALLOW W SPEECH SINGLE-CONTRAST, FL LIMITED UGI FOR MBS REFLUX SINGLE-CONTRAST Date of Exam: 3/13/2023 3:34 PM EDT Indication: dysphagia. Comparison: None available. Technique:  This examination was performed in conjunction with speech pathology. Lateral video fluoroscopic evaluation of the swallowing mechanism was performed while correlate administering to the patient various consistency food items mixed with barium. Fluoroscopic Time:  Min and 12 seconds  Number of Images: 10 associated fluoroscopic loops were saved. Findings: Please see speech therapy report for full details and recommendations. A limited view of the esophagus with the patient in the seated lateral position demonstrating no signs of a high-grade focal esophageal stricture, or significant gastroesophageal reflux.     Impression: Fluoroscopy provided for a modified barium swallow, and limited upper GI series. Please see speech therapy report for full details and recommendations. Limited upper GI series appeared within normal limits. Electronically Signed: Buddy Chandler  3/14/2023 7:52 AM EDT  Workstation ID: [id number]    XR Chest 1 View    Result Date: 3/16/2023  XR CHEST 1 VW Date of Exam: 3/16/2023 4:00 AM EDT Indication: CABG. Comparison: 3/10/2023 Findings: Interval removal of left-sided CVC, with a new right-sided catheter terminating in the SVC. There is no pneumothorax. There is a small left-sided pleural effusion and scattered linear atelectasis. Unchanged heart and mediastinal contours.     Impression: Interval removal of left-sided CVC, with a new right-sided catheter terminating in the SVC. There is no pneumothorax. There is a small left-sided pleural effusion and scattered linear atelectasis. Unchanged heart and mediastinal contours. Electronically Signed: Dashawn Dover  3/16/2023 8:34 AM EDT  Workstation ID: [id number]    XR Chest 1 View    Result Date: 3/10/2023  XR CHEST 1 VW Date of Exam: 3/10/2023 4:30 AM EST Indication: CABG. Comparison: March 8, 2023 Findings: Median sternotomy wires appear intact. Mediastinal drains are in place. No pneumothorax is identified. A right internal jugular catheter has its tip at the cavoatrial junction. A left internal jugular catheter has its tip at the upper SVC. A focal opacity in the left mid lung appears unchanged. The heart and mediastinal contours appear stable. There is pulmonary vascular congestion.     Impression: 1.Stable focal opacity  within mid left lung, likely atelectasis. 2.Pulmonary vascular congestion. 3.Mediastinal drains in place. No pneumothorax identified. Electronically Signed: Bala Man  3/10/2023 10:13 AM EST  Workstation ID: [id number]    FL Limited Ugi For Mbs Reflux Single-Contrast    Result Date: 3/14/2023  FL VIDEO SWALLOW W SPEECH SINGLE-CONTRAST, FL LIMITED UGI FOR MBS REFLUX SINGLE-CONTRAST Date of Exam: 3/13/2023 3:34 PM EDT Indication: dysphagia. Comparison: None available. Technique:  This examination was performed in conjunction with speech pathology. Lateral video fluoroscopic evaluation of the swallowing mechanism was performed while correlate administering to the patient various consistency food items mixed with barium. Fluoroscopic Time:  Min and 12 seconds Number of Images: 10 associated fluoroscopic loops were saved. Findings: Please see speech therapy report for full details and recommendations. A limited view of the esophagus with the patient in the seated lateral position demonstrating no signs of a high-grade focal esophageal stricture, or significant gastroesophageal reflux.     Impression: Fluoroscopy provided for a modified barium swallow, and limited upper GI series. Please see speech therapy report for full details and recommendations. Limited upper GI series appeared within normal limits. Electronically Signed: Buddy Chandler  3/14/2023 7:52 AM EDT  Workstation ID: [id number]      Results for orders placed during the hospital encounter of 02/15/23    Duplex Carotid Ultrasound CAR    Interpretation Summary  •  Right internal carotid artery stenosis of 0-49%.  •  Left internal carotid artery stenosis of 50-69%.  •  There is antegrade flow in the vertebral arteries bilaterally.      Results for orders placed during the hospital encounter of 02/15/23    Duplex Carotid Ultrasound CAR    Interpretation Summary  •  Right internal carotid artery stenosis of 0-49%.  •  Left internal carotid artery stenosis of  50-69%.  •  There is antegrade flow in the vertebral arteries bilaterally.      Results for orders placed during the hospital encounter of 03/01/23    Adult Transthoracic Echo Complete W/ Cont if Necessary Per Protocol    Interpretation Summary  •  Left ventricular systolic function is mildly decreased. Calculated left ventricular EF = 40.4% Left ventricular ejection fraction appears to be 41 - 45%.  •  The right ventricular cavity is dilated.  •  Left atrial volume is mildly increased.  •  There is calcification of the aortic valve.  •  Estimated right ventricular systolic pressure from tricuspid regurgitation is normal (<35 mmHg).  •  There is a left pleural effusion.      Plan for Follow-up of Pending Labs/Results:     Discharge Details        Discharge Medications      New Medications      Instructions Start Date   amiodarone 200 MG tablet  Commonly known as: PACERONE   200 mg, Oral, Every 24 Hours Scheduled      aspirin  MG tablet  Replaces: aspirin 81 MG chewable tablet   325 mg, Oral, Daily   Start Date: March 21, 2023     fluconazole 100 MG tablet  Commonly known as: DIFLUCAN   100 mg, Oral, 3 Times Weekly      fluconazole 50 MG tablet  Commonly known as: DIFLUCAN   One tablet once per day Sun Tue Thu Sat   Start Date: March 21, 2023     metoprolol succinate XL 25 MG 24 hr tablet  Commonly known as: TOPROL-XL   25 mg, Oral, Every 24 Hours Scheduled   Start Date: March 21, 2023     midodrine 5 MG tablet  Commonly known as: PROAMATINE   5 mg, Oral, Every 8 Hours         Changes to Medications      Instructions Start Date   Fluticasone-Umeclidin-Vilant 100-62.5-25 MCG/ACT inhaler  Commonly known as: TRELEGY  What changed: additional instructions   1 puff, Inhalation, Daily      Lantus 100 UNIT/ML injection  Generic drug: insulin glargine  What changed:   · how much to take  · how to take this  · when to take this   INJECT 60 UNITS SUBCUTANEOUSLY ONCE DAILY         Continue These Medications       Instructions Start Date   albuterol sulfate  (90 Base) MCG/ACT inhaler  Commonly known as: PROVENTIL HFA;VENTOLIN HFA;PROAIR HFA   2 puffs, Inhalation, Every 4 Hours PRN      COLLAGEN 1500/C PO   1 tablet, Oral, Daily, OTC supplement      ezetimibe 10 MG tablet  Commonly known as: ZETIA   10 mg, Oral, Daily      Farxiga 10 MG tablet  Generic drug: dapagliflozin Propanediol   10 mg, Oral, Daily at 1100      rosuvastatin 10 MG tablet  Commonly known as: CRESTOR   10 mg, Oral, Daily         Stop These Medications    aspirin 81 MG chewable tablet  Replaced by: aspirin  MG tablet     furosemide 20 MG tablet  Commonly known as: LASIX     sacubitril-valsartan 24-26 MG tablet  Commonly known as: ENTRESTO            Allergies   Allergen Reactions   • Wellbutrin [Bupropion] Swelling     Facial swelling, severe       Discharge Disposition:homeHome or Self Care    Diet:  Hospital:  Diet Order   Procedures   • Diet: Cardiac Diets, Regular/House Diet, Diabetic Diets, Renal Diets, Gastrointestinal Diets; Healthy Heart (2-3 Na+); Consistent Carbohydrate; Low Sodium (2-3g), Low Potassium, Low Phosphorus; Fiber-Restricted; Texture: Regular Texture (IDDSI 7);...       Activity:as tolerated      Restrictions or Other Recommendations:  none       CODE STATUS:    Code Status and Medical Interventions:   Ordered at: 03/01/23 1117     Code Status (Patient has no pulse and is not breathing):    CPR (Attempt to Resuscitate)     Medical Interventions (Patient has pulse or is breathing):    Full Support     Release to patient:    Routine Release       Future Appointments   Date Time Provider Department Center   5/2/2023  1:00 PM Yessica Ricci DO [facility]   5/4/2023  1:00 PM OR[facility]ATION [facility]   8/3/2023 10:30 AM Nadya Ramirez MD [facility]   1/30/2024  2:00 PM Yessica Ricci DO [facility]       Nelson Bose MD  03/20/23      Time Spent on Discharge:  I spent  40  minutes on this  discharge activity which included: face-to-face encounter with the patient, reviewing the data in the system, coordination of the care with the nursing staff as well as consultants, documentation, and entering orders.          Electronically signed by Nelson Bose MD at 03/20/23 1897

## 2023-03-20 NOTE — THERAPY TREATMENT NOTE
Acute Care - Speech Language Pathology   Swallow Treatment Note [facility]     Patient Name: Herbert Harley  : 1955  MRN: [id number]  Today's Date: 3/20/2023               Admit Date: 3/1/2023    Visit Dx:     ICD-10-CM ICD-9-CM   1. Coronary artery disease involving native coronary artery of native heart without angina pectoris  I25.10 414.01   2. Coronary artery disease involving native coronary artery of native heart, unspecified whether angina present  I25.10 414.01   3. Acute renal failure, unspecified acute renal failure type (HCC)  N17.9 584.9   4. Acute respiratory failure with hypoxia (HCC)  J96.01 518.81   5. Pharyngeal dysphagia  R13.13 787.23   6. I25.10, CABG 23  I25.10 414.01   7. Positive cardiac stress test  R94.39 794.39   8. Acute systolic heart failure (CMS/HCC)  I50.21 428.21     Patient Active Problem List   Diagnosis   • I25.10, CABG 23   • Hypertension   • Hyperlipidemia LDL goal <70   • Type 2 diabetes mellitus with hypoglycemia, with long-term current use of insulin (HCC)   • Ischemic cardiomyopathy   • Allergic rhinitis   • Positive cardiac stress test   • Mucopurulent chronic bronchitis (HCC)   • Prolonged Q-T interval on ECG   • Acute renal failure (ARF) (HCC)   • Shock (HCC)   • Acute respiratory failure with hypoxia (HCC)   • Acute systolic heart failure (CMS/HCC)     Past Medical History:   Diagnosis Date   • Acute renal failure (ARF) (HCC) 3/2/2023   • Asthma 2022   • Chronic systolic congestive heart failure (HCC) 3/1/2023   • Coronary artery disease 2014   • Elevated cholesterol    • History of tobacco abuse    • HLD (hyperlipidemia) 2023   • Hypertension    • Ischemic cardiomyopathy    • Myocardial infarction (HCC) 2014   • Prolonged Q-T interval on ECG 3/1/2023   • Type 2 diabetes mellitus (HCC)    • Wears glasses      Past Surgical History:   Procedure Laterality Date   • CARDIAC CATHETERIZATION  2014   • CARDIAC  CATHETERIZATION Left 02/17/2023    Procedure: Left Heart Cath;  Surgeon: Nadya Ramirez MD;  Location:  [facility];  Service: Cardiology;  Laterality: Left;   • CORONARY ARTERY BYPASS GRAFT N/A 3/1/2023    Procedure: MEDIAN STERNOTOMY CORONARY ARTERY BYPASS GRAFTING X3  UTILIZING THE LEFT INTERNAL MAMMERY GRAFT, AND EVH OF THE GREATER RIGHT SAPHENOUS VEIN;  Surgeon: Paresh Saavedra MD;  Location:  [facility];  Service: Cardiothoracic;  Laterality: N/A;   • CORONARY STENT PLACEMENT  March 2014   • TONSILLECTOMY         SLP Recommendation and Plan                          Anticipated Discharge Disposition (SLP): anticipate therapy at next level of care, home with home health (03/20/23 1315)           Demonstrates Need for Referral to Another Service: gastroenterology, other (see comments) (pt would likely still benefit from GI consult given cont'd complaints of pain in chest, specifically w/ solid foods) (03/20/23 1315)     Daily Summary of Progress (SLP): progress toward functional goals as expected (03/20/23 1315)               Treatment Assessment (SLP): suspected, continued, pharyngeal dysphagia (03/20/23 1315)     Plan for Continued Treatment (SLP): continue treatment per plan of care (03/20/23 1315)         Plan of Care Reviewed With: patient, spouse  Progress: no change      SWALLOW EVALUATION (last 72 hours)     SLP Adult Swallow Evaluation     Row Name 03/20/23 1315                   Rehab Evaluation    Document Type therapy note (daily note)  -AC        Subjective Information no complaints  -AC        Patient Observations alert;cooperative  -AC        Patient/Family/Caregiver Comments/Observations Spouse present.  -AC        Patient Effort good  -AC           Pain Scale: FACES Pre/Post-Treatment    Pain: FACES Scale, Pretreatment 0-->no hurt  -AC        Posttreatment Pain Rating 0-->no hurt  -AC           SLP Treatment Clinical Impressions    Treatment Assessment (SLP)  suspected;continued;pharyngeal dysphagia  -AC        Daily Summary of Progress (SLP) progress toward functional goals as expected  -AC        Plan for Continued Treatment (SLP) continue treatment per plan of care  -AC        Care Plan Review evaluation/treatment results reviewed;care plan/treatment goals reviewed;risks/benefits reviewed;current/potential barriers reviewed;patient/other agree to care plan  -AC        Care Plan Review, Other Participant(s) spouse  -AC           Recommendations    Anticipated Discharge Disposition (SLP) anticipate therapy at next level of care;home with home health  -AC        Demonstrates Need for Referral to Another Service gastroenterology;other (see comments)  pt would likely still benefit from GI consult given cont'd complaints of pain in chest, specifically w/ solid foods  -AC              User Key  (r) = Recorded By, (t) = Taken By, (c) = Cosigned By    Initials Name Effective Dates    AC Basia Figueroa, MS [facility]-SLP 02/03/23 -                 EDUCATION  The patient has been educated in the following areas:   Home Exercise Program (HEP) Dysphagia (Swallowing Impairment).        SLP GOALS     Row Name 03/20/23 1315             (LTG) Patient will demonstrate functional swallow for    Diet Texture (Demonstrate functional swallow) regular textures  -AC      Liquid viscosity (Demonstrate functional swallow) thin liquids  -AC      [place] (Demonstrate functional swallow) independently (over 90% accuracy)  -AC      Time Frame (Demonstrate functional swallow) by discharge  -AC      Progress/Outcomes (Demonstrate functional swallow) continuing progress toward goal  -AC         (STG) Patient will tolerate trials of    Consistencies Trialed (Tolerate trials) regular textures;thin liquids  -AC      Desired Outcome (Tolerate trials) without signs/symptoms of aspiration  -AC      [place] (Tolerate trials) independently (over 90% accuracy)  -AC      Time Frame (Tolerate trials) 1 week   -AC      Progress/Outcomes (Tolerate trials) goal ongoing  -AC         (STG) Pharyngeal Strengthening Exercise Goal 1 (SLP)    Increase Timing prepping - 3 second prep or suck swallow or 3-step swallow  -AC      Increase Closure at Entrance to Airway/Closure of Airway at Glottis supraglottic swallow;hard effortful swallow  -AC      [place]/Accuracy (Pharyngeal Strengthening Goal 1, SLP) with minimal cues (75-90% accuracy)  -AC      Time Frame (Pharyngeal Strengthening Goal 1, SLP) short term goal (STG)  -AC      Progress/Outcomes (Pharyngeal Strengthening Goal 1, SLP) continuing progress toward goal  -AC      Comment (Pharyngeal Strengthening Goal 1, SLP) Introduced exercises & provided handout/rationale. Pt able to demonstrate each w/ mod cues. Provided education re: HEP.  -AC         (STG) Swallow Compensatory Strategies Goal 1 (SLP)    Activity (Swallow Compensatory Strategies/Techniques Goal 1, SLP) small cup sips;small straw sips;during p.o. trials;during meal intake  -AC      [place]/Accuracy (Swallow Compensatory Strategies/Techniques Goal 1, SLP) with minimal cues (75-90% accuracy)  -AC      Time Frame (Swallow Compensatory Strategies/Techniques Goal 1, SLP) short term goal (STG)  -AC      Progress/Outcomes (Swallow Compensatory Strategies/Techniques Goal 1, SLP) continuing progress toward goal  -AC      Comment (Swallow Compensatory Strategies/Techniques Goal 1, SLP) Pt u/a to recall recommended compensations from last visit. Reviewed them again. Pt stated understanding.  -AC            User Key  (r) = Recorded By, (t) = Taken By, (c) = Cosigned By    Initials Name Provider Type    AC Basia Figueroa MS CCC-SLP Speech and Language Pathologist                   Time Calculation:    Time Calculation- SLP     Row Name 03/20/23 1616             Time Calculation- SLP    SLP Start Time 1315  -AC      SLP Received On 03/20/23  -AC         Untimed Charges    47609-[id number] Treatment Swallow Minutes 39  -AC          Total Minutes    Untimed Charges Total Minutes 39  -AC       Total Minutes 39  -AC            User Key  (r) = Recorded By, (t) = Taken By, (c) = Cosigned By    Initials Name Provider Type    AC Basia Figueroa MS CCC-SLP Speech and Language Pathologist                Therapy Charges for Today     Code Description Service Date Service Provider Modifiers Qty    85441341189 HC ST TREATMENT SWALLOW 3 3/20/2023 Basia Figueroa MS CCC-SLP GN 1               Basia Figueroa MS CCC-ESTRELLITA  3/20/2023

## 2023-03-20 NOTE — PROGRESS NOTES
"   LOS: 19 days    Patient Care Team:  Yessica Ricci DO as PCP - General (Family Medicine)  Nima Peter MD as Consulting Physician (Endocrinology)  Nadya Ramirez MD as Cardiologist (Cardiology)  Yessica Ricci DO as Consulting Physician (Family Medicine)    Chief Complaint: Acute kidney injury s/p CABG  67-year-old with history of hypertension, diabetes, former smoker 1-1/2 pack/day for quite some time, CAD s/p PCI in 2014, underwent CABG x3, post CABG remain hypotensive blood pressure in the 40s systolic in 60s and 70s 80s range requiring IV pressors.  Developed [place] oliguric, now an uric  Subjective . F/U [place]. SEEN ON DIALYSIS    Interval History:       Review of Systems:   No new complaints. NO CP/SOB/N/V/D/ITCHING.    Objective     Vital Sign Min/Max for last 24 hours  Temp  Min: 96.8 °F (36 °C)  Max: 98.3 °F (36.8 °C)   BP  Min: 123/59  Max: 165/65   Pulse  Min: 60  Max: 80   Resp  Min: 16  Max: 18   SpO2  Min: 93 %  Max: 95 %   No data recorded   Weight  Min: 73.4 kg (161 lb 12.8 oz)  Max: 73.4 kg (161 lb 12.8 oz)     Flowsheet Rows    Flowsheet Row First Filed Value   Admission Height 162.6 cm (64\") Documented at 03/01/2023 0611   Admission Weight 75.3 kg (166 lb) Documented at 03/01/2023 0611          No intake/output data recorded.  I/O last 3 completed shifts:  In: 120 [P.O.:120]  Out: 1350 [Urine:1350]    Physical Exam:  General Appearance:  male awake alert oriented no obvious distress, sitting and eating at this time.  Eyes: PER, EOMI.  Neck: Supple no JVD.  Right-sided nontunneled catheter.  tunneled catheter in place  Chest: Chest tube in place.  Lungs: Clear auscultation, no rales rhonchi's, equal chest movement, nonlabored.  Heart: No gallop, murmur, rub, RRR.  Abdomen: Soft, nontender, positive bowel sounds, no organomegaly.  Extremities: NO bilateral lower extremity dependent edema, no cyanosis.  Neuro: No focal deficit, moving all extremities, alert oriented X 3  : " Solomon catheter in place.  Minimal urine output  Skin warm and dry.  Psych mood and affect appropriate  THD CATH  WBC No results found for: WBC   HGB No results found for: HGB   HCT No results found for: HCT   Platlets No results found for: LABPLAT   MCV No results found for: MCV       Sodium Sodium   Date Value Ref Range Status   03/20/2023 133 (L) 136 - 145 mmol/L Final   03/19/2023 134 (L) 136 - 145 mmol/L Final   03/18/2023 134 (L) 136 - 145 mmol/L Final      Potassium Potassium   Date Value Ref Range Status   03/20/2023 4.4 3.5 - 5.2 mmol/L Final   03/19/2023 4.1 3.5 - 5.2 mmol/L Final   03/18/2023 4.0 3.5 - 5.2 mmol/L Final      Chloride Chloride   Date Value Ref Range Status   03/20/2023 97 (L) 98 - 107 mmol/L Final   03/19/2023 98 98 - 107 mmol/L Final   03/18/2023 98 98 - 107 mmol/L Final      CO2 CO2   Date Value Ref Range Status   03/20/2023 26.0 22.0 - 29.0 mmol/L Final   03/19/2023 26.0 22.0 - 29.0 mmol/L Final   03/18/2023 27.0 22.0 - 29.0 mmol/L Final      BUN BUN   Date Value Ref Range Status   03/20/2023 51 (H) 8 - 23 mg/dL Final   03/19/2023 43 (H) 8 - 23 mg/dL Final   03/18/2023 33 (H) 8 - 23 mg/dL Final      Creatinine Creatinine   Date Value Ref Range Status   03/20/2023 5.86 (H) 0.76 - 1.27 mg/dL Final   03/19/2023 5.03 (H) 0.76 - 1.27 mg/dL Final   03/18/2023 3.84 (H) 0.76 - 1.27 mg/dL Final      Calcium Calcium   Date Value Ref Range Status   03/20/2023 8.3 (L) 8.6 - 10.5 mg/dL Final   03/19/2023 8.1 (L) 8.6 - 10.5 mg/dL Final   03/18/2023 7.9 (L) 8.6 - 10.5 mg/dL Final      PO4 No results found for: CAPO4   Albumin Albumin   Date Value Ref Range Status   03/19/2023 2.6 (L) 3.5 - 5.2 g/dL Final   03/18/2023 2.6 (L) 3.5 - 5.2 g/dL Final      Magnesium No results found for: MG   Uric Acid No results found for: URICACID        Results Review:     I reviewed the patient's new clinical results.    amiodarone, 200 mg, Oral, Q24H  aspirin, 325 mg, Oral, Daily  epoetin andrew/andrew-epbx, 10,000 Units,  Subcutaneous, Once per day on Mon Wed Fri  fluconazole, 100 mg, Oral, Once per day on Mon Wed Fri  fluconazole, 50 mg, Oral, Once per day on Sun Tue Thu Sat  insulin detemir, 10 Units, Subcutaneous, Nightly  insulin lispro, 0-9 Units, Subcutaneous, 4x Daily With Meals & Nightly  Insulin Lispro, 2 Units, Subcutaneous, TID With Meals  metoprolol succinate XL, 25 mg, Oral, Q24H  midodrine, 10 mg, Oral, Q8H  pantoprazole, 40 mg, Oral, Q AM  pharmacy consult - MTM, , Does not apply, BID  senna-docusate sodium, 2 tablet, Oral, BID   And  polyethylene glycol, 17 g, Oral, Daily  rosuvastatin, 10 mg, Oral, Nightly  sodium chloride, 10 mL, Intravenous, Q12H           Medication Review: Reviewed    Assessment & Plan       I25.10, CABG 03/01/23    Hypertension    Hyperlipidemia LDL goal <70    Type 2 diabetes mellitus with hypoglycemia, with long-term current use of insulin (HCC)    Ischemic cardiomyopathy    Mucopurulent chronic bronchitis (HCC)    Prolonged Q-T interval on ECG    Acute renal failure (ARF) (HCC)    Shock (HCC)    Acute respiratory failure with hypoxia (HCC)     1.  [place]: ATN likely secondary to shock with systolic blood pressure postsurgery in the range of 40s to 80s, now on multiple vasopressors.  Admit creatinine 1.08, creatinine progressively getting worse.  Patient is oliguric.  C3 complement 18, C4 complement 83 within normal range.  Ultrasound of the kidney shows right kidney 11 cm left kidney 10.6 cm normal ultrasound, bladder ultrasound normal. NONOLIGURIC BUT  NO RECOVERY GFR YET. PATIENT FAILED PARAMETERS, HD TODAY 3/20/23. NEEDS OUTPATIENT HD ARRANGED.  2.  S/p CABG x3  3.  Ischemic cardiomyopathy.Nicolas Pritchett MD  03/20/23  09:02 EDT

## 2023-03-20 NOTE — PLAN OF CARE
Goal Outcome Evaluation:  Plan of Care Reviewed With: patient, spouse        Progress: no change   SLP treatment completed. Will  recommend cont'd SLP services @ next level of care to address pharyngeal dysphagia.  Please see note for further details and recommendations.

## 2023-03-20 NOTE — PROGRESS NOTES
Spoke with patient he is agreeable to [facility] Verified PCP spoke with Paulette. Yanet BOWEN, [facility]-Liaison

## 2023-03-20 NOTE — CASE MANAGEMENT/SOCIAL WORK
Continued Stay Note  [facility]     Patient Name: Herbert Harley  MRN: [id number]  Today's Date: 3/20/2023    Admit Date: 3/1/2023    Plan: Home with [facility]   Discharge Plan     Row Name 03/20/23 1240       Plan    Plan Home with [facility]    Patient/Family in Agreement with Plan yes    Plan Comments Received CM consult for  SLP, patient would also like  SN/PT/OT.  Called [facility] and spoke to liaison who advises they can accept patient to service. Patient plan is to discharge home with [facility] today via car with family to transport.    Final Discharge Disposition Code 06 - home with home health care    Row Name 03/20/23 1224       Plan    Plan Home    Patient/Family in Agreement with Plan yes    Plan Comments [facility] has discharge orders placed.  Spoke with [facility] [phone] has accepted patient and can have him come on Wednesday 3/22 with chair time of 1030.  They do caution that this is a temporaty chair time and to anticipate that time chair time to change.  Adjustments were made to accomodate him and assist with discharge from the hospital.  MD notified that OP HD arrangements have been made.  Spoke with patient and spouse at bedside regarding discharge plan and advised that [facility] has accepted him and his first OP HD day will be Wed 3/22 at 1030 but did advised that time is temporary and anticipate it changing.  Patient and spouse verbalized understanding and agreement with plan.  RN notified OP HD arrangements are confirmed.  OP HD appt placed in AVS.  Discharge Summary faxed to [facility] at [phone].  Patient plan is to discharge home today via car with family to transport.    Final Discharge Disposition Code 01 - home or self-care    Final Note [facility] has discharge orders placed.  Spoke with [facility] [phone] has accepted patient and can have him come on Wednesday 3/22 with chair time of 1030.  They do caution that this is a temporaty chair time and to anticipate that time chair time  to change.  Adjustments were made to accomodate him and assist with discharge from the hospital.  MD notified that OP HD arrangements have been made.  Spoke with patient and spouse at bedside regarding discharge plan and advised that [facility] has accepted him and his first OP HD day will be Wed 3/22 at 1030 but did advised that time is temporary and anticipate it changing.  Patient and spouse verbalized understanding and agreement with plan.  RN notified OP HD arrangements are confirmed.  OP HD appt placed in AVS.  Discharge Summary faxed to [facility] at [phone].  Patient plan is to discharge home today via car with family to transport.               Discharge Codes    No documentation.               Expected Discharge Date and Time     Expected Discharge Date Expected Discharge Time    Mar 20, 2023             Aurea Pemberton, RN

## 2023-03-20 NOTE — DISCHARGE SUMMARY
[facility] Medicine Services  DISCHARGE SUMMARY    Patient Name: Herbert Harley  : 1955  MRN: [id number]    Date of Admission: 3/1/2023  5:36 AM  Date of Discharge: 3/20/2023  Primary Care Physician: Yessica Ricci DO    Consults     Date and Time Order Name Status Description    3/15/2023  4:25 PM Inpatient Consult to Hospitalist TALON for Medical Management      3/14/2023 12:43 PM Inpatient Gastroenterology Consult Completed     3/2/2023  9:09 AM Inpatient Nephrology Consult Completed     3/1/2023 11:17 AM Inpatient Consult to Cardiology Completed     2023 11:30 AM Inpatient Cardiothoracic Surgery Consult Completed           Hospital Course     Presenting Problem:   Coronary artery disease involving native coronary artery of native heart, unspecified whether angina present [I25.10]    Active Hospital Problems    Diagnosis  POA   • **I25.10, CABG 23 [I25.10]  Yes   • Acute systolic heart failure (CMS/HCC) [I50.21]  Unknown   • Acute respiratory failure with hypoxia (HCC) [J96.01]  Yes   • Acute renal failure (ARF) (HCC) [N17.9]  Yes   • Shock (HCC) [R57.9]  Yes   • Mucopurulent chronic bronchitis (HCC) [J41.1]  Yes   • Prolonged Q-T interval on ECG [R94.31]  Yes   • Ischemic cardiomyopathy [I25.5]  Yes   • Type 2 diabetes mellitus with hypoglycemia, with long-term current use of insulin (HCC) [E11.649, Z79.4]  Not Applicable   • Hyperlipidemia LDL goal <70 [E78.5]  Yes   • Hypertension [I10]  Yes      Resolved Hospital Problems    Diagnosis Date Resolved POA   • Tobacco use [Z72.0] 2023 Yes      Hospital Course:  Herbert Harley is a 67 y.o. male with history of type 2 diabetes, hypertension, hyperlipidemia, CAD status post stents, found to have severe triple-vessel CAD, s/p CABG 3/1/2023, has required ICU care, with stay has been complicated with acute on chronic renal failure, requiring initiation of HD.  Patient also had odynophagia, started on  Diflucan, he has been anemic and has required 1 unit PRBC.  He otherwise progressed well and [facility] was asked to follow and assist with medical management, we assumed this on 3/16/2023     CAD multivessel CAD s/p CABG x3  Hyperlipidemia  -Continue aspirin, statin, beta-blocker  -Follow-up with CTS as previously scheduled     Postoperative fibrillation  Prolonged QTc, improving  Hypotension  -Currently on NSR  -Cardiology followed, recommend to continue amiodarone, will decrease midodrine to 5 mg 3 times daily   --Closely monitor QTc as patient is now on Diflucan     Acute on chronic renal failure  -Cr peaked to 5.82  -nephrology followed, s/p tunneled dialysis access placement  -Continue intermittent HD, patient has outpatient dialysis set up for MWF.     Anemia  -Suspect this was postop blood loss  -He is s/p unit PRBC 3/13  -H&H remains low but stable.     Odynophagia, resolving  -GI followed, recommend continue 14-day course of Diflucan  -Defer endoscopy at this time  -He is currently tolerating his diet well     Well-controlled type 2 diabetes with A1c 7.4%  -FSBG's reviewed and currently appropriate,  -Continue home regimen at discharge    Discharge Follow Up Recommendations for outpatient labs/diagnostics:  Follow-up with PCP in 1 week    Day of Discharge     HPI: No acute events overnight, patient rested well did not have dialysis today feels much better and wants to go home.      Review of Systems  Gen- No fevers, chills  CV- No chest pain, palpitations  Resp- No cough, dyspnea  GI- No N/V/D, abd pain      Vital Signs:   Temp:  [96.8 °F (36 °C)-98.3 °F (36.8 °C)] 96.8 °F (36 °C)  Heart Rate:  [60-96] 85  Resp:  [16-18] 16  BP: (123-165)/(55-99) 141/58      Physical Exam:  Constitutional: No acute distress, awake, alert  HENT: NCAT, mucous membranes moist  Respiratory: Clear to auscultation bilaterally, respiratory effort normal   Cardiovascular: RRR, no  murmurs, rubs, or gallops  Gastrointestinal: Positive bowel sounds, soft, nontender, nondistended  Musculoskeletal: No bilateral ankle edema  Psychiatric: Appropriate affect, cooperative  Neurologic: Oriented x 3, nonfocal  Skin: No rashes, sternal incision CDI      Pertinent  and/or Most Recent Results     LAB RESULTS:      Lab 03/17/23  0729 03/16/23  0459 03/15/23  0525 03/14/23  0454   WBC 13.81*  --  12.90* 13.77*   HEMOGLOBIN 8.4* 7.8* 8.6* 9.2*   HEMATOCRIT 26.8* 24.5* 27.5* 29.5*   PLATELETS 406  --  244 187   NEUTROS ABS  --   --  9.19* 9.98*   IMMATURE GRANS (ABS)  --   --  0.10* 0.11*   LYMPHS ABS  --   --  1.19 1.34   MONOS ABS  --   --  2.08* 2.01*   EOS ABS  --   --  0.28 0.28   MCV 93.4  --  94.2 93.4   PROCALCITONIN  --   --   --  0.61*         Lab 03/20/23  0504 03/19/23  0712 03/18/23  0516 03/17/23  0729 03/16/23  0459 03/15/23  0525   SODIUM 133* 134* 134* 133* 133* 133*   POTASSIUM 4.4 4.1 4.0 4.6 4.3 5.1   CHLORIDE 97* 98 98 96* 98 97*   CO2 26.0 26.0 27.0 24.0 24.0 23.0   ANION GAP 10.0 10.0 9.0 13.0 11.0 13.0   BUN 51* 43* 33* 50* 34* 42*   CREATININE 5.86* 5.03* 3.84* 6.58* 4.75* 5.69*   EGFR 9.9* 11.9* 16.4* 8.6* 12.7* 10.2*   GLUCOSE 153* 134* 111* 140* 102* 158*   CALCIUM 8.3* 8.1* 7.9* 8.2* 7.9* 7.7*   PHOSPHORUS  --  4.9* 4.8*  --   --  6.6*         Lab 03/19/23  0712 03/18/23  0516 03/17/23  0729 03/15/23  0525 03/14/23  0454   TOTAL PROTEIN  --   --  5.0* 5.0* 5.3*   ALBUMIN 2.6* 2.6* 2.7* 2.9* 2.8*   GLOBULIN  --   --  2.3 2.1 2.5   ALT (SGPT)  --   --  29 33 33   AST (SGOT)  --   --  30 37 42*   BILIRUBIN  --   --  0.2 0.3 0.4   ALK PHOS  --   --  128* 158* 161*         Lab 03/16/23  0801 03/16/23  0459   PROBNP  --  27,176.0*   HSTROP T 701* 690*                 Brief Urine Lab Results  (Last result in the past 365 days)      Color   Clarity   Blood   Leuk Est   Nitrite   Protein   CREAT   Urine HCG        03/02/23 1502             58.8             Microbiology Results (last 10  days)     ** No results found for the last 240 hours. **          IR Tunneled Catheter    Result Date: 3/14/2023  IR TUNNELED CATHETER Date of Exam: 3/14/2023 1:16 PM EDT Indication: HD depedent [place]. Comparison: None available. Technique: The procedure, risks options were discussed with the patient and informed written consent was obtained. The patient's indwelling temporary dialysis catheter was removed and hemostasis achieved. The right neck around anterior chest wall were prepped and draped. IV conscious sedation was a  consisting of Versed and fentanyl. The patient was monitored by the IR nurse during the entire procedure. Using maximal sterile barrier technique and under local anesthesia and ultrasound guidance,  a micropuncture was performed of the right internal jugular vein. Following placement of an 035 wire, serial dilatation was performed and a 16 [place] introducer sheath placed. A site was chosen over the right anterior chest wall and the skin and tract was anesthetized with 1% Xylocaine with epinephrine. The tunnel dialysis catheter was then tunneled to the right IJ site, and deployed with the tip just below the cavoatrial junction. The right IJ site was closed with a single 3-0 Vicryl suture and the deep fascia. The catheter was affixed to the skin utilizing a nylon suture. The catheter was flushed with heparin solution. A single spot radiograph was obtained documenting placement. Fluoroscopy time: 24 seconds Position monitored conscious sedation time: 16 minutes     Impression: Technically successful placement of a tunneled dialysis catheter with the tip at just below the cavoatrial junction under ultrasound and fluoroscopy. Electronically Signed: Juanjo Valenzuela  3/14/2023 2:19 PM EDT  Workstation ID: [id number]    FL Video Swallow With Speech Single Contrast    Result Date: 3/14/2023  FL VIDEO SWALLOW W SPEECH SINGLE-CONTRAST, FL LIMITED UGI FOR MBS REFLUX SINGLE-CONTRAST Date of Exam: 3/13/2023  3:34 PM EDT Indication: dysphagia. Comparison: None available. Technique:  This examination was performed in conjunction with speech pathology. Lateral video fluoroscopic evaluation of the swallowing mechanism was performed while correlate administering to the patient various consistency food items mixed with barium. Fluoroscopic Time:  Min and 12 seconds Number of Images: 10 associated fluoroscopic loops were saved. Findings: Please see speech therapy report for full details and recommendations. A limited view of the esophagus with the patient in the seated lateral position demonstrating no signs of a high-grade focal esophageal stricture, or significant gastroesophageal reflux.     Impression: Fluoroscopy provided for a modified barium swallow, and limited upper GI series. Please see speech therapy report for full details and recommendations. Limited upper GI series appeared within normal limits. Electronically Signed: Buddy Chandler  3/14/2023 7:52 AM EDT  Workstation ID: [id number]    XR Chest 1 View    Result Date: 3/16/2023  XR CHEST 1 VW Date of Exam: 3/16/2023 4:00 AM EDT Indication: CABG. Comparison: 3/10/2023 Findings: Interval removal of left-sided CVC, with a new right-sided catheter terminating in the SVC. There is no pneumothorax. There is a small left-sided pleural effusion and scattered linear atelectasis. Unchanged heart and mediastinal contours.     Impression: Interval removal of left-sided CVC, with a new right-sided catheter terminating in the SVC. There is no pneumothorax. There is a small left-sided pleural effusion and scattered linear atelectasis. Unchanged heart and mediastinal contours. Electronically Signed: Dashawn Dover  3/16/2023 8:34 AM EDT  Workstation ID: [id number]    XR Chest 1 View    Result Date: 3/10/2023  XR CHEST 1 VW Date of Exam: 3/10/2023 4:30 AM EST Indication: CABG. Comparison: March 8, 2023 Findings: Median sternotomy wires appear intact. Mediastinal drains are in place.  No pneumothorax is identified. A right internal jugular catheter has its tip at the cavoatrial junction. A left internal jugular catheter has its tip at the upper SVC. A focal opacity in the left mid lung appears unchanged. The heart and mediastinal contours appear stable. There is pulmonary vascular congestion.     Impression: 1.Stable focal opacity within mid left lung, likely atelectasis. 2.Pulmonary vascular congestion. 3.Mediastinal drains in place. No pneumothorax identified. Electronically Signed: Bala Man  3/10/2023 10:13 AM EST  Workstation ID: [id number]    FL Limited Ugi For Mbs Reflux Single-Contrast    Result Date: 3/14/2023  FL VIDEO SWALLOW W SPEECH SINGLE-CONTRAST, FL LIMITED UGI FOR MBS REFLUX SINGLE-CONTRAST Date of Exam: 3/13/2023 3:34 PM EDT Indication: dysphagia. Comparison: None available. Technique:  This examination was performed in conjunction with speech pathology. Lateral video fluoroscopic evaluation of the swallowing mechanism was performed while correlate administering to the patient various consistency food items mixed with barium. Fluoroscopic Time:  Min and 12 seconds Number of Images: 10 associated fluoroscopic loops were saved. Findings: Please see speech therapy report for full details and recommendations. A limited view of the esophagus with the patient in the seated lateral position demonstrating no signs of a high-grade focal esophageal stricture, or significant gastroesophageal reflux.     Impression: Fluoroscopy provided for a modified barium swallow, and limited upper GI series. Please see speech therapy report for full details and recommendations. Limited upper GI series appeared within normal limits. Electronically Signed: Buddy Chandler  3/14/2023 7:52 AM EDT  Workstation ID: [id number]      Results for orders placed during the hospital encounter of 02/15/23    Duplex Carotid Ultrasound CAR    Interpretation Summary  •  Right internal carotid artery stenosis of  0-49%.  •  Left internal carotid artery stenosis of 50-69%.  •  There is antegrade flow in the vertebral arteries bilaterally.      Results for orders placed during the hospital encounter of 02/15/23    Duplex Carotid Ultrasound CAR    Interpretation Summary  •  Right internal carotid artery stenosis of 0-49%.  •  Left internal carotid artery stenosis of 50-69%.  •  There is antegrade flow in the vertebral arteries bilaterally.      Results for orders placed during the hospital encounter of 03/01/23    Adult Transthoracic Echo Complete W/ Cont if Necessary Per Protocol    Interpretation Summary  •  Left ventricular systolic function is mildly decreased. Calculated left ventricular EF = 40.4% Left ventricular ejection fraction appears to be 41 - 45%.  •  The right ventricular cavity is dilated.  •  Left atrial volume is mildly increased.  •  There is calcification of the aortic valve.  •  Estimated right ventricular systolic pressure from tricuspid regurgitation is normal (<35 mmHg).  •  There is a left pleural effusion.      Plan for Follow-up of Pending Labs/Results:     Discharge Details        Discharge Medications      New Medications      Instructions Start Date   amiodarone 200 MG tablet  Commonly known as: PACERONE   200 mg, Oral, Every 24 Hours Scheduled      aspirin  MG tablet  Replaces: aspirin 81 MG chewable tablet   325 mg, Oral, Daily   Start Date: March 21, 2023     fluconazole 100 MG tablet  Commonly known as: DIFLUCAN   100 mg, Oral, 3 Times Weekly      fluconazole 50 MG tablet  Commonly known as: DIFLUCAN   One tablet once per day Sun Tue Thu Sat   Start Date: March 21, 2023     metoprolol succinate XL 25 MG 24 hr tablet  Commonly known as: TOPROL-XL   25 mg, Oral, Every 24 Hours Scheduled   Start Date: March 21, 2023     midodrine 5 MG tablet  Commonly known as: PROAMATINE   5 mg, Oral, Every 8 Hours         Changes to Medications      Instructions Start Date   Fluticasone-Umeclidin-Vilant  100-62.5-25 MCG/ACT inhaler  Commonly known as: TRELEGY  What changed: additional instructions   1 puff, Inhalation, Daily      Lantus 100 UNIT/ML injection  Generic drug: insulin glargine  What changed:   · how much to take  · how to take this  · when to take this   INJECT 60 UNITS SUBCUTANEOUSLY ONCE DAILY         Continue These Medications      Instructions Start Date   albuterol sulfate  (90 Base) MCG/ACT inhaler  Commonly known as: PROVENTIL HFA;VENTOLIN HFA;PROAIR HFA   2 puffs, Inhalation, Every 4 Hours PRN      COLLAGEN 1500/C PO   1 tablet, Oral, Daily, OTC supplement      ezetimibe 10 MG tablet  Commonly known as: ZETIA   10 mg, Oral, Daily      Farxiga 10 MG tablet  Generic drug: dapagliflozin Propanediol   10 mg, Oral, Daily at 1100      rosuvastatin 10 MG tablet  Commonly known as: CRESTOR   10 mg, Oral, Daily         Stop These Medications    aspirin 81 MG chewable tablet  Replaced by: aspirin  MG tablet     furosemide 20 MG tablet  Commonly known as: LASIX     sacubitril-valsartan 24-26 MG tablet  Commonly known as: ENTRESTO            Allergies   Allergen Reactions   • Wellbutrin [Bupropion] Swelling     Facial swelling, severe       Discharge Disposition:homeHome or Self Care    Diet:  Hospital:  Diet Order   Procedures   • Diet: Cardiac Diets, Regular/House Diet, Diabetic Diets, Renal Diets, Gastrointestinal Diets; Healthy Heart (2-3 Na+); Consistent Carbohydrate; Low Sodium (2-3g), Low Potassium, Low Phosphorus; Fiber-Restricted; Texture: Regular Texture (IDDSI 7);...       Activity:as tolerated      Restrictions or Other Recommendations:  none       CODE STATUS:    Code Status and Medical Interventions:   Ordered at: 03/01/23 1117     Code Status (Patient has no pulse and is not breathing):    CPR (Attempt to Resuscitate)     Medical Interventions (Patient has pulse or is breathing):    Full Support     Release to patient:    Routine Release       Future Appointments   Date Time  Provider Department [place]   5/2/2023  1:00 PM Yessica Ricci DO [facility]   5/4/2023  1:00 PM [facility]   8/3/2023 10:30 AM Nadya Ramirez MD [facility]   1/30/2024  2:00 PM Yessica Ricci DO [facility]       Nelson Bose MD  03/20/23      Time Spent on Discharge:  I spent  40  minutes on this discharge activity which included: face-to-face encounter with the patient, reviewing the data in the system, coordination of the care with the nursing staff as well as consultants, documentation, and entering orders.

## 2023-03-20 NOTE — PROGRESS NOTES
CTS Progress Note      POD 19 s/p CABG x3     LOS: 19 days     Subjective  HD this morning.    Objective    Vital Signs  Temp:  [97.9 °F (36.6 °C)-98.3 °F (36.8 °C)] 98.3 °F (36.8 °C)  Heart Rate:  [60-75] 69  Resp:  [18] 18  BP: (123-142)/(59-66) 142/66    Physical Exam:       Results     Results from last 7 days   Lab Units 03/17/23  0729   WBC 10*3/mm3 13.81*   HEMOGLOBIN g/dL 8.4*   HEMATOCRIT % 26.8*   PLATELETS 10*3/mm3 406     Results from last 7 days   Lab Units 03/20/23  0504   SODIUM mmol/L 133*   POTASSIUM mmol/L 4.4   CHLORIDE mmol/L 97*   CO2 mmol/L 26.0   BUN mg/dL 51*   CREATININE mg/dL 5.86*   GLUCOSE mg/dL 153*   CALCIUM mg/dL 8.3*       Imaging Results (Last 24 Hours)     ** No results found for the last 24 hours. **          Assessment    I25.10, CABG 03/01/23    Hypertension    Hyperlipidemia LDL goal <70    Type 2 diabetes mellitus with hypoglycemia, with long-term current use of insulin (HCC)    Ischemic cardiomyopathy    Mucopurulent chronic bronchitis (HCC)    Prolonged Q-T interval on ECG    Acute renal failure (ARF) (HCC)    Shock (HCC)    Acute respiratory failure with hypoxia (HCC)      Plan   HD per Nephrology  Afib per Cardiology  Ambulate TID  Pulm toilet  Dialysis chair available at [facility] on Wednesday    Stacy De La Torre PA-C  03/20/23  08:17 EDT

## 2023-03-20 NOTE — OUTREACH NOTE
Prep Survey    Flowsheet Row Responses   [facility] facility patient discharged from? [place]   Is LACE score < 7 ? No   Eligibility [facility]   Date of Admission 03/01/23   Date of Discharge 03/20/23   Discharge Disposition Home-Health Care Svc   Discharge diagnosis CAD, CABG x 3 using right leg vein harvest, post op A/C renal failure requiring HD, anemia,   Does the patient have one of the following disease processes/diagnoses(primary or secondary)? Cardiothoracic surgery   Does the patient have Home health ordered? Yes   What is the Home health agency?  [facility]   Is there a DME ordered? No   General alerts for this patient new HD patient   Prep survey completed? Yes          Keyonna DEL REAL - Registered Nurse

## 2023-03-20 NOTE — PROGRESS NOTES
"[facility]  IP Progress Note    PROBLEM LIST:  CARDIAC     Coronary Artery Disease:   Inferior STEMI with [facility],3/28/2014:  EF 50%, NILE to the mid-dominant RCA, NILE to the proximal LAD   Stress test, 2/13/2023: Inferolateral ischemia,   [facility], 2/17/2023: Severe triple-vessel disease, %, RCA 80%, LAD 80%     Myocardium:   Echocardiogram, 3/29/2014:   EF 50-55%, RVSP 30.   Echo 12/21/2022:Stage C, HFmrEF EF 41- 45%, mild LVH     Valvular:   Mild calcification to aortic valve     Electrical:   NSR, LVH, PVC     Percardium:   Normal     VASCULAR:   Arterial   Cerebrovascular disease:   Carotid Doppler: 2/2023 less than 50 %     CARDIAC RISK FACTORS:          Hypertension          Diabetes          Dyslipidemia          Tobacco Use, Quit after 75 years     NON-CARDIAC:   Asthma/COPD: Abnormal PFTs     SURGERIES:   Tonsillectomy       HOSPITAL COURSE:  Patient has successful revascularization performed that was complicated with acute kidney injury.  Patient did have brief episode of postop atrial fibrillation which was abated with amiodarone.  Patient currently on hemodialysis.      CHIEF COMPLAINTS:  CAD s/p CABG x3, postop atrial fibrillation      Subjective   Patient doing well this morning.  No complaints.  In dialysis this morning.    Objective     Blood pressure 142/66, pulse 69, temperature 98.3 °F (36.8 °C), temperature source Oral, resp. rate 18, height 162.6 cm (64\"), weight 73.4 kg (161 lb 12.8 oz), SpO2 94 %.     Intake/Output Summary (Last 24 hours) at 3/20/2023 0815  Last data filed at 3/20/2023 0532  Gross per 24 hour   Intake 120 ml   Output 1075 ml   Net -955 ml       PHYSICAL EXAM:    General: Alert and oriented   Cardiovascular: Heart has a nondisplaced focal PMI. Regular rate and rhythm without murmur, gallop or rub.    Lungs: Clear to auscultation bilaterally.  Extremities: Trace bilateral lower extremity edema  Skin: warm and dry.  Neurologic: " nonfocal    RESULR REVIEW:    I reviewed the patient's new clinical results.      MEDICATIONS:    amiodarone, 200 mg, Oral, Q24H  aspirin, 325 mg, Oral, Daily  epoetin andrew/andrew-epbx, 10,000 Units, Subcutaneous, Once per day on Mon Wed Fri  fluconazole, 100 mg, Oral, Once per day on Mon Wed Fri  fluconazole, 50 mg, Oral, Once per day on Sun Tue Thu Sat  insulin detemir, 10 Units, Subcutaneous, Nightly  insulin lispro, 0-9 Units, Subcutaneous, 4x Daily With Meals & Nightly  Insulin Lispro, 2 Units, Subcutaneous, TID With Meals  metoprolol succinate XL, 25 mg, Oral, Q24H  midodrine, 10 mg, Oral, Q8H  pantoprazole, 40 mg, Oral, Q AM  pharmacy consult - [place], , Does not apply, BID  senna-docusate sodium, 2 tablet, Oral, BID   And  polyethylene glycol, 17 g, Oral, Daily  rosuvastatin, 10 mg, Oral, Nightly  sodium chloride, 10 mL, Intravenous, Q12H          Results from last 7 days   Lab Units 03/17/23  0729   WBC 10*3/mm3 13.81*   HEMOGLOBIN g/dL 8.4*   HEMATOCRIT % 26.8*   PLATELETS 10*3/mm3 406     Results from last 7 days   Lab Units 03/20/23  0504 03/18/23  0516 03/17/23  0729   SODIUM mmol/L 133*   < > 133*   POTASSIUM mmol/L 4.4   < > 4.6   CHLORIDE mmol/L 97*   < > 96*   CO2 mmol/L 26.0   < > 24.0   BUN mg/dL 51*   < > 50*   CREATININE mg/dL 5.86*   < > 6.58*   CALCIUM mg/dL 8.3*   < > 8.2*   BILIRUBIN mg/dL  --   --  0.2   ALK PHOS U/L  --   --  128*   ALT (SGPT) U/L  --   --  29   AST (SGOT) U/L  --   --  30   GLUCOSE mg/dL 153*   < > 140*    < > = values in this interval not displayed.         Lab Results   Component Value Date    TROPONINI 15.31 (C) 03/30/2014    TROPONINT 701 (C) 03/16/2023                 No results found for: IRON, FERRITIN, LABIRON, TIBC   Hemoglobin A1C   Date Value Ref Range Status   02/28/2023 7.40 (H) 4.80 - 5.60 % Final     Magnesium   Date Value Ref Range Status   03/13/2023 2.1 1.6 - 2.4 mg/dL Final        Tele: Sinus Rythym heart rate in the 70s    EKG: 3/13/2023, normal sinus  rhythm, QTc 448  3/16/2023, normal sinus rhythm, QTc 436      ASSESSMENT:      1. CAD s/p CABG x3, preop EF 40-45%  2. Postop atrial fibrillation, currently in normal sinus rhythm  3. Chronic systolic heart failure,ischemic cardiomyopathy  4. Hypertension, recent hypotension, currently on midodrine  5. Prolonged QT, stable  6. Hyperlipidemia, on Crestor 10 at home  7. Acute on chronic kidney failure, on HD, started 3/11  8. Type 2 diabetes  9. Painful swallowing, GI consulted yesterday.  10. Acute anemia, received 1 unit PRBC 3/13              PLAN:      1. Amiodarone 200 mg daily.  We will continue to monitor QTc as he is on Diflucan now.  2. Continue aspirin and Crestor for CAD.  3. Continue metoprolol succinate 25 mg daily.  4. Due to brief episode of A-fib we will hold on any full anticoagulation for now.  5. No ACE/ARB/ARN I secondary to renal insufficiency/failure.  Also defer spironolactone currently due to patient's acute renal failure  6. Continue midodrine 10 mg 3 times daily for hypotension, blood pressure continues to improve.  If blood pressure is stable during and after dialysis would recommend transitioning to 5 mg 3 times a day.  7. Continue with hemodialysis per nephrology  8. Awaiting dialysis chair.    Electronically signed by Rosangela Queen PA-C, 03/20/23, 8:18 AM EDT.

## 2023-03-20 NOTE — PLAN OF CARE
Problem: Adult Inpatient Plan of Care  Goal: Plan of Care Review  Outcome: Ongoing, Progressing  Flowsheets (Taken 3/20/2023 0404)  Progress: improving  Goal: Patient-Specific Goal (Individualized)  Outcome: Ongoing, Progressing  Goal: Absence of Hospital-Acquired Illness or Injury  Outcome: Ongoing, Progressing  Intervention: Identify and Manage Fall Risk  Recent Flowsheet Documentation  Taken 3/19/2023 3601 by Yazmin Anglin, RN  Safety Promotion/Fall Prevention:   fall prevention program maintained   safety round/check completed   room organization consistent  Goal: Optimal Comfort and Wellbeing  Outcome: Ongoing, Progressing  Goal: Readiness for Transition of Care  Outcome: Ongoing, Progressing     Problem: Activity Intolerance (Cardiovascular Surgery)  Goal: Improved Activity Tolerance  Outcome: Ongoing, Progressing     Problem: Adjustment to Surgery (Cardiovascular Surgery)  Goal: Optimal Coping with Heart Surgery  Outcome: Ongoing, Progressing     Problem: Bleeding (Cardiovascular Surgery)  Goal: Bleeding (Cardiovascular Surgery)  Outcome: Ongoing, Progressing     Problem: Bowel Motility Impaired (Cardiovascular Surgery)  Goal: Effective Bowel Elimination (Cardiovascular Surgery)  Outcome: Ongoing, Progressing     Problem: Cardiac Function Impaired (Cardiovascular Surgery)  Goal: Effective Cardiac Function  Outcome: Ongoing, Progressing     Problem: Cerebral Tissue Perfusion (Cardiovascular Surgery)  Goal: Optimal Cerebral Tissue Perfusion (Cardiovascular Surgery)  Outcome: Ongoing, Progressing     Problem: Fluid and Electrolyte Imbalance (Cardiovascular Surgery)  Goal: Fluid and Electrolyte Balance (Cardiovascular Surgery)  Outcome: Ongoing, Progressing     Problem: Glycemic Control Impaired (Cardiovascular Surgery)  Goal: Blood Glucose Level Within Targeted Range (Cardiovascular Surgery)  Outcome: Ongoing, Progressing     Problem: Infection (Cardiovascular Surgery)  Goal: Absence of Infection Signs and  Symptoms  Outcome: Ongoing, Progressing     Problem: Ongoing Anesthesia Effects (Cardiovascular Surgery)  Goal: Anesthesia/Sedation Recovery  Outcome: Ongoing, Progressing  Intervention: Optimize Anesthesia Recovery  Recent Flowsheet Documentation  Taken 3/19/2023 2145 by Yazmin Anglin RN  Safety Promotion/Fall Prevention:   fall prevention program maintained   safety round/check completed   room organization consistent  Taken 3/19/2023 2144 by Yazmin Anglin RN  Administration (IS): self-administered  Taken 3/19/2023 2015 by Yazmin Anglin RN  Administration (IS): self-administered     Problem: Pain (Cardiovascular Surgery)  Goal: Acceptable Pain Control  Outcome: Ongoing, Progressing     Problem: Postoperative Nausea and Vomiting (Cardiovascular Surgery)  Goal: Nausea and Vomiting Relief (Cardiovascular Surgery)  Outcome: Ongoing, Progressing     Problem: Postoperative Urinary Retention (Cardiovascular Surgery)  Goal: Effective Urinary Elimination (Cardiovascular Surgery)  Outcome: Ongoing, Progressing     Problem: Respiratory Compromise (Cardiovascular Surgery)  Goal: Effective Oxygenation and Ventilation (Cardiovascular Surgery)  Outcome: Ongoing, Progressing  Intervention: Promote Airway Secretion Clearance  Recent Flowsheet Documentation  Taken 3/19/2023 2144 by Yazmin Anglin RN  Administration (IS): self-administered  Taken 3/19/2023 2015 by Yazmin Anglin RN  Administration (IS): self-administered     Problem: Communication Impairment (Mechanical Ventilation, Invasive)  Goal: Effective Communication  Outcome: Ongoing, Progressing     Problem: Device-Related Complication Risk (Mechanical Ventilation, Invasive)  Goal: Optimal Device Function  Outcome: Ongoing, Progressing     Problem: Inability to Wean (Mechanical Ventilation, Invasive)  Goal: Mechanical Ventilation Liberation  Outcome: Ongoing, Progressing  Intervention: Promote Extubation and Mechanical Ventilation Liberation  Recent Flowsheet  Documentation  Taken 3/19/2023 2145 by Yazmin Anglin RN  Medication Review/Management: medications reviewed     Problem: Nutrition Impairment (Mechanical Ventilation, Invasive)  Goal: Optimal Nutrition Delivery  Outcome: Ongoing, Progressing     Problem: Skin and Tissue Injury (Mechanical Ventilation, Invasive)  Goal: Absence of Device-Related Skin and Tissue Injury  Outcome: Ongoing, Progressing     Problem: Ventilator-Induced Lung Injury (Mechanical Ventilation, Invasive)  Goal: Absence of Ventilator-Induced Lung Injury  Outcome: Ongoing, Progressing     Problem: Skin Injury Risk Increased  Goal: Skin Health and Integrity  Outcome: Ongoing, Progressing     Problem: Asthma Comorbidity  Goal: Maintenance of Asthma Control  Outcome: Ongoing, Progressing  Intervention: Maintain Asthma Symptom Control  Recent Flowsheet Documentation  Taken 3/19/2023 2145 by Yazmin Anglin RN  Medication Review/Management: medications reviewed     Problem: Behavioral Health Comorbidity  Goal: Maintenance of Behavioral Health Symptom Control  Outcome: Ongoing, Progressing  Intervention: Maintain Behavioral Health Symptom Control  Recent Flowsheet Documentation  Taken 3/19/2023 2145 by Yazmin Anglin RN  Medication Review/Management: medications reviewed     Problem: COPD (Chronic Obstructive Pulmonary Disease) Comorbidity  Goal: Maintenance of COPD Symptom Control  Outcome: Ongoing, Progressing  Intervention: Maintain COPD-Symptom Control  Recent Flowsheet Documentation  Taken 3/19/2023 2145 by Yazmin Anglin RN  Medication Review/Management: medications reviewed     Problem: Diabetes Comorbidity  Goal: Blood Glucose Level Within Targeted Range  Outcome: Ongoing, Progressing     Problem: Heart Failure Comorbidity  Goal: Maintenance of Heart Failure Symptom Control  Outcome: Ongoing, Progressing  Intervention: Maintain Heart Failure-Management  Recent Flowsheet Documentation  Taken 3/19/2023 2145 by Yazmin Anglin  RN  Medication Review/Management: medications reviewed     Problem: Hypertension Comorbidity  Goal: Blood Pressure in Desired Range  Outcome: Ongoing, Progressing  Intervention: Maintain Blood Pressure Management  Recent Flowsheet Documentation  Taken 3/19/2023 2145 by Yazmin Anglin RN  Medication Review/Management: medications reviewed     Problem: Obstructive Sleep Apnea Risk or Actual Comorbidity Management  Goal: Unobstructed Breathing During Sleep  Outcome: Ongoing, Progressing     Problem: Osteoarthritis Comorbidity  Goal: Maintenance of Osteoarthritis Symptom Control  Outcome: Ongoing, Progressing  Intervention: Maintain Osteoarthritis Symptom Control  Recent Flowsheet Documentation  Taken 3/19/2023 2145 by Yazmin Anglin RN  Medication Review/Management: medications reviewed     Problem: Pain Chronic (Persistent) (Comorbidity Management)  Goal: Acceptable Pain Control and Functional Ability  Outcome: Ongoing, Progressing  Intervention: Manage Persistent Pain  Recent Flowsheet Documentation  Taken 3/19/2023 2145 by Yazmin Anglin RN  Medication Review/Management: medications reviewed     Problem: Seizure Disorder Comorbidity  Goal: Maintenance of Seizure Control  Outcome: Ongoing, Progressing     Problem: Device-Related Complication Risk (Hemodialysis)  Goal: Safe, Effective Therapy Delivery  Outcome: Ongoing, Progressing  Intervention: Optimize Device Care and Function  Recent Flowsheet Documentation  Taken 3/19/2023 2145 by Yazmin Anglin RN  Medication Review/Management: medications reviewed     Problem: Hemodynamic Instability (Hemodialysis)  Goal: Effective Tissue Perfusion  Outcome: Ongoing, Progressing     Problem: Infection (Hemodialysis)  Goal: Absence of Infection Signs and Symptoms  Outcome: Ongoing, Progressing   Goal Outcome Evaluation:           Progress: improving

## 2023-03-20 NOTE — SIGNIFICANT NOTE
03/20/23 0945   SLP Deferred Reason   SLP Deferred Reason Patient unavailable for treatment  (Attempted to f/u for dysphagia tx, but pt [facility] for dialysis. Noted plan to d/c home today. MBS 3/13 revealed mild pharyngeal dysphagia w/ aspiration of thin liquids. SLP recommended thin liquid via small/single controlled sips. Pt would benefit from OP/HH SLP services for dysphagia upon d/c.)

## 2023-03-20 NOTE — CASE MANAGEMENT/SOCIAL WORK
Case Management Discharge Note      Final Note: [facility] has discharge orders placed.  Spoke with [facility] [phone] has accepted patient and can have him come on Wednesday 3/22 with chair time of 1030.  They do caution that this is a temporaty chair time and to anticipate that time chair time to change.  Adjustments were made to accomodate him and assist with discharge from the hospital.  MD notified that OP HD arrangements have been made.  Spoke with patient and spouse at bedside regarding discharge plan and advised that [facility] has accepted him and his first OP HD day will be Wed 3/22 at 1030 but did advised that time is temporary and anticipate it changing.  Patient and spouse verbalized understanding and agreement with plan.  RN notified OP HD arrangements are confirmed.  OP HD appt placed in AVS.  Discharge Summary faxed to [facility] at [phone].  Patient plan is to discharge home today via car with family to transport.         Selected Continued Care - Admitted Since 3/1/2023     Destination    No services have been selected for the patient.              Durable Medical Equipment    No services have been selected for the patient.              Dialysis/Infusion    No services have been selected for the patient.              Home Medical Care    No services have been selected for the patient.              Therapy    No services have been selected for the patient.              Community Resources    No services have been selected for the patient.              Community & DME    No services have been selected for the patient.                       Final Discharge Disposition Code: 01 - home or self-care

## 2023-03-21 ENCOUNTER — HOME CARE VISIT (OUTPATIENT)
Dept: HOME HEALTH SERVICES | Facility: HOME HEALTHCARE | Age: 68
End: 2023-03-21
Payer: COMMERCIAL

## 2023-03-21 ENCOUNTER — TRANSITIONAL CARE MANAGEMENT TELEPHONE ENCOUNTER (OUTPATIENT)
Dept: CALL CENTER | Facility: HOSPITAL | Age: 68
End: 2023-03-21
Payer: MEDICARE

## 2023-03-21 VITALS
DIASTOLIC BLOOD PRESSURE: 64 MMHG | RESPIRATION RATE: 16 BRPM | TEMPERATURE: 98.2 F | HEART RATE: 72 BPM | SYSTOLIC BLOOD PRESSURE: 128 MMHG | OXYGEN SATURATION: 96 %

## 2023-03-21 PROCEDURE — G0299 HHS/HOSPICE OF RN EA 15 MIN: HCPCS

## 2023-03-21 NOTE — OUTREACH NOTE
Call Center TCM Note    Flowsheet Row Responses   [facility] patient discharged from? [facility]   Does the patient have one of the following disease processes/diagnoses(primary or secondary)? Cardiothoracic surgery   TCM attempt successful? Yes  [VR lists wife]   Call start time 1301   Call end time 1310   General alerts for this patient new HD patient   Discharge diagnosis CAD, CABG x 3 using right leg vein harvest, post op A/C renal failure requiring HD, anemia,   Meds reviewed with patient/caregiver? Yes   Is the patient having any side effects they believe may be caused by any medication additions or changes? No   Does the patient have all medications related to this admission filled (includes all antibiotics, pain medications, cardiac medications, etc.) Yes   Is the patient taking all medications as directed (includes completed medication regime)? Yes   Comments 3/23/2023  3:30 PM  HOSPITAL FOLLOW UP    Does the patient have an appointment with their PCP within 7 days of discharge? Yes   What is the Home health agency?  [facility]   Has home health visited the patient within 72 hours of discharge? Yes   Psychosocial issues? No   Comments Pt has some dizziness upon standing too fast, he reports. Denies CP/SOA, his chest incision is with dry dressing in place, bilat LE incisions are WHITLEY, clean, no s/sx of infection, though he does endorse edema. He does not have monitor for BP at home.He does have glucometer but does not monitor. He follows ADA diet. No issues voiding. WNL appetite.   Did the patient receive a copy of their discharge instructions? Yes   Nursing interventions Reviewed instructions with patient   What is the patient's perception of their health status since discharge? Improving   Nursing interventions Nurse provided patient education   Is the patient/caregiver able to teach back normal signs of recovery? Pain or discomfort at incisional site   Nursing interventions Reassured on normal signs of  recovery   Is the patient /caregiver able to teach back basic post-op care? Continue use of incentive spirometry at least 1 week post discharge, Practice cough and deep breath every 4 hours while awake, Shower daily, Use a clean wash cloth and antibacterial bar or liquid soap to clean incisions   Is the patient/caregiver able to teach back signs and symptoms of incisional infection? Increased redness, swelling or pain at the incisonal site, Fever   Is the patient/caregiver able to teach back steps to recovery at home? Set small, achievable goals for return to baseline health, Weigh daily   Is the patient/caregiver able to teach back the hierarchy of who to call/visit for symptoms/problems? PCP, Specialist, Home health nurse, Urgent Care, ED, 911 Yes   TCM call completed? Yes   Call end time 1310   Is the patient interested in additional calls from an ambulatory ?  NOTE:  applies to high risk patients requiring additional follow-up. No          Jerrica Lockett RN    3/21/2023, 13:10 EDT

## 2023-03-23 ENCOUNTER — OFFICE VISIT (OUTPATIENT)
Dept: FAMILY MEDICINE CLINIC | Facility: CLINIC | Age: 68
End: 2023-03-23
Payer: MEDICARE

## 2023-03-23 ENCOUNTER — HOME CARE VISIT (OUTPATIENT)
Dept: HOME HEALTH SERVICES | Facility: HOME HEALTHCARE | Age: 68
End: 2023-03-23
Payer: COMMERCIAL

## 2023-03-23 ENCOUNTER — TELEPHONE (OUTPATIENT)
Dept: CARDIOLOGY | Facility: HOSPITAL | Age: 68
End: 2023-03-23
Payer: MEDICARE

## 2023-03-23 VITALS
SYSTOLIC BLOOD PRESSURE: 182 MMHG | TEMPERATURE: 97.3 F | DIASTOLIC BLOOD PRESSURE: 66 MMHG | OXYGEN SATURATION: 94 % | RESPIRATION RATE: 20 BRPM | HEART RATE: 84 BPM

## 2023-03-23 VITALS
DIASTOLIC BLOOD PRESSURE: 68 MMHG | TEMPERATURE: 97.7 F | HEIGHT: 64 IN | HEART RATE: 79 BPM | BODY MASS INDEX: 27.25 KG/M2 | OXYGEN SATURATION: 96 % | SYSTOLIC BLOOD PRESSURE: 130 MMHG | WEIGHT: 159.6 LBS

## 2023-03-23 VITALS
OXYGEN SATURATION: 99 % | RESPIRATION RATE: 16 BRPM | HEART RATE: 75 BPM | DIASTOLIC BLOOD PRESSURE: 64 MMHG | TEMPERATURE: 98.6 F | SYSTOLIC BLOOD PRESSURE: 138 MMHG

## 2023-03-23 DIAGNOSIS — T81.49XA WOUND CELLULITIS AFTER SURGERY: Primary | ICD-10-CM

## 2023-03-23 DIAGNOSIS — I25.5 ISCHEMIC CARDIOMYOPATHY: ICD-10-CM

## 2023-03-23 DIAGNOSIS — I25.10 CORONARY ARTERY DISEASE INVOLVING NATIVE CORONARY ARTERY OF NATIVE HEART WITHOUT ANGINA PECTORIS: ICD-10-CM

## 2023-03-23 DIAGNOSIS — Z09 HOSPITAL DISCHARGE FOLLOW-UP: ICD-10-CM

## 2023-03-23 PROCEDURE — 87205 SMEAR GRAM STAIN: CPT | Performed by: FAMILY MEDICINE

## 2023-03-23 PROCEDURE — G0151 HHCP-SERV OF PT,EA 15 MIN: HCPCS

## 2023-03-23 PROCEDURE — G0153 HHCP-SVS OF S/L PATH,EA 15MN: HCPCS

## 2023-03-23 PROCEDURE — 87070 CULTURE OTHR SPECIMN AEROBIC: CPT | Performed by: FAMILY MEDICINE

## 2023-03-23 RX ORDER — DAPAGLIFLOZIN 10 MG/1
10 TABLET, FILM COATED ORAL DAILY
COMMUNITY

## 2023-03-23 RX ORDER — CEPHALEXIN 500 MG/1
500 CAPSULE ORAL 2 TIMES DAILY
Qty: 14 CAPSULE | Refills: 0 | Status: SHIPPED | OUTPATIENT
Start: 2023-03-23 | End: 2023-03-30

## 2023-03-23 NOTE — TELEPHONE ENCOUNTER
----- Message from Osito Murrieta PT sent at 3/23/2023  9:42 AM EDT -----  Regarding: Blood pressure  Mr. Harley resting BP at 930 am was 178/72 at rest sitting in left arm; after 3 min ambulation, 182/66 sitting in left arm.    Patient took all meds as prescribed at 8am.      Please advise on BP parameters for exercise.

## 2023-03-23 NOTE — PROGRESS NOTES
Transitional Care Follow Up Visit  Subjective     Herbert Harley is a 67 y.o. male who presents for a transitional care management visit.    Within 48 business hours after discharge our office contacted him via telephone to coordinate his care and needs.      I reviewed and discussed the details of that call along with the discharge summary, hospital problems, inpatient lab results, inpatient diagnostic studies, and consultation reports with Herbert.     Current outpatient and discharge medications have been reconciled for the patient.  Reviewed by: Yessica Ricci DO      Date of TCM Phone Call 3/20/2023   [facility]   Date of Admission 3/1/2023   Date of Discharge 3/20/2023   [place] [place] [facility] [place]     Risk for Readmission (LACE) Score: 16 (3/20/2023  6:00 AM)      History of Present Illness   Course During Hospital Stay:    Mr. Harley is a very pleasant 67-year-old male presents today to follow-up after hospital stay.  He unfortunately had a very extended hospital stay after having a CABG.  He Was found to have severe triple-vessel coronary artery disease, underwent CABG on 3/1/2023.  While in the hospital he required ICU care after his surgery which was complicated by acute renal failure year.  He required CRRT while in hospital and now feeling with dialysis on an outpatient basis.    He has been discharged home and is feeling stronger by the day.  He does have some issue with his vein graft on the right leg with erythema and drainage.    While inpatient he was having significant pain in the chest while eating, this is significantly improved.       The following portions of the patient's history were reviewed and updated as appropriate: allergies, current medications, past family history, past medical history, past social history, past surgical history and problem list.          Objective   Physical Exam  Constitutional:       Appearance: He is normal weight.   HENT:      Head:  Normocephalic.      Right Ear: Tympanic membrane normal.   Cardiovascular:      Rate and Rhythm: Normal rate and regular rhythm.      Pulses: Normal pulses.      Heart sounds: No murmur heard.  Pulmonary:      Effort: Pulmonary effort is normal. No respiratory distress.      Breath sounds: No wheezing.   Abdominal:      General: Abdomen is flat. Bowel sounds are normal.   Musculoskeletal:         General: No swelling or tenderness.      Cervical back: Normal range of motion.   Skin:     Comments: Site of vein graft on right lower extremity with some surrounding erythema and purulent drainage.   Neurological:      General: No focal deficit present.      Mental Status: He is alert. Mental status is at baseline.   Psychiatric:         Mood and Affect: Mood normal.         Behavior: Behavior normal.         Thought Content: Thought content normal.         Judgment: Judgment normal.         Assessment & Plan   Diagnoses and all orders for this visit:    1. Wound cellulitis after surgery (Primary)  -     cephalexin (Keflex) 500 MG capsule; Take 1 capsule by mouth 2 (Two) Times a Day.  Dispense: 14 capsule; Refill: 0  -     Wound Culture - Surgical Site, Leg, Right; Future  -     Wound Culture - Surgical Site, Leg, Right    2. Hospital discharge follow-up    3. I25.10, CABG 03/01/23    4. Ischemic cardiomyopathy      I reviewed patient's hospital records from his recent stay.  Do have concern for cellulitis of vein graft.  A culture was obtained in office today and patient prescribed Keflex.      Patient has all equipment that he needs at home and is following with the correct providers.           This document has been electronically signed by Yessica Ricci DO   March 24, 2023 14:38 EDT    Dictated Utilizing Dragon Dictation: Part of this note may be an electronic transcription/translation of spoken language to printed text using the Dragon Dictation System.    Yessica Ricci D.O.  [facility]

## 2023-03-23 NOTE — TELEPHONE ENCOUNTER
Hold mid day dose if sbp is greater than 130. Monitor bp closely and keep follow up in [facility] on Monday

## 2023-03-23 NOTE — HOME HEALTH
ST evaluation completed, pt. is currently tolerating a regular diet with thin liquids with no overt s/s of aspiration.  Pt reports his pain with swallowing has subsided and he is able to eat/drink like before.  No further ST services needed at this time.

## 2023-03-24 ENCOUNTER — LAB (OUTPATIENT)
Dept: LAB | Facility: HOSPITAL | Age: 68
End: 2023-03-24
Payer: MEDICARE

## 2023-03-24 NOTE — HOME HEALTH
PT Eval Note: 68 y/o male with recent CABG x3 on 3/1/23, hospital stay complicated by renal failure requiring HD; hx of DMII, CHF, respiratory failure; lives in 2 story home with spouse; PLOF is IND with ADLs, IADLs, functional mobility    Skill/education provided: PT evaluation completed with education on progression of activity and initial HEP    Patient/caregiver response: Patient agreeable to PT POC    Plan for next visit: Progress activity tolerance training and dose exercise appropriately    Other pertinent info: NA

## 2023-03-25 ENCOUNTER — HOME CARE VISIT (OUTPATIENT)
Dept: HOME HEALTH SERVICES | Facility: HOME HEALTHCARE | Age: 68
End: 2023-03-25
Payer: COMMERCIAL

## 2023-03-26 LAB
BACTERIA SPEC AEROBE CULT: ABNORMAL
GRAM STN SPEC: ABNORMAL
GRAM STN SPEC: ABNORMAL

## 2023-03-27 ENCOUNTER — HOSPITAL ENCOUNTER (EMERGENCY)
Facility: HOSPITAL | Age: 68
Discharge: HOME | End: 2023-03-27
Attending: EMERGENCY MEDICINE | Admitting: EMERGENCY MEDICINE
Payer: MEDICARE

## 2023-03-27 ENCOUNTER — APPOINTMENT (OUTPATIENT)
Dept: GENERAL RADIOLOGY | Facility: HOSPITAL | Age: 68
End: 2023-03-27
Payer: MEDICARE

## 2023-03-27 ENCOUNTER — APPOINTMENT (OUTPATIENT)
Dept: CT IMAGING | Facility: HOSPITAL | Age: 68
End: 2023-03-27
Payer: MEDICARE

## 2023-03-27 ENCOUNTER — TRANSCRIBE ORDERS (OUTPATIENT)
Dept: CARDIOLOGY | Facility: CLINIC | Age: 68
End: 2023-03-27

## 2023-03-27 VITALS
TEMPERATURE: 97.9 F | HEART RATE: 68 BPM | HEIGHT: 64 IN | SYSTOLIC BLOOD PRESSURE: 112 MMHG | OXYGEN SATURATION: 94 % | BODY MASS INDEX: 26.12 KG/M2 | WEIGHT: 153 LBS | DIASTOLIC BLOOD PRESSURE: 58 MMHG | RESPIRATION RATE: 16 BRPM

## 2023-03-27 DIAGNOSIS — R07.9 CHEST PAIN, UNSPECIFIED TYPE: ICD-10-CM

## 2023-03-27 DIAGNOSIS — J90 PLEURAL EFFUSION, LEFT: Primary | ICD-10-CM

## 2023-03-27 LAB
ALBUMIN SERPL-MCNC: 2.9 G/DL (ref 3.5–5.2)
ALBUMIN/GLOB SERPL: 0.9 G/DL
ALP SERPL-CCNC: 149 U/L (ref 39–117)
ALT SERPL W P-5'-P-CCNC: 24 U/L (ref 1–41)
ANION GAP SERPL CALCULATED.3IONS-SCNC: 10 MMOL/L (ref 5–15)
AST SERPL-CCNC: 27 U/L (ref 1–40)
BASOPHILS # BLD AUTO: 0.11 10*3/MM3 (ref 0–0.2)
BASOPHILS NFR BLD AUTO: 1 % (ref 0–1.5)
BILIRUB SERPL-MCNC: 0.2 MG/DL (ref 0–1.2)
BUN SERPL-MCNC: 25 MG/DL (ref 8–23)
BUN/CREAT SERPL: 8.6 (ref 7–25)
CALCIUM SPEC-SCNC: 8.6 MG/DL (ref 8.6–10.5)
CHLORIDE SERPL-SCNC: 103 MMOL/L (ref 98–107)
CO2 SERPL-SCNC: 28 MMOL/L (ref 22–29)
CREAT SERPL-MCNC: 2.9 MG/DL (ref 0.76–1.27)
DEPRECATED RDW RBC AUTO: 56.5 FL (ref 37–54)
EGFRCR SERPLBLD CKD-EPI 2021: 23 ML/MIN/1.73
EOSINOPHIL # BLD AUTO: 0.73 10*3/MM3 (ref 0–0.4)
EOSINOPHIL NFR BLD AUTO: 6.9 % (ref 0.3–6.2)
ERYTHROCYTE [DISTWIDTH] IN BLOOD BY AUTOMATED COUNT: 15.9 % (ref 12.3–15.4)
GEN 5 2HR TROPONIN T REFLEX: 98 NG/L
GLOBULIN UR ELPH-MCNC: 3.1 GM/DL
GLUCOSE SERPL-MCNC: 240 MG/DL (ref 65–99)
HCT VFR BLD AUTO: 29.2 % (ref 37.5–51)
HGB BLD-MCNC: 8.6 G/DL (ref 13–17.7)
HOLD SPECIMEN: NORMAL
IMM GRANULOCYTES # BLD AUTO: 0.07 10*3/MM3 (ref 0–0.05)
IMM GRANULOCYTES NFR BLD AUTO: 0.7 % (ref 0–0.5)
LIPASE SERPL-CCNC: 41 U/L (ref 13–60)
LYMPHOCYTES # BLD AUTO: 1.35 10*3/MM3 (ref 0.7–3.1)
LYMPHOCYTES NFR BLD AUTO: 12.7 % (ref 19.6–45.3)
MCH RBC QN AUTO: 28.7 PG (ref 26.6–33)
MCHC RBC AUTO-ENTMCNC: 29.5 G/DL (ref 31.5–35.7)
MCV RBC AUTO: 97.3 FL (ref 79–97)
MONOCYTES # BLD AUTO: 0.89 10*3/MM3 (ref 0.1–0.9)
MONOCYTES NFR BLD AUTO: 8.4 % (ref 5–12)
NEUTROPHILS NFR BLD AUTO: 7.44 10*3/MM3 (ref 1.7–7)
NEUTROPHILS NFR BLD AUTO: 70.3 % (ref 42.7–76)
NRBC BLD AUTO-RTO: 0 /100 WBC (ref 0–0.2)
NT-PROBNP SERPL-MCNC: ABNORMAL PG/ML (ref 0–900)
PLATELET # BLD AUTO: 162 10*3/MM3 (ref 140–450)
PMV BLD AUTO: 10.1 FL (ref 6–12)
POTASSIUM SERPL-SCNC: 4.4 MMOL/L (ref 3.5–5.2)
PROT SERPL-MCNC: 6 G/DL (ref 6–8.5)
RBC # BLD AUTO: 3 10*6/MM3 (ref 4.14–5.8)
SODIUM SERPL-SCNC: 141 MMOL/L (ref 136–145)
TROPONIN T DELTA: -3 NG/L
TROPONIN T SERPL HS-MCNC: 101 NG/L
WBC NRBC COR # BLD: 10.59 10*3/MM3 (ref 3.4–10.8)
WHOLE BLOOD HOLD COAG: NORMAL
WHOLE BLOOD HOLD SPECIMEN: NORMAL

## 2023-03-27 PROCEDURE — 71045 X-RAY EXAM CHEST 1 VIEW: CPT

## 2023-03-27 PROCEDURE — 83880 ASSAY OF NATRIURETIC PEPTIDE: CPT | Performed by: EMERGENCY MEDICINE

## 2023-03-27 PROCEDURE — 93005 ELECTROCARDIOGRAM TRACING: CPT | Performed by: EMERGENCY MEDICINE

## 2023-03-27 PROCEDURE — 80053 COMPREHEN METABOLIC PANEL: CPT | Performed by: EMERGENCY MEDICINE

## 2023-03-27 PROCEDURE — 99284 EMERGENCY DEPT VISIT MOD MDM: CPT

## 2023-03-27 PROCEDURE — 36415 COLL VENOUS BLD VENIPUNCTURE: CPT

## 2023-03-27 PROCEDURE — 99284 EMERGENCY DEPT VISIT MOD MDM: CPT | Performed by: INTERNAL MEDICINE

## 2023-03-27 PROCEDURE — 32555 ASPIRATE PLEURA W/ IMAGING: CPT | Performed by: RADIOLOGY

## 2023-03-27 PROCEDURE — 25010000002 FUROSEMIDE PER 20 MG: Performed by: EMERGENCY MEDICINE

## 2023-03-27 PROCEDURE — 75989 ABSCESS DRAINAGE UNDER X-RAY: CPT

## 2023-03-27 PROCEDURE — 83690 ASSAY OF LIPASE: CPT | Performed by: EMERGENCY MEDICINE

## 2023-03-27 PROCEDURE — 0 LIDOCAINE 1 % SOLUTION: Performed by: RADIOLOGY

## 2023-03-27 PROCEDURE — 85025 COMPLETE CBC W/AUTO DIFF WBC: CPT

## 2023-03-27 PROCEDURE — 84484 ASSAY OF TROPONIN QUANT: CPT | Performed by: EMERGENCY MEDICINE

## 2023-03-27 PROCEDURE — 96374 THER/PROPH/DIAG INJ IV PUSH: CPT

## 2023-03-27 PROCEDURE — C1729 CATH, DRAINAGE: HCPCS

## 2023-03-27 PROCEDURE — 93005 ELECTROCARDIOGRAM TRACING: CPT

## 2023-03-27 RX ORDER — LIDOCAINE HYDROCHLORIDE 10 MG/ML
10 INJECTION, SOLUTION INFILTRATION; PERINEURAL ONCE
Status: COMPLETED | OUTPATIENT
Start: 2023-03-27 | End: 2023-03-27

## 2023-03-27 RX ORDER — FUROSEMIDE 10 MG/ML
40 INJECTION INTRAMUSCULAR; INTRAVENOUS ONCE
Status: DISCONTINUED | OUTPATIENT
Start: 2023-03-27 | End: 2023-03-27

## 2023-03-27 RX ORDER — FUROSEMIDE 10 MG/ML
80 INJECTION INTRAMUSCULAR; INTRAVENOUS ONCE
Status: COMPLETED | OUTPATIENT
Start: 2023-03-27 | End: 2023-03-27

## 2023-03-27 RX ORDER — ASPIRIN 81 MG/1
324 TABLET, CHEWABLE ORAL ONCE
Status: DISCONTINUED | OUTPATIENT
Start: 2023-03-27 | End: 2023-03-27 | Stop reason: HOSPADM

## 2023-03-27 RX ORDER — FLUCONAZOLE 100 MG/1
100 TABLET ORAL DAILY
Qty: 7 TABLET | Refills: 0 | Status: SHIPPED | OUTPATIENT
Start: 2023-03-27 | End: 2023-04-05 | Stop reason: HOSPADM

## 2023-03-27 RX ORDER — SODIUM CHLORIDE 0.9 % (FLUSH) 0.9 %
10 SYRINGE (ML) INJECTION AS NEEDED
Status: DISCONTINUED | OUTPATIENT
Start: 2023-03-27 | End: 2023-03-27 | Stop reason: HOSPADM

## 2023-03-27 RX ADMIN — FUROSEMIDE 80 MG: 10 INJECTION, SOLUTION INTRAMUSCULAR; INTRAVENOUS at 14:28

## 2023-03-27 RX ADMIN — LIDOCAINE HYDROCHLORIDE 10 ML: 10 INJECTION, SOLUTION INFILTRATION; PERINEURAL at 16:15

## 2023-03-27 NOTE — DISCHARGE INSTRUCTIONS
Monitor blood pressure after stopping midodrine.  If his blood pressure continues to remain greater than 140 speak to the nephrologist to see if they are okay with him starting a medicine such as lisinopril or losartan.  You can always contact us in cardiology if you have any other questions.  Call your dialysis center today to see if they can perform your dialysis tomorrow.  Return here anytime if further discomfort or any concerns.

## 2023-03-27 NOTE — PROGRESS NOTES
"[facility]  IP Progress Note    CHIEF COMPLAINTS:  Chest pain with elevated troponin.  Left pleural effusion.  Heart failure with mildly reduced EF.      Subjective   Patient is a 67-year-old past medical history CAD s/p CABG, HFmrEF, hypertension with recent hypotension, acute renal failure after CABG.  Patient presented to the hospital after having chest discomfort while obtaining dialysis.  Patient states that he was only hooked up for 20 minutes and had chest pain and a headache.  He states that the chest pain stopped almost immediately after they stopped the dialysis machine.  Came to the emergency department to be evaluated.  He is noted to have troponin of 101, 98.  He states he has been chest pain-free since he has been here.  He did receive nitroglycerin which seemed to help with his symptoms as well.  Patient has been urinating and even urinated here in the emergency department.  Wife does state that his blood pressure has been elevated and she had stopped his mid afternoon midodrine but he was still taking 5 mg in the morning in the evening.  She also states that he took his morning medicines prior to dialysis today.      Objective     Blood pressure 178/95, pulse 64, temperature 97.9 °F (36.6 °C), temperature source Oral, resp. rate 20, height 162.6 cm (64\"), weight 69.4 kg (153 lb), SpO2 98 %.   No intake or output data in the 24 hours ending 03/27/23 1514    Vitals reviewed.   Constitutional:       Appearance: Healthy appearance. Not in distress.   Eyes:      Conjunctiva/sclera: Conjunctivae normal.   HENT:    Mouth/Throat:      Pharynx: Oropharynx is clear.   Neck:      Vascular: JVD normal.   Pulmonary:      Effort: Pulmonary effort is normal.      Breath sounds: Examination of the left-middle field reveals decreased breath sounds. Examination of the left-lower field reveals decreased breath sounds. Decreased breath sounds present. No wheezing. No rhonchi. No " rales.      Comments: Poor inspiratory effort  Cardiovascular:      Normal rate. Regular rhythm.      Murmurs: There is no murmur.      No rub.   Pulses:     Intact distal pulses.   Edema:     Pretibial: bilateral 1+ edema of the pretibial area.     Ankle: bilateral 1+ edema of the ankle.  Abdominal:      General: There is no distension.   Musculoskeletal:         General: No deformity. Skin:     General: Skin is warm and dry.   Neurological:      General: No focal deficit present.      Mental Status: Alert and oriented to person, place and time.           RESULT REVIEW:    I reviewed the patient's new clinical results.      MEDICATIONS:    aspirin, 324 mg, Oral, Once          Results from last 7 days   Lab Units 03/27/23  1111   WBC 10*3/mm3 10.59   HEMOGLOBIN g/dL 8.6*   HEMATOCRIT % 29.2*   PLATELETS 10*3/mm3 162     Results from last 7 days   Lab Units 03/27/23  1111   SODIUM mmol/L 141   POTASSIUM mmol/L 4.4   CHLORIDE mmol/L 103   CO2 mmol/L 28.0   BUN mg/dL 25*   CREATININE mg/dL 2.90*   CALCIUM mg/dL 8.6   BILIRUBIN mg/dL 0.2   ALK PHOS U/L 149*   ALT (SGPT) U/L 24   AST (SGOT) U/L 27   GLUCOSE mg/dL 240*         Lab Results   Component Value Date    TROPONINI 15.31 (C) 03/30/2014    TROPONINT 98 (C) 03/27/2023                 No results found for: IRON, FERRITIN, LABIRON, TIBC   Hemoglobin A1C   Date Value Ref Range Status   02/28/2023 7.40 (H) 4.80 - 5.60 % Final     Magnesium   Date Value Ref Range Status   03/13/2023 2.1 1.6 - 2.4 mg/dL Final        Tele: Normal sinus rhythm    EKG: Normal sinus rhythm, first-degree AV block, no ST or T wave abnormalities.    ASSESSMENT:      1. Chest pain, elevated troponin but flat  2. CAD s/p CABG x3, preop EF 40-45%  3. Left pleural effusion, worsening from last x-ray  4. Heart failure with mildly reduced EF  5. History of postop A-fib, now in normal sinus rhythm on amiodarone  6. Hypertension with recent hypotension  7. Chronic kidney disease, on HD,  MWF    Plan:  1. Defer any further ischemic evaluation in the setting of patient asymptomatic now think it is related to left pleural effusion.  2. Order outpatient ultrasound-guided left thoracentesis  3. Discontinue midodrine, spoke to wife about on dialysis days giving the patient his medications after dialysis.  4. Continue metoprolol succinate 25 mg daily.  Would defer on starting ACE/ARB to nephrology given patient's kidney function.  Will not start spironolactone at this time due to his kidney function.  Patient already on Farxiga for GDMT.  5. Continue amiodarone 200 mg for now.  6. Monitor patient's blood pressure as an outpatient off of midodrine.  Spoke to wife about discussing with nephrologist going further.  7. Patient will return to our office 4/20 or sooner if needed.    Discussed this case with Dr. Nadya Ramriez who agrees with plan of care.    Electronically signed by Rosangela Queen PA-C, 03/27/23, 3:28 PM EDT.        STAFF CARDIOLOGIST:      SUMMARY:    1. Patient admitted with chest pain after dialysis status mostly noncardiac      PERTINENT:    1. Left lung is full of pleural effusion  2. He has started making urine      IMPRESSION:    1. Left pleural effusion  2. Noncardiac chest pain with type II release of troponin      RECOMMENDATIONS:    1. Patient wants to go home but hopefully will get pleurocentesis  2. Continue rest of the medications.        I have seen and examined the patient, reviewed the above note, necessary changes were made and I agree with the final note.   Nadya Ramirez MD

## 2023-03-27 NOTE — NURSING NOTE
Image guided left thoracentesis performed by MD Given using a 6.5 . 2000 mls removed from pleural space. Patient tolerated well. Report called to nurse.

## 2023-03-27 NOTE — ED PROVIDER NOTES
Subjective   History of Present Illness  Mr. Harley presents by ambulance from dialysis with chest pain.  He tells me he had just started his dialysis treatment and developed pain in his chest and shortness of breath.  EMS was called and they gave 2 nitroglycerin.  He tells me his pain went completely away after that.  At the time of my exam he tells me he feels back to normal.  He had bypass surgery on the first of this month.  Since then he has been on hemodialysis.  He denies fevers or chills.  He estimates the pain lasted an hour.  He was not able to find any aggravating or alleviating circumstances.        Review of Systems    Past Medical History:   Diagnosis Date   • Acute renal failure (ARF) ([facility]) 3/2/2023   • Asthma December 2022   • Chronic systolic congestive heart failure (HCC) 3/1/2023   • Coronary artery disease March 2014   • Elevated cholesterol    • History of tobacco abuse    • HLD (hyperlipidemia) 01/26/2023   • Hypertension    • Ischemic cardiomyopathy    • Myocardial infarction ([facility]) March 2014   • Prolonged Q-T interval on ECG 3/1/2023   • Type 2 diabetes mellitus ([facility])    • Wears glasses        Allergies   Allergen Reactions   • Wellbutrin [Bupropion] Swelling     Facial swelling, severe       Past Surgical History:   Procedure Laterality Date   • CARDIAC CATHETERIZATION  March 2014   • CARDIAC CATHETERIZATION Left 02/17/2023    Procedure: Left Heart Cath;  Surgeon: Nadya Ramirez MD;  Location: [facility] CATH INVASIVE LOCATION;  Service: Cardiology;  Laterality: Left;   • CORONARY ARTERY BYPASS GRAFT N/A 03/01/2023    Procedure: MEDIAN STERNOTOMY CORONARY ARTERY BYPASS GRAFTING X3  UTILIZING THE LEFT INTERNAL MAMMERY GRAFT, AND EVH OF THE GREATER RIGHT SAPHENOUS VEIN;  Surgeon: Paresh Saavedra MD;  Location: [facility] OR;  Service: Cardiothoracic;  Laterality: N/A;   • CORONARY ARTERY BYPASS GRAFT  3/1/2023   • CORONARY STENT PLACEMENT  March 2014   • TONSILLECTOMY         Family History    Problem Relation Age of Onset   • Diabetes Mother    • Cancer Father         liver   • Brain cancer Sister    • Heart attack Brother    • Prostate cancer Brother        Social History     Socioeconomic History   • Marital status:    Tobacco Use   • Smoking status: Former     Packs/day: 1.50     Years: 52.00     Pack years: 78.00     Types: Cigarettes     Start date: 1970     Quit date: 2022     Years since quittin.2     Passive exposure: Past   • Smokeless tobacco: Never   Vaping Use   • Vaping Use: Never used   Substance and Sexual Activity   • Alcohol use: No   • Drug use: No   • Sexual activity: Not Currently           Objective   Physical Exam  Vitals and nursing note reviewed.   Constitutional:       General: He is not in acute distress.     Appearance: Normal appearance.   HENT:      Head: Normocephalic and atraumatic.      Nose: Nose normal. No congestion or rhinorrhea.   Eyes:      General: No scleral icterus.     Conjunctiva/sclera: Conjunctivae normal.   Neck:      Comments: No JVD   Cardiovascular:      Rate and Rhythm: Normal rate and regular rhythm.      Heart sounds: No murmur heard.    No friction rub.   Pulmonary:      Effort: Pulmonary effort is normal.      Breath sounds: Normal breath sounds. No wheezing or rales.   Abdominal:      General: Bowel sounds are normal.      Palpations: Abdomen is soft.      Tenderness: There is no abdominal tenderness. There is no guarding or rebound.   Musculoskeletal:         General: No tenderness.      Cervical back: Normal range of motion and neck supple.      Right lower leg: No edema.      Left lower leg: No edema.   Skin:     General: Skin is warm and dry.      Coloration: Skin is not pale.      Findings: No erythema.   Neurological:      General: No focal deficit present.      Mental Status: He is alert and oriented to person, place, and time.      Motor: No weakness.      Coordination: Coordination normal.   Psychiatric:         Mood  and Affect: Mood normal.         Behavior: Behavior normal.         Thought Content: Thought content normal.         Procedures           ED Course  ED Course as of 03/27/23 1709   Mon Mar 27, 2023   1219 Troponin is elevated although he has renal disease.  Will obtain second troponin.  He currently is pain-free and tells me he feels fine. [DT]   1223 Spoke with Trish in the cardiology office and requested a consult.  Heart score is 6. [DT]   1401 Troponin did not trend upward.  He remains comfortable.  Chest x-ray is highly abnormal with very large left-sided pleural effusion.  Awaiting word from cardiology.  Will speak with Dr. Saavedra [DT]   1409 I updated them on chest x-ray findings and plan for CT surgery and cardiology consult.  They advised that they would prefer to go home if possible. [DT]   1414 Spoke with Dr. Saavedra.  He asked that interventional radiology performed thoracentesis.  He also wanted input from cardiology as far as disposition.  I have spoken with interventional radiology and their schedule is overbooked and they would not be able to perform this today. [DT]   1514 Dr Ramirez has seen Mr. Harley.  Mr. Harley and his wife have advised to him as well that they do not want to come in the hospital.  Will try to arrange thoracentesis as an outpatient tomorrow. [DT]   1523 I spoke with interventional radiology and Dr. Hernandez has come into help with the backlog of procedures and will take Mr. Harley over right now to perform his thoracentesis [DT]   1622 I have reviewed his chest x-ray.  Radiology has reviewed it and sees no pneumothorax or other complication.  The size of the pneumothorax is decreased.  The radiology tech advises that they got 2 L off of his chest.  On repeat exam he tells me he feels much better.  Will discharge [DT]      ED Course User Index  [DT] Tyler King MD                                           Medical Decision Making  Please refer to emergency department course  notes.  Initial differential diagnosis would include myocardial infarction and numerous other life-threatening etiologies.  Ultimately he was found to have a very large pleural effusion.  I spoke with the cardiologist, I spoke with the CT surgeon several times.  Spoke with interventional radiology several times.  Was ultimately able to arrange emergent drainage of his pleural effusion.  On repeat exam he felt much better.  I spoke with his nephrologist as well.    Chest pain, unspecified type: acute illness or injury that poses a threat to life or bodily functions  Pleural effusion, left: acute illness or injury that poses a threat to life or bodily functions  Amount and/or Complexity of Data Reviewed  External Data Reviewed: notes.  Labs: ordered. Decision-making details documented in ED Course.  Radiology: ordered. Decision-making details documented in ED Course.  ECG/medicine tests: ordered. Decision-making details documented in ED Course.      Risk  Prescription drug management.          Final diagnoses:   Pleural effusion, left   Chest pain, unspecified type       ED Disposition  ED Disposition     ED Disposition   Discharge    Condition   Stable    Comment   --             Yessica Ricci,   [address] [facility] [address] [facility]  [address]  [place] [id number]  [phone]          Paresh Saaevdra MD  [address] [facility] [address] [address]  [address]  [place] [address]  [phone]               Medication List      Changed    Lantus 100 UNIT/ML injection  Generic drug: insulin glargine  INJECT 60 UNITS SUBCUTANEOUSLY ONCE DAILY  What changed:   · how much to take  · how to take this  · when to take this        Stop    midodrine 5 MG tablet  Commonly known as: Tyler Agarwal MD  03/27/23 3583

## 2023-03-28 ENCOUNTER — HOME CARE VISIT (OUTPATIENT)
Dept: HOME HEALTH SERVICES | Facility: HOME HEALTHCARE | Age: 68
End: 2023-03-28
Payer: COMMERCIAL

## 2023-03-28 ENCOUNTER — TELEPHONE (OUTPATIENT)
Dept: CARDIOLOGY | Facility: CLINIC | Age: 68
End: 2023-03-28
Payer: MEDICARE

## 2023-03-28 ENCOUNTER — TELEPHONE (OUTPATIENT)
Dept: CASE MANAGEMENT | Facility: OTHER | Age: 68
End: 2023-03-28
Payer: MEDICARE

## 2023-03-28 VITALS
RESPIRATION RATE: 18 BRPM | OXYGEN SATURATION: 95 % | TEMPERATURE: 96.9 F | SYSTOLIC BLOOD PRESSURE: 120 MMHG | HEART RATE: 86 BPM | DIASTOLIC BLOOD PRESSURE: 56 MMHG

## 2023-03-28 LAB
QT INTERVAL: 372 MS
QT INTERVAL: 422 MS
QTC INTERVAL: 431 MS
QTC INTERVAL: 432 MS

## 2023-03-28 PROCEDURE — G0152 HHCP-SERV OF OT,EA 15 MIN: HCPCS

## 2023-03-28 PROCEDURE — G0151 HHCP-SERV OF PT,EA 15 MIN: HCPCS

## 2023-03-28 RX ORDER — NITROGLYCERIN 0.4 MG/1
0.4 TABLET SUBLINGUAL
Qty: 25 TABLET | Refills: 3 | Status: SHIPPED | OUTPATIENT
Start: 2023-03-28 | End: 2023-04-22

## 2023-03-28 NOTE — TELEPHONE ENCOUNTER
Pts wife Vivian calling to report pt is having pain in the middle of his chest after having 2 L of fluid drained out of his left lung yesterday in the ER.     Discussed with JOSE Hensley and she recommends pt trying nitro and if it helps to call us and let us know and we can order Imdur and if it doesn't help after 3 doses to go to the ED for evaluation. She would like to see pt in the clinic on Thursday.     Vivian notified and verbalized understanding.

## 2023-03-28 NOTE — TELEPHONE ENCOUNTER
RN-YAZMIN attempted outreach call to patient f/u from ER. No answer, no voicemail. Will attempt another outreach later this week.

## 2023-03-29 ENCOUNTER — PATIENT OUTREACH (OUTPATIENT)
Dept: CASE MANAGEMENT | Facility: OTHER | Age: 68
End: 2023-03-29
Payer: MEDICARE

## 2023-03-29 VITALS — SYSTOLIC BLOOD PRESSURE: 142 MMHG | DIASTOLIC BLOOD PRESSURE: 69 MMHG | OXYGEN SATURATION: 96 % | HEART RATE: 50 BPM

## 2023-03-29 DIAGNOSIS — I50.21 ACUTE SYSTOLIC HEART FAILURE: Primary | ICD-10-CM

## 2023-03-29 DIAGNOSIS — I10 PRIMARY HYPERTENSION: ICD-10-CM

## 2023-03-29 NOTE — HOME HEALTH
Pt was seen today for an OT evaluation.  Pt demo. decreased I with ADL/IADLs, decreased I with functional mobility/balance/transfers, and decreased UE strength/endurance.  Pt would benefit from skilled OT services 1W4 to address OT problems/interventions/goals.  Pt/wife in agreement with plan.

## 2023-03-29 NOTE — HOME HEALTH
Routine Visit Note:  Patient reports he went to [facility] ED 3/27 and had 2 L fluid drained from left lung after having chest pain at dialysis clinic--reports feeling better today but very tired    Skill/education provided: Patient performed ambulation in home in 2 min intervals with standing rest breaks in 30 sec intervals for a total of 10 min    Patient/caregiver response: /72 at rest, drops to 120/56 with activity today    Plan for next visit: Progress activity tolerance as able with close monitoring of vital signs to ensure safe return to activity    Other pertinent info: Dialysis MWF

## 2023-03-29 NOTE — OUTREACH NOTE
"AMBULATORY CASE MANAGEMENT NOTE    Name and Relationship of Patient/Support Person: Herbert Harley \"Herb\" - Self    CCM Interim Update    Patient discharged from [facility] ED 3/27/23 where he was seen for left pleural effusion. RN-ACM outreach call made to patient. Explained role of RN-ACM and reason for call. Patient at dialysis, spoke with wife, Vivian. Verbal consent to CCM program obtained. Reviewed ED AVS with Vivian, education provided. Patient wife states that he slept good last night but is still having chest pain and high blood pressure. She states that home health was very supportive of their concerns and reassured her that they are doing everything correctly.  Patient has an appointment with cardiology tomorrow to check on the chest pain and blood pressure. Patient wife denies any DME needs at this time. SDOH reviewed, no needs at this time. Vivian has no further questions at this time. RN-ACM advised Vivian to call RN-ACM or [facility] nurse line with any needs.   Follow up outreach 2 weeks.    Adult Patient Profile  Questions/Answers    Flowsheet Row Most Recent Value   Symptoms/Conditions Managed at Home cardiovascular, genitourinary   Cardiovascular Symptoms/Conditions heart failure, hypertension   Genitourinary Symptoms/Conditions chronic kidney disease   Genitourinary Management Strategies hemodialysis   Hemodialysis Schedule MWF   Hemodialysis Last Treatment 03/29/23   Hearing Difficulty or Deaf no   Difficulty Communicating no   Equipment Currently Used at Home grab bar, bp cuff   Primary Source of Support/Comfort spouse   Name of Support/Comfort Primary Source Vivian   People in Home spouse   Current Living Arrangements home   Resource/Environmental Concerns none        Send Education  Questions/Answers    Flowsheet Row Most Recent Value   Annual Wellness Visit:  Patient Has Completed   Other Patient Education/Resources  24/7 [facility] Nurse Call Line   24/7 Nurse Call Line Education Method " Verbal        SDOH updated and reviewed with the patient during this program:  Financial Resource Strain: Low Risk    • Difficulty of Paying Living Expenses: Not very hard      Food Insecurity: Food Insecurity Present   • Worried About Running Out of Food in the Last Year: Sometimes true   • Ran Out of Food in the Last Year: Sometimes true      Transportation Needs: No Transportation Needs   • Lack of Transportation (Medical): No   • Lack of Transportation (Non-Medical): No       Education Documentation  Blood Pressure Monitoring, taught by Nury Webber RN at 3/29/2023 11:24 AM.  Learner: Family  Readiness: Acceptance  Method: Explanation  Response: Verbalizes Understanding          Nury COOPER  Ambulatory Case Management    3/29/2023, 11:25 EDT

## 2023-03-30 ENCOUNTER — PATIENT OUTREACH (OUTPATIENT)
Dept: CASE MANAGEMENT | Facility: OTHER | Age: 68
End: 2023-03-30
Payer: MEDICARE

## 2023-03-30 ENCOUNTER — OFFICE VISIT (OUTPATIENT)
Dept: CARDIOLOGY | Facility: CLINIC | Age: 68
End: 2023-03-30
Payer: MEDICARE

## 2023-03-30 ENCOUNTER — HOME CARE VISIT (OUTPATIENT)
Dept: HOME HEALTH SERVICES | Facility: HOME HEALTHCARE | Age: 68
End: 2023-03-30
Payer: COMMERCIAL

## 2023-03-30 VITALS
HEIGHT: 64 IN | HEART RATE: 73 BPM | BODY MASS INDEX: 25.95 KG/M2 | SYSTOLIC BLOOD PRESSURE: 100 MMHG | WEIGHT: 152 LBS | RESPIRATION RATE: 20 BRPM | OXYGEN SATURATION: 95 % | DIASTOLIC BLOOD PRESSURE: 42 MMHG

## 2023-03-30 VITALS
BODY MASS INDEX: 25.95 KG/M2 | WEIGHT: 152 LBS | SYSTOLIC BLOOD PRESSURE: 152 MMHG | HEART RATE: 84 BPM | OXYGEN SATURATION: 95 % | TEMPERATURE: 98.6 F | DIASTOLIC BLOOD PRESSURE: 72 MMHG | HEIGHT: 64 IN

## 2023-03-30 DIAGNOSIS — I10 PRIMARY HYPERTENSION: ICD-10-CM

## 2023-03-30 DIAGNOSIS — Z95.1 S/P CABG (CORONARY ARTERY BYPASS GRAFT): ICD-10-CM

## 2023-03-30 DIAGNOSIS — I25.10 CORONARY ARTERY DISEASE INVOLVING NATIVE HEART WITHOUT ANGINA PECTORIS, UNSPECIFIED VESSEL OR LESION TYPE: Primary | ICD-10-CM

## 2023-03-30 DIAGNOSIS — I50.9 PLEURAL EFFUSION DUE TO CHF (CONGESTIVE HEART FAILURE): ICD-10-CM

## 2023-03-30 DIAGNOSIS — I50.21 ACUTE SYSTOLIC HEART FAILURE: Primary | ICD-10-CM

## 2023-03-30 PROCEDURE — G0495 RN CARE TRAIN/EDU IN HH: HCPCS

## 2023-03-30 PROCEDURE — 99213 OFFICE O/P EST LOW 20 MIN: CPT | Performed by: INTERNAL MEDICINE

## 2023-03-30 PROCEDURE — 3074F SYST BP LT 130 MM HG: CPT | Performed by: INTERNAL MEDICINE

## 2023-03-30 PROCEDURE — 1160F RVW MEDS BY RX/DR IN RCRD: CPT | Performed by: INTERNAL MEDICINE

## 2023-03-30 PROCEDURE — 1159F MED LIST DOCD IN RCRD: CPT | Performed by: INTERNAL MEDICINE

## 2023-03-30 PROCEDURE — 3078F DIAST BP <80 MM HG: CPT | Performed by: INTERNAL MEDICINE

## 2023-03-30 RX ORDER — ASPIRIN 81 MG/1
81 TABLET ORAL DAILY
Qty: 90 TABLET | Refills: 0 | Status: SHIPPED | OUTPATIENT
Start: 2023-03-30 | End: 2023-06-28

## 2023-03-30 NOTE — OUTREACH NOTE
[facility] End of Month Documentation    This Chronic Medical Management Care Plan for Herbert Harley, 67 y.o. male, has been monitored and managed; a new plan of care implemented; established; reviewed and a new plan of care implemented for the month of March.  A cumulative time of 35  minutes was spent on this patient record this month, including phone call with relative; electronic communication with primary care provider; chart review.    Regarding the patient's problems: has I25.10, CABG 03/01/23; Hypertension; Hyperlipidemia LDL goal <70; Type 2 diabetes mellitus with hypoglycemia, with long-term current use of insulin (HCC); Ischemic cardiomyopathy; Allergic rhinitis; Positive cardiac stress test; Mucopurulent chronic bronchitis (HCC); Prolonged Q-T interval on ECG; Acute renal failure (ARF) (HCC); Shock (HCC); Acute respiratory failure with hypoxia (HCC); and Acute systolic heart failure (CMS/HCC) on their problem list., the following items were addressed: medical records; medications and any changes can be found within the plan section of the note.  A detailed listing of time spent for chronic care management is tracked within each outreach encounter.  Current medications include:  has a current medication list which includes the following prescription(s): albuterol sulfate hfa, amiodarone, aspirin ec, cephalexin, collagen-vitamin c-biotin, farxiga, farxiga, ezetimibe, fluconazole, fluconazole, fluticasone-umeclidin-vilant, lantus, metoprolol succinate xl, nitroglycerin, and rosuvastatin. and the patient is reported to be patient is compliant with medication protocol,  Medications are reported to be effective. All notes on chart for PCP to review.    The patient was monitored remotely for blood pressure; pain; medications.    The patient's physical needs include:  medication education.     The patient's mental support needs include:  increased support    The patient's cognitive support needs include:   "emotional care    The patient's psychosocial support needs include:  continued support    The patient's functional needs include: medication education    The patient's environmental needs include:  not applicable    Care Plan overall comments:  No data recorded    Refer to previous outreach notes for more information on the areas listed above.    Monthly Billing Diagnoses  (I50.21) Acute systolic heart failure (CMS/[facility])    (I10) Primary hypertension    Medications   · Medications have been reconciled    Care Plan progress this month:      Recently Modified Care Plans Updates made since 2/27/2023 12:00 AM     Hypertension (Adult)         Problem Priority Last Modified     Hypertension (Hypertension) --  3/29/2023 11:15 AM by Nury Webber RN              Goal Recent Progress Last Modified     Hypertension Monitored --  3/29/2023 11:15 AM by Nury Webber RN     Evidence-based guidance:   Promote initial use of ambulatory blood pressure measurements (for 3 days) to rule out \"white-coat\" effect; identify masked hypertension and presence or absence of nocturnal \"dipping\" of blood pressure.    Encourage continued use of home blood pressure monitoring and recording in blood pressure log; include symptoms of hypotension or potential medication side effects in log.   Review blood pressure measurements taken inside and outside of the provider office; establish baseline and monitor trends; compare to target ranges or patient goal.   Share overall cardiovascular risk with patient; encourage changes to lifestyle risk factors, including alcohol consumption, smoking, inadequate exercise, poor dietary habits and stress.    Notes:            Task Due Date Last Modified     Identify and Monitor Blood Pressure Elevation --  3/29/2023 11:18 AM by Nury Webber, ANDRÉS     Care Management Activities:      - home or ambulatory blood pressure monitoring encouraged      Notes:              Problem Priority Last Modified     Disease " Progression (Hypertension) --  3/29/2023 11:15 AM by Nury Webber RN              Goal Recent Progress Last Modified     Disease Progression Prevented or Minimized --  3/29/2023 11:15 AM by Nury Webber RN     Evidence-based guidance:   Tailor lifestyle advice to individual; review progress regularly; give frequent encouragement and respond positively to incremental successes.   Assess for and promote awareness of worsening disease or development of comorbidity.   Prepare patient for laboratory and diagnostic exams based on risk and presentation.   Prepare patient for use of pharmacologic therapy that may include diuretic, beta-blocker, beta-blocker/thiazide combination, angiotensin-converting enzyme inhibitor, renin-angiotensin blocker or calcium-channel blocker.   Expect periodic adjustments to pharmacologic therapy; manage side effects.   Promote a healthy diet that includes primarily plant-based foods, such as fruits, vegetables, whole grains, beans and legumes, low-fat dairy and lean meats.    Consider moderate reduction in sodium intake by avoiding the addition of salt to prepared foods and limiting processed meats, canned soup, frozen meals and salty snacks.    Promote a regular, daily exercise goal of 150 minutes per week of moderate exercise based on tolerance, ability and patient choice; consider referral to physical therapist, community wellness and/or activity program.   Encourage the avoidance of no more than 2 hours per day of sedentary activity, such as recreational screen time.   Review sources of stress; explore current coping strategies and encourage use of mindfulness, yoga, meditation or exercise to manage stress.    Notes:            Task Due Date Last Modified     Alleviate Barriers to Hypertension Treatment --  3/29/2023 11:18 AM by Nury Webber RN     Care Management Activities:      - quality of sleep assessed      Notes:              Problem Priority Last Modified     Resistant  Hypertension (Hypertension) --  3/29/2023 11:15 AM by Nury Webber RN              Goal Recent Progress Last Modified     Response to Treatment Maximized --  3/29/2023 11:15 AM by Nury Webber RN     Evidence-based guidance:   Assess patient response to treatment, including presence or absence of medication side effects, degree of blood pressure control and patient satisfaction.   Assess technique (including cuff size and placement), measurement times, condition and calibration of blood pressure cuff set (both at-home and in-office equipment).   Assess factors that may influence response to treatment, including nonadherence to pharmacologic treatment plan, diet or activity changes and/or presence of pain, stress or sleep disturbance.   Screen for signs and symptoms of depression; if present, refer for or complete a comprehensive assessment.   Evaluate social and economic barriers that may affect adherence to treatment plan   Address pharmacologic nonadherence by simplifying dosing regimen, counseling or support by pharmacist, financial assistance, self-monitoring of blood pressure, use of motivational interviewing, voice or text messages.   Encourage behavioral adherence strategies, like habit-based interventions that link medication taking with existing daily routines.   Assess barriers to regular, daily physical activity; support family or support person-oriented activity changes and utilization of community activity or sports program.   Address barriers to dietary changes, especially sodium restriction, with referrals to community programs, like cooking classes, meal services or intensive education when available.   Refer to community-based peer support program or nurse home-visiting program.   Assess for chronic pain; when present add additional goals (Chronic Pain Care Plan Guide) as needed.   Provide frequent follow-up by telephone, telemonitoring, patient-practice portal or with home visit.   Review  alcohol use screen; address using brief intervention beginning with risk that interferes with blood pressure control; refer for treatment when excessive alcohol use is noted.   Screen for obstructive sleep apnea; prepare patient for polysomnography based on risk and presentation and use of noninvasive ventilation to relieve obstructive sleep apnea when present.    Notes:            Task Due Date Last Modified     Facilitate Adherence to Lifestyle Change --  3/29/2023 11:18 AM by Nury Webber RN     Care Management Activities:      - success praised  - support and encouragement provided      Notes:                      · Current Specialty Plan of Care Status signed by both patient and provider    Instructions   · Patient was provided an electronic copy of care plan  · CCM services were explained and offered and patient has accepted these services.  · Patient has given their written consent to receive CCM services and understands that this includes the authorization of electronic communication of medical information with the other treating providers.  · Patient understands that they may stop CCM services at any time and these changes will be effective at the end of the calendar month and will effectively revocate the agreement of CCM services.  · Patient understands that only one practitioner can furnish and be paid for CCM services during one calendar month.  Patient also understands that there may be co-payment or deductible fees in association with CCM services.  · Patient will continue with at least monthly follow-up calls with the Ambulatory .    Nury COOPER  Ambulatory Case Management    3/30/2023, 13:46 EDT

## 2023-03-30 NOTE — PROGRESS NOTES
Follow-up Visit      Date: 2023  Patient Name: Herbert Harley  : 1955   MRN: [id number]     Chief Complaint:    Chief Complaint   Patient presents with   • Hospital Follow Up Visit     Chest pain and pleural effusion        History of Present Illness: Herbert Harley is a 67 y.o. male who is here today for follow-up after his coronary artery disease.  Patient had a complicated course during hospital stay which led to his acute kidney injury.  He also have a large pleural effusion which he was seen in the hospital few days ago and drained about 2 L.  His kidney function is improving and he is making a lot of urine.  He denies any more shortness of breath than before.  He is also recovering very good and feeling much better after his thoracentesis.  He still had a long way to go.      Problem List       CARDIAC  Coronary Artery Disease:   Inferior STEMI with [facility], Dr. Carter, 3/28/2014:  EF 50%, NILE to the mid-dominant RCA, NILE to the proximal LAD  Stress test, 2023: Inferolateral ischemia,   [facility], 2023: Severe triple-vessel disease, %, RCA 80%, LAD 80%  Myocardium:   Echocardiogram, 3/29/2014:   EF 50-55%, RVSP 30.  Echo 2022:Stage C, HFmrEF EF 41- 45%, mild LVH    Valvular:   Mild calcification to aortic valve    Electrical:   NSR, LVH, PVC    Percardium:   Normal    VASCULAR:  Arterial  Cerebrovascular disease:   Carotid Doppler: 2023 less than 50 %      CARDIAC RISK FACTORS:         Hypertension         Diabetes         Dyslipidemia         Tobacco Use, Quit after 75 years    NON-CARDIAC:  Asthma/COPD: Abnormal PFTs    SURGERIES:  Tonsillectomy            Subjective      Review of Systems:   Review of Systems   Respiratory: Positive for chest tightness and shortness of breath.        Medications:   No current facility-administered medications for this visit.  No current outpatient medications on file.    Facility-Administered Medications Ordered in Other Visits:    •  acetaminophen (TYLENOL) tablet 650 mg, 650 mg, Oral, Q4H PRN, 650 mg at 04/04/23 1940 **OR** acetaminophen (TYLENOL) 160 MG/5ML solution 650 mg, 650 mg, Oral, Q4H PRN **OR** acetaminophen (TYLENOL) suppository 650 mg, 650 mg, Rectal, Q4H PRN, Caitlin Smith, DO  •  albuterol (PROVENTIL) nebulizer solution 0.083% 2.5 mg/3mL, 2.5 mg, Nebulization, Q6H PRN, Caitlin Smith, DO  •  amiodarone (PACERONE) tablet 200 mg, 200 mg, Oral, Q24H, Caitlin Smith, DO, 200 mg at 04/05/23 0847  •  aspirin EC tablet 81 mg, 81 mg, Oral, Daily, Caitlin Smith, DO, 81 mg at 04/05/23 0847  •  bisacodyl (DULCOLAX) EC tablet 10 mg, 10 mg, Oral, Once, Caitlin Smith, DO  •  budesonide-formoterol (SYMBICORT) 160-4.5 MCG/ACT inhaler 2 puff, 2 puff, Inhalation, BID - RT, 2 puff at 04/05/23 0817 **AND** tiotropium (SPIRIVA RESPIMAT) 2.5 mcg/act aerosol solution inhaler, 2 puff, Inhalation, Daily - RT, Caitlin Smith DO, 2 puff at 04/05/23 0817  •  dextrose (D50W) (25 g/50 mL) IV injection 25 g, 25 g, Intravenous, Q15 Min PRN, Caitlin Smith, DO  •  dextrose (GLUTOSE) oral gel 15 g, 15 g, Oral, Q15 Min PRN, Caitlin Smith, DO  •  glucagon (GLUCAGEN) injection 1 mg, 1 mg, Intramuscular, Q15 Min PRN, Caitlin Smith, DO  •  metoprolol succinate XL (TOPROL-XL) 24 hr tablet 25 mg, 25 mg, Oral, Q24H, Caitlin Smith, DO, 25 mg at 04/05/23 0847  •  nitroglycerin (NITROSTAT) SL tablet 0.4 mg, 0.4 mg, Sublingual, Q5 Min PRN, Caitlin Smith,   •  ondansetron (ZOFRAN) tablet 4 mg, 4 mg, Oral, Q6H PRN **OR** ondansetron (ZOFRAN) injection 4 mg, 4 mg, Intravenous, Q6H PRN, Caitlin Smith,   •  rosuvastatin (CRESTOR) tablet 10 mg, 10 mg, Oral, Nightly, Caitlin Smith DO, 10 mg at 04/04/23 2025  •  sodium chloride 0.9 % flush 10 mL, 10 mL, Intravenous, PRN, Emergency, Triage Protocol, MD  •  sodium chloride 0.9 % flush 10 mL, 10 mL, Intravenous, Q12H, Caitlin Smith DO, 10 mL at 04/05/23 0848  •  " sodium chloride 0.9 % flush 10 mL, 10 mL, Intravenous, PRN, Caitlin Smith, DO  •  sodium chloride 0.9 % infusion 40 mL, 40 mL, Intravenous, PRN, Caitlni Smith, DO    Allergies:   Allergies   Allergen Reactions   • Wellbutrin [Bupropion] Swelling     Facial swelling, severe       Objective     Physical Exam:  Vitals:    03/30/23 1436   BP: 100/42   BP Location: Left arm   Patient Position: Sitting   Cuff Size: Adult   Pulse: 73   Resp: 20   SpO2: 95%   Weight: 68.9 kg (152 lb)   Height: 162.6 cm (64\")     Body mass index is 26.09 kg/m².      Constitutional:       General: Not in acute distress.     Appearance: Healthy appearance. Not in distress.     Neck:     JVP: Not elevated     Carotid artery: No carotid bruit    Pulmonary:      Effort: Pulmonary effort is normal.      Breath sounds: Normal breath sounds. No wheezing. No rhonchi. No rales.     Cardiovascular:      Normal rate. Regular rhythm. Normal S1. Normal S2.      Murmurs: There is no murmur.      No gallop. No click. No rub.     Abdominal:      General: Bowel sounds are normal.      Palpations: Abdomen is soft.      Tenderness: There is no abdominal tenderness.    Extremities:     Pulses: Good distal pulses     Edema: No edema    Smoking Cessation:   He recently has quit smoking after about 78 pack years.    Lab Review:   Lab Results   Component Value Date    GLUCOSE 154 (H) 04/04/2023    BUN 37 (H) 04/04/2023    CREATININE 2.03 (H) 04/04/2023    EGFRIFNONA 66 12/14/2021    BCR 18.2 04/04/2023    K 4.2 04/04/2023    CO2 24.0 04/04/2023    CALCIUM 8.4 (L) 04/04/2023    ALBUMIN 2.8 (L) 04/03/2023    AST 31 04/03/2023    ALT 39 04/03/2023     Lab Results   Component Value Date    WBC 14.17 (H) 04/04/2023    HGB 9.4 (L) 04/04/2023    HCT 32.2 (L) 04/04/2023    MCV 96.1 04/04/2023     04/04/2023     Lab Results   Component Value Date    CHOL 80 02/17/2023    CHLPL 174 03/28/2014    TRIG 60 02/17/2023    HDL 37 (L) 02/17/2023    LDL 29 " 02/17/2023     Lab Results   Component Value Date    TSH 2.330 12/22/2022     Lab Results   Component Value Date    HGBA1C 7.40 (H) 02/28/2023           Assessment / Plan      Assessment:   Diagnosis Plan   1. Coronary artery disease involving native heart without angina pectoris, unspecified vessel or lesion type        2. S/P CABG (coronary artery bypass graft)        3. Pleural effusion due to CHF (congestive heart failure)             Plan:  · Decrease aspirin to 81 mg daily  · Patient can continue to take amiodarone 200 mg daily until his current prescription runs out and then stop.  · Patient and wife will contact us if patient no longer has to undergo dialysis.  · Patient will continue taking his statins for primary and secondary prevention.      Follow Up:       Return in about 6 weeks (around 5/11/2023).    Nadya Ramirez MD

## 2023-03-30 NOTE — HOME HEALTH
[facility] for cardiopulmonary assessment.  Patient had thoracentesis in the ED with 2 liters of fluid removed a few days ago; feeling better.  Incisions healing well.  Patient to see cardiology today.  SN to see again next week.

## 2023-04-02 ENCOUNTER — APPOINTMENT (OUTPATIENT)
Dept: GENERAL RADIOLOGY | Facility: HOSPITAL | Age: 68
DRG: 206 | End: 2023-04-02
Payer: MEDICARE

## 2023-04-02 ENCOUNTER — HOSPITAL ENCOUNTER (INPATIENT)
Facility: HOSPITAL | Age: 68
LOS: 3 days | Discharge: HOME HEALTH | DRG: 206 | End: 2023-04-05
Attending: EMERGENCY MEDICINE | Admitting: FAMILY MEDICINE
Payer: MEDICARE

## 2023-04-02 ENCOUNTER — APPOINTMENT (OUTPATIENT)
Dept: CT IMAGING | Facility: HOSPITAL | Age: 68
DRG: 206 | End: 2023-04-02
Payer: MEDICARE

## 2023-04-02 ENCOUNTER — APPOINTMENT (OUTPATIENT)
Dept: ULTRASOUND IMAGING | Facility: HOSPITAL | Age: 68
DRG: 206 | End: 2023-04-02
Payer: MEDICARE

## 2023-04-02 DIAGNOSIS — E78.5 HYPERLIPIDEMIA LDL GOAL <70: ICD-10-CM

## 2023-04-02 DIAGNOSIS — J90 RECURRENT LEFT PLEURAL EFFUSION: ICD-10-CM

## 2023-04-02 DIAGNOSIS — Z79.4 TYPE 2 DIABETES MELLITUS WITH HYPOGLYCEMIA WITHOUT COMA, WITH LONG-TERM CURRENT USE OF INSULIN: ICD-10-CM

## 2023-04-02 DIAGNOSIS — E11.649 TYPE 2 DIABETES MELLITUS WITH HYPOGLYCEMIA WITHOUT COMA, WITH LONG-TERM CURRENT USE OF INSULIN: ICD-10-CM

## 2023-04-02 DIAGNOSIS — E16.2 HYPOGLYCEMIA: Primary | ICD-10-CM

## 2023-04-02 DIAGNOSIS — I25.10 CORONARY ARTERY DISEASE INVOLVING NATIVE CORONARY ARTERY OF NATIVE HEART WITHOUT ANGINA PECTORIS: ICD-10-CM

## 2023-04-02 DIAGNOSIS — R10.9 ACUTE ABDOMINAL PAIN: ICD-10-CM

## 2023-04-02 LAB
ALBUMIN SERPL-MCNC: 3.3 G/DL (ref 3.5–5.2)
ALBUMIN/GLOB SERPL: 0.9 G/DL
ALP SERPL-CCNC: 140 U/L (ref 39–117)
ALT SERPL W P-5'-P-CCNC: 49 U/L (ref 1–41)
ANION GAP SERPL CALCULATED.3IONS-SCNC: 11 MMOL/L (ref 5–15)
AST SERPL-CCNC: 38 U/L (ref 1–40)
BACTERIA UR QL AUTO: NORMAL /HPF
BASOPHILS # BLD AUTO: 0.12 10*3/MM3 (ref 0–0.2)
BASOPHILS NFR BLD AUTO: 1.2 % (ref 0–1.5)
BILIRUB SERPL-MCNC: 0.2 MG/DL (ref 0–1.2)
BILIRUB UR QL STRIP: NEGATIVE
BUN SERPL-MCNC: 17 MG/DL (ref 8–23)
BUN/CREAT SERPL: 8.2 (ref 7–25)
CALCIUM SPEC-SCNC: 8.8 MG/DL (ref 8.6–10.5)
CHLORIDE SERPL-SCNC: 102 MMOL/L (ref 98–107)
CLARITY UR: CLEAR
CO2 SERPL-SCNC: 29 MMOL/L (ref 22–29)
COLOR UR: YELLOW
CREAT SERPL-MCNC: 2.08 MG/DL (ref 0.76–1.27)
DEPRECATED RDW RBC AUTO: 48.9 FL (ref 37–54)
EGFRCR SERPLBLD CKD-EPI 2021: 34.3 ML/MIN/1.73
EOSINOPHIL # BLD AUTO: 0.46 10*3/MM3 (ref 0–0.4)
EOSINOPHIL NFR BLD AUTO: 4.6 % (ref 0.3–6.2)
ERYTHROCYTE [DISTWIDTH] IN BLOOD BY AUTOMATED COUNT: 14.4 % (ref 12.3–15.4)
ERYTHROCYTE [SEDIMENTATION RATE] IN BLOOD: 41 MM/HR (ref 0–20)
GLOBULIN UR ELPH-MCNC: 3.5 GM/DL
GLUCOSE BLDC GLUCOMTR-MCNC: 117 MG/DL (ref 70–130)
GLUCOSE BLDC GLUCOMTR-MCNC: 125 MG/DL (ref 70–130)
GLUCOSE BLDC GLUCOMTR-MCNC: 162 MG/DL (ref 70–130)
GLUCOSE BLDC GLUCOMTR-MCNC: 216 MG/DL (ref 70–130)
GLUCOSE BLDC GLUCOMTR-MCNC: 234 MG/DL (ref 70–130)
GLUCOSE BLDC GLUCOMTR-MCNC: 43 MG/DL (ref 70–130)
GLUCOSE BLDC GLUCOMTR-MCNC: 91 MG/DL (ref 70–130)
GLUCOSE SERPL-MCNC: 78 MG/DL (ref 65–99)
GLUCOSE UR STRIP-MCNC: ABNORMAL MG/DL
HCT VFR BLD AUTO: 30.9 % (ref 37.5–51)
HGB BLD-MCNC: 9.3 G/DL (ref 13–17.7)
HGB UR QL STRIP.AUTO: NEGATIVE
HOLD SPECIMEN: NORMAL
HYALINE CASTS UR QL AUTO: NORMAL /LPF
IMM GRANULOCYTES # BLD AUTO: 0.05 10*3/MM3 (ref 0–0.05)
IMM GRANULOCYTES NFR BLD AUTO: 0.5 % (ref 0–0.5)
INR PPP: 1.08 (ref 0.84–1.13)
KETONES UR QL STRIP: NEGATIVE
LEUKOCYTE ESTERASE UR QL STRIP.AUTO: NEGATIVE
LYMPHOCYTES # BLD AUTO: 1.37 10*3/MM3 (ref 0.7–3.1)
LYMPHOCYTES NFR BLD AUTO: 13.8 % (ref 19.6–45.3)
MAGNESIUM SERPL-MCNC: 2 MG/DL (ref 1.6–2.4)
MCH RBC QN AUTO: 28.4 PG (ref 26.6–33)
MCHC RBC AUTO-ENTMCNC: 30.1 G/DL (ref 31.5–35.7)
MCV RBC AUTO: 94.2 FL (ref 79–97)
MONOCYTES # BLD AUTO: 0.89 10*3/MM3 (ref 0.1–0.9)
MONOCYTES NFR BLD AUTO: 8.9 % (ref 5–12)
NEUTROPHILS NFR BLD AUTO: 7.07 10*3/MM3 (ref 1.7–7)
NEUTROPHILS NFR BLD AUTO: 71 % (ref 42.7–76)
NITRITE UR QL STRIP: NEGATIVE
NRBC BLD AUTO-RTO: 0 /100 WBC (ref 0–0.2)
PH UR STRIP.AUTO: 8 [PH] (ref 5–8)
PLATELET # BLD AUTO: 276 10*3/MM3 (ref 140–450)
PMV BLD AUTO: 9.3 FL (ref 6–12)
POTASSIUM SERPL-SCNC: 3.1 MMOL/L (ref 3.5–5.2)
PROT SERPL-MCNC: 6.8 G/DL (ref 6–8.5)
PROT UR QL STRIP: ABNORMAL
PROTHROMBIN TIME: 14 SECONDS (ref 11.4–14.4)
QT INTERVAL: 394 MS
QTC INTERVAL: 437 MS
RBC # BLD AUTO: 3.28 10*6/MM3 (ref 4.14–5.8)
RBC # UR STRIP: NORMAL /HPF
REF LAB TEST METHOD: NORMAL
SODIUM SERPL-SCNC: 142 MMOL/L (ref 136–145)
SP GR UR STRIP: 1.02 (ref 1–1.03)
SQUAMOUS #/AREA URNS HPF: NORMAL /HPF
TROPONIN T SERPL HS-MCNC: 115 NG/L
UROBILINOGEN UR QL STRIP: ABNORMAL
WBC # UR STRIP: NORMAL /HPF
WBC NRBC COR # BLD: 9.96 10*3/MM3 (ref 3.4–10.8)
WHOLE BLOOD HOLD COAG: NORMAL
WHOLE BLOOD HOLD SPECIMEN: NORMAL

## 2023-04-02 PROCEDURE — 99222 1ST HOSP IP/OBS MODERATE 55: CPT | Performed by: PHYSICIAN ASSISTANT

## 2023-04-02 PROCEDURE — 71045 X-RAY EXAM CHEST 1 VIEW: CPT

## 2023-04-02 PROCEDURE — 36415 COLL VENOUS BLD VENIPUNCTURE: CPT

## 2023-04-02 PROCEDURE — 93005 ELECTROCARDIOGRAM TRACING: CPT

## 2023-04-02 PROCEDURE — 99223 1ST HOSP IP/OBS HIGH 75: CPT | Performed by: FAMILY MEDICINE

## 2023-04-02 PROCEDURE — 83735 ASSAY OF MAGNESIUM: CPT

## 2023-04-02 PROCEDURE — 82962 GLUCOSE BLOOD TEST: CPT

## 2023-04-02 PROCEDURE — 80053 COMPREHEN METABOLIC PANEL: CPT

## 2023-04-02 PROCEDURE — 85610 PROTHROMBIN TIME: CPT | Performed by: RADIOLOGY

## 2023-04-02 PROCEDURE — 85025 COMPLETE CBC W/AUTO DIFF WBC: CPT

## 2023-04-02 PROCEDURE — 81001 URINALYSIS AUTO W/SCOPE: CPT

## 2023-04-02 PROCEDURE — 99285 EMERGENCY DEPT VISIT HI MDM: CPT

## 2023-04-02 PROCEDURE — 94799 UNLISTED PULMONARY SVC/PX: CPT

## 2023-04-02 PROCEDURE — 85652 RBC SED RATE AUTOMATED: CPT | Performed by: PHYSICIAN ASSISTANT

## 2023-04-02 PROCEDURE — 84484 ASSAY OF TROPONIN QUANT: CPT

## 2023-04-02 PROCEDURE — 74176 CT ABD & PELVIS W/O CONTRAST: CPT

## 2023-04-02 PROCEDURE — 94640 AIRWAY INHALATION TREATMENT: CPT

## 2023-04-02 PROCEDURE — 94761 N-INVAS EAR/PLS OXIMETRY MLT: CPT

## 2023-04-02 RX ORDER — METOPROLOL SUCCINATE 25 MG/1
25 TABLET, EXTENDED RELEASE ORAL
Status: DISCONTINUED | OUTPATIENT
Start: 2023-04-02 | End: 2023-04-05 | Stop reason: HOSPADM

## 2023-04-02 RX ORDER — IBUPROFEN 600 MG/1
1 TABLET ORAL
Status: DISCONTINUED | OUTPATIENT
Start: 2023-04-02 | End: 2023-04-05 | Stop reason: HOSPADM

## 2023-04-02 RX ORDER — ONDANSETRON 2 MG/ML
4 INJECTION INTRAMUSCULAR; INTRAVENOUS EVERY 6 HOURS PRN
Status: DISCONTINUED | OUTPATIENT
Start: 2023-04-02 | End: 2023-04-05 | Stop reason: HOSPADM

## 2023-04-02 RX ORDER — SODIUM CHLORIDE 0.9 % (FLUSH) 0.9 %
10 SYRINGE (ML) INJECTION EVERY 12 HOURS SCHEDULED
Status: DISCONTINUED | OUTPATIENT
Start: 2023-04-02 | End: 2023-04-05 | Stop reason: HOSPADM

## 2023-04-02 RX ORDER — ACETAMINOPHEN 325 MG/1
650 TABLET ORAL EVERY 4 HOURS PRN
Status: DISCONTINUED | OUTPATIENT
Start: 2023-04-02 | End: 2023-04-05 | Stop reason: HOSPADM

## 2023-04-02 RX ORDER — DEXTROSE MONOHYDRATE 25 G/50ML
25 INJECTION, SOLUTION INTRAVENOUS ONCE
Status: COMPLETED | OUTPATIENT
Start: 2023-04-02 | End: 2023-04-02

## 2023-04-02 RX ORDER — AMIODARONE HYDROCHLORIDE 200 MG/1
200 TABLET ORAL
Status: DISCONTINUED | OUTPATIENT
Start: 2023-04-02 | End: 2023-04-05 | Stop reason: HOSPADM

## 2023-04-02 RX ORDER — ROSUVASTATIN CALCIUM 10 MG/1
10 TABLET, COATED ORAL NIGHTLY
Status: DISCONTINUED | OUTPATIENT
Start: 2023-04-02 | End: 2023-04-05 | Stop reason: HOSPADM

## 2023-04-02 RX ORDER — ALBUTEROL SULFATE 2.5 MG/3ML
2.5 SOLUTION RESPIRATORY (INHALATION) EVERY 6 HOURS PRN
Status: DISCONTINUED | OUTPATIENT
Start: 2023-04-02 | End: 2023-04-05 | Stop reason: HOSPADM

## 2023-04-02 RX ORDER — DEXTROSE MONOHYDRATE 25 G/50ML
25 INJECTION, SOLUTION INTRAVENOUS
Status: DISCONTINUED | OUTPATIENT
Start: 2023-04-02 | End: 2023-04-05 | Stop reason: HOSPADM

## 2023-04-02 RX ORDER — POTASSIUM CHLORIDE 750 MG/1
40 CAPSULE, EXTENDED RELEASE ORAL ONCE
Status: COMPLETED | OUTPATIENT
Start: 2023-04-02 | End: 2023-04-02

## 2023-04-02 RX ORDER — SODIUM CHLORIDE 0.9 % (FLUSH) 0.9 %
10 SYRINGE (ML) INJECTION AS NEEDED
Status: DISCONTINUED | OUTPATIENT
Start: 2023-04-02 | End: 2023-04-05 | Stop reason: HOSPADM

## 2023-04-02 RX ORDER — SODIUM CHLORIDE 9 MG/ML
40 INJECTION, SOLUTION INTRAVENOUS AS NEEDED
Status: DISCONTINUED | OUTPATIENT
Start: 2023-04-02 | End: 2023-04-05 | Stop reason: HOSPADM

## 2023-04-02 RX ORDER — ONDANSETRON 4 MG/1
4 TABLET, FILM COATED ORAL EVERY 6 HOURS PRN
Status: DISCONTINUED | OUTPATIENT
Start: 2023-04-02 | End: 2023-04-05 | Stop reason: HOSPADM

## 2023-04-02 RX ORDER — NICOTINE POLACRILEX 4 MG
15 LOZENGE BUCCAL
Status: DISCONTINUED | OUTPATIENT
Start: 2023-04-02 | End: 2023-04-05 | Stop reason: HOSPADM

## 2023-04-02 RX ORDER — BUDESONIDE AND FORMOTEROL FUMARATE DIHYDRATE 160; 4.5 UG/1; UG/1
2 AEROSOL RESPIRATORY (INHALATION)
Status: DISCONTINUED | OUTPATIENT
Start: 2023-04-02 | End: 2023-04-05 | Stop reason: HOSPADM

## 2023-04-02 RX ORDER — ASPIRIN 81 MG/1
81 TABLET ORAL DAILY
Status: DISCONTINUED | OUTPATIENT
Start: 2023-04-02 | End: 2023-04-05 | Stop reason: HOSPADM

## 2023-04-02 RX ORDER — ACETAMINOPHEN 650 MG/1
650 SUPPOSITORY RECTAL EVERY 4 HOURS PRN
Status: DISCONTINUED | OUTPATIENT
Start: 2023-04-02 | End: 2023-04-05 | Stop reason: HOSPADM

## 2023-04-02 RX ORDER — ACETAMINOPHEN 160 MG/5ML
650 SOLUTION ORAL EVERY 4 HOURS PRN
Status: DISCONTINUED | OUTPATIENT
Start: 2023-04-02 | End: 2023-04-05 | Stop reason: HOSPADM

## 2023-04-02 RX ORDER — NITROGLYCERIN 0.4 MG/1
0.4 TABLET SUBLINGUAL
Status: DISCONTINUED | OUTPATIENT
Start: 2023-04-02 | End: 2023-04-05 | Stop reason: HOSPADM

## 2023-04-02 RX ADMIN — POTASSIUM CHLORIDE 40 MEQ: 10 CAPSULE, COATED, EXTENDED RELEASE ORAL at 10:53

## 2023-04-02 RX ADMIN — METOPROLOL SUCCINATE 25 MG: 25 TABLET, EXTENDED RELEASE ORAL at 10:54

## 2023-04-02 RX ADMIN — Medication 81 MG: at 10:53

## 2023-04-02 RX ADMIN — BUDESONIDE AND FORMOTEROL FUMARATE DIHYDRATE 2 PUFF: 160; 4.5 AEROSOL RESPIRATORY (INHALATION) at 10:58

## 2023-04-02 RX ADMIN — AMIODARONE HYDROCHLORIDE 200 MG: 200 TABLET ORAL at 10:53

## 2023-04-02 RX ADMIN — ROSUVASTATIN CALCIUM 10 MG: 10 TABLET, FILM COATED ORAL at 20:02

## 2023-04-02 RX ADMIN — DEXTROSE MONOHYDRATE 25 G: 25 INJECTION, SOLUTION INTRAVENOUS at 07:22

## 2023-04-02 RX ADMIN — Medication 10 ML: at 20:02

## 2023-04-02 RX ADMIN — TIOTROPIUM BROMIDE INHALATION SPRAY 2 PUFF: 3.12 SPRAY, METERED RESPIRATORY (INHALATION) at 10:58

## 2023-04-02 RX ADMIN — BUDESONIDE AND FORMOTEROL FUMARATE DIHYDRATE 2 PUFF: 160; 4.5 AEROSOL RESPIRATORY (INHALATION) at 21:40

## 2023-04-02 NOTE — PLAN OF CARE
Goal Outcome Evaluation:  Plan of Care Reviewed With: patient        Progress: improving  Outcome Evaluation: VSS on RA. Hypoglycemia resolved upon arrival to floor. Q3 hour fingersticks. No complaints of pain or nausea. Anticipate CT guided chest tube placement in the AM. Holding HD tomorrow per Dr Gomez. Discussed plan of care with patient who verbalizes understanding.

## 2023-04-02 NOTE — PROGRESS NOTES
I spoke with Dr. Smith a few moments ago after I received a message for the request for a chest tube through the IR RN. The patient has recurrent L pleural effusion after a L thora on 3/27. The patient is currently in no respirtory distress and on room air.  We agreed that this is apparently not an urgency/emergency. We agreed to do this on Monday morning.   Should the patient's clinical needs change to urgent/emergent, please alert the on call IR team and we will be happy to assist. If IR team is called in for an urgent/emergent case, IR will take care of this need if possible.  Thank you for consulting IR.     Thierry Kelsey MD  Interventional Radiology  Diagnostic Radiology  [address] [place], [place] [address]  Cell: [phone]  Email: [email or web address]

## 2023-04-02 NOTE — H&P
[facility] Medicine Services  HISTORY AND PHYSICAL    Patient Name: Herbert Harley  : 1955  MRN: [id number]  Primary Care Physician: Yessica Ricci DO  Date of admission: 2023      Subjective   Subjective     Chief Complaint:  Hypoglycemia     HPI:  Herbert Harley is a 67 y.o. male with PMHx MVCAD s/p CABG x3 per Dr Saavedra 3/1/2023, HLD, Post op A fib, Renal failure now on HD M, W, F, chronic anemia, DM2 who presents to ED with AMS and found to hae BG of 39. EMS gave IV dextrose and blood sugar came up to 180.  His mental status returned to baseline. He did eat dinner last night and took his usual 60 units of Lantus. Last HD session was Friday. Patient was seen in the ED 3/27 and had L pleural effusion. He had thoracentesis by IR and was sent home. Imaging in the ED today shows re-accumulation of fluid. He is on RA.   He also has some intermittent RUQ pain. Worse after eating. Ct A/P negative. Unable to have GB US due to drinking some OJ in the ED. Hospital medicine asked to admit.    Review of Systems   Constitutional: Positive for appetite change. Negative for activity change, chills, fatigue and fever.   HENT: Negative for congestion and sore throat.    Eyes: Negative for visual disturbance.   Respiratory: Positive for shortness of breath. Negative for cough and wheezing.    Cardiovascular: Negative for chest pain, palpitations and leg swelling.   Gastrointestinal: Positive for abdominal pain and nausea. Negative for diarrhea and vomiting.   Genitourinary: Negative for dysuria, frequency and urgency.   Musculoskeletal: Negative for back pain and gait problem.   Skin: Positive for wound.   Neurological: Negative for dizziness, weakness and headaches.   Psychiatric/Behavioral: Negative for confusion.        Personal History     Past Medical History:   Diagnosis Date   • Acute renal failure (ARF) 3/2/2023   • Asthma 2022   • Chronic systolic congestive  heart failure 3/1/2023   • Coronary artery disease March 2014   • Elevated cholesterol    • History of tobacco abuse    • HLD (hyperlipidemia) 01/26/2023   • Hypertension    • Ischemic cardiomyopathy    • Myocardial infarction March 2014   • Prolonged Q-T interval on ECG 3/1/2023   • Type 2 diabetes mellitus    • Wears glasses              Past Surgical History:   Procedure Laterality Date   • CARDIAC CATHETERIZATION  March 2014   • CARDIAC CATHETERIZATION Left 02/17/2023    Procedure: Left Heart Cath;  Surgeon: Nadya Ramirez MD;  Location:  [facility];  Service: Cardiology;  Laterality: Left;   • CORONARY ARTERY BYPASS GRAFT N/A 03/01/2023    Procedure: MEDIAN STERNOTOMY CORONARY ARTERY BYPASS GRAFTING X3  UTILIZING THE LEFT INTERNAL MAMMERY GRAFT, AND EVH OF THE GREATER RIGHT SAPHENOUS VEIN;  Surgeon: Paresh Saavedra MD;  Location:  [facility];  Service: Cardiothoracic;  Laterality: N/A;   • CORONARY ARTERY BYPASS GRAFT  3/1/2023   • CORONARY STENT PLACEMENT  March 2014   • TONSILLECTOMY         Family History: family history includes Brain cancer in his sister; Cancer in his father; Diabetes in his mother; Heart attack in his brother; Prostate cancer in his brother.     Social History:  reports that he quit smoking about 3 months ago. His smoking use included cigarettes. He started smoking about 53 years ago. He has a 78.00 pack-year smoking history. He has been exposed to tobacco smoke. He has never used smokeless tobacco. He reports that he does not currently use alcohol. He reports that he does not use drugs.  Social History     Social History Narrative   • Not on file       Medications:  Available home medication information reviewed.  Medications Prior to Admission   Medication Sig Dispense Refill Last Dose   • albuterol sulfate  (90 Base) MCG/ACT inhaler Inhale 2 puffs Every 4 (Four) Hours As Needed for Wheezing. 18 g 2    • amiodarone (PACERONE) 200 MG tablet Take 1 tablet by  mouth Daily for 30 days. 30 tablet 1    • aspirin 81 MG EC tablet Take 1 tablet by mouth Daily for 90 days. 90 tablet 0    • Collagen-Vitamin C-Biotin (COLLAGEN 1500/C PO) Take 1 tablet by mouth Daily. OTC supplement      • dapagliflozin Propanediol (Farxiga) 10 MG tablet Take 10 mg by mouth Daily for 30 days. 30 tablet 3    • dapagliflozin Propanediol (Farxiga) 10 MG tablet Take 10 mg by mouth Daily.      • ezetimibe (ZETIA) 10 MG tablet Take 1 tablet by mouth Daily for 30 days. 30 tablet 11    • fluconazole (Diflucan) 100 MG tablet Take 1 tablet by mouth Daily for 7 days. 7 tablet 0    • Fluticasone-Umeclidin-Vilant (TRELEGY) 100-62.5-25 MCG/ACT inhaler Inhale 1 puff Daily. 1 each 5    • Lantus 100 UNIT/ML injection INJECT 60 UNITS SUBCUTANEOUSLY ONCE DAILY (Patient taking differently: Inject 60 Units under the skin into the appropriate area as directed Every Night. INJECT 60 UNITS SUBCUTANEOUSLY ONCE DAILY) 20 mL 5    • metoprolol succinate XL (TOPROL-XL) 25 MG 24 hr tablet Take 1 tablet by mouth Daily for 30 days. 30 tablet 1    • nitroglycerin (NITROSTAT) 0.4 MG SL tablet Place 1 tablet under the tongue Every 5 (Five) Minutes As Needed for Chest Pain for up to 25 days. Take no more than 3 doses in 15 minutes. 25 tablet 3    • rosuvastatin (CRESTOR) 10 MG tablet Take 1 tablet by mouth Daily. 30 tablet 11        Allergies   Allergen Reactions   • Wellbutrin [Bupropion] Swelling     Facial swelling, severe       Objective   Objective     Vital Signs:   Temp:  [97.6 °F (36.4 °C)-97.7 °F (36.5 °C)] 97.6 °F (36.4 °C)  Heart Rate:  [71-79] 78  Resp:  [16] 16  BP: (155-168)/(73-79) 155/73       Physical Exam   Constitutional: Awake, alert, NAD, non-toxic, frail appearing   Eyes: PERRLA, sclerae anicteric, no conjunctival injection  HENT: NCAT, mucous membranes moist  Neck: Supple, no thyromegaly, no lymphadenopathy, trachea midline  Respiratory: Decreased in bases bilaterally, nonlabored respirations   Cardiovascular:  RRR, no murmurs, rubs, or gallops, palpable pedal pulses bilaterally  Gastrointestinal: Positive bowel sounds, soft, nontender, nondistended  Musculoskeletal: No bilateral ankle edema, no clubbing or cyanosis to extremities  Psychiatric: Appropriate affect, cooperative  Neurologic: Oriented x 3, strength symmetric in all extremities, Cranial Nerves grossly intact to confrontation, speech clear  Skin: No rashes, sternal incision healing well, graft sites LE healing, mild surrounding erythema. Tunneled HD cath R chest wall     Result Review:  I have personally reviewed the results from the time of this admission to 4/2/2023 10:26 EDT and agree with these findings:  [x]  Laboratory list / accordion  []  Microbiology  [x]  Radiology  [x]  EKG/Telemetry   []  Cardiology/Vascular   []  Pathology  [x]  Old records  []  Other:      LAB RESULTS:      Lab 04/02/23  0638 03/27/23  1111   WBC 9.96 10.59   HEMOGLOBIN 9.3* 8.6*   HEMATOCRIT 30.9* 29.2*   PLATELETS 276 162   NEUTROS ABS 7.07* 7.44*   IMMATURE GRANS (ABS) 0.05 0.07*   LYMPHS ABS 1.37 1.35   MONOS ABS 0.89 0.89   EOS ABS 0.46* 0.73*   MCV 94.2 97.3*         Lab 04/02/23  0638 03/27/23  1111   SODIUM 142 141   POTASSIUM 3.1* 4.4   CHLORIDE 102 103   CO2 29.0 28.0   ANION GAP 11.0 10.0   BUN 17 25*   CREATININE 2.08* 2.90*   EGFR 34.3* 23.0*   GLUCOSE 78 240*   CALCIUM 8.8 8.6   MAGNESIUM 2.0  --          Lab 04/02/23  0638 03/27/23  1111   TOTAL PROTEIN 6.8 6.0   ALBUMIN 3.3* 2.9*   GLOBULIN 3.5 3.1   ALT (SGPT) 49* 24   AST (SGOT) 38 27   BILIRUBIN 0.2 0.2   ALK PHOS 140* 149*   LIPASE  --  41         Lab 04/02/23  0638 03/27/23  1302 03/27/23  1111   PROBNP  --   --  14,026.0*   HSTROP T 115* 98* 101*                 UA    Urinalysis 4/2/23 4/2/23    0752 0752   Squamous Epithelial Cells, UA  0-2   Specific Gravity, UA 1.017    Ketones, UA Negative    Blood, UA Negative    Leukocytes, UA Negative    Nitrite, UA Negative    RBC, UA  0-2   WBC, UA  0-2   Bacteria, UA   None Seen             Microbiology Results (last 10 days)     Procedure Component Value - Date/Time    Wound Culture - Surgical Site, Leg, Right [[id number]]  (Abnormal) Collected: 03/23/23 1615    Lab Status: Final result Specimen: Surgical Site from Leg, Right Updated: 03/26/23 0734     Wound Culture Moderate growth (3+) Candida albicans     Gram Stain Few (2+) WBCs seen      Few (2+) Yeast          CT Abdomen Pelvis Without Contrast    Result Date: 4/2/2023  EXAMINATION:  CT SCAN OF THE ABDOMEN AND PELVIS WITHOUT INTRAVENOUS CONTRAST DATE OF EXAM: 4/2/2023 6:49 AM HISTORY: Right upper quadrant pain after eating.  COMPARISON: None. TECHNIQUE: CT examination of the abdomen and pelvis with sagittal and coronal reformations was performed without intravenous contrast.  CT dose lowering techniques were used, to include: automated exposure control, adjustment for patient size, and/or use  of iterative reconstruction. Note: The exam is limited because some types of pathology may not be adequately demonstrated due to lack of contrast enhancement.    FINDINGS: ABDOMEN/PELVIS: Lower Chest:  Large left pleural effusion with compressive atelectasis of the left upper and left lower lobes. Right lung is clear. Liver: Normal. Gallbladder/Biliary: Normal. Pancreas: Normal. Spleen: Normal. Adrenal Glands: Normal. Kidneys: Normal. GI Tract: Colonic diverticulosis without diverticulitis. No bowel wall thickening or obstruction. Mesentery/Peritoneum: Normal. Vasculature: Normal. Lymph Nodes: Normal. Abdominal Wall: Normal. Bladder: Normal. Reproductive: Normal. Musculoskeletal: Normal.     Impression: 1.  No acute abnormalities in the abdomen or pelvis. 2.  Partially visualized large left pleural effusion with compressive collapse of the left upper left lower lobes. Electronically signed by:  Deep Huerta M.D.  4/2/2023 5:19 AM Mountain Time    XR Chest 1 View    Result Date: 4/2/2023  Examination: XR CHEST 1 VW Date and Time:  4/2/2023 5:51 AM Clinical Information: 67 years, Male, Comparison: Comparison is made with radiograph the chest performed on March 27, 2023. FINDINGS: Sternal wires are demonstrated. A right internal jugular dialysis catheter is demonstrated. Heart size/mediastinum/petrona: There is exaggeration of the cardiac silhouette. There is silhouetting of the left heart border on the basis of opacification of the left hemithorax. Lung fields: There is opacification involving the mid and lower left lung field suggesting consolidation and/or atelectasis with moderate to large sized pleural effusion. Findings have progressed as compared to March 27, 2023. Lung bases/Retrocardiac/Pleura:  Interstitial opacities are seen at the right upper and right lower lung field without consolidation, segmental atelectasis or notable effusion. Trachea/Paravertebral soft tissues: The trachea and paravertebral soft tissues appear normal. Upper abdomen: No free air is demonstrated. Osseous structures: The bones are normally mineralized.     Impression: 1.  Opacification of the left mid/lower lung field suggests consolidation and/or atelectasis with moderate to large left-sided pleural effusion. 2.  Interstitial opacities are demonstrated suggesting mild to moderate pulmonary congestion. 3.  No pleural effusion is demonstrated on the right. Thank you for the referral of this patient. This exam was interpreted by an American Board of Radiology certified radiologist with subspecialty fellowship training. If there are any questions regarding this exam please feel free to contact a radiologist directly at [phone]. Slot 70 Electronically signed by:  Devante Ugarte M.D.  4/2/2023 4:32 AM Mountain Time      Results for orders placed during the hospital encounter of 03/01/23    Adult Transthoracic Echo Complete W/ Cont if Necessary Per Protocol    Interpretation Summary  •  Left ventricular systolic function is mildly decreased. Calculated left  ventricular EF = 40.4% Left ventricular ejection fraction appears to be 41 - 45%.  •  The right ventricular cavity is dilated.  •  Left atrial volume is mildly increased.  •  There is calcification of the aortic valve.  •  Estimated right ventricular systolic pressure from tricuspid regurgitation is normal (<35 mmHg).  •  There is a left pleural effusion.      Assessment & Plan   Assessment & Plan     Active Hospital Problems    Diagnosis  POA   • **Hypoglycemia [E16.2]  Yes   • Acute renal failure (ARF) [N17.9]  Yes   • Prolonged Q-T interval on ECG [R94.31]  Yes   • Ischemic cardiomyopathy [I25.5]  Yes     · Echo (3/29/2014): LVEF 50-55%  · Echo (12/2022): LVEF 41-45%     • Type 2 diabetes mellitus with hypoglycemia, with long-term current use of insulin [E11.649, Z79.4]  Not Applicable   • I25.10, CABG 03/01/23 [I25.10]  Yes     · Cardiac catheterization for inferior STEMI with Dr. Carter (3/28/2014): LVEF 50%.  NILE to RCA.  NILE to proximal LAD.  Had intraprocedural atrial fibrillation  · Echo (3/29/2014): LVEF 50-55%.  RVSP 30 mmHg  · MPS (2/15/2023): Abnormal with moderate to large area of ischemia in inferior lateral wall.  High risk study  · Cardiac cath (2/17/2023): Multivessel CAD.  · CABG with Dr. Saavedra (3/1/2023): LIMA to LAD.  SVG to OM.  SVG to RPDA     • Hypertension [I10]  Yes   • Hyperlipidemia LDL goal <70 [E78.5]  Yes       Hypoglycemia  DM2  -Improved after Dextrose administration   -POC glucose q3 hours x 24 hours   -Hold Lantus     Recurrent L pleural effusion s/p CABG  -S/P IR drainage 3/27  -Consult to CTS, suspect needs tube placement   -On RA     ARF on HD  -Consult to Nephrology for maintenance HD  -M, W, F schedule   -Has [place] HD cath   -Per wife, they were supposed to do a 24 hour urine test today, will defer to Renal if this needs to be done inpatient     RUQ pain  -CT A/P negative  -NPO after midnight for GB US     Hypokalemia  -Will give 40mEQ x 1 dose     Elevated HS  Troponin  -in setting of renal failure, no chest pain, no acute EKG changes     CAD multivessel CAD s/p CABG x3  Hyperlipidemia  -Continue aspirin, statin, beta-blocker    Post Op A fib  -Continue Amiodarone     Chronic Anemia  -H&H improved from prior     Valier site cellulitis -> resolved  -Completed course of Diflucan     Hx Odynophagia  -Completed course of Diflucan for this as well     DVT prophylaxis:  Mechanical for now     CODE STATUS:  Full Code   Code Status and Medical Interventions:   Ordered at: 04/02/23 0855     Level Of Support Discussed With:    Patient     Code Status (Patient has no pulse and is not breathing):    CPR (Attempt to Resuscitate)     Medical Interventions (Patient has pulse or is breathing):    Full Support       Expected Discharge   Expected Discharge Date and Time     Expected Discharge Date Expected Discharge Time    Apr 4, 2023            Caitlin Smith DO  04/02/23

## 2023-04-02 NOTE — Clinical Note
Level of Care: Telemetry [5]   Diagnosis: Hypoglycemia [412962]   Admitting Physician: STEVE PARIS [[id number]]

## 2023-04-02 NOTE — PAYOR COMM NOTE
"Cecy Acosta \"Herb\" (67 y.o. Male)     Date of Birth   1955    Social Security Number       Address   [address] [place] [address]    Home Phone   [phone]    MRN   [id number]       [facility]    Marital Status                               Admission Date   23    Admission Type   Emergency    Admitting Provider   Caitlin Smith DO    Attending Provider   Caitlin Smith DO    Department, Room/Bed   [facility] 2F, S204/1       Discharge Date       Discharge Disposition       Discharge Destination                               Attending Provider: Caitlin Smith DO    Allergies: Wellbutrin [Bupropion]    Isolation: None   Infection: None   Code Status: CPR    Ht: 162.6 cm (64.02\")   Wt: 68.3 kg (150 lb 8 oz)    Admission Cmt: None   Principal Problem: Hypoglycemia [E16.2]                 Active Insurance as of 2023     Primary Coverage     Payor Plan Insurance Group Employer/Plan Group    ANTHEM MEDICARE REPLACEMENT [facility] MEDICARE ADVANTAGE [id number]     Payor Plan Address Payor Plan Phone Number Payor Plan Fax Number Effective Dates    [address] [phone]  2021 - None Entered    [facility] [address]       Subscriber Name Subscriber Birth Date Member ID       CECY ACOSTA 1955 [id number]                 Emergency Contacts      (Rel.) Home Phone Work Phone Mobile Phone    CricketVivian (Spouse) [phone] -- [phone]            [place]: [facility] [id number] Tax ID [id number]  Insurance Information                ANTHEM MEDICARE REPLACEMENT/ANTHEM MEDICARE ADVANTAGE Phone: [phone]    Subscriber: Cecy Acosta Subscriber#: [id number]    Group#: [id number] Precert#: --             History & Physical      Caitlin Smith DO at [address] [place] [address] Medicine Services  HISTORY AND PHYSICAL    Patient Name: Cecy Acosta  : " 1955  MRN: [id number]  Primary Care Physician: Yessica Ricci DO  Date of admission: 4/2/2023      Subjective    Subjective     Chief Complaint:  Hypoglycemia     HPI:  Herbert Harley is a 67 y.o. male with PMHx MVCAD s/p CABG x3 per Dr Saavedra 3/1/2023, HLD, Post op A fib, Renal failure now on HD M, W, F, chronic anemia, DM2 who presents to ED with AMS and found to hae BG of 39. EMS gave IV dextrose and blood sugar came up to 180.  His mental status returned to baseline. He did eat dinner last night and took his usual 60 units of Lantus. Last HD session was Friday. Patient was seen in the ED 3/27 and had L pleural effusion. He had thoracentesis by IR and was sent home. Imaging in the ED today shows re-accumulation of fluid. He is on RA.   He also has some intermittent RUQ pain. Worse after eating. Ct A/P negative. Unable to have GB US due to drinking some OJ in the ED. Hospital medicine asked to admit.    Review of Systems   Constitutional: Positive for appetite change. Negative for activity change, chills, fatigue and fever.   HENT: Negative for congestion and sore throat.    Eyes: Negative for visual disturbance.   Respiratory: Positive for shortness of breath. Negative for cough and wheezing.    Cardiovascular: Negative for chest pain, palpitations and leg swelling.   Gastrointestinal: Positive for abdominal pain and nausea. Negative for diarrhea and vomiting.   Genitourinary: Negative for dysuria, frequency and urgency.   Musculoskeletal: Negative for back pain and gait problem.   Skin: Positive for wound.   Neurological: Negative for dizziness, weakness and headaches.   Psychiatric/Behavioral: Negative for confusion.        Personal History     Past Medical History:   Diagnosis Date   • Acute renal failure (ARF) 3/2/2023   • Asthma December 2022   • Chronic systolic congestive heart failure 3/1/2023   • Coronary artery disease March 2014   • Elevated cholesterol    • History of tobacco abuse    •  HLD (hyperlipidemia) 01/26/2023   • Hypertension    • Ischemic cardiomyopathy    • Myocardial infarction March 2014   • Prolonged Q-T interval on ECG 3/1/2023   • Type 2 diabetes mellitus    • Wears glasses              Past Surgical History:   Procedure Laterality Date   • CARDIAC CATHETERIZATION  March 2014   • CARDIAC CATHETERIZATION Left 02/17/2023    Procedure: Left Heart Cath;  Surgeon: Nadya Ramirez MD;  Location:  [facility];  Service: Cardiology;  Laterality: Left;   • CORONARY ARTERY BYPASS GRAFT N/A 03/01/2023    Procedure: MEDIAN STERNOTOMY CORONARY ARTERY BYPASS GRAFTING X3  UTILIZING THE LEFT INTERNAL MAMMERY GRAFT, AND EVH OF THE GREATER RIGHT SAPHENOUS VEIN;  Surgeon: Paresh Saavedra MD;  Location:  [facility];  Service: Cardiothoracic;  Laterality: N/A;   • CORONARY ARTERY BYPASS GRAFT  3/1/2023   • CORONARY STENT PLACEMENT  March 2014   • TONSILLECTOMY         Family History: family history includes Brain cancer in his sister; Cancer in his father; Diabetes in his mother; Heart attack in his brother; Prostate cancer in his brother.     Social History:  reports that he quit smoking about 3 months ago. His smoking use included cigarettes. He started smoking about 53 years ago. He has a 78.00 pack-year smoking history. He has been exposed to tobacco smoke. He has never used smokeless tobacco. He reports that he does not currently use alcohol. He reports that he does not use drugs.  Social History     Social History Narrative   • Not on file       Medications:  Available home medication information reviewed.  Medications Prior to Admission   Medication Sig Dispense Refill Last Dose   • albuterol sulfate  (90 Base) MCG/ACT inhaler Inhale 2 puffs Every 4 (Four) Hours As Needed for Wheezing. 18 g 2    • amiodarone (PACERONE) 200 MG tablet Take 1 tablet by mouth Daily for 30 days. 30 tablet 1    • aspirin 81 MG EC tablet Take 1 tablet by mouth Daily for 90 days. 90 tablet 0     • Collagen-Vitamin C-Biotin (COLLAGEN 1500/C PO) Take 1 tablet by mouth Daily. OTC supplement      • dapagliflozin Propanediol (Farxiga) 10 MG tablet Take 10 mg by mouth Daily for 30 days. 30 tablet 3    • dapagliflozin Propanediol (Farxiga) 10 MG tablet Take 10 mg by mouth Daily.      • ezetimibe (ZETIA) 10 MG tablet Take 1 tablet by mouth Daily for 30 days. 30 tablet 11    • fluconazole (Diflucan) 100 MG tablet Take 1 tablet by mouth Daily for 7 days. 7 tablet 0    • Fluticasone-Umeclidin-Vilant (TRELEGY) 100-62.5-25 MCG/ACT inhaler Inhale 1 puff Daily. 1 each 5    • Lantus 100 UNIT/ML injection INJECT 60 UNITS SUBCUTANEOUSLY ONCE DAILY (Patient taking differently: Inject 60 Units under the skin into the appropriate area as directed Every Night. INJECT 60 UNITS SUBCUTANEOUSLY ONCE DAILY) 20 mL 5    • metoprolol succinate XL (TOPROL-XL) 25 MG 24 hr tablet Take 1 tablet by mouth Daily for 30 days. 30 tablet 1    • nitroglycerin (NITROSTAT) 0.4 MG SL tablet Place 1 tablet under the tongue Every 5 (Five) Minutes As Needed for Chest Pain for up to 25 days. Take no more than 3 doses in 15 minutes. 25 tablet 3    • rosuvastatin (CRESTOR) 10 MG tablet Take 1 tablet by mouth Daily. 30 tablet 11        Allergies   Allergen Reactions   • Wellbutrin [Bupropion] Swelling     Facial swelling, severe       Objective    Objective     Vital Signs:   Temp:  [97.6 °F (36.4 °C)-97.7 °F (36.5 °C)] 97.6 °F (36.4 °C)  Heart Rate:  [71-79] 78  Resp:  [16] 16  BP: (155-168)/(73-79) 155/73       Physical Exam   Constitutional: Awake, alert, NAD, non-toxic, frail appearing   Eyes: PERRLA, sclerae anicteric, no conjunctival injection  HENT: NCAT, mucous membranes moist  Neck: Supple, no thyromegaly, no lymphadenopathy, trachea midline  Respiratory: Decreased in bases bilaterally, nonlabored respirations   Cardiovascular: RRR, no murmurs, rubs, or gallops, palpable pedal pulses bilaterally  Gastrointestinal: Positive bowel sounds, soft,  nontender, nondistended  Musculoskeletal: No bilateral ankle edema, no clubbing or cyanosis to extremities  Psychiatric: Appropriate affect, cooperative  Neurologic: Oriented x 3, strength symmetric in all extremities, Cranial Nerves grossly intact to confrontation, speech clear  Skin: No rashes, sternal incision healing well, graft sites LE healing, mild surrounding erythema. Tunneled HD cath R chest wall     Result Review:  I have personally reviewed the results from the time of this admission to 4/2/2023 10:26 EDT and agree with these findings:  [x]  Laboratory list / accordion  []  Microbiology  [x]  Radiology  [x]  EKG/Telemetry   []  Cardiology/Vascular   []  Pathology  [x]  Old records  []  Other:      LAB RESULTS:      Lab 04/02/23  0638 03/27/23  1111   WBC 9.96 10.59   HEMOGLOBIN 9.3* 8.6*   HEMATOCRIT 30.9* 29.2*   PLATELETS 276 162   NEUTROS ABS 7.07* 7.44*   IMMATURE GRANS (ABS) 0.05 0.07*   LYMPHS ABS 1.37 1.35   MONOS ABS 0.89 0.89   EOS ABS 0.46* 0.73*   MCV 94.2 97.3*         Lab 04/02/23  0638 03/27/23  1111   SODIUM 142 141   POTASSIUM 3.1* 4.4   CHLORIDE 102 103   CO2 29.0 28.0   ANION GAP 11.0 10.0   BUN 17 25*   CREATININE 2.08* 2.90*   EGFR 34.3* 23.0*   GLUCOSE 78 240*   CALCIUM 8.8 8.6   MAGNESIUM 2.0  --          Lab 04/02/23  0638 03/27/23  1111   TOTAL PROTEIN 6.8 6.0   ALBUMIN 3.3* 2.9*   GLOBULIN 3.5 3.1   ALT (SGPT) 49* 24   AST (SGOT) 38 27   BILIRUBIN 0.2 0.2   ALK PHOS 140* 149*   LIPASE  --  41         Lab 04/02/23  0638 03/27/23  1302 03/27/23  1111   PROBNP  --   --  14,026.0*   HSTROP T 115* 98* 101*                 UA    Urinalysis 4/2/23 4/2/23    0752 0752   Squamous Epithelial Cells, UA  0-2   Specific Gravity, UA 1.017    Ketones, UA Negative    Blood, UA Negative    Leukocytes, UA Negative    Nitrite, UA Negative    RBC, UA  0-2   WBC, UA  0-2   Bacteria, UA  None Seen             Microbiology Results (last 10 days)     Procedure Component Value - Date/Time    Wound  Culture - Surgical Site, Leg, Right [[id number]]  (Abnormal) Collected: 03/23/23 1611    Lab Status: Final result Specimen: Surgical Site from Leg, Right Updated: 03/26/23 0771     Wound Culture Moderate growth (3+) Candida albicans     Gram Stain Few (2+) WBCs seen      Few (2+) Yeast          CT Abdomen Pelvis Without Contrast    Result Date: 4/2/2023  EXAMINATION:  CT SCAN OF THE ABDOMEN AND PELVIS WITHOUT INTRAVENOUS CONTRAST DATE OF EXAM: 4/2/2023 6:49 AM HISTORY: Right upper quadrant pain after eating.  COMPARISON: None. TECHNIQUE: CT examination of the abdomen and pelvis with sagittal and coronal reformations was performed without intravenous contrast.  CT dose lowering techniques were used, to include: automated exposure control, adjustment for patient size, and/or use  of iterative reconstruction. Note: The exam is limited because some types of pathology may not be adequately demonstrated due to lack of contrast enhancement.    FINDINGS: ABDOMEN/PELVIS: Lower Chest:  Large left pleural effusion with compressive atelectasis of the left upper and left lower lobes. Right lung is clear. Liver: Normal. Gallbladder/Biliary: Normal. Pancreas: Normal. Spleen: Normal. Adrenal Glands: Normal. Kidneys: Normal. GI Tract: Colonic diverticulosis without diverticulitis. No bowel wall thickening or obstruction. Mesentery/Peritoneum: Normal. Vasculature: Normal. Lymph Nodes: Normal. Abdominal Wall: Normal. Bladder: Normal. Reproductive: Normal. Musculoskeletal: Normal.     Impression: 1.  No acute abnormalities in the abdomen or pelvis. 2.  Partially visualized large left pleural effusion with compressive collapse of the left upper left lower lobes. Electronically signed by:  Deep Huerta M.D.  4/2/2023 5:19 AM Mountain Time    XR Chest 1 View    Result Date: 4/2/2023  Examination: XR CHEST 1 VW Date and Time: 4/2/2023 5:51 AM Clinical Information: 67 years, Male, Comparison: Comparison is made with radiograph the  chest performed on March 27, 2023. FINDINGS: Sternal wires are demonstrated. A right internal jugular dialysis catheter is demonstrated. Heart size/mediastinum/petrona: There is exaggeration of the cardiac silhouette. There is silhouetting of the left heart border on the basis of opacification of the left hemithorax. Lung fields: There is opacification involving the mid and lower left lung field suggesting consolidation and/or atelectasis with moderate to large sized pleural effusion. Findings have progressed as compared to March 27, 2023. Lung bases/Retrocardiac/Pleura:  Interstitial opacities are seen at the right upper and right lower lung field without consolidation, segmental atelectasis or notable effusion. Trachea/Paravertebral soft tissues: The trachea and paravertebral soft tissues appear normal. Upper abdomen: No free air is demonstrated. Osseous structures: The bones are normally mineralized.     Impression: 1.  Opacification of the left mid/lower lung field suggests consolidation and/or atelectasis with moderate to large left-sided pleural effusion. 2.  Interstitial opacities are demonstrated suggesting mild to moderate pulmonary congestion. 3.  No pleural effusion is demonstrated on the right. Thank you for the referral of this patient. This exam was interpreted by an American Board of Radiology certified radiologist with subspecialty fellowship training. If there are any questions regarding this exam please feel free to contact a radiologist directly at [phone]. Slot 70 Electronically signed by:  Devante Ugarte M.D.  4/2/2023 4:32 AM Mountain Time      Results for orders placed during the hospital encounter of 03/01/23    Adult Transthoracic Echo Complete W/ Cont if Necessary Per Protocol    Interpretation Summary  •  Left ventricular systolic function is mildly decreased. Calculated left ventricular EF = 40.4% Left ventricular ejection fraction appears to be 41 - 45%.  •  The right ventricular  cavity is dilated.  •  Left atrial volume is mildly increased.  •  There is calcification of the aortic valve.  •  Estimated right ventricular systolic pressure from tricuspid regurgitation is normal (<35 mmHg).  •  There is a left pleural effusion.      Assessment & Plan   Assessment & Plan     Active Hospital Problems    Diagnosis  POA   • **Hypoglycemia [E16.2]  Yes   • Acute renal failure (ARF) [N17.9]  Yes   • Prolonged Q-T interval on ECG [R94.31]  Yes   • Ischemic cardiomyopathy [I25.5]  Yes     · Echo (3/29/2014): LVEF 50-55%  · Echo (12/2022): LVEF 41-45%     • Type 2 diabetes mellitus with hypoglycemia, with long-term current use of insulin [E11.649, Z79.4]  Not Applicable   • I25.10, CABG 03/01/23 [I25.10]  Yes     · Cardiac catheterization for inferior STEMI with Dr. Carter (3/28/2014): LVEF 50%.  NILE to RCA.  NILE to proximal LAD.  Had intraprocedural atrial fibrillation  · Echo (3/29/2014): LVEF 50-55%.  RVSP 30 mmHg  · MPS (2/15/2023): Abnormal with moderate to large area of ischemia in inferior lateral wall.  High risk study  · Cardiac cath (2/17/2023): Multivessel CAD.  · CABG with Dr. Saavedra (3/1/2023): LIMA to LAD.  SVG to OM.  SVG to RPDA     • Hypertension [I10]  Yes   • Hyperlipidemia LDL goal <70 [E78.5]  Yes       Hypoglycemia  DM2  -Improved after Dextrose administration   -POC glucose q3 hours x 24 hours   -Hold Lantus     Recurrent L pleural effusion s/p CABG  -S/P IR drainage 3/27  -Consult to CTS, suspect needs tube placement   -On RA     ARF on HD  -Consult to Nephrology for maintenance HD  -M, W, F schedule   -Has [place] HD cath   -Per wife, they were supposed to do a 24 hour urine test today, will defer to Renal if this needs to be done inpatient     RUQ pain  -CT A/P negative  -NPO after midnight for GB US     Hypokalemia  -Will give 40mEQ x 1 dose     Elevated HS Troponin  -in setting of renal failure, no chest pain, no acute EKG changes     CAD multivessel CAD s/p CABG  x3  Hyperlipidemia  -Continue aspirin, statin, beta-blocker    Post Op A fib  -Continue Amiodarone     Chronic Anemia  -H&H improved from prior     [place] site cellulitis -> resolved  -Completed course of Diflucan     Hx Odynophagia  -Completed course of Diflucan for this as well     DVT prophylaxis:  Mechanical for now     CODE STATUS:  Full Code   Code Status and Medical Interventions:   Ordered at: 04/02/23 0855     Level Of Support Discussed With:    Patient     Code Status (Patient has no pulse and is not breathing):    CPR (Attempt to Resuscitate)     Medical Interventions (Patient has pulse or is breathing):    Full Support       Expected Discharge   Expected Discharge Date and Time     Expected Discharge Date Expected Discharge Time    Apr 4, 2023            Caitlin Smith DO  04/02/23    Electronically signed by Caitlin Smith DO at 04/02/23 1026       Emergency Department Notes    No notes of this type exist for this encounter.           Current Facility-Administered Medications   Medication Dose Route Frequency Provider Last Rate Last Admin   • acetaminophen (TYLENOL) tablet 650 mg  650 mg Oral Q4H PRN Caitlin Smith DO        Or   • acetaminophen (TYLENOL) 160 MG/5ML solution 650 mg  650 mg Oral Q4H PRN Caitlin Smith DO        Or   • acetaminophen (TYLENOL) suppository 650 mg  650 mg Rectal Q4H PRN Caitlin Smith DO       • albuterol (PROVENTIL) nebulizer solution 0.083% 2.5 mg/3mL  2.5 mg Nebulization Q6H PRN Caitlin Smith DO       • amiodarone (PACERONE) tablet 200 mg  200 mg Oral Q24H Caitlin Smith DO       • aspirin EC tablet 81 mg  81 mg Oral Daily Caitlin Smith DO       • budesonide-formoterol (SYMBICORT) 160-4.5 MCG/ACT inhaler 2 puff  2 puff Inhalation BID - RT Caitlin Smith DO        And   • tiotropium (SPIRIVA RESPIMAT) 2.5 mcg/act aerosol solution inhaler  2 puff Inhalation Daily - RT Caitlin Smith DO       • dextrose (D50W) (25 g/50 mL)  IV injection 25 g  25 g Intravenous Q15 Min PRN Caitlin Smith DO       • dextrose (GLUTOSE) oral gel 15 g  15 g Oral Q15 Min PRN Caitlin Smith DO       • glucagon (GLUCAGEN) injection 1 mg  1 mg Intramuscular Q15 Min PRN Caitlin Smith DO       • metoprolol succinate XL (TOPROL-XL) 24 hr tablet 25 mg  25 mg Oral Q24H Caitlin Smith DO       • nitroglycerin (NITROSTAT) SL tablet 0.4 mg  0.4 mg Sublingual Q5 Min PRN Caitlin Smith DO       • ondansetron (ZOFRAN) tablet 4 mg  4 mg Oral Q6H PRN Caitlin Smith DO        Or   • ondansetron (ZOFRAN) injection 4 mg  4 mg Intravenous Q6H PRN Caitlin Smith DO       • potassium chloride (MICRO-K) CR capsule 40 mEq  40 mEq Oral Once Caitlin Smith DO       • rosuvastatin (CRESTOR) tablet 10 mg  10 mg Oral Nightly Caitlin Smith DO       • sodium chloride 0.9 % flush 10 mL  10 mL Intravenous PRN Emergency, Triage Protocol, MD       • sodium chloride 0.9 % flush 10 mL  10 mL Intravenous Q12H Caitlin Smith DO       • sodium chloride 0.9 % flush 10 mL  10 mL Intravenous PRN Caitlin Smith DO       • sodium chloride 0.9 % infusion 40 mL  40 mL Intravenous PRN Caitlin Smith DO           Lab Results (last 24 hours)     Procedure Component Value Units Date/Time    POC Glucose Once [[id number]]  (Normal) Collected: 04/02/23 0846    Specimen: Blood Updated: 04/02/23 0847     Glucose 91 mg/dL      Comment: Meter: [id number] : [id number] Cole Whitney       Urinalysis With Microscopic If Indicated (No Culture) - Urine, Clean Catch [[id number]]  (Abnormal) Collected: 04/02/23 0752    Specimen: Urine, Clean Catch Updated: 04/02/23 0818     Color, UA Yellow     Appearance, UA Clear     pH, UA 8.0     Specific Gravity, UA 1.017     Glucose, UA >=1000 mg/dL (3+)     Ketones, UA Negative     Bilirubin, UA Negative     Blood, UA Negative     Protein, UA 30 mg/dL (1+)     Leuk Esterase, UA Negative     Nitrite, UA Negative      Urobilinogen, UA 1.0 E.U./dL    Urinalysis, Microscopic Only - Urine, Clean Catch [[id number]] Collected: 04/02/23 0752    Specimen: Urine, Clean Catch Updated: 04/02/23 0818     RBC, UA 0-2 /HPF      WBC, UA 0-2 /HPF      Bacteria, UA None Seen /HPF      Squamous Epithelial Cells, UA 0-2 /HPF      Hyaline Casts, UA 0-6 /LPF      Methodology Automated Microscopy    [place] Draw [[id number]] Collected: 04/02/23 0638    Specimen: Blood Updated: 04/02/23 0745    Narrative:      The following orders were created for panel order [place] Draw.  Procedure                               Abnormality         Status                     ---------                               -----------         ------                     Green Top (Gel)[[id number]]                                  Final result               Lavender Top[[id number]]                                     Final result               Gold Top - SST[[id number]]                                   Final result               Carter Top[[id number]]                                         In process                 Light Blue Top[[id number]]                                   Final result                 Please view results for these tests on the individual orders.    Green Top (Gel) [[id number]] Collected: 04/02/23 0638    Specimen: Blood Updated: 04/02/23 0745     Extra Tube Hold for add-ons.     Comment: Auto resulted.       Gold Top - SST [[id number]] Collected: 04/02/23 0638    Specimen: Blood Updated: 04/02/23 0745     Extra Tube Hold for add-ons.     Comment: Auto resulted.       Lavender Top [[id number]] Collected: 04/02/23 0638    Specimen: Blood Updated: 04/02/23 0745     Extra Tube hold for add-on     Comment: Auto resulted       Light Blue Top [[id number]] Collected: 04/02/23 0638    Specimen: Blood Updated: 04/02/23 0745     Extra Tube Hold for add-ons.     Comment: Auto resulted       Comprehensive Metabolic Panel [[id number]]  (Abnormal) Collected: 04/02/23 0638    Specimen:  Blood Updated: 04/02/23 0728     Glucose 78 mg/dL      BUN 17 mg/dL      Creatinine 2.08 mg/dL      Sodium 142 mmol/L      Potassium 3.1 mmol/L      Chloride 102 mmol/L      CO2 29.0 mmol/L      Calcium 8.8 mg/dL      Total Protein 6.8 g/dL      Albumin 3.3 g/dL      ALT (SGPT) 49 U/L      AST (SGOT) 38 U/L      Alkaline Phosphatase 140 U/L      Total Bilirubin 0.2 mg/dL      Globulin 3.5 gm/dL      Comment: Calculated Result        A/G Ratio 0.9 g/dL      BUN/Creatinine Ratio 8.2     Anion Gap 11.0 mmol/L      eGFR 34.3 mL/min/1.73     Narrative:      GFR Normal >60  Chronic Kidney Disease <60  Kidney Failure <15      Magnesium [[id number]]  (Normal) Collected: 04/02/23 0638    Specimen: Blood Updated: 04/02/23 0727     Magnesium 2.0 mg/dL     Single High Sensitivity Troponin T [[id number]]  (Abnormal) Collected: 04/02/23 0638    Specimen: Blood Updated: 04/02/23 0725     HS Troponin T 115 ng/L     Narrative:      High Sensitive Troponin T Reference Range:  <10.0 ng/L- Negative Female for AMI  <15.0 ng/L- Negative Male for AMI  >=10 - Abnormal Female indicating possible myocardial injury.  >=15 - Abnormal Male indicating possible myocardial injury.   Clinicians would have to utilize clinical acumen, EKG, Troponin, and serial changes to determine if it is an Acute Myocardial Infarction or myocardial injury due to an underlying chronic condition.         POC Glucose Once [[id number]]  (Abnormal) Collected: 04/02/23 0719    Specimen: Blood Updated: 04/02/23 0721     Glucose 43 mg/dL      Comment: Physician Notified Meter: [id number] : [id number] Shiva Raj       CBC & Differential [[id number]]  (Abnormal) Collected: 04/02/23 0638    Specimen: Blood Updated: 04/02/23 0654    Narrative:      The following orders were created for panel order CBC & Differential.  Procedure                               Abnormality         Status                     ---------                               -----------          ------                     CBC Auto Differential[[id number]]        Abnormal            Final result                 Please view results for these tests on the individual orders.    CBC Auto Differential [[id number]]  (Abnormal) Collected: 04/02/23 0638    Specimen: Blood Updated: 04/02/23 0654     WBC 9.96 10*3/mm3      RBC 3.28 10*6/mm3      Hemoglobin 9.3 g/dL      Hematocrit 30.9 %      MCV 94.2 fL      MCH 28.4 pg      MCHC 30.1 g/dL      RDW 14.4 %      RDW-SD 48.9 fl      MPV 9.3 fL      Platelets 276 10*3/mm3      Neutrophil % 71.0 %      Lymphocyte % 13.8 %      Monocyte % 8.9 %      Eosinophil % 4.6 %      Basophil % 1.2 %      Immature Grans % 0.5 %      Neutrophils, Absolute 7.07 10*3/mm3      Lymphocytes, Absolute 1.37 10*3/mm3      Monocytes, Absolute 0.89 10*3/mm3      Eosinophils, Absolute 0.46 10*3/mm3      Basophils, Absolute 0.12 10*3/mm3      Immature Grans, Absolute 0.05 10*3/mm3      nRBC 0.0 /100 WBC     Carter Top [[id number]] Collected: 04/02/23 0638    Specimen: Blood Updated: 04/02/23 0643        Imaging Results (Last 24 Hours)     Procedure Component Value Units Date/Time    CT Abdomen Pelvis Without Contrast [[id number]] Collected: 04/02/23 0513     Updated: 04/02/23 0720    Narrative:      EXAMINATION:  CT SCAN OF THE ABDOMEN AND PELVIS WITHOUT INTRAVENOUS CONTRAST    DATE OF EXAM: 4/2/2023 6:49 AM     HISTORY: Right upper quadrant pain after eating.      COMPARISON: None.    TECHNIQUE: CT examination of the abdomen and pelvis with sagittal and coronal reformations was performed without intravenous contrast.  CT dose lowering techniques were used, to include: automated exposure control, adjustment for patient size, and/or use   of iterative reconstruction.    Note: The exam is limited because some types of pathology may not be adequately demonstrated due to lack of contrast enhancement.       FINDINGS:    ABDOMEN/PELVIS:    Lower Chest:  Large left pleural effusion with compressive  atelectasis of the left upper and left lower lobes. Right lung is clear.    Liver: Normal.     Gallbladder/Biliary: Normal.     Pancreas: Normal.    Spleen: Normal.     Adrenal Glands: Normal.     Kidneys: Normal.    GI Tract: Colonic diverticulosis without diverticulitis. No bowel wall thickening or obstruction.    Mesentery/Peritoneum: Normal.    Vasculature: Normal.     Lymph Nodes: Normal.     Abdominal Wall: Normal.     Bladder: Normal.     Reproductive: Normal.     Musculoskeletal: Normal.        Impression:        1.  No acute abnormalities in the abdomen or pelvis.  2.  Partially visualized large left pleural effusion with compressive collapse of the left upper left lower lobes.    Electronically signed by:  Deep Huerta M.D.    4/2/2023 5:19 AM Mountain Time    XR Chest 1 View [[id number]] Collected: 04/02/23 0430     Updated: 04/02/23 0633    Narrative:      Examination: XR CHEST 1 VW    Date and Time: 4/2/2023 5:51 AM    Clinical Information: 67 years, Male,    Comparison: Comparison is made with radiograph the chest performed on March 27, 2023.    FINDINGS:    Sternal wires are demonstrated.    A right internal jugular dialysis catheter is demonstrated.     Heart size/mediastinum/petrona: There is exaggeration of the cardiac silhouette. There is silhouetting of the left heart border on the basis of opacification of the left hemithorax.    Lung fields: There is opacification involving the mid and lower left lung field suggesting consolidation and/or atelectasis with moderate to large sized pleural effusion. Findings have progressed as compared to March 27, 2023.    Lung bases/Retrocardiac/Pleura:  Interstitial opacities are seen at the right upper and right lower lung field without consolidation, segmental atelectasis or notable effusion.    Trachea/Paravertebral soft tissues: The trachea and paravertebral soft tissues appear normal.    Upper abdomen: No free air is demonstrated.    Osseous structures:  The bones are normally mineralized.      Impression:      1.  Opacification of the left mid/lower lung field suggests consolidation and/or atelectasis with moderate to large left-sided pleural effusion.   2.  Interstitial opacities are demonstrated suggesting mild to moderate pulmonary congestion.   3.  No pleural effusion is demonstrated on the right.    Thank you for the referral of this patient. This exam was interpreted by an American Board of Radiology certified radiologist with subspecialty fellowship training. If there are any questions regarding this exam please feel free to contact a radiologist   directly at [phone].    Slot 70    Electronically signed by:  Devante Ugarte M.D.    4/2/2023 4:32 AM Mountain Time        ECG/EMG Results (last 24 hours)     Procedure Component Value Units Date/Time    ECG 12 Lead ED Triage Standing Order; Weak / Dizzy / AMS [[id number]] Collected: 04/02/23 0609     Updated: 04/02/23 0610     QT Interval 394 ms      QTC Interval 437 ms     Narrative:      Test Reason : ED Triage Standing Order~  Blood Pressure :   */*   mmHG  Vent. Rate :  74 BPM     Atrial Rate :  74 BPM     P-R Int : 244 ms          QRS Dur : 104 ms      QT Int : 394 ms       P-R-T Axes :  59  25 136 degrees     QTc Int : 437 ms    Sinus rhythm with 1st degree AV block  Possible Left atrial enlargement  Inferior infarct (cited on or before 27-MAR-2023)  Abnormal ECG  When compared with ECG of 27-MAR-2023 13:01,  No significant change was found    Referred By: EDMD           Confirmed By:           Orders (last 24 hrs)      Start     Ordered    04/03/23 0600  Comprehensive Metabolic Panel  Morning Draw         04/02/23 0855    04/03/23 0600  CBC (No Diff)  Morning Draw         04/02/23 0855    04/03/23 0600  Magnesium  Morning Draw         04/02/23 0855    04/03/23 0600  Phosphorus  Morning Draw         04/02/23 0855    04/03/23 0001  NPO Diet NPO Type: Strict NPO  Diet Effective Midnight         04/02/23  0907    04/03/23 0000  US Gallbladder  1 Time Imaging         04/02/23 0907    04/02/23 2100  rosuvastatin (CRESTOR) tablet 10 mg  Nightly         04/02/23 1006    04/02/23 1800  Oral Care  2 Times Daily       04/02/23 0855    04/02/23 1200  Vital Signs  Every 4 Hours       04/02/23 0855    04/02/23 1115  potassium chloride (MICRO-K) CR capsule 40 mEq  Once         04/02/23 1027    04/02/23 1100  amiodarone (PACERONE) tablet 200 mg  Every 24 Hours Scheduled         04/02/23 1006    04/02/23 1100  aspirin EC tablet 81 mg  Daily         04/02/23 1006    04/02/23 1100  budesonide-formoterol (SYMBICORT) 160-4.5 MCG/ACT inhaler 2 puff  2 Times Daily - RT        See Hyperspace for full Linked Orders Report.    04/02/23 1006    04/02/23 1100  tiotropium (SPIRIVA RESPIMAT) 2.5 mcg/act aerosol solution inhaler  Daily - RT        See Hyperspace for full Linked Orders Report.    04/02/23 1006    04/02/23 1100  metoprolol succinate XL (TOPROL-XL) 24 hr tablet 25 mg  Every 24 Hours Scheduled         04/02/23 1006    04/02/23 1036  Obtain Informed Consent  Once         04/02/23 1035    04/02/23 1036  No Lab Testing Needed  Once         04/02/23 1035    04/02/23 1032  CT Guided Chest Tube  1 Time Imaging         04/02/23 1032    04/02/23 1031  Inpatient Admission  Once         04/02/23 1031    04/02/23 1029  Sedimentation Rate  Morning Draw         04/02/23 1030    04/02/23 1016  dextrose (GLUTOSE) oral gel 15 g  Every 15 Minutes PRN         04/02/23 1016    04/02/23 1016  dextrose (D50W) (25 g/50 mL) IV injection 25 g  Every 15 Minutes PRN         04/02/23 1016    04/02/23 1016  glucagon (GLUCAGEN) injection 1 mg  Every 15 Minutes PRN         04/02/23 1016    04/02/23 1006  nitroglycerin (NITROSTAT) SL tablet 0.4 mg  Every 5 Minutes PRN         04/02/23 1006    04/02/23 1005  albuterol (PROVENTIL) nebulizer solution 0.083% 2.5 mg/3mL  Every 6 Hours PRN         04/02/23 1006    04/02/23 0945  sodium chloride 0.9 % flush 10 mL   Every 12 Hours Scheduled         04/02/23 0855    04/02/23 0908  DIET MESSAGE Please send late breakfast tray. He did not get one  Once        Comments: Please send late breakfast tray. He did not get one    04/02/23 0908    04/02/23 0900  POC Glucose Finger Q3H  Every 3 Hours       04/02/23 0846    04/02/23 0856  Intake & Output  Every Shift       04/02/23 0855    04/02/23 0856  Weigh Patient  Once         04/02/23 0855    04/02/23 0856  Oxygen Therapy- Nasal Cannula; Titrate for SPO2: 90% - 95%  Continuous         04/02/23 0855    04/02/23 0856  Insert Peripheral IV  Once         04/02/23 0855    04/02/23 0856  Saline Lock & Maintain IV Access  Continuous         04/02/23 0855    04/02/23 0856  Code Status and Medical Interventions:  Continuous         04/02/23 0855    04/02/23 0856  Place Sequential Compression Device  Once         04/02/23 0855    04/02/23 0856  Maintain Sequential Compression Device  Continuous         04/02/23 0855    04/02/23 0856  Pulse Oximetry, Continuous  Continuous         04/02/23 0855    04/02/23 0856  Cardiac Monitoring  Continuous        Comments: Follow Standing Orders As Outlined in Process Instructions (Open Order Report to View Full Instructions)    04/02/23 0855    04/02/23 0856  Diet: Cardiac Diets, Diabetic Diets; Healthy Heart (2-3 Na+); Consistent Carbohydrate; Texture: Regular Texture (IDDSI 7); Fluid Consistency: Thin (IDDSI 0)  Diet Effective Now         04/02/23 0855    04/02/23 0856  Inpatient Cardiothoracic Surgery Consult  Once        Specialty:  Cardiothoracic Surgery  Provider:  Paresh Saavedra MD    04/02/23 0855    04/02/23 0856  Inpatient Nephrology Consult  Once        Specialty:  Nephrology  Provider:  Manjit Gomez MD    04/02/23 0855    04/02/23 0855  sodium chloride 0.9 % flush 10 mL  As Needed         04/02/23 0855    04/02/23 0855  sodium chloride 0.9 % infusion 40 mL  As Needed         04/02/23 0855    04/02/23 0855  acetaminophen (TYLENOL) tablet  650 mg  Every 4 Hours PRN        See Hyperspace for full Linked Orders Report.    04/02/23 0855    04/02/23 0855  acetaminophen (TYLENOL) 160 MG/5ML solution 650 mg  Every 4 Hours PRN        See Hyperspace for full Linked Orders Report.    04/02/23 0855    04/02/23 0855  acetaminophen (TYLENOL) suppository 650 mg  Every 4 Hours PRN        See Hyperspace for full Linked Orders Report.    04/02/23 0855    04/02/23 0855  ondansetron (ZOFRAN) tablet 4 mg  Every 6 Hours PRN        See Hyperspace for full Linked Orders Report.    04/02/23 0855    04/02/23 0855  ondansetron (ZOFRAN) injection 4 mg  Every 6 Hours PRN        See Hyperspace for full Linked Orders Report.    04/02/23 0855    04/02/23 0848  POC Glucose Once  PROCEDURE ONCE         04/02/23 0846    04/02/23 0817  Urinalysis, Microscopic Only - Urine, Clean Catch  Once         04/02/23 0816    04/02/23 0752  Initiate Observation Status  Once         04/02/23 0751    04/02/23 0751  ED Bed Request  Once         04/02/23 0751    04/02/23 0746  US Gallbladder  1 Time Imaging,   Status:  Canceled         04/02/23 0746    04/02/23 0722  dextrose (D50W) (25 g/50 mL) IV injection 25 g  Once         04/02/23 0720    04/02/23 0722  POC Glucose Once  PROCEDURE ONCE         04/02/23 0719    04/02/23 0611  CT Abdomen Pelvis Without Contrast  1 Time Imaging        Comments: NON-CONTRASTED STUDY      04/02/23 0610    04/02/23 0558  NPO Diet NPO Type: Strict NPO  Diet Effective Now,   Status:  Canceled         04/02/23 0558    04/02/23 0558  Undress & Gown  Once         04/02/23 0558    04/02/23 0558  Cardiac Monitoring  Continuous,   Status:  Canceled        Comments: Follow Standing Orders As Outlined in Process Instructions (Open Order Report to View Full Instructions)    04/02/23 0558    04/02/23 0558  Continuous Pulse Oximetry  Continuous,   Status:  Canceled         04/02/23 0558    04/02/23 0558  Oxygen Therapy- Nasal Cannula; 2 LPM; Titrate for SPO2: 92%, Greater Than or  Equal To  Continuous PRN,   Status:  Canceled       04/02/23 0558    04/02/23 0558  Vital Signs  Per Hospital Policy         04/02/23 0558    04/02/23 0558  Orthostatic Blood Pressure  Once         04/02/23 0558    04/02/23 0558  Fall Precautions  Continuous         04/02/23 0558    04/02/23 0558  XR Chest 1 View  1 Time Imaging         04/02/23 0558    04/02/23 0558  ECG 12 Lead ED Triage Standing Order; Weak / Dizzy / AMS  Once         04/02/23 0558    04/02/23 0558  POC Glucose Once  Once         04/02/23 0558    04/02/23 0558  Insert Peripheral IV  Once         04/02/23 0558    04/02/23 0558  sodium chloride 0.9 % flush 10 mL  As Needed         04/02/23 0558    04/02/23 0558  [place] Draw  Once         04/02/23 0558    04/02/23 0558  CBC & Differential  Once         04/02/23 0558    04/02/23 0558  Comprehensive Metabolic Panel  Once         04/02/23 0558    04/02/23 0558  Single High Sensitivity Troponin T  Once         04/02/23 0558    04/02/23 0558  Magnesium  Once         04/02/23 0558    04/02/23 0558  Urinalysis With Microscopic If Indicated (No Culture) - Urine, Clean Catch  Once         04/02/23 0558    04/02/23 0558  Green Top (Gel)  PROCEDURE ONCE         04/02/23 0558    04/02/23 0558  Lavender Top  PROCEDURE ONCE         04/02/23 0558    04/02/23 0558  Gold Top - SST  PROCEDURE ONCE         04/02/23 0558    04/02/23 0558  Carter Top  PROCEDURE ONCE         04/02/23 0558    04/02/23 0558  Light Blue Top  PROCEDURE ONCE         04/02/23 0558    04/02/23 0558  CBC Auto Differential  PROCEDURE ONCE         04/02/23 0558    Unscheduled  Up With Assistance  As Needed       04/02/23 0855    Unscheduled  Follow Hypoglycemia Standing Orders For Blood Glucose <70 & Notify Provider of Treatment  As Needed      Comments: Follow Hypoglycemia Orders As Outlined in Process Instructions (Open Order Report to View Full Instructions)  Notify Provider Any Time Hypoglycemia Treatment is Administered    04/02/23 1016                 Physician Progress Notes (last 24 hours)  Notes from 04/01/23 1039 through 04/02/23 1039   No notes of this type exist for this encounter.            Consult Notes (last 24 hours)      Benton Yost PA-C at 04/02/23 0907      Consult Orders    1. Inpatient Cardiothoracic Surgery Consult [[id number]] ordered by Caitlin Smith DO at 04/02/23 0846               Consult Note  Herbert Harley  [id number]  1955    Referring Provider:  Reason for Consultation: Effusion post CABG    Patient Care Team:  Yessica Ricci DO as PCP - General (Family Medicine)  Nima Peter MD as Consulting Physician (Endocrinology)  Nadya Ramirez MD as Cardiologist (Cardiology)  Yessica Ricci DO as Consulting Physician (Family Medicine)  Nury Webber RN as Ambulatory  ([facility])    Chief complaint: Chest pain after dialysis    History of present illness: 57-year-old male who is a postop CABG Dr. Saavedra from March 1 2023  He underwent coronary bypass grafting x3 with endoscopic vein harvesting by Dr. Saavedra he did well postop and was discharged home on 3/20/2023   And gone home and became increasingly more short of breath with associated chest pain  Felt the pain was due to his recurrent effusion possible pericarditis    X-ray was done and showed left pleural effusion  review of Systems        History  Past Medical History:   Diagnosis Date   • Acute renal failure (ARF) 3/2/2023   • Asthma December 2022   • Chronic systolic congestive heart failure 3/1/2023   • Coronary artery disease March 2014   • Elevated cholesterol    • History of tobacco abuse    • HLD (hyperlipidemia) 01/26/2023   • Hypertension    • Ischemic cardiomyopathy    • Myocardial infarction March 2014   • Prolonged Q-T interval on ECG 3/1/2023   • Type 2 diabetes mellitus    • Wears glasses    ,   Past Surgical History:   Procedure Laterality Date   • CARDIAC CATHETERIZATION  March 2014   • CARDIAC  CATHETERIZATION Left 2023    Procedure: Left Heart Cath;  Surgeon: Nadya Ramirez MD;  Location:  [facility];  Service: Cardiology;  Laterality: Left;   • CORONARY ARTERY BYPASS GRAFT N/A 2023    Procedure: MEDIAN STERNOTOMY CORONARY ARTERY BYPASS GRAFTING X3  UTILIZING THE LEFT INTERNAL MAMMERY GRAFT, AND EVH OF THE GREATER RIGHT SAPHENOUS VEIN;  Surgeon: Paresh Saavedra MD;  Location:  [facility];  Service: Cardiothoracic;  Laterality: N/A;   • CORONARY ARTERY BYPASS GRAFT  3/1/2023   • CORONARY STENT PLACEMENT  2014   • TONSILLECTOMY     ,   Family History   Problem Relation Age of Onset   • Diabetes Mother    • Cancer Father         liver   • Brain cancer Sister    • Heart attack Brother    • Prostate cancer Brother    ,   Social History     Tobacco Use   • Smoking status: Former     Packs/day: 1.50     Years: 52.00     Pack years: 78.00     Types: Cigarettes     Start date: 1970     Quit date: 2022     Years since quittin.3     Passive exposure: Past   • Smokeless tobacco: Never   Vaping Use   • Vaping Use: Never used   Substance Use Topics   • Alcohol use: Not Currently   • Drug use: Never   ,   Medications Prior to Admission   Medication Sig Dispense Refill Last Dose   • albuterol sulfate  (90 Base) MCG/ACT inhaler Inhale 2 puffs Every 4 (Four) Hours As Needed for Wheezing. 18 g 2    • amiodarone (PACERONE) 200 MG tablet Take 1 tablet by mouth Daily for 30 days. 30 tablet 1    • aspirin 81 MG EC tablet Take 1 tablet by mouth Daily for 90 days. 90 tablet 0    • Collagen-Vitamin C-Biotin (COLLAGEN 1500/C PO) Take 1 tablet by mouth Daily. OTC supplement      • dapagliflozin Propanediol (Farxiga) 10 MG tablet Take 10 mg by mouth Daily for 30 days. 30 tablet 3    • dapagliflozin Propanediol (Farxiga) 10 MG tablet Take 10 mg by mouth Daily.      • ezetimibe (ZETIA) 10 MG tablet Take 1 tablet by mouth Daily for 30 days. 30 tablet 11    • fluconazole  "(Diflucan) 100 MG tablet Take 1 tablet by mouth Daily for 7 days. 7 tablet 0    • Fluticasone-Umeclidin-Vilant (TRELEGY) 100-62.5-25 MCG/ACT inhaler Inhale 1 puff Daily. 1 each 5    • Lantus 100 UNIT/ML injection INJECT 60 UNITS SUBCUTANEOUSLY ONCE DAILY (Patient taking differently: Inject 60 Units under the skin into the appropriate area as directed Every Night. INJECT 60 UNITS SUBCUTANEOUSLY ONCE DAILY) 20 mL 5    • metoprolol succinate XL (TOPROL-XL) 25 MG 24 hr tablet Take 1 tablet by mouth Daily for 30 days. 30 tablet 1    • nitroglycerin (NITROSTAT) 0.4 MG SL tablet Place 1 tablet under the tongue Every 5 (Five) Minutes As Needed for Chest Pain for up to 25 days. Take no more than 3 doses in 15 minutes. 25 tablet 3    • rosuvastatin (CRESTOR) 10 MG tablet Take 1 tablet by mouth Daily. 30 tablet 11       Scheduled Meds:  sodium chloride, 10 mL, Intravenous, Q12H       Continuous Infusions:      PRN Meds:  •  acetaminophen **OR** acetaminophen **OR** acetaminophen  •  ondansetron **OR** ondansetron  •  sodium chloride  •  sodium chloride  •  sodium chloride and Allergies:  Wellbutrin [bupropion]    Objective     Vital Sign Min/Max for last 24 hours  Temp  Min: 97.6 °F (36.4 °C)  Max: 97.7 °F (36.5 °C)   BP  Min: 155/73  Max: 168/77   Pulse  Min: 71  Max: 79   Resp  Min: 16  Max: 16   SpO2  Min: 98 %  Max: 99 %   No data recorded   Weight  Min: 66.7 kg (147 lb)  Max: 68.3 kg (150 lb 8 oz)     Flowsheet Rows    Flowsheet Row First Filed Value   Admission Height 162.6 cm (64\") Documented at 04/02/2023 0556   Admission Weight 66.7 kg (147 lb) Documented at 04/02/2023 0556          Physical Exam:     General Appearance:    Alert, cooperative, in no acute distress   Head:    Normocephalic, without obvious abnormality, atraumatic   Eyes:            Lids and lashes normal, conjunctivae and sclerae normal,   no icterus, no pallor, corneas clear, PERRLA   Ears:    Ears appear intact with no abnormalities noted   Throat:   " No oral lesions, no thrush, oral mucosa moist   Neck:   No adenopathy, supple, trachea midline, no thyromegaly, no carotid bruit, no JVD   Back:   No kyphosis present, no scoliosis present, no skin lesions, erythema or scars, no tenderness to percussion or                palpation, range of motion normal   Lungs:    Diminished on the left    Heart:    Regular rhythm and normal rate, normal S1 and S2, no         murmur, no gallop, no rub, no click   Chest Wall:    No abnormalities observed   Abdomen:     Normal bowel sounds, no masses, no organomegaly, soft     nontender, nondistended, no guarding, no rebound               tenderness   Rectal:     Deferred   Extremities:   Moves all extremities well, no edema, no cyanosis, no           redness   Pulses:   Pulses palpable and equal bilaterally   Skin:   No bleeding, bruising or rash   Lymph nodes:   No palpable adenopathy   Neurologic:   Cranial nerves 2 - 12 grossly intact, sensation intact, DTR     present and equal bilaterally             Assessment & Plan       Hypoglycemia    He had a thoracentesis on the 27th that improved aeration of his left lung he is back last night with a recurrent left effusion  After discussion with Dr. Saavedra he would like to have the left chest tube placed for drainage of the chest  Also order sed rate for rule out pericarditis  We will discuss with IR about possibility of a chest tube placed today and then have the atrium up to 20 cm wall suction  I discussed the patient's findings and my recommendations with patient      Please note that portions of this note were completed with a voice recognition program. Efforts were made to edit the dictations, but words may be mistranscribed    Benton Yost PA-C  04/02/23  09:07 EDT                Electronically signed by Benton Yost PA-C at 04/02/23 1762

## 2023-04-02 NOTE — ED PROVIDER NOTES
Subjective   History of Present Illness  Mr. Harley presents by ambulance with altered mental status.  His wife noted that he was lethargic when he woke up this morning.  EMS was called.  Blood sugar was found to be 39.  They gave IV dextrose and blood sugar came up to 180.  His mental status has returned to baseline.  He tells me he does not remember what happened this morning.  He does admit that he did not eat dinner last night.  He tells me that since his recent bypass surgery he has been having recurrent right upper quadrant pain after eating.  This is caused him to eat less.  He takes 60 units of Lantus insulin before going to bed every night.  Last dialysis was Friday, today is Sunday.  He has not missed any dialysis treatments.        Review of Systems    Past Medical History:   Diagnosis Date   • Acute renal failure (ARF) 3/2/2023   • Asthma December 2022   • Chronic systolic congestive heart failure 3/1/2023   • Coronary artery disease March 2014   • Elevated cholesterol    • History of tobacco abuse    • HLD (hyperlipidemia) 01/26/2023   • Hypertension    • Ischemic cardiomyopathy    • Myocardial infarction March 2014   • Prolonged Q-T interval on ECG 3/1/2023   • Type 2 diabetes mellitus    • Wears glasses        Allergies   Allergen Reactions   • Wellbutrin [Bupropion] Swelling     Facial swelling, severe       Past Surgical History:   Procedure Laterality Date   • CARDIAC CATHETERIZATION  March 2014   • CARDIAC CATHETERIZATION Left 02/17/2023    Procedure: Left Heart Cath;  Surgeon: Nadya Ramirez MD;  Location:  [facility] IN[facility];  Service: Cardiology;  Laterality: Left;   • CORONARY ARTERY BYPASS GRAFT N/A 03/01/2023    Procedure: MEDIAN STERNOTOMY CORONARY ARTERY BYPASS GRAFTING X3  UTILIZING THE LEFT INTERNAL MAMMERY GRAFT, AND EVH OF THE GREATER RIGHT SAPHENOUS VEIN;  Surgeon: Paresh Saavedra MD;  Location:  [facility];  Service: Cardiothoracic;  Laterality: N/A;   • CORONARY ARTERY  BYPASS GRAFT  3/1/2023   • CORONARY STENT PLACEMENT  2014   • TONSILLECTOMY         Family History   Problem Relation Age of Onset   • Diabetes Mother    • Cancer Father         liver   • Brain cancer Sister    • Heart attack Brother    • Prostate cancer Brother        Social History     Socioeconomic History   • Marital status:    Tobacco Use   • Smoking status: Former     Packs/day: 1.50     Years: 52.00     Pack years: 78.00     Types: Cigarettes     Start date: 1970     Quit date: 2022     Years since quittin.3     Passive exposure: Past   • Smokeless tobacco: Never   Vaping Use   • Vaping Use: Never used   Substance and Sexual Activity   • Alcohol use: Not Currently   • Drug use: Never   • Sexual activity: Not Currently           Objective   Physical Exam  Vitals and nursing note reviewed.   Constitutional:       General: He is not in acute distress.     Appearance: Normal appearance.   HENT:      Head: Normocephalic and atraumatic.      Nose: Nose normal. No congestion or rhinorrhea.   Eyes:      General: No scleral icterus.     Conjunctiva/sclera: Conjunctivae normal.   Neck:      Comments: No JVD   Cardiovascular:      Rate and Rhythm: Normal rate and regular rhythm.      Heart sounds: No murmur heard.    No friction rub.   Pulmonary:      Effort: Pulmonary effort is normal.      Breath sounds: Normal breath sounds. No wheezing or rales.   Abdominal:      General: Bowel sounds are normal.      Palpations: Abdomen is soft.      Tenderness: There is no abdominal tenderness. There is no guarding or rebound.   Musculoskeletal:         General: No tenderness.      Cervical back: Normal range of motion and neck supple.      Right lower leg: No edema.      Left lower leg: No edema.   Skin:     General: Skin is warm and dry.      Coloration: Skin is not pale.      Findings: No erythema.   Neurological:      General: No focal deficit present.      Mental Status: He is alert and oriented to  person, place, and time.      Motor: No weakness.      Coordination: Coordination normal.   Psychiatric:         Mood and Affect: Mood normal.         Behavior: Behavior normal.         Thought Content: Thought content normal.         Procedures           ED Course  ED Course as of 04/02/23 1457   Sun Apr 02, 2023   0615 He took Lantus insulin last night before going to bed and has not eaten since noon yesterday.  Additionally has been 48 hours since dialysis.  We will observe him for a while in the emergency department to make sure his sugars stabilize.  He gives history concerning for gallbladder disease.  Will obtain imaging of his gallbladder. [DT]   0636 I reviewed his chest x-ray.  The effusion in his left lung is worsening again.  Its not as bad as it was prior to thoracentesis a few days ago but definitely worse than the chest x-ray obtained after thoracentesis. [DT]   0731 Blood sugar dropped to 43.  He required additional IV dextrose.  Have reviewed his labs.  They are unremarkable.  Troponin is 115, that is a little higher than his recent baseline although has not had dialysis for 2 days. [DT]   0734 Radiology has read his CAT scan is nothing acute.  They did not comment on the gallbladder.  I have reviewed his scan and he has a gallstone.  Will ask them to comment further on the gallbladder. [DT]   0736 I called the number listed for Dr. Huerta.  Have left a message for him to call me [DT]   0740 I spoke with Mr. Harley.  He is hungry.  We have ordered him a breakfast tray.  He will need admission for observation of his blood sugar.  I think his gallbladder needs further evaluation, will order ultrasound.  His effusion is worsening I think he may benefit from a drain.  We will discuss this with the hospitalist. [DT]   0815 The ultrasound tech called me and told me she observed Mr. Harley to be drinking orange juice.  She feels that she will not be able to see the gallbladder on exam and recommends  attempting the exam at another time. [DT]      ED Course User Index  [DT] Tyler King MD                                           Medical Decision Making  Please refer to emergency department course notes.  I observed Mr. Patel for several hours in the emergency department.  We observed his blood sugars and treated recurring hypoglycemia.  Had multiple repeat encounters.  Ultimately consulted the hospitalist and he was admitted to the hospital.    Acute abdominal pain: complicated acute illness or injury  Hypoglycemia: acute illness or injury that poses a threat to life or bodily functions  Recurrent left pleural effusion: acute illness or injury that poses a threat to life or bodily functions  Amount and/or Complexity of Data Reviewed  External Data Reviewed: notes.  Labs: ordered. Decision-making details documented in ED Course.  Radiology: ordered. Decision-making details documented in ED Course.      Risk  Decision regarding hospitalization.          Final diagnoses:   Hypoglycemia   Recurrent left pleural effusion   Acute abdominal pain       ED Disposition  ED Disposition     ED Disposition   Decision to Admit    Condition   --    Comment   Level of Care: Telemetry [5]   Diagnosis: Hypoglycemia [561691]   Admitting Physician: STEVE PARIS [[id number]]               No follow-up provider specified.       Medication List      No changes were made to your prescriptions during this visit.          Tyler King MD  04/02/23 6819

## 2023-04-02 NOTE — CONSULTS
Consult Note  Herbert Harley  [id number]  1955    Referring Provider:  Reason for Consultation: Effusion post CABG    Patient Care Team:  Yessica Ricci DO as PCP - General (Family Medicine)  Nima Peter MD as Consulting Physician (Endocrinology)  Nadya Ramirez MD as Cardiologist (Cardiology)  Yessica Ricci DO as Consulting Physician (Family Medicine)  Nury Webber, RN as Ambulatory  ([facility])    Chief complaint: Chest pain after dialysis    History of present illness: 57-year-old male who is a postop CABG Dr. Saavedra from March 1 2023  He underwent coronary bypass grafting x3 with endoscopic vein harvesting by Dr. Saavedra he did well postop and was discharged home on 3/20/2023   And gone home and became increasingly more short of breath with associated chest pain  Felt the pain was due to his recurrent effusion possible pericarditis    X-ray was done and showed left pleural effusion  review of Systems        History  Past Medical History:   Diagnosis Date   • Acute renal failure (ARF) 3/2/2023   • Asthma December 2022   • Chronic systolic congestive heart failure 3/1/2023   • Coronary artery disease March 2014   • Elevated cholesterol    • History of tobacco abuse    • HLD (hyperlipidemia) 01/26/2023   • Hypertension    • Ischemic cardiomyopathy    • Myocardial infarction March 2014   • Prolonged Q-T interval on ECG 3/1/2023   • Type 2 diabetes mellitus    • Wears glasses    ,   Past Surgical History:   Procedure Laterality Date   • CARDIAC CATHETERIZATION  March 2014   • CARDIAC CATHETERIZATION Left 02/17/2023    Procedure: Left Heart Cath;  Surgeon: Nadya Ramirez MD;  Location: [facility] INVASIVE LOCATION;  Service: Cardiology;  Laterality: Left;   • CORONARY ARTERY BYPASS GRAFT N/A 03/01/2023    Procedure: MEDIAN STERNOTOMY CORONARY ARTERY BYPASS GRAFTING X3  UTILIZING THE LEFT INTERNAL MAMMERY GRAFT, AND EVH OF THE GREATER RIGHT SAPHENOUS VEIN;   Surgeon: Paresh Saavedra MD;  Location: [facility];  Service: Cardiothoracic;  Laterality: N/A;   • CORONARY ARTERY BYPASS GRAFT  3/1/2023   • CORONARY STENT PLACEMENT  2014   • TONSILLECTOMY     ,   Family History   Problem Relation Age of Onset   • Diabetes Mother    • Cancer Father         liver   • Brain cancer Sister    • Heart attack Brother    • Prostate cancer Brother    ,   Social History     Tobacco Use   • Smoking status: Former     Packs/day: 1.50     Years: 52.00     Pack years: 78.00     Types: Cigarettes     Start date: 1970     Quit date: 2022     Years since quittin.3     Passive exposure: Past   • Smokeless tobacco: Never   Vaping Use   • Vaping Use: Never used   Substance Use Topics   • Alcohol use: Not Currently   • Drug use: Never   ,   Medications Prior to Admission   Medication Sig Dispense Refill Last Dose   • albuterol sulfate  (90 Base) MCG/ACT inhaler Inhale 2 puffs Every 4 (Four) Hours As Needed for Wheezing. 18 g 2    • amiodarone (PACERONE) 200 MG tablet Take 1 tablet by mouth Daily for 30 days. 30 tablet 1    • aspirin 81 MG EC tablet Take 1 tablet by mouth Daily for 90 days. 90 tablet 0    • Collagen-Vitamin C-Biotin (COLLAGEN 1500/C PO) Take 1 tablet by mouth Daily. OTC supplement      • dapagliflozin Propanediol (Farxiga) 10 MG tablet Take 10 mg by mouth Daily for 30 days. 30 tablet 3    • dapagliflozin Propanediol (Farxiga) 10 MG tablet Take 10 mg by mouth Daily.      • ezetimibe (ZETIA) 10 MG tablet Take 1 tablet by mouth Daily for 30 days. 30 tablet 11    • fluconazole (Diflucan) 100 MG tablet Take 1 tablet by mouth Daily for 7 days. 7 tablet 0    • Fluticasone-Umeclidin-Vilant (TRELEGY) 100-62.5-25 MCG/ACT inhaler Inhale 1 puff Daily. 1 each 5    • Lantus 100 UNIT/ML injection INJECT 60 UNITS SUBCUTANEOUSLY ONCE DAILY (Patient taking differently: Inject 60 Units under the skin into the appropriate area as directed Every Night. INJECT 60 UNITS  "SUBCUTANEOUSLY ONCE DAILY) 20 mL 5    • metoprolol succinate XL (TOPROL-XL) 25 MG 24 hr tablet Take 1 tablet by mouth Daily for 30 days. 30 tablet 1    • nitroglycerin (NITROSTAT) 0.4 MG SL tablet Place 1 tablet under the tongue Every 5 (Five) Minutes As Needed for Chest Pain for up to 25 days. Take no more than 3 doses in 15 minutes. 25 tablet 3    • rosuvastatin (CRESTOR) 10 MG tablet Take 1 tablet by mouth Daily. 30 tablet 11       Scheduled Meds:  sodium chloride, 10 mL, Intravenous, Q12H       Continuous Infusions:      PRN Meds:  •  acetaminophen **OR** acetaminophen **OR** acetaminophen  •  ondansetron **OR** ondansetron  •  sodium chloride  •  sodium chloride  •  sodium chloride and Allergies:  Wellbutrin [bupropion]    Objective     Vital Sign Min/Max for last 24 hours  Temp  Min: 97.6 °F (36.4 °C)  Max: 97.7 °F (36.5 °C)   BP  Min: 155/73  Max: 168/77   Pulse  Min: 71  Max: 79   Resp  Min: 16  Max: 16   SpO2  Min: 98 %  Max: 99 %   No data recorded   Weight  Min: 66.7 kg (147 lb)  Max: 68.3 kg (150 lb 8 oz)     Flowsheet Rows    Flowsheet Row First Filed Value   Admission Height 162.6 cm (64\") Documented at 04/02/2023 0556   Admission Weight 66.7 kg (147 lb) Documented at 04/02/2023 0556          Physical Exam:     General Appearance:    Alert, cooperative, in no acute distress   Head:    Normocephalic, without obvious abnormality, atraumatic   Eyes:            Lids and lashes normal, conjunctivae and sclerae normal,   no icterus, no pallor, corneas clear, PERRLA   Ears:    Ears appear intact with no abnormalities noted   Throat:   No oral lesions, no thrush, oral mucosa moist   Neck:   No adenopathy, supple, trachea midline, no thyromegaly, no carotid bruit, no JVD   Back:   No kyphosis present, no scoliosis present, no skin lesions, erythema or scars, no tenderness to percussion or                palpation, range of motion normal   Lungs:    Diminished on the left    Heart:    Regular rhythm and normal " rate, normal S1 and S2, no         murmur, no gallop, no rub, no click   Chest Wall:    No abnormalities observed   Abdomen:     Normal bowel sounds, no masses, no organomegaly, soft     nontender, nondistended, no guarding, no rebound               tenderness   Rectal:     Deferred   Extremities:   Moves all extremities well, no edema, no cyanosis, no           redness   Pulses:   Pulses palpable and equal bilaterally   Skin:   No bleeding, bruising or rash   Lymph nodes:   No palpable adenopathy   Neurologic:   Cranial nerves 2 - 12 grossly intact, sensation intact, DTR     present and equal bilaterally             Assessment & Plan       Hypoglycemia    He had a thoracentesis on the 27th that improved aeration of his left lung he is back last night with a recurrent left effusion  After discussion with Dr. Saavedra he would like to have the left chest tube placed for drainage of the chest  Also order sed rate for rule out pericarditis  We will discuss with IR about possibility of a chest tube placed today and then have the atrium up to 20 cm wall suction  I discussed the patient's findings and my recommendations with patient      Please note that portions of this note were completed with a voice recognition program. Efforts were made to edit the dictations, but words may be mistranscribed    Benton Yost PA-C  04/02/23  09:07 EDT

## 2023-04-02 NOTE — CONSULTS
Patient Care Team:  Yessica Ricci DO as PCP - General (Family Medicine)  Nima Peter MD as Consulting Physician (Endocrinology)  Nadya Ramirez MD as Cardiologist (Cardiology)  Yessica Ricci DO as Consulting Physician (Family Medicine)  Nury Webber, RN as Ambulatory  ([facility])    Chief complaint: AMS  Hypoglycemia   HD dependent [place]    History of Present Illness: 57-year-old male who is a postop CABG Dr. Saavedra from March 1 2023  He underwent coronary bypass grafting x3. Patient developed [place] in post op period requiring HD. He is currently getting HD at [facility]. Last HD session on Friday. He admits making more urine now. But still req 3x weekly Hd. He presented to ER last week w SOB found to have pleural effusion requiring thoracentesis      Review of Systems   Unable to perform ROS: Acuity of condition   Constitutional: Negative.    HENT: Negative.    Respiratory: Negative.  Negative for shortness of breath.    Cardiovascular: Negative.    Gastrointestinal: Negative.    Genitourinary: Negative.         Past Medical History:   Diagnosis Date   • Acute renal failure (ARF) 3/2/2023   • Asthma December 2022   • Chronic systolic congestive heart failure 3/1/2023   • Coronary artery disease March 2014   • Elevated cholesterol    • History of tobacco abuse    • HLD (hyperlipidemia) 01/26/2023   • Hypertension    • Ischemic cardiomyopathy    • Myocardial infarction March 2014   • Prolonged Q-T interval on ECG 3/1/2023   • Type 2 diabetes mellitus    • Wears glasses    ,   Past Surgical History:   Procedure Laterality Date   • CARDIAC CATHETERIZATION  March 2014   • CARDIAC CATHETERIZATION Left 02/17/2023    Procedure: Left Heart Cath;  Surgeon: Nadya Ramirez MD;  Location: [facility] CATH INVASIVE LOCATION;  Service: Cardiology;  Laterality: Left;   • CORONARY ARTERY BYPASS GRAFT N/A 03/01/2023    Procedure: MEDIAN STERNOTOMY CORONARY ARTERY BYPASS GRAFTING X3  UTILIZING THE  LEFT INTERNAL MAMMERY GRAFT, AND EVH OF THE GREATER RIGHT SAPHENOUS VEIN;  Surgeon: Paresh Saavedra MD;  Location: [facility];  Service: Cardiothoracic;  Laterality: N/A;   • CORONARY ARTERY BYPASS GRAFT  3/1/2023   • CORONARY STENT PLACEMENT  2014   • TONSILLECTOMY     ,   Family History   Problem Relation Age of Onset   • Diabetes Mother    • Cancer Father         liver   • Brain cancer Sister    • Heart attack Brother    • Prostate cancer Brother    ,   Social History     Socioeconomic History   • Marital status:    Tobacco Use   • Smoking status: Former     Packs/day: 1.50     Years: 52.00     Pack years: 78.00     Types: Cigarettes     Start date: 1970     Quit date: 2022     Years since quittin.3     Passive exposure: Past   • Smokeless tobacco: Never   Vaping Use   • Vaping Use: Never used   Substance and Sexual Activity   • Alcohol use: Not Currently   • Drug use: Never   • Sexual activity: Not Currently     E-cigarette/Vaping   • E-cigarette/Vaping Use Never User    • Passive Exposure No    • Counseling Given No      E-cigarette/Vaping Substances   • Nicotine No    • THC No    • CBD No    • Flavoring No      E-cigarette/Vaping Devices   • Disposable No    • Pre-filled or Refillable Cartridge No    • Refillable Tank No    • Pre-filled Pod No         and   Medications Prior to Admission   Medication Sig Dispense Refill Last Dose   • albuterol sulfate  (90 Base) MCG/ACT inhaler Inhale 2 puffs Every 4 (Four) Hours As Needed for Wheezing. 18 g 2    • amiodarone (PACERONE) 200 MG tablet Take 1 tablet by mouth Daily for 30 days. 30 tablet 1    • aspirin 81 MG EC tablet Take 1 tablet by mouth Daily for 90 days. 90 tablet 0    • Collagen-Vitamin C-Biotin (COLLAGEN 1500/C PO) Take 1 tablet by mouth Daily. OTC supplement      • dapagliflozin Propanediol (Farxiga) 10 MG tablet Take 10 mg by mouth Daily for 30 days. 30 tablet 3    • dapagliflozin Propanediol (Farxiga) 10 MG tablet  Take 10 mg by mouth Daily.      • ezetimibe (ZETIA) 10 MG tablet Take 1 tablet by mouth Daily for 30 days. 30 tablet 11    • fluconazole (Diflucan) 100 MG tablet Take 1 tablet by mouth Daily for 7 days. 7 tablet 0    • Fluticasone-Umeclidin-Vilant (TRELEGY) 100-62.5-25 MCG/ACT inhaler Inhale 1 puff Daily. 1 each 5    • Lantus 100 UNIT/ML injection INJECT 60 UNITS SUBCUTANEOUSLY ONCE DAILY (Patient taking differently: Inject 60 Units under the skin into the appropriate area as directed Every Night. INJECT 60 UNITS SUBCUTANEOUSLY ONCE DAILY) 20 mL 5    • metoprolol succinate XL (TOPROL-XL) 25 MG 24 hr tablet Take 1 tablet by mouth Daily for 30 days. 30 tablet 1    • nitroglycerin (NITROSTAT) 0.4 MG SL tablet Place 1 tablet under the tongue Every 5 (Five) Minutes As Needed for Chest Pain for up to 25 days. Take no more than 3 doses in 15 minutes. 25 tablet 3    • rosuvastatin (CRESTOR) 10 MG tablet Take 1 tablet by mouth Daily. 30 tablet 11        Objective     Vital Signs  Temp:  [97.6 °F (36.4 °C)-98.2 °F (36.8 °C)] 98.2 °F (36.8 °C)  Heart Rate:  [71-87] 78  Resp:  [16] 16  BP: (118-168)/(61-79) 118/61    I/O this shift:  In: -   Out: 400 [Urine:400]  No intake/output data recorded.    Physical Exam  Vitals (TDC+) reviewed.   Constitutional:       Appearance: Normal appearance.   HENT:      Head: Normocephalic and atraumatic.      Nose: Nose normal.   Eyes:      Extraocular Movements: Extraocular movements intact.      Pupils: Pupils are equal, round, and reactive to light.   Cardiovascular:      Rate and Rhythm: Normal rate and regular rhythm.      Pulses: Normal pulses.      Heart sounds: Normal heart sounds. No murmur heard.    No friction rub.   Pulmonary:      Effort: Pulmonary effort is normal.      Breath sounds: Normal breath sounds.   Abdominal:      General: Abdomen is flat.   Musculoskeletal:         General: No swelling. Normal range of motion.      Right lower leg: No edema.      Left lower leg: No  edema.   Skin:     General: Skin is warm.   Neurological:      General: No focal deficit present.      Mental Status: He is alert and oriented to person, place, and time. Mental status is at baseline.         Results Review:    I reviewed the patient's new clinical results.    WBC WBC   Date Value Ref Range Status   04/02/2023 9.96 3.40 - 10.80 10*3/mm3 Final      HGB Hemoglobin   Date Value Ref Range Status   04/02/2023 9.3 (L) 13.0 - 17.7 g/dL Final      HCT Hematocrit   Date Value Ref Range Status   04/02/2023 30.9 (L) 37.5 - 51.0 % Final      Platlets No results found for: LABPLAT   MCV MCV   Date Value Ref Range Status   04/02/2023 94.2 79.0 - 97.0 fL Final          Sodium Sodium   Date Value Ref Range Status   04/02/2023 142 136 - 145 mmol/L Final      Potassium Potassium   Date Value Ref Range Status   04/02/2023 3.1 (L) 3.5 - 5.2 mmol/L Final      Chloride Chloride   Date Value Ref Range Status   04/02/2023 102 98 - 107 mmol/L Final      CO2 CO2   Date Value Ref Range Status   04/02/2023 29.0 22.0 - 29.0 mmol/L Final      BUN BUN   Date Value Ref Range Status   04/02/2023 17 8 - 23 mg/dL Final      Creatinine Creatinine   Date Value Ref Range Status   04/02/2023 2.08 (H) 0.76 - 1.27 mg/dL Final      Calcium Calcium   Date Value Ref Range Status   04/02/2023 8.8 8.6 - 10.5 mg/dL Final      PO4 No results found for: CAPO4   Albumin Albumin   Date Value Ref Range Status   04/02/2023 3.3 (L) 3.5 - 5.2 g/dL Final      Magnesium Magnesium   Date Value Ref Range Status   04/02/2023 2.0 1.6 - 2.4 mg/dL Final      Uric Acid No results found for: URICACID         Assessment & Plan       Hypoglycemia    I25.10, CABG 03/01/23    Hypertension    Hyperlipidemia LDL goal <70    Type 2 diabetes mellitus with hypoglycemia, with long-term current use of insulin    Ischemic cardiomyopathy    Prolonged Q-T interval on ECG    Acute renal failure (ARF)      Assessment & Plan     HD depedent [place]: Started on HD in March 2023  following CABG surgery. [place] likely ATN. On MWF [facility]. Making urine.    Hypokalemia: On admission    Volume status: No significant LE edema. Did requiring thoracentesis 1 week prior to this admission     AMS: Likely due to hypoglycemia.    Recs  Plan for 24hr urine for crcl. Hold HD in morning. HD per labs tomorrow. Good probability of renal recovery     I discussed the patients findings and my recommendations with patient    Manjit Gomez MD  04/02/23  15:45 EDT

## 2023-04-03 ENCOUNTER — APPOINTMENT (OUTPATIENT)
Dept: GENERAL RADIOLOGY | Facility: HOSPITAL | Age: 68
DRG: 206 | End: 2023-04-03
Payer: MEDICARE

## 2023-04-03 ENCOUNTER — TELEPHONE (OUTPATIENT)
Dept: ENDOCRINOLOGY | Facility: CLINIC | Age: 68
End: 2023-04-03
Payer: MEDICARE

## 2023-04-03 ENCOUNTER — APPOINTMENT (OUTPATIENT)
Dept: NEPHROLOGY | Facility: HOSPITAL | Age: 68
DRG: 206 | End: 2023-04-03
Payer: MEDICARE

## 2023-04-03 ENCOUNTER — APPOINTMENT (OUTPATIENT)
Dept: ULTRASOUND IMAGING | Facility: HOSPITAL | Age: 68
DRG: 206 | End: 2023-04-03
Payer: MEDICARE

## 2023-04-03 ENCOUNTER — READMISSION MANAGEMENT (OUTPATIENT)
Dept: CALL CENTER | Facility: HOSPITAL | Age: 68
End: 2023-04-03
Payer: MEDICARE

## 2023-04-03 ENCOUNTER — HOME CARE VISIT (OUTPATIENT)
Dept: HOME HEALTH SERVICES | Facility: HOME HEALTHCARE | Age: 68
End: 2023-04-03
Payer: COMMERCIAL

## 2023-04-03 LAB
ALBUMIN SERPL-MCNC: 2.8 G/DL (ref 3.5–5.2)
ALBUMIN/GLOB SERPL: 1 G/DL
ALP SERPL-CCNC: 132 U/L (ref 39–117)
ALT SERPL W P-5'-P-CCNC: 39 U/L (ref 1–41)
ANION GAP SERPL CALCULATED.3IONS-SCNC: 10 MMOL/L (ref 5–15)
AST SERPL-CCNC: 31 U/L (ref 1–40)
BILIRUB SERPL-MCNC: 0.2 MG/DL (ref 0–1.2)
BUN SERPL-MCNC: 24 MG/DL (ref 8–23)
BUN/CREAT SERPL: 10.5 (ref 7–25)
CALCIUM SPEC-SCNC: 8.2 MG/DL (ref 8.6–10.5)
CHLORIDE SERPL-SCNC: 104 MMOL/L (ref 98–107)
CO2 SERPL-SCNC: 26 MMOL/L (ref 22–29)
COLLECT DURATION TIME UR: 24 HRS
CREAT SERPL-MCNC: 2.28 MG/DL (ref 0.76–1.27)
CREAT UR-MCNC: 71.5 MG/DL
CREATINE 24H UR-MRATE: 1.18 G/24 HR (ref 1–2.4)
DEPRECATED RDW RBC AUTO: 49.2 FL (ref 37–54)
EGFRCR SERPLBLD CKD-EPI 2021: 30.7 ML/MIN/1.73
ERYTHROCYTE [DISTWIDTH] IN BLOOD BY AUTOMATED COUNT: 14.6 % (ref 12.3–15.4)
GLOBULIN UR ELPH-MCNC: 2.8 GM/DL
GLUCOSE BLDC GLUCOMTR-MCNC: 128 MG/DL (ref 70–130)
GLUCOSE BLDC GLUCOMTR-MCNC: 133 MG/DL (ref 70–130)
GLUCOSE BLDC GLUCOMTR-MCNC: 184 MG/DL (ref 70–130)
GLUCOSE BLDC GLUCOMTR-MCNC: 196 MG/DL (ref 70–130)
GLUCOSE SERPL-MCNC: 124 MG/DL (ref 65–99)
HCT VFR BLD AUTO: 29.9 % (ref 37.5–51)
HGB BLD-MCNC: 9.2 G/DL (ref 13–17.7)
MAGNESIUM SERPL-MCNC: 1.9 MG/DL (ref 1.6–2.4)
MCH RBC QN AUTO: 28.5 PG (ref 26.6–33)
MCHC RBC AUTO-ENTMCNC: 30.8 G/DL (ref 31.5–35.7)
MCV RBC AUTO: 92.6 FL (ref 79–97)
PHOSPHATE SERPL-MCNC: 4.1 MG/DL (ref 2.5–4.5)
PLATELET # BLD AUTO: 298 10*3/MM3 (ref 140–450)
PMV BLD AUTO: 9.3 FL (ref 6–12)
POTASSIUM SERPL-SCNC: 3.9 MMOL/L (ref 3.5–5.2)
PROT SERPL-MCNC: 5.6 G/DL (ref 6–8.5)
RBC # BLD AUTO: 3.23 10*6/MM3 (ref 4.14–5.8)
SODIUM SERPL-SCNC: 140 MMOL/L (ref 136–145)
SPECIMEN VOL 24H UR: 1650 ML
WBC NRBC COR # BLD: 12.2 10*3/MM3 (ref 3.4–10.8)

## 2023-04-03 PROCEDURE — C1769 GUIDE WIRE: HCPCS

## 2023-04-03 PROCEDURE — C1729 CATH, DRAINAGE: HCPCS

## 2023-04-03 PROCEDURE — 94761 N-INVAS EAR/PLS OXIMETRY MLT: CPT

## 2023-04-03 PROCEDURE — 80053 COMPREHEN METABOLIC PANEL: CPT | Performed by: FAMILY MEDICINE

## 2023-04-03 PROCEDURE — 99231 SBSQ HOSP IP/OBS SF/LOW 25: CPT | Performed by: THORACIC SURGERY (CARDIOTHORACIC VASCULAR SURGERY)

## 2023-04-03 PROCEDURE — 94799 UNLISTED PULMONARY SVC/PX: CPT

## 2023-04-03 PROCEDURE — 84100 ASSAY OF PHOSPHORUS: CPT | Performed by: FAMILY MEDICINE

## 2023-04-03 PROCEDURE — 83735 ASSAY OF MAGNESIUM: CPT | Performed by: FAMILY MEDICINE

## 2023-04-03 PROCEDURE — 76705 ECHO EXAM OF ABDOMEN: CPT

## 2023-04-03 PROCEDURE — 71045 X-RAY EXAM CHEST 1 VIEW: CPT

## 2023-04-03 PROCEDURE — 82570 ASSAY OF URINE CREATININE: CPT | Performed by: INTERNAL MEDICINE

## 2023-04-03 PROCEDURE — 0W9B30Z DRAINAGE OF LEFT PLEURAL CAVITY WITH DRAINAGE DEVICE, PERCUTANEOUS APPROACH: ICD-10-PCS | Performed by: FAMILY MEDICINE

## 2023-04-03 PROCEDURE — 82962 GLUCOSE BLOOD TEST: CPT

## 2023-04-03 PROCEDURE — 85027 COMPLETE CBC AUTOMATED: CPT | Performed by: FAMILY MEDICINE

## 2023-04-03 PROCEDURE — 81050 URINALYSIS VOLUME MEASURE: CPT | Performed by: INTERNAL MEDICINE

## 2023-04-03 PROCEDURE — 99232 SBSQ HOSP IP/OBS MODERATE 35: CPT | Performed by: FAMILY MEDICINE

## 2023-04-03 RX ORDER — LIDOCAINE HYDROCHLORIDE 10 MG/ML
10 INJECTION, SOLUTION EPIDURAL; INFILTRATION; INTRACAUDAL; PERINEURAL ONCE
Status: COMPLETED | OUTPATIENT
Start: 2023-04-03 | End: 2023-04-03

## 2023-04-03 RX ORDER — LIDOCAINE HYDROCHLORIDE 10 MG/ML
10 INJECTION, SOLUTION INFILTRATION; PERINEURAL ONCE
Status: DISCONTINUED | OUTPATIENT
Start: 2023-04-03 | End: 2023-04-03

## 2023-04-03 RX ADMIN — BUDESONIDE AND FORMOTEROL FUMARATE DIHYDRATE 2 PUFF: 160; 4.5 AEROSOL RESPIRATORY (INHALATION) at 20:10

## 2023-04-03 RX ADMIN — Medication 81 MG: at 08:37

## 2023-04-03 RX ADMIN — LIDOCAINE HYDROCHLORIDE 10 ML: 10 INJECTION, SOLUTION EPIDURAL; INFILTRATION; INTRACAUDAL; PERINEURAL at 13:20

## 2023-04-03 RX ADMIN — AMIODARONE HYDROCHLORIDE 200 MG: 200 TABLET ORAL at 08:37

## 2023-04-03 RX ADMIN — ROSUVASTATIN CALCIUM 10 MG: 10 TABLET, FILM COATED ORAL at 20:15

## 2023-04-03 RX ADMIN — BUDESONIDE AND FORMOTEROL FUMARATE DIHYDRATE 2 PUFF: 160; 4.5 AEROSOL RESPIRATORY (INHALATION) at 07:04

## 2023-04-03 RX ADMIN — Medication 10 ML: at 20:15

## 2023-04-03 RX ADMIN — TIOTROPIUM BROMIDE INHALATION SPRAY 2 PUFF: 3.12 SPRAY, METERED RESPIRATORY (INHALATION) at 07:04

## 2023-04-03 RX ADMIN — METOPROLOL SUCCINATE 25 MG: 25 TABLET, EXTENDED RELEASE ORAL at 08:37

## 2023-04-03 NOTE — TELEPHONE ENCOUNTER
Please note I have not seen him since June and he cancelled his last 2 appointment with me  They can buy glucose tablets otc.  You can set up script for baqsimi the inhaled glucagon

## 2023-04-03 NOTE — OUTREACH NOTE
CT Surgery Week 2 Survey    Flowsheet Row Responses   [facility] facility patient discharged from? [facility]   Does the patient have one of the following disease processes/diagnoses(primary or secondary)? Cardiothoracic surgery   Week 2 attempt successful? No   Unsuccessful attempts Attempt 1   Revoke Readmitted          Alicia TONY - Registered Nurse

## 2023-04-03 NOTE — PROGRESS NOTES
[facility] Medicine Services  PROGRESS NOTE    Patient Name: Herbert Harley  : 1955  MRN: [id number]    Date of Admission: 2023  Primary Care Physician: Yessica Ricci DO    Subjective   Subjective     CC:  F/U hypoglycemia, pleural effusion    HPI:  Patient seen and examined. Having GB US. No RUQ pain currently. Plan for IR chest tube today. 24 hour urine ongoing.     ROS:  Gen- No fevers, chills  CV- No chest pain, palpitations  Resp- No cough, dyspnea  GI- No N/V/D, abd pain      Objective   Objective     Vital Signs:   Temp:  [97.8 °F (36.6 °C)-98.3 °F (36.8 °C)] 97.8 °F (36.6 °C)  Heart Rate:  [73-87] 79  Resp:  [16-18] 16  BP: (118-168)/(61-86) 140/76     Physical Exam:  Constitutional: No acute distress, awake, alert  HENT: NCAT, mucous membranes moist  Respiratory: Decreased in bases bilaterally, respiratory effort normal RA  Cardiovascular: RRR, no murmurs, rubs, or gallops  Gastrointestinal: Positive bowel sounds, soft, nontender, nondistended  Musculoskeletal: No bilateral ankle edema  Psychiatric: Appropriate affect, cooperative  Neurologic: Oriented x 3, ROMAN, speech clear  Skin: No rashes    Results Reviewed:  LAB RESULTS:      Lab 23  0644 23  1641 23  1047 23  0638 23  1111   WBC 12.20*  --   --  9.96 10.59   HEMOGLOBIN 9.2*  --   --  9.3* 8.6*   HEMATOCRIT 29.9*  --   --  30.9* 29.2*   PLATELETS 298  --   --  276 162   NEUTROS ABS  --   --   --  7.07* 7.44*   IMMATURE GRANS (ABS)  --   --   --  0.05 0.07*   LYMPHS ABS  --   --   --  1.37 1.35   MONOS ABS  --   --   --  0.89 0.89   EOS ABS  --   --   --  0.46* 0.73*   MCV 92.6  --   --  94.2 97.3*   SED RATE  --   --  41*  --   --    PROTIME  --  14.0  --   --   --          Lab 23  0644 23  0638 23  1111   SODIUM 140 142 141   POTASSIUM 3.9 3.1* 4.4   CHLORIDE 104 102 103   CO2 26.0 29.0 28.0   ANION GAP 10.0 11.0 10.0   BUN 24* 17 25*   CREATININE 2.28* 2.08*  2.90*   EGFR 30.7* 34.3* 23.0*   GLUCOSE 124* 78 240*   CALCIUM 8.2* 8.8 8.6   MAGNESIUM 1.9 2.0  --    PHOSPHORUS 4.1  --   --          Lab 04/03/23  0644 04/02/23  0638 03/27/23  1111   TOTAL PROTEIN 5.6* 6.8 6.0   ALBUMIN 2.8* 3.3* 2.9*   GLOBULIN 2.8 3.5 3.1   ALT (SGPT) 39 49* 24   AST (SGOT) 31 38 27   BILIRUBIN 0.2 0.2 0.2   ALK PHOS 132* 140* 149*   LIPASE  --   --  41         Lab 04/02/23  1641 04/02/23  0638 03/27/23  1302 03/27/23  1111   PROBNP  --   --   --  14,026.0*   HSTROP T  --  115* 98* 101*   PROTIME 14.0  --   --   --    INR 1.08  --   --   --                  Brief Urine Lab Results  (Last result in the past 365 days)      Color   Clarity   Blood   Leuk Est   Nitrite   Protein   CREAT   Urine HCG        04/02/23 0752 Yellow   Clear   Negative   Negative   Negative   30 mg/dL (1+)                 Microbiology Results Abnormal     None          CT Abdomen Pelvis Without Contrast    Result Date: 4/2/2023  EXAMINATION:  CT SCAN OF THE ABDOMEN AND PELVIS WITHOUT INTRAVENOUS CONTRAST DATE OF EXAM: 4/2/2023 6:49 AM HISTORY: Right upper quadrant pain after eating.  COMPARISON: None. TECHNIQUE: CT examination of the abdomen and pelvis with sagittal and coronal reformations was performed without intravenous contrast.  CT dose lowering techniques were used, to include: automated exposure control, adjustment for patient size, and/or use  of iterative reconstruction. Note: The exam is limited because some types of pathology may not be adequately demonstrated due to lack of contrast enhancement.    FINDINGS: ABDOMEN/PELVIS: Lower Chest:  Large left pleural effusion with compressive atelectasis of the left upper and left lower lobes. Right lung is clear. Liver: Normal. Gallbladder/Biliary: Normal. Pancreas: Normal. Spleen: Normal. Adrenal Glands: Normal. Kidneys: Normal. GI Tract: Colonic diverticulosis without diverticulitis. No bowel wall thickening or obstruction. Mesentery/Peritoneum: Normal. Vasculature:  Normal. Lymph Nodes: Normal. Abdominal Wall: Normal. Bladder: Normal. Reproductive: Normal. Musculoskeletal: Normal.     Impression: 1.  No acute abnormalities in the abdomen or pelvis. 2.  Partially visualized large left pleural effusion with compressive collapse of the left upper left lower lobes. Electronically signed by:  Deep Huerta M.D.  4/2/2023 5:19 AM Mountain Time    US Gallbladder    Result Date: 4/3/2023  US GALLBLADDER Date of Exam: 4/3/2023 8:19 AM EDT Indication: Right upper quadrant pain. Comparison: CT the abdomen performed on 4/2/2023. Technique: Grayscale and color Doppler ultrasound evaluation of the right upper quadrant was performed. Findings: The pancreas was not seen well. The liver parenchyma is normal. There are no focal hepatic masses. There is normal directional flow in the main portal vein and hepatic veins. The gallbladder is unremarkable. The common bile duct caliber is normal measuring 1 mm. The right kidney measures 11.1 x 4.8 x 5.4 cm. There is a small cyst in the right kidney measuring 1.3 cm. There is no hydronephrosis or echogenic shadowing stones.     Impression: Impression: 1. The pancreas was not seen well. 2. Small right renal cyst. 3. The remaining study is normal. Electronically Signed: Toby Chowdary  4/3/2023 8:53 AM EDT  Workstation ID: [id number]    XR Chest 1 View    Result Date: 4/2/2023  Examination: XR CHEST 1 VW Date and Time: 4/2/2023 5:51 AM Clinical Information: 67 years, Male, Comparison: Comparison is made with radiograph the chest performed on March 27, 2023. FINDINGS: Sternal wires are demonstrated. A right internal jugular dialysis catheter is demonstrated. Heart size/mediastinum/petrona: There is exaggeration of the cardiac silhouette. There is silhouetting of the left heart border on the basis of opacification of the left hemithorax. Lung fields: There is opacification involving the mid and lower left lung field suggesting consolidation and/or atelectasis  with moderate to large sized pleural effusion. Findings have progressed as compared to March 27, 2023. Lung bases/Retrocardiac/Pleura:  Interstitial opacities are seen at the right upper and right lower lung field without consolidation, segmental atelectasis or notable effusion. Trachea/Paravertebral soft tissues: The trachea and paravertebral soft tissues appear normal. Upper abdomen: No free air is demonstrated. Osseous structures: The bones are normally mineralized.     Impression: 1.  Opacification of the left mid/lower lung field suggests consolidation and/or atelectasis with moderate to large left-sided pleural effusion. 2.  Interstitial opacities are demonstrated suggesting mild to moderate pulmonary congestion. 3.  No pleural effusion is demonstrated on the right. Thank you for the referral of this patient. This exam was interpreted by an American Board of Radiology certified radiologist with subspecialty fellowship training. If there are any questions regarding this exam please feel free to contact a radiologist directly at [phone]. Slot 70 Electronically signed by:  Devante Ugarte M.D.  4/2/2023 4:32 AM Mountain Time      Results for orders placed during the hospital encounter of 03/01/23    Adult Transthoracic Echo Complete W/ Cont if Necessary Per Protocol    Interpretation Summary  •  Left ventricular systolic function is mildly decreased. Calculated left ventricular EF = 40.4% Left ventricular ejection fraction appears to be 41 - 45%.  •  The right ventricular cavity is dilated.  •  Left atrial volume is mildly increased.  •  There is calcification of the aortic valve.  •  Estimated right ventricular systolic pressure from tricuspid regurgitation is normal (<35 mmHg).  •  There is a left pleural effusion.      Current medications:  Scheduled Meds:amiodarone, 200 mg, Oral, Q24H  aspirin, 81 mg, Oral, Daily  budesonide-formoterol, 2 puff, Inhalation, BID - RT   And  tiotropium bromide monohydrate,  2 puff, Inhalation, Daily - RT  metoprolol succinate XL, 25 mg, Oral, Q24H  rosuvastatin, 10 mg, Oral, Nightly  sodium chloride, 10 mL, Intravenous, Q12H      Continuous Infusions:   PRN Meds:.•  acetaminophen **OR** acetaminophen **OR** acetaminophen  •  albuterol  •  dextrose  •  dextrose  •  glucagon (human recombinant)  •  nitroglycerin  •  ondansetron **OR** ondansetron  •  sodium chloride  •  sodium chloride  •  sodium chloride    Assessment & Plan   Assessment & Plan     Active Hospital Problems    Diagnosis  POA   • **Hypoglycemia [E16.2]  Yes   • Acute renal failure (ARF) [N17.9]  Yes   • Prolonged Q-T interval on ECG [R94.31]  Yes   • Ischemic cardiomyopathy [I25.5]  Yes   • Type 2 diabetes mellitus with hypoglycemia, with long-term current use of insulin [E11.649, Z79.4]  Not Applicable   • I25.10, CABG 03/01/23 [I25.10]  Yes   • Hypertension [I10]  Yes   • Hyperlipidemia LDL goal <70 [E78.5]  Yes      Resolved Hospital Problems   No resolved problems to display.        Brief Hospital Course to date:  Herbert Harley is a 67 y.o. male with PMHx MVCAD s/p CABG x3 per Dr Saavedra 3/1/2023, HLD, Post op A fib, Renal failure now on HD M, W, F, chronic anemia, DM2 who presents to ED with AMS and found to hae BG of 39. EMS gave IV dextrose and blood sugar came up to 180.  His mental status returned to baseline. He did eat dinner last night and took his usual 60 units of Lantus. Last HD session was Friday. Patient was seen in the ED 3/27 and had L pleural effusion. He had thoracentesis by IR and was sent home. Imaging in the ED 4/2 showed re-accumulation of fluid.    This patient's problems and plans were partially entered by my partner and updated as appropriate by me 04/03/23.    Hypoglycemia -> resolved   DM2  -Resolved after Dextrose administration   -POC glucose ACHS  -Holding Lantus   -Defer starting SSI until sugar >200      Recurrent L pleural effusion s/p CABG  -S/P IR drainage 3/27  -Plan for  chest tube placement in IR today      ARF on HD  -Nephrology following for maintenance HD  -M, W, F schedule   -Has [place] HD cath   -24 hour urine test in progress      RUQ pain  -CT A/P negative  -GB US negative     Hypokalemia  -S/P 40mEQ x 1 dose and resolved      Elevated HS Troponin  -in setting of renal failure, no chest pain, no acute EKG changes      CAD multivessel CAD s/p CABG x3  Hyperlipidemia  -Continue aspirin, statin, beta-blocker     Post Op A fib  -Continue Amiodarone      Chronic Anemia  -H&H improved from prior and stable      [place] site cellulitis -> resolved  -Completed course of Diflucan      Hx Odynophagia  -Completed course of Diflucan for this as well     Leukocytosis  -No fever  -Trend, CBC in AM    Expected Discharge Location and Transportation: Home   Expected Discharge   Expected Discharge Date and Time     Expected Discharge Date Expected Discharge Time    Apr 4, 2023            DVT prophylaxis:  Mechanical DVT prophylaxis orders are present.     AM-PAC 6 Clicks Score (PT): 24 (04/03/23 0837)    CODE STATUS:   Code Status and Medical Interventions:   Ordered at: 04/02/23 0855     Level Of Support Discussed With:    Patient     Code Status (Patient has no pulse and is not breathing):    CPR (Attempt to Resuscitate)     Medical Interventions (Patient has pulse or is breathing):    Full Support       Caitlin Smith,   04/03/23

## 2023-04-03 NOTE — PROGRESS NOTES
CTS Progress Note       LOS: 1 day   Patient Care Team:  Yessica Ricci DO as PCP - General (Family Medicine)  Nima Peter MD as Consulting Physician (Endocrinology)  Nadya Ramirez MD as Cardiologist (Cardiology)  Yessica Ricci DO as Consulting Physician (Family Medicine)  Nury Wbeber, RN as Ambulatory  ([facility] [place])    Chief Complaint: Hypoglycemia    Vital Signs:  Temp:  [97.6 °F (36.4 °C)-98.3 °F (36.8 °C)] 98.3 °F (36.8 °C)  Heart Rate:  [73-87] 78  Resp:  [16-18] 18  BP: (118-168)/(61-86) 149/76    Physical Exam: Alert conversant breathing unlabored on room air       Results:   Results from last 7 days   Lab Units 04/02/23  0638   WBC 10*3/mm3 9.96   HEMOGLOBIN g/dL 9.3*   HEMATOCRIT % 30.9*   PLATELETS 10*3/mm3 276     Results from last 7 days   Lab Units 04/02/23  0638   SODIUM mmol/L 142   POTASSIUM mmol/L 3.1*   CHLORIDE mmol/L 102   CO2 mmol/L 29.0   BUN mg/dL 17   CREATININE mg/dL 2.08*   GLUCOSE mg/dL 78   CALCIUM mg/dL 8.8           Imaging Results (Last 24 Hours)     Procedure Component Value Units Date/Time    CT Abdomen Pelvis Without Contrast [[id number]] Collected: 04/02/23 0513     Updated: 04/02/23 0720    Narrative:      EXAMINATION:  CT SCAN OF THE ABDOMEN AND PELVIS WITHOUT INTRAVENOUS CONTRAST    DATE OF EXAM: 4/2/2023 6:49 AM     HISTORY: Right upper quadrant pain after eating.      COMPARISON: None.    TECHNIQUE: CT examination of the abdomen and pelvis with sagittal and coronal reformations was performed without intravenous contrast.  CT dose lowering techniques were used, to include: automated exposure control, adjustment for patient size, and/or use   of iterative reconstruction.    Note: The exam is limited because some types of pathology may not be adequately demonstrated due to lack of contrast enhancement.       FINDINGS:    ABDOMEN/PELVIS:    Lower Chest:  Large left pleural effusion with compressive atelectasis of the left upper and left lower  lobes. Right lung is clear.    Liver: Normal.     Gallbladder/Biliary: Normal.     Pancreas: Normal.    Spleen: Normal.     Adrenal Glands: Normal.     Kidneys: Normal.    GI Tract: Colonic diverticulosis without diverticulitis. No bowel wall thickening or obstruction.    Mesentery/Peritoneum: Normal.    Vasculature: Normal.     Lymph Nodes: Normal.     Abdominal Wall: Normal.     Bladder: Normal.     Reproductive: Normal.     Musculoskeletal: Normal.        Impression:        1.  No acute abnormalities in the abdomen or pelvis.  2.  Partially visualized large left pleural effusion with compressive collapse of the left upper left lower lobes.    Electronically signed by:  Deep Huerta M.D.    4/2/2023 5:19 AM Mountain Time          Assessment      Hypoglycemia    I25.10, CABG 03/01/23    Hypertension    Hyperlipidemia LDL goal <70    Type 2 diabetes mellitus with hypoglycemia, with long-term current use of insulin    Ischemic cardiomyopathy    Prolonged Q-T interval on ECG    Acute renal failure (ARF)    Large left pleural effusion.  SaO2 95% on room air.  Plan for left-sided pigtail catheter today    Plan   Chest x-ray at 6 PM tonight and in the a.m.  When pigtail catheter was placed, put on Pleur-evac drainage at 20 cm suction    Please note that portions of this note were completed with a voice recognition program. Efforts were made to edit the dictations, but occasionally words are mistranscribed.    Paresh Saavedra MD  04/03/23  07:08 EDT

## 2023-04-03 NOTE — PRE-SEDATION DOCUMENTATION
[facility]   Vascular Interventional Radiology  History & Physicial    Pertinent information including components of history, physical examination, review of systems, lab and imaging data, and impression and plan from this source was also reviewed for the creation of the following pre-procedural note: Progress Notes by Caitlin Smith DO (2023 09:32)         Patient Name:Herbert Harley    : 1955    MRN: [id number]    Primary Care Physician: Yessica Ricci DO    Referring Physician: No ref. provider found     Date of admission: 2023    Subjective     Reason for Consult: TDC removal ordered today    History of Present Illness     Herbert Harley is a 67 y.o. male referred to IR as noted above.      Active Hospital Problems:  Active Hospital Problems    Diagnosis     Prolonged Q-T interval on ECG     Ischemic cardiomyopathy     Type 2 diabetes mellitus with hypoglycemia, with long-term current use of insulin     I25.10, CABG 23     Hypertension     Hyperlipidemia LDL goal <70        Personal History     Past Medical History:   Diagnosis Date    Acute renal failure (ARF) 3/2/2023    Asthma 2022    Chronic systolic congestive heart failure 3/1/2023    Coronary artery disease 2014    Elevated cholesterol     History of tobacco abuse     HLD (hyperlipidemia) 2023    Hypertension     Ischemic cardiomyopathy     Myocardial infarction 2014    Prolonged Q-T interval on ECG 3/1/2023    Type 2 diabetes mellitus     Wears glasses        Past Surgical History:   Procedure Laterality Date    CARDIAC CATHETERIZATION  2014    CARDIAC CATHETERIZATION Left 2023    Procedure: Left Heart Cath;  Surgeon: Nadya Ramirez MD;  Location: [facility] CATH INVASIVE LOCATION;  Service: Cardiology;  Laterality: Left;    CORONARY ARTERY BYPASS GRAFT N/A 2023    Procedure: MEDIAN STERNOTOMY CORONARY ARTERY BYPASS GRAFTING X3  UTILIZING THE LEFT INTERNAL MAMMERY  "GRAFT, AND EVH OF THE GREATER RIGHT SAPHENOUS VEIN;  Surgeon: Paresh Saavedra MD;  Location: [facility];  Service: Cardiothoracic;  Laterality: N/A;    CORONARY ARTERY BYPASS GRAFT  3/1/2023    CORONARY STENT PLACEMENT  March 2014    TONSILLECTOMY         Family History: His family history includes Brain cancer in his sister; Cancer in his father; Diabetes in his mother; Heart attack in his brother; Prostate cancer in his brother.     Social History: He  reports that he quit smoking about 3 months ago. His smoking use included cigarettes. He started smoking about 53 years ago. He has a 78.00 pack-year smoking history. He has been exposed to tobacco smoke. He has never used smokeless tobacco. He reports that he does not currently use alcohol. He reports that he does not use drugs.    Home Medications:  Collagen-Vitamin C-Biotin, Fluticasone-Umeclidin-Vilant, albuterol sulfate HFA, amiodarone, aspirin, dapagliflozin Propanediol, ezetimibe, insulin glargine, metoprolol succinate XL, nitroglycerin, and rosuvastatin    Current Medications:    acetaminophen **OR** acetaminophen **OR** acetaminophen    albuterol    amiodarone    aspirin    budesonide-formoterol **AND** tiotropium bromide monohydrate    dextrose    dextrose    glucagon (human recombinant)    metoprolol succinate XL    nitroglycerin    ondansetron **OR** ondansetron    rosuvastatin    sodium chloride    sodium chloride    sodium chloride    sodium chloride     Allergies:  He is allergic to wellbutrin [bupropion].    Review of Systems    IR Procedure pertinent significant findings are mentioned in the PMH and PSH above.    Objective     Visit Vitals  /61 (BP Location: Right arm, Patient Position: Lying)   Pulse 71   Temp 98 °F (36.7 °C) (Oral)   Resp 18   Ht 162.6 cm (64.02\")   Wt 68.3 kg (150 lb 8 oz)   SpO2 96%   BMI 25.82 kg/m²        Physical Exam    A&Ox3.   Able to communicate  No Apparent Distress  Average physique   CVS: Regular rate and " rhythm  Respiratory: Non labored breathing. No signs of respiratory compromise.    Result Review      I have personally reviewed the results from the time of this admission to 4/5/2023 12:48 EDT and agree with these findings.  [x]  Laboratory  []  Microbiology  [x]  Radiology  []  EKG/Telemetry   []  Cardiology/Vascular   []  Pathology  []  Old records  []  Other:    Most notable findings include: As noted:    Results from last 7 days   Lab Units 04/04/23  0547 04/03/23  0644 04/02/23  1641 04/02/23  0638   INR   --   --  1.08  --    HEMOGLOBIN g/dL 9.4* 9.2*  --  9.3*   HEMATOCRIT % 32.2* 29.9*  --  30.9*   PLATELETS 10*3/mm3 316 298  --  276       Estimated Creatinine Clearance: 34.1 mL/min (A) (by C-G formula based on SCr of 2.03 mg/dL (H)).   Creatinine   Date Value Ref Range Status   04/04/2023 2.03 (H) 0.76 - 1.27 mg/dL Final   04/03/2023 2.28 (H) 0.76 - 1.27 mg/dL Final       COVID19   Date Value Ref Range Status   12/11/2022 Not Detected Not Detected - Ref. Range Final        No results found for: PREGTESTUR, PREGSERUM, HCG, HCGQUANT     ASA SCALE ASSESSMENT (applicable ONLY if sedation planned):        MALLAMPATI CLASSIFICATION (applicable ONLY if sedation planned):       Assessment / Plan     Herbert Harley is a 67 y.o. male referred to the IR service with above problem.    Plan:   As above.    Notice: The note was created before the performance of the procedure. It might have been left in the pending status and signed off after the procedure. The time stamp on the note may be misleading.    Thierry Kelsey MD   Vascular Interventional Radiology  04/03/23   1:10 PM EDT

## 2023-04-03 NOTE — CASE MANAGEMENT/SOCIAL WORK
Discharge Planning Assessment  [facility]     Patient Name: Herbert Harley  MRN: [id number]  Today's Date: 4/3/2023    Admit Date: 4/2/2023    Plan: TANA eval   Discharge Needs Assessment     Row Name 04/03/23 1618       Living Environment    People in Home spouse    Current Living Arrangements home    Primary Care Provided by self    Provides Primary Care For no one    Family Caregiver if Needed spouse    Quality of Family Relationships helpful;involved       Resource/Environmental Concerns    Resource/Environmental Concerns none       Transition Planning    Patient/Family Anticipates Transition to home with family    Patient/Family Anticipated Services at Transition     Transportation Anticipated family or friend will provide       Discharge Needs Assessment    Readmission Within the Last 30 Days no previous admission in last 30 days    Equipment Currently Used at Home none    Anticipated Changes Related to Illness none    Equipment Needed After Discharge none               Discharge Plan     Row Name 04/03/23 1618       Plan    Plan CM eval    Plan Comments Confirmed patient lives with his spouse in [facility]. He is independent of ADLs at baseline. He [facility] to [facility] on MWF for HD. Will return to their clinic for HD upon discharge - CM will notify them upon discharge. He does not use any DME at home at baseline. CT placed today. Goal is to return home upon discharge- CM will continue to follow for dc planning-dee [phone]    Final Discharge Disposition Code 01 - home or self-care              Continued Care and Services - Admitted Since 4/2/2023    Coordination has not been started for this encounter.     Selected Continued Care - Episodes Includes continued care and service providers with selected services from the active episodes listed below    Chronic Care Management Episode start date: 3/28/2023   There are no active outsourced providers for this episode.             Selected  Continued Care - Prior Encounters Includes continued care and service providers with selected services from prior encounters from 1/2/2023 to 4/3/2023    Discharged on 3/20/2023 Admission date: 3/1/2023 - Discharge disposition: Home-Health Care Svc    Home Medical Care     Service Provider Selected Services Address Phone Fax Patient Preferred     [facility] ,  Home Nursing ,  [facility] [address] [facility] [phone] --                    Expected Discharge Date and Time     Expected Discharge Date Expected Discharge Time    Apr 4, 2023          Demographic Summary     Row Name 04/03/23 1618       General Information    Admission Type inpatient    Arrived From home    Referral Source admission list    Reason for Consult discharge planning    Preferred Language English       Contact Information    Permission Granted to Share Info With                Functional Status     Row Name 04/03/23 1618       Functional Status    Usual Activity Tolerance good    Current Activity Tolerance moderate       Functional Status, IADL    Medications independent    Meal Preparation independent    Housekeeping independent    Laundry independent    Shopping independent               Psychosocial    No documentation.                Abuse/Neglect    No documentation.                Legal    No documentation.                Substance Abuse    No documentation.                Patient Forms    No documentation.                   Asia Sunsihne RN

## 2023-04-03 NOTE — TELEPHONE ENCOUNTER
Patient has been in/out of the hospital for 21 days,   He had a glucose level o 38 and went via ambulance.  They are inserting a chest tube today  The wife is concerned about what do when he experiences such a low glucose    They do not have glucose tablets nor glucagon  Please advisee

## 2023-04-03 NOTE — TELEPHONE ENCOUNTER
Please call them she needs to know what to do for low blood sugars he's been and out of the hospital he is currently in [place]  His blood sugar dropped to 38 and she had to call the ambulance    Please call her  [phone]

## 2023-04-03 NOTE — NURSING NOTE
8.5 Swazi locking, left sided Image Guided chest tube placed by Dr Kelsey. 3 mls removed from pleural space. Patient tolerated well. Report called to floor nurse. Dsg placed per protocol.

## 2023-04-03 NOTE — HOME HEALTH
Patient went to the hospital ER at [facility] with no call to home health for altered mental status. He was admitted for hypoglycemia. Patient was receiving home health services for nursing, PT, and OT.

## 2023-04-03 NOTE — PROGRESS NOTES
" LOS: 1 day   Patient Care Team:  Yessica Ricci DO as PCP - General (Family Medicine)  Nima Peter MD as Consulting Physician (Endocrinology)  Nadya Ramirez MD as Cardiologist (Cardiology)  Yessica Ricci DO as Consulting Physician (Family Medicine)  Nury Webber RN as Ambulatory  ([facility] [place])    Chief Complaint: HD dependent [place]    Subjective     Subjective:  Symptoms:  Stable.  No shortness of breath, chest pain or chest pressure.    Diet:  Adequate intake.    Pain:  He reports no pain.        History taken from: patient    Objective     Vital Sign Min/Max for last 24 hours  Temp  Min: 97.8 °F (36.6 °C)  Max: 98.3 °F (36.8 °C)   BP  Min: 116/78  Max: 168/86   Pulse  Min: 73  Max: 81   Resp  Min: 16  Max: 18   SpO2  Min: 93 %  Max: 97 %   No data recorded   No data recorded     Flowsheet Rows    Flowsheet Row First Filed Value   Admission Height 162.6 cm (64\") Documented at 04/02/2023 0556   Admission Weight 66.7 kg (147 lb) Documented at 04/02/2023 0556          I/O this shift:  In: -   Out: 540 [Urine:540]  I/O last 3 completed shifts:  In: -   Out: 900 [Urine:900]    Objective:  General Appearance:  Well-appearing and comfortable.    Vital signs: (most recent): Blood pressure 116/78, pulse 78, temperature 97.9 °F (36.6 °C), temperature source Oral, resp. rate 18, height 162.6 cm (64.02\"), weight 68.3 kg (150 lb 8 oz), SpO2 97 %.  Vital signs are normal.    Output: Producing urine.    HEENT: Normal HEENT exam.    Lungs:  Normal effort.  There are decreased breath sounds.    Heart: Normal rate.  Regular rhythm.  S1 normal and S2 normal.    Abdomen: Abdomen is soft.    Extremities: Normal range of motion.  There is no effusion, dependent edema or local swelling.    Pulses: Distal pulses are intact.    Neurological: Patient is alert and oriented to person, place and time.    Pupils:  Pupils are equal, round, and reactive to light.              Results Review:     I reviewed " the patient's new clinical results.    WBC WBC   Date Value Ref Range Status   04/03/2023 12.20 (H) 3.40 - 10.80 10*3/mm3 Final   04/02/2023 9.96 3.40 - 10.80 10*3/mm3 Final      HGB Hemoglobin   Date Value Ref Range Status   04/03/2023 9.2 (L) 13.0 - 17.7 g/dL Final   04/02/2023 9.3 (L) 13.0 - 17.7 g/dL Final      HCT Hematocrit   Date Value Ref Range Status   04/03/2023 29.9 (L) 37.5 - 51.0 % Final   04/02/2023 30.9 (L) 37.5 - 51.0 % Final      Platlets No results found for: LABPLAT   MCV MCV   Date Value Ref Range Status   04/03/2023 92.6 79.0 - 97.0 fL Final   04/02/2023 94.2 79.0 - 97.0 fL Final          Sodium Sodium   Date Value Ref Range Status   04/03/2023 140 136 - 145 mmol/L Final   04/02/2023 142 136 - 145 mmol/L Final      Potassium Potassium   Date Value Ref Range Status   04/03/2023 3.9 3.5 - 5.2 mmol/L Final   04/02/2023 3.1 (L) 3.5 - 5.2 mmol/L Final      Chloride Chloride   Date Value Ref Range Status   04/03/2023 104 98 - 107 mmol/L Final   04/02/2023 102 98 - 107 mmol/L Final      CO2 CO2   Date Value Ref Range Status   04/03/2023 26.0 22.0 - 29.0 mmol/L Final   04/02/2023 29.0 22.0 - 29.0 mmol/L Final      BUN BUN   Date Value Ref Range Status   04/03/2023 24 (H) 8 - 23 mg/dL Final   04/02/2023 17 8 - 23 mg/dL Final      Creatinine Creatinine   Date Value Ref Range Status   04/03/2023 2.28 (H) 0.76 - 1.27 mg/dL Final   04/02/2023 2.08 (H) 0.76 - 1.27 mg/dL Final      Calcium Calcium   Date Value Ref Range Status   04/03/2023 8.2 (L) 8.6 - 10.5 mg/dL Final   04/02/2023 8.8 8.6 - 10.5 mg/dL Final      PO4 No results found for: CAPO4   Albumin Albumin   Date Value Ref Range Status   04/03/2023 2.8 (L) 3.5 - 5.2 g/dL Final   04/02/2023 3.3 (L) 3.5 - 5.2 g/dL Final      Magnesium Magnesium   Date Value Ref Range Status   04/03/2023 1.9 1.6 - 2.4 mg/dL Final   04/02/2023 2.0 1.6 - 2.4 mg/dL Final      Uric Acid No results found for: URICACID     Medication Review: Yes    Assessment & Plan        Hypoglycemia    I25.10, CABG 03/01/23    Hypertension    Hyperlipidemia LDL goal <70    Type 2 diabetes mellitus with hypoglycemia, with long-term current use of insulin    Ischemic cardiomyopathy    Prolonged Q-T interval on ECG    Acute renal failure (ARF)      Assessment & Plan     HD depedent [place]: Started on HD in March 2023 following CABG surgery. [place] likely ATN. On MWF [facility]. Making urine.     Hypokalemia: On admission     Volume status: No significant LE edema. Did requiring thoracentesis 1 week prior to this admission      AMS: Likely due to hypoglycemia.     Recs  Dialysis on hold as showing sign of renal recovery.   Pending 24hr urine for crcl. Hold HD in morning.  Good probability of renal recovery  Getting Chest tube placed today       I discussed the patients findings and my recommendations with patient       Manjit Gomez MD  04/03/23  12:23 EDT

## 2023-04-03 NOTE — PROCEDURES
The following procedure was performed: Chest tube Left.    Please see corresponding Radiology report for in detail procedural information. The Radiology report will be dictated shortly, if not done so already. Please see the IR RN note for the information regarding medicines administered if any, katarzyna-procedural vitals and I/O information.

## 2023-04-03 NOTE — PLAN OF CARE
Goal Outcome Evaluation:  Plan of Care Reviewed With: patient        Progress: improving  Outcome Evaluation: VSS on RA. Blood sugar stablized and ordered fingersticks ACHS. No complaints of pain or nausea. Chest tube placed today to suction but paitent unable to tolerate and another stat chest xray ordered and going to try suction again at 1800. Chest tube draining serosangunious fluid. Discussed plan of care with patient who verbalizes understanding.

## 2023-04-04 ENCOUNTER — APPOINTMENT (OUTPATIENT)
Dept: GENERAL RADIOLOGY | Facility: HOSPITAL | Age: 68
DRG: 206 | End: 2023-04-04
Payer: MEDICARE

## 2023-04-04 LAB
ANION GAP SERPL CALCULATED.3IONS-SCNC: 11 MMOL/L (ref 5–15)
BASOPHILS # BLD AUTO: 0.08 10*3/MM3 (ref 0–0.2)
BASOPHILS NFR BLD AUTO: 0.6 % (ref 0–1.5)
BUN SERPL-MCNC: 37 MG/DL (ref 8–23)
BUN/CREAT SERPL: 18.2 (ref 7–25)
CALCIUM SPEC-SCNC: 8.4 MG/DL (ref 8.6–10.5)
CHLORIDE SERPL-SCNC: 106 MMOL/L (ref 98–107)
CO2 SERPL-SCNC: 24 MMOL/L (ref 22–29)
CREAT SERPL-MCNC: 2.03 MG/DL (ref 0.76–1.27)
DEPRECATED RDW RBC AUTO: 51.8 FL (ref 37–54)
EGFRCR SERPLBLD CKD-EPI 2021: 35.3 ML/MIN/1.73
EOSINOPHIL # BLD AUTO: 0.84 10*3/MM3 (ref 0–0.4)
EOSINOPHIL NFR BLD AUTO: 5.9 % (ref 0.3–6.2)
ERYTHROCYTE [DISTWIDTH] IN BLOOD BY AUTOMATED COUNT: 14.8 % (ref 12.3–15.4)
GLUCOSE BLDC GLUCOMTR-MCNC: 157 MG/DL (ref 70–130)
GLUCOSE BLDC GLUCOMTR-MCNC: 180 MG/DL (ref 70–130)
GLUCOSE BLDC GLUCOMTR-MCNC: 190 MG/DL (ref 70–130)
GLUCOSE BLDC GLUCOMTR-MCNC: 214 MG/DL (ref 70–130)
GLUCOSE SERPL-MCNC: 154 MG/DL (ref 65–99)
HCT VFR BLD AUTO: 32.2 % (ref 37.5–51)
HGB BLD-MCNC: 9.4 G/DL (ref 13–17.7)
IMM GRANULOCYTES # BLD AUTO: 0.09 10*3/MM3 (ref 0–0.05)
IMM GRANULOCYTES NFR BLD AUTO: 0.6 % (ref 0–0.5)
LYMPHOCYTES # BLD AUTO: 1.41 10*3/MM3 (ref 0.7–3.1)
LYMPHOCYTES NFR BLD AUTO: 10 % (ref 19.6–45.3)
MCH RBC QN AUTO: 28.1 PG (ref 26.6–33)
MCHC RBC AUTO-ENTMCNC: 29.2 G/DL (ref 31.5–35.7)
MCV RBC AUTO: 96.1 FL (ref 79–97)
MONOCYTES # BLD AUTO: 0.74 10*3/MM3 (ref 0.1–0.9)
MONOCYTES NFR BLD AUTO: 5.2 % (ref 5–12)
NEUTROPHILS NFR BLD AUTO: 11.01 10*3/MM3 (ref 1.7–7)
NEUTROPHILS NFR BLD AUTO: 77.7 % (ref 42.7–76)
NRBC BLD AUTO-RTO: 0 /100 WBC (ref 0–0.2)
PLATELET # BLD AUTO: 316 10*3/MM3 (ref 140–450)
PMV BLD AUTO: 9.3 FL (ref 6–12)
POTASSIUM SERPL-SCNC: 4.2 MMOL/L (ref 3.5–5.2)
PROCALCITONIN SERPL-MCNC: 0.2 NG/ML (ref 0–0.25)
RBC # BLD AUTO: 3.35 10*6/MM3 (ref 4.14–5.8)
SODIUM SERPL-SCNC: 141 MMOL/L (ref 136–145)
WBC NRBC COR # BLD: 14.17 10*3/MM3 (ref 3.4–10.8)

## 2023-04-04 PROCEDURE — 84145 PROCALCITONIN (PCT): CPT | Performed by: FAMILY MEDICINE

## 2023-04-04 PROCEDURE — 94799 UNLISTED PULMONARY SVC/PX: CPT

## 2023-04-04 PROCEDURE — 99231 SBSQ HOSP IP/OBS SF/LOW 25: CPT

## 2023-04-04 PROCEDURE — 85025 COMPLETE CBC W/AUTO DIFF WBC: CPT | Performed by: FAMILY MEDICINE

## 2023-04-04 PROCEDURE — 99232 SBSQ HOSP IP/OBS MODERATE 35: CPT | Performed by: FAMILY MEDICINE

## 2023-04-04 PROCEDURE — 71045 X-RAY EXAM CHEST 1 VIEW: CPT

## 2023-04-04 PROCEDURE — 97161 PT EVAL LOW COMPLEX 20 MIN: CPT

## 2023-04-04 PROCEDURE — 80048 BASIC METABOLIC PNL TOTAL CA: CPT | Performed by: FAMILY MEDICINE

## 2023-04-04 PROCEDURE — 94761 N-INVAS EAR/PLS OXIMETRY MLT: CPT

## 2023-04-04 PROCEDURE — 82962 GLUCOSE BLOOD TEST: CPT

## 2023-04-04 RX ADMIN — METOPROLOL SUCCINATE 25 MG: 25 TABLET, EXTENDED RELEASE ORAL at 08:27

## 2023-04-04 RX ADMIN — Medication 81 MG: at 08:27

## 2023-04-04 RX ADMIN — ROSUVASTATIN CALCIUM 10 MG: 10 TABLET, FILM COATED ORAL at 20:25

## 2023-04-04 RX ADMIN — Medication 10 ML: at 20:25

## 2023-04-04 RX ADMIN — BUDESONIDE AND FORMOTEROL FUMARATE DIHYDRATE 2 PUFF: 160; 4.5 AEROSOL RESPIRATORY (INHALATION) at 06:47

## 2023-04-04 RX ADMIN — ACETAMINOPHEN 325MG 650 MG: 325 TABLET ORAL at 19:40

## 2023-04-04 RX ADMIN — AMIODARONE HYDROCHLORIDE 200 MG: 200 TABLET ORAL at 08:27

## 2023-04-04 RX ADMIN — BUDESONIDE AND FORMOTEROL FUMARATE DIHYDRATE 2 PUFF: 160; 4.5 AEROSOL RESPIRATORY (INHALATION) at 18:32

## 2023-04-04 RX ADMIN — TIOTROPIUM BROMIDE INHALATION SPRAY 2 PUFF: 3.12 SPRAY, METERED RESPIRATORY (INHALATION) at 06:48

## 2023-04-04 NOTE — PROGRESS NOTES
[facility]  PROGRESS NOTE    Patient Name: Herbert Harley  : 1955  MRN: [id number]    Date of Admission: 2023  Primary Care Physician: Yessica Ricci DO    Subjective   Subjective     CC:  F/U hypoglycemia, pleural effusion    HPI:  Patient seen and examined. No complaints. Tolerating chest tube. Almost 2L out thus far. No RUQ pain. Glucose has stabilized.     ROS:  Gen- No fevers, chills  CV- No chest pain, palpitations  Resp- No cough, dyspnea  GI- No N/V/D, abd pain    Objective   Objective     Vital Signs:   Temp:  [97.9 °F (36.6 °C)-98.9 °F (37.2 °C)] 98.2 °F (36.8 °C)  Heart Rate:  [61-85] 82  Resp:  [16-18] 16  BP: (116-136)/(58-78) 136/68     Physical Exam:  Constitutional: No acute distress, awake, alert  HENT: NCAT, mucous membranes moist  Respiratory: Decreased in bases bilaterally, respiratory effort normal RA, L chest tube in place to suction   Cardiovascular: RRR, no murmurs, rubs, or gallops  Gastrointestinal: Positive bowel sounds, soft, nontender, nondistended  Musculoskeletal: No bilateral ankle edema  Psychiatric: Appropriate affect, cooperative  Neurologic: Oriented x 3, ROMAN, speech clear  Skin: No rashes    Results Reviewed:  LAB RESULTS:      Lab 23  0547 23  0644 23  1641 23  1047 23  0638   WBC 14.17* 12.20*  --   --  9.96   HEMOGLOBIN 9.4* 9.2*  --   --  9.3*   HEMATOCRIT 32.2* 29.9*  --   --  30.9*   PLATELETS 316 298  --   --  276   NEUTROS ABS 11.01*  --   --   --  7.07*   IMMATURE GRANS (ABS) 0.09*  --   --   --  0.05   LYMPHS ABS 1.41  --   --   --  1.37   MONOS ABS 0.74  --   --   --  0.89   EOS ABS 0.84*  --   --   --  0.46*   MCV 96.1 92.6  --   --  94.2   SED RATE  --   --   --  41*  --    PROCALCITONIN 0.20  --   --   --   --    PROTIME  --   --  14.0  --   --          Lab 23  0547 23  0644 23  0638   SODIUM 141 140 142   POTASSIUM 4.2 3.9 3.1*   CHLORIDE 106 104 102   CO2  24.0 26.0 29.0   ANION GAP 11.0 10.0 11.0   BUN 37* 24* 17   CREATININE 2.03* 2.28* 2.08*   EGFR 35.3* 30.7* 34.3*   GLUCOSE 154* 124* 78   CALCIUM 8.4* 8.2* 8.8   MAGNESIUM  --  1.9 2.0   PHOSPHORUS  --  4.1  --          Lab 04/03/23  0644 04/02/23  0638   TOTAL PROTEIN 5.6* 6.8   ALBUMIN 2.8* 3.3*   GLOBULIN 2.8 3.5   ALT (SGPT) 39 49*   AST (SGOT) 31 38   BILIRUBIN 0.2 0.2   ALK PHOS 132* 140*         Lab 04/02/23  1641 04/02/23  0638   HSTROP T  --  115*   PROTIME 14.0  --    INR 1.08  --                  Brief Urine Lab Results  (Last result in the past 365 days)      Color   Clarity   Blood   Leuk Est   Nitrite   Protein   CREAT   Urine HCG        04/03/23 1145             71.5               Microbiology Results Abnormal     None          US Gallbladder    Result Date: 4/3/2023  US GALLBLADDER Date of Exam: 4/3/2023 8:19 AM EDT Indication: Right upper quadrant pain. Comparison: CT the abdomen performed on 4/2/2023. Technique: Grayscale and color Doppler ultrasound evaluation of the right upper quadrant was performed. Findings: The pancreas was not seen well. The liver parenchyma is normal. There are no focal hepatic masses. There is normal directional flow in the main portal vein and hepatic veins. The gallbladder is unremarkable. The common bile duct caliber is normal measuring 1 mm. The right kidney measures 11.1 x 4.8 x 5.4 cm. There is a small cyst in the right kidney measuring 1.3 cm. There is no hydronephrosis or echogenic shadowing stones.     Impression: Impression: 1. The pancreas was not seen well. 2. Small right renal cyst. 3. The remaining study is normal. Electronically Signed: Toby Chowdary  4/3/2023 8:53 AM EDT  Workstation ID: [id number]    XR Chest 1 View    Result Date: 4/4/2023  XR CHEST 1 VW Date of Exam: 4/4/2023 5:47 AM EDT Indication: pleural effusion. Comparison: 4/3/2023 Findings: Right IJ catheter and left basilar pigtail pleural drain remain in place. There is mild persistent hazy  interstitial opacity of the left mid and lower lung unchanged. No pneumothorax, significant effusion, or new pulmonary parenchymal disease is seen. Heart is mildly enlarged. Vasculature appears upper limits of normal.     Impression: Impression: Stable chest radiograph with stable residual left mid and lower lung atelectasis. No evidence of pneumothorax or other new chest disease. Electronically Signed: Larry Covington  4/4/2023 8:19 AM EDT  Workstation ID: [id number]    XR Chest 1 View    Result Date: 4/3/2023  XR CHEST 1 VW Date of Exam: 4/3/2023 6:04 PM EDT Indication: postop. Comparison: Same day Findings: Status post coronary bypass. Right IJ dialysis catheter and left basilar pleural catheter remain in place. Heart size stable. Small residual left pleural effusion suspected. No pneumothorax. Airspace disease in the mid and lower left lung has the appearance of atelectasis. Right lung clear the sharp costophrenic angle     Impression: Impression: Minimal residual pleural effusion status post pleural catheter placement with no evidence of pneumothorax. Atelectasis present in the mid and lower left lung Electronically Signed: Gaurav Mike  4/3/2023 6:33 PM EDT  Workstation ID: [id number]    XR Chest 1 View    Result Date: 4/3/2023  XR CHEST 1 VW Date of Exam: 4/3/2023 3:22 PM EDT Indication: chest tube. Comparison: 4/3/2023 Findings: The heart size is enlarged. There are postoperative changes of sternotomy. A right-sided dialysis catheter is unchanged in position with the tip cavoatrial junction. There is a small bore pleural drain present on the left with a significant decrease in left-sided pleural fluid. There is residual atelectasis in the left lung base. There is no pneumothorax.     Impression: Impression: 1. Left-sided pleural drain remains in appropriate position. 2. Marked decrease in left-sided pleural fluid with residual atelectasis in the left lung base. Electronically Signed: Juanjo Valenzuela  4/3/2023 4:18 PM  EDT  Workstation ID: [id number]    XR Chest 1 View    Result Date: 4/3/2023  XR CHEST 1 VW Date of Exam: 4/3/2023 1:44 PM EDT Indication: s/p left chest tube placement. Comparison: 4/2/2023 Findings: Right IJ dialysis catheter is again seen with its tip at the SVC-right atrial junction. Small bore left pleural drain is now seen in the left base, apparently within the patient's large pleural effusion. There is persistent hazy opacity of the left base,  but less dense, and there is partial reexpansion of the left upper lung. No pneumothorax is seen. There is mild persistent pulmonary vascular congestion. Right lung otherwise appears clear.     Impression: Impression: 1. Left pleural drain placement, with significant remaining left lower chest opacity, but improved overall aeration of the left upper lung. 2. No evidence of pneumothorax. 3. Persistent mild pulmonary vascular congestion. Electronically Signed: Larry Covington  4/3/2023 2:08 PM EDT  Workstation ID: [id number]    US Tube Placement    Result Date: 4/3/2023  US TUBE PLACEMENT                                                         History: Recurrent effusion or shortness of breath or chest pain    : Thierry Kelsey MD.                                                                             Modality: Ultrasound                                                                                          Sedation: No IV sedation. Anesthesia: Lidocaine, local infiltration. Estimated blood loss:  < 5 cc.         Technique: Discussion of risks, benefits, and alternatives were made with the patient. The patient expressed understanding and agreed to proceed. Informed written consent was obtained. A universal timeout was performed prior to starting the procedure. Standard sterile precautions were utilized. A preliminary ultrasonography was performed to assess the target and determine a safe access site. It showed pleural effusion.  Pertinent ultrasound images  were secured to the PACS for documentation. An intercostal access site was selected and sterilely prepped and draped. Local anesthesia was administered. A dermatotomy was performed. Using real-time ultrasonographic guidance, 8.5 Chilean locking pigtail drainage catheter was advanced into the pleural space using Seldinger technique and the pigtail locked in position. Blood-tinged fluid was recovered. The catheter was secured to skin with nonabsorbable suture. An aseptic dressing applied. The catheter was connected to a Pleur-evac. At the end of the procedure, an aseptic dressing applied. The patient tolerated the procedure well. After recovery, the patient was discharged from the department in stable condition.           Complications: None immediate. Specimen: Nothing requested                                                                         Impression: Impression:                                                              Successful ultrasound-guided 8.5 Chilean left pleural drain placement as described above. Thank you for the opportunity to assist in the care of your patient. Electronically Signed: Thierry Kelsey  4/3/2023 1:55 PM EDT  Workstation ID: [id number]      Results for orders placed during the hospital encounter of 03/01/23    Adult Transthoracic Echo Complete W/ Cont if Necessary Per Protocol    Interpretation Summary  •  Left ventricular systolic function is mildly decreased. Calculated left ventricular EF = 40.4% Left ventricular ejection fraction appears to be 41 - 45%.  •  The right ventricular cavity is dilated.  •  Left atrial volume is mildly increased.  •  There is calcification of the aortic valve.  •  Estimated right ventricular systolic pressure from tricuspid regurgitation is normal (<35 mmHg).  •  There is a left pleural effusion.      Current medications:  Scheduled Meds:amiodarone, 200 mg, Oral, Q24H  aspirin, 81 mg, Oral, Daily  budesonide-formoterol, 2 puff, Inhalation, BID - RT    And  tiotropium bromide monohydrate, 2 puff, Inhalation, Daily - RT  metoprolol succinate XL, 25 mg, Oral, Q24H  rosuvastatin, 10 mg, Oral, Nightly  sodium chloride, 10 mL, Intravenous, Q12H      Continuous Infusions:   PRN Meds:.•  acetaminophen **OR** acetaminophen **OR** acetaminophen  •  albuterol  •  dextrose  •  dextrose  •  glucagon (human recombinant)  •  nitroglycerin  •  ondansetron **OR** ondansetron  •  sodium chloride  •  sodium chloride  •  sodium chloride    Assessment & Plan   Assessment & Plan     Active Hospital Problems    Diagnosis  POA   • **Hypoglycemia [E16.2]  Yes   • Acute renal failure (ARF) [N17.9]  Yes   • Prolonged Q-T interval on ECG [R94.31]  Yes   • Ischemic cardiomyopathy [I25.5]  Yes   • Type 2 diabetes mellitus with hypoglycemia, with long-term current use of insulin [E11.649, Z79.4]  Not Applicable   • I25.10, CABG 03/01/23 [I25.10]  Yes   • Hypertension [I10]  Yes   • Hyperlipidemia LDL goal <70 [E78.5]  Yes      Resolved Hospital Problems   No resolved problems to display.        Brief Hospital Course to date:  Herbert Harley is a 67 y.o. male with PMHx MVCAD s/p CABG x3 per Dr Saavedra 3/1/2023, HLD, Post op A fib, Renal failure now on HD M, W, F, chronic anemia, DM2 who presents to ED with AMS and found to hae BG of 39. EMS gave IV dextrose and blood sugar came up to 180.  His mental status returned to baseline. He did eat dinner last night and took his usual 60 units of Lantus. Last HD session was Friday. Patient was seen in the ED 3/27 and had L pleural effusion. He had thoracentesis by IR and was sent home. Imaging in the ED 4/2 showed re-accumulation of fluid.    This patient's problems and plans were partially entered by my partner and updated as appropriate by me 04/04/23.    Hypoglycemia -> resolved   DM2  -Resolved after Dextrose administration   -POC glucose ACHS  -Holding Lantus   -Defer starting SSI until glucose consistently >200      Recurrent L pleural  effusion s/p CABG  -S/P IR drainage 3/27  -S/P IR chest tube 4/3. 2L out thus far. Continue to suction. AM CXR, may remove tomorrow per CTS      ARF on HD  -Nephrology following, Dialysis on hold as showing sign of renal recovery  -24 hour urine reviewed      RUQ pain -> resolved   -CT A/P negative  -GB US negative     Hypokalemia  -S/P 40mEQ on admission x 1 dose and resolved      Elevated HS Troponin  -in setting of renal failure, no chest pain, no acute EKG changes      CAD multivessel CAD s/p CABG x3  Hyperlipidemia  -Continue aspirin, statin, beta-blocker     Post Op A fib  -Continue Amiodarone      Chronic Anemia  -H&H improved from prior and stable      Shanks site cellulitis -> resolved  -Completed course of Diflucan      Hx Odynophagia  -Completed course of Diflucan for this as well     Leukocytosis  -No fever  -Procal normal  -CXR negative   -Likely reactive from chest tube placement     Expected Discharge Location and Transportation: Home   Expected Discharge   Expected Discharge Date and Time     Expected Discharge Date Expected Discharge Time    Apr 5, 2023            DVT prophylaxis:  Mechanical DVT prophylaxis orders are present.     AM-PAC 6 Clicks Score (PT): 20 (04/03/23 2000)    CODE STATUS:   Code Status and Medical Interventions:   Ordered at: 04/02/23 0855     Level Of Support Discussed With:    Patient     Code Status (Patient has no pulse and is not breathing):    CPR (Attempt to Resuscitate)     Medical Interventions (Patient has pulse or is breathing):    Full Support       Caitlin Smith, DO  04/04/23

## 2023-04-04 NOTE — PLAN OF CARE
Problem: Adult Inpatient Plan of Care  Goal: Absence of Hospital-Acquired Illness or Injury  Intervention: Identify and Manage Fall Risk  Description: Perform standard risk assessment on admission using a validated tool or comprehensive approach appropriate to the patient; reassess fall risk frequently, with change in status or transfer to another level of care.  Communicate fall injury risk to interprofessional healthcare team.  Determine need for increased observation, equipment and environmental modification, such as low bed, signage and supportive, nonskid footwear.  Adjust safety measures to individual developmental age, stage and identified risk factors.  Reinforce the importance of safety and physical activity with patient and family.  Perform regular intentional rounding to assess need for position change, pain assessment and personal needs, including assistance with toileting.  Recent Flowsheet Documentation  Taken 4/4/2023 0000 by Bret Henson, RN  Safety Promotion/Fall Prevention:   activity supervised   assistive device/personal items within reach   clutter free environment maintained   elopement precautions   fall prevention program maintained   nonskid shoes/slippers when out of bed   safety round/check completed  Taken 4/3/2023 2200 by Bret Henson, RN  Safety Promotion/Fall Prevention:   activity supervised   assistive device/personal items within reach   clutter free environment maintained   elopement precautions   fall prevention program maintained   nonskid shoes/slippers when out of bed   safety round/check completed  Taken 4/3/2023 2000 by Bret Henson, RN  Safety Promotion/Fall Prevention:   activity supervised   assistive device/personal items within reach   clutter free environment maintained   elopement precautions   fall prevention program maintained   nonskid shoes/slippers when out of bed   safety round/check completed  Intervention: Prevent Skin Injury  Description: Perform a screening  for skin injury risk, such as pressure or moisture associated skin damage on admission and at regular intervals throughout hospital stay.  Keep all areas of skin (especially folds) clean and dry.  Maintain adequate skin hydration.  Relieve and redistribute pressure and protect bony prominences; implement measures based on patient-specific risk factors.  Match turning and repositioning schedule to clinical condition.  Encourage weight shift frequently; assist with reposition if unable to complete independently.  Float heels off bed; avoid pressure on the Achilles tendon.  Keep skin free from extended contact with medical devices.  Encourage functional activity and mobility, as early as tolerated.  Use aids (e.g., slide boards, mechanical lift) during transfer.  Recent Flowsheet Documentation  Taken 4/4/2023 0000 by Bret Henson RN  Body Position: position changed independently  Taken 4/3/2023 2200 by Bret Henson RN  Body Position: position changed independently  Taken 4/3/2023 2000 by Bret Henson RN  Body Position: position changed independently  Intervention: Prevent and Manage VTE (Venous Thromboembolism) Risk  Description: Assess for VTE (venous thromboembolism) risk.  Encourage and assist with early ambulation.  Initiate and maintain compression or other therapy, as indicated, based on identified risk in accordance with organizational protocol and provider order.  Encourage both active and passive leg exercises while in bed, if unable to ambulate.  Recent Flowsheet Documentation  Taken 4/4/2023 0000 by Bret Henson RN  Activity Management:   activity adjusted per tolerance   dorsiflexion/plantar flexion performed  Range of Motion: ROM (range of motion) performed  Taken 4/3/2023 2200 by Bret Henson RN  Activity Management:   activity adjusted per tolerance   dorsiflexion/plantar flexion performed  Taken 4/3/2023 2000 by Bret Henson RN  Activity Management:   activity adjusted per tolerance   up in chair  Range  of Motion: ROM (range of motion) performed  Intervention: Prevent Infection  Description: Maintain skin and mucous membrane integrity; promote hand, oral and pulmonary hygiene.  Optimize fluid balance, nutrition, sleep and glycemic control to maximize infection resistance.  Identify potential sources of infection early to prevent or mitigate progression of infection (e.g., wound, lines, devices).  Evaluate ongoing need for invasive devices; remove promptly when no longer indicated.  Recent Flowsheet Documentation  Taken 4/3/2023 2000 by Bret Henson RN  Infection Prevention:   environmental surveillance performed   hand hygiene promoted   rest/sleep promoted   single patient room provided  Goal: Optimal Comfort and Wellbeing  Intervention: Provide Person-Centered Care  Description: Use a family-focused approach to care.  Develop trust and rapport by proactively providing information, encouraging questions, addressing concerns and offering reassurance.  Acknowledge emotional response to hospitalization.  Recognize and utilize personal coping strategies.  Honor spiritual and cultural preferences.  Recent Flowsheet Documentation  Taken 4/3/2023 2000 by Bret Henson RN  Trust Relationship/Rapport:   care explained   choices provided   emotional support provided   empathic listening provided   questions answered   questions encouraged   reassurance provided   thoughts/feelings acknowledged     Problem: Heart Failure Comorbidity  Goal: Maintenance of Heart Failure Symptom Control  Intervention: Maintain Heart Failure-Management  Description: Evaluate adherence to home heart failure self-care regimen (e.g., medication, fluid balance, sodium intake, daily weight, physical activity, telemonitoring, support).  Advocate continuation of home medication and schedule.  Consider pharmacologic therapy administration time and effects (e.g., avoid giving diuretic prior to bedtime or nitrates on empty stomach).  Monitor response to  pharmacologic therapy, including weight fluctuations, blood pressure and electrolyte levels.  Monitor for signs and symptoms of anxiety and depression, including severity and duration; if present, provide psychosocial support.  Consider need for heart failure clinic or palliative care consult.  Recent Flowsheet Documentation  Taken 4/3/2023 2000 by Bret Henson RN  Medication Review/Management:   medications reviewed   high-risk medications identified     Problem: Hypertension Comorbidity  Goal: Blood Pressure in Desired Range  Outcome: Ongoing, Progressing  Intervention: Maintain Blood Pressure Management  Description: Evaluate adherence to home antihypertensive regimen (e.g., exercise and activity, diet modification, medication).  Provide scheduled antihypertensive medication; consider administration time and effects (e.g., avoid giving diuretic prior to bedtime).  Monitor response to antihypertensive medication therapy (e.g., blood pressure, electrolyte levels, medication effects).  Minimize risk of orthostatic hypotension; encourage caution with position changes, particularly if elderly.  Flowsheets  Taken 4/4/2023 0036  Syncope Management: position changed slowly  Taken 4/3/2023 2000  Medication Review/Management:   medications reviewed   high-risk medications identified   Goal Outcome Evaluation:

## 2023-04-04 NOTE — PROGRESS NOTES
" LOS: 2 days   Patient Care Team:  Yessica Ricci DO as PCP - General (Family Medicine)  Nima Peter MD as Consulting Physician (Endocrinology)  Nadya Ramirez MD as Cardiologist (Cardiology)  Yessica Ricci DO as Consulting Physician (Family Medicine)  Nury Webber, ANDRÉS as Ambulatory  ([facility])    Chief Complaint: HD dependent [place]    Subjective     Stable renal function. GFR 35. Crcl 39.5. Recovered renal function. Will remove HD cath     Subjective:  Symptoms:  Stable.  No shortness of breath, chest pain or chest pressure.    Diet:  Adequate intake.    Pain:  He reports no pain.        History taken from: patient    Objective     Vital Sign Min/Max for last 24 hours  Temp  Min: 97.9 °F (36.6 °C)  Max: 98.9 °F (37.2 °C)   BP  Min: 128/63  Max: 136/68   Pulse  Min: 61  Max: 85   Resp  Min: 16  Max: 18   SpO2  Min: 81 %  Max: 97 %   No data recorded   No data recorded     Flowsheet Rows    Flowsheet Row First Filed Value   Admission Height 162.6 cm (64\") Documented at 04/02/2023 0556   Admission Weight 66.7 kg (147 lb) Documented at 04/02/2023 0556          I/O this shift:  In: -   Out: 700 [Urine:700]  I/O last 3 completed shifts:  In: -   Out: 3525 [Urine:1690; Chest Tube:1835]    Objective:  General Appearance:  Well-appearing and comfortable.    Vital signs: (most recent): Blood pressure 132/62, pulse 71, temperature 98.2 °F (36.8 °C), temperature source Oral, resp. rate 16, height 162.6 cm (64.02\"), weight 68.3 kg (150 lb 8 oz), SpO2 97 %.  Vital signs are normal.    Output: Producing urine.    HEENT: Normal HEENT exam.    Lungs:  Normal effort.  There are decreased breath sounds.    Heart: Normal rate.  Regular rhythm.  S1 normal and S2 normal.    Abdomen: Abdomen is soft.    Extremities: Normal range of motion.  There is no effusion, dependent edema or local swelling.    Pulses: Distal pulses are intact.    Neurological: Patient is alert and oriented to person, place and " time.    Pupils:  Pupils are equal, round, and reactive to light.              Results Review:     I reviewed the patient's new clinical results.    WBC WBC   Date Value Ref Range Status   04/04/2023 14.17 (H) 3.40 - 10.80 10*3/mm3 Final   04/03/2023 12.20 (H) 3.40 - 10.80 10*3/mm3 Final   04/02/2023 9.96 3.40 - 10.80 10*3/mm3 Final      HGB Hemoglobin   Date Value Ref Range Status   04/04/2023 9.4 (L) 13.0 - 17.7 g/dL Final   04/03/2023 9.2 (L) 13.0 - 17.7 g/dL Final   04/02/2023 9.3 (L) 13.0 - 17.7 g/dL Final      HCT Hematocrit   Date Value Ref Range Status   04/04/2023 32.2 (L) 37.5 - 51.0 % Final   04/03/2023 29.9 (L) 37.5 - 51.0 % Final   04/02/2023 30.9 (L) 37.5 - 51.0 % Final      Platlets No results found for: LABPLAT   MCV MCV   Date Value Ref Range Status   04/04/2023 96.1 79.0 - 97.0 fL Final   04/03/2023 92.6 79.0 - 97.0 fL Final   04/02/2023 94.2 79.0 - 97.0 fL Final          Sodium Sodium   Date Value Ref Range Status   04/04/2023 141 136 - 145 mmol/L Final   04/03/2023 140 136 - 145 mmol/L Final   04/02/2023 142 136 - 145 mmol/L Final      Potassium Potassium   Date Value Ref Range Status   04/04/2023 4.2 3.5 - 5.2 mmol/L Final   04/03/2023 3.9 3.5 - 5.2 mmol/L Final   04/02/2023 3.1 (L) 3.5 - 5.2 mmol/L Final      Chloride Chloride   Date Value Ref Range Status   04/04/2023 106 98 - 107 mmol/L Final   04/03/2023 104 98 - 107 mmol/L Final   04/02/2023 102 98 - 107 mmol/L Final      CO2 CO2   Date Value Ref Range Status   04/04/2023 24.0 22.0 - 29.0 mmol/L Final   04/03/2023 26.0 22.0 - 29.0 mmol/L Final   04/02/2023 29.0 22.0 - 29.0 mmol/L Final      BUN BUN   Date Value Ref Range Status   04/04/2023 37 (H) 8 - 23 mg/dL Final   04/03/2023 24 (H) 8 - 23 mg/dL Final   04/02/2023 17 8 - 23 mg/dL Final      Creatinine Creatinine   Date Value Ref Range Status   04/04/2023 2.03 (H) 0.76 - 1.27 mg/dL Final   04/03/2023 2.28 (H) 0.76 - 1.27 mg/dL Final   04/02/2023 2.08 (H) 0.76 - 1.27 mg/dL Final       Calcium Calcium   Date Value Ref Range Status   04/04/2023 8.4 (L) 8.6 - 10.5 mg/dL Final   04/03/2023 8.2 (L) 8.6 - 10.5 mg/dL Final   04/02/2023 8.8 8.6 - 10.5 mg/dL Final      PO4 No results found for: CAPO4   Albumin Albumin   Date Value Ref Range Status   04/03/2023 2.8 (L) 3.5 - 5.2 g/dL Final   04/02/2023 3.3 (L) 3.5 - 5.2 g/dL Final      Magnesium Magnesium   Date Value Ref Range Status   04/03/2023 1.9 1.6 - 2.4 mg/dL Final   04/02/2023 2.0 1.6 - 2.4 mg/dL Final      Uric Acid No results found for: URICACID     Medication Review: Yes    Assessment & Plan       Hypoglycemia    I25.10, CABG 03/01/23    Hypertension    Hyperlipidemia LDL goal <70    Type 2 diabetes mellitus with hypoglycemia, with long-term current use of insulin    Ischemic cardiomyopathy    Prolonged Q-T interval on ECG    Acute renal failure (ARF)      Assessment & Plan     HD depedent [place]: Started on HD in March 2023 following CABG surgery. [place] likely ATN. On MWF [facility]. Making urine.     Hypokalemia: On admission     Volume status: No significant LE edema. Did requiring thoracentesis 1 week prior to this admission      AMS: Likely due to hypoglycemia.     Recs  Ok to remove HD cath as he has recovered renal function. Will ask IR to remove HD cath in AM.      I discussed the patients findings and my recommendations with patient       Manjit Gomez MD  04/04/23  14:39 EDT

## 2023-04-04 NOTE — CASE MANAGEMENT/SOCIAL WORK
Continued Stay Note   [facility]     Patient Name: Herbert Harley  MRN: [id number]  Today's Date: 4/4/2023    Admit Date: 4/2/2023    Plan: Home at discharge   Discharge Plan     Row Name 04/04/23 1622       Plan    Plan Home at discharge    Plan Comments Goal remains for patient to return home upon discharge - He was able to ambulate 450 ft with PT during their session. He will resume HD at [facility] upon discharge on [facility]. CM will need to fax DC summary to [phone]. - no other needs identified at this time- dee [phone]    Final Discharge Disposition Code 01 - home or self-care               Discharge Codes    No documentation.               Expected Discharge Date and Time     Expected Discharge Date Expected Discharge Time    Apr 5, 2023             Dee Sunshine RN

## 2023-04-04 NOTE — PLAN OF CARE
Goal Outcome Evaluation:  Plan of Care Reviewed With: patient           Outcome Evaluation: PT initial evaluation completed for pt presenting with generalized weakness and decreased functional mobility. Pt ambulated 450ft with CGA. Pt's decreased independence warrants PT skilled care. Recommend D/C home with assistance.

## 2023-04-04 NOTE — PLAN OF CARE
Goal Outcome Evaluation:              Outcome Evaluation: Plan is for chest tube and dyalisis port to come out tomorrow. Patient is stable today and seems to have more color in his face this afternoon . He walked in hallway with PT today. Wife at bedside.

## 2023-04-04 NOTE — PROGRESS NOTES
[facility] Cardiothoracic Surgery In-Patient Progress Note     LOS: 2 days     Chief Complaint: Pleural effusion    Subjective  No complaints, states dyspnea is much improved. Vitals stable on room air saturations 95%.       Objective  Vital Signs  Temp:  [97.9 °F (36.6 °C)-98.9 °F (37.2 °C)] 98.1 °F (36.7 °C)  Heart Rate:  [61-85] 84  Resp:  [16-18] 16  BP: (116-135)/(58-78) 135/62      Physical Exam:   General Appearance: alert, appears stated age and  cooperative   Lungs: respirations regular, respirations even and respirations unlabored   Heart: normal S1, S2, no murmur, no gallop, no rub and no click   Sternum: Stable   CT: 1835 cc/24 hrs, no air leak  Output by Drain (mL) 04/03/23 0701 - 04/03/23 1900 04/03/23 1901 - 04/04/23 0700 04/04/23 0701 - 04/04/23 0726 Range Total   Chest Tube Left 1700 135  1835     Results   Results from last 7 days   Lab Units 04/04/23  0547   WBC 10*3/mm3 14.17*   HEMOGLOBIN g/dL 9.4*   HEMATOCRIT % 32.2*   PLATELETS 10*3/mm3 316     Results from last 7 days   Lab Units 04/04/23  0547   SODIUM mmol/L 141   POTASSIUM mmol/L 4.2   CHLORIDE mmol/L 106   CO2 mmol/L 24.0   BUN mg/dL 37*   CREATININE mg/dL 2.03*   GLUCOSE mg/dL 154*   CALCIUM mg/dL 8.4*     Imaging Results (Last 24 Hours)     Procedure Component Value Units Date/Time    XR Chest 1 View [[id number]] Resulted: 04/04/23 0547     Updated: 04/04/23 0549    XR Chest 1 View [[id number]] Collected: 04/03/23 1832     Updated: 04/03/23 1837    Narrative:      XR CHEST 1 VW    Date of Exam: 4/3/2023 6:04 PM EDT    Indication: postop.    Comparison: Same day    Findings:  Status post coronary bypass. Right IJ dialysis catheter and left basilar pleural catheter remain in place. Heart size stable. Small residual left pleural effusion suspected. No pneumothorax. Airspace disease in the mid and lower left lung has the   appearance of atelectasis. Right lung clear the sharp costophrenic angle      Impression:      Impression:  Minimal  residual pleural effusion status post pleural catheter placement with no evidence of pneumothorax. Atelectasis present in the mid and lower left lung    Electronically Signed: Gaurav Cee    4/3/2023 6:33 PM EDT    Workstation ID: [id number]    XR Chest 1 View [[id number]] Collected: 04/03/23 1617     Updated: 04/03/23 1621    Narrative:      XR CHEST 1 VW    Date of Exam: 4/3/2023 3:22 PM EDT    Indication: chest tube.    Comparison: 4/3/2023    Findings:  The heart size is enlarged. There are postoperative changes of sternotomy. A right-sided dialysis catheter is unchanged in position with the tip cavoatrial junction. There is a small bore pleural drain present on the left with a significant decrease in   left-sided pleural fluid. There is residual atelectasis in the left lung base. There is no pneumothorax.      Impression:      Impression:    1. Left-sided pleural drain remains in appropriate position.  2. Marked decrease in left-sided pleural fluid with residual atelectasis in the left lung base.    Electronically Signed: Juanjo STAPLETON Nicolas    4/3/2023 4:18 PM EDT    Workstation ID: [id number]    XR Chest 1 View [[id number]] Collected: 04/03/23 1406     Updated: 04/03/23 1412    Narrative:      XR CHEST 1 VW    Date of Exam: 4/3/2023 1:44 PM EDT    Indication: s/p left chest tube placement.    Comparison: 4/2/2023    Findings:  Right IJ dialysis catheter is again seen with its tip at the SVC-right atrial junction. Small bore left pleural drain is now seen in the left base, apparently within the patient's large pleural effusion. There is persistent hazy opacity of the left base,   but less dense, and there is partial reexpansion of the left upper lung. No pneumothorax is seen. There is mild persistent pulmonary vascular congestion. Right lung otherwise appears clear.      Impression:      Impression:    1. Left pleural drain placement, with significant remaining left lower chest opacity, but improved overall aeration of  the left upper lung.    2. No evidence of pneumothorax.    3. Persistent mild pulmonary vascular congestion.    Electronically Signed: Larry Covington    4/3/2023 2:08 PM EDT    Workstation ID: [id number]    US Tube Placement [[id number]] Collected: 04/03/23 1354     Updated: 04/03/23 1358    Narrative:      US TUBE PLACEMENT                                                            History: Recurrent effusion or shortness of breath or chest pain       : Thierry Kelsey MD.                                                                                Modality: Ultrasound                                                                                             Sedation: No IV sedation.    Anesthesia: Lidocaine, local infiltration.    Estimated blood loss:  < 5 cc.             Technique:   Discussion of risks, benefits, and alternatives were made with the patient. The patient expressed understanding and agreed to proceed. Informed written consent was obtained. A universal timeout was performed prior to starting the procedure. Standard   sterile precautions were utilized.    A preliminary ultrasonography was performed to assess the target and determine a safe access site. It showed pleural effusion.  Pertinent ultrasound images were secured to the PACS for documentation. An intercostal access site was selected and sterilely   prepped and draped.     Local anesthesia was administered. A dermatotomy was performed. Using real-time ultrasonographic guidance, 8.5 Kinyarwanda locking pigtail drainage catheter was advanced into the pleural space using Seldinger technique and the pigtail locked in position.   Blood-tinged fluid was recovered. The catheter was secured to skin with nonabsorbable suture. An aseptic dressing applied.    The catheter was connected to a Pleur-evac.    At the end of the procedure, an aseptic dressing applied. The patient tolerated the procedure well. After recovery, the patient was discharged  from the department in stable condition.              Complications: None immediate.    Specimen: Nothing requested                                                                           Impression:      Impression:                                                                Successful ultrasound-guided 8.5 [place] left pleural drain placement as described above.     Thank you for the opportunity to assist in the care of your patient.    Electronically Signed: Thierry Kelsey    4/3/2023 1:55 PM EDT    Workstation ID: [id number]    US Gallbladder [[id number]] Collected: 04/03/23 0849     Updated: 04/03/23 0856    Narrative:      US GALLBLADDER    Date of Exam: 4/3/2023 8:19 AM EDT    Indication: Right upper quadrant pain.    Comparison: CT the abdomen performed on 4/2/2023.    Technique: Grayscale and color Doppler ultrasound evaluation of the right upper quadrant was performed.    Findings:  The pancreas was not seen well. The liver parenchyma is normal. There are no focal hepatic masses. There is normal directional flow in the main portal vein and hepatic veins. The gallbladder is unremarkable. The common bile duct caliber is normal   measuring 1 mm. The right kidney measures 11.1 x 4.8 x 5.4 cm. There is a small cyst in the right kidney measuring 1.3 cm. There is no hydronephrosis or echogenic shadowing stones.       Impression:      Impression:    1. The pancreas was not seen well.  2. Small right renal cyst.  3. The remaining study is normal.    Electronically Signed: Toby Chowdary    4/3/2023 8:53 AM EDT    Workstation ID: [id number]          Assessment    Hypoglycemia    I25.10, CABG 03/01/23    Hypertension    Hyperlipidemia LDL goal <70    Type 2 diabetes mellitus with hypoglycemia, with long-term current use of insulin    Ischemic cardiomyopathy    Prolonged Q-T interval on ECG    Acute renal failure (ARF)      Plan   Continue chest tube to suction, patient may be placed to water seal when ambulating  CXR in  JEFF Kelly, APRN  04/04/23  07:26 EDT

## 2023-04-04 NOTE — THERAPY EVALUATION
Patient Name: Herbert Harley  : 1955    MRN: [id number]                              Today's Date: 2023       Admit Date: 2023    Visit Dx:     ICD-10-CM ICD-9-CM   1. Hypoglycemia  E16.2 251.2   2. Recurrent left pleural effusion  J90 511.9   3. Acute abdominal pain  R10.9 789.00     338.19     Patient Active Problem List   Diagnosis   • I25.10, CABG 23   • Hypertension   • Hyperlipidemia LDL goal <70   • Type 2 diabetes mellitus with hypoglycemia, with long-term current use of insulin   • Ischemic cardiomyopathy   • Allergic rhinitis   • Positive cardiac stress test   • Mucopurulent chronic bronchitis   • Prolonged Q-T interval on ECG   • Acute renal failure (ARF)   • Shock   • Acute respiratory failure with hypoxia   • Acute systolic heart failure (CMS/HCC)   • Hypoglycemia     Past Medical History:   Diagnosis Date   • Acute renal failure (ARF) 3/2/2023   • Asthma 2022   • Chronic systolic congestive heart failure 3/1/2023   • Coronary artery disease 2014   • Elevated cholesterol    • History of tobacco abuse    • HLD (hyperlipidemia) 2023   • Hypertension    • Ischemic cardiomyopathy    • Myocardial infarction 2014   • Prolonged Q-T interval on ECG 3/1/2023   • Type 2 diabetes mellitus    • Wears glasses      Past Surgical History:   Procedure Laterality Date   • CARDIAC CATHETERIZATION  2014   • CARDIAC CATHETERIZATION Left 2023    Procedure: Left Heart Cath;  Surgeon: Nadya Ramirez MD;  Location:  [facility] CATH INVASIVE LOCATION;  Service: Cardiology;  Laterality: Left;   • CORONARY ARTERY BYPASS GRAFT N/A 2023    Procedure: MEDIAN STERNOTOMY CORONARY ARTERY BYPASS GRAFTING X3  UTILIZING THE LEFT INTERNAL MAMMERY GRAFT, AND EVH OF THE GREATER RIGHT SAPHENOUS VEIN;  Surgeon: Paresh Saavedra MD;  Location: [facility] OR;  Service: Cardiothoracic;  Laterality: N/A;   • CORONARY ARTERY BYPASS GRAFT  3/1/2023   • CORONARY STENT PLACEMENT   March 2014   • TONSILLECTOMY        General Information     Row Name 04/04/23 1514          Physical Therapy Time and Intention    Document Type evaluation  -KG     Mode of Treatment physical therapy  -KG     Row Name 04/04/23 1514          General Information    Patient Profile Reviewed yes  -KG     Prior Level of Function independent:;all household mobility;gait;transfer;ADL's;dressing;bathing  -KG     Existing Precautions/Restrictions cardiac;fall;sternal;other (see comments)  chest tube to suction  -KG     Barriers to Rehab medically complex  -KG     Row Name 04/04/23 1514          Living Environment    People in Home spouse  -KG     Row Name 04/04/23 1514          Home Main Entrance    Number of Stairs, Main Entrance three  -KG     Stair Railings, Main Entrance none  -KG     Row Name 04/04/23 1514          Stairs Within Home, Primary    Number of Stairs, Within Home, Primary none  -KG     Row Name 04/04/23 1514          Cognition    Orientation Status (Cognition) oriented x 4  -KG     Row Name 04/04/23 1514          Safety Issues, Functional Mobility    Safety Issues Affecting Function (Mobility) awareness of need for assistance;insight into deficits/self-awareness;safety precaution awareness;safety precautions follow-through/compliance  -KG     Impairments Affecting Function (Mobility) balance;coordination;endurance/activity tolerance;postural/trunk control;strength  -KG           User Key  (r) = Recorded By, (t) = Taken By, (c) = Cosigned By    Initials Name Provider Type    KG Whitney Mistry, PT Physical Therapist               Mobility     Row Name 04/04/23 1515          Bed Mobility    Comment, (Bed Mobility) UIC  -KG     Row Name 04/04/23 1515          Transfers    Comment, (Transfers) VC's for sequencing and safe hand placement.  -KG     Row Name 04/04/23 1515          Sit-Stand Transfer    Sit-Stand [place] (Transfers) contact guard;verbal cues  -KG     Row Name 04/04/23 1515           Gait/Stairs (Locomotion)    [place] Level (Gait) contact guard;verbal cues  -KG     Distance in Feet (Gait) 450  -KG     Deviations/Abnormal Patterns (Gait) base of support, narrow;deena decreased;stride length decreased  -KG     Bilateral Gait Deviations heel strike decreased  -KG     Comment, (Gait/Stairs) Pt demonstrated step through gait pattern with slow deena and decreased step length. Pt demonstrated adequate balance and stability throughout ambulation. Denied any c/o pain or SOA.  -KG           User Key  (r) = Recorded By, (t) = Taken By, (c) = Cosigned By    Initials Name Provider Type    KG Whitney Mistry, PT Physical Therapist               Obj/Interventions     Row Name 04/04/23 1516          Range of Motion Comprehensive    General Range of Motion no range of motion deficits identified  -KG     Comment, General Range of Motion B LE WFL  -KG     Row Name 04/04/23 1516          Strength Comprehensive (MMT)    Comment, General Manual Muscle Testing (MMT) Assessment B LE grossly 3+/5  -KG     Row Name 04/04/23 1516          Balance    Balance Assessment sitting static balance;standing static balance;standing dynamic balance  -KG     Static Sitting Balance independent  -KG     Position, Sitting Balance unsupported;sitting in chair  -KG     Static Standing Balance standby assist  -KG     Dynamic Standing Balance contact guard  -KG     Position/Device Used, Standing Balance unsupported  -KG     Row Name 04/04/23 1516          Sensory Assessment (Somatosensory)    Sensory Assessment (Somatosensory) LE sensation intact  -KG           User Key  (r) = Recorded By, (t) = Taken By, (c) = Cosigned By    Initials Name Provider Type    KG Whitney Mistry PT Physical Therapist               Goals/Plan     Row Name 04/04/23 1520          Bed Mobility Goal 1 (PT)    Activity/Assistive Device (Bed Mobility Goal 1, PT) sit to supine;supine to sit  -KG     [place] Level/Cues Needed (Bed Mobility  Goal 1, PT) independent  -KG     Time Frame (Bed Mobility Goal 1, PT) 2 weeks  -KG     Progress/Outcomes (Bed Mobility Goal 1, PT) goal ongoing  -KG     Row Name 04/04/23 1520          Transfer Goal 1 (PT)    Activity/Assistive Device (Transfer Goal 1, PT) sit-to-stand/stand-to-sit;bed-to-chair/chair-to-bed  -KG     [place] Level/Cues Needed (Transfer Goal 1, PT) independent  -KG     Time Frame (Transfer Goal 1, PT) 2 weeks  -KG     Progress/Outcome (Transfer Goal 1, PT) goal ongoing  -KG     Row Name 04/04/23 1520          Gait Training Goal 1 (PT)    Activity/Assistive Device (Gait Training Goal 1, PT) gait (walking locomotion)  -KG     [place] Level (Gait Training Goal 1, PT) independent  -KG     Distance (Gait Training Goal 1, PT) 400 feet  -KG     Time Frame (Gait Training Goal 1, PT) 2 weeks  -KG     Progress/Outcome (Gait Training Goal 1, PT) goal ongoing  -KG     Row Name 04/04/23 1520          Stairs Goal 1 (PT)    Activity/Assistive Device (Stairs Goal 1, PT) ascending stairs;descending stairs;step-to-step  -KG     [place] Level/Cues Needed (Stairs Goal 1, PT) standby assist  -KG     Number of Stairs (Stairs Goal 1, PT) 3  -KG     Time Frame (Stairs Goal 1, PT) 2 weeks  -KG     Progress/Outcome (Stairs Goal 1, PT) goal ongoing  -KG     Row Name 04/04/23 1520          Therapy Assessment/Plan (PT)    Planned Therapy Interventions (PT) balance training;bed mobility training;gait training;stair training;strengthening;transfer training  -KG           User Key  (r) = Recorded By, (t) = Taken By, (c) = Cosigned By    Initials Name Provider Type    KG Whitney Mistry N, PT Physical Therapist               Clinical Impression     Row Name 04/04/23 1517          Pain    Pretreatment Pain Rating 0/10 - no pain  -KG     Posttreatment Pain Rating 0/10 - no pain  -KG     Row Name 04/04/23 1517          Plan of Care Review    Plan of Care Reviewed With patient  -KG     Outcome Evaluation PT initial  evaluation completed for pt presenting with generalized weakness and decreased functional mobility. Pt ambulated 450ft with CGA. Pt's decreased independence warrants PT skilled care. Recommend D/C home with assistance.  -KG     Row Name 04/04/23 1517          Therapy Assessment/Plan (PT)    Patient/Family Therapy Goals Statement (PT) return to PLOF  -KG     Rehab Potential (PT) good, to achieve stated therapy goals  -KG     Criteria for Skilled Interventions Met (PT) yes;skilled treatment is necessary  -KG     Therapy Frequency (PT) daily  -KG     Row Name 04/04/23 1517          Vital Signs    Pre Systolic BP Rehab 132  -KG     Pre Treatment Diastolic BP 62  -KG     Pretreatment Heart Rate (beats/min) 62  -KG     Posttreatment Heart Rate (beats/min) 73  -KG     Pre SpO2 (%) 95  -KG     O2 Delivery Pre Treatment room air  -KG     Post SpO2 (%) 94  -KG     O2 Delivery Post Treatment room air  -KG     Pre Patient Position Sitting  -KG     Intra Patient Position Standing  -KG     Post Patient Position Sitting  -KG     Row Name 04/04/23 1517          Positioning and Restraints    Pre-Treatment Position sitting in chair/recliner  -KG     Post Treatment Position chair  -KG     In Chair notified nsg;reclined;call light within reach;encouraged to call for assist;with family/caregiver;legs elevated  -KG           User Key  (r) = Recorded By, (t) = Taken By, (c) = Cosigned By    Initials Name Provider Type    KG Whitney Mistry, PT Physical Therapist               Outcome Measures     Row Name 04/04/23 1521          How much help from another person do you currently need...    Turning from your back to your side while in flat bed without using bedrails? 3  -KG     Moving from lying on back to sitting on the side of a flat bed without bedrails? 3  -KG     Moving to and from a bed to a chair (including a wheelchair)? 3  -KG     Standing up from a chair using your arms (e.g., wheelchair, bedside chair)? 3  -KG     Climbing  3-5 steps with a railing? 3  -KG     To walk in hospital room? 3  -KG     AM-PAC 6 Clicks Score (PT) 18  -KG     Highest level of mobility 6 --> Walked 10 steps or more  -KG     Row Name 04/04/23 1521          Functional Assessment    Outcome Measure Options AM-PAC 6 Clicks Basic Mobility (PT)  -KG           User Key  (r) = Recorded By, (t) = Taken By, (c) = Cosigned By    Initials Name Provider Type    KG Whitney Mistry PT Physical Therapist                             Physical Therapy Education     Title: PT OT SLP Therapies (In Progress)     Topic: Physical Therapy (In Progress)     Point: Mobility training (In Progress)     Learning Progress Summary           Patient Acceptance, E, NR by KG at 4/4/2023 1339                   Point: Home exercise program (Not Started)     Learner Progress:  Not documented in this visit.          Point: Body mechanics (In Progress)     Learning Progress Summary           Patient Acceptance, E, NR by KG at 4/4/2023 1339                   Point: Precautions (In Progress)     Learning Progress Summary           Patient Acceptance, E, NR by KG at 4/4/2023 1339                               User Key     Initials Effective Dates Name Provider Type Discipline    KG 05/22/20 -  Whitney Mistry PT Physical Therapist PT              PT Recommendation and Plan  Planned Therapy Interventions (PT): balance training, bed mobility training, gait training, stair training, strengthening, transfer training  Plan of Care Reviewed With: patient  Outcome Evaluation: PT initial evaluation completed for pt presenting with generalized weakness and decreased functional mobility. Pt ambulated 450ft with CGA. Pt's decreased independence warrants PT skilled care. Recommend D/C home with assistance.     Time Calculation:    PT Charges     Row Name 04/04/23 1339             Time Calculation    Start Time 1339  -KG      PT Received On 04/04/23  -KG      PT Goal Re-Cert Due Date 04/14/23  -KG          Untimed Charges    PT Eval/Re-eval Minutes 48  -KG         Total Minutes    Untimed Charges Total Minutes 48  -KG       Total Minutes 48  -KG            User Key  (r) = Recorded By, (t) = Taken By, (c) = Cosigned By    Initials Name Provider Type    Whitney Wyatt, PT Physical Therapist              Therapy Charges for Today     Code Description Service Date Service Provider Modifiers Qty    69412502716 HC PT EVAL LOW COMPLEXITY 4 4/4/2023 Whitney Mistry, PT GP 1          PT G-Codes  Outcome Measure Options: AM-PAC 6 Clicks Basic Mobility (PT)  AM-PAC 6 Clicks Score (PT): 18  PT Discharge Summary  Anticipated Discharge Disposition (PT): home with assist    Shelly Mistry, PT  4/4/2023

## 2023-04-05 ENCOUNTER — APPOINTMENT (OUTPATIENT)
Dept: INTERVENTIONAL RADIOLOGY/VASCULAR | Facility: HOSPITAL | Age: 68
DRG: 206 | End: 2023-04-05
Payer: MEDICARE

## 2023-04-05 ENCOUNTER — READMISSION MANAGEMENT (OUTPATIENT)
Dept: CALL CENTER | Facility: HOSPITAL | Age: 68
End: 2023-04-05
Payer: MEDICARE

## 2023-04-05 ENCOUNTER — APPOINTMENT (OUTPATIENT)
Dept: GENERAL RADIOLOGY | Facility: HOSPITAL | Age: 68
DRG: 206 | End: 2023-04-05
Payer: MEDICARE

## 2023-04-05 VITALS
HEART RATE: 71 BPM | OXYGEN SATURATION: 96 % | SYSTOLIC BLOOD PRESSURE: 126 MMHG | BODY MASS INDEX: 25.7 KG/M2 | DIASTOLIC BLOOD PRESSURE: 61 MMHG | RESPIRATION RATE: 18 BRPM | TEMPERATURE: 98 F | WEIGHT: 150.5 LBS | HEIGHT: 64 IN

## 2023-04-05 PROBLEM — E16.2 HYPOGLYCEMIA: Status: RESOLVED | Noted: 2023-04-02 | Resolved: 2023-04-05

## 2023-04-05 PROBLEM — N17.9 ACUTE RENAL FAILURE (ARF): Status: RESOLVED | Noted: 2023-03-02 | Resolved: 2023-04-05

## 2023-04-05 LAB
GLUCOSE BLDC GLUCOMTR-MCNC: 138 MG/DL (ref 70–130)
GLUCOSE BLDC GLUCOMTR-MCNC: 211 MG/DL (ref 70–130)

## 2023-04-05 PROCEDURE — 94799 UNLISTED PULMONARY SVC/PX: CPT

## 2023-04-05 PROCEDURE — 05PY33Z REMOVAL OF INFUSION DEVICE FROM UPPER VEIN, PERCUTANEOUS APPROACH: ICD-10-PCS | Performed by: RADIOLOGY

## 2023-04-05 PROCEDURE — 82962 GLUCOSE BLOOD TEST: CPT

## 2023-04-05 PROCEDURE — 99231 SBSQ HOSP IP/OBS SF/LOW 25: CPT | Performed by: THORACIC SURGERY (CARDIOTHORACIC VASCULAR SURGERY)

## 2023-04-05 PROCEDURE — 71045 X-RAY EXAM CHEST 1 VIEW: CPT

## 2023-04-05 PROCEDURE — 99239 HOSP IP/OBS DSCHRG MGMT >30: CPT | Performed by: FAMILY MEDICINE

## 2023-04-05 PROCEDURE — 94761 N-INVAS EAR/PLS OXIMETRY MLT: CPT

## 2023-04-05 RX ORDER — BISACODYL 5 MG/1
10 TABLET, DELAYED RELEASE ORAL ONCE
Status: COMPLETED | OUTPATIENT
Start: 2023-04-05 | End: 2023-04-05

## 2023-04-05 RX ORDER — INSULIN GLARGINE 100 [IU]/ML
INJECTION, SOLUTION SUBCUTANEOUS
Qty: 20 ML | Refills: 0 | Status: SHIPPED | OUTPATIENT
Start: 2023-04-05 | End: 2023-04-11

## 2023-04-05 RX ADMIN — METOPROLOL SUCCINATE 25 MG: 25 TABLET, EXTENDED RELEASE ORAL at 08:47

## 2023-04-05 RX ADMIN — Medication 10 ML: at 08:48

## 2023-04-05 RX ADMIN — LIDOCAINE HYDROCHLORIDE 6 ML: 10; .005 INJECTION, SOLUTION EPIDURAL; INFILTRATION; INTRACAUDAL; PERINEURAL at 10:30

## 2023-04-05 RX ADMIN — BUDESONIDE AND FORMOTEROL FUMARATE DIHYDRATE 2 PUFF: 160; 4.5 AEROSOL RESPIRATORY (INHALATION) at 08:17

## 2023-04-05 RX ADMIN — Medication 81 MG: at 08:47

## 2023-04-05 RX ADMIN — BISACODYL 10 MG: 5 TABLET, COATED ORAL at 09:25

## 2023-04-05 RX ADMIN — TIOTROPIUM BROMIDE INHALATION SPRAY 2 PUFF: 3.12 SPRAY, METERED RESPIRATORY (INHALATION) at 08:17

## 2023-04-05 RX ADMIN — AMIODARONE HYDROCHLORIDE 200 MG: 200 TABLET ORAL at 08:47

## 2023-04-05 NOTE — PROCEDURES
Vascular Interventional Radiology  Procedure Note     Date: 04/05/23      Time: 10:11 EDT      Pre-op Diagnosis: TDC DC.     Post-op Diagnosis: same     Procedure: Aseptic precautions. Local anestheisa. RIJ route tunneled HD catheter removed with blunt dissection. Hemostasis. ASD.     Surgeon: Thierry Kelsey MD      Assistants: KARON     Sedation: None     Estimated Blood Loss (EBL): min      IVF: NA     Findings: NA.     Specimens: none     Complications: None immediate     Disposition: IR recovery     Thierry Kelsey MD   Vascular Interventional Radiology

## 2023-04-05 NOTE — OUTREACH NOTE
Prep Survey    Flowsheet Row Responses   [facility] patient discharged from? [place]   Is LACE score < 7 ? No   Eligibility [facility]   Date of Admission 04/02/23   Date of Discharge 04/05/23   Discharge Disposition Home or Self Care   Discharge diagnosis Hypoglycemia   Does the patient have one of the following disease processes/diagnoses(primary or secondary)? Other   Does the patient have Home health ordered? Yes   What is the Home health agency?  [facility]   Is there a DME ordered? No   Prep survey completed? Yes          Kelly LEDESMA - Registered Nurse

## 2023-04-05 NOTE — DISCHARGE SUMMARY
[facility]  DISCHARGE SUMMARY    Patient Name: Herbert Harley  : 1955  MRN: [id number]    Date of Admission: 2023  5:51 AM  Date of Discharge:  2023  Primary Care Physician: Yessica Ricci DO    Consults     Date and Time Order Name Status Description    2023  8:55 AM Inpatient Nephrology Consult      2023  8:55 AM Inpatient Cardiothoracic Surgery Consult Completed     3/14/2023 12:43 PM Inpatient Gastroenterology Consult Completed     3/2/2023  9:09 AM Inpatient Nephrology Consult Completed     3/1/2023 11:17 AM Inpatient Consult to Cardiology Completed           Hospital Course     Presenting Problem:   Hypoglycemia [E16.2]    Active Hospital Problems    Diagnosis  POA   • Prolonged Q-T interval on ECG [R94.31]  Yes   • Ischemic cardiomyopathy [I25.5]  Yes   • Type 2 diabetes mellitus with hypoglycemia, with long-term current use of insulin [E11.649, Z79.4]  Not Applicable   • I25.10, CABG 23 [I25.10]  Yes   • Hypertension [I10]  Yes   • Hyperlipidemia LDL goal <70 [E78.5]  Yes      Resolved Hospital Problems    Diagnosis Date Resolved POA   • **Hypoglycemia [E16.2] 2023 Yes   • Acute renal failure (ARF) [N17.9] 2023 Yes          Hospital Course:  Herbert Harley is a 67 y.o. male with PMHx MVCAD s/p CABG x3 per Dr Saavedra 3/1/2023, HLD, Post op A fib, Renal failure now on HD M, W, F, chronic anemia, DM2 who presents to ED with AMS and found to hae BG of 39. EMS gave IV dextrose and blood sugar came up to 180.  His mental status returned to baseline. He did eat dinner last night and took his usual 60 units of Lantus. Last HD session was Friday. Patient was seen in the ED 3/27 and had L pleural effusion. He had thoracentesis by IR and was sent home. Imaging in the ED  showed re-accumulation of fluid.     Hypoglycemia -> resolved   DM2  -Resolved after Dextrose administration   -Resume Farxiga. Discussed with  patient to check his blood sugar TID for the next several days. If his blood glucose is >200 at night, he can take 5 units of Lantus.   -He will need to follow-up with his primary Endocrinologist Dr Peter in 1-2 weeks      Recurrent L pleural effusion s/p CABG  -S/P IR drainage (thoracentesis) 3/27  -S/P IR chest tube 4/3. Plan to remove today     ARF on HD  -Nephrology following, Dialysis on hold as showing sign of renal recovery  -24 hour urine reviewed   -Plan for tunneled HD cath removal today in IR      RUQ pain -> resolved   -CT A/P negative  -GB US negative     Hypokalemia  -S/P 40mEQ on admission x 1 dose and resolved      Elevated HS Troponin  -in setting of renal failure, no chest pain, no acute EKG changes      CAD multivessel CAD s/p CABG x3  Hyperlipidemia  -Continue aspirin, statin, beta-blocker     Post Op A fib  -Continue Amiodarone      Chronic Anemia  -H&H improved from prior and stable      Effie site cellulitis -> resolved  -Completed course of Diflucan      Hx Odynophagia  -Completed course of Diflucan for this as well      Leukocytosis  -No fever  -Procal normal  -CXR negative   -Likely reactive from chest tube placement     Discharge Follow Up Recommendations for outpatient labs/diagnostics:  -PCP 3-5 days   -BHARATHI 1-2 weeks   -Dr Saavedra 2 weeks   -Dr ePter 1-2 weeks     Day of Discharge     HPI:   Patient seen and examined. No complaints. Wants to go home.     Review of Systems  Gen- No fevers, chills  CV- No chest pain, palpitations  Resp- No cough, dyspnea  GI- No N/V/D, abd pain    Vital Signs:   Temp:  [97.8 °F (36.6 °C)-98.2 °F (36.8 °C)] 97.9 °F (36.6 °C)  Heart Rate:  [60-79] 71  Resp:  [16] 16  BP: (124-142)/(55-69) 141/69      Physical Exam:  Constitutional: No acute distress, awake, alert  HENT: NCAT, mucous membranes moist  Respiratory: Decreased in bases bilaterally, respiratory effort normal RA, L chest tube in place   Cardiovascular: RRR, no murmurs, rubs, or  gallops  Gastrointestinal: Positive bowel sounds, soft, nontender, nondistended  Musculoskeletal: No bilateral ankle edema  Psychiatric: Appropriate affect, cooperative  Neurologic: Oriented x 3, ROMAN, speech clear  Skin: No rashes, R chest tunneled HD cath, B/L graft site incisions, scabbed     Pertinent  and/or Most Recent Results     LAB RESULTS:      Lab 04/04/23  0547 04/03/23  0644 04/02/23  1641 04/02/23  1047 04/02/23  0638   WBC 14.17* 12.20*  --   --  9.96   HEMOGLOBIN 9.4* 9.2*  --   --  9.3*   HEMATOCRIT 32.2* 29.9*  --   --  30.9*   PLATELETS 316 298  --   --  276   NEUTROS ABS 11.01*  --   --   --  7.07*   IMMATURE GRANS (ABS) 0.09*  --   --   --  0.05   LYMPHS ABS 1.41  --   --   --  1.37   MONOS ABS 0.74  --   --   --  0.89   EOS ABS 0.84*  --   --   --  0.46*   MCV 96.1 92.6  --   --  94.2   SED RATE  --   --   --  41*  --    PROCALCITONIN 0.20  --   --   --   --    PROTIME  --   --  14.0  --   --          Lab 04/04/23  0547 04/03/23  0644 04/02/23  0638   SODIUM 141 140 142   POTASSIUM 4.2 3.9 3.1*   CHLORIDE 106 104 102   CO2 24.0 26.0 29.0   ANION GAP 11.0 10.0 11.0   BUN 37* 24* 17   CREATININE 2.03* 2.28* 2.08*   EGFR 35.3* 30.7* 34.3*   GLUCOSE 154* 124* 78   CALCIUM 8.4* 8.2* 8.8   MAGNESIUM  --  1.9 2.0   PHOSPHORUS  --  4.1  --          Lab 04/03/23  0644 04/02/23  0638   TOTAL PROTEIN 5.6* 6.8   ALBUMIN 2.8* 3.3*   GLOBULIN 2.8 3.5   ALT (SGPT) 39 49*   AST (SGOT) 31 38   BILIRUBIN 0.2 0.2   ALK PHOS 132* 140*         Lab 04/02/23  1641 04/02/23  0638   HSTROP T  --  115*   PROTIME 14.0  --    INR 1.08  --                  Brief Urine Lab Results  (Last result in the past 365 days)      Color   Clarity   Blood   Leuk Est   Nitrite   Protein   CREAT   Urine HCG        04/03/23 1145             71.5             Microbiology Results (last 10 days)     ** No results found for the last 240 hours. **          CT Abdomen Pelvis Without Contrast    Result Date: 4/2/2023  EXAMINATION:  CT SCAN OF  THE ABDOMEN AND PELVIS WITHOUT INTRAVENOUS CONTRAST DATE OF EXAM: 4/2/2023 6:49 AM HISTORY: Right upper quadrant pain after eating.  COMPARISON: None. TECHNIQUE: CT examination of the abdomen and pelvis with sagittal and coronal reformations was performed without intravenous contrast.  CT dose lowering techniques were used, to include: automated exposure control, adjustment for patient size, and/or use  of iterative reconstruction. Note: The exam is limited because some types of pathology may not be adequately demonstrated due to lack of contrast enhancement.    FINDINGS: ABDOMEN/PELVIS: Lower Chest:  Large left pleural effusion with compressive atelectasis of the left upper and left lower lobes. Right lung is clear. Liver: Normal. Gallbladder/Biliary: Normal. Pancreas: Normal. Spleen: Normal. Adrenal Glands: Normal. Kidneys: Normal. GI Tract: Colonic diverticulosis without diverticulitis. No bowel wall thickening or obstruction. Mesentery/Peritoneum: Normal. Vasculature: Normal. Lymph Nodes: Normal. Abdominal Wall: Normal. Bladder: Normal. Reproductive: Normal. Musculoskeletal: Normal.     1.  No acute abnormalities in the abdomen or pelvis. 2.  Partially visualized large left pleural effusion with compressive collapse of the left upper left lower lobes. Electronically signed by:  Deep Huerta M.D.  4/2/2023 5:19 AM Mountain Time    US Gallbladder    Result Date: 4/3/2023  US GALLBLADDER Date of Exam: 4/3/2023 8:19 AM EDT Indication: Right upper quadrant pain. Comparison: CT the abdomen performed on 4/2/2023. Technique: Grayscale and color Doppler ultrasound evaluation of the right upper quadrant was performed. Findings: The pancreas was not seen well. The liver parenchyma is normal. There are no focal hepatic masses. There is normal directional flow in the main portal vein and hepatic veins. The gallbladder is unremarkable. The common bile duct caliber is normal measuring 1 mm. The right kidney measures  11.1 x 4.8 x 5.4 cm. There is a small cyst in the right kidney measuring 1.3 cm. There is no hydronephrosis or echogenic shadowing stones.     Impression: 1. The pancreas was not seen well. 2. Small right renal cyst. 3. The remaining study is normal. Electronically Signed: Toby Ricarda  4/3/2023 8:53 AM EDT  Workstation ID: [id number]    XR Chest 1 View    Result Date: 4/5/2023  XR CHEST 1 VW Date of Exam: 4/5/2023 4:20 AM EDT Indication: pleural effusion. Comparison: One day prior Findings: Interval increase in hazy opacity over the entire left lung field, suggesting increased moderate pleural effusion. The right lung remains clear. There is no distinct pneumothorax. Unchanged cardiac enlargement.     Impression: Interval increase in hazy opacity over the entire left lung field, suggesting increased moderate pleural effusion. The right lung remains clear. There is no distinct pneumothorax. Unchanged cardiac enlargement. Electronically Signed: Dashawn Dover  4/5/2023 8:52 AM EDT  Workstation ID: [id number]    XR Chest 1 View    Result Date: 4/4/2023  XR CHEST 1 VW Date of Exam: 4/4/2023 5:47 AM EDT Indication: pleural effusion. Comparison: 4/3/2023 Findings: Right IJ catheter and left basilar pigtail pleural drain remain in place. There is mild persistent hazy interstitial opacity of the left mid and lower lung unchanged. No pneumothorax, significant effusion, or new pulmonary parenchymal disease is seen. Heart is mildly enlarged. Vasculature appears upper limits of normal.     Impression: Stable chest radiograph with stable residual left mid and lower lung atelectasis. No evidence of pneumothorax or other new chest disease. Electronically Signed: Larry Covington  4/4/2023 8:19 AM EDT  Workstation ID: [id number]    XR Chest 1 View    Result Date: 4/3/2023  XR CHEST 1 VW Date of Exam: 4/3/2023 6:04 PM EDT Indication: postop. Comparison: Same day Findings: Status post coronary bypass. Right IJ dialysis catheter and left basilar  pleural catheter remain in place. Heart size stable. Small residual left pleural effusion suspected. No pneumothorax. Airspace disease in the mid and lower left lung has the appearance of atelectasis. Right lung clear the sharp costophrenic angle     Impression: Minimal residual pleural effusion status post pleural catheter placement with no evidence of pneumothorax. Atelectasis present in the mid and lower left lung Electronically Signed: Gaurav Mike  4/3/2023 6:33 PM EDT  Workstation ID: [id number]    XR Chest 1 View    Result Date: 4/3/2023  XR CHEST 1 VW Date of Exam: 4/3/2023 3:22 PM EDT Indication: chest tube. Comparison: 4/3/2023 Findings: The heart size is enlarged. There are postoperative changes of sternotomy. A right-sided dialysis catheter is unchanged in position with the tip cavoatrial junction. There is a small bore pleural drain present on the left with a significant decrease in left-sided pleural fluid. There is residual atelectasis in the left lung base. There is no pneumothorax.     Impression: 1. Left-sided pleural drain remains in appropriate position. 2. Marked decrease in left-sided pleural fluid with residual atelectasis in the left lung base. Electronically Signed: Juanjo STAPLETON Nicolas  4/3/2023 4:18 PM EDT  Workstation ID: [id number]    XR Chest 1 View    Result Date: 4/3/2023  XR CHEST 1 VW Date of Exam: 4/3/2023 1:44 PM EDT Indication: s/p left chest tube placement. Comparison: 4/2/2023 Findings: Right IJ dialysis catheter is again seen with its tip at the SVC-right atrial junction. Small bore left pleural drain is now seen in the left base, apparently within the patient's large pleural effusion. There is persistent hazy opacity of the left base,  but less dense, and there is partial reexpansion of the left upper lung. No pneumothorax is seen. There is mild persistent pulmonary vascular congestion. Right lung otherwise appears clear.     Impression: 1. Left pleural drain placement, with significant  remaining left lower chest opacity, but improved overall aeration of the left upper lung. 2. No evidence of pneumothorax. 3. Persistent mild pulmonary vascular congestion. Electronically Signed: Larry Hilld  4/3/2023 2:08 PM EDT  Workstation ID: [id number]    XR Chest 1 View    Result Date: 4/2/2023  Examination: XR CHEST 1 VW Date and Time: 4/2/2023 5:51 AM Clinical Information: 67 years, Male, Comparison: Comparison is made with radiograph the chest performed on March 27, 2023. FINDINGS: Sternal wires are demonstrated. A right internal jugular dialysis catheter is demonstrated. Heart size/mediastinum/petrona: There is exaggeration of the cardiac silhouette. There is silhouetting of the left heart border on the basis of opacification of the left hemithorax. Lung fields: There is opacification involving the mid and lower left lung field suggesting consolidation and/or atelectasis with moderate to large sized pleural effusion. Findings have progressed as compared to March 27, 2023. Lung bases/Retrocardiac/Pleura:  Interstitial opacities are seen at the right upper and right lower lung field without consolidation, segmental atelectasis or notable effusion. Trachea/Paravertebral soft tissues: The trachea and paravertebral soft tissues appear normal. Upper abdomen: No free air is demonstrated. Osseous structures: The bones are normally mineralized.     1.  Opacification of the left mid/lower lung field suggests consolidation and/or atelectasis with moderate to large left-sided pleural effusion. 2.  Interstitial opacities are demonstrated suggesting mild to moderate pulmonary congestion. 3.  No pleural effusion is demonstrated on the right. Thank you for the referral of this patient. This exam was interpreted by an American Board of Radiology certified radiologist with subspecialty fellowship training. If there are any questions regarding this exam please feel free to contact a radiologist directly at [phone]. Slot [phone]  Electronically signed by:  Devante Ugarte M.D.  4/2/2023 4:32 AM Mountain Time    XR Chest 1 View    Result Date: 3/27/2023  XR CHEST 1 VW Date of Exam: 3/27/2023 4:10 PM EDT Indication: post left sided thoracentesis. Comparison: 3/27/2023 at 1130 hours Findings: There is improved aeration throughout the left lung. Residual infiltrates are seen in left mid to lower lung. There is a residual small left pleural effusion. There is no evidence for pneumothorax following thoracentesis. The right lung is clear. The cardiac silhouette and mediastinum are stable. Postoperative changes are noted. The right dialysis catheter is stable in position.     Impression: 1.Improved aeration throughout the left lung following thoracentesis. Small residual pleural effusion is noted. There our residual infiltrates in the left mid to lower lung. 2.No evidence for pneumothorax. Electronically Signed: Vincent Servin  3/27/2023 4:39 PM EDT  Workstation ID: [id number]    XR Chest 1 View    Result Date: 3/27/2023  XR CHEST 1 VW Date of Exam: 3/27/2023 11:11 AM EDT Indication: Chest Pain Triage Protocol. Comparison: March 16, 2023 Findings: The heart is enlarged. There are changes of midline sternotomy. There is a tunneled right-sided hemodialysis catheter with the tip in the superior vena cava. There is a large left pleural effusion with diffuse left lung airspace disease most likely related to atelectasis or pneumonia not excluded. There is pulmonary edema seen throughout the right lung and in the left suprahilar region.     Impression: Very large left pleural effusion with left sided airspace disease most likely compressive atelectasis with pneumonia not excluded. There is diffuse pulmonary edema. Electronically Signed: Herbert Walh  3/27/2023 11:23 AM EDT  Workstation ID: [id number]    US Tube Placement    Result Date: 4/3/2023  US TUBE PLACEMENT                                                         History: Recurrent effusion or shortness  of breath or chest pain    : Thierry Kelsey MD.                                                                             Modality: Ultrasound                                                                                          Sedation: No IV sedation. Anesthesia: Lidocaine, local infiltration. Estimated blood loss:  < 5 cc.         Technique: Discussion of risks, benefits, and alternatives were made with the patient. The patient expressed understanding and agreed to proceed. Informed written consent was obtained. A universal timeout was performed prior to starting the procedure. Standard sterile precautions were utilized. A preliminary ultrasonography was performed to assess the target and determine a safe access site. It showed pleural effusion.  Pertinent ultrasound images were secured to the PACS for documentation. An intercostal access site was selected and sterilely prepped and draped. Local anesthesia was administered. A dermatotomy was performed. Using real-time ultrasonographic guidance, 8.5 [place]an locking pigtail drainage catheter was advanced into the pleural space using Seldinger technique and the pigtail locked in position. Blood-tinged fluid was recovered. The catheter was secured to skin with nonabsorbable suture. An aseptic dressing applied. The catheter was connected to a Pleur-evac. At the end of the procedure, an aseptic dressing applied. The patient tolerated the procedure well. After recovery, the patient was discharged from the department in stable condition.           Complications: None immediate. Specimen: Nothing requested                                                                         Impression:                                                              Successful ultrasound-guided 8.5 [place]an left pleural drain placement as described above. Thank you for the opportunity to assist in the care of your patient. Electronically Signed: Thierry Kelsey  4/3/2023  "1:55 PM EDT  Workstation ID: [id number]    CT Guided Thoracentesis    Result Date: 3/27/2023  Clinical Indication: 71-year-old male, status post CABG, large left pleural effusion.  Procedure:  CT guided thoracentesis, left sided  :  Dr. Hernandez Estimated blood loss: Negligible Contrast usage: None Conscious sedation: None  Complication:  None apparent.  Technique:  The left mid-axillary region was prepped and draped in the usual/sterile fashion.  Local anesthesia with Lidocaine was performed.  Utilizing CT localization, a 6.5 [place]an drainage catheter placed into large left pleural fluid collection without difficulty.  2000 cc's of sanginous fluid was drained and sample sent for lab/culture/cytology, per the referring service.  Post procedural \"CT fluoroscopy\" reveals marked reduction in pleural effusion, without complication feature.  All catheters were removed and a sterile dressing was applied to the puncture site.  The patient tolerated the procedure well without complication.  Impression:  Technically successful CT guided left-sided thoracentesis.        Results for orders placed during the hospital encounter of 02/15/23    Duplex Carotid Ultrasound CAR    Interpretation Summary  •  Right internal carotid artery stenosis of 0-49%.  •  Left internal carotid artery stenosis of 50-69%.  •  There is antegrade flow in the vertebral arteries bilaterally.      Results for orders placed during the hospital encounter of 02/15/23    Duplex Carotid Ultrasound CAR    Interpretation Summary  •  Right internal carotid artery stenosis of 0-49%.  •  Left internal carotid artery stenosis of 50-69%.  •  There is antegrade flow in the vertebral arteries bilaterally.      Results for orders placed during the hospital encounter of 03/01/23    Adult Transthoracic Echo Complete W/ Cont if Necessary Per Protocol    Interpretation Summary  •  Left ventricular systolic function is mildly decreased. Calculated left ventricular EF = " 40.4% Left ventricular ejection fraction appears to be 41 - 45%.  •  The right ventricular cavity is dilated.  •  Left atrial volume is mildly increased.  •  There is calcification of the aortic valve.  •  Estimated right ventricular systolic pressure from tricuspid regurgitation is normal (<35 mmHg).  •  There is a left pleural effusion.      Plan for Follow-up of Pending Labs/Results: Inbox     Discharge Details        Discharge Medications      Changes to Medications      Instructions Start Date   Lantus 100 UNIT/ML injection  Generic drug: insulin glargine  What changed: additional instructions   INJECT 5 UNITS SUBCUTANEOUSLY ONCE NIGHTLY IF BLOOD GLUCOSE >200         Continue These Medications      Instructions Start Date   albuterol sulfate  (90 Base) MCG/ACT inhaler  Commonly known as: PROVENTIL HFA;VENTOLIN HFA;PROAIR HFA   2 puffs, Inhalation, Every 4 Hours PRN      amiodarone 200 MG tablet  Commonly known as: PACERONE   200 mg, Oral, Every 24 Hours Scheduled      aspirin 81 MG EC tablet   81 mg, Oral, Daily      COLLAGEN 1500/C PO   1 tablet, Oral, Daily, OTC supplement      ezetimibe 10 MG tablet  Commonly known as: ZETIA   10 mg, Oral, Daily      Farxiga 10 MG tablet  Generic drug: dapagliflozin Propanediol   10 mg, Oral, Daily      Farxiga 10 MG tablet  Generic drug: dapagliflozin Propanediol   10 mg, Oral, Daily at 1100      Fluticasone-Umeclidin-Vilant 100-62.5-25 MCG/ACT inhaler  Commonly known as: TRELEGY   1 puff, Inhalation, Daily      metoprolol succinate XL 25 MG 24 hr tablet  Commonly known as: TOPROL-XL   25 mg, Oral, Every 24 Hours Scheduled      nitroglycerin 0.4 MG SL tablet  Commonly known as: NITROSTAT   0.4 mg, Sublingual, Every 5 Minutes PRN, Take no more than 3 doses in 15 minutes.      rosuvastatin 10 MG tablet  Commonly known as: CRESTOR   10 mg, Oral, Daily         Stop These Medications    fluconazole 100 MG tablet  Commonly known as: Diflucan            Allergies   Allergen  Reactions   • Wellbutrin [Bupropion] Swelling     Facial swelling, severe         Discharge Disposition:  Home or Self Care    Diet:  Hospital:  Diet Order   Procedures   • Diet: Cardiac Diets, Diabetic Diets; Healthy Heart (2-3 Na+); Consistent Carbohydrate; Texture: Regular Texture (IDDSI 7); Fluid Consistency: Thin (IDDSI 0)       Activity:      Restrictions or Other Recommendations:       CODE STATUS:    Code Status and Medical Interventions:   Ordered at: 04/02/23 0855     Level Of Support Discussed With:    Patient     Code Status (Patient has no pulse and is not breathing):    CPR (Attempt to Resuscitate)     Medical Interventions (Patient has pulse or is breathing):    Full Support       Future Appointments   Date Time Provider Department Center   4/5/2023  3:35 PM [facility]   5/2/2023  1:00 PM Yessica Ricci DO [facility]   5/4/2023  1:00 PM ORIENTATION [facility]   5/18/2023  3:15 PM Nadya Ramirez MD [facility]   1/30/2024  2:00 PM Yessica Ricci DO [facility]       Additional Instructions for the Follow-ups that You Need to Schedule     Discharge Follow-up with PCP   As directed       Currently Documented PCP:    Yessica Ricci DO    PCP Phone Number:    [phone]     Follow Up Details: 3-5 days         Discharge Follow-up with Specified Provider: Dr Nima Peter, Endocrinology; 1 Week   As directed      To: Dr Nima Peter, Endocrinology    Follow Up: 1 Week    Follow Up Details: Please make appt prior to DC         Discharge Follow-up with Specified Provider: Dr Saavedra; 2 Weeks   As directed      To: Dr Saavedra    Follow Up: 2 Weeks         Discharge Follow-up with Specified Provider: [facility]; 2 Weeks   As directed      To: [facility]    Follow Up: 2 Weeks                     Caitlin Smith DO  04/05/23      Time Spent on Discharge:  I spent  52 minutes on this discharge activity which included:  face-to-face encounter with the patient, reviewing the data in the system, coordination of the care with the nursing staff as well as consultants, documentation, and entering orders.

## 2023-04-05 NOTE — PROGRESS NOTES
" LOS: 3 days   Patient Care Team:  Yessica Ricci DO as PCP - General (Family Medicine)  Nima Peter MD as Consulting Physician (Endocrinology)  Nadya Ramirez MD as Cardiologist (Cardiology)  Yessica Ricci DO as Consulting Physician (Family Medicine)  Nury Webber RN as Ambulatory  ([facility])    Chief Complaint: HD dependent [place]    Subjective     Stable renal function. GFR 35. Crcl 39.5. Recovered renal function. HD cath removed today     Subjective:  Symptoms:  Stable.  No shortness of breath, chest pain or chest pressure.    Diet:  Adequate intake.    Pain:  He reports no pain.        History taken from: patient    Objective     Vital Sign Min/Max for last 24 hours  Temp  Min: 97.8 °F (36.6 °C)  Max: 98 °F (36.7 °C)   BP  Min: 124/55  Max: 142/63   Pulse  Min: 60  Max: 79   Resp  Min: 16  Max: 18   SpO2  Min: 94 %  Max: 97 %   No data recorded   No data recorded     Flowsheet Rows    Flowsheet Row First Filed Value   Admission Height 162.6 cm (64\") Documented at 04/02/2023 0556   Admission Weight 66.7 kg (147 lb) Documented at 04/02/2023 0556          I/O this shift:  In: -   Out: 400 [Urine:400]  I/O last 3 completed shifts:  In: -   Out: 2185 [Urine:1950; Chest Tube:235]    Objective:  General Appearance:  Well-appearing and comfortable.    Vital signs: (most recent): Blood pressure 126/61, pulse 71, temperature 98 °F (36.7 °C), temperature source Oral, resp. rate 18, height 162.6 cm (64.02\"), weight 68.3 kg (150 lb 8 oz), SpO2 96 %.  Vital signs are normal.    Output: Producing urine.    HEENT: Normal HEENT exam.    Lungs:  Normal effort.  There are decreased breath sounds.    Heart: Normal rate.  Regular rhythm.  S1 normal and S2 normal.    Abdomen: Abdomen is soft.    Extremities: Normal range of motion.  There is no effusion, dependent edema or local swelling.    Pulses: Distal pulses are intact.    Neurological: Patient is alert and oriented to person, place and " time.    Pupils:  Pupils are equal, round, and reactive to light.              Results Review:     I reviewed the patient's new clinical results.    WBC WBC   Date Value Ref Range Status   04/04/2023 14.17 (H) 3.40 - 10.80 10*3/mm3 Final   04/03/2023 12.20 (H) 3.40 - 10.80 10*3/mm3 Final      HGB Hemoglobin   Date Value Ref Range Status   04/04/2023 9.4 (L) 13.0 - 17.7 g/dL Final   04/03/2023 9.2 (L) 13.0 - 17.7 g/dL Final      HCT Hematocrit   Date Value Ref Range Status   04/04/2023 32.2 (L) 37.5 - 51.0 % Final   04/03/2023 29.9 (L) 37.5 - 51.0 % Final      Platlets No results found for: LABPLAT   MCV MCV   Date Value Ref Range Status   04/04/2023 96.1 79.0 - 97.0 fL Final   04/03/2023 92.6 79.0 - 97.0 fL Final          Sodium Sodium   Date Value Ref Range Status   04/04/2023 141 136 - 145 mmol/L Final   04/03/2023 140 136 - 145 mmol/L Final      Potassium Potassium   Date Value Ref Range Status   04/04/2023 4.2 3.5 - 5.2 mmol/L Final   04/03/2023 3.9 3.5 - 5.2 mmol/L Final      Chloride Chloride   Date Value Ref Range Status   04/04/2023 106 98 - 107 mmol/L Final   04/03/2023 104 98 - 107 mmol/L Final      CO2 CO2   Date Value Ref Range Status   04/04/2023 24.0 22.0 - 29.0 mmol/L Final   04/03/2023 26.0 22.0 - 29.0 mmol/L Final      BUN BUN   Date Value Ref Range Status   04/04/2023 37 (H) 8 - 23 mg/dL Final   04/03/2023 24 (H) 8 - 23 mg/dL Final      Creatinine Creatinine   Date Value Ref Range Status   04/04/2023 2.03 (H) 0.76 - 1.27 mg/dL Final   04/03/2023 2.28 (H) 0.76 - 1.27 mg/dL Final      Calcium Calcium   Date Value Ref Range Status   04/04/2023 8.4 (L) 8.6 - 10.5 mg/dL Final   04/03/2023 8.2 (L) 8.6 - 10.5 mg/dL Final      PO4 No results found for: CAPO4   Albumin Albumin   Date Value Ref Range Status   04/03/2023 2.8 (L) 3.5 - 5.2 g/dL Final      Magnesium Magnesium   Date Value Ref Range Status   04/03/2023 1.9 1.6 - 2.4 mg/dL Final      Uric Acid No results found for: URICACID     Medication Review:  Yes    Assessment & Plan       I25.10, CABG 03/01/23    Hypertension    Hyperlipidemia LDL goal <70    Type 2 diabetes mellitus with hypoglycemia, with long-term current use of insulin    Ischemic cardiomyopathy    Prolonged Q-T interval on ECG      Assessment & Plan     HD depedent [place]NA: Started on HD in March 2023 following CABG surgery. [place] likely ATN. On MWF [facility] SW. Making urine.     Hypokalemia: On admission     Volume status: No significant LE edema. Did requiring thoracentesis 1 week prior to this admission      AMS: Likely due to hypoglycemia.     Recs  Stable renal function. HD cath out. Will followup in renal clinic in 4 weeks.      I discussed the patients findings and my recommendations with patient       Manjit Gomez MD  04/05/23  14:23 EDT

## 2023-04-05 NOTE — PROGRESS NOTES
CTS Progress Note       LOS: 3 days   Patient Care Team:  Yessica Ricci DO as PCP - General (Family Medicine)  Nima Peter MD as Consulting Physician (Endocrinology)  Nadya Ramirez MD as Cardiologist (Cardiology)  Yessica Ricci DO as Consulting Physician (Family Medicine)  Nury Webber, RN as Ambulatory  ([facility])    Chief Complaint: Hypoglycemia    Vital Signs:  Temp:  [97.8 °F (36.6 °C)-98.2 °F (36.8 °C)] 97.8 °F (36.6 °C)  Heart Rate:  [60-86] 72  Resp:  [16] 16  BP: (124-142)/(55-68) 124/55    Physical Exam:       Results:   Results from last 7 days   Lab Units 04/04/23  0547   WBC 10*3/mm3 14.17*   HEMOGLOBIN g/dL 9.4*   HEMATOCRIT % 32.2*   PLATELETS 10*3/mm3 316     Results from last 7 days   Lab Units 04/04/23  0547   SODIUM mmol/L 141   POTASSIUM mmol/L 4.2   CHLORIDE mmol/L 106   CO2 mmol/L 24.0   BUN mg/dL 37*   CREATININE mg/dL 2.03*   GLUCOSE mg/dL 154*   CALCIUM mg/dL 8.4*           Imaging Results (Last 24 Hours)     Procedure Component Value Units Date/Time    XR Chest 1 View [[id number]] Resulted: 04/05/23 0451     Updated: 04/05/23 0451    XR Chest 1 View [[id number]] Collected: 04/04/23 0818     Updated: 04/04/23 0822    Narrative:      XR CHEST 1 VW    Date of Exam: 4/4/2023 5:47 AM EDT    Indication: pleural effusion.    Comparison: 4/3/2023    Findings:  Right IJ catheter and left basilar pigtail pleural drain remain in place. There is mild persistent hazy interstitial opacity of the left mid and lower lung unchanged. No pneumothorax, significant effusion, or new pulmonary parenchymal disease is seen.   Heart is mildly enlarged. Vasculature appears upper limits of normal.      Impression:      Impression:  Stable chest radiograph with stable residual left mid and lower lung atelectasis. No evidence of pneumothorax or other new chest disease.    Electronically Signed: Larry Covington    4/4/2023 8:19 AM EDT    Workstation ID: [id number]          Assessment       Hypoglycemia    I25.10, CABG 03/01/23    Hypertension    Hyperlipidemia LDL goal <70    Type 2 diabetes mellitus with hypoglycemia, with long-term current use of insulin    Ischemic cardiomyopathy    Prolonged Q-T interval on ECG    Acute renal failure (ARF)        Plan   Discontinue chest tube.  May discharge home anytime from my standpoint when satisfactory with other physicians    Please note that portions of this note were completed with a voice recognition program. Efforts were made to edit the dictations, but occasionally words are mistranscribed.    Paresh Saavedra MD  04/05/23  06:27 EDT

## 2023-04-05 NOTE — CASE MANAGEMENT/SOCIAL WORK
Case Management Discharge Note      Final Note: home today with family and HH. Patient current with [facility]  for nursing PT and OT. I placed orders in [facility] for HH nursing, PT and OT. family to transport.         Selected Continued Care - Admitted Since 4/2/2023     Destination    No services have been selected for the patient.              Durable Medical Equipment    No services have been selected for the patient.              Dialysis/Infusion    No services have been selected for the patient.              Home Medical Care Coordination complete.    Service Provider Selected Services Address Phone Fax Patient Preferred    [facility] ,  Home Rehabilitation [address] [address] [phone] --          Therapy    No services have been selected for the patient.              Community Resources    No services have been selected for the patient.              Community & [facility]    No services have been selected for the patient.                Selected Continued Care - Episodes Includes continued care and service providers with selected services from the active episodes listed below    Chronic Care Management Episode start date: 3/28/2023   There are no active outsourced providers for this episode.             Selected Continued Care - Prior Encounters Includes continued care and service providers with selected services from prior encounters from 1/2/2023 to 4/5/2023    Discharged on 3/20/2023 Admission date: 3/1/2023 - Discharge disposition: Home-Health Care c    Home Medical Care     Service Provider Selected Services Address Phone Fax Patient Preferred     [facility] ,  Home Nursing ,  Home Rehabilitation [address] [address] [phone] --                         Final Discharge Disposition Code: 06 - home with home health care

## 2023-04-05 NOTE — PLAN OF CARE
Problem: Adult Inpatient Plan of Care  Goal: Absence of Hospital-Acquired Illness or Injury  Intervention: Identify and Manage Fall Risk  Description: Perform standard risk assessment on admission using a validated tool or comprehensive approach appropriate to the patient; reassess fall risk frequently, with change in status or transfer to another level of care.  Communicate fall injury risk to interprofessional healthcare team.  Determine need for increased observation, equipment and environmental modification, such as low bed, signage and supportive, nonskid footwear.  Adjust safety measures to individual developmental age, stage and identified risk factors.  Reinforce the importance of safety and physical activity with patient and family.  Perform regular intentional rounding to assess need for position change, pain assessment and personal needs, including assistance with toileting.  Recent Flowsheet Documentation  Taken 4/5/2023 0000 by Bret Henson, RN  Safety Promotion/Fall Prevention:   activity supervised   assistive device/personal items within reach   clutter free environment maintained   elopement precautions   fall prevention program maintained   nonskid shoes/slippers when out of bed   safety round/check completed  Taken 4/4/2023 2200 by Bret Henson, RN  Safety Promotion/Fall Prevention:   activity supervised   assistive device/personal items within reach   clutter free environment maintained   elopement precautions   fall prevention program maintained   nonskid shoes/slippers when out of bed   safety round/check completed  Taken 4/4/2023 2000 by Bret Henson, RN  Safety Promotion/Fall Prevention:   activity supervised   assistive device/personal items within reach   clutter free environment maintained   elopement precautions   fall prevention program maintained   nonskid shoes/slippers when out of bed   safety round/check completed  Intervention: Prevent Skin Injury  Description: Perform a screening  for skin injury risk, such as pressure or moisture associated skin damage on admission and at regular intervals throughout hospital stay.  Keep all areas of skin (especially folds) clean and dry.  Maintain adequate skin hydration.  Relieve and redistribute pressure and protect bony prominences; implement measures based on patient-specific risk factors.  Match turning and repositioning schedule to clinical condition.  Encourage weight shift frequently; assist with reposition if unable to complete independently.  Float heels off bed; avoid pressure on the Achilles tendon.  Keep skin free from extended contact with medical devices.  Encourage functional activity and mobility, as early as tolerated.  Use aids (e.g., slide boards, mechanical lift) during transfer.  Recent Flowsheet Documentation  Taken 4/5/2023 0000 by Bret Henson RN  Body Position: position changed independently  Taken 4/4/2023 2200 by Bret Henson RN  Body Position: position changed independently  Taken 4/4/2023 2000 by Bret Henson RN  Body Position: position changed independently  Intervention: Prevent and Manage VTE (Venous Thromboembolism) Risk  Description: Assess for VTE (venous thromboembolism) risk.  Encourage and assist with early ambulation.  Initiate and maintain compression or other therapy, as indicated, based on identified risk in accordance with organizational protocol and provider order.  Encourage both active and passive leg exercises while in bed, if unable to ambulate.  Recent Flowsheet Documentation  Taken 4/5/2023 0000 by Bret Henson RN  Activity Management: dorsiflexion/plantar flexion performed  Range of Motion: ROM (range of motion) performed  Taken 4/4/2023 2200 by Bret Henson RN  Activity Management: dorsiflexion/plantar flexion performed  Taken 4/4/2023 2000 by Bret Henson RN  Activity Management:   ambulated outside room   dorsiflexion/plantar flexion performed  Range of Motion: ROM (range of motion)  performed  Intervention: Prevent Infection  Description: Maintain skin and mucous membrane integrity; promote hand, oral and pulmonary hygiene.  Optimize fluid balance, nutrition, sleep and glycemic control to maximize infection resistance.  Identify potential sources of infection early to prevent or mitigate progression of infection (e.g., wound, lines, devices).  Evaluate ongoing need for invasive devices; remove promptly when no longer indicated.  Recent Flowsheet Documentation  Taken 4/4/2023 2000 by Bret Henson RN  Infection Prevention:   environmental surveillance performed   hand hygiene promoted   rest/sleep promoted   single patient room provided  Goal: Optimal Comfort and Wellbeing  Intervention: Monitor Pain and Promote Comfort  Description: Assess pain level, treatment efficacy and patient response at regular intervals using a consistent pain scale.  Consider the presence and impact of preexisting chronic pain.  Encourage patient and caregiver involvement in pain assessment, interventions and safety measures.  Recent Flowsheet Documentation  Taken 4/4/2023 1940 by Bret Henson RN  Pain Management Interventions:   quiet environment facilitated   see MAR   relaxation techniques promoted  Intervention: Provide Person-Centered Care  Description: Use a family-focused approach to care.  Develop trust and rapport by proactively providing information, encouraging questions, addressing concerns and offering reassurance.  Acknowledge emotional response to hospitalization.  Recognize and utilize personal coping strategies.  Honor spiritual and cultural preferences.  Recent Flowsheet Documentation  Taken 4/4/2023 2000 by Bret Henson RN  Trust Relationship/Rapport:   care explained   choices provided   emotional support provided   empathic listening provided   questions answered   questions encouraged   reassurance provided   thoughts/feelings acknowledged     Problem: Heart Failure Comorbidity  Goal: Maintenance  of Heart Failure Symptom Control  Intervention: Maintain Heart Failure-Management  Description: Evaluate adherence to home heart failure self-care regimen (e.g., medication, fluid balance, sodium intake, daily weight, physical activity, telemonitoring, support).  Advocate continuation of home medication and schedule.  Consider pharmacologic therapy administration time and effects (e.g., avoid giving diuretic prior to bedtime or nitrates on empty stomach).  Monitor response to pharmacologic therapy, including weight fluctuations, blood pressure and electrolyte levels.  Monitor for signs and symptoms of anxiety and depression, including severity and duration; if present, provide psychosocial support.  Consider need for heart failure clinic or palliative care consult.  Recent Flowsheet Documentation  Taken 4/4/2023 2000 by Bret Henson RN  Medication Review/Management:   medications reviewed   high-risk medications identified     Problem: Hypertension Comorbidity  Goal: Blood Pressure in Desired Range  Outcome: Ongoing, Progressing  Intervention: Maintain Blood Pressure Management  Description: Evaluate adherence to home antihypertensive regimen (e.g., exercise and activity, diet modification, medication).  Provide scheduled antihypertensive medication; consider administration time and effects (e.g., avoid giving diuretic prior to bedtime).  Monitor response to antihypertensive medication therapy (e.g., blood pressure, electrolyte levels, medication effects).  Minimize risk of orthostatic hypotension; encourage caution with position changes, particularly if elderly.  Flowsheets  Taken 4/4/2023 2000  Medication Review/Management:   medications reviewed   high-risk medications identified  Taken 4/4/2023 0036  Syncope Management: position changed slowly   Goal Outcome Evaluation:

## 2023-04-06 ENCOUNTER — TRANSITIONAL CARE MANAGEMENT TELEPHONE ENCOUNTER (OUTPATIENT)
Dept: CALL CENTER | Facility: HOSPITAL | Age: 68
End: 2023-04-06
Payer: MEDICARE

## 2023-04-06 NOTE — OUTREACH NOTE
Call Center TCM Note    Flowsheet Row Responses   [facility] patient discharged from? [place]   Does the patient have one of the following disease processes/diagnoses(primary or secondary)? Other   TCM attempt successful? Yes   Call start time 1556   Discharge diagnosis Hypoglycemia   Is patient permission given to speak with other caregiver? Yes   List who call center can speak with MEKHI RENLPS   Person spoke with today (if not patient) and relationship MEKHI ACOSTA- WIFE   Meds reviewed with patient/caregiver? Yes   Does the patient have all medications ordered at discharge? N/A   Is the patient taking all medications as directed (includes completed medication regime)? Yes   Does the patient have an appointment with their PCP within 7 days of discharge? Yes   What is the Home health agency?  [facility]   Has home health visited the patient within 72 hours of discharge? Call prior to 72 hours   Psychosocial issues? No   Did the patient receive a copy of their discharge instructions? Yes   Nursing interventions Reviewed instructions with patient   What is the patient's perception of their health status since discharge? Improving   Is the patient/caregiver able to teach back signs and symptoms related to disease process for when to call PCP? Yes   Is the patient/caregiver able to teach back signs and symptoms related to disease process for when to call 911? Yes   Is the patient/caregiver able to teach back the hierarchy of who to call/visit for symptoms/problems? PCP, Specialist, Home health nurse, Urgent Care, ED, 911 Yes   If the patient is a current smoker, are they able to teach back resources for cessation? Not a smoker   TCM call completed? Yes   Would this patient benefit from a Referral to Amb Social Work? No   Is the patient interested in additional calls from an ambulatory ?  NOTE:  applies to high risk patients requiring additional follow-up. No          Elena Barajas LPN    4/6/2023,  16:02 EDT

## 2023-04-06 NOTE — OUTREACH NOTE
Call Center TCM Note    Flowsheet Row Responses   [facility] patient discharged from? [place]   Does the patient have one of the following disease processes/diagnoses(primary or secondary)? Other   TCM attempt successful? No   Unsuccessful attempts Attempt 1          Elena Barajas LPN    4/6/2023, 10:47 EDT

## 2023-04-07 ENCOUNTER — HOME CARE VISIT (OUTPATIENT)
Dept: HOME HEALTH SERVICES | Facility: HOME HEALTHCARE | Age: 68
End: 2023-04-07
Payer: COMMERCIAL

## 2023-04-07 VITALS
DIASTOLIC BLOOD PRESSURE: 78 MMHG | SYSTOLIC BLOOD PRESSURE: 128 MMHG | OXYGEN SATURATION: 100 % | RESPIRATION RATE: 16 BRPM | HEART RATE: 64 BPM | TEMPERATURE: 97.9 F

## 2023-04-07 PROCEDURE — G0495 RN CARE TRAIN/EDU IN HH: HCPCS

## 2023-04-07 NOTE — HOME HEALTH
Resumption of Care Note:    Reason for hosp/new problems: hypoglycemia and acute renal failure    New/changed medications: lantus decreased    New/changed orders: lantus decreased    Plan/Focus of Care: disease process Diabetes mellitus, medication management and falls prevention    Plan for next visit: disease process education

## 2023-04-10 ENCOUNTER — OFFICE VISIT (OUTPATIENT)
Dept: FAMILY MEDICINE CLINIC | Facility: CLINIC | Age: 68
End: 2023-04-10
Payer: MEDICARE

## 2023-04-10 VITALS
DIASTOLIC BLOOD PRESSURE: 66 MMHG | OXYGEN SATURATION: 97 % | HEIGHT: 64 IN | BODY MASS INDEX: 24.69 KG/M2 | TEMPERATURE: 98.7 F | SYSTOLIC BLOOD PRESSURE: 114 MMHG | HEART RATE: 73 BPM | WEIGHT: 144.6 LBS

## 2023-04-10 DIAGNOSIS — I10 PRIMARY HYPERTENSION: ICD-10-CM

## 2023-04-10 DIAGNOSIS — I25.5 ISCHEMIC CARDIOMYOPATHY: ICD-10-CM

## 2023-04-10 DIAGNOSIS — I25.10 CORONARY ARTERY DISEASE INVOLVING NATIVE CORONARY ARTERY OF NATIVE HEART WITHOUT ANGINA PECTORIS: ICD-10-CM

## 2023-04-10 DIAGNOSIS — I50.22 CHRONIC SYSTOLIC (CONGESTIVE) HEART FAILURE: ICD-10-CM

## 2023-04-10 DIAGNOSIS — Z09 HOSPITAL DISCHARGE FOLLOW-UP: Primary | ICD-10-CM

## 2023-04-10 PROBLEM — D50.9 IRON DEFICIENCY ANEMIA: Status: ACTIVE | Noted: 2023-03-21

## 2023-04-10 PROBLEM — E11.22 TYPE 2 DIABETES MELLITUS WITH DIABETIC CHRONIC KIDNEY DISEASE: Status: ACTIVE | Noted: 2023-03-17

## 2023-04-10 PROBLEM — T78.2XXA ANAPHYLACTIC SHOCK, UNSPECIFIED, INITIAL ENCOUNTER: Status: ACTIVE | Noted: 2023-03-21

## 2023-04-10 PROBLEM — N25.81 SECONDARY HYPERPARATHYROIDISM OF RENAL ORIGIN: Status: ACTIVE | Noted: 2023-03-21

## 2023-04-10 PROBLEM — Z79.84 LONG TERM (CURRENT) USE OF ORAL HYPOGLYCEMIC DRUGS: Status: ACTIVE | Noted: 2023-03-17

## 2023-04-10 PROBLEM — I11.0 HYPERTENSIVE HEART DISEASE WITH HEART FAILURE: Status: ACTIVE | Noted: 2023-03-17

## 2023-04-10 PROBLEM — E78.2 MIXED HYPERLIPIDEMIA: Status: ACTIVE | Noted: 2023-03-17

## 2023-04-10 PROBLEM — E83.39 OTHER DISORDERS OF PHOSPHORUS METABOLISM: Status: ACTIVE | Noted: 2023-03-21

## 2023-04-10 PROBLEM — I25.2 OLD MYOCARDIAL INFARCTION: Status: ACTIVE | Noted: 2023-03-17

## 2023-04-10 PROBLEM — T78.40XA ALLERGY, UNSPECIFIED, INITIAL ENCOUNTER: Status: ACTIVE | Noted: 2023-03-21

## 2023-04-10 PROBLEM — Z99.2 DEPENDENCE ON RENAL DIALYSIS: Status: ACTIVE | Noted: 2023-03-17

## 2023-04-10 PROBLEM — D63.1 ANEMIA IN CHRONIC KIDNEY DISEASE: Status: ACTIVE | Noted: 2023-03-17

## 2023-04-10 PROBLEM — R11.0 NAUSEA: Status: ACTIVE | Noted: 2023-03-21

## 2023-04-10 PROBLEM — E44.0 MODERATE PROTEIN-CALORIE MALNUTRITION: Status: ACTIVE | Noted: 2023-03-21

## 2023-04-10 PROBLEM — Z11.1 ENCOUNTER FOR SCREENING FOR RESPIRATORY TUBERCULOSIS: Status: ACTIVE | Noted: 2023-03-24

## 2023-04-10 PROBLEM — N17.9 ACUTE KIDNEY FAILURE, UNSPECIFIED: Status: ACTIVE | Noted: 2023-03-21

## 2023-04-10 PROBLEM — J44.9 CHRONIC OBSTRUCTIVE PULMONARY DISEASE, UNSPECIFIED: Status: ACTIVE | Noted: 2023-03-17

## 2023-04-10 PROBLEM — N18.9 ANEMIA IN CHRONIC KIDNEY DISEASE: Status: ACTIVE | Noted: 2023-03-17

## 2023-04-10 PROBLEM — B18.2 CHRONIC VIRAL HEPATITIS C: Status: ACTIVE | Noted: 2023-03-21

## 2023-04-10 PROBLEM — R06.02 SHORTNESS OF BREATH: Status: ACTIVE | Noted: 2023-03-21

## 2023-04-10 PROBLEM — J81.0 ACUTE PULMONARY EDEMA: Status: ACTIVE | Noted: 2023-04-07

## 2023-04-10 NOTE — PROGRESS NOTES
Transitional Care Follow Up Visit  Subjective     Herbert Harley is a 67 y.o. male who presents for a transitional care management visit.    Within 48 business hours after discharge our office contacted him via telephone to coordinate his care and needs.      I reviewed and discussed the details of that call along with the discharge summary, hospital problems, inpatient lab results, inpatient diagnostic studies, and consultation reports with Herbert.     Current outpatient and discharge medications have been reconciled for the patient.  Reviewed by: Yessica Ricci DO          4/5/2023     5:53 PM   Date of TCM Phone Call   [facility]   Date of Admission 4/2/2023   Date of Discharge 4/5/2023   Discharge Disposition Home or Self Care     Risk for Readmission (LACE) Score: 14 (4/5/2023  6:00 AM)      History of Present Illness   Course During Hospital Stay:   Patient was admitted for pleural effusion.  He had initially had CABG x3 on 3/1/2023 experienced postop A-fib and renal failure.  He ultimately was admitted for left pleural effusion and underwent thoracentesis on 3/27/2023 and then had a chest tube placed on 4/3/2023 that was removed on 4/5/2023.    He reports feeling significantly better last drainage of the pleural effusion.  He has no significant shortness of breath.     The following portions of the patient's history were reviewed and updated as appropriate: allergies, current medications, past family history, past medical history, past social history, past surgical history and problem list.    Review of Systems   Constitutional: Negative for activity change, appetite change and fatigue.   Respiratory: Negative for cough, chest tightness, shortness of breath and wheezing.    Cardiovascular: Negative for chest pain, palpitations and leg swelling.   Gastrointestinal: Negative for abdominal pain, blood in stool, constipation and diarrhea.   Allergic/Immunologic: Negative for  environmental allergies.   Neurological: Negative for dizziness, facial asymmetry, light-headedness and numbness.   Psychiatric/Behavioral: Negative for agitation, hallucinations, self-injury and suicidal ideas.       Objective   Physical Exam  Constitutional:       Appearance: He is not ill-appearing.   Eyes:      Pupils: Pupils are equal, round, and reactive to light.   Cardiovascular:      Rate and Rhythm: Normal rate.      Pulses: Normal pulses.   Pulmonary:      Effort: Pulmonary effort is normal.      Breath sounds: Normal breath sounds.   Musculoskeletal:      Right lower leg: No edema.      Left lower leg: No edema.   Neurological:      General: No focal deficit present.      Mental Status: He is alert. Mental status is at baseline.   Psychiatric:         Mood and Affect: Mood normal.         Thought Content: Thought content normal.         Assessment & Plan   Diagnoses and all orders for this visit:    1. Hospital discharge follow-up (Primary)    2. I25.10, CABG 03/01/23    3. Primary hypertension    4. Ischemic cardiomyopathy    5. Chronic systolic (congestive) heart failure        Hospital records reviewed.  Patient is feeling better regarding shortness of breath.  He also was able to remove the tunneled dialysis catheter while he was inpatient due to renal function improving.  He does have follow-up with nephrology upcoming.             This document has been electronically signed by Yessica Ricci DO   April 11, 2023 12:51 EDT    Dictated Utilizing Dragon Dictation: Part of this note may be an electronic transcription/translation of spoken language to printed text using the Dragon Dictation System.    Yessica Ricci D.O.  [facility] [place]

## 2023-04-11 ENCOUNTER — OFFICE VISIT (OUTPATIENT)
Dept: ENDOCRINOLOGY | Facility: CLINIC | Age: 68
End: 2023-04-11
Payer: MEDICARE

## 2023-04-11 VITALS
BODY MASS INDEX: 24.24 KG/M2 | SYSTOLIC BLOOD PRESSURE: 112 MMHG | HEART RATE: 72 BPM | HEIGHT: 64 IN | DIASTOLIC BLOOD PRESSURE: 65 MMHG | WEIGHT: 142 LBS | OXYGEN SATURATION: 97 %

## 2023-04-11 DIAGNOSIS — E11.649 TYPE 2 DIABETES MELLITUS WITH HYPOGLYCEMIA WITHOUT COMA, WITH LONG-TERM CURRENT USE OF INSULIN: Primary | Chronic | ICD-10-CM

## 2023-04-11 DIAGNOSIS — Z79.4 TYPE 2 DIABETES MELLITUS WITH HYPOGLYCEMIA WITHOUT COMA, WITH LONG-TERM CURRENT USE OF INSULIN: Primary | Chronic | ICD-10-CM

## 2023-04-11 LAB
EXPIRATION DATE: ABNORMAL
GLUCOSE BLDC GLUCOMTR-MCNC: 234 MG/DL (ref 70–130)
Lab: ABNORMAL

## 2023-04-11 PROCEDURE — 3078F DIAST BP <80 MM HG: CPT | Performed by: PHYSICIAN ASSISTANT

## 2023-04-11 PROCEDURE — 82947 ASSAY GLUCOSE BLOOD QUANT: CPT | Performed by: PHYSICIAN ASSISTANT

## 2023-04-11 PROCEDURE — 99214 OFFICE O/P EST MOD 30 MIN: CPT | Performed by: PHYSICIAN ASSISTANT

## 2023-04-11 PROCEDURE — 3074F SYST BP LT 130 MM HG: CPT | Performed by: PHYSICIAN ASSISTANT

## 2023-04-11 RX ORDER — NAPROXEN SODIUM 220 MG
1 TABLET ORAL DAILY
COMMUNITY

## 2023-04-11 RX ORDER — INSULIN GLARGINE 100 [IU]/ML
INJECTION, SOLUTION SUBCUTANEOUS
Start: 2023-04-11

## 2023-04-11 NOTE — PROGRESS NOTES
Office Note      Date: 2023  Patient Name: Herbert Harley  MRN: [id number]  : 1955    Chief Complaint   Patient presents with   • Diabetes       History of Present Illness  Herbert Harley is a 67 y.o. male who presents today for follow up on type 2 diabetes.   Diabetes was diagnosed in .  Known diabetic complications: nephropathy and cardiovascular disease.  Current diabetic medications: Lantus, Farxiga.  Feet: No sores or issues.  Eye exam is due.  Statin: Rosuvastatin.  He also takes ezetimibe.  He underwent CABG x3 on 3/1/2023.  He had a prolonged hospitalization 3/20/2023 complicated by acute on chronic renal failure requiring dialysis, postop atrial fibrillation, and anemia.  He developed recurrent left pleural effusion.  He was admitted with severe hypoglycemia on 2023.  BG was 39.  He had been taking Lantus 60 units daily.  Renal function has improved and no longer requiring dialysis.  Testing 1-2 times per day.  Fasting readings 140-180.  He did have a reading over 300 one day 1 hour after breakfast.    Review of systems  As noted in HPI.    Past Medical History:   Diagnosis Date   • Acute renal failure (ARF) 2023   • Asthma 2022   • Chronic obstructive pulmonary disease, unspecified 2023   • Chronic systolic congestive heart failure 2023   • Coronary artery disease 2014   • Elevated cholesterol    • GERD (gastroesophageal reflux disease) 2023    Esophagitis in hospital   • History of tobacco abuse    • HLD (hyperlipidemia) 2023   • Hypertension    • Ischemic cardiomyopathy    • Myocardial infarction 2014   • Prolonged Q-T interval on ECG 2023   • Type 2 diabetes mellitus    • Wears glasses       Past Surgical History:   Procedure Laterality Date   • CARDIAC CATHETERIZATION Left 2023    Procedure: Left Heart Cath;  Surgeon: Nadya Ramirez MD;  Location: [facility] CATH INVASIVE LOCATION;  Service: Cardiology;   "Laterality: Left;   • CORONARY ARTERY BYPASS GRAFT N/A 03/01/2023    Procedure: MEDIAN STERNOTOMY CORONARY ARTERY BYPASS GRAFTING X3  UTILIZING THE LEFT INTERNAL MAMMERY GRAFT, AND EVH OF THE GREATER RIGHT SAPHENOUS VEIN;  Surgeon: Paresh Saavedra MD;  Location: [facility] beds at the [facility];  Service: Cardiothoracic;  Laterality: N/A;   • CARDIAC CATHETERIZATION  March 2014   • CORONARY ARTERY BYPASS GRAFT  3/1/2023   • CORONARY STENT PLACEMENT  March 2014   • TONSILLECTOMY       The following portions of the patient's history were reviewed and updated as appropriate: allergies, current medications, past family history, past medical history, past social history, past surgical history and problem list.    Vitals:  /65   Pulse 72   Ht 162.6 cm (64\")   Wt 64.4 kg (142 lb)   SpO2 97%   BMI 24.37 kg/m²     Physical Exam  Vitals reviewed.   Constitutional:       General: He is not in acute distress.  Neurological:      Mental Status: He is alert and oriented to person, place, and time.   Psychiatric:         Mood and Affect: Affect normal.       Labs/Imaging    Hemoglobin A1c  Lab Results   Component Value Date    HGBA1C 7.40 (H) 02/28/2023    HGBA1C 7.1 12/13/2022    HGBA1C 7.1 06/16/2022     Glucose  Lab Results   Component Value Date    POCGLU 234 (A) 04/11/2023       Current Outpatient Medications   Medication Instructions   • albuterol sulfate  (90 Base) MCG/ACT inhaler 2 puffs, Inhalation, Every 4 Hours PRN   • amiodarone (PACERONE) 200 mg, Oral, Every 24 Hours Scheduled   • aspirin 81 mg, Oral, Daily   • Collagen-Vitamin C-Biotin (COLLAGEN 1500/C PO) 1 tablet, Oral, Daily, OTC supplement   • ezetimibe (ZETIA) 10 mg, Oral, Daily   • Farxiga 10 mg, Oral, Daily   • Fluticasone-Umeclidin-Vilant (TRELEGY) 100-62.5-25 MCG/ACT inhaler 1 puff, Inhalation, Daily   • Insulin Syringe 31G X 5/16\" 0.5 ML misc 1 each, Does not apply, Daily   • Lantus 100 UNIT/ML injection Inject 5 units every morning, increase by 2 units " "every 4 days for fasting goal    • metoprolol succinate XL (TOPROL-XL) 25 mg, Oral, Every 24 Hours Scheduled   • nitroglycerin (NITROSTAT) 0.4 mg, Sublingual, Every 5 Minutes PRN, Take no more than 3 doses in 15 minutes.   • rosuvastatin (CRESTOR) 10 mg, Oral, Daily       Assessment & Plan  1. Type 2 diabetes mellitus with hypoglycemia without coma, with long-term current use of insulin  Diabetes is uncontrolled with recent severe hypoglycemic episode and hyperglycemia.  No A1c today - this would be inaccurate due to blood transfusion last month.  Encouraged nutritious dietary choices, moderation of carbohydrates and avoidance of added sugar.  Activity is limited right now.  Will be starting cardiac rehab.  Encouraged consistent meals and blood sugar monitoring to avoid hypoglycemia.  Patient Instructions   Lantus 5 units every morning, titrate by 2 units every 4 days for fasting goal   Encouraged to call as needed for help with insulin adjustments.  - POC Glucose, Blood  - Insulin Syringe 31G X 5/16\" 0.5 ML misc; 1 each Daily.       Return in about 3 months (around 7/11/2023) for next scheduled follow up. He was advised to contact the office with any interval questions or concerns.    JOSE Horner  Endocrinology  04/11/2023  "

## 2023-04-12 ENCOUNTER — HOME CARE VISIT (OUTPATIENT)
Dept: HOME HEALTH SERVICES | Facility: HOME HEALTHCARE | Age: 68
End: 2023-04-12
Payer: COMMERCIAL

## 2023-04-12 VITALS
TEMPERATURE: 97.1 F | OXYGEN SATURATION: 99 % | RESPIRATION RATE: 18 BRPM | HEART RATE: 84 BPM | DIASTOLIC BLOOD PRESSURE: 70 MMHG | SYSTOLIC BLOOD PRESSURE: 154 MMHG

## 2023-04-12 PROCEDURE — G0151 HHCP-SERV OF PT,EA 15 MIN: HCPCS

## 2023-04-12 NOTE — HOME HEALTH
PT Eval Note:  68 y/o patient returns home after recent hospitalization for hypoglycemic episode and left pleural effusion; significant PMHx includes CABG x3 on 3/1/23 and recent renal failure requiring HD--patient has experienced kidney recovery and no longer requires HD; lives in two story home with spouse; PLOF is IND with ADLs and functional mobility    Skill/education provided: PT evaluation completed with HEP established    Patient/caregiver response: Patient agreeable to PT POC    Plan for next visit: Progress activity tolerance training; DC to cardiac rehab if no further concerns    Other pertinent info: NA

## 2023-04-13 ENCOUNTER — READMISSION MANAGEMENT (OUTPATIENT)
Dept: CALL CENTER | Facility: HOSPITAL | Age: 68
End: 2023-04-13
Payer: MEDICARE

## 2023-04-13 ENCOUNTER — HOME CARE VISIT (OUTPATIENT)
Dept: HOME HEALTH SERVICES | Facility: HOME HEALTHCARE | Age: 68
End: 2023-04-13
Payer: COMMERCIAL

## 2023-04-13 VITALS
RESPIRATION RATE: 16 BRPM | TEMPERATURE: 97.1 F | HEART RATE: 61 BPM | OXYGEN SATURATION: 95 % | DIASTOLIC BLOOD PRESSURE: 69 MMHG | SYSTOLIC BLOOD PRESSURE: 124 MMHG

## 2023-04-13 PROCEDURE — G0495 RN CARE TRAIN/EDU IN HH: HCPCS

## 2023-04-13 PROCEDURE — G0152 HHCP-SERV OF OT,EA 15 MIN: HCPCS

## 2023-04-13 NOTE — HOME HEALTH
[facility] for cardiopulmonary assessment and diabetic teaching.  Patient's lungs are clear.  No SOA with exertion per patient.  Blood sugar has been stable at 130 this morning.  Patient reports concern with weight and lost 8 pounds.  Educated on starting protein shake or Glucerna. Incision to chest is healed.  CT incision has healed. OT to see patient today.  Will plan to see patient again in one week.

## 2023-04-13 NOTE — OUTREACH NOTE
Medical Week 2 Survey    Flowsheet Row Responses   [facility] patient discharged from? [facility]   Does the patient have one of the following disease processes/diagnoses(primary or secondary)? Other   Week 2 attempt successful? Yes   Call start time 0953   Call end time 0959   Person spoke with today (if not patient) and relationship MEKHI ACOSTA- WIFE   Meds reviewed with patient/caregiver? Yes   Is the patient having any side effects they believe may be caused by any medication additions or changes? No   Does the patient have all medications ordered at discharge? N/A   Is the patient taking all medications as directed (includes completed medication regime)? Yes   Comments regarding appointments Pt has multiple appts on schedule and compliant with f/u   Does the patient have a primary care provider?  Yes   Has the patient kept scheduled appointments due by today? Yes   What is the Home health agency?  [facility]   Home health comments HH continues to follow with pt   Psychosocial issues? No   Did the patient receive a copy of their discharge instructions? Yes   Nursing interventions Reviewed instructions with patient   What is the patient's perception of their health status since discharge? Improving  [Wife reports pt is improving--he is no longer on HD, working with OT and will soon start OP PT.  Incisions healing w/o issues.  Pt  with change in insulin.  Wife seems confident with care, HH following also.]   Is the patient/caregiver able to teach back signs and symptoms related to disease process for when to call PCP? Yes   Is the patient/caregiver able to teach back signs and symptoms related to disease process for when to call 911? Yes   Week 2 Call Completed? Yes          JOAN VALLADARES - Registered Nurse

## 2023-04-14 VITALS — DIASTOLIC BLOOD PRESSURE: 70 MMHG | OXYGEN SATURATION: 98 % | HEART RATE: 62 BPM | SYSTOLIC BLOOD PRESSURE: 133 MMHG

## 2023-04-14 NOTE — HOME HEALTH
Pt was seen today for an OT evaluation and recommendations only.  No further OT needs at this time.  Pt is Mod I with all ADL tasks and all functional mobility/transfers.  Pt is also I with daily follow through of HEP.  Pt/wife in agreement with plan.

## 2023-04-20 ENCOUNTER — HOME CARE VISIT (OUTPATIENT)
Dept: HOME HEALTH SERVICES | Facility: HOME HEALTHCARE | Age: 68
End: 2023-04-20
Payer: COMMERCIAL

## 2023-04-20 VITALS
OXYGEN SATURATION: 96 % | SYSTOLIC BLOOD PRESSURE: 124 MMHG | DIASTOLIC BLOOD PRESSURE: 64 MMHG | TEMPERATURE: 96.5 F | RESPIRATION RATE: 14 BRPM | HEART RATE: 64 BPM

## 2023-04-20 VITALS
HEART RATE: 82 BPM | DIASTOLIC BLOOD PRESSURE: 74 MMHG | SYSTOLIC BLOOD PRESSURE: 130 MMHG | TEMPERATURE: 97.1 F | OXYGEN SATURATION: 99 % | RESPIRATION RATE: 18 BRPM

## 2023-04-20 PROCEDURE — G0300 HHS/HOSPICE OF LPN EA 15 MIN: HCPCS

## 2023-04-20 PROCEDURE — G0151 HHCP-SERV OF PT,EA 15 MIN: HCPCS

## 2023-04-20 NOTE — HOME HEALTH
Routine Visit Note: LPN    Skill/education provided: diabetic teaching , medication education and review. Pt reports 1 low BG 35 911 cld and BG brought up. Pt checks BG each morning before meals. His insulin was increased to 7 units of Lantus in the AM. No skin issues noted, Pt /spouse are now aware of s/s to watch for if having a hypo/hyper glycemic episode.     Patient/caregiver response: Pt will continue to check BG in AM and take insulin as ordred, Pt will check feet daily for any skin issues and report any issues to PCP.    Plan for next visit: Disease process    Other pertinent info:

## 2023-04-24 ENCOUNTER — HOME CARE VISIT (OUTPATIENT)
Dept: HOME HEALTH SERVICES | Facility: HOME HEALTHCARE | Age: 68
End: 2023-04-24
Payer: COMMERCIAL

## 2023-04-24 VITALS — SYSTOLIC BLOOD PRESSURE: 125 MMHG | OXYGEN SATURATION: 96 % | DIASTOLIC BLOOD PRESSURE: 67 MMHG | HEART RATE: 62 BPM

## 2023-04-24 PROCEDURE — G0495 RN CARE TRAIN/EDU IN HH: HCPCS

## 2023-04-24 NOTE — Clinical Note
Pt discharged from  services to care of self/spouse in his home in stable conditon effective 4.24.23 d/t goals met. Teaching complete and pt independent wiith care.

## 2023-05-02 ENCOUNTER — OFFICE VISIT (OUTPATIENT)
Dept: FAMILY MEDICINE CLINIC | Facility: CLINIC | Age: 68
End: 2023-05-02
Payer: MEDICARE

## 2023-05-02 VITALS
BODY MASS INDEX: 25.13 KG/M2 | SYSTOLIC BLOOD PRESSURE: 130 MMHG | HEIGHT: 64 IN | DIASTOLIC BLOOD PRESSURE: 60 MMHG | HEART RATE: 59 BPM | TEMPERATURE: 98.6 F | WEIGHT: 147.2 LBS | OXYGEN SATURATION: 96 %

## 2023-05-02 DIAGNOSIS — J45.41 MODERATE PERSISTENT ASTHMA WITH ACUTE EXACERBATION: ICD-10-CM

## 2023-05-02 DIAGNOSIS — I50.22 CHRONIC SYSTOLIC (CONGESTIVE) HEART FAILURE: Primary | ICD-10-CM

## 2023-05-02 PROBLEM — Z49.31 AFTERCARE INCLUDING INTERMITTENT DIALYSIS: Status: ACTIVE | Noted: 2023-03-27

## 2023-05-02 RX ORDER — DOXYCYCLINE 100 MG/1
100 TABLET ORAL 2 TIMES DAILY
Qty: 20 TABLET | Refills: 0 | Status: SHIPPED | OUTPATIENT
Start: 2023-05-02 | End: 2023-05-12

## 2023-05-02 RX ORDER — METHYLPREDNISOLONE 4 MG/1
TABLET ORAL
Qty: 21 TABLET | Refills: 0 | Status: SHIPPED | OUTPATIENT
Start: 2023-05-02 | End: 2023-05-08

## 2023-05-02 NOTE — PROGRESS NOTES
"      Subjective     Chief Complaint  Cough (Has been coughing for several weeks, slightly productive and can be deep at times)    Subjective          Herbert Harley is a 67 y.o. male who presents today to [facility] for follow up.    HPI:   History of Present Illness     Mr. Triana is a very pleasant 67-year-old male who presents today to follow-up.  He has recently had significant number of hospital stays with having CABG on 3/1/2023 and subsequently having a complicated hospital stay.  He was readmitted on 3/27 for pleural effusion and chest tube placed at that time.    Today, he reports cough for several weeks that is productive and can be deep at times.    The following portions of the patient's history were reviewed and updated as appropriate: allergies, current medications, past family history, past medical history, past social history, past surgical history and problem list.    Objective     Objective     Allergy:   Allergies   Allergen Reactions   • Wellbutrin [Bupropion] Swelling     Facial swelling, severe        Current Medications:   Current Outpatient Medications   Medication Sig Dispense Refill   • albuterol sulfate  (90 Base) MCG/ACT inhaler Inhale 2 puffs Every 4 (Four) Hours As Needed for Wheezing. 18 g 2   • amiodarone (PACERONE) 200 MG tablet Take 1 tablet by mouth Daily for 30 days. 30 tablet 1   • aspirin 81 MG EC tablet Take 1 tablet by mouth Daily for 90 days. 90 tablet 0   • Collagen-Vitamin C-Biotin (COLLAGEN 1500/C PO) Take 1 tablet by mouth Daily. OTC supplement     • dapagliflozin Propanediol (Farxiga) 10 MG tablet Take 10 mg by mouth Daily. Indications: Type 2 Diabetes     • ezetimibe (ZETIA) 10 MG tablet Take 1 tablet by mouth Daily for 30 days. 30 tablet 11   • Fluticasone-Umeclidin-Vilant (TRELEGY) 100-62.5-25 MCG/ACT inhaler Inhale 1 puff Daily. 1 each 5   • Insulin Syringe 31G X 5/16\" 0.5 ML misc 1 each Daily.     • Lantus 100 UNIT/ML " injection Inject 5 units every morning, increase by 2 units every 4 days for fasting goal  (Patient taking differently: Inject 7 units every morning, increase by 2 units every 4 days for fasting goal )     • metoprolol succinate XL (TOPROL-XL) 25 MG 24 hr tablet Take 1 tablet by mouth Daily for 30 days. 30 tablet 1   • rosuvastatin (CRESTOR) 10 MG tablet Take 1 tablet by mouth Daily. 30 tablet 11   • doxycycline (ADOXA) 100 MG tablet Take 1 tablet by mouth 2 (Two) Times a Day for 10 days. 20 tablet 0   • methylPREDNISolone (MEDROL) 4 MG dose pack Take as directed on package instructions. 21 tablet 0   • nitroglycerin (NITROSTAT) 0.4 MG SL tablet Place 1 tablet under the tongue Every 5 (Five) Minutes As Needed for Chest Pain for up to 25 days. Take no more than 3 doses in 15 minutes. 25 tablet 3     No current facility-administered medications for this visit.       Past Medical History:  Past Medical History:   Diagnosis Date   • Acute renal failure (ARF) 03/02/2023   • Asthma December 2022   • Chronic obstructive pulmonary disease, unspecified 03/17/2023   • Chronic systolic congestive heart failure 03/01/2023   • Coronary artery disease March 2014   • Elevated cholesterol    • GERD (gastroesophageal reflux disease) March 2023    Esophagitis in hospital   • History of tobacco abuse    • HLD (hyperlipidemia) 01/26/2023   • Hypertension    • Ischemic cardiomyopathy    • Myocardial infarction March 2014   • Prolonged Q-T interval on ECG 03/01/2023   • Type 2 diabetes mellitus    • Wears glasses        Past Surgical History:  Past Surgical History:   Procedure Laterality Date   • CARDIAC CATHETERIZATION  March 2014   • CARDIAC CATHETERIZATION Left 02/17/2023    Procedure: Left Heart Cath;  Surgeon: Nadya Ramirez MD;  Location: [facility] CATH INVASIVE LOCATION;  Service: Cardiology;  Laterality: Left;   • CORONARY ARTERY BYPASS GRAFT N/A 03/01/2023    Procedure: MEDIAN STERNOTOMY CORONARY ARTERY BYPASS  "GRAFTING X3  UTILIZING THE LEFT INTERNAL MAMMERY GRAFT, AND EVH OF THE GREATER RIGHT SAPHENOUS VEIN;  Surgeon: Paresh Saavedra MD;  Location: [facility];  Service: Cardiothoracic;  Laterality: N/A;   • CORONARY ARTERY BYPASS GRAFT  3/1/2023   • CORONARY STENT PLACEMENT  2014   • TONSILLECTOMY         Social History:  Social History     Socioeconomic History   • Marital status:    Tobacco Use   • Smoking status: Former     Packs/day: 1.50     Years: 52.00     Pack years: 78.00     Types: Cigarettes     Start date: 1970     Quit date: 2022     Years since quittin.3     Passive exposure: Past   • Smokeless tobacco: Never   Vaping Use   • Vaping Use: Never used   Substance and Sexual Activity   • Alcohol use: Not Currently   • Drug use: Never   • Sexual activity: Not Currently       Family History:  Family History   Problem Relation Age of Onset   • Diabetes Mother    • Cancer Father         liver   • Brain cancer Sister    • Heart attack Brother    • Prostate cancer Brother          Vital Signs:   /60   Pulse 59   Temp 98.6 °F (37 °C) (Infrared)   Ht 162.6 cm (64.02\")   Wt 66.8 kg (147 lb 3.2 oz)   SpO2 96%   BMI 25.25 kg/m²      Physical Exam:  Physical Exam  Constitutional:       Appearance: He is not ill-appearing.   Eyes:      Pupils: Pupils are equal, round, and reactive to light.   Cardiovascular:      Rate and Rhythm: Normal rate.      Pulses: Normal pulses.   Pulmonary:      Effort: Pulmonary effort is normal.      Breath sounds: Wheezing present.   Neurological:      General: No focal deficit present.      Mental Status: He is alert. Mental status is at baseline.   Psychiatric:         Mood and Affect: Mood normal.         Thought Content: Thought content normal.         Judgment: Judgment normal.               PHQ-9 Score  PHQ-9 Total Score:       Lab Review  Office Visit on 2023   Component Date Value Ref Range Status   • Glucose 2023 234 (A)  70 - 130 " mg/dL Final   • Lot Number 04/11/2023 [id number]   Final   • Expiration Date 04/11/2023 10/28/23   Final   Admission on 04/02/2023, Discharged on 04/05/2023   Component Date Value Ref Range Status   • QT Interval 04/02/2023 394  ms Final   • QTC Interval 04/02/2023 437  ms Final   • Glucose 04/02/2023 78  65 - 99 mg/dL Final   • BUN 04/02/2023 17  8 - 23 mg/dL Final   • Creatinine 04/02/2023 2.08 (H)  0.76 - 1.27 mg/dL Final   • Sodium 04/02/2023 142  136 - 145 mmol/L Final   • Potassium 04/02/2023 3.1 (L)  3.5 - 5.2 mmol/L Final   • Chloride 04/02/2023 102  98 - 107 mmol/L Final   • CO2 04/02/2023 29.0  22.0 - 29.0 mmol/L Final   • Calcium 04/02/2023 8.8  8.6 - 10.5 mg/dL Final   • Total Protein 04/02/2023 6.8  6.0 - 8.5 g/dL Final   • Albumin 04/02/2023 3.3 (L)  3.5 - 5.2 g/dL Final   • ALT (SGPT) 04/02/2023 49 (H)  1 - 41 U/L Final   • AST (SGOT) 04/02/2023 38  1 - 40 U/L Final   • Alkaline Phosphatase 04/02/2023 140 (H)  39 - 117 U/L Final   • Total Bilirubin 04/02/2023 0.2  0.0 - 1.2 mg/dL Final   • Globulin 04/02/2023 3.5  gm/dL Final    Calculated Result   • A/G Ratio 04/02/2023 0.9  g/dL Final   • BUN/Creatinine Ratio 04/02/2023 8.2  7.0 - 25.0 Final   • Anion Gap 04/02/2023 11.0  5.0 - 15.0 mmol/L Final   • eGFR 04/02/2023 34.3 (L)  >60.0 mL/min/1.73 Final   • HS Troponin T 04/02/2023 115 (C)  <15 ng/L Final   • Magnesium 04/02/2023 2.0  1.6 - 2.4 mg/dL Final   • Color, UA 04/02/2023 Yellow  Yellow, Straw Final   • Appearance, UA 04/02/2023 Clear  Clear Final   • pH, UA 04/02/2023 8.0  5.0 - 8.0 Final   • Specific Gravity, UA 04/02/2023 1.017  1.001 - 1.030 Final   • Glucose, UA 04/02/2023 >=1000 mg/dL (3+) (A)  Negative Final   • Ketones, UA 04/02/2023 Negative  Negative Final   • Bilirubin, UA 04/02/2023 Negative  Negative Final   • Blood, UA 04/02/2023 Negative  Negative Final   • Protein, UA 04/02/2023 30 mg/dL (1+) (A)  Negative Final   • Leuk Esterase, UA 04/02/2023 Negative  Negative Final   •  Nitrite, UA 04/02/2023 Negative  Negative Final   • Urobilinogen, UA 04/02/2023 1.0 E.U./dL  0.2 - 1.0 E.U./dL Final   • Extra Tube 04/02/2023 Hold for add-ons.   Final    Auto resulted.   • Extra Tube 04/02/2023 hold for add-on   Final    Auto resulted   • Extra Tube 04/02/2023 Hold for add-ons.   Final    Auto resulted.   • Extra Tube 04/02/2023 Hold for add-ons.   Final    Auto resulted.   • Extra Tube 04/02/2023 Hold for add-ons.   Final    Auto resulted   • WBC 04/02/2023 9.96  3.40 - 10.80 10*3/mm3 Final   • RBC 04/02/2023 3.28 (L)  4.14 - 5.80 10*6/mm3 Final   • Hemoglobin 04/02/2023 9.3 (L)  13.0 - 17.7 g/dL Final   • Hematocrit 04/02/2023 30.9 (L)  37.5 - 51.0 % Final   • MCV 04/02/2023 94.2  79.0 - 97.0 fL Final   • MCH 04/02/2023 28.4  26.6 - 33.0 pg Final   • MCHC 04/02/2023 30.1 (L)  31.5 - 35.7 g/dL Final   • RDW 04/02/2023 14.4  12.3 - 15.4 % Final   • RDW-SD 04/02/2023 48.9  37.0 - 54.0 fl Final   • MPV 04/02/2023 9.3  6.0 - 12.0 fL Final   • Platelets 04/02/2023 276  140 - 450 10*3/mm3 Final   • Neutrophil % 04/02/2023 71.0  42.7 - 76.0 % Final   • Lymphocyte % 04/02/2023 13.8 (L)  19.6 - 45.3 % Final   • Monocyte % 04/02/2023 8.9  5.0 - 12.0 % Final   • Eosinophil % 04/02/2023 4.6  0.3 - 6.2 % Final   • Basophil % 04/02/2023 1.2  0.0 - 1.5 % Final   • Immature Grans % 04/02/2023 0.5  0.0 - 0.5 % Final   • Neutrophils, Absolute 04/02/2023 7.07 (H)  1.70 - 7.00 10*3/mm3 Final   • Lymphocytes, Absolute 04/02/2023 1.37  0.70 - 3.10 10*3/mm3 Final   • Monocytes, Absolute 04/02/2023 0.89  0.10 - 0.90 10*3/mm3 Final   • Eosinophils, Absolute 04/02/2023 0.46 (H)  0.00 - 0.40 10*3/mm3 Final   • Basophils, Absolute 04/02/2023 0.12  0.00 - 0.20 10*3/mm3 Final   • Immature Grans, Absolute 04/02/2023 0.05  0.00 - 0.05 10*3/mm3 Final   • nRBC 04/02/2023 0.0  0.0 - 0.2 /100 WBC Final   • Glucose 04/02/2023 43 (C)  70 - 130 mg/dL Final    Physician Notified Meter: [id number] : [id number] Shiva Anthony    • RBC, UA 04/02/2023 0-2  None Seen, 0-2 /HPF Final   • WBC, UA 04/02/2023 0-2  None Seen, 0-2 /HPF Final   • Bacteria, UA 04/02/2023 None Seen  None Seen, Trace /HPF Final   • Squamous Epithelial Cells, UA 04/02/2023 0-2  None Seen, 0-2 /HPF Final   • Hyaline Casts, UA 04/02/2023 0-6  0 - 6 /LPF Final   • Methodology 04/02/2023 Automated Microscopy   Final   • Glucose 04/02/2023 91  70 - 130 mg/dL Final    Meter: [id number] : [id number] Cole Whitney   • Sed Rate 04/02/2023 41 (H)  0 - 20 mm/hr Final   • Glucose 04/02/2023 117  70 - 130 mg/dL Final    Meter: [id number] : [id number] Alexander Hoskins   • Protime 04/02/2023 14.0  11.4 - 14.4 Seconds Final   • INR 04/02/2023 1.08  0.84 - 1.13 Final   • Glucose 04/02/2023 125  70 - 130 mg/dL Final    Meter: [id number] : [id number] Alexander Hoskins   • Creatinine, 24H 04/03/2023 1.18  1.00 - 2.40 g/24 hr Final   • Creatinine, Urine 04/03/2023 71.5  mg/dL Final   • 24H Urine Volume 04/03/2023 1,650  mL Final   • Time (Hours) 04/03/2023 24  hrs Final   • Glucose 04/02/2023 216 (H)  70 - 130 mg/dL Final    Meter: [id number] : [id number] Alexander Hoskins   • Glucose 04/02/2023 234 (H)  70 - 130 mg/dL Final    Meter: [id number] : [id number] Jada Ren   • Glucose 04/03/2023 124 (H)  65 - 99 mg/dL Final   • BUN 04/03/2023 24 (H)  8 - 23 mg/dL Final   • Creatinine 04/03/2023 2.28 (H)  0.76 - 1.27 mg/dL Final   • Sodium 04/03/2023 140  136 - 145 mmol/L Final   • Potassium 04/03/2023 3.9  3.5 - 5.2 mmol/L Final   • Chloride 04/03/2023 104  98 - 107 mmol/L Final   • CO2 04/03/2023 26.0  22.0 - 29.0 mmol/L Final   • Calcium 04/03/2023 8.2 (L)  8.6 - 10.5 mg/dL Final   • Total Protein 04/03/2023 5.6 (L)  6.0 - 8.5 g/dL Final   • Albumin 04/03/2023 2.8 (L)  3.5 - 5.2 g/dL Final   • ALT (SGPT) 04/03/2023 39  1 - 41 U/L Final   • AST (SGOT) 04/03/2023 31  1 - 40 U/L Final   • Alkaline Phosphatase 04/03/2023 132 (H)  39 - 117 U/L Final   • Total  Bilirubin 04/03/2023 0.2  0.0 - 1.2 mg/dL Final   • Globulin 04/03/2023 2.8  gm/dL Final    Calculated Result   • A/G Ratio 04/03/2023 1.0  g/dL Final   • BUN/Creatinine Ratio 04/03/2023 10.5  7.0 - 25.0 Final   • Anion Gap 04/03/2023 10.0  5.0 - 15.0 mmol/L Final   • eGFR 04/03/2023 30.7 (L)  >60.0 mL/min/1.73 Final   • WBC 04/03/2023 12.20 (H)  3.40 - 10.80 10*3/mm3 Final   • RBC 04/03/2023 3.23 (L)  4.14 - 5.80 10*6/mm3 Final   • Hemoglobin 04/03/2023 9.2 (L)  13.0 - 17.7 g/dL Final   • Hematocrit 04/03/2023 29.9 (L)  37.5 - 51.0 % Final   • MCV 04/03/2023 92.6  79.0 - 97.0 fL Final   • MCH 04/03/2023 28.5  26.6 - 33.0 pg Final   • MCHC 04/03/2023 30.8 (L)  31.5 - 35.7 g/dL Final   • RDW 04/03/2023 14.6  12.3 - 15.4 % Final   • RDW-SD 04/03/2023 49.2  37.0 - 54.0 fl Final   • MPV 04/03/2023 9.3  6.0 - 12.0 fL Final   • Platelets 04/03/2023 298  140 - 450 10*3/mm3 Final   • Magnesium 04/03/2023 1.9  1.6 - 2.4 mg/dL Final   • Phosphorus 04/03/2023 4.1  2.5 - 4.5 mg/dL Final   • Glucose 04/02/2023 162 (H)  70 - 130 mg/dL Final    Meter: [id number] : [id number] Trevor Townsend   • Glucose 04/03/2023 133 (H)  70 - 130 mg/dL Final    Meter: [id number] : [id number] Trevor Townsend   • Glucose 04/03/2023 128  70 - 130 mg/dL Final    Meter: [id number] : [id number] Trevor Townsend   • Glucose 04/03/2023 184 (H)  70 - 130 mg/dL Final    Meter: [id number] : [id number] Fareed Jesika   • Glucose 04/03/2023 196 (H)  70 - 130 mg/dL Final    Meter: [id number] : [id number] Trevor Townsend   • Glucose 04/04/2023 154 (H)  65 - 99 mg/dL Final   • BUN 04/04/2023 37 (H)  8 - 23 mg/dL Final   • Creatinine 04/04/2023 2.03 (H)  0.76 - 1.27 mg/dL Final   • Sodium 04/04/2023 141  136 - 145 mmol/L Final   • Potassium 04/04/2023 4.2  3.5 - 5.2 mmol/L Final   • Chloride 04/04/2023 106  98 - 107 mmol/L Final   • CO2 04/04/2023 24.0  22.0 - 29.0 mmol/L Final   • Calcium 04/04/2023 8.4 (L)  8.6 - 10.5 mg/dL Final   • BUN/Creatinine  Ratio 04/04/2023 18.2  7.0 - 25.0 Final   • Anion Gap 04/04/2023 11.0  5.0 - 15.0 mmol/L Final   • eGFR 04/04/2023 35.3 (L)  >60.0 mL/min/1.73 Final   • WBC 04/04/2023 14.17 (H)  3.40 - 10.80 10*3/mm3 Final   • RBC 04/04/2023 3.35 (L)  4.14 - 5.80 10*6/mm3 Final   • Hemoglobin 04/04/2023 9.4 (L)  13.0 - 17.7 g/dL Final   • Hematocrit 04/04/2023 32.2 (L)  37.5 - 51.0 % Final   • MCV 04/04/2023 96.1  79.0 - 97.0 fL Final   • MCH 04/04/2023 28.1  26.6 - 33.0 pg Final   • MCHC 04/04/2023 29.2 (L)  31.5 - 35.7 g/dL Final   • RDW 04/04/2023 14.8  12.3 - 15.4 % Final   • RDW-SD 04/04/2023 51.8  37.0 - 54.0 fl Final   • MPV 04/04/2023 9.3  6.0 - 12.0 fL Final   • Platelets 04/04/2023 316  140 - 450 10*3/mm3 Final   • Neutrophil % 04/04/2023 77.7 (H)  42.7 - 76.0 % Final   • Lymphocyte % 04/04/2023 10.0 (L)  19.6 - 45.3 % Final   • Monocyte % 04/04/2023 5.2  5.0 - 12.0 % Final   • Eosinophil % 04/04/2023 5.9  0.3 - 6.2 % Final   • Basophil % 04/04/2023 0.6  0.0 - 1.5 % Final   • Immature Grans % 04/04/2023 0.6 (H)  0.0 - 0.5 % Final   • Neutrophils, Absolute 04/04/2023 11.01 (H)  1.70 - 7.00 10*3/mm3 Final   • Lymphocytes, Absolute 04/04/2023 1.41  0.70 - 3.10 10*3/mm3 Final   • Monocytes, Absolute 04/04/2023 0.74  0.10 - 0.90 10*3/mm3 Final   • Eosinophils, Absolute 04/04/2023 0.84 (H)  0.00 - 0.40 10*3/mm3 Final   • Basophils, Absolute 04/04/2023 0.08  0.00 - 0.20 10*3/mm3 Final   • Immature Grans, Absolute 04/04/2023 0.09 (H)  0.00 - 0.05 10*3/mm3 Final   • nRBC 04/04/2023 0.0  0.0 - 0.2 /100 WBC Final   • Procalcitonin 04/04/2023 0.20  0.00 - 0.25 ng/mL Final   • Glucose 04/04/2023 157 (H)  70 - 130 mg/dL Final    Meter: [id number] : [id number] Mendel Lee   • Glucose 04/04/2023 214 (H)  70 - 130 mg/dL Final    Meter: [id number] : [id number] Vanda Gutierres   • Glucose 04/04/2023 180 (H)  70 - 130 mg/dL Final    Meter: [id number] : [id number] Vanda Gutierres   • Glucose 04/04/2023 190 (H)  70 - 130 mg/dL Final     Meter: [id number] : [id number] Pedro Flowers   • Glucose 04/05/2023 138 (H)  70 - 130 mg/dL Final    Meter: [id number] : [id number] Alexander Aidee   • Glucose 04/05/2023 211 (H)  70 - 130 mg/dL Final    Meter: [id number] : [id number] Alexander Hoskins   Admission on 03/27/2023, Discharged on 03/27/2023   Component Date Value Ref Range Status   • QT Interval 03/27/2023 372  ms Final   • QTC Interval 03/27/2023 432  ms Final   • QT Interval 03/27/2023 422  ms Final   • QTC Interval 03/27/2023 431  ms Final   • HS Troponin T 03/27/2023 101 (C)  <15 ng/L Final   • Glucose 03/27/2023 240 (H)  65 - 99 mg/dL Final   • BUN 03/27/2023 25 (H)  8 - 23 mg/dL Final   • Creatinine 03/27/2023 2.90 (H)  0.76 - 1.27 mg/dL Final   • Sodium 03/27/2023 141  136 - 145 mmol/L Final   • Potassium 03/27/2023 4.4  3.5 - 5.2 mmol/L Final    Slight hemolysis detected by analyzer. Results may be affected.   • Chloride 03/27/2023 103  98 - 107 mmol/L Final   • CO2 03/27/2023 28.0  22.0 - 29.0 mmol/L Final   • Calcium 03/27/2023 8.6  8.6 - 10.5 mg/dL Final   • Total Protein 03/27/2023 6.0  6.0 - 8.5 g/dL Final   • Albumin 03/27/2023 2.9 (L)  3.5 - 5.2 g/dL Final   • ALT (SGPT) 03/27/2023 24  1 - 41 U/L Final   • AST (SGOT) 03/27/2023 27  1 - 40 U/L Final   • Alkaline Phosphatase 03/27/2023 149 (H)  39 - 117 U/L Final   • Total Bilirubin 03/27/2023 0.2  0.0 - 1.2 mg/dL Final   • Globulin 03/27/2023 3.1  gm/dL Final    Calculated Result   • A/G Ratio 03/27/2023 0.9  g/dL Final   • BUN/Creatinine Ratio 03/27/2023 8.6  7.0 - 25.0 Final   • Anion Gap 03/27/2023 10.0  5.0 - 15.0 mmol/L Final   • eGFR 03/27/2023 23.0 (L)  >60.0 mL/min/1.73 Final   • Lipase 03/27/2023 41  13 - 60 U/L Final   • proBNP 03/27/2023 14,026.0 (H)  0.0 - 900.0 pg/mL Final   • Extra Tube 03/27/2023 Hold for add-ons.   Final    Auto resulted.   • Extra Tube 03/27/2023 hold for add-on   Final    Auto resulted   • Extra Tube 03/27/2023 Hold for add-ons.   Final     Auto resulted.   • Extra Tube 03/27/2023 Hold for add-ons.   Final    Auto resulted.   • Extra Tube 03/27/2023 Hold for add-ons.   Final    Auto resulted   • WBC 03/27/2023 10.59  3.40 - 10.80 10*3/mm3 Final   • RBC 03/27/2023 3.00 (L)  4.14 - 5.80 10*6/mm3 Final   • Hemoglobin 03/27/2023 8.6 (L)  13.0 - 17.7 g/dL Final   • Hematocrit 03/27/2023 29.2 (L)  37.5 - 51.0 % Final   • MCV 03/27/2023 97.3 (H)  79.0 - 97.0 fL Final   • MCH 03/27/2023 28.7  26.6 - 33.0 pg Final   • MCHC 03/27/2023 29.5 (L)  31.5 - 35.7 g/dL Final   • RDW 03/27/2023 15.9 (H)  12.3 - 15.4 % Final   • RDW-SD 03/27/2023 56.5 (H)  37.0 - 54.0 fl Final   • MPV 03/27/2023 10.1  6.0 - 12.0 fL Final   • Platelets 03/27/2023 162  140 - 450 10*3/mm3 Final   • Neutrophil % 03/27/2023 70.3  42.7 - 76.0 % Final   • Lymphocyte % 03/27/2023 12.7 (L)  19.6 - 45.3 % Final   • Monocyte % 03/27/2023 8.4  5.0 - 12.0 % Final   • Eosinophil % 03/27/2023 6.9 (H)  0.3 - 6.2 % Final   • Basophil % 03/27/2023 1.0  0.0 - 1.5 % Final   • Immature Grans % 03/27/2023 0.7 (H)  0.0 - 0.5 % Final   • Neutrophils, Absolute 03/27/2023 7.44 (H)  1.70 - 7.00 10*3/mm3 Final   • Lymphocytes, Absolute 03/27/2023 1.35  0.70 - 3.10 10*3/mm3 Final   • Monocytes, Absolute 03/27/2023 0.89  0.10 - 0.90 10*3/mm3 Final   • Eosinophils, Absolute 03/27/2023 0.73 (H)  0.00 - 0.40 10*3/mm3 Final   • Basophils, Absolute 03/27/2023 0.11  0.00 - 0.20 10*3/mm3 Final   • Immature Grans, Absolute 03/27/2023 0.07 (H)  0.00 - 0.05 10*3/mm3 Final   • nRBC 03/27/2023 0.0  0.0 - 0.2 /100 WBC Final   • HS Troponin T 03/27/2023 98 (C)  <15 ng/L Final   • Troponin T Delta 03/27/2023 -3  >=-4 - <+4 ng/L Final   Office Visit on 03/23/2023   Component Date Value Ref Range Status   • Wound Culture 03/23/2023 Moderate growth (3+) Candida albicans (A)   Final   • Gram Stain 03/23/2023 Few (2+) WBCs seen   Final   • Gram Stain 03/23/2023 Few (2+) Yeast   Final   No results displayed because visit has over 200  results.      Pre-Admission Testing on 02/28/2023   Component Date Value Ref Range Status   • Cotinine 02/28/2023 Negative  Tdrssa=337 ng/mL Final   • Drug Screen Comment: 02/28/2023 Comment   Final    This analysis is performed by immunoassay. Positive findings are  unconfirmed analytical test results; if results do not support  expected clinical finding, confirmation by an alternate methodology is  recommended. Patient metabolic variables, specific drug chemistry, and  specimen characteristics can affect test outcome.  Technical consultation is available at [email or web address], or call  toll free [phone].   • Glucose 02/28/2023 91  65 - 99 mg/dL Final   • BUN 02/28/2023 18  8 - 23 mg/dL Final   • Creatinine 02/28/2023 1.03  0.76 - 1.27 mg/dL Final   • Sodium 02/28/2023 139  136 - 145 mmol/L Final   • Potassium 02/28/2023 4.2  3.5 - 5.2 mmol/L Final    Slight hemolysis detected by analyzer. Results may be affected.   • Chloride 02/28/2023 103  98 - 107 mmol/L Final   • CO2 02/28/2023 28.0  22.0 - 29.0 mmol/L Final   • Calcium 02/28/2023 9.1  8.6 - 10.5 mg/dL Final   • Total Protein 02/28/2023 7.5  6.0 - 8.5 g/dL Final   • Albumin 02/28/2023 4.5  3.5 - 5.2 g/dL Final   • ALT (SGPT) 02/28/2023 32  1 - 41 U/L Final   • AST (SGOT) 02/28/2023 22  1 - 40 U/L Final   • Alkaline Phosphatase 02/28/2023 92  39 - 117 U/L Final   • Total Bilirubin 02/28/2023 0.6  0.0 - 1.2 mg/dL Final   • Globulin 02/28/2023 3.0  gm/dL Final    Calculated Result   • A/G Ratio 02/28/2023 1.5  g/dL Final   • BUN/Creatinine Ratio 02/28/2023 17.5  7.0 - 25.0 Final   • Anion Gap 02/28/2023 8.0  5.0 - 15.0 mmol/L Final   • eGFR 02/28/2023 79.6  >60.0 mL/min/1.73 Final   • Magnesium 02/28/2023 2.3  1.6 - 2.4 mg/dL Final   • Hemoglobin A1C 02/28/2023 7.40 (H)  4.80 - 5.60 % Final   • PTT 02/28/2023 23.4  22.0 - 39.0 seconds Final   • Protime 02/28/2023 13.2  11.4 - 14.4 Seconds Final   • INR 02/28/2023 1.01  0.84 - 1.13 Final   • P2Y12  Reactivity Unit 02/28/2023 137  PRU Final   • THC, Screen, Urine 02/28/2023 Negative  Negative Final   • Phencyclidine (PCP), Urine 02/28/2023 Negative  Negative Final   • Cocaine Screen, Urine 02/28/2023 Negative  Negative Final   • Methamphetamine, Ur 02/28/2023 Negative  Negative Final   • Opiate Screen 02/28/2023 Negative  Negative Final   • Amphetamine Screen, Urine 02/28/2023 Negative  Negative Final   • Benzodiazepine Screen, Urine 02/28/2023 Negative  Negative Final   • Tricyclic Antidepressants Screen 02/28/2023 Negative  Negative Final   • Methadone Screen, Urine 02/28/2023 Negative  Negative Final   • Barbiturates Screen, Urine 02/28/2023 Negative  Negative Final   • Oxycodone Screen, Urine 02/28/2023 Negative  Negative Final   • Propoxyphene Screen 02/28/2023 Negative  Negative Final   • Buprenorphine, Screen, Urine 02/28/2023 Negative  Negative Final   • ABO Type 02/28/2023 O   Final   • RH type 02/28/2023 Positive   Final   • Antibody Screen 02/28/2023 Negative   Final   • T&S Expiration Date 02/28/2023 3/3/2023 11:59:59 PM   Final   • WBC 02/28/2023 8.26  3.40 - 10.80 10*3/mm3 Final   • RBC 02/28/2023 5.78  4.14 - 5.80 10*6/mm3 Final   • Hemoglobin 02/28/2023 17.0  13.0 - 17.7 g/dL Final   • Hematocrit 02/28/2023 51.5 (H)  37.5 - 51.0 % Final   • MCV 02/28/2023 89.1  79.0 - 97.0 fL Final   • MCH 02/28/2023 29.4  26.6 - 33.0 pg Final   • MCHC 02/28/2023 33.0  31.5 - 35.7 g/dL Final   • RDW 02/28/2023 13.0  12.3 - 15.4 % Final   • RDW-SD 02/28/2023 42.5  37.0 - 54.0 fl Final   • MPV 02/28/2023 10.1  6.0 - 12.0 fL Final   • Platelets 02/28/2023 194  140 - 450 10*3/mm3 Final   • Neutrophil % 02/28/2023 61.2  42.7 - 76.0 % Final   • Lymphocyte % 02/28/2023 19.6  19.6 - 45.3 % Final   • Monocyte % 02/28/2023 11.9  5.0 - 12.0 % Final   • Eosinophil % 02/28/2023 5.8  0.3 - 6.2 % Final   • Basophil % 02/28/2023 1.1  0.0 - 1.5 % Final   • Immature Grans % 02/28/2023 0.4  0.0 - 0.5 % Final   • Neutrophils,  Absolute 02/28/2023 5.06  1.70 - 7.00 10*3/mm3 Final   • Lymphocytes, Absolute 02/28/2023 1.62  0.70 - 3.10 10*3/mm3 Final   • Monocytes, Absolute 02/28/2023 0.98 (H)  0.10 - 0.90 10*3/mm3 Final   • Eosinophils, Absolute 02/28/2023 0.48 (H)  0.00 - 0.40 10*3/mm3 Final   • Basophils, Absolute 02/28/2023 0.09  0.00 - 0.20 10*3/mm3 Final   • Immature Grans, Absolute 02/28/2023 0.03  0.00 - 0.05 10*3/mm3 Final   • nRBC 02/28/2023 0.0  0.0 - 0.2 /100 WBC Final   Admission on 02/17/2023, Discharged on 02/17/2023   Component Date Value Ref Range Status   • WBC 02/17/2023 8.52  3.40 - 10.80 10*3/mm3 Final   • RBC 02/17/2023 5.73  4.14 - 5.80 10*6/mm3 Final   • Hemoglobin 02/17/2023 16.9  13.0 - 17.7 g/dL Final   • Hematocrit 02/17/2023 51.7 (H)  37.5 - 51.0 % Final   • MCV 02/17/2023 90.2  79.0 - 97.0 fL Final   • MCH 02/17/2023 29.5  26.6 - 33.0 pg Final   • MCHC 02/17/2023 32.7  31.5 - 35.7 g/dL Final   • RDW 02/17/2023 13.4  12.3 - 15.4 % Final   • RDW-SD 02/17/2023 44.7  37.0 - 54.0 fl Final   • MPV 02/17/2023 10.4  6.0 - 12.0 fL Final   • Platelets 02/17/2023 189  140 - 450 10*3/mm3 Final   • Glucose 02/17/2023 83  65 - 99 mg/dL Final   • BUN 02/17/2023 15  8 - 23 mg/dL Final   • Creatinine 02/17/2023 1.06  0.76 - 1.27 mg/dL Final   • Sodium 02/17/2023 142  136 - 145 mmol/L Final   • Potassium 02/17/2023 4.4  3.5 - 5.2 mmol/L Final   • Chloride 02/17/2023 110 (H)  98 - 107 mmol/L Final   • CO2 02/17/2023 25.0  22.0 - 29.0 mmol/L Final   • Calcium 02/17/2023 8.6  8.6 - 10.5 mg/dL Final   • Total Protein 02/17/2023 6.7  6.0 - 8.5 g/dL Final   • Albumin 02/17/2023 3.9  3.5 - 5.2 g/dL Final   • ALT (SGPT) 02/17/2023 28  1 - 41 U/L Final   • AST (SGOT) 02/17/2023 22  1 - 40 U/L Final   • Alkaline Phosphatase 02/17/2023 75  39 - 117 U/L Final   • Total Bilirubin 02/17/2023 0.4  0.0 - 1.2 mg/dL Final   • Globulin 02/17/2023 2.8  gm/dL Final    Calculated Result   • A/G Ratio 02/17/2023 1.4  g/dL Final   • BUN/Creatinine Ratio  02/17/2023 14.2  7.0 - 25.0 Final   • Anion Gap 02/17/2023 7.0  5.0 - 15.0 mmol/L Final   • eGFR 02/17/2023 76.9  >60.0 mL/min/1.73 Final   • Total Cholesterol 02/17/2023 80  0 - 200 mg/dL Final   • Triglycerides 02/17/2023 60  0 - 150 mg/dL Final   • HDL Cholesterol 02/17/2023 37 (L)  40 - 60 mg/dL Final   • LDL Cholesterol  02/17/2023 29  0 - 100 mg/dL Final   • VLDL Cholesterol 02/17/2023 14  5 - 40 mg/dL Final   • LDL/HDL Ratio 02/17/2023 0.84   Final   • Creatinine 02/17/2023 1.10  0.60 - 1.30 mg/dL Final    Serial Number: [id number]:  [id number]   Hospital Outpatient Visit on 02/15/2023   Component Date Value Ref Range Status   • Target HR (85%) 02/15/2023 130  bpm Final   • Max. Pred. HR (100%) 02/15/2023 153  bpm Final   • BH CV REST NUCLEAR ISOTOPE DOSE 02/15/2023 9.9  mCi Final   • BH CV STRESS PROTOCOL 1 02/15/2023 Shine   Final   • Stage 1 02/15/2023 1   Final   • Duration Min Stage 1 02/15/2023 3   Final   • Duration Sec Stage 1 02/15/2023 0   Final   • Grade Stage 1 02/15/2023 10   Final   • Speed Stage 1 02/15/2023 1.7   Final   • BH CV STRESS METS STAGE 1 02/15/2023 5   Final   • HR Stage 1 02/15/2023 104   Final   • BP Stage 1 02/15/2023 126/66   Final   • O2 Stage 1 02/15/2023 99   Final   • Stage 2 02/15/2023 2   Final   • BP Stage 2 02/15/2023 130/70   Final   • O2 Stage 2 02/15/2023 98   Final   • Duration Min Stage 2 02/15/2023 3   Final   • Duration Sec Stage 2 02/15/2023 0   Final   • Grade Stage 2 02/15/2023 12   Final   • Speed Stage 2 02/15/2023 2.5   Final   • BH CV STRESS METS STAGE 2 02/15/2023 7.5   Final   • Stage 3 02/15/2023 3   Final   • HR Stage 3 02/15/2023 134   Final   • Duration Min Stage 3 02/15/2023 1   Final   • Duration Sec Stage 3 02/15/2023 13   Final   • Grade Stage 3 02/15/2023 14   Final   • Speed Stage 3 02/15/2023 3.4   Final   • BH CV STRESS METS STAGE 3 02/15/2023 10.0   Final   • Baseline HR 02/15/2023 52  bpm Final   • Baseline BP 02/15/2023 114/66  mmHg Final    • O2 sat rest 02/15/2023 94  % Final   • Peak HR 02/15/2023 136  bpm Final   • Percent Max Pred HR 02/15/2023 88.89  % Final   • Percent Target HR 02/15/2023 105  % Final   • Peak BP 02/15/2023 130/70  mmHg Final   • Recovery HR 02/15/2023 82  bpm Final   • Recovery BP 02/15/2023 124/62  mmHg Final   • Recovery O2 02/15/2023 99  % Final   • Exercise duration (min) 02/15/2023 7  min Final   • Exercise duration (sec) 02/15/2023 13  sec Final   • Estimated workload 02/15/2023 7.6  METS Final   • BH CV STRESS NUCLEAR ISOTOPE DOSE 02/15/2023 32.1  mCi Final   • Nuc Stress EF 02/15/2023 42  % Final   Hospital Outpatient Visit on 02/15/2023   Component Date Value Ref Range Status   • Target HR (85%) 02/15/2023 130  bpm Final   • Max. Pred. HR (100%) 02/15/2023 153  bpm Final   • Prox CCA PSV 02/15/2023 177.0  cm/sec Final   • Right Mid CCA PSV 02/15/2023 144.00  cm/sec Final   • right Mid CCA EDV 02/15/2023 26.00  cm/sec Final   • Dist CCA PSV 02/15/2023 142.00  cm/sec Final   • Dist CCA EDV 02/15/2023 23.00  cm/sec Final   • Prox ICA PSV 02/15/2023 62.1  cm/sec Final   • Prox ICA EDV 02/15/2023 16.2  cm/sec Final   • Mid ICA PSV 02/15/2023 132.00  cm/sec Final   • Mid ICA EDV 02/15/2023 19.00  cm/sec Final   • Dist ICA PSV 02/15/2023 93.00  cm/sec Final   • Dist ICA EDV 02/15/2023 20.00  cm/sec Final   • Prox ECA PSV 02/15/2023 226.00  cm/sec Final   • Vertebral A PSV 02/15/2023 67.0  cm/sec Final   • Vertebral A EDV 02/15/2023 14.8  cm/sec Final   • Prox SCLA PSV 02/15/2023 224.0  cm/sec Final   • Prox CCA PSV 02/15/2023 114.0  cm/sec Final   • Prox CCA EDV 02/15/2023 12.6  cm/sec Final   • left Mid CCA PSV 02/15/2023 102.0  cm/sec Final   • left Mid CCA EDV 02/15/2023 15.4  cm/sec Final   • Dist CCA PSV 02/15/2023 127.00  cm/sec Final   • Dist CCA EDV 02/15/2023 13.00  cm/sec Final   • Prox ICA PSV 02/15/2023 213.0  cm/sec Final   • Prox ICA EDV 02/15/2023 27.4  cm/sec Final   • Mid ICA PSV 02/15/2023 159.00  cm/sec  Final   • Mid ICA EDV 02/15/2023 32.00  cm/sec Final   • Dist ICA PSV 02/15/2023 131.00  cm/sec Final   • Dist ICA EDV 02/15/2023 17.00  cm/sec Final   • Prox ECA PSV 02/15/2023 158.00  cm/sec Final   • Prox ECA EDV 02/15/2023 13.00  cm/sec Final   • Vertebral A PSV 02/15/2023 58.00  cm/sec Final   • Vertebral A EDV 02/15/2023 13.00  cm/sec Final   • Prox SCLA PSV 02/15/2023 283.0  cm/sec Final   • ICA/CCA ratio 02/15/2023 0.93   Final   • ICA/CCA ratio 02/15/2023 2.09   Final   • Right arm BP 02/15/2023 108/57  mmHg Final   • Left arm BP 02/15/2023 115/61  mmHg Final        Radiology Results  US Gallbladder    Result Date: 4/3/2023  Impression: Impression: 1. The pancreas was not seen well. 2. Small right renal cyst. 3. The remaining study is normal. Electronically Signed: Toby Chowdary  4/3/2023 8:53 AM EDT  Workstation ID: [id number]    XR Chest 1 View    Result Date: 4/5/2023  Impression: Impression: Interval increase in hazy opacity over the entire left lung field, suggesting increased moderate pleural effusion. The right lung remains clear. There is no distinct pneumothorax. Unchanged cardiac enlargement. Electronically Signed: Dashawn Dover  4/5/2023 8:52 AM EDT  Workstation ID: [id number]    XR Chest 1 View    Result Date: 4/4/2023  Impression: Impression: Stable chest radiograph with stable residual left mid and lower lung atelectasis. No evidence of pneumothorax or other new chest disease. Electronically Signed: Larry Covington  4/4/2023 8:19 AM EDT  Workstation ID: [id number]    XR Chest 1 View    Result Date: 4/3/2023  Impression: Impression: Minimal residual pleural effusion status post pleural catheter placement with no evidence of pneumothorax. Atelectasis present in the mid and lower left lung Electronically Signed: Gaurav Mike  4/3/2023 6:33 PM EDT  Workstation ID: [id number]    XR Chest 1 View    Result Date: 4/3/2023  Impression: Impression: 1. Left-sided pleural drain remains in appropriate position. 2. Marked  decrease in left-sided pleural fluid with residual atelectasis in the left lung base. Electronically Signed: Juanjo Valenzuela  4/3/2023 4:18 PM EDT  Workstation ID: [id number]    XR Chest 1 View    Result Date: 4/3/2023  Impression: Impression: 1. Left pleural drain placement, with significant remaining left lower chest opacity, but improved overall aeration of the left upper lung. 2. No evidence of pneumothorax. 3. Persistent mild pulmonary vascular congestion. Electronically Signed: Larry Covington  4/3/2023 2:08 PM EDT  Workstation ID: [id number]    US Tube Placement    Result Date: 4/3/2023  Impression: Impression:                                                              Successful ultrasound-guided 8.5 [place] left pleural drain placement as described above. Thank you for the opportunity to assist in the care of your patient. Electronically Signed: Thierry Kelsey  4/3/2023 1:55 PM EDT  Workstation ID: [id number]    IR Removal Tunnel CV Cath Without Port    Result Date: 4/5/2023  Impression: Impression:                                                              Successful removal of a right IJ route tunneled dialysis catheter as described above. Thank you for the opportunity to assist in the care of your patient. Electronically Signed: Thierry Low  4/5/2023 2:43 PM EDT  Workstation ID: [id number]       Assessment / Plan         Assessment and Plan   Diagnoses and all orders for this visit:    1. Chronic systolic (congestive) heart failure (Primary)  -     XR Chest PA & Lateral (In Office)    2. Moderate persistent asthma with acute exacerbation  -     XR Chest PA & Lateral (In Office)  -     methylPREDNISolone (MEDROL) 4 MG dose pack; Take as directed on package instructions.  Dispense: 21 tablet; Refill: 0  -     doxycycline (ADOXA) 100 MG tablet; Take 1 tablet by mouth 2 (Two) Times a Day for 10 days.  Dispense: 20 tablet; Refill: 0        - Rx Medrol dose chey and Doxycyline   - Chest xray in office today   - Continue  Albuterol prn     - xray to rule out pleural effusion      Discussed possible differential diagnoses, testing, treatment, recommended non-pharmacological interventions, risks, warning signs to monitor for that would indicate need for follow-up in clinic or ER. If no improvement with these regimens or you have new or worsening symptoms follow-up. Patient verbalizes understanding and agreement with plan of care. Denies further needs or concerns.     Patient was given instructions and counseling regarding her condition and for health maintenance advised.    BMI is >= 25 and <30. (Overweight) The following options were offered after discussion;: weight loss educational material (shared in after visit summary)           Health Maintenance  Health Maintenance:   Health Maintenance Due   Topic Date Due   • DIABETIC FOOT EXAM  12/14/2022        Meds ordered during this visit  New Medications Ordered This Visit   Medications   • methylPREDNISolone (MEDROL) 4 MG dose pack     Sig: Take as directed on package instructions.     Dispense:  21 tablet     Refill:  0   • doxycycline (ADOXA) 100 MG tablet     Sig: Take 1 tablet by mouth 2 (Two) Times a Day for 10 days.     Dispense:  20 tablet     Refill:  0       Meds stopped during this visit:  There are no discontinued medications.     Visit Diagnoses    ICD-10-CM ICD-9-CM   1. Chronic systolic (congestive) heart failure  I50.22 428.22     428.0   2. Moderate persistent asthma with acute exacerbation  J45.41 493.92       Patient was given instructions and counseling regarding his condition or for health maintenance advice. Please see specific information pulled into the AVS if appropriate.     Follow Up   Return in about 3 months (around 8/2/2023) for Recheck.          This document has been electronically signed by Yessica Ricci DO   May 2, 2023 13:29 EDT    Dictated Utilizing Dragon Dictation: Part of this note may be an electronic transcription/translation of spoken language  to printed text using the Dragon Dictation System.    Yessica Ricci D.O.  [facility] [place]

## 2023-05-03 ENCOUNTER — TELEPHONE (OUTPATIENT)
Dept: CARDIAC REHAB | Facility: HOSPITAL | Age: 68
End: 2023-05-03
Payer: MEDICARE

## 2023-05-03 RX ORDER — CEFDINIR 300 MG/1
300 CAPSULE ORAL 2 TIMES DAILY
Qty: 20 CAPSULE | Refills: 0 | Status: SHIPPED | OUTPATIENT
Start: 2023-05-03

## 2023-05-03 NOTE — TELEPHONE ENCOUNTER
Pt. has been diagnosed with pneumonia. Pt will call to reschedule his cardiac rehab session when feeling better.

## 2023-05-05 ENCOUNTER — PATIENT OUTREACH (OUTPATIENT)
Dept: CASE MANAGEMENT | Facility: OTHER | Age: 68
End: 2023-05-05
Payer: MEDICARE

## 2023-05-05 DIAGNOSIS — I50.21 ACUTE SYSTOLIC HEART FAILURE: Primary | ICD-10-CM

## 2023-05-05 DIAGNOSIS — I10 PRIMARY HYPERTENSION: ICD-10-CM

## 2023-05-05 NOTE — OUTREACH NOTE
"AMBULATORY CASE MANAGEMENT NOTE    Name and Relationship of Patient/Support Person: Herbert Harley \"Herb\" - Self    CCM Interim Update      RN-ACM follow up outreach call to patient. Spoke with patient wife, Vivian. Patient wife states that patient is home and had a rough night last night. Patient was up coughing and didn't get much rest. Patient wife states patient is taking mucinex to help with cough. Patient wife also states he has [facility] that comes to their home to see him. RN-ACM encouraged patient wife to call and have [facility] see patient today if possible. Wife verbalizes understanding and will call today. Patient has follow up with cardiology scheduled for Monday 5/8/23. Patient wifehas no questions at this time. RN-ACM advised patient to call RN-ACM or [facility] with any needs.     Follow up outreach 2-3 weeks.       Education Documentation  Provider Follow-Up, taught by Nury Webber RN at 5/5/2023 10:43 AM.  Learner: Family  Readiness: Acceptance  Method: Explanation  Response: Verbalizes Understanding          Nury COOPER  Ambulatory Case Management    5/5/2023, 10:45 EDT  "

## 2023-05-06 ENCOUNTER — DOCUMENTATION (OUTPATIENT)
Dept: FAMILY MEDICINE CLINIC | Facility: CLINIC | Age: 68
End: 2023-05-06
Payer: MEDICARE

## 2023-05-06 NOTE — PROGRESS NOTES
Spoke with patient's wife over the phone. She says patient's glucose was around 600, it is now between 450-500. He was diagnosed with pneumonia and moderate pleural effusion this week on xray. Last kidney function elevated on bmp. Given recent decreased kidney function, infection, pleural effusion with large spike in glucose have recommended for him to go to the ER for stat labs and monitoring.

## 2023-05-08 ENCOUNTER — OFFICE VISIT (OUTPATIENT)
Dept: CARDIAC SURGERY | Facility: CLINIC | Age: 68
End: 2023-05-08
Payer: MEDICARE

## 2023-05-08 ENCOUNTER — TELEPHONE (OUTPATIENT)
Dept: ENDOCRINOLOGY | Facility: CLINIC | Age: 68
End: 2023-05-08
Payer: MEDICARE

## 2023-05-08 VITALS
BODY MASS INDEX: 24.75 KG/M2 | TEMPERATURE: 98 F | HEIGHT: 64 IN | SYSTOLIC BLOOD PRESSURE: 110 MMHG | HEART RATE: 51 BPM | OXYGEN SATURATION: 98 % | WEIGHT: 145 LBS | DIASTOLIC BLOOD PRESSURE: 40 MMHG

## 2023-05-08 DIAGNOSIS — I25.10 CORONARY ARTERY DISEASE INVOLVING NATIVE CORONARY ARTERY OF NATIVE HEART WITHOUT ANGINA PECTORIS: ICD-10-CM

## 2023-05-08 DIAGNOSIS — Z95.1 S/P CABG X 3: Primary | ICD-10-CM

## 2023-05-08 PROBLEM — N17.9 ACUTE KIDNEY FAILURE, UNSPECIFIED: Status: RESOLVED | Noted: 2023-03-21 | Resolved: 2023-05-08

## 2023-05-08 PROBLEM — J41.1 MUCOPURULENT CHRONIC BRONCHITIS: Status: RESOLVED | Noted: 2023-03-01 | Resolved: 2023-05-08

## 2023-05-08 PROBLEM — I50.21 ACUTE SYSTOLIC HEART FAILURE: Status: RESOLVED | Noted: 2023-03-20 | Resolved: 2023-05-08

## 2023-05-08 PROBLEM — I25.2 OLD MYOCARDIAL INFARCTION: Status: RESOLVED | Noted: 2023-03-17 | Resolved: 2023-05-08

## 2023-05-08 PROBLEM — R06.02 SHORTNESS OF BREATH: Status: RESOLVED | Noted: 2023-03-21 | Resolved: 2023-05-08

## 2023-05-08 PROBLEM — R57.9 SHOCK: Status: RESOLVED | Noted: 2023-03-02 | Resolved: 2023-05-08

## 2023-05-08 PROBLEM — J96.01 ACUTE RESPIRATORY FAILURE WITH HYPOXIA: Status: RESOLVED | Noted: 2023-03-03 | Resolved: 2023-05-08

## 2023-05-08 PROBLEM — E83.39 OTHER DISORDERS OF PHOSPHORUS METABOLISM: Status: RESOLVED | Noted: 2023-03-21 | Resolved: 2023-05-08

## 2023-05-08 PROBLEM — J81.0 ACUTE PULMONARY EDEMA: Status: RESOLVED | Noted: 2023-04-07 | Resolved: 2023-05-08

## 2023-05-08 PROBLEM — T78.2XXA ANAPHYLACTIC SHOCK, UNSPECIFIED, INITIAL ENCOUNTER: Status: RESOLVED | Noted: 2023-03-21 | Resolved: 2023-05-08

## 2023-05-08 PROBLEM — R94.39 POSITIVE CARDIAC STRESS TEST: Status: RESOLVED | Noted: 2023-02-15 | Resolved: 2023-05-08

## 2023-05-08 RX ORDER — POTASSIUM CHLORIDE 20 MEQ/1
20 TABLET, EXTENDED RELEASE ORAL DAILY
Qty: 7 TABLET | Refills: 0 | Status: SHIPPED | OUTPATIENT
Start: 2023-05-08 | End: 2024-05-07

## 2023-05-08 RX ORDER — FUROSEMIDE 40 MG/1
40 TABLET ORAL DAILY
Qty: 7 TABLET | Refills: 0 | Status: SHIPPED | OUTPATIENT
Start: 2023-05-08 | End: 2024-05-07

## 2023-05-08 NOTE — PROGRESS NOTES
[facility] Cardiothoracic Surgery Office Follow Up Note    Date of Encounter: 2023     Name: Herbert Harley  : 1955     Referred By: No ref. provider found  PCP: Yessica Ricci DO    Chief Complaint:    Chief Complaint   Patient presents with   • Post-op Follow-up     Hosp D/C CABG x 3 on 3/1/23-CAD       Subjective      History of Present Illness:    It was nice to see Herbert Harley in follow up.  He is a pleasant 67 y.o. male with PMH significant for hypertension, hyperlipidemia on statin therapy, insulin-dependent type 2 diabetes mellitus (A1c 7.4%), former smoker, myocardial infarction, and multivessel coronary artery disease.      Patient is s/p CABG x3 utilizing CAMPOS/RSVG by Dr. Saavedra on 3/1/2023.  See op report for full details.  Postoperative phase was somewhat complicated.  Patient experienced acute on chronic renal failure (creatinine peaked to 5.82) and required hemodialysis.  He received 1 unit PRBC for anemia.  Patient experience atrial fibrillation and was managed with amiodarone protocol.  On postop day #19 (3/20/2023) patient patient met required criteria and was deemed appropriate for discharge home.  On 3/27 patient was seen in the emergency department and was found to have a left pleural effusion.  He had a thoracentesis by IR and was sent home.  On 2023 patient presented to [facility] emergency department with altered mental status.  Patient was found to have a blood glucose of 39.  He was given IV dextrose and his mental status returned to baseline once he was normoglycemic.  Please see discharge summary for further details.  Patient was discharged home on 2023.    Mr. Patel presents to office today accompanied by his wife for initial postop follow-up at 10 weeks.  Patient is now overall doing well.  He denies fever or chills.  Patient denies popping, clicking, or rubbing sensation of the sternum.  He has followed up with his PCP and  cardiology.  Patient was diagnosed with pneumonia and and left pleural effusion last week by his PCP.  He was treated with antibiotic course along with steroids.  Steroids were discontinued due to hyperglycemia.  Reports medication compliance.    Review of Systems:  Review of Systems   Constitutional: Positive for malaise/fatigue and weight loss. Negative for chills, decreased appetite, diaphoresis, fever, night sweats and weight gain.   HENT: Negative for hoarse voice.    Eyes: Negative for blurred vision, double vision and visual disturbance.   Cardiovascular: Positive for chest pain. Negative for claudication, dyspnea on exertion, irregular heartbeat, leg swelling, near-syncope, orthopnea, palpitations, paroxysmal nocturnal dyspnea and syncope.   Respiratory: Positive for cough and wheezing. Negative for hemoptysis, shortness of breath and sputum production.    Hematologic/Lymphatic: Negative for adenopathy and bleeding problem. Does not bruise/bleed easily.   Skin: Negative for color change, nail changes, poor wound healing and rash.   Musculoskeletal: Negative for back pain, falls and muscle cramps.   Gastrointestinal: Negative for abdominal pain, dysphagia and heartburn.   Genitourinary: Negative for flank pain.   Neurological: Positive for dizziness and light-headedness. Negative for brief paralysis, disturbances in coordination, focal weakness, headaches, loss of balance, numbness, paresthesias, sensory change, vertigo and weakness.   Psychiatric/Behavioral: Negative for depression and suicidal ideas.   Allergic/Immunologic: Negative for persistent infections.       I have reviewed the following portions of the patient's history: allergies, current medications, past family history, past medical history, past social history, past surgical history and problem list and confirm it's accurate.    Allergies:  Allergies   Allergen Reactions   • Wellbutrin [Bupropion] Swelling     Facial swelling, severe  "      Medications:      Current Outpatient Medications:   •  albuterol sulfate  (90 Base) MCG/ACT inhaler, Inhale 2 puffs Every 4 (Four) Hours As Needed for Wheezing., Disp: 18 g, Rfl: 2  •  amiodarone (PACERONE) 200 MG tablet, Take 1 tablet by mouth Daily for 30 days., Disp: 30 tablet, Rfl: 1  •  aspirin 81 MG EC tablet, Take 1 tablet by mouth Daily for 90 days., Disp: 90 tablet, Rfl: 0  •  cefdinir (OMNICEF) 300 MG capsule, Take 1 capsule by mouth 2 (Two) Times a Day., Disp: 20 capsule, Rfl: 0  •  Collagen-Vitamin C-Biotin (COLLAGEN 1500/C PO), Take 1 tablet by mouth Daily. OTC supplement, Disp: , Rfl:   •  dapagliflozin Propanediol (Farxiga) 10 MG tablet, Take 10 mg by mouth Daily. Indications: Type 2 Diabetes, Disp: , Rfl:   •  doxycycline (ADOXA) 100 MG tablet, Take 1 tablet by mouth 2 (Two) Times a Day for 10 days., Disp: 20 tablet, Rfl: 0  •  ezetimibe (ZETIA) 10 MG tablet, Take 1 tablet by mouth Daily for 30 days., Disp: 30 tablet, Rfl: 11  •  Fluticasone-Umeclidin-Vilant (TRELEGY) 100-62.5-25 MCG/ACT inhaler, Inhale 1 puff Daily., Disp: 1 each, Rfl: 5  •  Insulin Syringe 31G X 5/16\" 0.5 ML misc, 1 each Daily., Disp: , Rfl:   •  Lantus 100 UNIT/ML injection, Inject 5 units every morning, increase by 2 units every 4 days for fasting goal  (Patient taking differently: Inject 7 units every morning, increase by 2 units every 4 days for fasting goal ), Disp: , Rfl:   •  metoprolol succinate XL (TOPROL-XL) 25 MG 24 hr tablet, Take 1 tablet by mouth Daily for 30 days., Disp: 30 tablet, Rfl: 1  •  rosuvastatin (CRESTOR) 10 MG tablet, Take 1 tablet by mouth Daily., Disp: 30 tablet, Rfl: 11  •  methylPREDNISolone (MEDROL) 4 MG dose pack, Take as directed on package instructions. (Patient not taking: Reported on 5/8/2023), Disp: 21 tablet, Rfl: 0  •  nitroglycerin (NITROSTAT) 0.4 MG SL tablet, Place 1 tablet under the tongue Every 5 (Five) Minutes As Needed for Chest Pain for up to 25 days. Take no " more than 3 doses in 15 minutes., Disp: 25 tablet, Rfl: 3    History:   Past Medical History:   Diagnosis Date   • Acute renal failure (ARF) 2023   • Asthma 2022   • Chronic obstructive pulmonary disease, unspecified 2023   • Chronic systolic congestive heart failure 2023   • Coronary artery disease 2014   • Elevated cholesterol    • GERD (gastroesophageal reflux disease) 2023    Esophagitis in hospital   • History of tobacco abuse    • HLD (hyperlipidemia) 2023   • Hypertension    • Ischemic cardiomyopathy    • Myocardial infarction 2014   • Prolonged Q-T interval on ECG 2023   • Type 2 diabetes mellitus    • Wears glasses        Past Surgical History:   Procedure Laterality Date   • CARDIAC CATHETERIZATION  2014   • CARDIAC CATHETERIZATION Left 2023    Procedure: Left Heart Cath;  Surgeon: Nadya Ramirez MD;  Location:  [facility];  Service: Cardiology;  Laterality: Left;   • CORONARY ARTERY BYPASS GRAFT N/A 2023    Procedure: MEDIAN STERNOTOMY CORONARY ARTERY BYPASS GRAFTING X3  UTILIZING THE LEFT INTERNAL MAMMERY GRAFT, AND EVH OF THE GREATER RIGHT SAPHENOUS VEIN;  Surgeon: Paresh Saavedra MD;  Location:  [facility];  Service: Cardiothoracic;  Laterality: N/A;   • CORONARY ARTERY BYPASS GRAFT  3/1/2023   • CORONARY STENT PLACEMENT  2014   • TONSILLECTOMY         Social History     Socioeconomic History   • Marital status:    • Number of children: 5   Tobacco Use   • Smoking status: Former     Packs/day: 1.50     Years: 52.00     Pack years: 78.00     Types: Cigarettes     Start date: 1970     Quit date: 2022     Years since quittin.4     Passive exposure: Past   • Smokeless tobacco: Never   Vaping Use   • Vaping Use: Never used   Substance and Sexual Activity   • Alcohol use: Not Currently   • Drug use: Never   • Sexual activity: Not Currently        Family History   Problem Relation Age of  "Onset   • Diabetes Mother    • Cancer Father         liver   • Brain cancer Sister    • Heart attack Brother    • Prostate cancer Brother        Objective     Physical Exam:  Vitals:    05/08/23 1049 05/08/23 1054   BP: 100/40 110/40   BP Location: Right arm Left arm   Patient Position: Sitting Sitting   Pulse: 51    Temp: 98 °F (36.7 °C)    SpO2: 98%    Weight: 65.8 kg (145 lb)    Height: 162.6 cm (64\")  Comment: patient reported       Body mass index is 24.89 kg/m².    Physical Exam  Vitals reviewed.   Constitutional:       General: He is not in acute distress.     Appearance: Normal appearance. He is not ill-appearing.   Cardiovascular:      Rate and Rhythm: Normal rate and regular rhythm.      Pulses: Normal pulses.      Heart sounds: Normal heart sounds.   Pulmonary:      Effort: Pulmonary effort is normal.      Breath sounds: Examination of the left-lower field reveals decreased breath sounds. Decreased breath sounds present.   Musculoskeletal:      Right lower leg: No edema.      Left lower leg: No edema.   Skin:     General: Skin is warm and dry.      Comments: Mid-sternotomy incision:  wound edges well approximated, no erythema, edema, or induration.  Mediastinal tubes sites:  well healed without erythema, edema, or drainage.  Endoscopic vein harvest site:  well healed without erythema, edema, or drainage.       Neurological:      General: No focal deficit present.      Mental Status: He is alert and oriented to person, place, and time.   Psychiatric:         Mood and Affect: Mood normal.         Behavior: Behavior normal.         Thought Content: Thought content normal.         Judgment: Judgment normal.         Imaging/Labs:  2 view chest x-ray--5/8/2023  Impression:  Decreased size and conspicuity of small to moderate left pleural effusion.  XR Chest 2 View (05/08/2023 11:04)    I personally reviewed this report and imaging.  Assessment / Plan      Assessment / Plan:   PMH significant for hypertension, " hyperlipidemia on statin therapy, insulin-dependent type 2 diabetes mellitus (A1c 7.4%), former smoker, myocardial infarction, and multivessel coronary artery disease.      1.  Multivessel coronary artery disease  · S/p CABG x3 utilizing CAMPOS/RSVG by Dr. Saavedra on 3/1/2023.  See op report for full details.  · Postoperative phase complicated.  Experienced acute on chronic renal failure and required hemodialysis.  Received 1 unit PRBC for anemia.  Experienced atrial fibrillation and was managed with amiodarone protocol.  · On postop day #19 (3/20/2023) met required criteria and was deemed appropriate for discharge home.  · On 327 was seen in the emergency department and was found to have a left pleural effusion.  Thoracentesis by IR.  Patient discharged home.  · On 4/2/2023 presented to [facility] emergency department with altered mental status.  Was found to have a blood glucose level of 39.  Given IV dextrose and mental status returned to baseline once he was normoglycemic.  Discharged on 4/5/2023.  · Presents to office today accompanied by his wife for initial postop follow-up at 10 weeks.  · Patient is now overall doing well.  He denies fever or chills.  Denies popping, clicking, or rubbing sensation of the sternum.  Mid-sternotomy incision:  wound edges well approximated, no erythema, edema, or induration.  Mediastinal tubes sites:  well healed without erythema, edema, or drainage.  · Endoscopic vein harvest site:  well healed without erythema, edema, or drainage.    · Chest x-ray is resulted above under imaging/labs.  · Patient was seen by his PCP last week and diagnosed with pneumonia and left pleural effusion.  He was treated with course of antibiotics along with steroids.  Steroids were discontinued due to hyperglycemia.  · 7 day course of Lasix/Kdur ordered.  Encouraged hourly use of incentive spirometer and with daily ambulation.  Instructed to hold Lasix if SBP <95.   · Plans to follow-up with PCP  for chest x-ray in approximately 1 week.  · Reports following up with cardiology.  Reports medication compliance.      Patient Education:  • You may resume driving at 6 weeks from your surgery date.  Please plan to stop every 2 hours to ambulate if driving or flying long distances.  • No lifting over 10 lbs for 12 weeks (3 months).  After this time you can slowly increase your weight as tolerated.  • You should not partake in any overhead activities or movements that involve twisting or jarring of your upper chest and torso such as (but not limited to) -- golfing, tennis, lawn mowing including zero turn mowers, use of lawn trimmers or edgers, shoveling, painting, vacuuming, mopping, sweeping, shooting a gun, etc.  • Continue to keep your incisions clean and dry.  Use plain antibacterial soap (i.e. dial) and water to clean and pat dry.    • Do no apply any lotions, creams, oils, powders, salves, or ointments directly to incisional sites.  • Please watch for signs and symptoms of infection which may include but are not limited to: increased pain or tenderness around your incision, warmth at the site or fever, redness or red streaking, and foul smelling or appearing drainage.  Clear drainage and bloody drainage are not worrisome.  • If your incision opens up please contact our office.    Instructions and post-operative restrictions verbally reviewed -- patient verbalized understanding.    Follow Up:   We will follow on an as-needed basis.  Or sooner for any further concerns or worsening sign and symptoms.  Patient encouraged to call the office with any questions or concerns.     Thank you for allowing me to participate in your care.  Best Regards,    TALON Acuña  [facility] Cardiothoracic Surgery  05/08/23  10:55 EDT

## 2023-05-08 NOTE — TELEPHONE ENCOUNTER
PT CALLED STATING HE IS ON A STEROID AND HIS BS HAS BEEN RUNNING HIGH. HE REQUESTED A CALL BACK TO CONSULT ON STARTING A FAST ACTING INSULIN.

## 2023-05-08 NOTE — TELEPHONE ENCOUNTER
MURPHY  Spoke with patients wife.  Release signed in chart.  States patient was put on prednisone for pneumonia.  Blood sugar on Saturday got up to 615.  Called PCP office and they told patient to go to ER.   Patient did not go but did stop the prednisone.  Blood sugar this morning was 195.  They are going to continue to monitor and call back if needed.  Discussed with Dr. Peter.

## 2023-05-09 ENCOUNTER — TELEPHONE (OUTPATIENT)
Dept: CARDIAC SURGERY | Facility: CLINIC | Age: 68
End: 2023-05-09
Payer: MEDICARE

## 2023-05-09 NOTE — TELEPHONE ENCOUNTER
Called pharmacy to confirm that they received prescription because the e-prescription says that receipt was confirmed by pharmacy yesterday. Pharmacy stated that they just got off the phone with the patient's spouse and that patient is aware that they have medications ready for pick-up.

## 2023-05-09 NOTE — TELEPHONE ENCOUNTER
Caller: HarleyVivian    Relationship: Emergency Contact    Best call back number: [phone]    Requested Prescriptions:   Requested Prescriptions      No prescriptions requested or ordered in this encounter        [address]armacy where request should be sent:    [address] [facility] - [phone]  - [phone]   [phone]    Last office visit with prescribing clinician: 5/8/2023   Last telemedicine visit with prescribing clinician: 5/8/2023   Next office visit with prescribing clinician: Visit date not found     Additional details provided by patient: PT SPOUSE STATED HER  WAS SUPPOSED TO RECEIVE A PRESCRIPTION FOR LASIX & POTASSIUM CHLORIDE    Does the patient have less than a 3 day supply:  [x] Yes  [] No    Would you like a call back once the refill request has been completed: [x] Yes [] No    If the office needs to give you a call back, can they leave a voicemail: [x] Yes [] No    Ander Banerjee Rep   05/09/23 09:32 EDT

## 2023-05-16 ENCOUNTER — TELEPHONE (OUTPATIENT)
Dept: FAMILY MEDICINE CLINIC | Facility: CLINIC | Age: 68
End: 2023-05-16

## 2023-05-16 DIAGNOSIS — I50.22 CHRONIC SYSTOLIC (CONGESTIVE) HEART FAILURE: Primary | ICD-10-CM

## 2023-05-16 NOTE — TELEPHONE ENCOUNTER
Caller: Vivian Harley    Relationship: Emergency Contact    Best call back number: [phone]    What orders are you requesting (i.e. lab or imaging): CHEST X-RAY     In what timeframe would the patient need to come in: AS SOON AS POSSIBLE     Where will you receive your lab/imaging services: IN OFFICE     Additional notes: PATIENTS WIFE STATES THE PATIENT WAS SEEN BY A CARDIOTHORACIC SURGEON AND THEY WOULD LIKE HIM TO GET ANOTHER CHEST X-RAY.

## 2023-05-17 ENCOUNTER — PATIENT OUTREACH (OUTPATIENT)
Dept: CASE MANAGEMENT | Facility: OTHER | Age: 68
End: 2023-05-17
Payer: MEDICARE

## 2023-05-17 DIAGNOSIS — I10 PRIMARY HYPERTENSION: ICD-10-CM

## 2023-05-17 DIAGNOSIS — I50.21 ACUTE SYSTOLIC HEART FAILURE: Primary | ICD-10-CM

## 2023-05-17 NOTE — OUTREACH NOTE
AMBULATORY CASE MANAGEMENT NOTE    Name and Relationship of Patient/Support Person: CricketVivian - Emergency Contact    CCM Interim Update    RN-ACM outreach follow up call to patient. Spoke with patient wife. Wife states patient is doing ok at home. He is still having a cough. Patient did have x-ray completed today from PCP order and has appointment tomorrow with cardiologist to follow up on cough.  Patient wife reports that patient is no longer on dialysis. Patient wife reports that patient has all medications prescribed at this time and is taking them as ordered. Patient has no questions at this time. RN-ACM advised patient to call RN-ACM or [employer] Nurse Line with any needs.     Follow up outreach 1 month      Education Documentation  Medication Management, taught by Nury Webber RN at 5/17/2023  3:10 PM.  Learner: Family  Readiness: Acceptance  Method: Explanation  Response: Verbalizes Understanding          Nury COOPER  Ambulatory Case Management    5/17/2023, 15:13 EDT

## 2023-05-18 ENCOUNTER — LAB (OUTPATIENT)
Dept: LAB | Facility: HOSPITAL | Age: 68
End: 2023-05-18
Payer: MEDICARE

## 2023-05-18 ENCOUNTER — OFFICE VISIT (OUTPATIENT)
Dept: CARDIOLOGY | Facility: CLINIC | Age: 68
End: 2023-05-18
Payer: MEDICARE

## 2023-05-18 VITALS
BODY MASS INDEX: 24.07 KG/M2 | SYSTOLIC BLOOD PRESSURE: 108 MMHG | DIASTOLIC BLOOD PRESSURE: 58 MMHG | OXYGEN SATURATION: 97 % | HEIGHT: 64 IN | WEIGHT: 141 LBS | HEART RATE: 58 BPM

## 2023-05-18 DIAGNOSIS — J90 PLEURAL EFFUSION ON LEFT: ICD-10-CM

## 2023-05-18 DIAGNOSIS — I25.810 CORONARY ARTERY DISEASE INVOLVING CORONARY BYPASS GRAFT OF NATIVE HEART WITHOUT ANGINA PECTORIS: ICD-10-CM

## 2023-05-18 DIAGNOSIS — I25.810 CORONARY ARTERY DISEASE INVOLVING CORONARY BYPASS GRAFT OF NATIVE HEART WITHOUT ANGINA PECTORIS: Primary | ICD-10-CM

## 2023-05-18 DIAGNOSIS — E78.5 DYSLIPIDEMIA: ICD-10-CM

## 2023-05-18 DIAGNOSIS — I50.22 CHRONIC SYSTOLIC HEART FAILURE: ICD-10-CM

## 2023-05-18 PROCEDURE — 36415 COLL VENOUS BLD VENIPUNCTURE: CPT

## 2023-05-18 PROCEDURE — 80053 COMPREHEN METABOLIC PANEL: CPT

## 2023-05-18 RX ORDER — FUROSEMIDE 40 MG/1
40 TABLET ORAL DAILY
Qty: 30 TABLET | Refills: 0 | Status: SHIPPED | OUTPATIENT
Start: 2023-05-18

## 2023-05-18 RX ORDER — DAPAGLIFLOZIN 10 MG/1
10 TABLET, FILM COATED ORAL DAILY
Qty: 90 TABLET | Refills: 3 | Status: SHIPPED | OUTPATIENT
Start: 2023-05-18 | End: 2023-08-16

## 2023-05-18 RX ORDER — METOPROLOL SUCCINATE 25 MG/1
25 TABLET, EXTENDED RELEASE ORAL
Qty: 90 TABLET | Refills: 3 | Status: SHIPPED | OUTPATIENT
Start: 2023-05-18 | End: 2023-08-16

## 2023-05-18 RX ORDER — ROSUVASTATIN CALCIUM 10 MG/1
10 TABLET, COATED ORAL DAILY
Qty: 30 TABLET | Refills: 11 | Status: SHIPPED | OUTPATIENT
Start: 2023-05-18

## 2023-05-18 RX ORDER — POTASSIUM CHLORIDE 750 MG/1
20 TABLET, FILM COATED, EXTENDED RELEASE ORAL DAILY
Qty: 60 TABLET | Refills: 0 | Status: SHIPPED | OUTPATIENT
Start: 2023-05-18 | End: 2023-06-17

## 2023-05-19 LAB
ALBUMIN SERPL-MCNC: 4 G/DL (ref 3.5–5.2)
ALBUMIN/GLOB SERPL: 1.4 G/DL
ALP SERPL-CCNC: 106 U/L (ref 39–117)
ALT SERPL W P-5'-P-CCNC: 62 U/L (ref 1–41)
ANION GAP SERPL CALCULATED.3IONS-SCNC: 13.4 MMOL/L (ref 5–15)
AST SERPL-CCNC: 35 U/L (ref 1–40)
BILIRUB SERPL-MCNC: <0.2 MG/DL (ref 0–1.2)
BUN SERPL-MCNC: 42 MG/DL (ref 8–23)
BUN/CREAT SERPL: 24.7 (ref 7–25)
CALCIUM SPEC-SCNC: 9.4 MG/DL (ref 8.6–10.5)
CHLORIDE SERPL-SCNC: 101 MMOL/L (ref 98–107)
CO2 SERPL-SCNC: 21.6 MMOL/L (ref 22–29)
CREAT SERPL-MCNC: 1.7 MG/DL (ref 0.76–1.27)
EGFRCR SERPLBLD CKD-EPI 2021: 43.6 ML/MIN/1.73
GLOBULIN UR ELPH-MCNC: 2.9 GM/DL
GLUCOSE SERPL-MCNC: 166 MG/DL (ref 65–99)
POTASSIUM SERPL-SCNC: 4.3 MMOL/L (ref 3.5–5.2)
PROT SERPL-MCNC: 6.9 G/DL (ref 6–8.5)
SODIUM SERPL-SCNC: 136 MMOL/L (ref 136–145)

## 2023-05-19 NOTE — PROGRESS NOTES
[facility] Cardiology established patient visit      Date: 2023  Patient Name: Herbert Harley  : 1955   MRN: [id number]     PCP: Yessica Ricci DO       Chief Complaint:    Chief Complaint   Patient presents with   • Coronary Artery Disease       History of Present Illness  Herbert Harley is a 67 y.o. male here for 6-week follow-up.  Patient had CABG and has had chronic kidney disease requiring dialysis after the CABG.  Patient is now off of dialysis.  He has had a recurrent left pleural effusion and was seen by CT surgery couple of weeks ago.  He was placed on 1 week of Lasix 40 mg daily and repeat chest x-ray showed improvement of this pleural effusion.  Patient states he feels much better however he still continues to have some left-sided chest discomfort that worsens with deep inspiration.  Patient has had no lower extremity edema.  He is still fatigued but has not started any cardiac rehab yet because his wife in the room states that he was just concerned to start it given his complicated postop course.  Patient otherwise has no complaints today.    Problem List        CARDIAC  Coronary Artery Disease:   Inferior STEMI with [facility], Dr. Carter, 3/28/2014:  EF 50%, NILE to the mid-dominant RCA, NILE to the proximal LAD  Stress test, 2023: Inferolateral ischemia,   [facility], 2023: Severe triple-vessel disease, %, RCA 80%, LAD 80%  Myocardium:   Echocardiogram, 3/29/2014:   EF 50-55%, RVSP 30.  Echo 2022:Stage C, HFmrEF EF 41- 45%, mild LVH    Valvular:   Mild calcification to aortic valve    Electrical:   NSR, LVH, PVC    Percardium:   Normal    VASCULAR:  Arterial  Cerebrovascular disease:   Carotid Doppler: 2023 less than 50 %      CARDIAC RISK FACTORS:         Hypertension         Diabetes         Dyslipidemia         Tobacco Use, Quit after 75 years    NON-CARDIAC:  Asthma/COPD: Abnormal PFTs  Chronic kidney disease, required brief dialysis after  "CABG    SURGERIES:  Tonsillectomy      Subjective     ROS   Systems reviewed and pertinent positives and negatives noted in the HPI.    Medications:     Current Outpatient Medications:   •  albuterol sulfate  (90 Base) MCG/ACT inhaler, Inhale 2 puffs Every 4 (Four) Hours As Needed for Wheezing., Disp: 18 g, Rfl: 2  •  aspirin 81 MG EC tablet, Take 1 tablet by mouth Daily for 90 days., Disp: 90 tablet, Rfl: 0  •  Collagen-Vitamin C-Biotin (COLLAGEN 1500/C PO), Take 1 tablet by mouth Daily. OTC supplement, Disp: , Rfl:   •  dapagliflozin Propanediol (Farxiga) 10 MG tablet, Take 10 mg by mouth Daily for 90 days. Indications: Type 2 Diabetes, Disp: 90 tablet, Rfl: 3  •  ezetimibe (ZETIA) 10 MG tablet, Take 1 tablet by mouth Daily for 30 days., Disp: 30 tablet, Rfl: 11  •  Fluticasone-Umeclidin-Vilant (TRELEGY) 100-62.5-25 MCG/ACT inhaler, Inhale 1 puff Daily., Disp: 1 each, Rfl: 5  •  Insulin Syringe 31G X 5/16\" 0.5 ML misc, 1 each Daily., Disp: , Rfl:   •  Lantus 100 UNIT/ML injection, Inject 5 units every morning, increase by 2 units every 4 days for fasting goal  (Patient taking differently: Inject 7 units every morning, increase by 2 units every 4 days for fasting goal ), Disp: , Rfl:   •  metoprolol succinate XL (TOPROL-XL) 25 MG 24 hr tablet, Take 1 tablet by mouth Daily for 90 days., Disp: 90 tablet, Rfl: 3  •  rosuvastatin (CRESTOR) 10 MG tablet, Take 1 tablet by mouth Daily., Disp: 30 tablet, Rfl: 11  •  furosemide (LASIX) 40 MG tablet, Take 1 tablet by mouth Daily., Disp: 30 tablet, Rfl: 0  •  nitroglycerin (NITROSTAT) 0.4 MG SL tablet, Place 1 tablet under the tongue Every 5 (Five) Minutes As Needed for Chest Pain for up to 25 days. Take no more than 3 doses in 15 minutes., Disp: 25 tablet, Rfl: 3  •  potassium chloride 10 MEQ CR tablet, Take 2 tablets by mouth Daily for 30 days., Disp: 60 tablet, Rfl: 0     Allergies:   Allergies   Allergen Reactions   • Wellbutrin [Bupropion] Swelling     " "Facial swelling, severe         The following portions of the patient's history were reviewed and updated as appropriate: allergies, current medications, past family history, past medical history, past social history, past surgical history and problem list.    Objective     Vitals:    05/18/23 1520   BP: 108/58   BP Location: Right arm   Patient Position: Sitting   Cuff Size: Adult   Pulse: 58   SpO2: 97%   Weight: 64 kg (141 lb)   Height: 162.6 cm (64\")      Body mass index is 24.2 kg/m².     Physical Exam   Constitutional: Well-developed, well-nourished, no acute distress  HENT: Normocephalic, atraumatic moist oral mucosa  Neck: Neck supple. No JVD present. No carotid bruits.   Cardiovascular: Regular rate, regular rhythm and normal heart sounds. No murmur heard.   Pulmonary/Chest: Clear to auscultation bilaterally without wheezing, rhonchi, or rails  Abdominal: Nondistended, nontender.  Musculoskeletal: No obvious deformity  Neurological: Alert and oriented x3, no focal deficits  Extremities: No peripheral edema, palpable DP pulses bilaterally    Labs:  Lab Results   Component Value Date    GLUCOSE 166 (H) 05/18/2023    BUN 42 (H) 05/18/2023    CREATININE 1.70 (H) 05/18/2023    EGFRIFNONA 66 12/14/2021    BCR 24.7 05/18/2023    K 4.3 05/18/2023    CO2 21.6 (L) 05/18/2023    CALCIUM 9.4 05/18/2023    ALBUMIN 4.0 05/18/2023    AST 35 05/18/2023    ALT 62 (H) 05/18/2023     Lab Results   Component Value Date    WBC 14.17 (H) 04/04/2023    HGB 9.4 (L) 04/04/2023    HCT 32.2 (L) 04/04/2023    MCV 96.1 04/04/2023     04/04/2023     Lab Results   Component Value Date    CHOL 80 02/17/2023    TRIG 60 02/17/2023    HDL 37 (L) 02/17/2023    LDL 29 02/17/2023     Lab Results   Component Value Date    TSH 2.330 12/22/2022     Lab Results   Component Value Date    HGBA1C 7.40 (H) 02/28/2023     XR Chest 2 View    Result Date: 5/8/2023  Impression: Decreased size and conspicuity of small to moderate left pleural " effusion. Electronically Signed: Kain Dionicio  5/8/2023 12:31 PM EDT  Workstation ID: [id number]    XR Chest PA & Lateral    Result Date: 5/16/2023  Impression: 1.Minimal left pleural effusion and atelectatic changes within the lingula/left lower lobe 2.Evidence for prior granulomatous exposure. 3.There is some cardiomegaly. Electronically Signed: Jose Angel Ames  5/16/2023 3:08 PM EDT  Workstation ID: [id number]    XR Chest PA & Lateral (In Office)    Result Date: 5/3/2023  Impression: 1. Moderate left effusion and left base consolidation. Electronically Signed: Bret Vasquez  5/3/2023 11:09 AM EDT  Workstation ID: [id number]  Reviewed patient's x-rays and went over the them and the results in the office.    Smoking Cessation:   Patient is not a smoker    Advance Care Planning   ACP discussion was declined by the patient. Patient does not have an advance directive, information provided.          Assessment / Plan    Assessment:   Diagnosis Plan   1. Coronary artery disease involving coronary bypass graft of native heart without angina pectoris  Comprehensive Metabolic Panel      2. Dyslipidemia  rosuvastatin (CRESTOR) 10 MG tablet      3. Chronic systolic heart failure             Plan:  • Patient will continue aspirin 81 mg for antiplatelet therapy.  • Patient will continue Crestor 10 mg and Zetia 10 mg daily for dyslipidemia.    • We will check a CMP today to check on patient's kidney function as he has had a persistent left pleural effusion and has been on Lasix 40 mg for a week which improved the pleural effusion however patient is still symptomatic with it.  Would like to check and see if patient can be on Lasix given his history of chronic kidney disease.  • Will prescribe Lasix 40 mg daily and potassium 20 mEq to be taken with the Lasix.  Had a long discussion with patient and wife stating that he will likely need to be on it for 3 to 4 days based on his kidney function and then take it as needed when he gains 3  pounds over a day or 2.        Follow Up:   Return in about 3 months (around 8/18/2023).      Rosangela Queen PA-C

## 2023-05-30 ENCOUNTER — PATIENT OUTREACH (OUTPATIENT)
Dept: CASE MANAGEMENT | Facility: OTHER | Age: 68
End: 2023-05-30

## 2023-05-30 DIAGNOSIS — I50.21 ACUTE SYSTOLIC HEART FAILURE: Primary | ICD-10-CM

## 2023-05-30 DIAGNOSIS — I10 PRIMARY HYPERTENSION: ICD-10-CM

## 2023-05-30 NOTE — OUTREACH NOTE
[facility] End of Month Documentation    This Chronic Medical Management Care Plan for Herbert Harley, 67 y.o. male, has been monitored and managed; reviewed and a new plan of care implemented for the month of May.  A cumulative time of 28  minutes was spent on this patient record this month, including phone call with relative; chart review.    Regarding the patient's problems: has I25.10, CABG 03/01/23; Hypertension; Hyperlipidemia LDL goal <70; Type 2 diabetes mellitus with hypoglycemia, with long-term current use of insulin; Ischemic cardiomyopathy; Allergic rhinitis; Prolonged Q-T interval on ECG; Dependence on renal dialysis; Anemia in chronic kidney disease; Chronic obstructive pulmonary disease, unspecified; Chronic viral hepatitis C; Hypertensive heart disease with heart failure; Iron deficiency anemia; Long term (current) use of oral hypoglycemic drugs; Moderate protein-calorie malnutrition; Secondary hyperparathyroidism of renal origin; Chronic systolic (congestive) heart failure; and Atherosclerotic heart disease of native coronary artery without angina pectoris on their problem list., the following items were addressed: medical records; medications and any changes can be found within the plan section of the note.  A detailed listing of time spent for chronic care management is tracked within each outreach encounter.  Current medications include:  has a current medication list which includes the following prescription(s): albuterol sulfate hfa, aspirin, collagen-vitamin c-biotin, farxiga, ezetimibe, fluticasone-umeclidin-vilant, furosemide, insulin syringe, lantus, metoprolol succinate xl, nitroglycerin, potassium chloride, and rosuvastatin. and the patient is reported to be patient is compliant with medication protocol,  Medications are reported to be effective. All notes on chart for PCP to review.    The patient was monitored remotely for blood pressure; pain; medications.    The patient's physical  "needs include:  medication education.     The patient's mental support needs include:  increased support    The patient's cognitive support needs include:  emotional care    The patient's psychosocial support needs include:  continued support    The patient's functional needs include: medication education    The patient's environmental needs include:  not applicable    Care Plan overall comments:  No data recorded    Refer to previous outreach notes for more information on the areas listed above.    Monthly Billing Diagnoses  (I50.21) Acute systolic heart failure (CMS/[facility])    (I10) Primary hypertension    Medications   · Medications have been reconciled    Care Plan progress this month:      Recently Modified Care Plans Updates made since 4/29/2023 12:00 AM     Hypertension (Adult)         Problem Priority Last Modified     Hypertension (Hypertension) --  3/29/2023 11:15 AM by Nury Webber RN              Goal Recent Progress Last Modified     Hypertension Monitored --  3/29/2023 11:15 AM by Nury Webber RN     Evidence-based guidance:   Promote initial use of ambulatory blood pressure measurements (for 3 days) to rule out \"white-coat\" effect; identify masked hypertension and presence or absence of nocturnal \"dipping\" of blood pressure.    Encourage continued use of home blood pressure monitoring and recording in blood pressure log; include symptoms of hypotension or potential medication side effects in log.   Review blood pressure measurements taken inside and outside of the provider office; establish baseline and monitor trends; compare to target ranges or patient goal.   Share overall cardiovascular risk with patient; encourage changes to lifestyle risk factors, including alcohol consumption, smoking, inadequate exercise, poor dietary habits and stress.    Notes:            Task Due Date Last Modified     Identify and Monitor Blood Pressure Elevation --  5/5/2023 10:40 AM by Nury Webber RN     Care " Management Activities:      - home or ambulatory blood pressure monitoring encouraged      Notes:              Problem Priority Last Modified     Disease Progression (Hypertension) --  3/29/2023 11:15 AM by Nury Webber RN              Goal Recent Progress Last Modified     Disease Progression Prevented or Minimized --  3/29/2023 11:15 AM by Nury Webber RN       Evidence-based guidance:   Tailor lifestyle advice to individual; review progress regularly; give frequent encouragement and respond positively to incremental successes.   Assess for and promote awareness of worsening disease or development of comorbidity.   Prepare patient for laboratory and diagnostic exams based on risk and presentation.   Prepare patient for use of pharmacologic therapy that may include diuretic, beta-blocker, beta-blocker/thiazide combination, angiotensin-converting enzyme inhibitor, renin-angiotensin blocker or calcium-channel blocker.   Expect periodic adjustments to pharmacologic therapy; manage side effects.   Promote a healthy diet that includes primarily plant-based foods, such as fruits, vegetables, whole grains, beans and legumes, low-fat dairy and lean meats.    Consider moderate reduction in sodium intake by avoiding the addition of salt to prepared foods and limiting processed meats, canned soup, frozen meals and salty snacks.    Promote a regular, daily exercise goal of 150 minutes per week of moderate exercise based on tolerance, ability and patient choice; consider referral to physical therapist, community wellness and/or activity program.   Encourage the avoidance of no more than 2 hours per day of sedentary activity, such as recreational screen time.   Review sources of stress; explore current coping strategies and encourage use of mindfulness, yoga, meditation or exercise to manage stress.    Notes:              Task Due Date Last Modified     Alleviate Barriers to Hypertension Treatment --  5/5/2023 10:41 AM  by Nury Webber RN     Care Management Activities:      - quality of sleep assessed      Notes:              Problem Priority Last Modified     Resistant Hypertension (Hypertension) --  3/29/2023 11:15 AM by Nury Webber RN              Goal Recent Progress Last Modified     Response to Treatment Maximized --  3/29/2023 11:15 AM by Nury Webber RN     Evidence-based guidance:   Assess patient response to treatment, including presence or absence of medication side effects, degree of blood pressure control and patient satisfaction.   Assess technique (including cuff size and placement), measurement times, condition and calibration of blood pressure cuff set (both at-home and in-office equipment).   Assess factors that may influence response to treatment, including nonadherence to pharmacologic treatment plan, diet or activity changes and/or presence of pain, stress or sleep disturbance.   Screen for signs and symptoms of depression; if present, refer for or complete a comprehensive assessment.   Evaluate social and economic barriers that may affect adherence to treatment plan   Address pharmacologic nonadherence by simplifying dosing regimen, counseling or support by pharmacist, financial assistance, self-monitoring of blood pressure, use of motivational interviewing, voice or text messages.   Encourage behavioral adherence strategies, like habit-based interventions that link medication taking with existing daily routines.   Assess barriers to regular, daily physical activity; support family or support person-oriented activity changes and utilization of community activity or sports program.   Address barriers to dietary changes, especially sodium restriction, with referrals to community programs, like cooking classes, meal services or intensive education when available.   Refer to community-based peer support program or nurse home-visiting program.   Assess for chronic pain; when present add additional  goals (Chronic Pain Care Plan Guide) as needed.   Provide frequent follow-up by telephone, telemonitoring, patient-practice portal or with home visit.   Review alcohol use screen; address using brief intervention beginning with risk that interferes with blood pressure control; refer for treatment when excessive alcohol use is noted.   Screen for obstructive sleep apnea; prepare patient for polysomnography based on risk and presentation and use of noninvasive ventilation to relieve obstructive sleep apnea when present.    Notes:            Task Due Date Last Modified     Facilitate Adherence to Lifestyle Change --  5/5/2023 10:41 AM by Nury Webber RN     Care Management Activities:      - success praised  - support and encouragement provided      Notes:                      · Current Specialty Plan of Care Status signed by both patient and provider    Instructions   · Patient was provided an electronic copy of care plan  · CCM services were explained and offered and patient has accepted these services.  · Patient has given their written consent to receive CCM services and understands that this includes the authorization of electronic communication of medical information with the other treating providers.  · Patient understands that they may stop CCM services at any time and these changes will be effective at the end of the calendar month and will effectively revocate the agreement of CCM services.  · Patient understands that only one practitioner can furnish and be paid for CCM services during one calendar month.  Patient also understands that there may be co-payment or deductible fees in association with CCM services.  · Patient will continue with at least monthly follow-up calls with the Ambulatory .    Nury COOPER  Ambulatory Case Management    5/30/2023, 12:49 EDT

## 2023-06-12 ENCOUNTER — TELEPHONE (OUTPATIENT)
Dept: CASE MANAGEMENT | Facility: OTHER | Age: 68
End: 2023-06-12
Payer: MEDICARE

## 2023-06-12 ENCOUNTER — PATIENT OUTREACH (OUTPATIENT)
Dept: CASE MANAGEMENT | Facility: OTHER | Age: 68
End: 2023-06-12
Payer: MEDICARE

## 2023-06-12 DIAGNOSIS — R05.1 ACUTE COUGH: Primary | ICD-10-CM

## 2023-06-12 DIAGNOSIS — I10 PRIMARY HYPERTENSION: ICD-10-CM

## 2023-06-12 DIAGNOSIS — I50.21 ACUTE SYSTOLIC HEART FAILURE: Primary | ICD-10-CM

## 2023-06-12 RX ORDER — DOXYCYCLINE 100 MG/1
100 TABLET ORAL 2 TIMES DAILY
Qty: 20 TABLET | Refills: 0 | Status: SHIPPED | OUTPATIENT
Start: 2023-06-12 | End: 2023-06-22

## 2023-06-12 NOTE — OUTREACH NOTE
"AMBULATORY CASE MANAGEMENT NOTE    Name and Relationship of Patient/Support Person: Herbert Harley \"Herb\" - Self    CCM Interim Update    RN-ACM outreach follow up call to patient. Spoke with patient wife, Vivian. Wife reports that patient isn't feeling well today and she was debating on calling the office. Patient wife reports that patient is having a productive cough, left lung congestion / wheezing, and has gained 2 pounds. She is requesting a chest xray if possible.  RN-ACM notified PCP of this via EPIC. PCP placed order for xray. RN-ACM notified wife, she states that she will take him now. She also confirms that patient was given 20mg of lasix this morning. RN-ACM advised to go ahead and give the other 20mg now and do 40mg tomorrow and Wednesday per Dr. Ricci for weight gain. Patient wife verbalized understanding. Patient has no questions at this time. RN-ACM advised patient to call RN-ACM or [facility] Line with any needs.     Follow up outreach as scheduled.    Education Documentation  Weight Monitoring, taught by Nury Webber RN at 6/12/2023  1:28 PM.  Learner: Family  Readiness: Eager  Method: Explanation  Response: Verbalizes Understanding    Provider Follow-Up, taught by Nury Webber RN at 6/12/2023  1:28 PM.  Learner: Family  Readiness: Eager  Method: Explanation  Response: Verbalizes Understanding    Medication Management, taught by Nury Webber RN at 6/12/2023  1:28 PM.  Learner: Family  Readiness: Eager  Method: Explanation  Response: Verbalizes Understanding    Adherence to Disease Management Plan, taught by Nury Webber RN at 6/12/2023  1:28 PM.  Learner: Family  Readiness: Eager  Method: Explanation  Response: Verbalizes Understanding          Nury COOPER  Ambulatory Case Management    6/12/2023, 13:30 EDT  "

## 2023-06-13 ENCOUNTER — TELEPHONE (OUTPATIENT)
Dept: FAMILY MEDICINE CLINIC | Facility: CLINIC | Age: 68
End: 2023-06-13
Payer: MEDICARE

## 2023-06-13 NOTE — TELEPHONE ENCOUNTER
Patients wife called stating that patient is not doing any better. He's had a fever of 102, which is currently at 99.6. Per Dr. Ricci patient was advised to go to the hospital, since he started antibiotics yesterday. Patient's wife states that medication was not picked up until today Wife states that patient would refuse to go but that if his temperature returned to 102 she would take him

## 2023-07-24 DIAGNOSIS — R06.02 SHORTNESS OF BREATH: ICD-10-CM

## 2023-07-24 RX ORDER — ALBUTEROL SULFATE 90 UG/1
2 AEROSOL, METERED RESPIRATORY (INHALATION) EVERY 4 HOURS PRN
Qty: 18 G | Refills: 2 | Status: SHIPPED | OUTPATIENT
Start: 2023-07-24

## 2023-07-25 ENCOUNTER — PATIENT OUTREACH (OUTPATIENT)
Dept: CASE MANAGEMENT | Facility: OTHER | Age: 68
End: 2023-07-25
Payer: MEDICARE

## 2023-07-25 DIAGNOSIS — J45.909 MODERATE ASTHMA WITHOUT COMPLICATION, UNSPECIFIED WHETHER PERSISTENT: ICD-10-CM

## 2023-07-25 DIAGNOSIS — I50.21 ACUTE SYSTOLIC HEART FAILURE: Primary | ICD-10-CM

## 2023-07-25 DIAGNOSIS — I10 PRIMARY HYPERTENSION: ICD-10-CM

## 2023-07-25 NOTE — OUTREACH NOTE
"AMBULATORY CASE MANAGEMENT NOTE    Name and Relationship of Patient/Support Person: Vivian Harley - Emergency Contact    CCM Interim Update    RN-ACM outreach follow up call with patient wife. Wife states that patient is having a \"wet cough\" that comes and goes. He has not had a fever. Patient has started using his trelegy inhaler again which seems to help alleviate symptoms. Patient / wife sent PCP a [employer] message to refill this inhaler. RN-ACM scheduled follow up visit with PCP for patient on Friday.  Wife states that patient will be traveling to [place] the beginning of August. Patient wife states she is still weighing patient every morning and his weights are staying within 1-2 pounds each day. Patient wife also states that she feels patients blood sugar is doing much better. Patient has no further questions at this time. RN-ACM advised patient to call RN-ACM or [facility] Line with any needs.      Follow up outreach after returning from [place].    Education Documentation  Provider Follow-Up, taught by Nury Webber, RN at 7/25/2023  3:26 PM.  Learner: Family  Readiness: Acceptance  Method: Explanation  Response: Verbalizes Understanding    Signs/Symptoms, taught by Nury Webber, RN at 7/25/2023  3:26 PM.  Learner: Family  Readiness: Acceptance  Method: Explanation  Response: Verbalizes Understanding          Nury COOPER  Ambulatory Case Management    7/25/2023, 15:27 EDT  "

## 2023-07-27 ENCOUNTER — PATIENT OUTREACH (OUTPATIENT)
Dept: CASE MANAGEMENT | Facility: OTHER | Age: 68
End: 2023-07-27
Payer: MEDICARE

## 2023-07-27 DIAGNOSIS — I10 PRIMARY HYPERTENSION: ICD-10-CM

## 2023-07-27 DIAGNOSIS — I50.21 ACUTE SYSTOLIC HEART FAILURE: Primary | ICD-10-CM

## 2023-07-27 NOTE — OUTREACH NOTE
[facility] End of Month Documentation    This Chronic Medical Management Care Plan for Herbert Harley, 67 y.o. male, has been monitored and managed; reviewed and a new plan of care implemented for the month of July.  A cumulative time of 32  minutes was spent on this patient record this month, including phone call with relative; chart review.    Regarding the patient's problems: has I25.10, CABG 03/01/23; Hypertension; Hyperlipidemia LDL goal <70; Type 2 diabetes mellitus with hypoglycemia, with long-term current use of insulin; Ischemic cardiomyopathy; Allergic rhinitis; Prolonged Q-T interval on ECG; Dependence on renal dialysis; Anemia in chronic kidney disease; Chronic obstructive pulmonary disease, unspecified; Chronic viral hepatitis C; Hypertensive heart disease with heart failure; Iron deficiency anemia; Long term (current) use of oral hypoglycemic drugs; Moderate protein-calorie malnutrition; Secondary hyperparathyroidism of renal origin; Chronic systolic (congestive) heart failure; and Atherosclerotic heart disease of native coronary artery without angina pectoris on their problem list., the following items were addressed: medical records; medications and any changes can be found within the plan section of the note.  A detailed listing of time spent for chronic care management is tracked within each outreach encounter.  Current medications include:  has a current medication list which includes the following prescription(s): albuterol sulfate hfa, bumetanide, collagen-vitamin c-biotin, farxiga, ezetimibe, fluticasone, fluticasone-umeclidin-vilant, furosemide, insulin syringe, lantus, metoprolol succinate xl, montelukast, nitroglycerin, potassium chloride er, and rosuvastatin. and the patient is reported to be patient is compliant with medication protocol,  Medications are reported to be effective.  All notes on chart for PCP to review.    The patient was monitored remotely for blood pressure; pain;  medications; blood glucose.    The patient's physical needs include:  medication education.     The patient's mental support needs include:  increased support    The patient's cognitive support needs include:  emotional care    The patient's psychosocial support needs include:  continued support    The patient's functional needs include: medication education    The patient's environmental needs include:  not applicable    Care Plan overall comments:  No data recorded    Refer to previous outreach notes for more information on the areas listed above.    Monthly Billing Diagnoses  (I50.21) Acute systolic heart failure    (I10) Primary hypertension    Medications   Medications have been reconciled    Care Plan progress this month:      Recently Modified Care Plans Updates made since 6/26/2023 12:00 AM       Hypertension (Adult)           Problem Priority Last Modified     Disease Progression (Hypertension) --  3/29/2023 11:15 AM by Nury Webber RN              Goal Recent Progress Last Modified     Disease Progression Prevented or Minimized --  3/29/2023 11:15 AM by Nury Webber RN     Evidence-based guidance:   Tailor lifestyle advice to individual; review progress regularly; give frequent encouragement and respond positively to incremental successes.   Assess for and promote awareness of worsening disease or development of comorbidity.   Prepare patient for laboratory and diagnostic exams based on risk and presentation.   Prepare patient for use of pharmacologic therapy that may include diuretic, beta-blocker, beta-blocker/thiazide combination, angiotensin-converting enzyme inhibitor, renin-angiotensin blocker or calcium-channel blocker.   Expect periodic adjustments to pharmacologic therapy; manage side effects.   Promote a healthy diet that includes primarily plant-based foods, such as fruits, vegetables, whole grains, beans and legumes, low-fat dairy and lean meats.    Consider moderate reduction in sodium  intake by avoiding the addition of salt to prepared foods and limiting processed meats, canned soup, frozen meals and salty snacks.    Promote a regular, daily exercise goal of 150 minutes per week of moderate exercise based on tolerance, ability and patient choice; consider referral to physical therapist, community wellness and/or activity program.   Encourage the avoidance of no more than 2 hours per day of sedentary activity, such as recreational screen time.   Review sources of stress; explore current coping strategies and encourage use of mindfulness, yoga, meditation or exercise to manage stress.    Notes:            Task Due Date Last Modified     Alleviate Barriers to Hypertension Treatment --  7/25/2023  3:23 PM by Nury Webber RN     Care Management Activities:      - healthy diet promoted  - healthy family lifestyle promoted  - medication side effects managed  - quality of sleep assessed      Notes:                          Current Specialty Plan of Care Status signed by both patient and provider    Instructions   Patient was provided an electronic copy of care plan  CCM services were explained and offered and patient has accepted these services.  Patient has given their written consent to receive CCM services and understands that this includes the authorization of electronic communication of medical information with the other treating providers.  Patient understands that they may stop CCM services at any time and these changes will be effective at the end of the calendar month and will effectively revocate the agreement of CCM services.  Patient understands that only one practitioner can furnish and be paid for CCM services during one calendar month.  Patient also understands that there may be co-payment or deductible fees in association with CCM services.  Patient will continue with at least monthly follow-up calls with the Ambulatory .    Nury COOPER  Ambulatory Case  Management    7/27/2023, 12:43 EDT

## 2023-07-28 ENCOUNTER — OFFICE VISIT (OUTPATIENT)
Dept: FAMILY MEDICINE CLINIC | Facility: CLINIC | Age: 68
End: 2023-07-28
Payer: MEDICARE

## 2023-07-28 VITALS
SYSTOLIC BLOOD PRESSURE: 112 MMHG | DIASTOLIC BLOOD PRESSURE: 60 MMHG | BODY MASS INDEX: 26.12 KG/M2 | TEMPERATURE: 98.7 F | WEIGHT: 153 LBS | HEIGHT: 64 IN

## 2023-07-28 DIAGNOSIS — J44.9 CHRONIC OBSTRUCTIVE PULMONARY DISEASE, UNSPECIFIED COPD TYPE: Primary | ICD-10-CM

## 2023-07-28 DIAGNOSIS — I50.22 CHRONIC SYSTOLIC (CONGESTIVE) HEART FAILURE: ICD-10-CM

## 2023-07-28 PROCEDURE — 99214 OFFICE O/P EST MOD 30 MIN: CPT | Performed by: FAMILY MEDICINE

## 2023-07-28 PROCEDURE — 3046F HEMOGLOBIN A1C LEVEL >9.0%: CPT | Performed by: FAMILY MEDICINE

## 2023-07-28 PROCEDURE — 3074F SYST BP LT 130 MM HG: CPT | Performed by: FAMILY MEDICINE

## 2023-07-28 PROCEDURE — 3078F DIAST BP <80 MM HG: CPT | Performed by: FAMILY MEDICINE

## 2023-07-28 RX ORDER — DOXYCYCLINE 100 MG/1
100 TABLET ORAL 2 TIMES DAILY
Qty: 20 TABLET | Refills: 0 | Status: SHIPPED | OUTPATIENT
Start: 2023-07-28 | End: 2023-08-07

## 2023-07-28 RX ORDER — BUMETANIDE 1 MG/1
1 TABLET ORAL 2 TIMES DAILY
Qty: 180 TABLET | Refills: 0 | Status: SHIPPED | OUTPATIENT
Start: 2023-07-28

## 2023-07-28 RX ORDER — POTASSIUM CHLORIDE 1500 MG/1
20 TABLET, EXTENDED RELEASE ORAL DAILY
Qty: 90 TABLET | Refills: 0 | Status: SHIPPED | OUTPATIENT
Start: 2023-07-28

## 2023-07-28 RX ORDER — PREDNISONE 10 MG/1
TABLET ORAL
Qty: 1 EACH | Refills: 0 | Status: SHIPPED | OUTPATIENT
Start: 2023-07-28

## 2023-07-28 RX ORDER — ASPIRIN 81 MG/1
81 TABLET ORAL DAILY
COMMUNITY

## 2023-07-28 NOTE — PROGRESS NOTES
Answers submitted by the patient for this visit:  Primary Reason for Visit (Submitted on 7/26/2023)  What is the primary reason for your visit?: Cough  Cough Questionnaire (Submitted on 7/26/2023)  Chief Complaint: Cough  Chronicity: recurrent  Onset: more than 1 month ago  Progression since onset: waxing and waning  Frequency: every few hours  Cough characteristics: non-productive, productive of sputum, productive of purulent sputum  chest pain: Yes  chills: No  ear congestion: No  ear pain: No  fever: No  headaches: No  heartburn: No  hemoptysis: No  myalgias: No  nasal congestion: No  postnasal drip: Yes  rash: No  rhinorrhea: No  shortness of breath: No  sore throat: No  sweats: No  weight loss: No  wheezing: Yes  Aggravated by: lying down  Risk factors for lung disease: smoking/tobacco exposure        Subjective     Chief Complaint  Cough (Still coughing since approx Dec. Some days worse then others,worse in the evening pain in chest when coughing./Losing weight)    Subjective          Herbert Harley is a 67 y.o. male who presents today to [facility] for follow up.    HPI:   History of Present Illness    Mr. Harley is a very pleasant 67-year-old male who presents today to follow-up on shortness of breath and pleural effusion.  He has a past medical history of hypertension, hyperlipidemia, ischemic cardiomyopathy, chronic systolic heart failure, coronary artery disease status post CABG, type 2 diabetes, chronic viral hepatitis C, iron deficiency anemia, COPD.    His weight has been fluctuating a lot, up steadily but then down two lbs today. His wife reports concern due to travel coming up to [facility]. The cough is intermittent and sometimes very severe.  His sputum is clear in nature, he reports it is not increased from his normal amount of sputum production    The following portions of the patient's history were reviewed and updated as appropriate: allergies,  "current medications, past family history, past medical history, past social history, past surgical history and problem list.    Objective     Objective     Allergy:   Allergies   Allergen Reactions    Wellbutrin [Bupropion] Swelling     Facial swelling, severe        Current Medications:   Current Outpatient Medications   Medication Sig Dispense Refill    albuterol sulfate  (90 Base) MCG/ACT inhaler Inhale 2 puffs Every 4 (Four) Hours As Needed for Wheezing. 18 g 2    aspirin 81 MG EC tablet Take 1 tablet by mouth Daily.      bumetanide (Bumex) 1 MG tablet Take 1 tablet by mouth 2 (Two) Times a Day. 180 tablet 0    Collagen-Vitamin C-Biotin (COLLAGEN 1500/C PO) Take 1 tablet by mouth Daily. OTC supplement      dapagliflozin Propanediol (Farxiga) 10 MG tablet Take 10 mg by mouth Daily for 90 days. Indications: Type 2 Diabetes 90 tablet 3    ezetimibe (ZETIA) 10 MG tablet Take 1 tablet by mouth Daily for 30 days. 30 tablet 11    fluticasone (FLONASE) 50 MCG/ACT nasal spray 2 sprays into the nostril(s) as directed by provider Daily. 15.8 mL 5    Fluticasone-Umeclidin-Vilant (TRELEGY) 100-62.5-25 MCG/ACT inhaler Inhale 1 puff Daily. 1 each 5    Insulin Syringe 31G X 5/16\" 0.5 ML misc 1 each Daily. With insulin injections 100 each 11    Lantus 100 UNIT/ML injection Inject 30 units every morning, increase by 2 units every 4 days for fasting goal       montelukast (Singulair) 10 MG tablet Take 1 tablet by mouth Every Night. 30 tablet 5    potassium chloride ER (K-TAB) 20 MEQ tablet controlled-release ER tablet Take 1 tablet by mouth Daily. 90 tablet 0    rosuvastatin (CRESTOR) 10 MG tablet Take 1 tablet by mouth Daily. 30 tablet 11    doxycycline (ADOXA) 100 MG tablet Take 1 tablet by mouth 2 (Two) Times a Day for 10 days. 20 tablet 0    nitroglycerin (NITROSTAT) 0.4 MG SL tablet Place 1 tablet under the tongue Every 5 (Five) Minutes As Needed for Chest Pain for up to 25 days. Take no more than 3 doses in 15 " minutes. 25 tablet 3    predniSONE (DELTASONE) 10 MG (48) dose pack Take as directed 1 each 0     No current facility-administered medications for this visit.       Past Medical History:  Past Medical History:   Diagnosis Date    Acute kidney failure, unspecified 03/21/2023    Acute pulmonary edema 04/07/2023    Acute renal failure (ARF) 03/02/2023    Acute respiratory failure with hypoxia 03/03/2023    Acute systolic heart failure (CMS/HCC) 03/20/2023    Anaphylactic shock, unspecified, initial encounter 03/21/2023    Arrhythmia March 2023    Asthma 12/2022    Chronic obstructive pulmonary disease, unspecified 03/17/2023    Chronic systolic congestive heart failure 03/01/2023    Coronary artery disease 03/2014    Elevated cholesterol     GERD (gastroesophageal reflux disease) 03/2023    Esophagitis in hospital    History of tobacco abuse     HLD (hyperlipidemia) 01/26/2023    Hypertension     Ischemic cardiomyopathy     Mucopurulent chronic bronchitis 03/01/2023    Myocardial infarction 03/2014    Old myocardial infarction 03/17/2023    Other disorders of phosphorus metabolism 03/21/2023    Positive cardiac stress test 02/15/2023    Added automatically from request for surgery [id number]    Prolonged Q-T interval on ECG 03/01/2023    Secondary hyperparathyroidism of renal origin     Shock 03/02/2023    Shortness of breath 03/21/2023    Type 2 diabetes mellitus     Wears glasses        Past Surgical History:  Past Surgical History:   Procedure Laterality Date    CARDIAC CATHETERIZATION  March 2014    CARDIAC CATHETERIZATION Left 02/17/2023    Procedure: Left Heart Cath;  Surgeon: Nadya Ramirez MD;  Location: [facility] CATH INVASIVE LOCATION;  Service: Cardiology;  Laterality: Left;    CORONARY ARTERY BYPASS GRAFT N/A 03/01/2023    Procedure: MEDIAN STERNOTOMY CORONARY ARTERY BYPASS GRAFTING X3  UTILIZING THE LEFT INTERNAL MAMMERY GRAFT, AND EVH OF THE GREATER RIGHT SAPHENOUS VEIN;  Surgeon: Paresh Saavedra MD;   "Location: [facility];  Service: Cardiothoracic;  Laterality: N/A;    CORONARY ARTERY BYPASS GRAFT  3/1/2023    CORONARY STENT PLACEMENT  2014    TONSILLECTOMY         Social History:  Social History     Socioeconomic History    Marital status:     Number of children: 5   Tobacco Use    Smoking status: Former     Packs/day: 1.50     Years: 52.00     Pack years: 78.00     Types: Cigarettes     Start date: 1970     Quit date: 2022     Years since quittin.6     Passive exposure: Past    Smokeless tobacco: Never   Vaping Use    Vaping Use: Never used   Substance and Sexual Activity    Alcohol use: Not Currently    Drug use: Never    Sexual activity: Not Currently       Family History:  Family History   Problem Relation Age of Onset    Diabetes Mother     Cancer Father         liver    Brain cancer Sister     Heart attack Brother     Prostate cancer Brother        Vital Signs:   /60   Temp 98.7 °F (37.1 °C) (Infrared)   Ht 162.6 cm (64.02\")   Wt 69.4 kg (153 lb)   BMI 26.25 kg/m²      Physical Exam:  Physical Exam  Constitutional:       Appearance: He is not ill-appearing.   Eyes:      Pupils: Pupils are equal, round, and reactive to light.   Cardiovascular:      Rate and Rhythm: Normal rate.      Pulses: Normal pulses.   Pulmonary:      Effort: Pulmonary effort is normal.      Breath sounds: Wheezing present.   Neurological:      General: No focal deficit present.      Mental Status: He is alert. Mental status is at baseline.   Psychiatric:         Mood and Affect: Mood normal.         Behavior: Behavior normal.         Thought Content: Thought content normal.         Judgment: Judgment normal.             PHQ-9 Score  PHQ-9 Total Score:       Lab Review  Office Visit on 2023   Component Date Value Ref Range Status    Glucose 2023 221 (A)  70 - 130 mg/dL Final    Lot Number [id number]   Final    Expiration Date 2023   Final    Hemoglobin A1C " 07/13/2023 9.5  % Final    Lot Number 07/13/2023 [id number]   Final    Expiration Date 07/13/2023 12/12/24   Final   Lab on 07/05/2023   Component Date Value Ref Range Status    Glucose 07/05/2023 353 (H)  65 - 99 mg/dL Final    BUN 07/05/2023 24 (H)  8 - 23 mg/dL Final    Creatinine 07/05/2023 1.59 (H)  0.76 - 1.27 mg/dL Final    Sodium 07/05/2023 136  136 - 145 mmol/L Final    Potassium 07/05/2023 4.1  3.5 - 5.2 mmol/L Final    Chloride 07/05/2023 101  98 - 107 mmol/L Final    CO2 07/05/2023 21.1 (L)  22.0 - 29.0 mmol/L Final    Calcium 07/05/2023 9.2  8.6 - 10.5 mg/dL Final    Albumin 07/05/2023 4.0  3.5 - 5.2 g/dL Final    Phosphorus 07/05/2023 3.4  2.5 - 4.5 mg/dL Final    Anion Gap 07/05/2023 13.9  5.0 - 15.0 mmol/L Final    BUN/Creatinine Ratio 07/05/2023 15.1  7.0 - 25.0 Final    eGFR 07/05/2023 47.3 (L)  >60.0 mL/min/1.73 Final    WBC 07/05/2023 8.12  3.40 - 10.80 10*3/mm3 Final    RBC 07/05/2023 4.94  4.14 - 5.80 10*6/mm3 Final    Hemoglobin 07/05/2023 13.5  13.0 - 17.7 g/dL Final    Hematocrit 07/05/2023 42.7  37.5 - 51.0 % Final    MCV 07/05/2023 86.4  79.0 - 97.0 fL Final    MCH 07/05/2023 27.3  26.6 - 33.0 pg Final    MCHC 07/05/2023 31.6  31.5 - 35.7 g/dL Final    RDW 07/05/2023 13.8  12.3 - 15.4 % Final    RDW-SD 07/05/2023 43.6  37.0 - 54.0 fl Final    MPV 07/05/2023 11.4  6.0 - 12.0 fL Final    Platelets 07/05/2023 241  140 - 450 10*3/mm3 Final    Neutrophil % 07/05/2023 53.2  42.7 - 76.0 % Final    Lymphocyte % 07/05/2023 28.9  19.6 - 45.3 % Final    Monocyte % 07/05/2023 10.3  5.0 - 12.0 % Final    Eosinophil % 07/05/2023 6.2  0.3 - 6.2 % Final    Basophil % 07/05/2023 1.2  0.0 - 1.5 % Final    Immature Grans % 07/05/2023 0.2  0.0 - 0.5 % Final    Neutrophils, Absolute 07/05/2023 4.31  1.70 - 7.00 10*3/mm3 Final    Lymphocytes, Absolute 07/05/2023 2.35  0.70 - 3.10 10*3/mm3 Final    Monocytes, Absolute 07/05/2023 0.84  0.10 - 0.90 10*3/mm3 Final    Eosinophils, Absolute 07/05/2023 0.50 (H)  0.00  - 0.40 10*3/mm3 Final    Basophils, Absolute 07/05/2023 0.10  0.00 - 0.20 10*3/mm3 Final    Immature Grans, Absolute 07/05/2023 0.02  0.00 - 0.05 10*3/mm3 Final    nRBC 07/05/2023 0.0  0.0 - 0.2 /100 WBC Final    Protein/Creatinine Ratio, Urine 07/05/2023    Final    Unable to calculate    Creatinine, Urine 07/05/2023 21.4  mg/dL Final    Total Protein, Urine 07/05/2023 <4.0  mg/dL Final    Color, UA 07/05/2023 Yellow  Yellow, Straw Final    Appearance, UA 07/05/2023 Clear  Clear Final    pH, UA 07/05/2023 5.5  5.0 - 8.0 Final    Specific Gravity, UA 07/05/2023 1.016  1.005 - 1.030 Final    Glucose, UA 07/05/2023 >=1000 mg/dL (3+) (A)  Negative Final    Ketones, UA 07/05/2023 Negative  Negative Final    Bilirubin, UA 07/05/2023 Negative  Negative Final    Blood, UA 07/05/2023 Negative  Negative Final    Protein, UA 07/05/2023 Negative  Negative Final    Leuk Esterase, UA 07/05/2023 Negative  Negative Final    Nitrite, UA 07/05/2023 Negative  Negative Final    Urobilinogen, UA 07/05/2023 0.2 E.U./dL  0.2 - 1.0 E.U./dL Final    RBC, UA 07/05/2023 0-2  None Seen, 0-2 /HPF Final    WBC, UA 07/05/2023 0-2  None Seen, 0-2 /HPF Final    Bacteria, UA 07/05/2023 None Seen  None Seen /HPF Final    Squamous Epithelial Cells, UA 07/05/2023 0-2  None Seen, 0-2 /HPF Final    Hyaline Casts, UA 07/05/2023 None Seen  None Seen /LPF Final    Methodology 07/05/2023 Automated Microscopy   Final   Lab on 06/26/2023   Component Date Value Ref Range Status    Glucose 06/26/2023 383 (H)  65 - 99 mg/dL Final    BUN 06/26/2023 38 (H)  8 - 23 mg/dL Final    Creatinine 06/26/2023 1.95 (H)  0.76 - 1.27 mg/dL Final    Sodium 06/26/2023 135 (L)  136 - 145 mmol/L Final    Potassium 06/26/2023 4.8  3.5 - 5.2 mmol/L Final    Chloride 06/26/2023 98  98 - 107 mmol/L Final    CO2 06/26/2023 24.3  22.0 - 29.0 mmol/L Final    Calcium 06/26/2023 9.3  8.6 - 10.5 mg/dL Final    BUN/Creatinine Ratio 06/26/2023 19.5  7.0 - 25.0 Final    Anion Gap 06/26/2023  12.7  5.0 - 15.0 mmol/L Final    eGFR 06/26/2023 37.0 (L)  >60.0 mL/min/1.73 Final   Lab on 06/22/2023   Component Date Value Ref Range Status    Respiratory Culture 06/22/2023 Moderate growth (3+) Normal respiratory wilma. No S. aureus or Pseudomonas aeruginosa detected. Final report.   Final    Gram Stain 06/22/2023 Moderate (3+) WBCs per low power field   Final    Gram Stain 06/22/2023 Rare (1+) Epithelial cells per low power field   Final    Gram Stain 06/22/2023 Many (4+) Mixed bacterial morphotypes seen on Gram Stain   Final   Lab on 05/18/2023   Component Date Value Ref Range Status    Glucose 05/18/2023 166 (H)  65 - 99 mg/dL Final    BUN 05/18/2023 42 (H)  8 - 23 mg/dL Final    Creatinine 05/18/2023 1.70 (H)  0.76 - 1.27 mg/dL Final    Sodium 05/18/2023 136  136 - 145 mmol/L Final    Potassium 05/18/2023 4.3  3.5 - 5.2 mmol/L Final    Chloride 05/18/2023 101  98 - 107 mmol/L Final    CO2 05/18/2023 21.6 (L)  22.0 - 29.0 mmol/L Final    Calcium 05/18/2023 9.4  8.6 - 10.5 mg/dL Final    Total Protein 05/18/2023 6.9  6.0 - 8.5 g/dL Final    Albumin 05/18/2023 4.0  3.5 - 5.2 g/dL Final    ALT (SGPT) 05/18/2023 62 (H)  1 - 41 U/L Final    AST (SGOT) 05/18/2023 35  1 - 40 U/L Final    Alkaline Phosphatase 05/18/2023 106  39 - 117 U/L Final    Total Bilirubin 05/18/2023 <0.2  0.0 - 1.2 mg/dL Final    Globulin 05/18/2023 2.9  gm/dL Final    A/G Ratio 05/18/2023 1.4  g/dL Final    BUN/Creatinine Ratio 05/18/2023 24.7  7.0 - 25.0 Final    Anion Gap 05/18/2023 13.4  5.0 - 15.0 mmol/L Final    eGFR 05/18/2023 43.6 (L)  >60.0 mL/min/1.73 Final        Radiology Results        Assessment / Plan         Assessment and Plan   Diagnoses and all orders for this visit:    1. Chronic obstructive pulmonary disease, unspecified COPD type (Primary)  -     predniSONE (DELTASONE) 10 MG (48) dose pack; Take as directed  Dispense: 1 each; Refill: 0  -     doxycycline (ADOXA) 100 MG tablet; Take 1 tablet by mouth 2 (Two) Times a Day  for 10 days.  Dispense: 20 tablet; Refill: 0    2. Chronic systolic (congestive) heart failure  -     bumetanide (Bumex) 1 MG tablet; Take 1 tablet by mouth 2 (Two) Times a Day.  Dispense: 180 tablet; Refill: 0  -     potassium chloride ER (K-TAB) 20 MEQ tablet controlled-release ER tablet; Take 1 tablet by mouth Daily.  Dispense: 90 tablet; Refill: 0        Patient's breathing is at baseline.  His weight has went up from prior visits however, his weight prior to surgery was 167 pounds.  I suspect that his slow weight gain is secondary to eating better and having more energy for activity/muscle mass gain.    He is to continue Bumex 1 mg daily.  If he has a increase in weight and has swelling/symptoms of volume overload he is to take Bumex twice daily.    I do not feel the patient has a acute exacerbation of COPD at this time however with his pending travel I have prescribed prednisone and doxycycline for him to have in case he develops symptoms of an exacerbation.      Discussed possible differential diagnoses, testing, treatment, recommended non-pharmacological interventions, risks, warning signs to monitor for that would indicate need for follow-up in clinic or ER. If no improvement with these regimens or you have new or worsening symptoms follow-up. Patient verbalizes understanding and agreement with plan of care. Denies further needs or concerns.     Patient was given instructions and counseling regarding her condition and for health maintenance advised.            BMI is >= 25 and <30. (Overweight) The following options were offered after discussion;: weight loss educational material (shared in after visit summary)           Health Maintenance  Health Maintenance:   Health Maintenance Due   Topic Date Due    ZOSTER VACCINE (1 of 2) Never done    DIABETIC FOOT EXAM  12/14/2022        Meds ordered during this visit  New Medications Ordered This Visit   Medications    bumetanide (Bumex) 1 MG tablet     Sig: Take 1  tablet by mouth 2 (Two) Times a Day.     Dispense:  180 tablet     Refill:  0    potassium chloride ER (K-TAB) 20 MEQ tablet controlled-release ER tablet     Sig: Take 1 tablet by mouth Daily.     Dispense:  90 tablet     Refill:  0    predniSONE (DELTASONE) 10 MG (48) dose pack     Sig: Take as directed     Dispense:  1 each     Refill:  0    doxycycline (ADOXA) 100 MG tablet     Sig: Take 1 tablet by mouth 2 (Two) Times a Day for 10 days.     Dispense:  20 tablet     Refill:  0       Meds stopped during this visit:  Medications Discontinued During This Encounter   Medication Reason    metoprolol succinate XL (TOPROL-XL) 25 MG 24 hr tablet Patient Reported Not Taking    furosemide (LASIX) 40 MG tablet Patient Reported Not Taking    bumetanide (Bumex) 1 MG tablet Reorder    potassium chloride ER (K-TAB) 20 MEQ tablet controlled-release ER tablet Reorder        Visit Diagnoses    ICD-10-CM ICD-9-CM   1. Chronic obstructive pulmonary disease, unspecified COPD type  J44.9 496   2. Chronic systolic (congestive) heart failure  I50.22 428.22     428.0       Patient was given instructions and counseling regarding his condition or for health maintenance advice. Please see specific information pulled into the AVS if appropriate.     Follow Up   No follow-ups on file.          This document has been electronically signed by Yessica Ricci DO   July 28, 2023 13:03 EDT    Dictated Utilizing Dragon Dictation: Part of this note may be an electronic transcription/translation of spoken language to printed text using the Dragon Dictation System.    Yessica Ricci D.O.  [facility] [place]

## 2023-08-23 ENCOUNTER — PATIENT OUTREACH (OUTPATIENT)
Dept: CASE MANAGEMENT | Facility: OTHER | Age: 68
End: 2023-08-23
Payer: MEDICARE

## 2023-08-23 DIAGNOSIS — I10 PRIMARY HYPERTENSION: Primary | ICD-10-CM

## 2023-08-23 DIAGNOSIS — I50.21 ACUTE SYSTOLIC HEART FAILURE: ICD-10-CM

## 2023-08-23 NOTE — OUTREACH NOTE
"AMBULATORY CASE MANAGEMENT NOTE    Name and Relationship of Patient/Support Person: Herbert Harley \"Herb\" - Self    CCM Interim Update    RN-ACM outreach follow up call with patient and wife. Patient states that he is feeling well today. He states that his breathing and cough come and go but today is a good day. Patient states he is still continuing to check his weight and keep a log daily. RN-ACM encouraged patient to continue this and to call PCP if his weight increases by 3 pounds over night or 5 pounds in 3-5 days. Patient verbalized understanding. Patient confirms he has all medication in home at this time and denies any questions regarding medication today. Patient has no further questions at this time. RN-ACM advised patient to call RN-ACM or [facility] [place] Nurse Line with any needs.       Follow up outreach as scheduled.     Education Documentation  Self-Care, taught by Nury Webber RN at 8/23/2023  5:28 PM.  Learner: Family, Patient  Readiness: Acceptance  Method: Explanation  Response: Verbalizes Understanding          Nury COOPER  Ambulatory Case Management    8/23/2023, 17:25 EDT  "

## 2023-08-30 ENCOUNTER — PATIENT OUTREACH (OUTPATIENT)
Dept: CASE MANAGEMENT | Facility: OTHER | Age: 68
End: 2023-08-30
Payer: MEDICARE

## 2023-08-30 DIAGNOSIS — I50.21 ACUTE SYSTOLIC HEART FAILURE: Primary | ICD-10-CM

## 2023-08-30 DIAGNOSIS — I10 PRIMARY HYPERTENSION: ICD-10-CM

## 2023-08-30 NOTE — OUTREACH NOTE
[facility] End of Month Documentation    This Chronic Medical Management Care Plan for Herbert Harley, 67 y.o. male, has been monitored and managed; reviewed and a new plan of care implemented for the month of August.  A cumulative time of 26  minutes was spent on this patient record this month, including phone call with relative; chart review; phone call with patient.    Regarding the patient's problems: has I25.10, CABG 03/01/23; Hypertension; Hyperlipidemia LDL goal <70; Type 2 diabetes mellitus with hypoglycemia, with long-term current use of insulin; Ischemic cardiomyopathy; Allergic rhinitis; Prolonged Q-T interval on ECG; Dependence on renal dialysis; Anemia in chronic kidney disease; Chronic obstructive pulmonary disease, unspecified; Chronic viral hepatitis C; Hypertensive heart disease with heart failure; Iron deficiency anemia; Long term (current) use of oral hypoglycemic drugs; Moderate protein-calorie malnutrition; Secondary hyperparathyroidism of renal origin; Chronic systolic (congestive) heart failure; and Atherosclerotic heart disease of native coronary artery without angina pectoris on their problem list., the following items were addressed: medical records; medications and any changes can be found within the plan section of the note.  A detailed listing of time spent for chronic care management is tracked within each outreach encounter.  Current medications include:  has a current medication list which includes the following prescription(s): albuterol sulfate hfa, aspirin, bumetanide, collagen-vitamin c-biotin, farxiga, ezetimibe, fluticasone, fluticasone-umeclidin-vilant, insulin syringe, lantus, montelukast, nitroglycerin, potassium chloride er, prednisone, and rosuvastatin. and the patient is reported to be patient is compliant with medication protocol,  Medications are reported to be effective.  All notes on chart for PCP to review.    The patient was monitored remotely for blood pressure;  pain; medications; blood glucose.    The patient's physical needs include:  medication education.     The patient's mental support needs include:  increased support    The patient's cognitive support needs include:  emotional care    The patient's psychosocial support needs include:  continued support    The patient's functional needs include: medication education    The patient's environmental needs include:  not applicable    Care Plan overall comments:  No data recorded    Refer to previous outreach notes for more information on the areas listed above.    Monthly Billing Diagnoses  (I50.21) Acute systolic heart failure    (I10) Primary hypertension    Medications   Medications have been reconciled    Care Plan progress this month:      Recently Modified Care Plans Updates made since 7/30/2023 12:00 AM      No recently modified care plans.            Current Specialty Plan of Care Status signed by both patient and provider    Instructions   Patient was provided an electronic copy of care plan  CCM services were explained and offered and patient has accepted these services.  Patient has given their written consent to receive CCM services and understands that this includes the authorization of electronic communication of medical information with the other treating providers.  Patient understands that they may stop CCM services at any time and these changes will be effective at the end of the calendar month and will effectively revocate the agreement of CCM services.  Patient understands that only one practitioner can furnish and be paid for CCM services during one calendar month.  Patient also understands that there may be co-payment or deductible fees in association with CCM services.  Patient will continue with at least monthly follow-up calls with the Ambulatory .    Nury COOPER  Ambulatory Case Management    8/30/2023, 15:33 EDT

## 2023-09-14 ENCOUNTER — OFFICE VISIT (OUTPATIENT)
Dept: CARDIOLOGY | Facility: CLINIC | Age: 68
End: 2023-09-14
Payer: MEDICARE

## 2023-09-14 VITALS
HEIGHT: 65 IN | OXYGEN SATURATION: 97 % | HEART RATE: 62 BPM | SYSTOLIC BLOOD PRESSURE: 110 MMHG | WEIGHT: 159.4 LBS | BODY MASS INDEX: 26.56 KG/M2 | DIASTOLIC BLOOD PRESSURE: 62 MMHG

## 2023-09-14 DIAGNOSIS — N18.32 STAGE 3B CHRONIC KIDNEY DISEASE: ICD-10-CM

## 2023-09-14 DIAGNOSIS — E78.5 HYPERLIPIDEMIA LDL GOAL <70: ICD-10-CM

## 2023-09-14 DIAGNOSIS — I25.10 ATHEROSCLEROSIS OF NATIVE CORONARY ARTERY OF NATIVE HEART WITHOUT ANGINA PECTORIS: Primary | ICD-10-CM

## 2023-09-14 PROCEDURE — 99213 OFFICE O/P EST LOW 20 MIN: CPT | Performed by: INTERNAL MEDICINE

## 2023-09-14 PROCEDURE — 3074F SYST BP LT 130 MM HG: CPT | Performed by: INTERNAL MEDICINE

## 2023-09-14 PROCEDURE — 3078F DIAST BP <80 MM HG: CPT | Performed by: INTERNAL MEDICINE

## 2023-09-14 RX ORDER — BENZONATATE 100 MG/1
100 CAPSULE ORAL 3 TIMES DAILY PRN
COMMUNITY

## 2023-09-14 RX ORDER — INSULIN GLARGINE 100 [IU]/ML
INJECTION, SOLUTION SUBCUTANEOUS
Qty: 15 ML | Refills: 5 | Status: SHIPPED | OUTPATIENT
Start: 2023-09-14

## 2023-09-14 RX ORDER — METOPROLOL SUCCINATE 25 MG/1
12.5 TABLET, EXTENDED RELEASE ORAL DAILY
COMMUNITY
End: 2023-09-14 | Stop reason: SDUPTHER

## 2023-09-14 NOTE — PROGRESS NOTES
Follow-up Visit      Date: 2023  Patient Name: Herbert Harley  : 1955   MRN: [id number]     Chief Complaint:    Chief Complaint   Patient presents with    Coronary artery disease involving coronary bypass graft of        History of Present Illness: Herbert Harley is a 67 y.o. male who is here today for follow-up on his coronary artery disease.  He has been doing fine.  He denies any chest pain any shortness of breath any dizziness any palpitations or any other symptoms.  He is able to do most of his activities.  He has not lost any weight.  He occasionally remain tired and fatigued.      Problem List     CARDIAC  Coronary Artery Disease:   Inferior STEMI with [facility], Dr. Carter, 3/28/2014:  EF 50%, NILE to the mid-dominant RCA, NILE to the proximal LAD  Stress test, 2023: Inferolateral ischemia,   [facility], 2023: Severe triple-vessel disease, %, RCA 80%, LAD 80%    Myocardium:   Echocardiogram, 3/29/2014:   EF 50-55%, RVSP 30.  Echo 2022:Stage C, HFmrEF EF 41- 45%, mild LVH    Valvular:   Mild calcification to aortic valve    Electrical:   NSR, LVH, PVC    Percardium:   Normal    VASCULAR:  Arterial  Cerebrovascular disease:   Carotid Doppler: 2023 less than 50 %    CARDIAC RISK FACTORS:  Hypertension  Diabetes   A1C 9.5  Dyslipidemia   TC 80 HDL 37 LDL 29 TG 60  Tobacco Use, Quit after 75 years    NON-CARDIAC:  Asthma/COPD: Abnormal PFTs    SURGERIES:  Tonsillectomy          Subjective      Review of Systems:   Review of Systems   Constitutional:  Positive for fatigue.     Medications:     Current Outpatient Medications:     aspirin 81 MG EC tablet, Take 1 tablet by mouth Daily., Disp: , Rfl:     benzonatate (TESSALON) 100 MG capsule, Take 1 capsule by mouth 3 (Three) Times a Day As Needed for Cough., Disp: , Rfl:     bumetanide (Bumex) 1 MG tablet, Take 1 tablet by mouth 2 (Two) Times a Day., Disp: 180 tablet, Rfl: 0    Collagen-Vitamin C-Biotin (COLLAGEN 1500/C  "PO), Take 1 tablet by mouth Daily. OTC supplement, Disp: , Rfl:     dapagliflozin Propanediol (Farxiga) 10 MG tablet, Take 10 mg by mouth Daily for 90 days. Indications: Type 2 Diabetes, Disp: 90 tablet, Rfl: 3    ezetimibe (ZETIA) 10 MG tablet, Take 1 tablet by mouth Daily for 30 days., Disp: 30 tablet, Rfl: 11    fluticasone (FLONASE) 50 MCG/ACT nasal spray, 2 sprays into the nostril(s) as directed by provider Daily., Disp: 15.8 mL, Rfl: 5    Fluticasone-Umeclidin-Vilant (TRELEGY) 100-62.5-25 MCG/ACT inhaler, Inhale 1 puff Daily., Disp: 1 each, Rfl: 5    Insulin Syringe 31G X 5/16\" 0.5 ML misc, 1 each Daily. With insulin injections, Disp: 100 each, Rfl: 11    Lantus 100 UNIT/ML injection, Inject 30 units every morning, increase by 2 units every 4 days for fasting goal  MX 50 units/day, Disp: 15 mL, Rfl: 5    metoprolol succinate XL (TOPROL-XL) 25 MG 24 hr tablet, Take 0.5 tablets by mouth Daily., Disp: , Rfl:     montelukast (Singulair) 10 MG tablet, Take 1 tablet by mouth Every Night., Disp: 30 tablet, Rfl: 5    nitroglycerin (NITROSTAT) 0.4 MG SL tablet, Place 1 tablet under the tongue Every 5 (Five) Minutes As Needed for Chest Pain for up to 25 days. Take no more than 3 doses in 15 minutes., Disp: 25 tablet, Rfl: 3    potassium chloride ER (K-TAB) 20 MEQ tablet controlled-release ER tablet, Take 1 tablet by mouth Daily., Disp: 90 tablet, Rfl: 0    rosuvastatin (CRESTOR) 10 MG tablet, Take 1 tablet by mouth Daily., Disp: 30 tablet, Rfl: 11    Allergies:   Allergies   Allergen Reactions    Wellbutrin [Bupropion] Swelling     Facial swelling, severe       Objective     Physical Exam:  Vitals:    09/14/23 1549   BP: 110/62   BP Location: Right arm   Patient Position: Sitting   Pulse: 62   SpO2: 97%   Weight: 72.3 kg (159 lb 6.4 oz)   Height: 165.1 cm (65\")     Body mass index is 26.53 kg/m².      Constitutional:       General: Not in acute distress.     Appearance: Healthy appearance. Not in distress. "     Neck:     JVP: Not elevated     Carotid artery: No carotid bruit    Pulmonary:      Effort: Pulmonary effort is normal.      Breath sounds: Normal breath sounds. No wheezing. No rhonchi. No rales.     Cardiovascular:      Normal rate. Regular rhythm. Normal S1. Normal S2.      Murmurs: There is no murmur.      No gallop. No click. No rub.     Abdominal:      General: Bowel sounds are normal.      Palpations: Abdomen is soft.      Tenderness: There is no abdominal tenderness.    Extremities:     Pulses: Good distal pulses     Edema: Trace edema    Smoking Cessation:   He quit smoking after 38 pack years in December 2022    Lab Review:   Lab Results   Component Value Date    GLUCOSE 353 (H) 07/05/2023    BUN 24 (H) 07/05/2023    CREATININE 1.59 (H) 07/05/2023    EGFRIFNONA 66 12/14/2021    BCR 15.1 07/05/2023    K 4.1 07/05/2023    CO2 21.1 (L) 07/05/2023    CALCIUM 9.2 07/05/2023    ALBUMIN 4.0 07/05/2023    AST 35 05/18/2023    ALT 62 (H) 05/18/2023     Lab Results   Component Value Date    WBC 8.12 07/05/2023    HGB 13.5 07/05/2023    HCT 42.7 07/05/2023    MCV 86.4 07/05/2023     07/05/2023     Lab Results   Component Value Date    CHOL 80 02/17/2023    CHLPL 174 03/28/2014    TRIG 60 02/17/2023    HDL 37 (L) 02/17/2023    LDL 29 02/17/2023     Lab Results   Component Value Date    TSH 2.330 12/22/2022     Lab Results   Component Value Date    HGBA1C 9.5 07/13/2023           Assessment / Plan      Assessment:   Diagnosis Plan   1. Atherosclerosis of native coronary artery of native heart without angina pectoris        2. Hyperlipidemia LDL goal <70        3. Stage 3b chronic kidney disease             Plan:  Patient has been stable on his current medication: Continue his current medications.  I have advised him to keep monitoring his diet and exercise  His lipid has been under good control.  We will keep watching his kidney function      Follow Up:       Return in about 9 months (around  6/14/2024).    Nadya Ramirez MD

## 2023-09-14 NOTE — TELEPHONE ENCOUNTER
Pt wife called states Lantus 100 unit/mL injection is requiring a prior authorization. I looked in Jessie folder to see if the pharmacy has faxed denial. We have not received yet. Prescription was sent today.

## 2023-09-14 NOTE — TELEPHONE ENCOUNTER
Rx Refill Note  Requested Prescriptions     Pending Prescriptions Disp Refills    Lantus 100 UNIT/ML injection       Sig: Inject 30 units every morning, increase by 2 units every 4 days for fasting goal       Last office visit with prescribing clinician: 7/13/2023     Next office visit with prescribing clinician: 11/6/2023       Lizz Carter MA  09/14/23, 07:52 EDT

## 2023-09-26 ENCOUNTER — TELEPHONE (OUTPATIENT)
Dept: CASE MANAGEMENT | Facility: OTHER | Age: 68
End: 2023-09-26
Payer: MEDICARE

## 2023-09-26 NOTE — TELEPHONE ENCOUNTER
RN-ACM outreach follow up call with patient. No answer. Will attempt outreach call again at a later date.

## 2023-10-03 RX ORDER — METOPROLOL SUCCINATE 25 MG/1
12.5 TABLET, EXTENDED RELEASE ORAL DAILY
Qty: 90 TABLET | Refills: 11 | Status: SHIPPED | OUTPATIENT
Start: 2023-10-03

## 2023-10-04 ENCOUNTER — LAB (OUTPATIENT)
Dept: LAB | Facility: HOSPITAL | Age: 68
End: 2023-10-04
Payer: MEDICARE

## 2023-10-04 ENCOUNTER — TRANSCRIBE ORDERS (OUTPATIENT)
Dept: LAB | Facility: HOSPITAL | Age: 68
End: 2023-10-04
Payer: MEDICARE

## 2023-10-04 ENCOUNTER — APPOINTMENT (OUTPATIENT)
Dept: LAB | Facility: HOSPITAL | Age: 68
End: 2023-10-04
Payer: MEDICARE

## 2023-10-04 DIAGNOSIS — N18.30 STAGE 3 CHRONIC KIDNEY DISEASE, UNSPECIFIED WHETHER STAGE 3A OR 3B CKD: Primary | ICD-10-CM

## 2023-10-04 DIAGNOSIS — N18.30 STAGE 3 CHRONIC KIDNEY DISEASE, UNSPECIFIED WHETHER STAGE 3A OR 3B CKD: ICD-10-CM

## 2023-10-04 LAB
BACTERIA UR QL AUTO: NORMAL /HPF
BILIRUB UR QL STRIP: NEGATIVE
CLARITY UR: CLEAR
COLOR UR: YELLOW
DEPRECATED RDW RBC AUTO: 42.3 FL (ref 37–54)
ERYTHROCYTE [DISTWIDTH] IN BLOOD BY AUTOMATED COUNT: 13.2 % (ref 12.3–15.4)
GLUCOSE UR STRIP-MCNC: ABNORMAL MG/DL
HCT VFR BLD AUTO: 48.2 % (ref 37.5–51)
HGB BLD-MCNC: 15.8 G/DL (ref 13–17.7)
HGB UR QL STRIP.AUTO: NEGATIVE
HYALINE CASTS UR QL AUTO: NORMAL /LPF
KETONES UR QL STRIP: NEGATIVE
LEUKOCYTE ESTERASE UR QL STRIP.AUTO: NEGATIVE
MCH RBC QN AUTO: 28.9 PG (ref 26.6–33)
MCHC RBC AUTO-ENTMCNC: 32.8 G/DL (ref 31.5–35.7)
MCV RBC AUTO: 88.1 FL (ref 79–97)
NITRITE UR QL STRIP: NEGATIVE
PH UR STRIP.AUTO: 6 [PH] (ref 5–8)
PLATELET # BLD AUTO: 172 10*3/MM3 (ref 140–450)
PMV BLD AUTO: 11.8 FL (ref 6–12)
PROT UR QL STRIP: NEGATIVE
RBC # BLD AUTO: 5.47 10*6/MM3 (ref 4.14–5.8)
RBC # UR STRIP: NORMAL /HPF
REF LAB TEST METHOD: NORMAL
SP GR UR STRIP: 1.02 (ref 1–1.03)
SQUAMOUS #/AREA URNS HPF: NORMAL /HPF
UROBILINOGEN UR QL STRIP: ABNORMAL
WBC # UR STRIP: NORMAL /HPF
WBC NRBC COR # BLD: 8.03 10*3/MM3 (ref 3.4–10.8)

## 2023-10-04 PROCEDURE — 81001 URINALYSIS AUTO W/SCOPE: CPT

## 2023-10-04 PROCEDURE — 36415 COLL VENOUS BLD VENIPUNCTURE: CPT

## 2023-10-04 PROCEDURE — 85027 COMPLETE CBC AUTOMATED: CPT

## 2023-10-04 PROCEDURE — 85007 BL SMEAR W/DIFF WBC COUNT: CPT

## 2023-10-04 PROCEDURE — 80069 RENAL FUNCTION PANEL: CPT

## 2023-10-05 LAB
ALBUMIN SERPL-MCNC: 4.3 G/DL (ref 3.5–5.2)
ANION GAP SERPL CALCULATED.3IONS-SCNC: 13 MMOL/L (ref 5–15)
BASOPHILS # BLD MANUAL: 0.08 10*3/MM3 (ref 0–0.2)
BASOPHILS NFR BLD MANUAL: 1 % (ref 0–1.5)
BUN SERPL-MCNC: 34 MG/DL (ref 8–23)
BUN/CREAT SERPL: 21 (ref 7–25)
CALCIUM SPEC-SCNC: 9.3 MG/DL (ref 8.6–10.5)
CHLORIDE SERPL-SCNC: 102 MMOL/L (ref 98–107)
CO2 SERPL-SCNC: 21 MMOL/L (ref 22–29)
CREAT SERPL-MCNC: 1.62 MG/DL (ref 0.76–1.27)
EGFRCR SERPLBLD CKD-EPI 2021: 46.2 ML/MIN/1.73
EOSINOPHIL # BLD MANUAL: 0.08 10*3/MM3 (ref 0–0.4)
EOSINOPHIL NFR BLD MANUAL: 1 % (ref 0.3–6.2)
GLUCOSE SERPL-MCNC: 286 MG/DL (ref 65–99)
LYMPHOCYTES # BLD MANUAL: 2.51 10*3/MM3 (ref 0.7–3.1)
LYMPHOCYTES NFR BLD MANUAL: 8.1 % (ref 5–12)
MONOCYTES # BLD: 0.65 10*3/MM3 (ref 0.1–0.9)
NEUTROPHILS # BLD AUTO: 4.71 10*3/MM3 (ref 1.7–7)
NEUTROPHILS NFR BLD MANUAL: 58.6 % (ref 42.7–76)
PHOSPHATE SERPL-MCNC: 3.1 MG/DL (ref 2.5–4.5)
PLAT MORPH BLD: NORMAL
POIKILOCYTOSIS BLD QL SMEAR: NORMAL
POTASSIUM SERPL-SCNC: 4.5 MMOL/L (ref 3.5–5.2)
SODIUM SERPL-SCNC: 136 MMOL/L (ref 136–145)
VARIANT LYMPHS NFR BLD MANUAL: 31.3 % (ref 19.6–45.3)
WBC MORPH BLD: NORMAL

## 2023-10-12 NOTE — PLAN OF CARE
Goal Outcome Evaluation:  Plan of Care Reviewed With: patient        Progress: improving  Outcome Evaluation: Patient showing improvement requiring less assistance for all mobility. He performed sidesteps to chair with modAx2 and ambulated 8' forward and 8' backwards from chair with modAx2 and BUE support. Posterior lean present in standing but improved from previous session. Good effort with BLE therex with patient able to recall exercises provided from previous session. Patient remains below his baseline, will continue with current PT POC.   No

## 2023-10-13 ENCOUNTER — PATIENT OUTREACH (OUTPATIENT)
Dept: CASE MANAGEMENT | Facility: OTHER | Age: 68
End: 2023-10-13
Payer: MEDICARE

## 2023-10-13 DIAGNOSIS — I10 PRIMARY HYPERTENSION: ICD-10-CM

## 2023-10-13 DIAGNOSIS — I50.21 ACUTE SYSTOLIC HEART FAILURE: Primary | ICD-10-CM

## 2023-10-13 NOTE — OUTREACH NOTE
"AMBULATORY CASE MANAGEMENT NOTE    Name and Relationship of Patient/Support Person: Herbert Harley \"Herb\" - Self    CCM Interim Update    RN-ACM outreach follow up call. Spoke with patient wife. Patient wife states everything is going well at home at this time. Patient wife states that patient had some cough and congestion earlier this week but it has resolved at this time. Patient wife confirms that patient has all medication in home at this time and denies any questions regarding medication. Patient has no further questions at this time. RN-ACM advised patient to call RN-ACM or [facility] with any needs.       Follow up outreach as scheduled.     Education Documentation  Heart Failure Eating Plan, taught by Nury Webber, RN at 10/13/2023 11:49 AM.  Learner: Family  Readiness: Acceptance  Method: Explanation, Handout  Response: Verbalizes Understanding    Hypertension, taught by Nury Webber, RN at 10/13/2023 11:49 AM.  Learner: Family  Readiness: Acceptance  Method: Explanation, Handout  Response: Verbalizes Understanding          Nury COOPER  Ambulatory Case Management    10/13/2023, 11:49 EDT  "

## 2023-10-31 ENCOUNTER — PATIENT OUTREACH (OUTPATIENT)
Dept: CASE MANAGEMENT | Facility: OTHER | Age: 68
End: 2023-10-31
Payer: MEDICARE

## 2023-10-31 DIAGNOSIS — I50.21 ACUTE SYSTOLIC HEART FAILURE: ICD-10-CM

## 2023-10-31 DIAGNOSIS — I10 PRIMARY HYPERTENSION: Primary | ICD-10-CM

## 2023-10-31 PROCEDURE — 99490 CHRNC CARE MGMT STAFF 1ST 20: CPT | Performed by: FAMILY MEDICINE

## 2023-10-31 NOTE — OUTREACH NOTE
[facility] End of Month Documentation    This Chronic Medical Management Care Plan for Herbert Harley, 67 y.o. male, has been monitored and managed; reviewed and a new plan of care implemented for the month of October.  A cumulative time of 24  minutes was spent on this patient record this month, including phone call with relative; chart review.    Regarding the patient's problems: has I25.10, CABG 03/01/23; Hypertension; Hyperlipidemia LDL goal <70; Type 2 diabetes mellitus with hypoglycemia, with long-term current use of insulin; Ischemic cardiomyopathy; Allergic rhinitis; Prolonged Q-T interval on ECG; Dependence on renal dialysis; Anemia in chronic kidney disease; Chronic obstructive pulmonary disease, unspecified; Chronic viral hepatitis C; Hypertensive heart disease with heart failure; Iron deficiency anemia; Long term (current) use of oral hypoglycemic drugs; Moderate protein-calorie malnutrition; Secondary hyperparathyroidism of renal origin; Chronic systolic (congestive) heart failure; and Atherosclerotic heart disease of native coronary artery without angina pectoris on their problem list., the following items were addressed: medical records; medications and any changes can be found within the plan section of the note.  A detailed listing of time spent for chronic care management is tracked within each outreach encounter.  Current medications include:  has a current medication list which includes the following prescription(s): aspirin, benzonatate, bumetanide, collagen-vitamin c-biotin, farxiga, ezetimibe, fluticasone, fluticasone-umeclidin-vilant, insulin syringe, lantus, metoprolol succinate xl, montelukast, nitroglycerin, potassium chloride er, and rosuvastatin. and the patient is reported to be patient is compliant with medication protocol,  Medications are reported to be effective.  All notes on chart for PCP to review.    The patient was monitored remotely for blood pressure; pain; medications; blood  "glucose.    The patient's physical needs include:  medication education.     The patient's mental support needs include:  increased support    The patient's cognitive support needs include:  emotional care    The patient's psychosocial support needs include:  continued support    The patient's functional needs include: medication education    The patient's environmental needs include:  not applicable    Care Plan overall comments:  No data recorded    Refer to previous outreach notes for more information on the areas listed above.    Monthly Billing Diagnoses  (I10) Primary hypertension    (I50.21) Acute systolic heart failure    Medications   Medications have been reconciled    Care Plan progress this month:      Recently Modified Care Plans Updates made since 9/30/2023 12:00 AM       Hypertension (Adult)           Problem Priority Last Modified     Hypertension (Hypertension) --  3/29/2023 11:15 AM by Nury Webber RN              Goal Recent Progress Last Modified     Hypertension Monitored --  3/29/2023 11:15 AM by Nury Webber RN     Evidence-based guidance:   Promote initial use of ambulatory blood pressure measurements (for 3 days) to rule out \"white-coat\" effect; identify masked hypertension and presence or absence of nocturnal \"dipping\" of blood pressure.    Encourage continued use of home blood pressure monitoring and recording in blood pressure log; include symptoms of hypotension or potential medication side effects in log.   Review blood pressure measurements taken inside and outside of the provider office; establish baseline and monitor trends; compare to target ranges or patient goal.   Share overall cardiovascular risk with patient; encourage changes to lifestyle risk factors, including alcohol consumption, smoking, inadequate exercise, poor dietary habits and stress.    Notes:            Task Due Date Last Modified     Identify and Monitor Blood Pressure Elevation --  10/13/2023 11:09 AM by " Nury Webber RN     Care Management Activities:      - home or ambulatory blood pressure monitoring encouraged      Notes:                Problem Priority Last Modified     Disease Progression (Hypertension) --  3/29/2023 11:15 AM by Nury Webber RN              Goal Recent Progress Last Modified     Disease Progression Prevented or Minimized --  3/29/2023 11:15 AM by Nury Webber RN     Evidence-based guidance:   Tailor lifestyle advice to individual; review progress regularly; give frequent encouragement and respond positively to incremental successes.   Assess for and promote awareness of worsening disease or development of comorbidity.   Prepare patient for laboratory and diagnostic exams based on risk and presentation.   Prepare patient for use of pharmacologic therapy that may include diuretic, beta-blocker, beta-blocker/thiazide combination, angiotensin-converting enzyme inhibitor, renin-angiotensin blocker or calcium-channel blocker.   Expect periodic adjustments to pharmacologic therapy; manage side effects.   Promote a healthy diet that includes primarily plant-based foods, such as fruits, vegetables, whole grains, beans and legumes, low-fat dairy and lean meats.    Consider moderate reduction in sodium intake by avoiding the addition of salt to prepared foods and limiting processed meats, canned soup, frozen meals and salty snacks.    Promote a regular, daily exercise goal of 150 minutes per week of moderate exercise based on tolerance, ability and patient choice; consider referral to physical therapist, community wellness and/or activity program.   Encourage the avoidance of no more than 2 hours per day of sedentary activity, such as recreational screen time.   Review sources of stress; explore current coping strategies and encourage use of mindfulness, yoga, meditation or exercise to manage stress.    Notes:            Task Due Date Last Modified     Alleviate Barriers to Hypertension  Treatment --  10/13/2023 11:10 AM by Nury Webber RN     Care Management Activities:      - healthy diet promoted  - healthy family lifestyle promoted  - medication side effects managed  - quality of sleep assessed      Notes:                          Current Specialty Plan of Care Status signed by both patient and provider    Instructions   Patient was provided an electronic copy of care plan  CCM services were explained and offered and patient has accepted these services.  Patient has given their written consent to receive CCM services and understands that this includes the authorization of electronic communication of medical information with the other treating providers.  Patient understands that they may stop CCM services at any time and these changes will be effective at the end of the calendar month and will effectively revocate the agreement of CCM services.  Patient understands that only one practitioner can furnish and be paid for CCM services during one calendar month.  Patient also understands that there may be co-payment or deductible fees in association with CCM services.  Patient will continue with at least monthly follow-up calls with the Ambulatory .    Nury COOPER  Ambulatory Case Management    10/31/2023, 14:40 EDT

## 2023-11-03 DIAGNOSIS — I50.22 CHRONIC SYSTOLIC (CONGESTIVE) HEART FAILURE: ICD-10-CM

## 2023-11-03 RX ORDER — POTASSIUM CHLORIDE 1500 MG/1
20 TABLET, EXTENDED RELEASE ORAL DAILY
Qty: 90 TABLET | Refills: 0 | Status: SHIPPED | OUTPATIENT
Start: 2023-11-03

## 2023-11-06 ENCOUNTER — OFFICE VISIT (OUTPATIENT)
Dept: ENDOCRINOLOGY | Facility: CLINIC | Age: 68
End: 2023-11-06
Payer: MEDICARE

## 2023-11-06 VITALS
WEIGHT: 165 LBS | OXYGEN SATURATION: 96 % | DIASTOLIC BLOOD PRESSURE: 52 MMHG | HEIGHT: 65 IN | SYSTOLIC BLOOD PRESSURE: 118 MMHG | HEART RATE: 62 BPM | BODY MASS INDEX: 27.49 KG/M2

## 2023-11-06 DIAGNOSIS — Z79.4 TYPE 2 DIABETES MELLITUS WITH HYPERGLYCEMIA, WITH LONG-TERM CURRENT USE OF INSULIN: Primary | ICD-10-CM

## 2023-11-06 DIAGNOSIS — E11.65 TYPE 2 DIABETES MELLITUS WITH HYPERGLYCEMIA, WITH LONG-TERM CURRENT USE OF INSULIN: Primary | ICD-10-CM

## 2023-11-06 LAB
EXPIRATION DATE: ABNORMAL
EXPIRATION DATE: ABNORMAL
GLUCOSE BLDC GLUCOMTR-MCNC: 170 MG/DL (ref 70–130)
HBA1C MFR BLD: 8 % (ref 4.5–5.7)
Lab: ABNORMAL
Lab: ABNORMAL

## 2023-11-06 PROCEDURE — 99213 OFFICE O/P EST LOW 20 MIN: CPT | Performed by: PHYSICIAN ASSISTANT

## 2023-11-06 PROCEDURE — 82947 ASSAY GLUCOSE BLOOD QUANT: CPT | Performed by: PHYSICIAN ASSISTANT

## 2023-11-06 PROCEDURE — 83036 HEMOGLOBIN GLYCOSYLATED A1C: CPT | Performed by: PHYSICIAN ASSISTANT

## 2023-11-06 PROCEDURE — 3052F HG A1C>EQUAL 8.0%<EQUAL 9.0%: CPT | Performed by: PHYSICIAN ASSISTANT

## 2023-11-06 PROCEDURE — 3078F DIAST BP <80 MM HG: CPT | Performed by: PHYSICIAN ASSISTANT

## 2023-11-06 PROCEDURE — 3074F SYST BP LT 130 MM HG: CPT | Performed by: PHYSICIAN ASSISTANT

## 2023-11-06 PROCEDURE — 1160F RVW MEDS BY RX/DR IN RCRD: CPT | Performed by: PHYSICIAN ASSISTANT

## 2023-11-06 PROCEDURE — 1159F MED LIST DOCD IN RCRD: CPT | Performed by: PHYSICIAN ASSISTANT

## 2023-11-06 RX ORDER — BUMETANIDE 1 MG/1
0.5 TABLET ORAL DAILY
COMMUNITY

## 2023-11-06 RX ORDER — SERTRALINE HYDROCHLORIDE 25 MG/1
50 TABLET, FILM COATED ORAL DAILY
COMMUNITY
Start: 2023-09-28

## 2023-11-28 ENCOUNTER — PATIENT OUTREACH (OUTPATIENT)
Dept: CASE MANAGEMENT | Facility: OTHER | Age: 68
End: 2023-11-28
Payer: MEDICARE

## 2023-11-28 DIAGNOSIS — I10 PRIMARY HYPERTENSION: ICD-10-CM

## 2023-11-28 DIAGNOSIS — I50.21 ACUTE SYSTOLIC HEART FAILURE: Primary | ICD-10-CM

## 2023-11-28 PROCEDURE — 99490 CHRNC CARE MGMT STAFF 1ST 20: CPT | Performed by: FAMILY MEDICINE

## 2023-11-28 NOTE — OUTREACH NOTE
"AMBULATORY CASE MANAGEMENT NOTE    Name and Relationship of Patient/Support Person: Herbert Harley \"Herb\" - Self  Self    CCM Interim Update    RN-ACM outreach follow up call, spoke with patient wife. Patient wife states they are doing well at home. Patient still has a cough that is more of an annoyance than anything. Patient wife also states that patient chronic conditions, HTN and CHF and DM seem to be doing well and all readings are staying in normal range. Patient wife states they picked up refills to all medications last week and do not need any refills at this time. Patient wife denies any questions regarding medication today. Patient has no further questions at this time. RN-ACM advised patient to call RN-ACM or [facility] Line with any needs.       Follow up outreach as scheduled.    Education Documentation  When to Seek Medical Attention, taught by Nury Webber, RN at 11/28/2023 12:23 PM.  Learner: Family  Readiness: Acceptance  Method: Explanation  Response: Verbalizes Understanding    Adherence to Disease Management Plan, taught by Nury Webber RN at 11/28/2023 12:23 PM.  Learner: Family  Readiness: Acceptance  Method: Explanation  Response: Verbalizes Understanding          Nury COOPER  Ambulatory Case Management    11/28/2023, 12:24 EST  "

## 2023-11-29 ENCOUNTER — TRANSCRIBE ORDERS (OUTPATIENT)
Dept: LAB | Facility: HOSPITAL | Age: 68
End: 2023-11-29
Payer: MEDICARE

## 2023-11-29 ENCOUNTER — LAB (OUTPATIENT)
Dept: LAB | Facility: HOSPITAL | Age: 68
End: 2023-11-29
Payer: MEDICARE

## 2023-11-29 DIAGNOSIS — N18.30 STAGE 3 CHRONIC KIDNEY DISEASE, UNSPECIFIED WHETHER STAGE 3A OR 3B CKD: ICD-10-CM

## 2023-11-29 DIAGNOSIS — N18.30 STAGE 3 CHRONIC KIDNEY DISEASE, UNSPECIFIED WHETHER STAGE 3A OR 3B CKD: Primary | ICD-10-CM

## 2023-11-29 LAB
ALBUMIN SERPL-MCNC: 4.4 G/DL (ref 3.5–5.2)
ANION GAP SERPL CALCULATED.3IONS-SCNC: 12.4 MMOL/L (ref 5–15)
BUN SERPL-MCNC: 24 MG/DL (ref 8–23)
BUN/CREAT SERPL: 16.9 (ref 7–25)
CALCIUM SPEC-SCNC: 9 MG/DL (ref 8.6–10.5)
CHLORIDE SERPL-SCNC: 102 MMOL/L (ref 98–107)
CO2 SERPL-SCNC: 25.6 MMOL/L (ref 22–29)
CREAT SERPL-MCNC: 1.42 MG/DL (ref 0.76–1.27)
EGFRCR SERPLBLD CKD-EPI 2021: 53.8 ML/MIN/1.73
GLUCOSE SERPL-MCNC: 224 MG/DL (ref 65–99)
PHOSPHATE SERPL-MCNC: 3.1 MG/DL (ref 2.5–4.5)
POTASSIUM SERPL-SCNC: 4.5 MMOL/L (ref 3.5–5.2)
SODIUM SERPL-SCNC: 140 MMOL/L (ref 136–145)

## 2023-11-29 PROCEDURE — 80069 RENAL FUNCTION PANEL: CPT

## 2023-11-29 PROCEDURE — 81001 URINALYSIS AUTO W/SCOPE: CPT

## 2023-11-29 PROCEDURE — 36415 COLL VENOUS BLD VENIPUNCTURE: CPT

## 2023-11-29 PROCEDURE — 84156 ASSAY OF PROTEIN URINE: CPT

## 2023-11-29 PROCEDURE — 82570 ASSAY OF URINE CREATININE: CPT

## 2023-11-29 PROCEDURE — 85025 COMPLETE CBC W/AUTO DIFF WBC: CPT

## 2023-11-30 ENCOUNTER — PATIENT OUTREACH (OUTPATIENT)
Dept: CASE MANAGEMENT | Facility: OTHER | Age: 68
End: 2023-11-30
Payer: MEDICARE

## 2023-11-30 DIAGNOSIS — I50.21 ACUTE SYSTOLIC HEART FAILURE: ICD-10-CM

## 2023-11-30 DIAGNOSIS — I10 PRIMARY HYPERTENSION: Primary | ICD-10-CM

## 2023-11-30 LAB
BACTERIA UR QL AUTO: NORMAL /HPF
BASOPHILS # BLD AUTO: 0.09 10*3/MM3 (ref 0–0.2)
BASOPHILS NFR BLD AUTO: 1.1 % (ref 0–1.5)
BILIRUB UR QL STRIP: NEGATIVE
CLARITY UR: CLEAR
COLOR UR: YELLOW
CREAT UR-MCNC: 30 MG/DL
DEPRECATED RDW RBC AUTO: 44.8 FL (ref 37–54)
EOSINOPHIL # BLD AUTO: 0.34 10*3/MM3 (ref 0–0.4)
EOSINOPHIL NFR BLD AUTO: 4.2 % (ref 0.3–6.2)
ERYTHROCYTE [DISTWIDTH] IN BLOOD BY AUTOMATED COUNT: 13.6 % (ref 12.3–15.4)
GLUCOSE UR STRIP-MCNC: ABNORMAL MG/DL
HCT VFR BLD AUTO: 49.2 % (ref 37.5–51)
HGB BLD-MCNC: 16 G/DL (ref 13–17.7)
HGB UR QL STRIP.AUTO: NEGATIVE
HYALINE CASTS UR QL AUTO: NORMAL /LPF
IMM GRANULOCYTES # BLD AUTO: 0.02 10*3/MM3 (ref 0–0.05)
IMM GRANULOCYTES NFR BLD AUTO: 0.2 % (ref 0–0.5)
KETONES UR QL STRIP: NEGATIVE
LEUKOCYTE ESTERASE UR QL STRIP.AUTO: NEGATIVE
LYMPHOCYTES # BLD AUTO: 1.75 10*3/MM3 (ref 0.7–3.1)
LYMPHOCYTES NFR BLD AUTO: 21.8 % (ref 19.6–45.3)
MCH RBC QN AUTO: 29 PG (ref 26.6–33)
MCHC RBC AUTO-ENTMCNC: 32.5 G/DL (ref 31.5–35.7)
MCV RBC AUTO: 89.3 FL (ref 79–97)
MONOCYTES # BLD AUTO: 0.85 10*3/MM3 (ref 0.1–0.9)
MONOCYTES NFR BLD AUTO: 10.6 % (ref 5–12)
NEUTROPHILS NFR BLD AUTO: 4.99 10*3/MM3 (ref 1.7–7)
NEUTROPHILS NFR BLD AUTO: 62.1 % (ref 42.7–76)
NITRITE UR QL STRIP: NEGATIVE
NRBC BLD AUTO-RTO: 0 /100 WBC (ref 0–0.2)
PH UR STRIP.AUTO: 5.5 [PH] (ref 5–8)
PLATELET # BLD AUTO: 223 10*3/MM3 (ref 140–450)
PMV BLD AUTO: 10.8 FL (ref 6–12)
PROT ?TM UR-MCNC: <4 MG/DL
PROT UR QL STRIP: NEGATIVE
RBC # BLD AUTO: 5.51 10*6/MM3 (ref 4.14–5.8)
RBC # UR STRIP: NORMAL /HPF
REF LAB TEST METHOD: NORMAL
SP GR UR STRIP: 1.02 (ref 1–1.03)
SQUAMOUS #/AREA URNS HPF: NORMAL /HPF
UROBILINOGEN UR QL STRIP: ABNORMAL
WBC # UR STRIP: NORMAL /HPF
WBC NRBC COR # BLD AUTO: 8.04 10*3/MM3 (ref 3.4–10.8)

## 2023-11-30 NOTE — OUTREACH NOTE
[facility] End of Month Documentation    This Chronic Medical Management Care Plan for Herbert Harley, 68 y.o. male, has been monitored and managed; reviewed and a new plan of care implemented for the month of November.  A cumulative time of 25  minutes was spent on this patient record this month, including phone call with relative; chart review.    Regarding the patient's problems: has I25.10, CABG 03/01/23; Hypertension; Hyperlipidemia LDL goal <70; Type 2 diabetes mellitus with hypoglycemia, with long-term current use of insulin; Ischemic cardiomyopathy; Allergic rhinitis; Prolonged Q-T interval on ECG; Dependence on renal dialysis; Anemia in chronic kidney disease; Chronic obstructive pulmonary disease, unspecified; Chronic viral hepatitis C; Hypertensive heart disease with heart failure; Iron deficiency anemia; Long term (current) use of oral hypoglycemic drugs; Moderate protein-calorie malnutrition; Secondary hyperparathyroidism of renal origin; Chronic systolic (congestive) heart failure; and Atherosclerotic heart disease of native coronary artery without angina pectoris on their problem list., the following items were addressed: medical records; medications and any changes can be found within the plan section of the note.  A detailed listing of time spent for chronic care management is tracked within each outreach encounter.  Current medications include:  has a current medication list which includes the following prescription(s): aspirin, bumetanide, collagen-vitamin c-biotin, farxiga, ezetimibe, fluticasone, fluticasone-umeclidin-vilant, insulin syringe, lantus, metoprolol succinate xl, montelukast, nitroglycerin, potassium chloride er, rosuvastatin, and sertraline. and the patient is reported to be patient is compliant with medication protocol,  Medications are reported to be effective.  All notes on chart for PCP to review.    The patient was monitored remotely for blood pressure; pain; medications; blood  glucose.    The patient's physical needs include:  medication education.     The patient's mental support needs include:  increased support    The patient's cognitive support needs include:  emotional care    The patient's psychosocial support needs include:  continued support    The patient's functional needs include: medication education    The patient's environmental needs include:  not applicable    Care Plan overall comments:  No data recorded    Refer to previous outreach notes for more information on the areas listed above.    Monthly Billing Diagnoses  (I10) Primary hypertension    (I50.21) Acute systolic heart failure    Medications   Medications have been reconciled    Care Plan progress this month:      Recently Modified Care Plans Updates made since 10/30/2023 12:00 AM       Hypertension (Adult)           Problem Priority Last Modified     Resistant Hypertension (Hypertension) --  3/29/2023 11:15 AM by Nury Webber RN              Goal Recent Progress Last Modified     Response to Treatment Maximized --  3/29/2023 11:15 AM by Nury Webber RN     Evidence-based guidance:   Assess patient response to treatment, including presence or absence of medication side effects, degree of blood pressure control and patient satisfaction.   Assess technique (including cuff size and placement), measurement times, condition and calibration of blood pressure cuff set (both at-home and in-office equipment).   Assess factors that may influence response to treatment, including nonadherence to pharmacologic treatment plan, diet or activity changes and/or presence of pain, stress or sleep disturbance.   Screen for signs and symptoms of depression; if present, refer for or complete a comprehensive assessment.   Evaluate social and economic barriers that may affect adherence to treatment plan   Address pharmacologic nonadherence by simplifying dosing regimen, counseling or support by pharmacist, financial assistance,  self-monitoring of blood pressure, use of motivational interviewing, voice or text messages.   Encourage behavioral adherence strategies, like habit-based interventions that link medication taking with existing daily routines.   Assess barriers to regular, daily physical activity; support family or support person-oriented activity changes and utilization of community activity or sports program.   Address barriers to dietary changes, especially sodium restriction, with referrals to community programs, like cooking classes, meal services or intensive education when available.   Refer to community-based peer support program or nurse home-visiting program.   Assess for chronic pain; when present add additional goals (Chronic Pain Care Plan Guide) as needed.   Provide frequent follow-up by telephone, telemonitoring, patient-practice portal or with home visit.   Review alcohol use screen; address using brief intervention beginning with risk that interferes with blood pressure control; refer for treatment when excessive alcohol use is noted.   Screen for obstructive sleep apnea; prepare patient for polysomnography based on risk and presentation and use of noninvasive ventilation to relieve obstructive sleep apnea when present.    Notes:            Task Due Date Last Modified     Facilitate Adherence to Lifestyle Change --  11/28/2023 12:22 PM by Nury Webber RN     Care Management Activities:      - medication adherence assessment completed  - success praised  - support and encouragement provided      Notes:                          Current Specialty Plan of Care Status signed by both patient and provider    Instructions   Patient was provided an electronic copy of care plan  CCM services were explained and offered and patient has accepted these services.  Patient has given their written consent to receive CCM services and understands that this includes the authorization of electronic communication of medical  information with the other treating providers.  Patient understands that they may stop CCM services at any time and these changes will be effective at the end of the calendar month and will effectively revocate the agreement of CCM services.  Patient understands that only one practitioner can furnish and be paid for CCM services during one calendar month.  Patient also understands that there may be co-payment or deductible fees in association with CCM services.  Patient will continue with at least monthly follow-up calls with the Ambulatory .    Nury COOPER  Ambulatory Case Management    11/30/2023, 13:14 EST

## 2023-12-13 DIAGNOSIS — I50.22 CHRONIC SYSTOLIC (CONGESTIVE) HEART FAILURE: ICD-10-CM

## 2023-12-13 RX ORDER — MONTELUKAST SODIUM 10 MG/1
10 TABLET ORAL EVERY EVENING
Qty: 30 TABLET | Refills: 5 | Status: SHIPPED | OUTPATIENT
Start: 2023-12-13

## 2023-12-13 RX ORDER — POTASSIUM CHLORIDE 1500 MG/1
20 TABLET, EXTENDED RELEASE ORAL DAILY
Qty: 90 TABLET | Refills: 0 | Status: SHIPPED | OUTPATIENT
Start: 2023-12-13

## 2023-12-13 RX ORDER — DAPAGLIFLOZIN 10 MG/1
10 TABLET, FILM COATED ORAL DAILY
Qty: 90 TABLET | Refills: 3 | Status: SHIPPED | OUTPATIENT
Start: 2023-12-13

## 2023-12-13 RX ORDER — DAPAGLIFLOZIN 10 MG/1
10 TABLET, FILM COATED ORAL DAILY
Qty: 90 TABLET | Refills: 3 | Status: SHIPPED | OUTPATIENT
Start: 2023-12-13 | End: 2023-12-13 | Stop reason: SDUPTHER

## 2023-12-14 DIAGNOSIS — I50.22 CHRONIC SYSTOLIC (CONGESTIVE) HEART FAILURE: ICD-10-CM

## 2023-12-14 RX ORDER — ASPIRIN 81 MG/1
81 TABLET ORAL DAILY
Qty: 90 TABLET | Refills: 3 | Status: SHIPPED | OUTPATIENT
Start: 2023-12-14

## 2024-01-24 RX ORDER — EZETIMIBE 10 MG/1
10 TABLET ORAL DAILY
Qty: 90 TABLET | Refills: 1 | Status: SHIPPED | OUTPATIENT
Start: 2024-01-24

## 2024-01-30 ENCOUNTER — OFFICE VISIT (OUTPATIENT)
Dept: FAMILY MEDICINE CLINIC | Facility: CLINIC | Age: 69
End: 2024-01-30
Payer: MEDICARE

## 2024-01-30 VITALS
DIASTOLIC BLOOD PRESSURE: 62 MMHG | SYSTOLIC BLOOD PRESSURE: 120 MMHG | WEIGHT: 169 LBS | BODY MASS INDEX: 28.16 KG/M2 | HEIGHT: 65 IN | TEMPERATURE: 98.2 F | HEART RATE: 72 BPM

## 2024-01-30 DIAGNOSIS — R79.9 ABNORMAL FINDING OF BLOOD CHEMISTRY, UNSPECIFIED: ICD-10-CM

## 2024-01-30 DIAGNOSIS — R06.83 SNORING: ICD-10-CM

## 2024-01-30 DIAGNOSIS — Z79.4 TYPE 2 DIABETES MELLITUS WITH HYPOGLYCEMIA WITHOUT COMA, WITH LONG-TERM CURRENT USE OF INSULIN: ICD-10-CM

## 2024-01-30 DIAGNOSIS — E11.649 TYPE 2 DIABETES MELLITUS WITH HYPOGLYCEMIA WITHOUT COMA, WITH LONG-TERM CURRENT USE OF INSULIN: ICD-10-CM

## 2024-01-30 DIAGNOSIS — Z00.00 ENCOUNTER FOR SUBSEQUENT ANNUAL WELLNESS VISIT (AWV) IN MEDICARE PATIENT: Primary | ICD-10-CM

## 2024-01-30 DIAGNOSIS — Z12.2 ENCOUNTER FOR SCREENING FOR LUNG CANCER: ICD-10-CM

## 2024-01-30 DIAGNOSIS — F17.211 NICOTINE DEPENDENCE, CIGARETTES, IN REMISSION: ICD-10-CM

## 2024-01-30 DIAGNOSIS — L50.9 URTICARIA: ICD-10-CM

## 2024-01-30 DIAGNOSIS — I10 PRIMARY HYPERTENSION: ICD-10-CM

## 2024-01-30 DIAGNOSIS — R06.81 WITNESSED EPISODE OF APNEA: ICD-10-CM

## 2024-01-30 DIAGNOSIS — H66.002 NON-RECURRENT ACUTE SUPPURATIVE OTITIS MEDIA OF LEFT EAR WITHOUT SPONTANEOUS RUPTURE OF TYMPANIC MEMBRANE: ICD-10-CM

## 2024-01-30 DIAGNOSIS — M89.9 DISORDER OF BONE, UNSPECIFIED: ICD-10-CM

## 2024-01-30 DIAGNOSIS — R53.82 CHRONIC FATIGUE: ICD-10-CM

## 2024-01-30 DIAGNOSIS — Z12.5 SCREENING PSA (PROSTATE SPECIFIC ANTIGEN): ICD-10-CM

## 2024-01-30 RX ORDER — AMOXICILLIN AND CLAVULANATE POTASSIUM 875; 125 MG/1; MG/1
1 TABLET, FILM COATED ORAL 2 TIMES DAILY
Qty: 14 TABLET | Refills: 0 | Status: SHIPPED | OUTPATIENT
Start: 2024-01-30

## 2024-01-30 RX ORDER — ALBUTEROL SULFATE 90 UG/1
AEROSOL, METERED RESPIRATORY (INHALATION)
COMMUNITY
Start: 2023-11-10

## 2024-01-30 NOTE — ASSESSMENT & PLAN NOTE
Due to signs and symptoms and duration of illness will start antibiotics.  Rx for Augmentin x10 days sent to pharmacy  Continue allergy medications, Tylenol as needed.  If symptoms do not improve patient to return to clinic for reevaluation

## 2024-01-30 NOTE — ASSESSMENT & PLAN NOTE
Diabetes is unchanged.   Continue current treatment regimen.  Diabetes will be reassessed in 6 months.  - follows with Endocrinology

## 2024-01-30 NOTE — PROGRESS NOTES
"The ABCs of the Annual Wellness Visit  Subsequent Medicare Wellness Visit    Subjective    Herbert Harley is a 68 y.o. male who presents for a Subsequent Medicare Wellness Visit.    The following portions of the patient's history were reviewed and   updated as appropriate: allergies, current medications, past family history, past medical history, past social history, past surgical history, and problem list.    Compared to one year ago, the patient feels his physical   health is worse.    Compared to one year ago, the patient feels his mental   health is the same.    Recent Hospitalizations:  This patient has had a [facility] admission record on file within the last 365 days.    Current Medical Providers:  Patient Care Team:  Yessica Ricci DO as PCP - General (Family Medicine)  Nima Peter MD as Consulting Physician (Endocrinology)  Nadya Ramirez MD as Cardiologist (Cardiology)  Yessica Ricci DO as Consulting Physician (Family Medicine)  Nury Webber RN as Ambulatory  (Formerly named [facility])    Outpatient Medications Prior to Visit   Medication Sig Dispense Refill   • albuterol sulfate  (90 Base) MCG/ACT inhaler      • aspirin 81 MG EC tablet Take 1 tablet by mouth Daily. 90 tablet 3   • bumetanide (BUMEX) 1 MG tablet Take 0.5 tablets by mouth Daily.     • Collagen-Vitamin C-Biotin (COLLAGEN 1500/C PO) Take 1 tablet by mouth Daily. OTC supplement     • dapagliflozin Propanediol (Farxiga) 10 MG tablet Take 10 mg by mouth Daily. Indications: Type 2 Diabetes 90 tablet 3   • ezetimibe (ZETIA) 10 MG tablet Take 1 tablet by mouth Daily. 90 tablet 1   • fluticasone (FLONASE) 50 MCG/ACT nasal spray 2 sprays into the nostril(s) as directed by provider Daily. 15.8 mL 5   • Fluticasone-Umeclidin-Vilant (TRELEGY) 100-62.5-25 MCG/ACT inhaler Inhale 1 puff Daily. 1 each 5   • Insulin Syringe 31G X 5/16\" 0.5 ML misc 1 each Daily. With insulin injections 100 each 11   • Lantus 100 " UNIT/ML injection Inject 30 units every morning, increase by 2 units every 4 days for fasting goal  MX 50 units/day 15 mL 5   • metoprolol succinate XL (TOPROL-XL) 25 MG 24 hr tablet Take 0.5 tablets by mouth Daily. 90 tablet 11   • montelukast (SINGULAIR) 10 MG tablet TAKE ONE TABLET BY MOUTH EVERY EVENING 30 tablet 5   • potassium chloride ER (K-TAB) 20 MEQ tablet controlled-release ER tablet TAKE 1 TABLET BY MOUTH DAILY 90 tablet 0   • rosuvastatin (CRESTOR) 10 MG tablet Take 1 tablet by mouth Daily. 30 tablet 11   • sertraline (ZOLOFT) 25 MG tablet Take 2 tablets by mouth Daily.     • nitroglycerin (NITROSTAT) 0.4 MG SL tablet Place 1 tablet under the tongue Every 5 (Five) Minutes As Needed for Chest Pain for up to 25 days. Take no more than 3 doses in 15 minutes. 25 tablet 3     No facility-administered medications prior to visit.       No opioid medication identified on active medication list. I have reviewed chart for other potential  high risk medication/s and harmful drug interactions in the elderly.        Aspirin is on active medication list. Aspirin use is indicated based on review of current medical condition/s. Pros and cons of this therapy have been discussed today. Benefits of this medication outweigh potential harm.  Patient has been encouraged to continue taking this medication.  .      Patient Active Problem List   Diagnosis   • I25.10, CABG 03/01/23   • Hypertension   • Hyperlipidemia LDL goal <70   • Type 2 diabetes mellitus with hypoglycemia, with long-term current use of insulin   • Ischemic cardiomyopathy   • Allergic rhinitis   • Prolonged Q-T interval on ECG   • Dependence on renal dialysis   • Anemia in chronic kidney disease   • Chronic obstructive pulmonary disease, unspecified   • Chronic viral hepatitis C   • Hypertensive heart disease with heart failure   • Iron deficiency anemia   • Long term (current) use of oral hypoglycemic drugs   • Moderate protein-calorie malnutrition   •  "Secondary hyperparathyroidism of renal origin   • Chronic systolic (congestive) heart failure   • Atherosclerotic heart disease of native coronary artery without angina pectoris   • Non-recurrent acute suppurative otitis media of left ear without spontaneous rupture of tympanic membrane     Advance Care Planning   Advance Care Planning     Advance Directive is not on file.  ACP discussion was held with the patient during this visit. Patient has an advance directive (not in EMR), copy requested.     Objective    Vitals:    24 1410   BP: 120/62   Pulse: 72   Temp: 98.2 °F (36.8 °C)   TempSrc: Infrared   Weight: 76.7 kg (169 lb)   Height: 165.1 cm (65\")   PainSc: 0-No pain     Estimated body mass index is 28.12 kg/m² as calculated from the following:    Height as of this encounter: 165.1 cm (65\").    Weight as of this encounter: 76.7 kg (169 lb).           Does the patient have evidence of cognitive impairment? No    Lab Results   Component Value Date    HGBA1C 8.0 (A) 2023        HEALTH RISK ASSESSMENT    Smoking Status:  Social History     Tobacco Use   Smoking Status Former   • Packs/day: 1.50   • Years: 52.00   • Additional pack years: 0.00   • Total pack years: 78.00   • Types: Cigarettes   • Start date: 1970   • Quit date: 2022   • Years since quittin.1   • Passive exposure: Past   Smokeless Tobacco Never     Alcohol Consumption:  Social History     Substance and Sexual Activity   Alcohol Use Not Currently     Fall Risk Screen:    LUISADI Fall Risk Assessment was completed, and patient is at LOW risk for falls.Assessment completed on:2024    Depression Screenin/30/2024     2:03 PM   PHQ-2/PHQ-9 Depression Screening   Little Interest or Pleasure in Doing Things 0-->not at all   Feeling Down, Depressed or Hopeless 0-->not at all   PHQ-9: Brief Depression Severity Measure Score 0       Health Habits and Functional and Cognitive Screenin/30/2024     2:09 PM   Functional " & Cognitive Status   Do you have difficulty preparing food and eating? No   Do you have difficulty bathing yourself, getting dressed or grooming yourself? No   Do you have difficulty using the toilet? No   Do you have difficulty moving around from place to place? No   Do you have trouble with steps or getting out of a bed or a chair? No   Current Diet Well Balanced Diet   Dental Exam Not up to date   Eye Exam Up to date   Current Exercises Include Walking   Do you need help using the phone?  No   Are you deaf or do you have serious difficulty hearing?  No   Do you need help to go to places out of walking distance? No   Do you need help shopping? No   Do you need help preparing meals?  No   Do you need help with housework?  No   Do you need help with laundry? No   Do you need help taking your medications? No   Do you need help managing money? No   Do you ever drive or ride in a car without wearing a seat belt? No       Age-appropriate Screening Schedule:  Refer to the list below for future screening recommendations based on patient's age, sex and/or medical conditions. Orders for these recommended tests are listed in the plan section. The patient has been provided with a written plan.    Health Maintenance   Topic Date Due   • DIABETIC FOOT EXAM  12/14/2022   • LUNG CANCER SCREENING  12/11/2023   • ZOSTER VACCINE (1 of 2) 01/30/2024 (Originally 11/9/2005)   • INFLUENZA VACCINE  03/31/2024 (Originally 8/1/2023)   • COVID-19 Vaccine (1) 04/20/2024 (Originally 5/9/1956)   • Hepatitis B (1 of 3 - Risk 3-dose series) 05/02/2024 (Originally 11/9/2015)   • Pneumococcal Vaccine 65+ (1 of 2 - PCV) 05/16/2024 (Originally 11/9/1961)   • LIPID PANEL  02/17/2024   • HEMOGLOBIN A1C  05/06/2024   • URINE MICROALBUMIN  07/05/2024   • BMI FOLLOWUP  07/28/2024   • DIABETIC EYE EXAM  10/19/2024   • ANNUAL WELLNESS VISIT  01/30/2025   • COLORECTAL CANCER SCREENING  02/23/2026   • TDAP/TD VACCINES (2 - Td or Tdap) 01/26/2033   •  "HEPATITIS C SCREENING  Completed   • AAA SCREEN (ONE-TIME)  Completed                  CMS Preventative Services Quick Reference  Risk Factors Identified During Encounter  Fall Risk-High or Moderate: Information on Fall Prevention Shared in After Visit Summary  Hearing Problem:  will discuss at follow up  Dental Screening Recommended  Vision Screening Recommended  The above risks/problems have been discussed with the patient.  Pertinent information has been shared with the patient in the After Visit Summary.  An After Visit Summary and PPPS were made available to the patient.    Follow Up:   Next Medicare Wellness visit to be scheduled in 1 year.       Additional E&M Note during same encounter follows:  Patient has multiple medical problems which are significant and separately identifiable that require additional work above and beyond the Medicare Wellness Visit.      Chief Complaint  Medicare Wellness-subsequent    Subjective        HPI  Hrebert Harley is also being seen today for ear pain and pressure.  He is also felt like his eye was having pressure and droopy over the last few days.  He has had ear popping as well.  He has chronic hearing loss issues.    He has chronic fatigue.  He does snore at night.  His wife does note that she has noted some apneic episodes.     He also had an episode of hives on his hands that he woke up with brained only 1 morning he has had issues like this in the past.     He has a past medical history of hypertension, hyperlipidemia, ischemic cardiomyopathy, chronic systolic heart failure, coronary artery disease status post CABG, type 2 diabetes, chronic viral hepatitis C, iron deficiency anemia, COPD.        Objective   Vital Signs:  /62   Pulse 72   Temp 98.2 °F (36.8 °C) (Infrared)   Ht 165.1 cm (65\")   Wt 76.7 kg (169 lb)   BMI 28.12 kg/m²     Physical Exam  Constitutional:       Appearance: He is not ill-appearing.   HENT:      Left Ear: A middle ear effusion is " present. Tympanic membrane is erythematous.   Eyes:      Pupils: Pupils are equal, round, and reactive to light.   Cardiovascular:      Rate and Rhythm: Normal rate.      Pulses: Normal pulses.   Pulmonary:      Effort: Pulmonary effort is normal.      Breath sounds: Normal breath sounds.   Neurological:      General: No focal deficit present.      Mental Status: He is alert. Mental status is at baseline.               Assessment and Plan   Diagnoses and all orders for this visit:    1. Encounter for subsequent annual wellness visit (AWV) in Medicare patient (Primary)  -     CBC & Differential; Future  -     Comprehensive Metabolic Panel; Future  -     Lipid Panel; Future  -     Hemoglobin A1c; Future  -     TSH Rfx On Abnormal To Free T4; Future  -     Vitamin B12; Future  -     Vitamin D,25-Hydroxy; Future  -     Iron Profile; Future  -     PSA Screen; Future    2. Witnessed episode of apnea  -     Ambulatory Referral to Sleep Medicine    3. Snoring  -     Ambulatory Referral to Sleep Medicine    4. Type 2 diabetes mellitus with hypoglycemia without coma, with long-term current use of insulin  Assessment & Plan:  Diabetes is unchanged.   Continue current treatment regimen.  Diabetes will be reassessed in 6 months.  - follows with Endocrinology       5. Primary hypertension  Assessment & Plan:  Hypertension is improving with treatment.  Continue current treatment regimen.  Blood pressure will be reassessed at the next regular appointment.    The patient was instructed to take blood pressure readings daily and bring log to next scheduled follow up.   Advised that their target blood pressure readings are an average of <140/90.  Advised to follow a low salt diet.         6. Encounter for screening for lung cancer  -      CT Chest Low Dose Cancer Screening WO; Future    7. Nicotine dependence, cigarettes, in remission  -      CT Chest Low Dose Cancer Screening WO; Future    8. Screening PSA (prostate specific  antigen)  -     PSA Screen; Future    9. Chronic fatigue  -     TSH Rfx On Abnormal To Free T4; Future  -     Vitamin B12; Future  -     Vitamin D,25-Hydroxy; Future  -     Iron Profile; Future    10. Disorder of bone, unspecified  -     Vitamin D,25-Hydroxy; Future    11. Abnormal finding of blood chemistry, unspecified  -     Iron Profile; Future    12. Non-recurrent acute suppurative otitis media of left ear without spontaneous rupture of tympanic membrane  Assessment & Plan:  Due to signs and symptoms and duration of illness will start antibiotics.  Rx for Augmentin x10 days sent to pharmacy  Continue allergy medications, Tylenol as needed.  If symptoms do not improve patient to return to clinic for reevaluation      Orders:  -     amoxicillin-clavulanate (AUGMENTIN) 875-125 MG per tablet; Take 1 tablet by mouth 2 (Two) Times a Day.  Dispense: 14 tablet; Refill: 0    13. Urticaria  -     Ambulatory Referral to Dermatology  -     betamethasone valerate (VALISONE) 0.1 % ointment; Apply 1 Application topically to the appropriate area as directed 2 (Two) Times a Day.  Dispense: 45 g; Refill: 1             Follow Up   Return in about 3 months (around 4/30/2024) for Recheck.  Patient was given instructions and counseling regarding his condition or for health maintenance advice. Please see specific information pulled into the AVS if appropriate.       This document has been electronically signed by Yessica Ricci DO   January 30, 2024 15:14 EST    Dictated Utilizing Dragon Dictation: Part of this note may be an electronic transcription/translation of spoken language to printed text using the Dragon Dictation System.    Yessica Ricci D.O.  [facility]

## 2024-02-02 ENCOUNTER — LAB (OUTPATIENT)
Dept: LAB | Facility: HOSPITAL | Age: 69
End: 2024-02-02
Payer: MEDICARE

## 2024-02-02 DIAGNOSIS — Z00.00 ENCOUNTER FOR SUBSEQUENT ANNUAL WELLNESS VISIT (AWV) IN MEDICARE PATIENT: ICD-10-CM

## 2024-02-02 DIAGNOSIS — Z12.5 SCREENING PSA (PROSTATE SPECIFIC ANTIGEN): ICD-10-CM

## 2024-02-02 DIAGNOSIS — M89.9 DISORDER OF BONE, UNSPECIFIED: ICD-10-CM

## 2024-02-02 DIAGNOSIS — R79.9 ABNORMAL FINDING OF BLOOD CHEMISTRY, UNSPECIFIED: ICD-10-CM

## 2024-02-02 DIAGNOSIS — R53.82 CHRONIC FATIGUE: ICD-10-CM

## 2024-02-02 LAB
25(OH)D3 SERPL-MCNC: 13.6 NG/ML (ref 30–100)
ALBUMIN SERPL-MCNC: 4 G/DL (ref 3.5–5.2)
ALBUMIN/GLOB SERPL: 1.3 G/DL
ALP SERPL-CCNC: 92 U/L (ref 39–117)
ALT SERPL W P-5'-P-CCNC: 21 U/L (ref 1–41)
ANION GAP SERPL CALCULATED.3IONS-SCNC: 10.4 MMOL/L (ref 5–15)
AST SERPL-CCNC: 22 U/L (ref 1–40)
BASOPHILS # BLD AUTO: 0.09 10*3/MM3 (ref 0–0.2)
BASOPHILS NFR BLD AUTO: 1.3 % (ref 0–1.5)
BILIRUB SERPL-MCNC: 0.4 MG/DL (ref 0–1.2)
BUN SERPL-MCNC: 23 MG/DL (ref 8–23)
BUN/CREAT SERPL: 14.5 (ref 7–25)
CALCIUM SPEC-SCNC: 9.1 MG/DL (ref 8.6–10.5)
CHLORIDE SERPL-SCNC: 104 MMOL/L (ref 98–107)
CHOLEST SERPL-MCNC: 160 MG/DL (ref 0–200)
CO2 SERPL-SCNC: 24.6 MMOL/L (ref 22–29)
CREAT SERPL-MCNC: 1.59 MG/DL (ref 0.76–1.27)
DEPRECATED RDW RBC AUTO: 43 FL (ref 37–54)
EGFRCR SERPLBLD CKD-EPI 2021: 47 ML/MIN/1.73
EOSINOPHIL # BLD AUTO: 0.62 10*3/MM3 (ref 0–0.4)
EOSINOPHIL NFR BLD AUTO: 8.7 % (ref 0.3–6.2)
ERYTHROCYTE [DISTWIDTH] IN BLOOD BY AUTOMATED COUNT: 13.6 % (ref 12.3–15.4)
GLOBULIN UR ELPH-MCNC: 3.2 GM/DL
GLUCOSE SERPL-MCNC: 117 MG/DL (ref 65–99)
HBA1C MFR BLD: 7.5 % (ref 4.8–5.6)
HCT VFR BLD AUTO: 50.1 % (ref 37.5–51)
HDLC SERPL-MCNC: 33 MG/DL (ref 40–60)
HGB BLD-MCNC: 16.6 G/DL (ref 13–17.7)
IMM GRANULOCYTES # BLD AUTO: 0.02 10*3/MM3 (ref 0–0.05)
IMM GRANULOCYTES NFR BLD AUTO: 0.3 % (ref 0–0.5)
IRON 24H UR-MRATE: 75 MCG/DL (ref 59–158)
IRON SATN MFR SERPL: 21 % (ref 20–50)
LDLC SERPL CALC-MCNC: 96 MG/DL (ref 0–100)
LDLC/HDLC SERPL: 2.76 {RATIO}
LYMPHOCYTES # BLD AUTO: 1.97 10*3/MM3 (ref 0.7–3.1)
LYMPHOCYTES NFR BLD AUTO: 27.7 % (ref 19.6–45.3)
MCH RBC QN AUTO: 28.8 PG (ref 26.6–33)
MCHC RBC AUTO-ENTMCNC: 33.1 G/DL (ref 31.5–35.7)
MCV RBC AUTO: 86.8 FL (ref 79–97)
MONOCYTES # BLD AUTO: 0.91 10*3/MM3 (ref 0.1–0.9)
MONOCYTES NFR BLD AUTO: 12.8 % (ref 5–12)
NEUTROPHILS NFR BLD AUTO: 3.49 10*3/MM3 (ref 1.7–7)
NEUTROPHILS NFR BLD AUTO: 49.2 % (ref 42.7–76)
NRBC BLD AUTO-RTO: 0 /100 WBC (ref 0–0.2)
PLATELET # BLD AUTO: 163 10*3/MM3 (ref 140–450)
PMV BLD AUTO: 10.6 FL (ref 6–12)
POTASSIUM SERPL-SCNC: 4.5 MMOL/L (ref 3.5–5.2)
PROT SERPL-MCNC: 7.2 G/DL (ref 6–8.5)
PSA SERPL-MCNC: 0.72 NG/ML (ref 0–4)
RBC # BLD AUTO: 5.77 10*6/MM3 (ref 4.14–5.8)
SODIUM SERPL-SCNC: 139 MMOL/L (ref 136–145)
T4 FREE SERPL-MCNC: 0.99 NG/DL (ref 0.93–1.7)
TIBC SERPL-MCNC: 355 MCG/DL (ref 298–536)
TRANSFERRIN SERPL-MCNC: 238 MG/DL (ref 200–360)
TRIGL SERPL-MCNC: 179 MG/DL (ref 0–150)
TSH SERPL DL<=0.05 MIU/L-ACNC: 4.9 UIU/ML (ref 0.27–4.2)
VIT B12 BLD-MCNC: 402 PG/ML (ref 211–946)
VLDLC SERPL-MCNC: 31 MG/DL (ref 5–40)
WBC NRBC COR # BLD AUTO: 7.1 10*3/MM3 (ref 3.4–10.8)

## 2024-02-02 PROCEDURE — 82607 VITAMIN B-12: CPT

## 2024-02-02 PROCEDURE — G0103 PSA SCREENING: HCPCS

## 2024-02-02 PROCEDURE — 84466 ASSAY OF TRANSFERRIN: CPT

## 2024-02-02 PROCEDURE — 84439 ASSAY OF FREE THYROXINE: CPT

## 2024-02-02 PROCEDURE — 82306 VITAMIN D 25 HYDROXY: CPT

## 2024-02-02 PROCEDURE — 84443 ASSAY THYROID STIM HORMONE: CPT

## 2024-02-02 PROCEDURE — 83540 ASSAY OF IRON: CPT

## 2024-02-02 PROCEDURE — 85025 COMPLETE CBC W/AUTO DIFF WBC: CPT

## 2024-02-02 PROCEDURE — 80061 LIPID PANEL: CPT

## 2024-02-02 PROCEDURE — 80053 COMPREHEN METABOLIC PANEL: CPT

## 2024-02-02 PROCEDURE — 83036 HEMOGLOBIN GLYCOSYLATED A1C: CPT

## 2024-02-05 RX ORDER — ERGOCALCIFEROL 1.25 MG/1
50000 CAPSULE ORAL WEEKLY
Qty: 12 CAPSULE | Refills: 0 | Status: SHIPPED | OUTPATIENT
Start: 2024-02-05

## 2024-02-09 RX ORDER — EZETIMIBE 10 MG/1
10 TABLET ORAL DAILY
Qty: 90 TABLET | Refills: 1 | Status: SHIPPED | OUTPATIENT
Start: 2024-02-09

## 2024-02-09 NOTE — TELEPHONE ENCOUNTER
Lab Results   Component Value Date    CHOL 160 02/02/2024    CHLPL 174 03/28/2014    TRIG 179 (H) 02/02/2024    HDL 33 (L) 02/02/2024    LDL 96 02/02/2024     Lab Results   Component Value Date    ALT 21 02/02/2024

## 2024-02-09 NOTE — TELEPHONE ENCOUNTER
Lab Results   Component Value Date    GLUCOSE 117 (H) 02/02/2024    BUN 23 02/02/2024    CREATININE 1.59 (H) 02/02/2024    EGFR 47.0 (L) 02/02/2024    BCR 14.5 02/02/2024    K 4.5 02/02/2024    CO2 24.6 02/02/2024    CALCIUM 9.1 02/02/2024    ALBUMIN 4.0 02/02/2024    BILITOT 0.4 02/02/2024    AST 22 02/02/2024    ALT 21 02/02/2024

## 2024-02-14 ENCOUNTER — PATIENT OUTREACH (OUTPATIENT)
Dept: CASE MANAGEMENT | Facility: OTHER | Age: 69
End: 2024-02-14
Payer: MEDICARE

## 2024-02-14 DIAGNOSIS — I50.21 ACUTE SYSTOLIC HEART FAILURE: Primary | ICD-10-CM

## 2024-02-14 DIAGNOSIS — I10 PRIMARY HYPERTENSION: ICD-10-CM

## 2024-02-19 ENCOUNTER — HOSPITAL ENCOUNTER (OUTPATIENT)
Dept: CT IMAGING | Facility: HOSPITAL | Age: 69
Discharge: HOME | End: 2024-02-19
Admitting: FAMILY MEDICINE
Payer: MEDICARE

## 2024-02-19 DIAGNOSIS — Z12.2 ENCOUNTER FOR SCREENING FOR LUNG CANCER: ICD-10-CM

## 2024-02-19 DIAGNOSIS — F17.211 NICOTINE DEPENDENCE, CIGARETTES, IN REMISSION: ICD-10-CM

## 2024-02-19 PROCEDURE — 71271 CT THORAX LUNG CANCER SCR C-: CPT

## 2024-02-20 ENCOUNTER — LAB (OUTPATIENT)
Dept: LAB | Facility: HOSPITAL | Age: 69
End: 2024-02-20
Payer: MEDICARE

## 2024-02-20 ENCOUNTER — TRANSCRIBE ORDERS (OUTPATIENT)
Dept: LAB | Facility: HOSPITAL | Age: 69
End: 2024-02-20
Payer: MEDICARE

## 2024-02-20 DIAGNOSIS — N18.30 STAGE 3 CHRONIC KIDNEY DISEASE, UNSPECIFIED WHETHER STAGE 3A OR 3B CKD: ICD-10-CM

## 2024-02-20 DIAGNOSIS — H47.012 NAION (NON-ARTERITIC ANTERIOR ISCHEMIC OPTIC NEUROPATHY), LEFT EYE: ICD-10-CM

## 2024-02-20 DIAGNOSIS — H47.012 NAION (NON-ARTERITIC ANTERIOR ISCHEMIC OPTIC NEUROPATHY), LEFT EYE: Primary | ICD-10-CM

## 2024-02-20 DIAGNOSIS — N18.30 STAGE 3 CHRONIC KIDNEY DISEASE, UNSPECIFIED WHETHER STAGE 3A OR 3B CKD: Primary | ICD-10-CM

## 2024-02-20 LAB
ALBUMIN SERPL-MCNC: 3.7 G/DL (ref 3.5–5.2)
ANION GAP SERPL CALCULATED.3IONS-SCNC: 14.7 MMOL/L (ref 5–15)
BACTERIA UR QL AUTO: NORMAL /HPF
BILIRUB UR QL STRIP: NEGATIVE
BUN SERPL-MCNC: 21 MG/DL (ref 8–23)
BUN/CREAT SERPL: 13 (ref 7–25)
CALCIUM SPEC-SCNC: 8.7 MG/DL (ref 8.6–10.5)
CHLORIDE SERPL-SCNC: 102 MMOL/L (ref 98–107)
CLARITY UR: CLEAR
CO2 SERPL-SCNC: 19.3 MMOL/L (ref 22–29)
COLOR UR: YELLOW
CREAT SERPL-MCNC: 1.62 MG/DL (ref 0.76–1.27)
CRP SERPL-MCNC: <0.3 MG/DL (ref 0–0.5)
EGFRCR SERPLBLD CKD-EPI 2021: 46 ML/MIN/1.73
GLUCOSE SERPL-MCNC: 143 MG/DL (ref 65–99)
GLUCOSE UR STRIP-MCNC: ABNORMAL MG/DL
HGB UR QL STRIP.AUTO: NEGATIVE
HYALINE CASTS UR QL AUTO: NORMAL /LPF
KETONES UR QL STRIP: NEGATIVE
LEUKOCYTE ESTERASE UR QL STRIP.AUTO: NEGATIVE
NITRITE UR QL STRIP: NEGATIVE
PH UR STRIP.AUTO: 5.5 [PH] (ref 5–8)
PHOSPHATE SERPL-MCNC: 2.9 MG/DL (ref 2.5–4.5)
POTASSIUM SERPL-SCNC: 4.5 MMOL/L (ref 3.5–5.2)
PROT UR QL STRIP: NEGATIVE
PTH-INTACT SERPL-MCNC: 47.3 PG/ML (ref 15–65)
RBC # UR STRIP: NORMAL /HPF
REF LAB TEST METHOD: NORMAL
SODIUM SERPL-SCNC: 136 MMOL/L (ref 136–145)
SP GR UR STRIP: 1.02 (ref 1–1.03)
SQUAMOUS #/AREA URNS HPF: NORMAL /HPF
UROBILINOGEN UR QL STRIP: ABNORMAL
WBC # UR STRIP: NORMAL /HPF

## 2024-02-20 PROCEDURE — 86140 C-REACTIVE PROTEIN: CPT

## 2024-02-20 PROCEDURE — 80069 RENAL FUNCTION PANEL: CPT

## 2024-02-20 PROCEDURE — 36415 COLL VENOUS BLD VENIPUNCTURE: CPT

## 2024-02-20 PROCEDURE — 81001 URINALYSIS AUTO W/SCOPE: CPT

## 2024-02-20 PROCEDURE — 85027 COMPLETE CBC AUTOMATED: CPT

## 2024-02-20 PROCEDURE — 82570 ASSAY OF URINE CREATININE: CPT

## 2024-02-20 PROCEDURE — 84156 ASSAY OF PROTEIN URINE: CPT

## 2024-02-20 PROCEDURE — 85652 RBC SED RATE AUTOMATED: CPT

## 2024-02-20 PROCEDURE — 83970 ASSAY OF PARATHORMONE: CPT

## 2024-02-20 PROCEDURE — 82652 VIT D 1 25-DIHYDROXY: CPT

## 2024-02-21 ENCOUNTER — TRANSCRIBE ORDERS (OUTPATIENT)
Dept: ADMINISTRATIVE | Facility: HOSPITAL | Age: 69
End: 2024-02-21
Payer: MEDICARE

## 2024-02-21 DIAGNOSIS — H47.012 ANTERIOR ISCHEMIC OPTIC NEUROPATHY OF LEFT EYE: Primary | ICD-10-CM

## 2024-02-21 LAB
CREAT UR-MCNC: 66.1 MG/DL
DEPRECATED RDW RBC AUTO: 43.7 FL (ref 37–54)
ERYTHROCYTE [DISTWIDTH] IN BLOOD BY AUTOMATED COUNT: 13.9 % (ref 12.3–15.4)
ERYTHROCYTE [SEDIMENTATION RATE] IN BLOOD: 6 MM/HR (ref 0–20)
HCT VFR BLD AUTO: 49.1 % (ref 37.5–51)
HGB BLD-MCNC: 16.5 G/DL (ref 13–17.7)
MCH RBC QN AUTO: 29.1 PG (ref 26.6–33)
MCHC RBC AUTO-ENTMCNC: 33.6 G/DL (ref 31.5–35.7)
MCV RBC AUTO: 86.6 FL (ref 79–97)
PLATELET # BLD AUTO: 228 10*3/MM3 (ref 140–450)
PMV BLD AUTO: 10.3 FL (ref 6–12)
PROT ?TM UR-MCNC: 6.7 MG/DL
RBC # BLD AUTO: 5.67 10*6/MM3 (ref 4.14–5.8)
WBC NRBC COR # BLD AUTO: 8.54 10*3/MM3 (ref 3.4–10.8)

## 2024-02-23 LAB — 1,25(OH)2D SERPL-MCNC: 37.5 PG/ML (ref 24.8–81.5)

## 2024-02-28 ENCOUNTER — PATIENT OUTREACH (OUTPATIENT)
Dept: CASE MANAGEMENT | Facility: OTHER | Age: 69
End: 2024-02-28
Payer: MEDICARE

## 2024-02-28 DIAGNOSIS — I50.21 ACUTE SYSTOLIC HEART FAILURE: ICD-10-CM

## 2024-02-28 DIAGNOSIS — I10 PRIMARY HYPERTENSION: Primary | ICD-10-CM

## 2024-02-28 NOTE — OUTREACH NOTE
[facility] End of Month Documentation    This Chronic Medical Management Care Plan for Herbert Harley, 68 y.o. male, has been monitored and managed; reviewed and a new plan of care implemented for the month of February.  A cumulative time of 27  minutes was spent on this patient record this month, including phone call with relative; chart review.    Regarding the patient's problems: has I25.10, CABG 03/01/23; Hypertension; Hyperlipidemia LDL goal <70; Type 2 diabetes mellitus with hypoglycemia, with long-term current use of insulin; Ischemic cardiomyopathy; Allergic rhinitis; Prolonged Q-T interval on ECG; Dependence on renal dialysis; Anemia in chronic kidney disease; Chronic obstructive pulmonary disease, unspecified; Chronic viral hepatitis C; Hypertensive heart disease with heart failure; Iron deficiency anemia; Long term (current) use of oral hypoglycemic drugs; Moderate protein-calorie malnutrition; Secondary hyperparathyroidism of renal origin; Chronic systolic (congestive) heart failure; Atherosclerotic heart disease of native coronary artery without angina pectoris; and Non-recurrent acute suppurative otitis media of left ear without spontaneous rupture of tympanic membrane on their problem list., the following items were addressed: medical records; medications and any changes can be found within the plan section of the note.  A detailed listing of time spent for chronic care management is tracked within each outreach encounter.  Current medications include:  has a current medication list which includes the following prescription(s): albuterol sulfate hfa, amoxicillin-clavulanate, aspirin, betamethasone valerate, bumetanide, collagen-vitamin c-biotin, farxiga, ezetimibe, fluticasone, fluticasone-umeclidin-vilant, insulin syringe, lantus, metoprolol succinate xl, montelukast, nitroglycerin, potassium chloride er, rosuvastatin, sertraline, and vitamin d. and the patient is reported to be patient is compliant  "with medication protocol,  Medications are reported to be effective.  All notes on chart for PCP to review.    The patient was monitored remotely for blood pressure; pain; medications; blood glucose.    The patient's physical needs include:  medication education.     The patient's mental support needs include:  increased support    The patient's cognitive support needs include:  emotional care    The patient's psychosocial support needs include:  continued support    The patient's functional needs include: medication education    The patient's environmental needs include:  not applicable    Care Plan overall comments:  No data recorded    Refer to previous outreach notes for more information on the areas listed above.    Monthly Billing Diagnoses  (I10) Primary hypertension    (I50.21) Acute systolic heart failure    Medications   Medications have been reconciled    Care Plan progress this month:      Recently Modified Care Plans Updates made since 1/28/2024 12:00 AM       Hypertension (Adult)           Problem Priority Last Modified     Hypertension (Hypertension) --  3/29/2023 11:15 AM by Nury Webber RN              Goal Recent Progress Last Modified     Hypertension Monitored --  3/29/2023 11:15 AM by Nury Webber RN     Evidence-based guidance:   Promote initial use of ambulatory blood pressure measurements (for 3 days) to rule out \"white-coat\" effect; identify masked hypertension and presence or absence of nocturnal \"dipping\" of blood pressure.    Encourage continued use of home blood pressure monitoring and recording in blood pressure log; include symptoms of hypotension or potential medication side effects in log.   Review blood pressure measurements taken inside and outside of the provider office; establish baseline and monitor trends; compare to target ranges or patient goal.   Share overall cardiovascular risk with patient; encourage changes to lifestyle risk factors, including alcohol " consumption, smoking, inadequate exercise, poor dietary habits and stress.    Notes:            Task Due Date Last Modified     Identify and Monitor Blood Pressure Elevation --  2/15/2024 10:31 AM by Nury Webber RN     Care Management Activities:      - blood pressure trends reviewed  - home or ambulatory blood pressure monitoring encouraged      Notes:                Problem Priority Last Modified     Disease Progression (Hypertension) --  3/29/2023 11:15 AM by Nury Webber RN              Goal Recent Progress Last Modified     Disease Progression Prevented or Minimized --  3/29/2023 11:15 AM by Nury Webber RN     Evidence-based guidance:   Tailor lifestyle advice to individual; review progress regularly; give frequent encouragement and respond positively to incremental successes.   Assess for and promote awareness of worsening disease or development of comorbidity.   Prepare patient for laboratory and diagnostic exams based on risk and presentation.   Prepare patient for use of pharmacologic therapy that may include diuretic, beta-blocker, beta-blocker/thiazide combination, angiotensin-converting enzyme inhibitor, renin-angiotensin blocker or calcium-channel blocker.   Expect periodic adjustments to pharmacologic therapy; manage side effects.   Promote a healthy diet that includes primarily plant-based foods, such as fruits, vegetables, whole grains, beans and legumes, low-fat dairy and lean meats.    Consider moderate reduction in sodium intake by avoiding the addition of salt to prepared foods and limiting processed meats, canned soup, frozen meals and salty snacks.    Promote a regular, daily exercise goal of 150 minutes per week of moderate exercise based on tolerance, ability and patient choice; consider referral to physical therapist, community wellness and/or activity program.   Encourage the avoidance of no more than 2 hours per day of sedentary activity, such as recreational screen time.    Review sources of stress; explore current coping strategies and encourage use of mindfulness, yoga, meditation or exercise to manage stress.    Notes:            Task Due Date Last Modified     Alleviate Barriers to Hypertension Treatment --  2/15/2024 10:31 AM by Nury Webber RN     Care Management Activities:      - healthy diet promoted  - healthy family lifestyle promoted  - medication side effects managed  - patient response to treatment assessed  - quality of sleep assessed      Notes:                Problem Priority Last Modified     Resistant Hypertension (Hypertension) --  3/29/2023 11:15 AM by Nury Webber RN              Goal Recent Progress Last Modified     Response to Treatment Maximized --  3/29/2023 11:15 AM by Nury Webber RN     Evidence-based guidance:   Assess patient response to treatment, including presence or absence of medication side effects, degree of blood pressure control and patient satisfaction.   Assess technique (including cuff size and placement), measurement times, condition and calibration of blood pressure cuff set (both at-home and in-office equipment).   Assess factors that may influence response to treatment, including nonadherence to pharmacologic treatment plan, diet or activity changes and/or presence of pain, stress or sleep disturbance.   Screen for signs and symptoms of depression; if present, refer for or complete a comprehensive assessment.   Evaluate social and economic barriers that may affect adherence to treatment plan   Address pharmacologic nonadherence by simplifying dosing regimen, counseling or support by pharmacist, financial assistance, self-monitoring of blood pressure, use of motivational interviewing, voice or text messages.   Encourage behavioral adherence strategies, like habit-based interventions that link medication taking with existing daily routines.   Assess barriers to regular, daily physical activity; support family or support  person-oriented activity changes and utilization of community activity or sports program.   Address barriers to dietary changes, especially sodium restriction, with referrals to community programs, like cooking classes, meal services or intensive education when available.   Refer to community-based peer support program or nurse home-visiting program.   Assess for chronic pain; when present add additional goals (Chronic Pain Care Plan Guide) as needed.   Provide frequent follow-up by telephone, telemonitoring, patient-practice portal or with home visit.   Review alcohol use screen; address using brief intervention beginning with risk that interferes with blood pressure control; refer for treatment when excessive alcohol use is noted.   Screen for obstructive sleep apnea; prepare patient for polysomnography based on risk and presentation and use of noninvasive ventilation to relieve obstructive sleep apnea when present.    Notes:            Task Due Date Last Modified     Facilitate Adherence to Lifestyle Change --  2/15/2024 10:32 AM by Nury Webber RN     Care Management Activities:      - medication adherence assessment completed  - success praised  - support and encouragement provided      Notes:                          Current Specialty Plan of Care Status signed by both patient and provider    Instructions   Patient was provided an electronic copy of care plan  CCM services were explained and offered and patient has accepted these services.  Patient has given their written consent to receive CCM services and understands that this includes the authorization of electronic communication of medical information with the other treating providers.  Patient understands that they may stop CCM services at any time and these changes will be effective at the end of the calendar month and will effectively revocate the agreement of CCM services.  Patient understands that only one practitioner can furnish and be paid for  CCM services during one calendar month.  Patient also understands that there may be co-payment or deductible fees in association with CCM services.  Patient will continue with at least monthly follow-up calls with the Ambulatory .    Nury COOPER  Ambulatory Case Management    2/28/2024, 14:23 EST

## 2024-03-08 DIAGNOSIS — J45.909 MODERATE ASTHMA WITHOUT COMPLICATION, UNSPECIFIED WHETHER PERSISTENT: ICD-10-CM

## 2024-03-08 RX ORDER — FLUTICASONE FUROATE, UMECLIDINIUM BROMIDE AND VILANTEROL TRIFENATATE 100; 62.5; 25 UG/1; UG/1; UG/1
1 POWDER RESPIRATORY (INHALATION) DAILY
Qty: 60 EACH | Refills: 0 | Status: SHIPPED | OUTPATIENT
Start: 2024-03-08

## 2024-03-14 ENCOUNTER — OFFICE VISIT (OUTPATIENT)
Dept: ENDOCRINOLOGY | Facility: CLINIC | Age: 69
End: 2024-03-14
Payer: MEDICARE

## 2024-03-14 VITALS
SYSTOLIC BLOOD PRESSURE: 116 MMHG | TEMPERATURE: 97.6 F | HEART RATE: 79 BPM | BODY MASS INDEX: 27.96 KG/M2 | OXYGEN SATURATION: 95 % | DIASTOLIC BLOOD PRESSURE: 50 MMHG | WEIGHT: 168 LBS

## 2024-03-14 DIAGNOSIS — E11.65 TYPE 2 DIABETES MELLITUS WITH HYPERGLYCEMIA, WITH LONG-TERM CURRENT USE OF INSULIN: Primary | ICD-10-CM

## 2024-03-14 DIAGNOSIS — Z79.4 TYPE 2 DIABETES MELLITUS WITH HYPERGLYCEMIA, WITH LONG-TERM CURRENT USE OF INSULIN: Primary | ICD-10-CM

## 2024-03-14 LAB
EXPIRATION DATE: ABNORMAL
EXPIRATION DATE: ABNORMAL
GLUCOSE BLDC GLUCOMTR-MCNC: 243 MG/DL (ref 70–130)
HBA1C MFR BLD: 7.3 % (ref 4.5–5.7)
Lab: ABNORMAL
Lab: ABNORMAL

## 2024-03-14 RX ORDER — CALCIUM CARB/VITAMIN D3/VIT K1 500-100-40
TABLET,CHEWABLE ORAL
Qty: 100 EACH | Refills: 3 | Status: SHIPPED | OUTPATIENT
Start: 2024-03-14

## 2024-03-14 RX ORDER — INSULIN GLARGINE 100 [IU]/ML
INJECTION, SOLUTION SUBCUTANEOUS
Qty: 60 ML | Refills: 2 | Status: SHIPPED | OUTPATIENT
Start: 2024-03-14

## 2024-03-14 NOTE — PROGRESS NOTES
Office Note      Date: 2024  Patient Name: Herbert Harley  MRN: [id number]  : 1955    Chief Complaint   Patient presents with    Type 2 diabetes mellitus with hyperglycemia, with long-term       History of Present Illness:   Herbert Harley is a 68 y.o. male who presents for follow-up for type 2 diabetes diagnosed in .  He is up to 50 units of Lantus every morning.  He is asking about switching this to the evenings.  He remains on Farxiga 10 mg daily.  He reports he is checking his fasting blood sugars regularly and these are typically 140 to 150 mg/dL.  Reports his lowest reading in the last several months has been 89 mg/dL.  He denies any severe or frequent hypoglycemia.  He reports he has been trying to walk regularly.  He is up-to-date on his eye exam they are seeing a retina specialist regularly for NOIAN.  He continues to see nephrology regularly.      Subjective     Review of Systems:   Review of Systems   Constitutional:  Positive for fatigue.   Cardiovascular: Negative.    Gastrointestinal: Negative.    Endocrine: Negative.    Neurological: Negative.        The following portions of the patient's history were reviewed and updated as appropriate: allergies, current medications, past family history, past medical history, past social history, past surgical history, and problem list.    Objective     Vitals:    24 1521   BP: 116/50   Pulse: 79   Temp: 97.6 °F (36.4 °C)   TempSrc: Temporal   SpO2: 95%   Weight: 76.2 kg (168 lb)     Body mass index is 27.96 kg/m².    Physical Exam  Vitals reviewed.   Constitutional:       General: He is not in acute distress.     Appearance: Normal appearance.   Neurological:      Mental Status: He is alert.         HEMOGLOBIN A1C  Lab Results   Component Value Date    HGBA1C 7.3 (A) 2024       GLUCOSE  Glucose   Date Value Ref Range Status   2024 243 (A) 70 - 130 mg/dL Final       CMP  Lab Results   Component Value Date     GLUCOSE 143 (H) 02/20/2024    BUN 21 02/20/2024    CREATININE 1.62 (H) 02/20/2024    EGFRIFNONA 66 12/14/2021    BCR 13.0 02/20/2024    K 4.5 02/20/2024    CO2 19.3 (L) 02/20/2024    CALCIUM 8.7 02/20/2024    AST 22 02/02/2024    ALT 21 02/02/2024       LIPID PANEL  Lab Results   Component Value Date    CHOL 160 02/02/2024    CHLPL 174 03/28/2014    TRIG 179 (H) 02/02/2024    HDL 33 (L) 02/02/2024    LDL 96 02/02/2024       URINE MICROALBUMIN/CREATININE RATIO  Microalbumin/Creatinine Ratio   Date Value Ref Range Status   12/22/2022 55.9 mg/g Final       TSH  Lab Results   Component Value Date    TSH 4.900 (H) 02/02/2024       Assessment / Plan      Assessment & Plan:  1. Type 2 diabetes mellitus with hyperglycemia, with long-term current use of insulin  His hemoglobin A1c in February was 7.5%.  It is too early to check this today.  His blood glucose today is elevated at 243 mg/dL.  He reports he ate some cookies this afternoon.  We will increase his insulin to 52 units daily.  We discussed switching the insulin to the evening.  Tomorrow morning he will take half his insulin dose and that evening the other half and then switch to the 52 units at night.  He will continue to monitor his blood sugar glucose readings regularly and titrate his insulin to target fasting glucose readings 90 to 130 mg/dL.  I refilled his Lantus today and sent prescription for 100 units syringes to his pharmacy.  His weight is up 3 pounds since his appointment in November.  I encouraged healthy eating habits and physical activity as tolerated.  His blood pressure is okay today.  - POC Glycosylated Hemoglobin (Hb A1C)  - POC Glucose, Blood      Return in about 5 months (around 8/14/2024) for Recheck.     Electronically signed by: JOSE Merchant  03/14/2024

## 2024-03-15 ENCOUNTER — TELEPHONE (OUTPATIENT)
Dept: CASE MANAGEMENT | Facility: OTHER | Age: 69
End: 2024-03-15
Payer: MEDICARE

## 2024-03-21 ENCOUNTER — HOSPITAL ENCOUNTER (OUTPATIENT)
Dept: MRI IMAGING | Facility: HOSPITAL | Age: 69
Discharge: HOME | End: 2024-03-21
Payer: MEDICARE

## 2024-03-21 DIAGNOSIS — H47.012 ANTERIOR ISCHEMIC OPTIC NEUROPATHY OF LEFT EYE: ICD-10-CM

## 2024-03-21 LAB — CREAT BLDA-MCNC: 1.8 MG/DL (ref 0.6–1.3)

## 2024-03-21 PROCEDURE — 0 GADOBENATE DIMEGLUMINE 529 MG/ML SOLUTION: Performed by: OPHTHALMOLOGY

## 2024-03-21 PROCEDURE — A9577 INJ MULTIHANCE: HCPCS | Performed by: OPHTHALMOLOGY

## 2024-03-21 PROCEDURE — 82565 ASSAY OF CREATININE: CPT

## 2024-03-21 PROCEDURE — 70543 MRI ORBT/FAC/NCK W/O &W/DYE: CPT

## 2024-03-21 RX ADMIN — GADOBENATE DIMEGLUMINE 15 ML: 529 INJECTION, SOLUTION INTRAVENOUS at 10:29

## 2024-03-27 RX ORDER — INSULIN GLARGINE 100 [IU]/ML
INJECTION, SOLUTION SUBCUTANEOUS
Qty: 10 ML | OUTPATIENT
Start: 2024-03-27

## 2024-03-27 NOTE — TELEPHONE ENCOUNTER
Rx Refill Note  Requested Prescriptions     Pending Prescriptions Disp Refills    Lantus 100 UNIT/ML injection [Pharmacy Med Name: LANTUS 100 UNIT/ML VIAL] 10 mL      Sig: INJECT 30 UNITS EVERY MORNING, INCREASE BY 2 UNITS EVERY 4 DAYS FOR FASTING GOAL 90 -140 MAX 60 UNITS DAILY          Last office visit with prescribing clinician: 3/14/2024     Next office visit with prescribing clinician: 8/15/2024         Marivel Delgadillo MA  03/27/24, 15:13 EDT

## 2024-04-07 DIAGNOSIS — J45.909 MODERATE ASTHMA WITHOUT COMPLICATION, UNSPECIFIED WHETHER PERSISTENT: ICD-10-CM

## 2024-04-08 RX ORDER — FLUTICASONE FUROATE, UMECLIDINIUM BROMIDE AND VILANTEROL TRIFENATATE 100; 62.5; 25 UG/1; UG/1; UG/1
1 POWDER RESPIRATORY (INHALATION) DAILY
Qty: 60 EACH | Refills: 0 | Status: SHIPPED | OUTPATIENT
Start: 2024-04-08

## 2024-04-10 ENCOUNTER — TELEPHONE (OUTPATIENT)
Dept: CASE MANAGEMENT | Facility: OTHER | Age: 69
End: 2024-04-10
Payer: MEDICARE

## 2024-04-30 ENCOUNTER — OFFICE VISIT (OUTPATIENT)
Dept: FAMILY MEDICINE CLINIC | Facility: CLINIC | Age: 69
End: 2024-04-30
Payer: MEDICARE

## 2024-04-30 ENCOUNTER — TELEPHONE (OUTPATIENT)
Dept: FAMILY MEDICINE CLINIC | Facility: CLINIC | Age: 69
End: 2024-04-30

## 2024-04-30 ENCOUNTER — LAB (OUTPATIENT)
Dept: LAB | Facility: HOSPITAL | Age: 69
End: 2024-04-30
Payer: MEDICARE

## 2024-04-30 VITALS
HEIGHT: 65 IN | SYSTOLIC BLOOD PRESSURE: 124 MMHG | TEMPERATURE: 98.7 F | OXYGEN SATURATION: 95 % | WEIGHT: 170.8 LBS | BODY MASS INDEX: 28.46 KG/M2 | DIASTOLIC BLOOD PRESSURE: 58 MMHG | HEART RATE: 63 BPM

## 2024-04-30 DIAGNOSIS — M54.41 CHRONIC BILATERAL LOW BACK PAIN WITH RIGHT-SIDED SCIATICA: ICD-10-CM

## 2024-04-30 DIAGNOSIS — J45.40 MODERATE PERSISTENT ASTHMA, UNSPECIFIED WHETHER COMPLICATED: ICD-10-CM

## 2024-04-30 DIAGNOSIS — R79.89 ABNORMAL THYROID BLOOD TEST: ICD-10-CM

## 2024-04-30 DIAGNOSIS — I10 PRIMARY HYPERTENSION: Primary | ICD-10-CM

## 2024-04-30 DIAGNOSIS — M25.561 BILATERAL CHRONIC KNEE PAIN: ICD-10-CM

## 2024-04-30 DIAGNOSIS — Z23 NEED FOR VACCINATION: ICD-10-CM

## 2024-04-30 DIAGNOSIS — I50.22 CHRONIC SYSTOLIC (CONGESTIVE) HEART FAILURE: ICD-10-CM

## 2024-04-30 DIAGNOSIS — G89.29 CHRONIC BILATERAL LOW BACK PAIN WITH RIGHT-SIDED SCIATICA: ICD-10-CM

## 2024-04-30 DIAGNOSIS — M25.562 BILATERAL CHRONIC KNEE PAIN: ICD-10-CM

## 2024-04-30 DIAGNOSIS — I10 PRIMARY HYPERTENSION: ICD-10-CM

## 2024-04-30 DIAGNOSIS — M54.2 NECK PAIN: ICD-10-CM

## 2024-04-30 DIAGNOSIS — G89.29 BILATERAL CHRONIC KNEE PAIN: ICD-10-CM

## 2024-04-30 PROBLEM — J44.9 CHRONIC OBSTRUCTIVE PULMONARY DISEASE, UNSPECIFIED: Status: RESOLVED | Noted: 2023-03-17 | Resolved: 2024-04-30

## 2024-04-30 PROBLEM — Z99.2 DEPENDENCE ON RENAL DIALYSIS: Status: RESOLVED | Noted: 2023-03-17 | Resolved: 2024-04-30

## 2024-04-30 PROBLEM — H66.002 NON-RECURRENT ACUTE SUPPURATIVE OTITIS MEDIA OF LEFT EAR WITHOUT SPONTANEOUS RUPTURE OF TYMPANIC MEMBRANE: Status: RESOLVED | Noted: 2024-01-30 | Resolved: 2024-04-30

## 2024-04-30 PROCEDURE — 90677 PCV20 VACCINE IM: CPT | Performed by: FAMILY MEDICINE

## 2024-04-30 PROCEDURE — G0008 ADMIN INFLUENZA VIRUS VAC: HCPCS | Performed by: FAMILY MEDICINE

## 2024-04-30 PROCEDURE — 84443 ASSAY THYROID STIM HORMONE: CPT

## 2024-04-30 PROCEDURE — 80048 BASIC METABOLIC PNL TOTAL CA: CPT

## 2024-04-30 PROCEDURE — 1159F MED LIST DOCD IN RCRD: CPT | Performed by: FAMILY MEDICINE

## 2024-04-30 PROCEDURE — 3051F HG A1C>EQUAL 7.0%<8.0%: CPT | Performed by: FAMILY MEDICINE

## 2024-04-30 PROCEDURE — 36415 COLL VENOUS BLD VENIPUNCTURE: CPT

## 2024-04-30 PROCEDURE — 84439 ASSAY OF FREE THYROXINE: CPT

## 2024-04-30 PROCEDURE — 3074F SYST BP LT 130 MM HG: CPT | Performed by: FAMILY MEDICINE

## 2024-04-30 PROCEDURE — 3078F DIAST BP <80 MM HG: CPT | Performed by: FAMILY MEDICINE

## 2024-04-30 PROCEDURE — 99214 OFFICE O/P EST MOD 30 MIN: CPT | Performed by: FAMILY MEDICINE

## 2024-04-30 PROCEDURE — 1160F RVW MEDS BY RX/DR IN RCRD: CPT | Performed by: FAMILY MEDICINE

## 2024-04-30 RX ORDER — NITROGLYCERIN 0.4 MG/1
0.4 TABLET SUBLINGUAL
Qty: 25 TABLET | Refills: 3 | Status: SHIPPED | OUTPATIENT
Start: 2024-04-30

## 2024-04-30 RX ORDER — FLUTICASONE PROPIONATE 50 MCG
2 SPRAY, SUSPENSION (ML) NASAL DAILY
Qty: 15.8 ML | Refills: 5 | Status: SHIPPED | OUTPATIENT
Start: 2024-04-30

## 2024-04-30 RX ORDER — MONTELUKAST SODIUM 10 MG/1
10 TABLET ORAL EVERY EVENING
Qty: 30 TABLET | Refills: 5 | Status: SHIPPED | OUTPATIENT
Start: 2024-04-30

## 2024-04-30 RX ORDER — UBIDECARENONE 100 MG
100 CAPSULE ORAL DAILY
COMMUNITY

## 2024-04-30 RX ORDER — POTASSIUM CHLORIDE 1500 MG/1
20 TABLET, EXTENDED RELEASE ORAL DAILY
Qty: 45 TABLET | Refills: 1 | Status: SHIPPED | OUTPATIENT
Start: 2024-04-30

## 2024-04-30 RX ORDER — FLUTICASONE FUROATE, UMECLIDINIUM BROMIDE AND VILANTEROL TRIFENATATE 100; 62.5; 25 UG/1; UG/1; UG/1
1 POWDER RESPIRATORY (INHALATION) DAILY
Qty: 60 EACH | Refills: 0 | Status: SHIPPED | OUTPATIENT
Start: 2024-04-30

## 2024-04-30 RX ORDER — BUMETANIDE 1 MG/1
0.5 TABLET ORAL DAILY
Qty: 45 TABLET | Refills: 1 | Status: SHIPPED | OUTPATIENT
Start: 2024-04-30

## 2024-04-30 NOTE — TELEPHONE ENCOUNTER
Pharmacy Name: [facility] [id number] - [place] [id number] [facility] - [phone]  - [phone] FX     Reference Number (if applicable): [id number]    Pharmacy representative name: ADRIANNA    Pharmacy representative phone number: [phone]    What medication are you calling in regards to: POTASSIUM CHLORIDE     What question does the pharmacy have: THE PHARMACY SAYS THAT THERE IS TWO DIRECTIONS ON THE MEDICATION. THEY NEED TO KNOW WHICH ONE IS CORRECT.     Who is the provider that prescribed the medication: DR ESPINOZA     Additional notes:

## 2024-04-30 NOTE — LETTER
[facility]  Vaccine Consent Form    Patient Name:  Herbert Harley  Patient :  1955     Vaccine(s) Ordered    Pneumococcal Conjugate Vaccine 20-Valent All        Screening Checklist  The following questions should be completed prior to vaccination. If you answer “yes” to any question, it does not necessarily mean you should not be vaccinated. It just means we may need to clarify or ask more questions. If a question is unclear, please ask your healthcare provider to explain it.    Yes No   Any fever or moderate to severe illness today (mild illness and/or antibiotic treatment are not contraindications)?     Do you have a history of a serious reaction to any previous vaccinations, such as anaphylaxis, encephalopathy within 7 days, Guillain-Jerome syndrome within 6 weeks, seizure?     Have you received any live vaccine(s) (e.g MMR, RABIA) or any other vaccines in the last month (to ensure duplicate doses aren't given)?     Do you have an anaphylactic allergy to latex (DTaP, DTaP-IPV, Hep A, Hep B, MenB, RV, Td, Tdap), baker’s yeast (Hep B, HPV), polysorbates (RSV, nirsevimab, PCV 20, Rotavirrus, Tdap, Shingrix), or gelatin (RABIA, MMR)?     Do you have an anaphylactic allergy to neomycin (Rabies, RABIA, MMR, IPV, Hep A), polymyxin B (IPV), or streptomycin (IPV)?      Any cancer, leukemia, AIDS, or other immune system disorder? (RABIA, MMR, RV)     Do you have a parent, brother, or sister with an immune system problem (if immune competence of vaccine recipient clinically verified, can proceed)? (MMR, RABIA)     Any recent steroid treatments for >2 weeks, chemotherapy, or radiation treatment? (RABIA, MMR)     Have you received antibody-containing blood transfusions or IVIG in the past 11 months (recommended interval is dependent on product)? (MMR, RABIA)     Have you taken antiviral drugs (acyclovir, famciclovir, valacyclovir for RABIA) in the last 24 or 48 hours, respectively?      Are you pregnant or planning to become  "pregnant within 1 month? (RABIA, MMR, HPV, IPV, MenB, Abrexvy; For Hep B- refer to Engerix-B; For RSV - Abrysvo is indicated for 32-36 weeks of pregnancy from September to January)     For infants, have you ever been told your child has had intussusception or a medical emergency involving obstruction of the intestine (Rotavirus)? If not for an infant, can skip this question.         *Ordering Physicians/APC should be consulted if \"yes\" is checked by the patient or guardian above.  I have received, read, and understand the Vaccine Information Statement (VIS) for each vaccine ordered.  I have considered my or my child's health status as well as the health status of my close contacts.  I have taken the opportunity to discuss my vaccine questions with my or my child's health care provider.   I have requested that the ordered vaccine(s) be given to me or my child.  I understand the benefits and risks of the vaccines.  I understand that I should remain in the clinic for 15 minutes after receiving the vaccine(s).  _________________________________________________________  Signature of Patient or Parent/Legal Guardian ____________________  Date     "

## 2024-04-30 NOTE — PROGRESS NOTES
Subjective     Chief Complaint  Post-op Open Heart Surgery (Talk about medication)    Subjective          Herbert Harley is a 68 y.o. male who presents today to [facility] for follow up.    HPI:   History of Present Illness    Mr. Harley is a 68-year-old male presents today to follow-up on chronic issues.    He has a past medical history of hypertension, hyperlipidemia, ischemic cardiomyopathy, chronic systolic heart failure, coronary artery disease status post CABG, type 2 diabetes, chronic viral hepatitis C, iron deficiency anemia, COPD.    His biggest issue right now is his bilateral knee pain and neck pain. He has noted a different in his range of motion in his cervical spine. His knee pain is worse after sitting for a long period of time. He has to prop his feet up a lot due to fluid collection and noted that this causes his knee pain to be worse. The neck pain is mainly present when trying to turn his head.  He will get muscle spasms there sometimes, as well.     He is able to stay euvolemic with Bumex 0.5 mg daily and potassium every other day.     The following portions of the patient's history were reviewed and updated as appropriate: allergies, current medications, past family history, past medical history, past social history, past surgical history and problem list.    Objective     Objective     Allergy:   Allergies   Allergen Reactions    Wellbutrin [Bupropion] Swelling     Facial swelling, severe        Current Medications:   Current Outpatient Medications   Medication Sig Dispense Refill    albuterol sulfate  (90 Base) MCG/ACT inhaler       aspirin 81 MG EC tablet Take 1 tablet by mouth Daily. 90 tablet 3    betamethasone valerate (VALISONE) 0.1 % ointment Apply 1 Application topically to the appropriate area as directed 2 (Two) Times a Day. 45 g 1    bumetanide (BUMEX) 1 MG tablet Take 0.5 tablets by mouth Daily. 45 tablet 1    coenzyme Q10 100 MG  "capsule Take 1 capsule by mouth Daily.      Collagen-Vitamin C-Biotin (COLLAGEN 1500/C PO) Take 1 tablet by mouth Daily. OTC supplement      dapagliflozin Propanediol (Farxiga) 10 MG tablet Take 10 mg by mouth Daily. Indications: Type 2 Diabetes 90 tablet 3    ezetimibe (ZETIA) 10 MG tablet Take 1 tablet by mouth Daily. 90 tablet 1    fluticasone (FLONASE) 50 MCG/ACT nasal spray 2 sprays into the nostril(s) as directed by provider Daily. 15.8 mL 5    Fluticasone-Umeclidin-Vilant (Trelegy Ellipta) 100-62.5-25 MCG/ACT inhaler Inhale 1 puff Daily. 60 each 0    Insulin Syringe 31G X 5/16\" 1 ML misc USE DAILY WITH INSULIN INJECTIONS 100 each 3    Lantus 100 UNIT/ML injection Inject 52 units every EVENING, increase by 2 units every 4 days for fasting goal  MX 60 units/day 60 mL 2    montelukast (SINGULAIR) 10 MG tablet Take 1 tablet by mouth Every Evening. 30 tablet 5    nitroglycerin (NITROSTAT) 0.4 MG SL tablet Place 1 tablet under the tongue Every 5 (Five) Minutes As Needed for Chest Pain. Take no more than 3 doses in 15 minutes. 25 tablet 3    potassium chloride ER (K-TAB) 20 MEQ tablet controlled-release ER tablet Take 1 tablet by mouth Daily. ONE TABLET EVERY OTHER DAY 45 tablet 1    rosuvastatin (CRESTOR) 10 MG tablet Take 1 tablet by mouth Daily. 30 tablet 11    sertraline (ZOLOFT) 25 MG tablet Take 2 tablets by mouth Daily.      vitamin D (ERGOCALCIFEROL) 1.25 MG (98854 UT) capsule capsule Take 1 capsule by mouth 1 (One) Time Per Week. 12 capsule 0     No current facility-administered medications for this visit.       Past Medical History:  Past Medical History:   Diagnosis Date    Acute kidney failure, unspecified 03/21/2023    Acute pulmonary edema 04/07/2023    Acute renal failure (ARF) 03/02/2023    Acute respiratory failure with hypoxia 03/03/2023    Acute systolic heart failure (CMS/HCC) 03/20/2023    Anaphylactic shock, unspecified, initial encounter 03/21/2023    Arrhythmia March 2023    Asthma " 2022    Chronic obstructive pulmonary disease, unspecified 2023    Chronic systolic congestive heart failure 2023    Coronary artery disease 2014    Elevated cholesterol     Erectile dysfunction     GERD (gastroesophageal reflux disease) 2023    Esophagitis in hospital    History of tobacco abuse     HLD (hyperlipidemia) 2023    Hypertension     Ischemic cardiomyopathy     Mucopurulent chronic bronchitis 2023    Myocardial infarction 2014    Old myocardial infarction 2023    Other disorders of phosphorus metabolism 2023    Positive cardiac stress test 02/15/2023    Added automatically from request for surgery [id number]    Prolonged Q-T interval on ECG 2023    Secondary hyperparathyroidism of renal origin     Shock 2023    Shortness of breath 2023    Type 2 diabetes mellitus     Wears glasses        Past Surgical History:  Past Surgical History:   Procedure Laterality Date    CARDIAC CATHETERIZATION  2014    CARDIAC CATHETERIZATION Left 2023    Procedure: Left Heart Cath;  Surgeon: Nadya Ramirez MD;  Location:  [facility];  Service: Cardiology;  Laterality: Left;    CORONARY ARTERY BYPASS GRAFT N/A 2023    Procedure: MEDIAN STERNOTOMY CORONARY ARTERY BYPASS GRAFTING X3  UTILIZING THE LEFT INTERNAL MAMMERY GRAFT, AND EVH OF THE GREATER RIGHT SAPHENOUS VEIN;  Surgeon: Paresh Saavedra MD;  Location:  [facility];  Service: Cardiothoracic;  Laterality: N/A;    CORONARY ARTERY BYPASS GRAFT  3/1/2023    CORONARY STENT PLACEMENT  2014    TONSILLECTOMY         Social History:  Social History     Socioeconomic History    Marital status:     Number of children: 5   Tobacco Use    Smoking status: Former     Current packs/day: 0.00     Average packs/day: 1.5 packs/day for 52.9 years (79.4 ttl pk-yrs)     Types: Cigarettes     Start date: 1970     Quit date: 2022     Years since quittin.3      "Passive exposure: Past    Smokeless tobacco: Never   Vaping Use    Vaping status: Never Used   Substance and Sexual Activity    Alcohol use: Not Currently    Drug use: Never    Sexual activity: Not Currently       Family History:  Family History   Problem Relation Age of Onset    Diabetes Mother     Cancer Father         liver    Brain cancer Sister     Heart attack Brother     Prostate cancer Brother          Vital Signs:   /58   Pulse 63   Temp 98.7 °F (37.1 °C) (Temporal)   Ht 165.1 cm (65\")   Wt 77.5 kg (170 lb 12.8 oz)   SpO2 95%   BMI 28.42 kg/m²      Physical Exam:  Physical Exam  Vitals reviewed.   Constitutional:       Appearance: He is not ill-appearing.   Eyes:      Pupils: Pupils are equal, round, and reactive to light.   Neck:      Thyroid: No thyromegaly.   Cardiovascular:      Rate and Rhythm: Normal rate.      Pulses: Normal pulses.   Pulmonary:      Effort: Pulmonary effort is normal.      Breath sounds: Normal breath sounds.   Musculoskeletal:         General: No swelling.      Cervical back: Rigidity and spasms present. Decreased range of motion.   Neurological:      General: No focal deficit present.      Mental Status: He is alert. Mental status is at baseline.   Psychiatric:         Mood and Affect: Mood normal.         Thought Content: Thought content normal.               PHQ-9 Score  PHQ-9 Total Score:       Lab Review  Hospital Outpatient Visit on 03/21/2024   Component Date Value Ref Range Status    Creatinine 03/21/2024 1.80 (H)  0.60 - 1.30 mg/dL Final    Serial Number: [id number]:  [id number]   Office Visit on 03/14/2024   Component Date Value Ref Range Status    Hemoglobin A1C 03/14/2024 7.3 (A)  4.5 - 5.7 % Final    Lot Number 03/14/2024 [id number]   Final    Expiration Date 03/14/2024 11/22/2025   Final    Glucose 03/14/2024 243 (A)  70 - 130 mg/dL Final    Lot Number 03/14/2024 [id number]   Final    Expiration Date 03/14/2024 10/05/2024   Final   Lab on 02/20/2024 "   Component Date Value Ref Range Status    Glucose 02/20/2024 143 (H)  65 - 99 mg/dL Final    BUN 02/20/2024 21  8 - 23 mg/dL Final    Creatinine 02/20/2024 1.62 (H)  0.76 - 1.27 mg/dL Final    Sodium 02/20/2024 136  136 - 145 mmol/L Final    Potassium 02/20/2024 4.5  3.5 - 5.2 mmol/L Final    Slight hemolysis detected by analyzer. Result may be falsely elevated.    Chloride 02/20/2024 102  98 - 107 mmol/L Final    CO2 02/20/2024 19.3 (L)  22.0 - 29.0 mmol/L Final    Calcium 02/20/2024 8.7  8.6 - 10.5 mg/dL Final    Albumin 02/20/2024 3.7  3.5 - 5.2 g/dL Final    Phosphorus 02/20/2024 2.9  2.5 - 4.5 mg/dL Final    Anion Gap 02/20/2024 14.7  5.0 - 15.0 mmol/L Final    BUN/Creatinine Ratio 02/20/2024 13.0  7.0 - 25.0 Final    eGFR 02/20/2024 46.0 (L)  >60.0 mL/min/1.73 Final    Total Protein, Urine 02/20/2024 6.7  mg/dL Final    Creatinine, Urine 02/20/2024 66.1  mg/dL Final    1,25-Dihydroxy, Vitamin D 02/20/2024 37.5  24.8 - 81.5 pg/mL Final    PTH, Intact 02/20/2024 47.3  15.0 - 65.0 pg/mL Final    WBC 02/20/2024 8.54  3.40 - 10.80 10*3/mm3 Final    RBC 02/20/2024 5.67  4.14 - 5.80 10*6/mm3 Final    Hemoglobin 02/20/2024 16.5  13.0 - 17.7 g/dL Final    Hematocrit 02/20/2024 49.1  37.5 - 51.0 % Final    MCV 02/20/2024 86.6  79.0 - 97.0 fL Final    MCH 02/20/2024 29.1  26.6 - 33.0 pg Final    MCHC 02/20/2024 33.6  31.5 - 35.7 g/dL Final    RDW 02/20/2024 13.9  12.3 - 15.4 % Final    RDW-SD 02/20/2024 43.7  37.0 - 54.0 fl Final    MPV 02/20/2024 10.3  6.0 - 12.0 fL Final    Platelets 02/20/2024 228  140 - 450 10*3/mm3 Final    Sed Rate 02/20/2024 6  0 - 20 mm/hr Final    C-Reactive Protein 02/20/2024 <0.30  0.00 - 0.50 mg/dL Final    Color, UA 02/20/2024 Yellow  Yellow, Straw Final    Appearance, UA 02/20/2024 Clear  Clear Final    pH, UA 02/20/2024 5.5  5.0 - 8.0 Final    Specific Gravity, UA 02/20/2024 1.023  1.005 - 1.030 Final    Glucose, UA 02/20/2024 >=1000 mg/dL (3+) (A)  Negative Final    Ketones, UA  02/20/2024 Negative  Negative Final    Bilirubin, UA 02/20/2024 Negative  Negative Final    Blood, UA 02/20/2024 Negative  Negative Final    Protein, UA 02/20/2024 Negative  Negative Final    Leuk Esterase, UA 02/20/2024 Negative  Negative Final    Nitrite, UA 02/20/2024 Negative  Negative Final    Urobilinogen, UA 02/20/2024 0.2 E.U./dL  0.2 - 1.0 E.U./dL Final    RBC, UA 02/20/2024 0-2  None Seen, 0-2 /HPF Final    WBC, UA 02/20/2024 0-2  None Seen, 0-2 /HPF Final    Bacteria, UA 02/20/2024 None Seen  None Seen /HPF Final    Squamous Epithelial Cells, UA 02/20/2024 0-2  None Seen, 0-2 /HPF Final    Hyaline Casts, UA 02/20/2024 None Seen  None Seen /LPF Final    Methodology 02/20/2024 Automated Microscopy   Final   Lab on 02/02/2024   Component Date Value Ref Range Status    Glucose 02/02/2024 117 (H)  65 - 99 mg/dL Final    BUN 02/02/2024 23  8 - 23 mg/dL Final    Creatinine 02/02/2024 1.59 (H)  0.76 - 1.27 mg/dL Final    Sodium 02/02/2024 139  136 - 145 mmol/L Final    Potassium 02/02/2024 4.5  3.5 - 5.2 mmol/L Final    Slight hemolysis detected by analyzer. Result may be falsely elevated.    Chloride 02/02/2024 104  98 - 107 mmol/L Final    CO2 02/02/2024 24.6  22.0 - 29.0 mmol/L Final    Calcium 02/02/2024 9.1  8.6 - 10.5 mg/dL Final    Total Protein 02/02/2024 7.2  6.0 - 8.5 g/dL Final    Albumin 02/02/2024 4.0  3.5 - 5.2 g/dL Final    ALT (SGPT) 02/02/2024 21  1 - 41 U/L Final    AST (SGOT) 02/02/2024 22  1 - 40 U/L Final    Alkaline Phosphatase 02/02/2024 92  39 - 117 U/L Final    Total Bilirubin 02/02/2024 0.4  0.0 - 1.2 mg/dL Final    Globulin 02/02/2024 3.2  gm/dL Final    A/G Ratio 02/02/2024 1.3  g/dL Final    BUN/Creatinine Ratio 02/02/2024 14.5  7.0 - 25.0 Final    Anion Gap 02/02/2024 10.4  5.0 - 15.0 mmol/L Final    eGFR 02/02/2024 47.0 (L)  >60.0 mL/min/1.73 Final    Total Cholesterol 02/02/2024 160  0 - 200 mg/dL Final    Triglycerides 02/02/2024 179 (H)  0 - 150 mg/dL Final    HDL Cholesterol  02/02/2024 33 (L)  40 - 60 mg/dL Final    LDL Cholesterol  02/02/2024 96  0 - 100 mg/dL Final    VLDL Cholesterol 02/02/2024 31  5 - 40 mg/dL Final    LDL/HDL Ratio 02/02/2024 2.76   Final    Hemoglobin A1C 02/02/2024 7.50 (H)  4.80 - 5.60 % Final    TSH 02/02/2024 4.900 (H)  0.270 - 4.200 uIU/mL Final    Vitamin B-12 02/02/2024 402  211 - 946 pg/mL Final    25 Hydroxy, Vitamin D 02/02/2024 13.6 (L)  30.0 - 100.0 ng/ml Final    Iron 02/02/2024 75  59 - 158 mcg/dL Final    Iron Saturation (TSAT) 02/02/2024 21  20 - 50 % Final    Transferrin 02/02/2024 238  200 - 360 mg/dL Final    TIBC 02/02/2024 355  298 - 536 mcg/dL Final    PSA 02/02/2024 0.724  0.000 - 4.000 ng/mL Final    WBC 02/02/2024 7.10  3.40 - 10.80 10*3/mm3 Final    RBC 02/02/2024 5.77  4.14 - 5.80 10*6/mm3 Final    Hemoglobin 02/02/2024 16.6  13.0 - 17.7 g/dL Final    Hematocrit 02/02/2024 50.1  37.5 - 51.0 % Final    MCV 02/02/2024 86.8  79.0 - 97.0 fL Final    MCH 02/02/2024 28.8  26.6 - 33.0 pg Final    MCHC 02/02/2024 33.1  31.5 - 35.7 g/dL Final    RDW 02/02/2024 13.6  12.3 - 15.4 % Final    RDW-SD 02/02/2024 43.0  37.0 - 54.0 fl Final    MPV 02/02/2024 10.6  6.0 - 12.0 fL Final    Platelets 02/02/2024 163  140 - 450 10*3/mm3 Final    Neutrophil % 02/02/2024 49.2  42.7 - 76.0 % Final    Lymphocyte % 02/02/2024 27.7  19.6 - 45.3 % Final    Monocyte % 02/02/2024 12.8 (H)  5.0 - 12.0 % Final    Eosinophil % 02/02/2024 8.7 (H)  0.3 - 6.2 % Final    Basophil % 02/02/2024 1.3  0.0 - 1.5 % Final    Immature Grans % 02/02/2024 0.3  0.0 - 0.5 % Final    Neutrophils, Absolute 02/02/2024 3.49  1.70 - 7.00 10*3/mm3 Final    Lymphocytes, Absolute 02/02/2024 1.97  0.70 - 3.10 10*3/mm3 Final    Monocytes, Absolute 02/02/2024 0.91 (H)  0.10 - 0.90 10*3/mm3 Final    Eosinophils, Absolute 02/02/2024 0.62 (H)  0.00 - 0.40 10*3/mm3 Final    Basophils, Absolute 02/02/2024 0.09  0.00 - 0.20 10*3/mm3 Final    Immature Grans, Absolute 02/02/2024 0.02  0.00 - 0.05 10*3/mm3  Final    nRBC 02/02/2024 0.0  0.0 - 0.2 /100 WBC Final    Free T4 02/02/2024 0.99  0.93 - 1.70 ng/dL Final        Radiology Results        Assessment / Plan         Assessment and Plan   Diagnoses and all orders for this visit:    1. Primary hypertension (Primary)  -     TSH; Future  -     T4, free; Future  -     Basic metabolic panel; Future    2. Chronic systolic (congestive) heart failure  -     bumetanide (BUMEX) 1 MG tablet; Take 0.5 tablets by mouth Daily.  Dispense: 45 tablet; Refill: 1  -     potassium chloride ER (K-TAB) 20 MEQ tablet controlled-release ER tablet; Take 1 tablet by mouth Daily. ONE TABLET EVERY OTHER DAY  Dispense: 45 tablet; Refill: 1    3. Moderate persistent asthma, unspecified whether complicated  -     fluticasone (FLONASE) 50 MCG/ACT nasal spray; 2 sprays into the nostril(s) as directed by provider Daily.  Dispense: 15.8 mL; Refill: 5  -     montelukast (SINGULAIR) 10 MG tablet; Take 1 tablet by mouth Every Evening.  Dispense: 30 tablet; Refill: 5  -     Fluticasone-Umeclidin-Vilant (Trelegy Ellipta) 100-62.5-25 MCG/ACT inhaler; Inhale 1 puff Daily.  Dispense: 60 each; Refill: 0    4. Neck pain  -     XR Spine Cervical Complete 4 or 5 View (In Office)  -     Ambulatory Referral to Physical Therapy    5. Chronic bilateral low back pain with right-sided sciatica  -     XR Spine Lumbar 2 or 3 View (In Office)  -     Ambulatory Referral to Physical Therapy    6. Bilateral chronic knee pain  -     XR Knee 1 or 2 View Bilateral (In Office)  -     Ambulatory Referral to Physical Therapy    7. Abnormal thyroid blood test  -     TSH; Future  -     T4, free; Future    8. Need for vaccination  -     Pneumococcal Conjugate Vaccine 20-Valent All        Patient doing well overall.  Continuing inhalers as prescribed for his asthma.    For his neck pain and knee pain.  I suspect this is related to stiffness due to chronic illnesses and not being as active over the last 1 year.  Referring to physical  therapy and imaging ordered.  Treatment of inflammatory process is complicated given his history of coronary artery disease and diabetes.    Will continue to monitor his renal function along with thyroid studies as his TSH was elevated on his last labs.    Discussed possible differential diagnoses, testing, treatment, recommended non-pharmacological interventions, risks, warning signs to monitor for that would indicate need for follow-up in clinic or ER. If no improvement with these regimens or you have new or worsening symptoms follow-up. Patient verbalizes understanding and agreement with plan of care. Denies further needs or concerns.     Patient was given instructions and counseling regarding her condition and for health maintenance advised.            BMI is >= 25 and <30. (Overweight) The following options were offered after discussion;: weight loss educational material (shared in after visit summary)           Health Maintenance  Health Maintenance:   Health Maintenance Due   Topic Date Due    DIABETIC FOOT EXAM  2022        Meds ordered during this visit  New Medications Ordered This Visit   Medications    bumetanide (BUMEX) 1 MG tablet     Sig: Take 0.5 tablets by mouth Daily.     Dispense:  45 tablet     Refill:  1    potassium chloride ER (K-TAB) 20 MEQ tablet controlled-release ER tablet     Sig: Take 1 tablet by mouth Daily. ONE TABLET EVERY OTHER DAY     Dispense:  45 tablet     Refill:  1    fluticasone (FLONASE) 50 MCG/ACT nasal spray     Si sprays into the nostril(s) as directed by provider Daily.     Dispense:  15.8 mL     Refill:  5    montelukast (SINGULAIR) 10 MG tablet     Sig: Take 1 tablet by mouth Every Evening.     Dispense:  30 tablet     Refill:  5    Fluticasone-Umeclidin-Vilant (Trelegy Ellipta) 100-62.5-25 MCG/ACT inhaler     Sig: Inhale 1 puff Daily.     Dispense:  60 each     Refill:  0       Meds stopped during this visit:  Medications Discontinued During This Encounter    Medication Reason    bumetanide (BUMEX) 1 MG tablet Reorder    potassium chloride ER (K-TAB) 20 MEQ tablet controlled-release ER tablet Reorder    fluticasone (FLONASE) 50 MCG/ACT nasal spray Reorder    montelukast (SINGULAIR) 10 MG tablet Reorder    Trelegy Ellipta 100-62.5-25 MCG/ACT inhaler Reorder        Visit Diagnoses    ICD-10-CM ICD-9-CM   1. Primary hypertension  I10 401.9   2. Chronic systolic (congestive) heart failure  I50.22 428.22     428.0   3. Moderate persistent asthma, unspecified whether complicated  J45.40 493.90   4. Neck pain  M54.2 723.1   5. Chronic bilateral low back pain with right-sided sciatica  M54.41 724.2    G89.29 724.3     338.29   6. Bilateral chronic knee pain  M25.561 719.46    M25.562 338.29    G89.29    7. Abnormal thyroid blood test  R79.89 790.6   8. Need for vaccination  Z23 V05.9       Patient was given instructions and counseling regarding his condition or for health maintenance advice. Please see specific information pulled into the AVS if appropriate.     Follow Up   Return in about 3 months (around 7/30/2024) for followup.        This document has been electronically signed by Yessica Ricci DO   April 30, 2024 13:39 EDT    Dictated Utilizing Dragon Dictation: Part of this note may be an electronic transcription/translation of spoken language to printed text using the Dragon Dictation System.    Yessica Ricci D.O.  [facility] [place]

## 2024-05-01 LAB
ANION GAP SERPL CALCULATED.3IONS-SCNC: 8 MMOL/L (ref 5–15)
BUN SERPL-MCNC: 23 MG/DL (ref 8–23)
BUN/CREAT SERPL: 15.4 (ref 7–25)
CALCIUM SPEC-SCNC: 9.4 MG/DL (ref 8.6–10.5)
CHLORIDE SERPL-SCNC: 102 MMOL/L (ref 98–107)
CO2 SERPL-SCNC: 26 MMOL/L (ref 22–29)
CREAT SERPL-MCNC: 1.49 MG/DL (ref 0.76–1.27)
EGFRCR SERPLBLD CKD-EPI 2021: 50.8 ML/MIN/1.73
GLUCOSE SERPL-MCNC: 190 MG/DL (ref 65–99)
POTASSIUM SERPL-SCNC: 4.9 MMOL/L (ref 3.5–5.2)
SODIUM SERPL-SCNC: 136 MMOL/L (ref 136–145)
T4 FREE SERPL-MCNC: 0.93 NG/DL (ref 0.93–1.7)
TSH SERPL DL<=0.05 MIU/L-ACNC: 2.33 UIU/ML (ref 0.27–4.2)

## 2024-05-01 RX ORDER — ERGOCALCIFEROL 1.25 MG/1
50000 CAPSULE ORAL WEEKLY
Qty: 12 CAPSULE | Refills: 0 | Status: SHIPPED | OUTPATIENT
Start: 2024-05-01

## 2024-05-15 ENCOUNTER — TELEPHONE (OUTPATIENT)
Dept: CASE MANAGEMENT | Facility: OTHER | Age: 69
End: 2024-05-15
Payer: MEDICARE

## 2024-05-28 ENCOUNTER — TREATMENT (OUTPATIENT)
Dept: PHYSICAL THERAPY | Facility: CLINIC | Age: 69
End: 2024-05-28
Payer: MEDICARE

## 2024-05-28 DIAGNOSIS — M54.2 CERVICALGIA: Primary | ICD-10-CM

## 2024-05-28 DIAGNOSIS — G89.29 CHRONIC LEFT SHOULDER PAIN: ICD-10-CM

## 2024-05-28 DIAGNOSIS — M25.512 CHRONIC LEFT SHOULDER PAIN: ICD-10-CM

## 2024-05-28 PROCEDURE — 97161 PT EVAL LOW COMPLEX 20 MIN: CPT | Performed by: PHYSICAL THERAPIST

## 2024-05-28 PROCEDURE — 97140 MANUAL THERAPY 1/> REGIONS: CPT | Performed by: PHYSICAL THERAPIST

## 2024-05-28 PROCEDURE — 97110 THERAPEUTIC EXERCISES: CPT | Performed by: PHYSICAL THERAPIST

## 2024-05-28 NOTE — PROGRESS NOTES
Physical Therapy Initial Evaluation and Plan of Care    [facility] [place]  [address]  [place] [id number] [phone]    Patient: Herbert Harley   : 1955  Diagnosis/ICD-10 Code:  No primary diagnosis found.  Referring practitioner: Yessica Ricci DO    Subjective Evaluation    History of Present Illness  Mechanism of injury: Insidious    Subjective comment: Has had left shoulder and neck pain for the past 3 weeks.  Neck pain is mid to lower and on both sides.  States that his shoulder pain is superior.  No radiating pain down left arm.  Feels that it may be related to him sleeping on his left side.  Denies sleep disturbance. (Previous history of cardiac bypass x 3 on 3/1/2023.  Has had more mobility issues and joint soreness since recovering.  Never did cardiac rehabiliation due to multiple episodes of pneumonia.  Also, has low back soreness and bilateral knee pain.)  Patient Occupation: Retired-[employer] Quality of life: good    Pain  Exacerbated by: Shoulder abduction and hand behind back; neck rotation stiffness.  Progression: no change    Social Support  Lives with: spouse    Hand dominance: right    Patient Goals  Patient goals for therapy: decreased pain, increased motion and increased strength           *FEROZ:  12%  *NDI:  6%    Objective          Active Range of Motion   Cervical/Thoracic Spine   Cervical    Flexion: 40 degrees   Extension: 10 degrees   Left rotation: 40 degrees   Right rotation: 30 degrees     Additional Active Range of Motion Details  *L GHJ AROM flx/ER/aBd:  90 deg/40 deg/80 deg      Tests     Additional Tests Details  *(+) C/S traction with relief in the cervical spine region  *(-) Belly Press/Drop Arm/lag signs for L GHJ          Assessment & Plan       Assessment  Impairments: abnormal or restricted ROM, activity intolerance and lacks appropriate home exercise program   Functional limitations: carrying objects,  lifting, pulling, pushing, reaching behind back, reaching overhead and unable to perform repetitive tasks   Assessment details: Mr. Harley is a very pleasant 68 year old RHD male that presents to physical therapy with chronic pain and stiffness in the cervical spine and left shoulder.  PMH was covered and reviewed during interview.  Cervical spine active mobility is limited in all planes.  Has limited active and passive left shoulder mobility.  No signs or symptoms of cervical radiculopathy.  Has more stiffness in the left shoulder with pain, but no nisreen weakness.  Will focus care on improving cervical spine strength and mobility.  Combined with a graded mobility/loading program for the left shoulder.    Prognosis: good    Goals  Plan Goals: STGs:  1.)  Have at least 60 deg of active cervical spine rotation bilaterally in 4 weeks.  2.)  Have at least 120 deg of active shoulder forward elevation in 6 weeks.  LTGs:  1.)  Have at least 30 deg of active cervical spine extension in 8 weeks.      Plan  Therapy options: will be seen for skilled therapy services  Planned modality interventions: thermotherapy (hydrocollator packs)  Planned therapy interventions: strengthening, stretching, therapeutic activities, manual therapy, abdominal trunk stabilization, functional ROM exercises and home exercise program  Frequency: 1x week  Duration in visits: 8  Duration in weeks: 8  Treatment plan discussed with: patient        Manual Therapy:    9     mins  36254;  Therapeutic Exercise:    15     mins  88262;     Neuromuscular Lizzeth:        mins  77601;    Therapeutic Activity:          mins  16164;     Gait Training:           mins  54748;     Ultrasound:          mins  89825;    Electrical Stimulation:         mins  40446 ( );  Dry Needling          mins self-pay    Timed Treatment:   24   mins   Total Treatment:     55   mins    PT SIGNATURE: Amandeep Gruber, HAIR   DATE TREATMENT INITIATED: 5/28/2024    Initial  Certification  Certification Period: 8/26/2024  I certify that the therapy services are furnished while this patient is under my care.  The services outlined above are required by this patient, and will be reviewed every 90 days.     PHYSICIAN: Yessica Ricci DO  NPI: [id number]                                      DATE:    Please sign and return via fax to [phone].. Thank you, [facility].

## 2024-05-29 DIAGNOSIS — E78.5 DYSLIPIDEMIA: ICD-10-CM

## 2024-05-29 RX ORDER — ROSUVASTATIN CALCIUM 10 MG/1
10 TABLET, COATED ORAL DAILY
Qty: 90 TABLET | Refills: 3 | Status: SHIPPED | OUTPATIENT
Start: 2024-05-29

## 2024-05-29 NOTE — TELEPHONE ENCOUNTER
Lab Results   Component Value Date    CHOL 160 02/02/2024    CHLPL 174 03/28/2014    TRIG 179 (H) 02/02/2024    HDL 33 (L) 02/02/2024    LDL 96 02/02/2024     Lab Results   Component Value Date    GLUCOSE 190 (H) 04/30/2024    BUN 23 04/30/2024    CREATININE 1.49 (H) 04/30/2024    EGFR 50.8 (L) 04/30/2024    BCR 15.4 04/30/2024    K 4.9 04/30/2024    CO2 26.0 04/30/2024    CALCIUM 9.4 04/30/2024    ALBUMIN 3.7 02/20/2024    BILITOT 0.4 02/02/2024    AST 22 02/02/2024    ALT 21 02/02/2024

## 2024-06-02 DIAGNOSIS — J45.40 MODERATE PERSISTENT ASTHMA, UNSPECIFIED WHETHER COMPLICATED: ICD-10-CM

## 2024-06-03 RX ORDER — FLUTICASONE FUROATE, UMECLIDINIUM BROMIDE AND VILANTEROL TRIFENATATE 100; 62.5; 25 UG/1; UG/1; UG/1
1 POWDER RESPIRATORY (INHALATION) DAILY
Qty: 60 EACH | Refills: 0 | Status: SHIPPED | OUTPATIENT
Start: 2024-06-03

## 2024-06-07 ENCOUNTER — TREATMENT (OUTPATIENT)
Dept: PHYSICAL THERAPY | Facility: CLINIC | Age: 69
End: 2024-06-07
Payer: MEDICARE

## 2024-06-07 DIAGNOSIS — M25.512 CHRONIC LEFT SHOULDER PAIN: ICD-10-CM

## 2024-06-07 DIAGNOSIS — M54.2 CERVICALGIA: Primary | ICD-10-CM

## 2024-06-07 DIAGNOSIS — G89.29 CHRONIC LEFT SHOULDER PAIN: ICD-10-CM

## 2024-06-07 NOTE — PROGRESS NOTES
Physical Therapy Daily Progress Note    Patient: Herbert Harley   : 1955  Diagnosis/ICD-10 Code:  Cervicalgia [M54.2]  Referring practitioner: Yessica Ricci DO  Date of Initial Visit: Type: THERAPY  Noted: 2024  Today's Date: 2024  Patient seen for 2 sessions         Herbert Harley reports that his neck and left shoulder are less stiff since last visit.  Also, noticing reduced soreness.      Subjective     Objective   See Exercise, Manual, and Modality Logs for complete treatment.       Assessment/Plan  Focused visit on reducing cervical spine stiffness via manual therapy and exercise.  Combined with improving shoulder mobility and strength.  Will f/u next week.           Manual Therapy:    10     mins  75235;  Therapeutic Exercise:    18     mins  97373;     Neuromuscular Lizzeth:        mins  39235;    Therapeutic Activity:          mins  32690;     Gait Training:           mins  59104;     Ultrasound:          mins  43335;    Electrical Stimulation:         mins  44560 (MC );  Dry Needling          mins self-pay    Timed Treatment:   28   mins   Total Treatment:     28   mins    Amandeep Gruber PT  Physical Therapist

## 2024-06-11 ENCOUNTER — TREATMENT (OUTPATIENT)
Dept: PHYSICAL THERAPY | Facility: CLINIC | Age: 69
End: 2024-06-11
Payer: MEDICARE

## 2024-06-11 DIAGNOSIS — M25.512 CHRONIC LEFT SHOULDER PAIN: ICD-10-CM

## 2024-06-11 DIAGNOSIS — G89.29 CHRONIC LEFT SHOULDER PAIN: ICD-10-CM

## 2024-06-11 DIAGNOSIS — M54.2 CERVICALGIA: Primary | ICD-10-CM

## 2024-06-11 NOTE — PROGRESS NOTES
Physical Therapy Daily Progress Note    Patient: Herbert Harley   : 1955  Diagnosis/ICD-10 Code:  Cervicalgia [M54.2]  Referring practitioner: Yessica Ricci DO  Date of Initial Visit: Type: THERAPY  Noted: 2024  Today's Date: 2024  Patient seen for 3 sessions         Herbert Harley reports that his neck is less stiff since last visit.  No changes in left shoulder stiffness since last visit.      Subjective     Objective   See Exercise, Manual, and Modality Logs for complete treatment.       Assessment/Plan  Continued emphasis on improving cervical spine and left shoulder mobility.  Left shoulder forward elevation mobility improved slowly over the course of today's visit.  Will f/u next week.         Manual Therapy:    10     mins  60462;  Therapeutic Exercise:    15     mins  41590;     Neuromuscular Lizzeth:        mins  90839;    Therapeutic Activity:         mins  12196;     Gait Training:           mins  19762;     Ultrasound:          mins  94478;    Electrical Stimulation:         mins  12576 ( );  Dry Needling          mins self-pay    Timed Treatment:   25   mins   Total Treatment:     25   mins    Amandeep Gruber, PT  Physical Therapist

## 2024-06-18 ENCOUNTER — TREATMENT (OUTPATIENT)
Dept: PHYSICAL THERAPY | Facility: CLINIC | Age: 69
End: 2024-06-18
Payer: MEDICARE

## 2024-06-18 DIAGNOSIS — M25.512 CHRONIC LEFT SHOULDER PAIN: ICD-10-CM

## 2024-06-18 DIAGNOSIS — G89.29 CHRONIC LEFT SHOULDER PAIN: ICD-10-CM

## 2024-06-18 DIAGNOSIS — M54.2 CERVICALGIA: Primary | ICD-10-CM

## 2024-06-18 NOTE — PROGRESS NOTES
Physical Therapy Daily Progress Note    Patient: Herbert Harley   : 1955  Diagnosis/ICD-10 Code:  Cervicalgia [M54.2]  Referring practitioner: Yessica Ricci DO  Date of Initial Visit: Type: THERAPY  Noted: 2024  Today's Date: 2024  Patient seen for 4 sessions         Herbert Harley reports that his neck is improving in terms of stiffness.  His left shoulder was improving until he worked in his shed cleaning it out.  This was last Thursday.  Shoulder feels better today.      Subjective     Objective   See Exercise, Manual, and Modality Logs for complete treatment.       Assessment/Plan  Continued emphasis on improving left shoulder and cervical spine stiffness via manual therapy and exercise.  Will f/u in 2 weeks for re-assessment.         Manual Therapy:    15     mins  92511;  Therapeutic Exercise:    15     mins  99004;     Neuromuscular Lizzeth:        mins  53165;    Therapeutic Activity:     10     mins  81228;     Gait Training:           mins  27660;     Ultrasound:         mins  95176;    Electrical Stimulation:         mins  11016 ( );  Dry Needling          mins self-pay    Timed Treatment:   40   mins   Total Treatment:     40   mins    Amandeep Gruber PT  Physical Therapist

## 2024-06-24 NOTE — PROGRESS NOTES
Follow-up Visit      Date: 2024  Patient Name: Herbert Harley  : 1955   MRN: [id number]     Chief Complaint:    Chief Complaint   Patient presents with    Coronary Artery Disease       History of Present Illness: Herbert Harley is a 68 y.o. male who is here today for follow-up on his coronary artery disease.  Patient has been doing fine.  Patient denies any chest pain and shortness of breath any dizziness any palpitations.  He had a little bit of chest discomfort at that day when he was working in his garage with abnormal posture and did better.  He has been having occasional cough also.      Problem List     CARDIAC  Coronary Artery Disease:   Inferior STEMI with [facility], Dr. Carter, 3/28/2014:  EF 50%, NILE to the mid-dominant RCA, NILE to the proximal LAD  Stress test, 2023: Inferolateral ischemia,   [facility], 2023: Severe triple-vessel disease, %, RCA 80%, LAD 80%  2023 CABG x3    Myocardium:   Echo 3/2014:   EF 50-55%, RVSP 30.  Echo 2022:Stage C, HFmrEF EF 41- 45%, mild LVH  Echo 3/2023 EF 40%, RVC dilated, LA volume mildly increased    Valvular:   Mild calcification to aortic valve    Electrical:   NSR, LVH, PVC    Percardium:   Normal    VASCULAR  Arterial  Cerebrovascular disease:   Carotid Doppler: 2023 less than 50 %    CARDIAC RISK FACTORS  Hypertension  Diabetes  2023 A1C 9.5  2024 A1C 7.5  Dyslipidemia  2024   HDL 33 LDL 96  Tobacco Use, Quit after 75 years    NON-CARDIAC  Asthma/COPD: Abnormal PFTs  CKD, on dialysis     SURGERIES  Tonsillectomy  CABG x3      Subjective      Review of Systems:   Review of Systems   Respiratory:  Positive for cough and choking (ongoing for years, some days worse than others). Negative for shortness of breath.    Cardiovascular:  Positive for chest pain (2 days ago, intermittently). Negative for palpitations and leg swelling.       Medications:     Current Outpatient Medications:     albuterol sulfate   "(90 Base) MCG/ACT inhaler, , Disp: , Rfl:     amiodarone (PACERONE) 200 MG tablet, Take 1 tablet by mouth Daily., Disp: , Rfl:     aspirin 81 MG EC tablet, Take 1 tablet by mouth Daily., Disp: 90 tablet, Rfl: 3    betamethasone valerate (VALISONE) 0.1 % ointment, Apply 1 Application topically to the appropriate area as directed 2 (Two) Times a Day., Disp: 45 g, Rfl: 1    coenzyme Q10 100 MG capsule, Take 1 capsule by mouth Daily., Disp: , Rfl:     Collagen-Vitamin C-Biotin (COLLAGEN 1500/C PO), Take 1 tablet by mouth Daily. OTC supplement, Disp: , Rfl:     dapagliflozin Propanediol (Farxiga) 10 MG tablet, Take 10 mg by mouth Daily. Indications: Type 2 Diabetes, Disp: 90 tablet, Rfl: 3    ezetimibe (ZETIA) 10 MG tablet, Take 1 tablet by mouth Daily., Disp: 90 tablet, Rfl: 1    fluticasone (FLONASE) 50 MCG/ACT nasal spray, 2 sprays into the nostril(s) as directed by provider Daily., Disp: 15.8 mL, Rfl: 5    Insulin Syringe 31G X 5/16\" 1 ML misc, USE DAILY WITH INSULIN INJECTIONS, Disp: 100 each, Rfl: 3    Lantus 100 UNIT/ML injection, Inject 52 units every EVENING, increase by 2 units every 4 days for fasting goal  MX 60 units/day, Disp: 60 mL, Rfl: 2    nitroglycerin (NITROSTAT) 0.4 MG SL tablet, Place 1 tablet under the tongue Every 5 (Five) Minutes As Needed for Chest Pain. Take no more than 3 doses in 15 minutes., Disp: 25 tablet, Rfl: 3    potassium chloride ER (K-TAB) 20 MEQ tablet controlled-release ER tablet, Take 1 tablet by mouth Daily. ONE TABLET EVERY OTHER DAY, Disp: 45 tablet, Rfl: 1    rosuvastatin (CRESTOR) 10 MG tablet, Take 2 tablets by mouth Daily., Disp: 90 tablet, Rfl: 3    vitamin D (ERGOCALCIFEROL) 1.25 MG (42935 UT) capsule capsule, TAKE 1 CAPSULE BY MOUTH ONCE WEEKLY, Disp: 12 capsule, Rfl: 0    Allergies:   Allergies   Allergen Reactions    Wellbutrin [Bupropion] Swelling     Facial swelling, severe       Objective     Physical Exam:  Vitals:    06/27/24 1313   BP: 134/70   BP Location: " "Right arm   Patient Position: Sitting   Cuff Size: Adult   Pulse: 82   SpO2: 95%   Weight: 77.1 kg (170 lb)   Height: 162.6 cm (64\")     Body mass index is 29.18 kg/m².    Constitutional:       General: Not in acute distress.     Appearance: Healthy appearance. Not in distress.     Neck:     JVP:Not elevated     Carotid artery: Normal    Pulmonary:      Effort: Pulmonary effort is normal.      Breath sounds: Normal breath sounds. No wheezing. No rhonchi. No rales.     Cardiovascular:      Normal rate. Regular rhythm. Normal S1. Normal S2.      Murmurs: There is mild systolic murmur.      No gallop. No click. No rub.     Abdominal:      General: Bowel sounds are normal.      Palpations: Abdomen is soft.      Tenderness: There is no abdominal tenderness.    Extremities:     Pulses:Normal radial and pedal pulses     Edema:no edema    Smoking Cessation:   Tobacco Product History : Patient quit smoking in 2022 after 80 pack years     Lab Review:   Lab Results   Component Value Date    GLUCOSE 190 (H) 04/30/2024    BUN 23 04/30/2024    CREATININE 1.49 (H) 04/30/2024    EGFRIFNONA 66 12/14/2021    BCR 15.4 04/30/2024    K 4.9 04/30/2024    CO2 26.0 04/30/2024    CALCIUM 9.4 04/30/2024    ALBUMIN 3.7 02/20/2024    AST 22 02/02/2024    ALT 21 02/02/2024     Lab Results   Component Value Date    WBC 8.54 02/20/2024    HGB 16.5 02/20/2024    HCT 49.1 02/20/2024    MCV 86.6 02/20/2024     02/20/2024     Lab Results   Component Value Date    TSH 2.330 04/30/2024           ECG 12 Lead    Date/Time: 6/27/2024 1:39 PM  Performed by: Nadya Ramirez MD    Authorized by: Nadya Ramirez MD  Comparison: compared with previous ECG from 4/2/2023  Similar to previous ECG  Rhythm: sinus rhythm  Conduction: 1st degree AV block  Other findings: left ventricular hypertrophy with strain    Clinical impression: abnormal EKG           Assessment / Plan      Assessment:   Diagnosis Plan   1. Ischemic cardiomyopathy  ECG 12 Lead      2. " Hyperlipidemia LDL goal <70  Non-HDL Cholesterol Panel    Hepatic Function Panel    Lipid Panel      3. Primary hypertension        4. Dyslipidemia  rosuvastatin (CRESTOR) 10 MG tablet           Plan:  Patient has been stable on his coronary artery disease.  He will continue his current medications.  We will check his lipid profile again to bring his LDL close to 55.  Will increase his atorvastatin to 20 mg daily.  Will also check his liver function test.    Follow Up:       Return in about 1 year (around 6/27/2025).    Nadya Ramirez MD

## 2024-06-27 ENCOUNTER — OFFICE VISIT (OUTPATIENT)
Dept: CARDIOLOGY | Facility: CLINIC | Age: 69
End: 2024-06-27
Payer: MEDICARE

## 2024-06-27 VITALS
BODY MASS INDEX: 29.02 KG/M2 | HEIGHT: 64 IN | DIASTOLIC BLOOD PRESSURE: 70 MMHG | HEART RATE: 82 BPM | WEIGHT: 170 LBS | SYSTOLIC BLOOD PRESSURE: 134 MMHG | OXYGEN SATURATION: 95 %

## 2024-06-27 DIAGNOSIS — I25.5 ISCHEMIC CARDIOMYOPATHY: Primary | ICD-10-CM

## 2024-06-27 DIAGNOSIS — E78.5 HYPERLIPIDEMIA LDL GOAL <70: ICD-10-CM

## 2024-06-27 DIAGNOSIS — I10 PRIMARY HYPERTENSION: ICD-10-CM

## 2024-06-27 DIAGNOSIS — E78.5 DYSLIPIDEMIA: ICD-10-CM

## 2024-06-27 PROCEDURE — 99214 OFFICE O/P EST MOD 30 MIN: CPT | Performed by: INTERNAL MEDICINE

## 2024-06-27 PROCEDURE — 3078F DIAST BP <80 MM HG: CPT | Performed by: INTERNAL MEDICINE

## 2024-06-27 PROCEDURE — 3075F SYST BP GE 130 - 139MM HG: CPT | Performed by: INTERNAL MEDICINE

## 2024-06-27 PROCEDURE — 93000 ELECTROCARDIOGRAM COMPLETE: CPT | Performed by: INTERNAL MEDICINE

## 2024-06-27 RX ORDER — AMIODARONE HYDROCHLORIDE 200 MG/1
200 TABLET ORAL DAILY
COMMUNITY

## 2024-06-27 RX ORDER — ROSUVASTATIN CALCIUM 10 MG/1
20 TABLET, COATED ORAL DAILY
Qty: 90 TABLET | Refills: 3 | Status: SHIPPED | OUTPATIENT
Start: 2024-06-27

## 2024-07-02 DIAGNOSIS — J45.40 MODERATE PERSISTENT ASTHMA, UNSPECIFIED WHETHER COMPLICATED: ICD-10-CM

## 2024-07-02 RX ORDER — FLUTICASONE FUROATE, UMECLIDINIUM BROMIDE AND VILANTEROL TRIFENATATE 100; 62.5; 25 UG/1; UG/1; UG/1
1 POWDER RESPIRATORY (INHALATION) DAILY
Qty: 60 EACH | Refills: 0 | Status: SHIPPED | OUTPATIENT
Start: 2024-07-02

## 2024-07-02 NOTE — TELEPHONE ENCOUNTER
Rx Refill Note  Requested Prescriptions     Pending Prescriptions Disp Refills    Trelegy Ellipta 100-62.5-25 MCG/ACT inhaler [Pharmacy Med Name: TRELEGY ELLIPTA 100-62.5-25] 60 each 0     Sig: INHALE 1 PUFF BY MOUTH DAILY      Last office visit with prescribing clinician: 4/30/2024   Last telemedicine visit with prescribing clinician: Visit date not found   Next office visit with prescribing clinician: 8/6/2024                         Would you like a call back once the refill request has been completed: [] Yes [] No    If the office needs to give you a call back, can they leave a voicemail: [] Yes [] No    Garima Hlal MA  07/02/24, 09:13 EDT

## 2024-07-03 ENCOUNTER — TELEPHONE (OUTPATIENT)
Dept: FAMILY MEDICINE CLINIC | Facility: CLINIC | Age: 69
End: 2024-07-03

## 2024-07-03 NOTE — TELEPHONE ENCOUNTER
Caller: Vivian Harley    Relationship: Emergency Contact    Best call back number:  [phone] (Mobile)     Requested Prescriptions:   Requested Prescriptions      No prescriptions requested or ordered in this encounter      BUMETANIDE 1 MG TABLET     Pharmacy where request should be sent: [facility] [id number] [address] - [phone]  - [phone] FX     Last office visit with prescribing clinician: 4/30/2024   Last telemedicine visit with prescribing clinician: Visit date not found   Next office visit with prescribing clinician: 8/6/2024     Additional details provided by patient: PATIENT HAD TO TAKE 1.5 TABLET SOMETIMES AND NOW HAS 3 TABLETS LEFT OF MEDICATION       Does the patient have less than a 3 day supply:  [x] Yes  [] No    Would you like a call back once the refill request has been completed: [] Yes [x] No    If the office needs to give you a call back, can they leave a voicemail: [] Yes [x] No

## 2024-07-05 RX ORDER — BUMETANIDE 1 MG/1
1 TABLET ORAL DAILY
Qty: 90 TABLET | Refills: 1 | Status: SHIPPED | OUTPATIENT
Start: 2024-07-05

## 2024-07-08 ENCOUNTER — TREATMENT (OUTPATIENT)
Dept: PHYSICAL THERAPY | Facility: CLINIC | Age: 69
End: 2024-07-08
Payer: MEDICARE

## 2024-07-08 DIAGNOSIS — G89.29 CHRONIC LEFT SHOULDER PAIN: ICD-10-CM

## 2024-07-08 DIAGNOSIS — M54.2 CERVICALGIA: Primary | ICD-10-CM

## 2024-07-08 DIAGNOSIS — M25.512 CHRONIC LEFT SHOULDER PAIN: ICD-10-CM

## 2024-07-08 NOTE — PROGRESS NOTES
Physical Therapy Daily Progress Note    Patient: Herbert Harley   : 1955  Diagnosis/ICD-10 Code:  No primary diagnosis found.  Referring practitioner: Yessica Ricci DO  Date of Initial Visit: Type: THERAPY  Noted: 2024  Today's Date: 2024  Patient seen for 5 sessions         Herbert Harley reports that he is doing better overall.  Notes that he has had improved neck mobility.  Shoulder mobility is still the same.    Aggs:  *L S/L  *HBB    Subjective     Objective   See Exercise, Manual, and Modality Logs for complete treatment.     *C/S rotation:  R->50 deg; L->40 deg; 25 deg/45 deg  *L GHJ flx/ER//HBB:  105 deg/50 deg/L2    Assessment/Plan  Mr. Harley has attended physical therapy for a total of 5 visits for chronic pain and stiffness in the cervical spine and left shoulder.  Has made some improvement in cervical spine and left shoulder mobility.  Continued emphasis on improving cervical spine and left shoulder mobility via manual therapy and exercise today.  It will likely take another 2 months for symptoms to continue to improve. Will continue physical therapy 1x every 2 weeks for 4 weeks.         Manual Therapy:    9     mins  72693;  Therapeutic Exercise:    20     mins  92795;     Neuromuscular Lizzeth:        mins  98367;    Therapeutic Activity:     10     mins  12040;     Gait Training:           mins  00840;     Ultrasound:          mins  23402;    Electrical Stimulation:         mins  15862 ( );  Dry Needling          mins self-pay    Timed Treatment:   39   mins   Total Treatment:     39   mins    Amandeep Gruber, PT  Physical Therapist

## 2024-07-10 ENCOUNTER — TELEPHONE (OUTPATIENT)
Dept: CARDIOLOGY | Facility: CLINIC | Age: 69
End: 2024-07-10

## 2024-07-10 NOTE — TELEPHONE ENCOUNTER
[facility] received a fax that requires your attention. The document has been indexed to the patient’s chart for your review.      Reason for sending: EXTERNAL MEDICAL RECORD NOTIFICATION     Documents Description: MED REC [id number]    Name of Sender: LARISSA     Date Indexed: 7.10.24    WANTING COPY OF LAST EKG AND OV NOTE

## 2024-07-18 ENCOUNTER — TREATMENT (OUTPATIENT)
Dept: PHYSICAL THERAPY | Facility: CLINIC | Age: 69
End: 2024-07-18
Payer: MEDICARE

## 2024-07-18 DIAGNOSIS — M54.2 CERVICALGIA: Primary | ICD-10-CM

## 2024-07-18 NOTE — PROGRESS NOTES
Physical Therapy Daily Progress Note    Patient: Herbert Harley   : 1955  Diagnosis/ICD-10 Code:  No primary diagnosis found.  Referring practitioner: Yessica Ricci DO  Date of Initial Visit: No linked episodes  Today's Date: 2024  Patient seen for Visit count could not be calculated. Make sure you are using a visit which is associated with an episode. sessions         Herbert Harley reports feeling much better overall.  Has improved neck mobility.  States that his shoulder is even better in terms of pain and mobility after working on a car last weekend.  Was sore during the work, but improved afterward.      Subjective     Objective   See Exercise, Manual, and Modality Logs for complete treatment.       Assessment/Plan  Focused visit on improving cervical spine stiffness via manual therapy.  Combined with exercises to improve left shoulder mobility and strength.  Will f/u in 2 weeks and plan discharge at that time.             Manual Therapy:    10     mins  23628;  Therapeutic Exercise:    18     mins  88622;     Neuromuscular Lizzeth:        mins  38165;    Therapeutic Activity:     10     mins  63069;     Gait Training:           mins  72462;     Ultrasound:         mins  98436;    Electrical Stimulation:         mins  89961 ( );  Dry Needling          mins self-pay    Timed Treatment:   38   mins   Total Treatment:     38   mins    Amandeep Gruber, PT  Physical Therapist

## 2024-07-26 RX ORDER — ERGOCALCIFEROL 1.25 MG/1
50000 CAPSULE ORAL WEEKLY
Qty: 12 CAPSULE | Refills: 0 | Status: SHIPPED | OUTPATIENT
Start: 2024-07-26

## 2024-08-04 DIAGNOSIS — J45.40 MODERATE PERSISTENT ASTHMA, UNSPECIFIED WHETHER COMPLICATED: ICD-10-CM

## 2024-08-05 RX ORDER — FLUTICASONE FUROATE, UMECLIDINIUM BROMIDE AND VILANTEROL TRIFENATATE 100; 62.5; 25 UG/1; UG/1; UG/1
1 POWDER RESPIRATORY (INHALATION) DAILY
Qty: 60 EACH | Refills: 0 | Status: SHIPPED | OUTPATIENT
Start: 2024-08-05

## 2024-08-06 ENCOUNTER — OFFICE VISIT (OUTPATIENT)
Dept: FAMILY MEDICINE CLINIC | Facility: CLINIC | Age: 69
End: 2024-08-06
Payer: MEDICARE

## 2024-08-06 ENCOUNTER — LAB (OUTPATIENT)
Dept: LAB | Facility: HOSPITAL | Age: 69
End: 2024-08-06
Payer: MEDICARE

## 2024-08-06 ENCOUNTER — TREATMENT (OUTPATIENT)
Dept: PHYSICAL THERAPY | Facility: CLINIC | Age: 69
End: 2024-08-06
Payer: MEDICARE

## 2024-08-06 VITALS
DIASTOLIC BLOOD PRESSURE: 62 MMHG | SYSTOLIC BLOOD PRESSURE: 132 MMHG | WEIGHT: 171 LBS | TEMPERATURE: 98.4 F | HEIGHT: 64 IN | HEART RATE: 77 BPM | OXYGEN SATURATION: 97 % | BODY MASS INDEX: 29.19 KG/M2

## 2024-08-06 DIAGNOSIS — E78.5 HYPERLIPIDEMIA LDL GOAL <70: ICD-10-CM

## 2024-08-06 DIAGNOSIS — G89.29 CHRONIC LEFT SHOULDER PAIN: ICD-10-CM

## 2024-08-06 DIAGNOSIS — M25.512 CHRONIC LEFT SHOULDER PAIN: ICD-10-CM

## 2024-08-06 DIAGNOSIS — Z79.4 TYPE 2 DIABETES MELLITUS WITH HYPOGLYCEMIA WITHOUT COMA, WITH LONG-TERM CURRENT USE OF INSULIN: Primary | ICD-10-CM

## 2024-08-06 DIAGNOSIS — M54.2 CERVICALGIA: Primary | ICD-10-CM

## 2024-08-06 DIAGNOSIS — R26.9 ABNORMALITY OF GAIT AND MOBILITY: ICD-10-CM

## 2024-08-06 DIAGNOSIS — J45.40 MODERATE PERSISTENT ASTHMA, UNSPECIFIED WHETHER COMPLICATED: ICD-10-CM

## 2024-08-06 DIAGNOSIS — E11.649 TYPE 2 DIABETES MELLITUS WITH HYPOGLYCEMIA WITHOUT COMA, WITH LONG-TERM CURRENT USE OF INSULIN: Primary | ICD-10-CM

## 2024-08-06 LAB
ALBUMIN/CREATININE RATIO, URINE: <30
EXPIRATION DATE: ABNORMAL
EXPIRATION DATE: NORMAL
HBA1C MFR BLD: 8.2 % (ref 4.5–5.7)
Lab: ABNORMAL
Lab: NORMAL
POC CREATININE URINE: 50
POC MICROALBUMIN URINE: 10

## 2024-08-06 PROCEDURE — 80053 COMPREHEN METABOLIC PANEL: CPT

## 2024-08-06 PROCEDURE — 3078F DIAST BP <80 MM HG: CPT | Performed by: FAMILY MEDICINE

## 2024-08-06 PROCEDURE — 1126F AMNT PAIN NOTED NONE PRSNT: CPT | Performed by: FAMILY MEDICINE

## 2024-08-06 PROCEDURE — 99214 OFFICE O/P EST MOD 30 MIN: CPT | Performed by: FAMILY MEDICINE

## 2024-08-06 PROCEDURE — G2211 COMPLEX E/M VISIT ADD ON: HCPCS | Performed by: FAMILY MEDICINE

## 2024-08-06 PROCEDURE — 3052F HG A1C>EQUAL 8.0%<EQUAL 9.0%: CPT | Performed by: FAMILY MEDICINE

## 2024-08-06 PROCEDURE — 1159F MED LIST DOCD IN RCRD: CPT | Performed by: FAMILY MEDICINE

## 2024-08-06 PROCEDURE — 3075F SYST BP GE 130 - 139MM HG: CPT | Performed by: FAMILY MEDICINE

## 2024-08-06 PROCEDURE — 82044 UR ALBUMIN SEMIQUANTITATIVE: CPT | Performed by: FAMILY MEDICINE

## 2024-08-06 PROCEDURE — 83036 HEMOGLOBIN GLYCOSYLATED A1C: CPT | Performed by: FAMILY MEDICINE

## 2024-08-06 PROCEDURE — 1160F RVW MEDS BY RX/DR IN RCRD: CPT | Performed by: FAMILY MEDICINE

## 2024-08-06 RX ORDER — CALCIUM CARB/VITAMIN D3/VIT K1 500-100-40
TABLET,CHEWABLE ORAL
Qty: 100 EACH | Refills: 3 | Status: SHIPPED | OUTPATIENT
Start: 2024-08-06

## 2024-08-06 NOTE — PROGRESS NOTES
Physical Therapy Daily Progress Note    Patient: Herbert Harley   : 1955  Diagnosis/ICD-10 Code:  Cervicalgia [M54.2]  Referring practitioner: Yessica Ricci DO  Date of Initial Visit: Type: THERAPY  Noted: 2024  Today's Date: 2024  Patient seen for 7 sessions         Herbert Harley reports that his neck is feeling better in terms of stiffness and soreness. Just returned from a fishing trip that did cause some left shoulder, soreness and stiffness. Overall, feels much better since starting physical therapy and terms of neck mobility, neck, pain, and left shoulder stiffness. Just had a visit with his primary care physician prior to this visit.       Subjective     Objective   See Exercise, Manual, and Modality Logs for complete treatment.       *L GHJ AROM flx:  163 deg  *L GHJ PROM Flx:  173 deg    Assessment/Plan  Mr. Harley has attended physical therapy for a total of 7 visits for chronic cervical spine, stiffness and pain. Combine with left shoulder stiffness. Has made significant improvements in left shoulder, passive and active mobility system physical therapy. Cervical spine active mobility has certainly improved as well and all planes. Will focus of care to improve shoulder strength. Follow up to progress strengthening program.           Manual Therapy:    9     mins  09453;  Therapeutic Exercise:    20     mins  92056;     Neuromuscular Lizzeth:        mins  52810;    Therapeutic Activity:     10     mins  72792;     Gait Training:           mins  85576;     Ultrasound:          mins  05367;    Electrical Stimulation:         mins  56280 ( );  Dry Needling          mins self-pay    Timed Treatment:   39   mins   Total Treatment:     39   mins    Amandeep Gruber, PT  Physical Therapist

## 2024-08-06 NOTE — PROGRESS NOTES
Subjective     Chief Complaint  Hypertension    Subjective          Herbert Harley is a 68 y.o. male who presents today to [facility] for follow up.    HPI:   Hypertension        Mr. Harley is a 68-year-old male presents today to follow-up on chronic issues.    He has a past medical history of hypertension, hyperlipidemia, ischemic cardiomyopathy, chronic systolic heart failure, coronary artery disease status post CABG, type 2 diabetes, chronic viral hepatitis C, iron deficiency anemia, COPD.    For Type 2 DM - his HgA1c in office today is 8.2% he has an upcoming appointment with Endocrinology next week.     Since our last visit, he has followed with Cardiology who increased his Crestor to 20 mg for a target of LDL 55.  He is due for a repeat of his CMP.     He is able to stay euvolemic with Bumex 0.5 mg daily and potassium every other day.     He is still struggling with mobility at times. He is following with PT, his issue is mainly with going from sitting to standing and that he isn't sure footed. He hasn't had a fall but did have one near fall.     The cough is still ongoing and persistent. He hasn't see any significant benefit with Trelegy. He has history of hives and prescribed Antihistamines. He isn't as consistent as he could be with the inhaler. PFTs did not give a definitive diagnosis.     The following portions of the patient's history were reviewed and updated as appropriate: allergies, current medications, past family history, past medical history, past social history, past surgical history and problem list.    Objective     Objective     Allergy:   Allergies   Allergen Reactions    Wellbutrin [Bupropion] Swelling     Facial swelling, severe        Current Medications:   Current Outpatient Medications   Medication Sig Dispense Refill    albuterol sulfate  (90 Base) MCG/ACT inhaler       aspirin 81 MG EC tablet Take 1 tablet by mouth Daily. 90 tablet 3  "   betamethasone valerate (VALISONE) 0.1 % ointment Apply 1 Application topically to the appropriate area as directed 2 (Two) Times a Day. 45 g 1    bumetanide (Bumex) 1 MG tablet Take 1 tablet by mouth Daily. 90 tablet 1    coenzyme Q10 100 MG capsule Take 1 capsule by mouth Daily.      Collagen-Vitamin C-Biotin (COLLAGEN 1500/C PO) Take 1 tablet by mouth Daily. OTC supplement      dapagliflozin Propanediol (Farxiga) 10 MG tablet Take 10 mg by mouth Daily. Indications: Type 2 Diabetes 90 tablet 3    ezetimibe (ZETIA) 10 MG tablet Take 1 tablet by mouth Daily. 90 tablet 1    fluticasone (FLONASE) 50 MCG/ACT nasal spray 2 sprays into the nostril(s) as directed by provider Daily. 15.8 mL 5    Insulin Syringe 31G X 5/16\" 1 ML misc USE DAILY WITH INSULIN INJECTIONS 100 each 3    Lantus 100 UNIT/ML injection Inject 52 units every EVENING, increase by 2 units every 4 days for fasting goal  MX 60 units/day 60 mL 2    nitroglycerin (NITROSTAT) 0.4 MG SL tablet Place 1 tablet under the tongue Every 5 (Five) Minutes As Needed for Chest Pain. Take no more than 3 doses in 15 minutes. 25 tablet 3    potassium chloride ER (K-TAB) 20 MEQ tablet controlled-release ER tablet Take 1 tablet by mouth Daily. ONE TABLET EVERY OTHER DAY 45 tablet 1    rosuvastatin (CRESTOR) 10 MG tablet Take 2 tablets by mouth Daily. 90 tablet 3    Trelegy Ellipta 100-62.5-25 MCG/ACT inhaler INHALE 1 PUFF BY MOUTH DAILY 60 each 0    vitamin D (ERGOCALCIFEROL) 1.25 MG (99296 UT) capsule capsule TAKE 1 CAPSULE BY MOUTH ONCE WEEKLY 12 capsule 0     No current facility-administered medications for this visit.       Past Medical History:  Past Medical History:   Diagnosis Date    Acute kidney failure, unspecified 03/21/2023    Acute pulmonary edema 04/07/2023    Acute renal failure (ARF) 03/02/2023    Acute respiratory failure with hypoxia 03/03/2023    Acute systolic heart failure (CMS/HCC) 03/20/2023    Anaphylactic shock, unspecified, initial " encounter 03/21/2023    Arrhythmia March 2023    Asthma 12/2022    Chronic obstructive pulmonary disease, unspecified 03/17/2023    Chronic systolic congestive heart failure 03/01/2023    Coronary artery disease 03/2014    Elevated cholesterol     Erectile dysfunction 2010    GERD (gastroesophageal reflux disease) 03/2023    Esophagitis in hospital    History of tobacco abuse     HLD (hyperlipidemia) 01/26/2023    Hypertension     Ischemic cardiomyopathy     Mucopurulent chronic bronchitis 03/01/2023    Myocardial infarction 03/2014    Old myocardial infarction 03/17/2023    Other disorders of phosphorus metabolism 03/21/2023    Positive cardiac stress test 02/15/2023    Added automatically from request for surgery [id number]    Prolonged Q-T interval on ECG 03/01/2023    Secondary hyperparathyroidism of renal origin     Shock 03/02/2023    Shortness of breath 03/21/2023    Type 2 diabetes mellitus     Wears glasses        Past Surgical History:  Past Surgical History:   Procedure Laterality Date    ARTERIAL BYPASS SURGERY      CARDIAC CATHETERIZATION  March 2014    CARDIAC CATHETERIZATION Left 02/17/2023    Procedure: Left Heart Cath;  Surgeon: Nadya Ramirez MD;  Location:  [facility] LOCATION;  Service: Cardiology;  Laterality: Left;    CORONARY ARTERY BYPASS GRAFT N/A 03/01/2023    Procedure: MEDIAN STERNOTOMY CORONARY ARTERY BYPASS GRAFTING X3  UTILIZING THE LEFT INTERNAL MAMMERY GRAFT, AND EVH OF THE GREATER RIGHT SAPHENOUS VEIN;  Surgeon: Paresh Saavedra MD;  Location:  [facility];  Service: Cardiothoracic;  Laterality: N/A;    CORONARY ARTERY BYPASS GRAFT  3/1/2023    CORONARY STENT PLACEMENT  March 2014    TONSILLECTOMY         Social History:  Social History     Socioeconomic History    Marital status:     Number of children: 5   Tobacco Use    Smoking status: Former     Current packs/day: 0.00     Average packs/day: 1.5 packs/day for 52.9 years (79.4 ttl pk-yrs)     Types: Cigarettes      "Start date: 1970     Quit date: 2022     Years since quittin.6     Passive exposure: Past    Smokeless tobacco: Never   Vaping Use    Vaping status: Never Used   Substance and Sexual Activity    Alcohol use: Not Currently    Drug use: Never    Sexual activity: Not Currently       Family History:  Family History   Problem Relation Age of Onset    Diabetes Mother     Cancer Father         liver    Brain cancer Sister     Heart attack Brother     Prostate cancer Brother          Vital Signs:   /62   Pulse 77   Temp 98.4 °F (36.9 °C) (Temporal)   Ht 162.6 cm (64.02\")   Wt 77.6 kg (171 lb)   SpO2 97%   BMI 29.34 kg/m²      Physical Exam:  Physical Exam  Vitals reviewed.   Constitutional:       Appearance: He is not ill-appearing.   Eyes:      Pupils: Pupils are equal, round, and reactive to light.   Neck:      Thyroid: No thyromegaly.   Cardiovascular:      Rate and Rhythm: Normal rate.      Pulses: Normal pulses.   Pulmonary:      Effort: Pulmonary effort is normal.      Breath sounds: Normal breath sounds.   Musculoskeletal:         General: No swelling.      Cervical back: Rigidity and spasms present. Decreased range of motion.   Neurological:      General: No focal deficit present.      Mental Status: He is alert. Mental status is at baseline.   Psychiatric:         Mood and Affect: Mood normal.         Thought Content: Thought content normal.               PHQ-9 Score  PHQ-9 Total Score:       Lab Review  Office Visit on 2024   Component Date Value Ref Range Status    Microalbumin, Urine 2024 10   Final    Creatinine, Urine 2024 50   Final    Lot Number [id number]   Final    Expiration Date 2024   Final    Albumin/Creatinine Ratio, Urine 2024 <30   Final    Hemoglobin A1C 2024 8.2 (A)  4.5 - 5.7 % Final    Lot Number [id number]   Final    Expiration Date 2024   Final        Radiology Results    " "    Assessment / Plan         Assessment and Plan   Diagnoses and all orders for this visit:    1. Type 2 diabetes mellitus with hypoglycemia without coma, with long-term current use of insulin (Primary)  -     POC Microalbumin  -     POC Glycosylated Hemoglobin (Hb A1C)    2. Moderate persistent asthma, unspecified whether complicated  -     Ambulatory Referral to Pulmonology    3. Abnormality of gait and mobility  Assessment & Plan:  Continue PT.  If not seeing a lot of benefit may benefit from PM&R       4. Hyperlipidemia LDL goal <70  -     Comprehensive Metabolic Panel; Future    Other orders  -     Insulin Syringe 31G X 5/16\" 1 ML misc; USE DAILY WITH INSULIN INJECTIONS  Dispense: 100 each; Refill: 3        Patient doing well overall.  Continuing inhalers as prescribed for his asthma. -This is likely the cause of his persistent cough.  I have encouraged him to follow-up with pulmonology    His hemoglobin A1c did trend upwards.  He follows with endocrinology for management.  I encouraged follow-up with them later this month.      Discussed possible differential diagnoses, testing, treatment, recommended non-pharmacological interventions, risks, warning signs to monitor for that would indicate need for follow-up in clinic or ER. If no improvement with these regimens or you have new or worsening symptoms follow-up. Patient verbalizes understanding and agreement with plan of care. Denies further needs or concerns.     Patient was given instructions and counseling regarding her condition and for health maintenance advised.            BMI is >= 25 and <30. (Overweight) The following options were offered after discussion;: weight loss educational material (shared in after visit summary)           Health Maintenance  Health Maintenance:   Health Maintenance Due   Topic Date Due    Hepatitis B (1 of 3 - Risk 3-dose series) Never done    INFLUENZA VACCINE  08/01/2024        Meds ordered during this visit  New " "Medications Ordered This Visit   Medications    Insulin Syringe 31G X 5/16\" 1 ML misc     Sig: USE DAILY WITH INSULIN INJECTIONS     Dispense:  100 each     Refill:  3       Meds stopped during this visit:  Medications Discontinued During This Encounter   Medication Reason    amiodarone (PACERONE) 200 MG tablet *Therapy completed    Insulin Syringe 31G X 5/16\" 1 ML misc Reorder        Visit Diagnoses    ICD-10-CM ICD-9-CM   1. Type 2 diabetes mellitus with hypoglycemia without coma, with long-term current use of insulin  E11.649 250.80    Z79.4 251.2     V58.67   2. Moderate persistent asthma, unspecified whether complicated  J45.40 493.90   3. Abnormality of gait and mobility  R26.9 781.2   4. Hyperlipidemia LDL goal <70  E78.5 272.4       Patient was given instructions and counseling regarding his condition or for health maintenance advice. Please see specific information pulled into the AVS if appropriate.     Follow Up   Return in about 3 months (around 11/6/2024) for followup DM, HTN, ASTHMA .        This document has been electronically signed by Yessica Ricci DO   August 6, 2024 14:04 EDT    Dictated Utilizing Dragon Dictation: Part of this note may be an electronic transcription/translation of spoken language to printed text using the Dragon Dictation System.    Yessica Ricci D.O.  [facility] [place]   "

## 2024-08-07 LAB
ALBUMIN SERPL-MCNC: 4.2 G/DL (ref 3.5–5.2)
ALBUMIN/GLOB SERPL: 1.2 G/DL
ALP SERPL-CCNC: 117 U/L (ref 39–117)
ALT SERPL W P-5'-P-CCNC: 21 U/L (ref 1–41)
ANION GAP SERPL CALCULATED.3IONS-SCNC: 16.8 MMOL/L (ref 5–15)
AST SERPL-CCNC: 21 U/L (ref 1–40)
BILIRUB SERPL-MCNC: 0.3 MG/DL (ref 0–1.2)
BUN SERPL-MCNC: 21 MG/DL (ref 8–23)
BUN/CREAT SERPL: 13.4 (ref 7–25)
CALCIUM SPEC-SCNC: 9.6 MG/DL (ref 8.6–10.5)
CHLORIDE SERPL-SCNC: 101 MMOL/L (ref 98–107)
CO2 SERPL-SCNC: 19.2 MMOL/L (ref 22–29)
CREAT SERPL-MCNC: 1.57 MG/DL (ref 0.76–1.27)
EGFRCR SERPLBLD CKD-EPI 2021: 47.7 ML/MIN/1.73
GLOBULIN UR ELPH-MCNC: 3.5 GM/DL
GLUCOSE SERPL-MCNC: 171 MG/DL (ref 65–99)
POTASSIUM SERPL-SCNC: 4.3 MMOL/L (ref 3.5–5.2)
PROT SERPL-MCNC: 7.7 G/DL (ref 6–8.5)
SODIUM SERPL-SCNC: 137 MMOL/L (ref 136–145)

## 2024-08-15 ENCOUNTER — OFFICE VISIT (OUTPATIENT)
Dept: ENDOCRINOLOGY | Facility: CLINIC | Age: 69
End: 2024-08-15
Payer: MEDICARE

## 2024-08-15 VITALS
HEIGHT: 64 IN | OXYGEN SATURATION: 97 % | BODY MASS INDEX: 28.51 KG/M2 | DIASTOLIC BLOOD PRESSURE: 62 MMHG | SYSTOLIC BLOOD PRESSURE: 124 MMHG | HEART RATE: 70 BPM | WEIGHT: 167 LBS

## 2024-08-15 DIAGNOSIS — I10 PRIMARY HYPERTENSION: ICD-10-CM

## 2024-08-15 DIAGNOSIS — Z79.4 TYPE 2 DIABETES MELLITUS WITH HYPERGLYCEMIA, WITH LONG-TERM CURRENT USE OF INSULIN: Primary | ICD-10-CM

## 2024-08-15 DIAGNOSIS — E11.65 TYPE 2 DIABETES MELLITUS WITH HYPERGLYCEMIA, WITH LONG-TERM CURRENT USE OF INSULIN: Primary | ICD-10-CM

## 2024-08-15 LAB
EXPIRATION DATE: ABNORMAL
GLUCOSE BLDC GLUCOMTR-MCNC: 404 MG/DL (ref 70–130)
Lab: ABNORMAL

## 2024-08-15 PROCEDURE — 3074F SYST BP LT 130 MM HG: CPT | Performed by: PHYSICIAN ASSISTANT

## 2024-08-15 PROCEDURE — 1159F MED LIST DOCD IN RCRD: CPT | Performed by: PHYSICIAN ASSISTANT

## 2024-08-15 PROCEDURE — 82947 ASSAY GLUCOSE BLOOD QUANT: CPT | Performed by: PHYSICIAN ASSISTANT

## 2024-08-15 PROCEDURE — 1160F RVW MEDS BY RX/DR IN RCRD: CPT | Performed by: PHYSICIAN ASSISTANT

## 2024-08-15 PROCEDURE — 3052F HG A1C>EQUAL 8.0%<EQUAL 9.0%: CPT | Performed by: PHYSICIAN ASSISTANT

## 2024-08-15 PROCEDURE — 99214 OFFICE O/P EST MOD 30 MIN: CPT | Performed by: PHYSICIAN ASSISTANT

## 2024-08-15 PROCEDURE — 3078F DIAST BP <80 MM HG: CPT | Performed by: PHYSICIAN ASSISTANT

## 2024-08-15 NOTE — PROGRESS NOTES
"     Office Note      Date: 08/15/2024  Patient Name: Herbert Harley  MRN: [id number]  : 1955    Chief Complaint   Patient presents with    Diabetes     Type 2 diabetes mellitus with hyperglycemia, with long-term current use of insulin       History of Present Illness:   Herbert Harley is a 68 y.o. male who presents for follow-up for type 2 diabetes diagnosed in .  He is accompanied by his wife today.  He remains on Lantus 52 units every evening and Farxiga 10 mg daily.  He reports his blood glucose readings in the morning are typically excellent between 70 to 110 mg/dL.  His wife reports he does not eat like he should during the day and is not surprised his hemoglobin A1c is up.  He had a Pepsi this afternoon before coming to the office.  He was also on vacation since last visit.  He continues to see the retina specialist regularly.  He sees nephrology regularly as well.    Subjective     Review of Systems:   Review of Systems   Constitutional:  Positive for fatigue.   Cardiovascular: Negative.    Gastrointestinal: Negative.    Endocrine: Negative.    Neurological: Negative.        The following portions of the patient's history were reviewed and updated as appropriate: allergies, current medications, past family history, past medical history, past social history, past surgical history, and problem list.    Objective     Vitals:    08/15/24 1534   BP: 124/62   BP Location: Left arm   Patient Position: Sitting   Cuff Size: Adult   Pulse: 70   SpO2: 97%   Weight: 75.8 kg (167 lb)   Height: 162.6 cm (64\")     Body mass index is 28.67 kg/m².    Physical Exam  Vitals reviewed.   Constitutional:       General: He is not in acute distress.     Appearance: Normal appearance.   Neurological:      Mental Status: He is alert.         HEMOGLOBIN A1C  Lab Results   Component Value Date    HGBA1C 8.2 (A) 2024       GLUCOSE  Glucose   Date Value Ref Range Status   08/15/2024 404 (A) 70 - 130 mg/dL " Final       CMP  Lab Results   Component Value Date    GLUCOSE 171 (H) 08/06/2024    BUN 21 08/06/2024    CREATININE 1.57 (H) 08/06/2024    EGFRIFNONA 66 12/14/2021    BCR 13.4 08/06/2024    K 4.3 08/06/2024    CO2 19.2 (L) 08/06/2024    CALCIUM 9.6 08/06/2024    AST 21 08/06/2024    ALT 21 08/06/2024       LIPID PANEL  Lab Results   Component Value Date    CHOL 160 02/02/2024    CHLPL 174 03/28/2014    TRIG 179 (H) 02/02/2024    HDL 33 (L) 02/02/2024    LDL 96 02/02/2024       URINE MICROALBUMIN/CREATININE RATIO  Microalbumin/Creatinine Ratio   Date Value Ref Range Status   12/22/2022 55.9 mg/g Final       TSH  Lab Results   Component Value Date    TSH 2.330 04/30/2024       Assessment / Plan      Assessment & Plan:  1. Type 2 diabetes mellitus with hyperglycemia, with long-term current use of insulin  His hemoglobin A1c earlier this month was 8.2%.  This is higher than in March and well above goal.  We discussed the importance of getting this under control.  We could discuss considering additional diabetes medication.  Patient and his wife report that they are concerned about adding additional medication due to side effects.  They report patient's mom had an intolerance to metformin and their daughter has had a lot of side effects with Ozempic.  For now we will continue the Lantus 52 units at bedtime and Farxiga 10 mg daily.  He will work on healthier eating habits and physical activity as tolerated.  We discussed considering diabetes education but he prefers to hold off on this for now.  His weight is stable.    - POC Glucose, Blood    2. Primary hypertension  His blood pressure is okay today.  He will continue his current medications.  He will continue to see nephrology regularly.      Return in about 4 months (around 12/15/2024) for Recheck.     This note was dictated using Dragon voice recognition.    Electronically signed by: JOSE Merchant  08/15/2024

## 2024-08-20 ENCOUNTER — TREATMENT (OUTPATIENT)
Dept: PHYSICAL THERAPY | Facility: CLINIC | Age: 69
End: 2024-08-20
Payer: MEDICARE

## 2024-08-20 DIAGNOSIS — M25.512 CHRONIC LEFT SHOULDER PAIN: ICD-10-CM

## 2024-08-20 DIAGNOSIS — G89.29 CHRONIC LEFT SHOULDER PAIN: ICD-10-CM

## 2024-08-20 DIAGNOSIS — M54.2 CERVICALGIA: Primary | ICD-10-CM

## 2024-08-20 NOTE — PROGRESS NOTES
Physical Therapy Daily Progress Note    Patient: Herbert Harley   : 1955  Diagnosis/ICD-10 Code:  Cervicalgia [M54.2]  Referring practitioner: Yessica Ricci DO  Date of Initial Visit: Type: THERAPY  Noted: 2024  Today's Date: 2024  Patient seen for 8 sessions         Herbert Harley reports that his neck is mostly doing well in terms of mobility and soreness.  Has some improvement in left shoulder stiffness.  Notes that working on his cars at home have eased his left shoulder soreness and stiffness.      Subjective     Objective   See Exercise, Manual, and Modality Logs for complete treatment.       Assessment/Plan  Focused visit on improving left shoulder mobility and strength today.  Will f/u in 3 weeks for re-assessment and discharge planning.           Manual Therapy:    10     mins  65935;  Therapeutic Exercise:    19     mins  80924;     Neuromuscular Lizzeth:        mins  94549;    Therapeutic Activity:     9     mins  02896;     Gait Training:           mins  13317;     Ultrasound:          mins  17123;    Electrical Stimulation:         mins  41486 ( );  Dry Needling          mins self-pay    Timed Treatment:   38   mins   Total Treatment:     38   mins    Amandeep Gruber, HAIR  Physical Therapist

## 2024-08-30 DIAGNOSIS — J45.40 MODERATE PERSISTENT ASTHMA, UNSPECIFIED WHETHER COMPLICATED: ICD-10-CM

## 2024-08-30 RX ORDER — FLUTICASONE FUROATE, UMECLIDINIUM BROMIDE AND VILANTEROL TRIFENATATE 100; 62.5; 25 UG/1; UG/1; UG/1
1 POWDER RESPIRATORY (INHALATION) DAILY
Qty: 60 EACH | Refills: 0 | Status: SHIPPED | OUTPATIENT
Start: 2024-08-30

## 2024-09-10 ENCOUNTER — TREATMENT (OUTPATIENT)
Dept: PHYSICAL THERAPY | Facility: CLINIC | Age: 69
End: 2024-09-10
Payer: MEDICARE

## 2024-09-10 DIAGNOSIS — G89.29 CHRONIC LEFT SHOULDER PAIN: ICD-10-CM

## 2024-09-10 DIAGNOSIS — M54.2 CERVICALGIA: Primary | ICD-10-CM

## 2024-09-10 DIAGNOSIS — M25.512 CHRONIC LEFT SHOULDER PAIN: ICD-10-CM

## 2024-09-10 PROCEDURE — 97530 THERAPEUTIC ACTIVITIES: CPT | Performed by: PHYSICAL THERAPIST

## 2024-09-10 PROCEDURE — 97140 MANUAL THERAPY 1/> REGIONS: CPT | Performed by: PHYSICAL THERAPIST

## 2024-09-10 PROCEDURE — 97110 THERAPEUTIC EXERCISES: CPT | Performed by: PHYSICAL THERAPIST

## 2024-09-10 RX ORDER — EZETIMIBE 10 MG/1
10 TABLET ORAL DAILY
Qty: 90 TABLET | Refills: 3 | Status: SHIPPED | OUTPATIENT
Start: 2024-09-10

## 2024-09-10 NOTE — PROGRESS NOTES
Physical Therapy Daily Progress Note    Patient: Herbert Harley   : 1955  Diagnosis/ICD-10 Code:  Cervicalgia [M54.2]  Referring practitioner: Yessica Ricci DO  Date of Initial Visit: Type: THERAPY  Noted: 2024  Today's Date: 9/10/2024  Patient seen for 9 sessions         Herbert Harley reports that he has noticed increased stiffness in the neck and left shoulder over the past 2 days or so.  Cannot recall anything that may have precipitated his symptoms.        Subjective     Objective   See Exercise, Manual, and Modality Logs for complete treatment.     *AROM GHJ flx/ER/HBB:  130 deg/30 deg/6.3 cm(L hypo)  *C/S R rot/L rot/ext/flx:  35 deg/55 deg/40 deg/35 deg    Assessment/Plan  Mr. Harley has attended physical therapy for a total of 9 visits for chronic stiffness and pain in the cervical spine and left shoulder regions.  Overall, he has made excellent progress in terms of mobility in the cervical spine and left shoulder since start of care.  Has had a recent regression in mobility.  I feel confident that this should ease with mobility exercises at home.  This was Mr. Harley last approved visit and will hold on physical therapy for now.  Will be happy to see him once the new year starts with insurance approval for continued care.  It was a pleasure working with Mr. Harley.           Manual Therapy:    10     mins  25661;  Therapeutic Exercise:    18     mins  87100;     Neuromuscular Lizzeth:       mins  96209;    Therapeutic Activity:     12     mins  57160;     Gait Training:           mins  30039;     Ultrasound:         mins  71847;    Electrical Stimulation:         mins  63263 ( );  Dry Needling          mins self-pay    Timed Treatment:   40   mins   Total Treatment:     40   mins    Amandeep Gruber, PT  Physical Therapist

## 2024-09-11 DIAGNOSIS — L50.9 URTICARIA: ICD-10-CM

## 2024-09-12 DIAGNOSIS — E78.5 DYSLIPIDEMIA: ICD-10-CM

## 2024-09-12 RX ORDER — DAPAGLIFLOZIN 10 MG/1
10 TABLET, FILM COATED ORAL DAILY
Qty: 90 TABLET | Refills: 1 | Status: SHIPPED | OUTPATIENT
Start: 2024-09-12

## 2024-09-12 RX ORDER — ROSUVASTATIN CALCIUM 20 MG/1
20 TABLET, COATED ORAL DAILY
Qty: 90 TABLET | Refills: 1 | Status: SHIPPED | OUTPATIENT
Start: 2024-09-12

## 2024-09-23 ENCOUNTER — HOSPITAL ENCOUNTER (EMERGENCY)
Facility: HOSPITAL | Age: 69
Discharge: HOME | End: 2024-09-23
Attending: EMERGENCY MEDICINE | Admitting: EMERGENCY MEDICINE
Payer: MEDICARE

## 2024-09-23 ENCOUNTER — APPOINTMENT (OUTPATIENT)
Dept: GENERAL RADIOLOGY | Facility: HOSPITAL | Age: 69
End: 2024-09-23
Payer: MEDICARE

## 2024-09-23 ENCOUNTER — APPOINTMENT (OUTPATIENT)
Dept: CT IMAGING | Facility: HOSPITAL | Age: 69
End: 2024-09-23
Payer: MEDICARE

## 2024-09-23 VITALS
HEART RATE: 76 BPM | HEIGHT: 63 IN | BODY MASS INDEX: 29.59 KG/M2 | WEIGHT: 167 LBS | OXYGEN SATURATION: 96 % | TEMPERATURE: 97.8 F | RESPIRATION RATE: 18 BRPM | SYSTOLIC BLOOD PRESSURE: 162 MMHG | DIASTOLIC BLOOD PRESSURE: 89 MMHG

## 2024-09-23 DIAGNOSIS — Z86.39 HISTORY OF DIABETES MELLITUS: ICD-10-CM

## 2024-09-23 DIAGNOSIS — Z86.79 HISTORY OF CORONARY ARTERY DISEASE: ICD-10-CM

## 2024-09-23 DIAGNOSIS — R10.10 ACUTE UPPER ABDOMINAL PAIN: Primary | ICD-10-CM

## 2024-09-23 DIAGNOSIS — K80.80 BILIARY CALCULUS OF OTHER SITE WITHOUT OBSTRUCTION: ICD-10-CM

## 2024-09-23 LAB
ALBUMIN SERPL-MCNC: 4.1 G/DL (ref 3.5–5.2)
ALBUMIN/GLOB SERPL: 1.5 G/DL
ALP SERPL-CCNC: 125 U/L (ref 39–117)
ALT SERPL W P-5'-P-CCNC: 37 U/L (ref 1–41)
ANION GAP SERPL CALCULATED.3IONS-SCNC: 13 MMOL/L (ref 5–15)
AST SERPL-CCNC: 30 U/L (ref 1–40)
BASOPHILS # BLD AUTO: 0.08 10*3/MM3 (ref 0–0.2)
BASOPHILS NFR BLD AUTO: 1.1 % (ref 0–1.5)
BILIRUB SERPL-MCNC: 0.3 MG/DL (ref 0–1.2)
BUN SERPL-MCNC: 30 MG/DL (ref 8–23)
BUN/CREAT SERPL: 17.2 (ref 7–25)
CALCIUM SPEC-SCNC: 8.9 MG/DL (ref 8.6–10.5)
CHLORIDE SERPL-SCNC: 100 MMOL/L (ref 98–107)
CO2 SERPL-SCNC: 23 MMOL/L (ref 22–29)
CREAT SERPL-MCNC: 1.74 MG/DL (ref 0.76–1.27)
DEPRECATED RDW RBC AUTO: 44 FL (ref 37–54)
EGFRCR SERPLBLD CKD-EPI 2021: 42.2 ML/MIN/1.73
EOSINOPHIL # BLD AUTO: 0.51 10*3/MM3 (ref 0–0.4)
EOSINOPHIL NFR BLD AUTO: 6.7 % (ref 0.3–6.2)
ERYTHROCYTE [DISTWIDTH] IN BLOOD BY AUTOMATED COUNT: 13.2 % (ref 12.3–15.4)
GEN 5 2HR TROPONIN T REFLEX: 30 NG/L
GLOBULIN UR ELPH-MCNC: 2.8 GM/DL
GLUCOSE SERPL-MCNC: 240 MG/DL (ref 65–99)
HCT VFR BLD AUTO: 53.2 % (ref 37.5–51)
HGB BLD-MCNC: 17.4 G/DL (ref 13–17.7)
HOLD SPECIMEN: NORMAL
IMM GRANULOCYTES # BLD AUTO: 0.03 10*3/MM3 (ref 0–0.05)
IMM GRANULOCYTES NFR BLD AUTO: 0.4 % (ref 0–0.5)
LIPASE SERPL-CCNC: 29 U/L (ref 13–60)
LYMPHOCYTES # BLD AUTO: 1.77 10*3/MM3 (ref 0.7–3.1)
LYMPHOCYTES NFR BLD AUTO: 23.3 % (ref 19.6–45.3)
MCH RBC QN AUTO: 29.5 PG (ref 26.6–33)
MCHC RBC AUTO-ENTMCNC: 32.7 G/DL (ref 31.5–35.7)
MCV RBC AUTO: 90.3 FL (ref 79–97)
MONOCYTES # BLD AUTO: 0.79 10*3/MM3 (ref 0.1–0.9)
MONOCYTES NFR BLD AUTO: 10.4 % (ref 5–12)
NEUTROPHILS NFR BLD AUTO: 4.41 10*3/MM3 (ref 1.7–7)
NEUTROPHILS NFR BLD AUTO: 58.1 % (ref 42.7–76)
NRBC BLD AUTO-RTO: 0 /100 WBC (ref 0–0.2)
NT-PROBNP SERPL-MCNC: 1127 PG/ML (ref 0–900)
PLATELET # BLD AUTO: 204 10*3/MM3 (ref 140–450)
PMV BLD AUTO: 10.2 FL (ref 6–12)
POTASSIUM SERPL-SCNC: 4.4 MMOL/L (ref 3.5–5.2)
PROT SERPL-MCNC: 6.9 G/DL (ref 6–8.5)
QT INTERVAL: 408 MS
QT INTERVAL: 428 MS
QTC INTERVAL: 413 MS
QTC INTERVAL: 452 MS
RBC # BLD AUTO: 5.89 10*6/MM3 (ref 4.14–5.8)
SODIUM SERPL-SCNC: 136 MMOL/L (ref 136–145)
TROPONIN T DELTA: -2 NG/L
TROPONIN T SERPL HS-MCNC: 32 NG/L
WBC NRBC COR # BLD AUTO: 7.59 10*3/MM3 (ref 3.4–10.8)
WHOLE BLOOD HOLD COAG: NORMAL
WHOLE BLOOD HOLD SPECIMEN: NORMAL

## 2024-09-23 PROCEDURE — 84484 ASSAY OF TROPONIN QUANT: CPT

## 2024-09-23 PROCEDURE — 99285 EMERGENCY DEPT VISIT HI MDM: CPT

## 2024-09-23 PROCEDURE — 83690 ASSAY OF LIPASE: CPT

## 2024-09-23 PROCEDURE — 85025 COMPLETE CBC W/AUTO DIFF WBC: CPT

## 2024-09-23 PROCEDURE — 83880 ASSAY OF NATRIURETIC PEPTIDE: CPT

## 2024-09-23 PROCEDURE — 93005 ELECTROCARDIOGRAM TRACING: CPT

## 2024-09-23 PROCEDURE — 71045 X-RAY EXAM CHEST 1 VIEW: CPT

## 2024-09-23 PROCEDURE — 93005 ELECTROCARDIOGRAM TRACING: CPT | Performed by: EMERGENCY MEDICINE

## 2024-09-23 PROCEDURE — 74177 CT ABD & PELVIS W/CONTRAST: CPT

## 2024-09-23 PROCEDURE — 36415 COLL VENOUS BLD VENIPUNCTURE: CPT

## 2024-09-23 PROCEDURE — 80053 COMPREHEN METABOLIC PANEL: CPT

## 2024-09-23 PROCEDURE — 84484 ASSAY OF TROPONIN QUANT: CPT | Performed by: EMERGENCY MEDICINE

## 2024-09-23 PROCEDURE — 25510000001 IOPAMIDOL 61 % SOLUTION: Performed by: EMERGENCY MEDICINE

## 2024-09-23 RX ORDER — IOPAMIDOL 612 MG/ML
85 INJECTION, SOLUTION INTRAVASCULAR
Status: COMPLETED | OUTPATIENT
Start: 2024-09-23 | End: 2024-09-23

## 2024-09-23 RX ORDER — ASPIRIN 81 MG/1
324 TABLET, CHEWABLE ORAL ONCE
Status: DISCONTINUED | OUTPATIENT
Start: 2024-09-23 | End: 2024-09-23 | Stop reason: HOSPADM

## 2024-09-23 RX ORDER — SODIUM CHLORIDE 0.9 % (FLUSH) 0.9 %
10 SYRINGE (ML) INJECTION AS NEEDED
Status: DISCONTINUED | OUTPATIENT
Start: 2024-09-23 | End: 2024-09-23 | Stop reason: HOSPADM

## 2024-09-23 RX ADMIN — IOPAMIDOL 85 ML: 612 INJECTION, SOLUTION INTRAVENOUS at 13:50

## 2024-09-24 ENCOUNTER — OFFICE VISIT (OUTPATIENT)
Dept: FAMILY MEDICINE CLINIC | Facility: CLINIC | Age: 69
End: 2024-09-24
Payer: MEDICARE

## 2024-09-24 VITALS
TEMPERATURE: 97.5 F | OXYGEN SATURATION: 97 % | DIASTOLIC BLOOD PRESSURE: 60 MMHG | SYSTOLIC BLOOD PRESSURE: 130 MMHG | HEART RATE: 61 BPM | WEIGHT: 170.4 LBS | HEIGHT: 63 IN | BODY MASS INDEX: 30.19 KG/M2

## 2024-09-24 DIAGNOSIS — J18.9 PNEUMONIA OF LEFT LOWER LOBE DUE TO INFECTIOUS ORGANISM: ICD-10-CM

## 2024-09-24 DIAGNOSIS — Z76.0 MEDICATION REFILL: ICD-10-CM

## 2024-09-24 DIAGNOSIS — R35.0 BENIGN PROSTATIC HYPERPLASIA WITH URINARY FREQUENCY: ICD-10-CM

## 2024-09-24 DIAGNOSIS — N40.1 BENIGN PROSTATIC HYPERPLASIA WITH URINARY FREQUENCY: ICD-10-CM

## 2024-09-24 DIAGNOSIS — K57.90 DIVERTICULOSIS: ICD-10-CM

## 2024-09-24 DIAGNOSIS — K80.20 CALCULUS OF GALLBLADDER WITHOUT CHOLECYSTITIS WITHOUT OBSTRUCTION: Primary | ICD-10-CM

## 2024-09-24 PROCEDURE — 3078F DIAST BP <80 MM HG: CPT | Performed by: FAMILY MEDICINE

## 2024-09-24 PROCEDURE — G2211 COMPLEX E/M VISIT ADD ON: HCPCS | Performed by: FAMILY MEDICINE

## 2024-09-24 PROCEDURE — 1160F RVW MEDS BY RX/DR IN RCRD: CPT | Performed by: FAMILY MEDICINE

## 2024-09-24 PROCEDURE — 99214 OFFICE O/P EST MOD 30 MIN: CPT | Performed by: FAMILY MEDICINE

## 2024-09-24 PROCEDURE — 3052F HG A1C>EQUAL 8.0%<EQUAL 9.0%: CPT | Performed by: FAMILY MEDICINE

## 2024-09-24 PROCEDURE — 3075F SYST BP GE 130 - 139MM HG: CPT | Performed by: FAMILY MEDICINE

## 2024-09-24 PROCEDURE — 1159F MED LIST DOCD IN RCRD: CPT | Performed by: FAMILY MEDICINE

## 2024-09-24 PROCEDURE — 1126F AMNT PAIN NOTED NONE PRSNT: CPT | Performed by: FAMILY MEDICINE

## 2024-09-24 RX ORDER — ALBUTEROL SULFATE 90 UG/1
2 INHALANT RESPIRATORY (INHALATION) EVERY 6 HOURS PRN
Qty: 8 G | Refills: 5 | Status: SHIPPED | OUTPATIENT
Start: 2024-09-24

## 2024-09-24 RX ORDER — AZITHROMYCIN 250 MG/1
TABLET, FILM COATED ORAL
Qty: 6 TABLET | Refills: 0 | Status: SHIPPED | OUTPATIENT
Start: 2024-09-24

## 2024-09-24 RX ORDER — TAMSULOSIN HYDROCHLORIDE 0.4 MG/1
1 CAPSULE ORAL DAILY
Qty: 90 CAPSULE | Refills: 1 | Status: SHIPPED | OUTPATIENT
Start: 2024-09-24

## 2024-10-03 DIAGNOSIS — J45.40 MODERATE PERSISTENT ASTHMA, UNSPECIFIED WHETHER COMPLICATED: ICD-10-CM

## 2024-10-03 LAB
QT INTERVAL: 408 MS
QT INTERVAL: 428 MS
QTC INTERVAL: 413 MS
QTC INTERVAL: 452 MS

## 2024-10-03 RX ORDER — METOPROLOL SUCCINATE 25 MG/1
12.5 TABLET, EXTENDED RELEASE ORAL DAILY
Qty: 45 TABLET | Refills: 3 | Status: SHIPPED | OUTPATIENT
Start: 2024-10-03

## 2024-10-03 RX ORDER — FLUTICASONE FUROATE, UMECLIDINIUM BROMIDE AND VILANTEROL TRIFENATATE 100; 62.5; 25 UG/1; UG/1; UG/1
1 POWDER RESPIRATORY (INHALATION) DAILY
Qty: 60 EACH | Refills: 0 | Status: SHIPPED | OUTPATIENT
Start: 2024-10-03

## 2024-10-09 ENCOUNTER — OFFICE VISIT (OUTPATIENT)
Dept: PULMONOLOGY | Facility: CLINIC | Age: 69
End: 2024-10-09
Payer: MEDICARE

## 2024-10-09 VITALS
SYSTOLIC BLOOD PRESSURE: 128 MMHG | HEIGHT: 63 IN | WEIGHT: 169 LBS | TEMPERATURE: 97.2 F | DIASTOLIC BLOOD PRESSURE: 76 MMHG | HEART RATE: 72 BPM | BODY MASS INDEX: 29.95 KG/M2 | OXYGEN SATURATION: 93 %

## 2024-10-09 DIAGNOSIS — Z87.891 FORMER SMOKER: ICD-10-CM

## 2024-10-09 DIAGNOSIS — K21.9 CHRONIC GERD: ICD-10-CM

## 2024-10-09 DIAGNOSIS — J98.19 TRAPPED LUNG: Primary | ICD-10-CM

## 2024-10-09 DIAGNOSIS — R05.9 COUGH, UNSPECIFIED TYPE: ICD-10-CM

## 2024-10-09 DIAGNOSIS — I25.5 ISCHEMIC CARDIOMYOPATHY: ICD-10-CM

## 2024-10-09 DIAGNOSIS — J92.9 PLEURAL THICKENING: ICD-10-CM

## 2024-10-09 NOTE — PROGRESS NOTES
PULMONARY  NOTE    Chief Complaint     Shortness of breath, cough, chronic reflux, ischemic cardiomyopathy, prior CABG, recurrent postoperative pleural effusion, former smoker    History of Present Illness     68-year-old male referred for abnormal chest imaging and persistent respiratory symptoms    He has a long history of tobacco abuse, resolved in 2022    He underwent CABG earlier this year  His postoperative course was complicated by recurrent pleural effusion  He underwent thoracentesis twice for reaccumulation    He has had persistent cough  He has difficulty expectorating sputum  He has a globus sensation    He has chronic reflux  He has a wedge pillow but he is not using it  He does not follow reflux precautions    He has been prescribed Trelegy which she thinks may help a little bit    He had a screening CT scan of the chest in February 2024 which revealed no suspicious intrathoracic findings    Patient Active Problem List   Diagnosis    I25.10, CABG 03/01/23    Hypertension    Hyperlipidemia LDL goal <70    Type 2 diabetes mellitus with hypoglycemia, with long-term current use of insulin    Ischemic cardiomyopathy    Allergic rhinitis    Prolonged Q-T interval on ECG    Anemia in chronic kidney disease    Chronic viral hepatitis C    Hypertensive heart disease with heart failure    Iron deficiency anemia    Long term (current) use of oral hypoglycemic drugs    Moderate protein-calorie malnutrition    Secondary hyperparathyroidism of renal origin    Chronic systolic (congestive) heart failure    Atherosclerotic heart disease of native coronary artery without angina pectoris    Abnormality of gait and mobility    Benign prostatic hyperplasia with urinary frequency    Diverticulosis    Calculus of gallbladder without cholecystitis without obstruction    Trapped LLL    Pleural thickening LLL (Since CABG)    Chronic GERD    Former smoker      Allergies   Allergen Reactions    Wellbutrin [Bupropion] Swelling  "    Facial swelling, severe    Singulair [Montelukast] Myalgia and Headache       Current Outpatient Medications:     albuterol sulfate  (90 Base) MCG/ACT inhaler, Inhale 2 puffs Every 6 (Six) Hours As Needed for Wheezing., Disp: 8 g, Rfl: 5    aspirin 81 MG EC tablet, Take 1 tablet by mouth Daily., Disp: 90 tablet, Rfl: 3    betamethasone valerate (VALISONE) 0.1 % ointment, APPLY TO THE AFFECTED AREA(S) 2 TIMES A DAY AS DIRECTED, Disp: 45 g, Rfl: 1    bumetanide (Bumex) 1 MG tablet, Take 1 tablet by mouth Daily., Disp: 90 tablet, Rfl: 1    coenzyme Q10 100 MG capsule, Take 1 capsule by mouth Daily., Disp: , Rfl:     Collagen-Vitamin C-Biotin (COLLAGEN 1500/C PO), Take 1 tablet by mouth Daily. OTC supplement, Disp: , Rfl:     dapagliflozin Propanediol (Farxiga) 10 MG tablet, Take 10 mg by mouth Daily. Indications: Type 2 Diabetes, Disp: 90 tablet, Rfl: 1    ezetimibe (ZETIA) 10 MG tablet, TAKE 1 TABLET BY MOUTH DAILY, Disp: 90 tablet, Rfl: 3    fluticasone (FLONASE) 50 MCG/ACT nasal spray, 2 sprays into the nostril(s) as directed by provider Daily., Disp: 15.8 mL, Rfl: 5    Insulin Syringe 31G X 5/16\" 1 ML misc, USE DAILY WITH INSULIN INJECTIONS, Disp: 100 each, Rfl: 3    Lantus 100 UNIT/ML injection, Inject 52 units every EVENING, increase by 2 units every 4 days for fasting goal  MX 60 units/day, Disp: 60 mL, Rfl: 2    metoprolol succinate XL (TOPROL-XL) 25 MG 24 hr tablet, TAKE ONE-HALF TABLET BY MOUTH DAILY, Disp: 45 tablet, Rfl: 3    nitroglycerin (NITROSTAT) 0.4 MG SL tablet, Place 1 tablet under the tongue Every 5 (Five) Minutes As Needed for Chest Pain. Take no more than 3 doses in 15 minutes., Disp: 25 tablet, Rfl: 3    potassium chloride ER (K-TAB) 20 MEQ tablet controlled-release ER tablet, Take 1 tablet by mouth Daily. ONE TABLET EVERY OTHER DAY, Disp: 45 tablet, Rfl: 1    rosuvastatin (CRESTOR) 20 MG tablet, Take 1 tablet by mouth Daily., Disp: 90 tablet, Rfl: 1    sertraline (ZOLOFT) 50 MG " "tablet, , Disp: , Rfl:     tamsulosin (FLOMAX) 0.4 MG capsule 24 hr capsule, Take 1 capsule by mouth Daily., Disp: 90 capsule, Rfl: 1    Trelegy Ellipta 100-62.5-25 MCG/ACT inhaler, INHALE 1 PUFF BY MOUTH DAILY, Disp: 60 each, Rfl: 0    vitamin D (ERGOCALCIFEROL) 1.25 MG (21032 UT) capsule capsule, TAKE 1 CAPSULE BY MOUTH ONCE WEEKLY, Disp: 12 capsule, Rfl: 0  MEDICATION LIST AND ALLERGIES REVIEWED.    Family History   Problem Relation Age of Onset    Diabetes Mother     Cancer Father         liver    Brain cancer Sister     Heart attack Brother     Prostate cancer Brother      Social History     Tobacco Use    Smoking status: Former     Current packs/day: 0.00     Average packs/day: 1.5 packs/day for 52.9 years (79.4 ttl pk-yrs)     Types: Cigarettes     Start date: 1970     Quit date: 2022     Years since quittin.8     Passive exposure: Past    Smokeless tobacco: Never   Vaping Use    Vaping status: Never Used   Substance Use Topics    Alcohol use: Not Currently    Drug use: Never     Social History     Social History Narrative    Lives in [place].    Stop smoking in      FAMILY AND SOCIAL HISTORY REVIEWED.    Review of Systems  IF PRESENT REFER TO SCANNED ROS SHEET FROM SAME DATE  OTHERWISE ROS OBTAINED AND NON-CONTRIBUTORY OVER HPI.    /76   Pulse 72   Temp 97.2 °F (36.2 °C) (Infrared)   Ht 160 cm (62.99\")   Wt 76.7 kg (169 lb)   SpO2 93% Comment: room air at rest  BMI 29.94 kg/m²   Physical Exam  Vitals and nursing note reviewed.   Constitutional:       General: He is not in acute distress.     Appearance: He is well-developed. He is not diaphoretic.   HENT:      Head: Normocephalic and atraumatic.   Neck:      Thyroid: No thyromegaly.   Cardiovascular:      Rate and Rhythm: Normal rate and regular rhythm.      Heart sounds: Normal heart sounds. No murmur heard.  Pulmonary:      Effort: Pulmonary effort is normal.      Breath sounds: No stridor.      Comments: Decreased breath " sounds, left greater than right  Lymphadenopathy:      Cervical: No cervical adenopathy.      Upper Body:      Right upper body: No supraclavicular or epitrochlear adenopathy.      Left upper body: No supraclavicular or epitrochlear adenopathy.   Skin:     General: Skin is warm and dry.   Neurological:      Mental Status: He is alert and oriented to person, place, and time.   Psychiatric:         Behavior: Behavior normal.         Results     CT scan of the abdomen pelvis from September revealed pleural thickening and linear fibrotic changes in the left base with no consolidation and no effusion  This is all new in comparison to CT scan of the chest from February 2024    PFTs reveal no airway obstruction but a mild restriction and a normal diffusion capacity    Immunization History   Administered Date(s) Administered    Pneumococcal Conjugate 20-Valent (PCV20) 04/30/2024    Tdap 01/26/2023     Problem List       ICD-10-CM ICD-9-CM   1. Trapped LLL  J98.19 518.0   2. Pleural thickening LLL (Since CABG)  J92.9 511.0   3. Chronic GERD  K21.9 530.81   4. Former smoker  Z87.891 V15.82   5. Ischemic cardiomyopathy  I25.5 414.8       Discussion     We reviewed his chest imaging and PFTs    I reassured him he does not have COPD  No evidence of asthma on today's breathing tests, either    He restrictive pulmonary physiology  This is most likely related to pleural thickening and what is essentially a trapped left lower lobe as a sequela of his prior chest surgery  There is no evidence of residual pleural fluid    We did talk about surgical decortication  I think it is unlikely that he would achieve enough improvement in lung function and symptom improvement to justify what would be a big surgery in this individual with multiple other medical problems    I have recommended nebulized hypertonic saline to help with mucus clearance  Will get that set up through a DME company    I think chronic reflux is contributing to his  symptoms as well  I have recommended strict n.p.o. 2 hours before sleep and sleeping elevated  He has a wedge pillow that he has not been using    I will plan to see him back in a month or 2 for follow-up    At the very least he needs a follow-up CT scan of the chest in February 2025 for lung cancer screening  I would probably do that as a routine noncontrast CT scan and not an LDCT so that we can evaluate other issues more clearly    Level of service justified based on 63 minutes spent in patient care on this date of service including, but not limited to: preparing to see the patient, obtaining and/or reviewing history, performing medically appropriate examination, ordering tests/medicine/procedures, independently interpreting results, documenting clinical information in EHR, and counseling/education of patient/family/caregiver (excluding time spent on other separate services such as performing procedures or test interpretation, if applicable). (Level 4 45-59 minutes; Level 5 60-74 minutes)    Juanjo Ahuja MD  Note electronically signed    CC: Yessica Ricci DO

## 2024-10-23 RX ORDER — ERGOCALCIFEROL 1.25 MG/1
50000 CAPSULE, LIQUID FILLED ORAL WEEKLY
Qty: 12 CAPSULE | Refills: 0 | Status: SHIPPED | OUTPATIENT
Start: 2024-10-23

## 2024-10-28 ENCOUNTER — TELEPHONE (OUTPATIENT)
Dept: CARDIOLOGY | Facility: CLINIC | Age: 69
End: 2024-10-28

## 2024-10-28 DIAGNOSIS — I50.22 CHRONIC SYSTOLIC (CONGESTIVE) HEART FAILURE: Primary | ICD-10-CM

## 2024-10-28 NOTE — TELEPHONE ENCOUNTER
Caller: Vivian Harley    Relationship to patient: Emergency Contact    Best call back number: [phone]    Chief complaint: IMAGES CAME BACK SHOWING ENLARGED HEART     Type of visit: FOLLOW UP     Requested date: ASAP, AFTERNOON APPOINTMENT       Additional notes: PT HAD A VISIT AT THE ER AND THEY RAN SOME TESTS. SOME OF THE IMAGES CAME BACK ABNORMAL. ONE SHOWED AN ENLARGED HEART, SO THE DR SUGGESTED THE PT SEE HIS CARDIOLOGIST FOR AN ECHOCARDIOGRAM.

## 2024-10-28 NOTE — TELEPHONE ENCOUNTER
Spoke with patient wife regarding need for appointment. Patient recently went to see pulmonary and they recommended based off the xrays that his heart is enlarged and causing compression of his lung per the wife.     Patient has hx of ischemic cardiomyopathy, CAGB x3 3/2023. Most recent echo was 3/2023.     Per the wife patient has had worsening fatigue over the last few months. He has a cough at night that produces a clear foam. She says this has been going on since his surgery.     Patient is taking all his cardiac medications besides metoprolol wife states at some point someone told her not to give it to him d/t heart rate.    BP averages 120/60  HR 60s  02 92-94    We could try to get him in to see midlevel maybe?    Please advise,

## 2024-10-29 ENCOUNTER — TELEPHONE (OUTPATIENT)
Dept: ENDOCRINOLOGY | Facility: CLINIC | Age: 69
End: 2024-10-29
Payer: MEDICARE

## 2024-10-31 ENCOUNTER — HOSPITAL ENCOUNTER (OUTPATIENT)
Facility: HOSPITAL | Age: 69
Discharge: HOME | End: 2024-10-31
Admitting: INTERNAL MEDICINE
Payer: MEDICARE

## 2024-10-31 DIAGNOSIS — J45.40 MODERATE PERSISTENT ASTHMA, UNSPECIFIED WHETHER COMPLICATED: ICD-10-CM

## 2024-10-31 DIAGNOSIS — I50.22 CHRONIC SYSTOLIC (CONGESTIVE) HEART FAILURE: ICD-10-CM

## 2024-10-31 LAB
ASCENDING AORTA: 2.6 CM
BH CV ECHO MEAS - AO MAX PG: 5.3 MMHG
BH CV ECHO MEAS - AO MEAN PG: 3.5 MMHG
BH CV ECHO MEAS - AO ROOT DIAM: 2.9 CM
BH CV ECHO MEAS - AO V2 MAX: 113.7 CM/SEC
BH CV ECHO MEAS - AO V2 VTI: 25 CM
BH CV ECHO MEAS - AVA(I,D): 2.39 CM2
BH CV ECHO MEAS - EDV(CUBED): 175.6 ML
BH CV ECHO MEAS - EDV(MOD-SP2): 152 ML
BH CV ECHO MEAS - EDV(MOD-SP4): 160 ML
BH CV ECHO MEAS - EF(MOD-BP): 41.5 %
BH CV ECHO MEAS - EF(MOD-SP2): 42.6 %
BH CV ECHO MEAS - EF(MOD-SP4): 41.1 %
BH CV ECHO MEAS - ESV(CUBED): 103.8 ML
BH CV ECHO MEAS - ESV(MOD-SP2): 87.2 ML
BH CV ECHO MEAS - ESV(MOD-SP4): 94.2 ML
BH CV ECHO MEAS - FS: 16.1 %
BH CV ECHO MEAS - IVS/LVPW: 1 CM
BH CV ECHO MEAS - IVSD: 0.8 CM
BH CV ECHO MEAS - LA DIMENSION: 4.2 CM
BH CV ECHO MEAS - LAT PEAK E' VEL: 8.4 CM/SEC
BH CV ECHO MEAS - LV DIASTOLIC VOL/BSA (35-75): 89.9 CM2
BH CV ECHO MEAS - LV MASS(C)D: 165 GRAMS
BH CV ECHO MEAS - LV MAX PG: 2.6 MMHG
BH CV ECHO MEAS - LV MEAN PG: 1.33 MMHG
BH CV ECHO MEAS - LV SYSTOLIC VOL/BSA (12-30): 52.9 CM2
BH CV ECHO MEAS - LV V1 MAX: 80.9 CM/SEC
BH CV ECHO MEAS - LV V1 VTI: 19 CM
BH CV ECHO MEAS - LVIDD: 5.6 CM
BH CV ECHO MEAS - LVIDS: 4.7 CM
BH CV ECHO MEAS - LVOT AREA: 3.1 CM2
BH CV ECHO MEAS - LVOT DIAM: 2 CM
BH CV ECHO MEAS - LVPWD: 0.8 CM
BH CV ECHO MEAS - MED PEAK E' VEL: 4.4 CM/SEC
BH CV ECHO MEAS - MV A MAX VEL: 107.5 CM/SEC
BH CV ECHO MEAS - MV DEC SLOPE: 365 CM/SEC2
BH CV ECHO MEAS - MV DEC TIME: 0.21 SEC
BH CV ECHO MEAS - MV E MAX VEL: 67 CM/SEC
BH CV ECHO MEAS - MV E/A: 0.62
BH CV ECHO MEAS - MV MAX PG: 3.7 MMHG
BH CV ECHO MEAS - MV MEAN PG: 1.5 MMHG
BH CV ECHO MEAS - MV P1/2T: 54.4 MSEC
BH CV ECHO MEAS - MV V2 VTI: 22.4 CM
BH CV ECHO MEAS - MVA(P1/2T): 4 CM2
BH CV ECHO MEAS - MVA(VTI): 2.7 CM2
BH CV ECHO MEAS - PA ACC TIME: 0.13 SEC
BH CV ECHO MEAS - RAP SYSTOLE: 3 MMHG
BH CV ECHO MEAS - RVSP: 26 MMHG
BH CV ECHO MEAS - SV(LVOT): 59.7 ML
BH CV ECHO MEAS - SV(MOD-SP2): 64.8 ML
BH CV ECHO MEAS - SV(MOD-SP4): 65.8 ML
BH CV ECHO MEAS - SVI(LVOT): 33.5 ML/M2
BH CV ECHO MEAS - SVI(MOD-SP2): 36.4 ML/M2
BH CV ECHO MEAS - SVI(MOD-SP4): 37 ML/M2
BH CV ECHO MEAS - TAPSE (>1.6): 1.42 CM
BH CV ECHO MEAS - TR MAX PG: 23.2 MMHG
BH CV ECHO MEAS - TR MAX VEL: 191 CM/SEC
BH CV ECHO MEASUREMENTS AVERAGE E/E' RATIO: 10.47
BH CV XLRA - RV BASE: 3.8 CM
BH CV XLRA - RV LENGTH: 7 CM
BH CV XLRA - RV MID: 3.1 CM
BH CV XLRA - TDI S': 8.1 CM/SEC
LEFT ATRIUM VOLUME INDEX: 42.9 ML/M2

## 2024-10-31 PROCEDURE — 93306 TTE W/DOPPLER COMPLETE: CPT | Performed by: INTERNAL MEDICINE

## 2024-10-31 PROCEDURE — 93306 TTE W/DOPPLER COMPLETE: CPT

## 2024-10-31 RX ORDER — FLUTICASONE FUROATE, UMECLIDINIUM BROMIDE AND VILANTEROL TRIFENATATE 100; 62.5; 25 UG/1; UG/1; UG/1
1 POWDER RESPIRATORY (INHALATION) DAILY
Qty: 60 EACH | Refills: 0 | Status: SHIPPED | OUTPATIENT
Start: 2024-10-31

## 2024-11-04 ENCOUNTER — TELEPHONE (OUTPATIENT)
Dept: CARDIOLOGY | Facility: CLINIC | Age: 69
End: 2024-11-04
Payer: MEDICARE

## 2024-11-04 NOTE — TELEPHONE ENCOUNTER
Tried to call patient regarding results, unable to get a hold of him or leave a voicemail. Patient has appointment tomorrow.

## 2024-11-04 NOTE — PROGRESS NOTES
Subjective:     Encounter Date:11/05/2024      Patient ID: Herbert Harley is a 68 y.o.  white male from [place], [place].    PCP: Yessica Ricci DO  CARDIOLOGIST: Mavis Ramirez MD  CARDIOVASCULAR SURGEON: Paresh Saavedra MD  PULMONOLOGIST: Mariusz Ahuja MD    Chief Complaint:   Chief Complaint   Patient presents with    Cardiomyopathy     Problem List:  Coronary artery disease  Inferior STEMI with [facility], Dr. Carter, 3/28/2014:  EF 50%, NILE to the mid-dominant RCA, NILE to the proximal LAD  Echo 3/2014:   EF 50-55%, RVSP 30.  Echo 12/2022:Stage C, HFmrEF EF 41- 45%, mild LVH  Stress test, 2/13/2023: Inferolateral ischemia,   [facility], 2/17/2023: Severe triple-vessel disease, %, RCA 80%, LAD 80%  Echo 3/2023 EF 40%, RVC dilated, LA volume mildly increased   CABG x3 vessels (LIMA to LAD, SVG to OM, SVG to posterior descending branch of right coronary) March 2023, complicated by recurrent pleural effusion and underwent thoracentesis x 2  Echocardiogram 10/31/2024: LVEF 41.5%, the following LV wall segments are akinetic: Basal inferior and mid inferior, grade 1 diastolic dysfunction, LA cavity mildly dilated, RVSP 26 mmHg  Hypertension  Hyperlipidemia  Type 2 diabetes mellitus; hemoglobin A1c 7.5% February 2024, 8.2% August 2024  PVCs  Mild LVH  Carotid artery disease with carotid duplex showing less than 50% R ICA stenosis, LICA stenosis 50 to 69% February 2023  Asthma  CKD, on dialysis  Left adrenal gland adenoma 1.9 cm on CT abdomen/pelvis September 2024  Former tobacco use  Snoring and daytime sleepiness, no prior sleep study  Surgical history:  Tonsillectomy  CABG x 3    Allergies   Allergen Reactions    Wellbutrin [Bupropion] Swelling     Facial swelling, severe    Singulair [Montelukast] Myalgia and Headache       Current Outpatient Medications   Medication Instructions    albuterol sulfate  (90 Base) MCG/ACT inhaler 2 puffs, Inhalation, Every 6 Hours PRN    aspirin 81 mg, Oral,  "Daily    betamethasone valerate (VALISONE) 0.1 % ointment APPLY TO THE AFFECTED AREA(S) 2 TIMES A DAY AS DIRECTED    bumetanide (BUMEX) 1 mg, Oral, Daily    coenzyme Q10 100 mg, Daily    Collagen-Vitamin C-Biotin (COLLAGEN 1500/C PO) 1 tablet, Daily    dapagliflozin Propanediol (FARXIGA) 10 mg, Oral, Daily    ezetimibe (ZETIA) 10 mg, Oral, Daily    fluticasone (FLONASE) 50 MCG/ACT nasal spray 2 sprays, Nasal, Daily    Insulin Syringe 31G X 5/16\" 1 ML misc USE DAILY WITH INSULIN INJECTIONS    Lantus 100 UNIT/ML injection Inject 52 units every EVENING, increase by 2 units every 4 days for fasting goal  MX 60 units/day    nitroglycerin (NITROSTAT) 0.4 mg, Sublingual, Every 5 Minutes PRN, Take no more than 3 doses in 15 minutes.    potassium chloride ER (K-TAB) 20 MEQ tablet controlled-release ER tablet 20 mEq, Oral, Daily, ONE TABLET EVERY OTHER DAY    rosuvastatin (CRESTOR) 20 mg, Oral, Daily    sertraline (ZOLOFT) 50 MG tablet     tamsulosin (FLOMAX) 0.4 mg, Oral, Daily    Trelegy Ellipta 100-62.5-25 MCG/ACT inhaler 1 puff, Inhalation, Daily    vitamin D (ERGOCALCIFEROL) 50,000 Units, Oral, Weekly         HISTORY OF PRESENT ILLNESS:  The patient is here earlier than scheduled appointment due to recent visit to the ED for abdominal pain and found to have enlarged heart and is a patient of Dr Ramirez.  Patient saw pulmonology and they recommended echocardiogram because they felt that the enlargement of his heart was possibly causing trapped LLL.  Patient had been having a cough at night with clear sputum production.  He was recently provided with nebulizer treatments and he feels like these have improved some of his symptoms.  Patient has daytime sleepiness and often falls asleep easily.  He snores but has not had a sleep study.  Echocardiogram October 2024 showed LVEF 41.5%, the following LV wall segments are akinetic: Basal inferior and mid inferior, grade 1 diastolic dysfunction, LA cavity mildly dilated, RVSP " 26 mmHg.  EF 40% prior to CABG x 3 vessels in March 2023.  Patient already on Farxiga.  He is no longer on metoprolol because his heart rate/blood pressure was too low.  The patient cannot recall who discontinued this medication.  His blood pressure readings at home are generally 110-120s on average.  He denies any palpitations, dizziness, presyncope, syncope, or edema.  Occasionally he will have a chest pain but he has not used any nitroglycerin for this.  He notices this sometimes on the left or right side of his chest but it is generally brief lasting only a minute.  Before he had bypass surgery his symptoms were of nausea.  He has not had any recurrent nausea since having bypass surgery.  He has been taking Bumex every day.  He thinks creatinine is probably around 1.7 at baseline.  He follows with nephrology.  He has upcoming labs soon with his primary care physician.  Patient denies any orthopnea.  Unfortunately he was unable to attend cardiac rehab after he had bypass surgery because he got pneumonia couple times.  He says he also has some scar tissue close to his left lower lobe from having chest tubes.  Patient denies any increased shortness of breath.  The patient and his wife were hoping that after having bypass surgery that he would feel a little better and be a little bit more active with less fatigue.      ROS   All other systems reviewed and otherwise negative.      ECG 12 Lead    Date/Time: 11/5/2024 11:31 AM  Performed by: Cris Figueroa APRN    Authorized by: Cris Figueroa APRN  Rhythm comments: Sinus rhythm with first-degree AV block, inferior and anteroseptal infarct, age undetermined, ST and T wave abnormality, consider lateral ischemia, abnormal ECG, 77 bpm, QRS 92 ms, QTc 450 ms,  ms, sinus rhythm has replaced SB compared to ECG 9/24             Objective:       Vitals:    11/05/24 1055 11/05/24 1056   BP: 112/58 114/62   Pulse: 73    SpO2: 96%    Weight: 77.5 kg (170 lb 12.8 oz)   "  Height: 162.6 cm (64\")      Body mass index is 29.32 kg/m².  Wt Readings from Last 2 Encounters:   11/05/24 77.5 kg (170 lb 12.8 oz)   10/09/24 76.7 kg (169 lb)        Constitutional:       Appearance: Healthy appearance. Not in distress.   Neck:      Vascular: No JVR. JVD normal.   Pulmonary:      Effort: Pulmonary effort is normal.      Breath sounds: Normal breath sounds. No wheezing. No rhonchi. No rales.   Chest:      Chest wall: Not tender to palpatation.   Cardiovascular:      PMI at left midclavicular line. Normal rate. Regular rhythm. Normal S1. Normal S2.       Murmurs: There is no murmur.      No gallop.  No click. No rub.   Pulses:     Intact distal pulses.   Edema:     Peripheral edema absent.   Abdominal:      General: Bowel sounds are normal.      Palpations: Abdomen is soft.      Tenderness: There is no abdominal tenderness.   Musculoskeletal: Normal range of motion.         General: No tenderness. Skin:     General: Skin is warm and dry.   Neurological:      General: No focal deficit present.      Mental Status: Alert and oriented to person, place and time.           Lab Review:   Lab Results   Component Value Date    GLUCOSE 240 (H) 09/23/2024    BUN 30 (H) 09/23/2024    CREATININE 1.74 (H) 09/23/2024    EGFRIFNONA 66 12/14/2021    BCR 17.2 09/23/2024    CO2 23.0 09/23/2024    CALCIUM 8.9 09/23/2024    ALBUMIN 4.1 09/23/2024    AST 30 09/23/2024    ALT 37 09/23/2024       Lab Results   Component Value Date    WBC 7.59 09/23/2024    HGB 17.4 09/23/2024    HCT 53.2 (H) 09/23/2024    MCV 90.3 09/23/2024     09/23/2024       Lab Results   Component Value Date    HGBA1C 8.2 (A) 08/06/2024       Lab Results   Component Value Date    TSH 2.330 04/30/2024       Lab Results   Component Value Date    CHOL 160 02/02/2024    CHOL 80 02/17/2023     Lab Results   Component Value Date    TRIG 179 (H) 02/02/2024    TRIG 60 02/17/2023     Lab Results   Component Value Date    HDL 33 (L) 02/02/2024    HDL " 37 (L) 02/17/2023     Lab Results   Component Value Date    LDL 96 02/02/2024    LDL 29 02/17/2023           Lab Results   Component Value Date    BNP 73 03/28/2014                 Results for orders placed during the hospital encounter of 10/31/24    Adult Transthoracic Echo Complete W/ Cont if Necessary Per Protocol    Interpretation Summary    Left ventricular systolic function is mildly decreased. Calculated left ventricular EF = 41.5% Left ventricular ejection fraction appears to be 41 - 45%.    The following left ventricular wall segments are akinetic: basal inferior and mid inferior.    Left ventricular diastolic function is consistent with (grade I) impaired relaxation.    The left atrial cavity is mildly dilated.    Estimated right ventricular systolic pressure from tricuspid regurgitation is normal (<35 mmHg). Calculated right ventricular systolic pressure from tricuspid regurgitation is 26 mmHg.       Results for orders placed during the hospital encounter of 02/15/23    Duplex Carotid Ultrasound CAR    Interpretation Summary    Right internal carotid artery stenosis of 0-49%.    Left internal carotid artery stenosis of 50-69%.    There is antegrade flow in the vertebral arteries bilaterally.       Advance Care Planning   ACP discussion was held with the patient during this visit. Patient has an advance directive (not in EMR), copy requested.      Assessment:    Patient with fair functional status on limited activity.  Echocardiogram October 2024 showed LVEF 41.5%, the following LV wall segments are akinetic: Basal inferior and mid inferior, grade 1 diastolic dysfunction, LA cavity mildly dilated, RVSP 26 mmHg.  EF about the same as pre-bypass.  Unfortunately the patient's low blood pressure and renal dysfunction limit treatment for GDMT.  Will try low-dose valsartan 40 mg daily to help with cardiomyopathy and get repeat labs in a week.  Also, patient was unable to attend cardiopulmonary rehab after he  had bypass so this will be reinitiated to help him regain strength/stamina.  Patient has snoring and daytime sleepiness so we will screen him with an outpatient sleep study.     Diagnosis Plan   1. Ischemic cardiomyopathy  Echocardiogram October 2024 showed LVEF 41.5%, the following LV wall segments are akinetic: Basal inferior and mid inferior, grade 1 diastolic dysfunction, LA cavity mildly dilated, RVSP 26 mmHg.  Valsartan 40 mg daily with repeat labs in a week      2. I25.10, CABG 03/01/23  Patient is status post CABG x 3 vessels March 2023, continue current cardiac medications      3. Chronic systolic (congestive) heart failure  Will try low-dose valsartan 40 mg daily to help with cardiomyopathy and get repeat labs in a week.      4. Primary hypertension  Controlled      5. Hyperlipidemia LDL goal <70  Abnormal lipid panel February 2024, continue Zetia, rosuvastatin, increase rosuvastatin to 40 mg daily for goal of LDL 55   6.  Snoring and daytime sleepiness: Outpatient sleep study          Plan:         Patient to continue current medications and close follow up with the above providers.  Tentative cardiology follow up in February 2025 with MA or patient may return sooner PRN.  Unfortunately renal dysfunction and hypotension preclude addition of Entresto, spironolactone, Toprol recently discontinued due to bradycardia/hypotension  Would continue Farxiga 10 mg daily, Bumex 1 mg daily, potassium chloride 20mEq daily, aspirin 81 mg daily, Zetia 10 mg daily  Recommended cardiopulmonary rehab  Outpatient sleep study  ProBNP, mag, BMP in 1 week  Trial of valsartan 40 mg daily to help with cardiomyopathy if blood pressure and renal function allow  Patient will call back in 1 week with blood pressure and heart rate readings  Would increase rosuvastatin to 40 mg daily (LDL goal 55)      Electronically signed by TALON Dawson, 11/05/24, 12:40 PM EST.

## 2024-11-05 ENCOUNTER — OFFICE VISIT (OUTPATIENT)
Dept: CARDIOLOGY | Facility: CLINIC | Age: 69
End: 2024-11-05
Payer: MEDICARE

## 2024-11-05 VITALS
DIASTOLIC BLOOD PRESSURE: 62 MMHG | WEIGHT: 170.8 LBS | HEIGHT: 64 IN | SYSTOLIC BLOOD PRESSURE: 114 MMHG | HEART RATE: 73 BPM | OXYGEN SATURATION: 96 % | BODY MASS INDEX: 29.16 KG/M2

## 2024-11-05 DIAGNOSIS — E78.5 DYSLIPIDEMIA: ICD-10-CM

## 2024-11-05 DIAGNOSIS — R06.83 SNORING: ICD-10-CM

## 2024-11-05 DIAGNOSIS — I25.700 CORONARY ARTERY DISEASE INVOLVING CORONARY BYPASS GRAFT OF NATIVE HEART WITH UNSTABLE ANGINA PECTORIS: Primary | ICD-10-CM

## 2024-11-05 DIAGNOSIS — E78.5 HYPERLIPIDEMIA LDL GOAL <70: ICD-10-CM

## 2024-11-05 DIAGNOSIS — I10 PRIMARY HYPERTENSION: ICD-10-CM

## 2024-11-05 DIAGNOSIS — I25.10 CORONARY ARTERY DISEASE INVOLVING NATIVE CORONARY ARTERY OF NATIVE HEART WITHOUT ANGINA PECTORIS: ICD-10-CM

## 2024-11-05 DIAGNOSIS — I50.22 CHRONIC SYSTOLIC (CONGESTIVE) HEART FAILURE: ICD-10-CM

## 2024-11-05 DIAGNOSIS — I25.5 ISCHEMIC CARDIOMYOPATHY: Primary | ICD-10-CM

## 2024-11-05 PROCEDURE — 99214 OFFICE O/P EST MOD 30 MIN: CPT | Performed by: NURSE PRACTITIONER

## 2024-11-05 PROCEDURE — 1159F MED LIST DOCD IN RCRD: CPT | Performed by: NURSE PRACTITIONER

## 2024-11-05 PROCEDURE — 3074F SYST BP LT 130 MM HG: CPT | Performed by: NURSE PRACTITIONER

## 2024-11-05 PROCEDURE — 3078F DIAST BP <80 MM HG: CPT | Performed by: NURSE PRACTITIONER

## 2024-11-05 PROCEDURE — 1160F RVW MEDS BY RX/DR IN RCRD: CPT | Performed by: NURSE PRACTITIONER

## 2024-11-05 PROCEDURE — 93000 ELECTROCARDIOGRAM COMPLETE: CPT | Performed by: NURSE PRACTITIONER

## 2024-11-05 RX ORDER — VALSARTAN 40 MG/1
40 TABLET ORAL DAILY
Qty: 30 TABLET | Refills: 11 | Status: SHIPPED | OUTPATIENT
Start: 2024-11-05

## 2024-11-05 RX ORDER — ROSUVASTATIN CALCIUM 40 MG/1
40 TABLET, COATED ORAL DAILY
Qty: 90 TABLET | Refills: 2 | Status: SHIPPED | OUTPATIENT
Start: 2024-11-05

## 2024-11-06 ENCOUNTER — DOCUMENTATION (OUTPATIENT)
Dept: CARDIAC REHAB | Facility: HOSPITAL | Age: 69
End: 2024-11-06
Payer: MEDICARE

## 2024-11-07 ENCOUNTER — OFFICE VISIT (OUTPATIENT)
Dept: FAMILY MEDICINE CLINIC | Facility: CLINIC | Age: 69
End: 2024-11-07
Payer: MEDICARE

## 2024-11-07 VITALS
SYSTOLIC BLOOD PRESSURE: 120 MMHG | DIASTOLIC BLOOD PRESSURE: 62 MMHG | OXYGEN SATURATION: 96 % | BODY MASS INDEX: 29.42 KG/M2 | WEIGHT: 171.4 LBS | TEMPERATURE: 98.2 F | HEART RATE: 62 BPM

## 2024-11-07 DIAGNOSIS — N18.32 STAGE 3B CHRONIC KIDNEY DISEASE: ICD-10-CM

## 2024-11-07 DIAGNOSIS — E78.5 HYPERLIPIDEMIA LDL GOAL <70: ICD-10-CM

## 2024-11-07 DIAGNOSIS — R53.1 WEAKNESS: ICD-10-CM

## 2024-11-07 DIAGNOSIS — Z79.4 TYPE 2 DIABETES MELLITUS WITH HYPOGLYCEMIA WITHOUT COMA, WITH LONG-TERM CURRENT USE OF INSULIN: Primary | ICD-10-CM

## 2024-11-07 DIAGNOSIS — E11.649 TYPE 2 DIABETES MELLITUS WITH HYPOGLYCEMIA WITHOUT COMA, WITH LONG-TERM CURRENT USE OF INSULIN: Primary | ICD-10-CM

## 2024-11-07 DIAGNOSIS — I10 PRIMARY HYPERTENSION: ICD-10-CM

## 2024-11-07 LAB
EXPIRATION DATE: ABNORMAL
HBA1C MFR BLD: 8.1 % (ref 4.5–5.7)
Lab: ABNORMAL

## 2024-11-07 NOTE — PROGRESS NOTES
Subjective     Chief Complaint  Follow-up (Wants to know what was the brand of last Pneumonia vaccine was and if he needs another dose. Also wants info on Valsartan how to use.)    Subjective          Herbert Harley is a 68 y.o. male who presents today to [facility] for follow up.    HPI:   History of Present Illness    Mr. Harley is a 68-year-old male presents today to follow-up on chronic issues.    He has a past medical history of hypertension, hyperlipidemia, ischemic cardiomyopathy, chronic systolic heart failure, coronary artery disease status post CABG, type 2 diabetes, chronic viral hepatitis C, iron deficiency anemia, COPD.    For Type 2 DM - his HgA1c in office today is 8.1% he has an upcoming appointment with Endocrinology next month.  His fasting sugar this morning was 185 but this was an outlier for him. He is usually < 90 fasting.     Hyperlipidemia -target less than 55 on the LDL, currently prescribed Crestor 40 mg daily.     He is able to stay euvolemic with Bumex 0.5 mg daily and potassium every other day.     The cough is still ongoing and persistent. He hasn't see any significant benefit with Trelegy. He has history of hives and prescribed Antihistamines. He isn't as consistent as he could be with the inhaler. PFTs did not give a definitive diagnosis. - he has an appointment in about one month with Pulmonology. He states that overall, his breathing has been better. He isn't having a good day today though.   He has followed with cardiology and they suggested referral to cardiopulmonary rehab and for him to start Valsartan 40 mg daily. His Creatine Clearance  - 45    The following portions of the patient's history were reviewed and updated as appropriate: allergies, current medications, past family history, past medical history, past social history, past surgical history and problem list.    Objective     Objective     Allergy:   Allergies   Allergen  "Reactions    Wellbutrin [Bupropion] Swelling     Facial swelling, severe    Singulair [Montelukast] Myalgia and Headache        Current Medications:   Current Outpatient Medications   Medication Sig Dispense Refill    albuterol sulfate  (90 Base) MCG/ACT inhaler Inhale 2 puffs Every 6 (Six) Hours As Needed for Wheezing. 8 g 5    aspirin 81 MG EC tablet Take 1 tablet by mouth Daily. 90 tablet 3    bumetanide (Bumex) 1 MG tablet Take 1 tablet by mouth Daily. 90 tablet 1    coenzyme Q10 100 MG capsule Take 1 capsule by mouth Daily.      Collagen-Vitamin C-Biotin (COLLAGEN 1500/C PO) Take 1 tablet by mouth Daily. OTC supplement      dapagliflozin Propanediol (Farxiga) 10 MG tablet Take 10 mg by mouth Daily. Indications: Type 2 Diabetes 90 tablet 1    ezetimibe (ZETIA) 10 MG tablet TAKE 1 TABLET BY MOUTH DAILY 90 tablet 3    Insulin Syringe 31G X 5/16\" 1 ML misc USE DAILY WITH INSULIN INJECTIONS 100 each 3    Lantus 100 UNIT/ML injection Inject 52 units every EVENING, increase by 2 units every 4 days for fasting goal  MX 60 units/day 60 mL 2    nitroglycerin (NITROSTAT) 0.4 MG SL tablet Place 1 tablet under the tongue Every 5 (Five) Minutes As Needed for Chest Pain. Take no more than 3 doses in 15 minutes. 25 tablet 3    potassium chloride ER (K-TAB) 20 MEQ tablet controlled-release ER tablet Take 1 tablet by mouth Daily. ONE TABLET EVERY OTHER DAY 45 tablet 1    rosuvastatin (CRESTOR) 40 MG tablet Take 1 tablet by mouth Daily. 90 tablet 2    sertraline (ZOLOFT) 50 MG tablet       Trelegy Ellipta 100-62.5-25 MCG/ACT inhaler INHALE 1 PUFF BY MOUTH DAILY 60 each 0    valsartan (DIOVAN) 40 MG tablet Take 1 tablet by mouth Daily. Hold for systolic blood pressure less than 100 mmHg 30 tablet 11    vitamin D (ERGOCALCIFEROL) 1.25 MG (96661 UT) capsule capsule TAKE 1 CAPSULE BY MOUTH ONCE WEEKLY 12 capsule 0     No current facility-administered medications for this visit.       Past Medical History:  Past Medical " History:   Diagnosis Date    Acute kidney failure, unspecified 03/21/2023    Acute pulmonary edema 04/07/2023    Acute renal failure (ARF) 03/02/2023    Acute respiratory failure with hypoxia 03/03/2023    Acute systolic heart failure (CMS/HCC) 03/20/2023    Anaphylactic shock, unspecified, initial encounter 03/21/2023    Arrhythmia March 2023    Asthma 12/2022    Chronic obstructive pulmonary disease, unspecified 03/17/2023    Chronic systolic congestive heart failure 03/01/2023    Coronary artery disease 03/2014    Elevated cholesterol     Erectile dysfunction 2010    GERD (gastroesophageal reflux disease) 03/2023    Esophagitis in hospital    History of tobacco abuse     HLD (hyperlipidemia) 01/26/2023    Hypertension     Ischemic cardiomyopathy     Mucopurulent chronic bronchitis 03/01/2023    Myocardial infarction 03/2014    Old myocardial infarction 03/17/2023    Other disorders of phosphorus metabolism 03/21/2023    Positive cardiac stress test 02/15/2023    Added automatically from request for surgery [id number]    Prolonged Q-T interval on ECG 03/01/2023    Secondary hyperparathyroidism of renal origin     Shock 03/02/2023    Shortness of breath 03/21/2023    Type 2 diabetes mellitus     Wears glasses        Past Surgical History:  Past Surgical History:   Procedure Laterality Date    ARTERIAL BYPASS SURGERY      CARDIAC CATHETERIZATION  March 2014    CARDIAC CATHETERIZATION Left 02/17/2023    Procedure: Left Heart Cath;  Surgeon: Nadya Ramirez MD;  Location:  [facility];  Service: Cardiology;  Laterality: Left;    CORONARY ARTERY BYPASS GRAFT N/A 03/01/2023    Procedure: MEDIAN STERNOTOMY CORONARY ARTERY BYPASS GRAFTING X3  UTILIZING THE LEFT INTERNAL MAMMERY GRAFT, AND EVH OF THE GREATER RIGHT SAPHENOUS VEIN;  Surgeon: Paresh Saavedra MD;  Location:  [facility];  Service: Cardiothoracic;  Laterality: N/A;    CORONARY ARTERY BYPASS GRAFT  3/1/2023    CORONARY STENT PLACEMENT  March 2014     TONSILLECTOMY         Social History:  Social History     Socioeconomic History    Marital status:     Number of children: 5   Tobacco Use    Smoking status: Former     Current packs/day: 0.00     Average packs/day: 1.5 packs/day for 52.9 years (79.4 ttl pk-yrs)     Types: Cigarettes     Start date: 1970     Quit date: 2022     Years since quittin.9     Passive exposure: Past    Smokeless tobacco: Never   Vaping Use    Vaping status: Never Used   Substance and Sexual Activity    Alcohol use: Not Currently    Drug use: Never    Sexual activity: Not Currently       Family History:  Family History   Problem Relation Age of Onset    Diabetes Mother     Cancer Father         liver    Brain cancer Sister     Heart attack Brother     Prostate cancer Brother            Vital Signs:   /62   Pulse 62   Temp 98.2 °F (36.8 °C) (Temporal)   Wt 77.7 kg (171 lb 6.4 oz)   SpO2 96%   BMI 29.42 kg/m²      Physical Exam:  Physical Exam  Vitals reviewed.   Constitutional:       Appearance: He is not ill-appearing.   Eyes:      Pupils: Pupils are equal, round, and reactive to light.   Cardiovascular:      Rate and Rhythm: Normal rate.      Pulses: Normal pulses.   Pulmonary:      Effort: Pulmonary effort is normal.      Breath sounds: Normal breath sounds.   Neurological:      General: No focal deficit present.      Mental Status: He is alert. Mental status is at baseline.   Psychiatric:         Mood and Affect: Mood normal.         Behavior: Behavior normal.         Thought Content: Thought content normal.         Judgment: Judgment normal.               PHQ-9 Score  PHQ-9 Total Score:      Lab Review  Office Visit on 2024   Component Date Value Ref Range Status    Hemoglobin A1C 2024 8.1 (A)  4.5 - 5.7 % Final    Lot Number [id number]   Final    Expiration Date 2024 06/10/2026   Final   Hospital Outpatient Visit on 10/31/2024   Component Date Value Ref Range Status     EF(MOD-bp) 10/31/2024 41.5  % Final    LVIDd 10/31/2024 5.6  cm Final    LVIDs 10/31/2024 4.7  cm Final    IVSd 10/31/2024 0.80  cm Final    LVPWd 10/31/2024 0.80  cm Final    FS 10/31/2024 16.1  % Final    IVS/LVPW 10/31/2024 1.00  cm Final    ESV(cubed) 10/31/2024 103.8  ml Final    LV Sys Vol (BSA corrected) 10/31/2024 52.9  cm2 Final    EDV(cubed) 10/31/2024 175.6  ml Final    LV Alonso Vol (BSA corrected) 10/31/2024 89.9  cm2 Final    LV mass(C)d 10/31/2024 165.0  grams Final    LVOT area 10/31/2024 3.1  cm2 Final    LVOT diam 10/31/2024 2.00  cm Final    EDV(MOD-sp2) 10/31/2024 152.0  ml Final    EDV(MOD-sp4) 10/31/2024 160.0  ml Final    ESV(MOD-sp2) 10/31/2024 87.2  ml Final    ESV(MOD-sp4) 10/31/2024 94.2  ml Final    SV(MOD-sp2) 10/31/2024 64.8  ml Final    SV(MOD-sp4) 10/31/2024 65.8  ml Final    SVi(MOD-SP2) 10/31/2024 36.4  ml/m2 Final    SVi(MOD-SP4) 10/31/2024 37.0  ml/m2 Final    SVi (LVOT) 10/31/2024 33.5  ml/m2 Final    EF(MOD-sp2) 10/31/2024 42.6  % Final    EF(MOD-sp4) 10/31/2024 41.1  % Final    MV E max agustin 10/31/2024 67.0  cm/sec Final    MV A max agustin 10/31/2024 107.5  cm/sec Final    MV dec time 10/31/2024 0.21  sec Final    MV E/A 10/31/2024 0.62   Final    LA ESV Index (BP) 10/31/2024 42.9  ml/m2 Final    Med Peak E' Agustin 10/31/2024 4.4  cm/sec Final    Lat Peak E' Agustin 10/31/2024 8.4  cm/sec Final    TR max agustin 10/31/2024 191.0  cm/sec Final    Avg E/e' ratio 10/31/2024 10.47   Final    SV(LVOT) 10/31/2024 59.7  ml Final    RV Base 10/31/2024 3.8  cm Final    RV Mid 10/31/2024 3.1  cm Final    RV Length 10/31/2024 7.0  cm Final    TAPSE (>1.6) 10/31/2024 1.42  cm Final    RV S' 10/31/2024 8.1  cm/sec Final    LA dimension (2D)  10/31/2024 4.2  cm Final    LV V1 max 10/31/2024 80.9  cm/sec Final    LV V1 max PG 10/31/2024 2.6  mmHg Final    LV V1 mean PG 10/31/2024 1.33  mmHg Final    LV V1 VTI 10/31/2024 19.0  cm Final    Ao pk agustin 10/31/2024 113.7  cm/sec Final    Ao max PG 10/31/2024 5.3   mmHg Final    Ao mean PG 10/31/2024 3.5  mmHg Final    Ao V2 VTI 10/31/2024 25.0  cm Final    WILLIAMS(I,D) 10/31/2024 2.39  cm2 Final    MV max PG 10/31/2024 3.7  mmHg Final    MV mean PG 10/31/2024 1.50  mmHg Final    MV V2 VTI 10/31/2024 22.4  cm Final    MV P1/2t 10/31/2024 54.4  msec Final    MVA(P1/2t) 10/31/2024 4.0  cm2 Final    MVA(VTI) 10/31/2024 2.7  cm2 Final    MV dec slope 10/31/2024 365.0  cm/sec2 Final    TR max PG 10/31/2024 23.2  mmHg Final    PA acc time 10/31/2024 0.13  sec Final    Ao root diam 10/31/2024 2.9  cm Final    RVSP(TR) 10/31/2024 26  mmHg Final    RAP systole 10/31/2024 3  mmHg Final    Ascending aorta 10/31/2024 2.6  cm Final   Admission on 09/23/2024, Discharged on 09/23/2024   Component Date Value Ref Range Status    QT Interval 09/23/2024 408  ms Final    QTC Interval 09/23/2024 452  ms Final    QT Interval 09/23/2024 428  ms Final    QTC Interval 09/23/2024 413  ms Final    HS Troponin T 09/23/2024 32 (H)  <22 ng/L Final    Glucose 09/23/2024 240 (H)  65 - 99 mg/dL Final    BUN 09/23/2024 30 (H)  8 - 23 mg/dL Final    Creatinine 09/23/2024 1.74 (H)  0.76 - 1.27 mg/dL Final    Sodium 09/23/2024 136  136 - 145 mmol/L Final    Potassium 09/23/2024 4.4  3.5 - 5.2 mmol/L Final    Chloride 09/23/2024 100  98 - 107 mmol/L Final    CO2 09/23/2024 23.0  22.0 - 29.0 mmol/L Final    Calcium 09/23/2024 8.9  8.6 - 10.5 mg/dL Final    Total Protein 09/23/2024 6.9  6.0 - 8.5 g/dL Final    Albumin 09/23/2024 4.1  3.5 - 5.2 g/dL Final    ALT (SGPT) 09/23/2024 37  1 - 41 U/L Final    AST (SGOT) 09/23/2024 30  1 - 40 U/L Final    Alkaline Phosphatase 09/23/2024 125 (H)  39 - 117 U/L Final    Total Bilirubin 09/23/2024 0.3  0.0 - 1.2 mg/dL Final    Globulin 09/23/2024 2.8  gm/dL Final    Calculated Result    A/G Ratio 09/23/2024 1.5  g/dL Final    BUN/Creatinine Ratio 09/23/2024 17.2  7.0 - 25.0 Final    Anion Gap 09/23/2024 13.0  5.0 - 15.0 mmol/L Final    eGFR 09/23/2024 42.2 (L)  >60.0 mL/min/1.73  Final    Lipase 09/23/2024 29  13 - 60 U/L Final    proBNP 09/23/2024 1,127.0 (H)  0.0 - 900.0 pg/mL Final    Extra Tube 09/23/2024 Hold for add-ons.   Final    Auto resulted.    Extra Tube 09/23/2024 hold for add-on   Final    Auto resulted    Extra Tube 09/23/2024 Hold for add-ons.   Final    Auto resulted.    Extra Tube 09/23/2024 Hold for add-ons.   Final    Auto resulted.    Extra Tube 09/23/2024 Hold for add-ons.   Final    Auto resulted    WBC 09/23/2024 7.59  3.40 - 10.80 10*3/mm3 Final    RBC 09/23/2024 5.89 (H)  4.14 - 5.80 10*6/mm3 Final    Hemoglobin 09/23/2024 17.4  13.0 - 17.7 g/dL Final    Hematocrit 09/23/2024 53.2 (H)  37.5 - 51.0 % Final    MCV 09/23/2024 90.3  79.0 - 97.0 fL Final    MCH 09/23/2024 29.5  26.6 - 33.0 pg Final    MCHC 09/23/2024 32.7  31.5 - 35.7 g/dL Final    RDW 09/23/2024 13.2  12.3 - 15.4 % Final    RDW-SD 09/23/2024 44.0  37.0 - 54.0 fl Final    MPV 09/23/2024 10.2  6.0 - 12.0 fL Final    Platelets 09/23/2024 204  140 - 450 10*3/mm3 Final    Neutrophil % 09/23/2024 58.1  42.7 - 76.0 % Final    Lymphocyte % 09/23/2024 23.3  19.6 - 45.3 % Final    Monocyte % 09/23/2024 10.4  5.0 - 12.0 % Final    Eosinophil % 09/23/2024 6.7 (H)  0.3 - 6.2 % Final    Basophil % 09/23/2024 1.1  0.0 - 1.5 % Final    Immature Grans % 09/23/2024 0.4  0.0 - 0.5 % Final    Neutrophils, Absolute 09/23/2024 4.41  1.70 - 7.00 10*3/mm3 Final    Lymphocytes, Absolute 09/23/2024 1.77  0.70 - 3.10 10*3/mm3 Final    Monocytes, Absolute 09/23/2024 0.79  0.10 - 0.90 10*3/mm3 Final    Eosinophils, Absolute 09/23/2024 0.51 (H)  0.00 - 0.40 10*3/mm3 Final    Basophils, Absolute 09/23/2024 0.08  0.00 - 0.20 10*3/mm3 Final    Immature Grans, Absolute 09/23/2024 0.03  0.00 - 0.05 10*3/mm3 Final    nRBC 09/23/2024 0.0  0.0 - 0.2 /100 WBC Final    HS Troponin T 09/23/2024 30 (H)  <22 ng/L Final    Troponin T Delta 09/23/2024 -2  >=-4 - <+4 ng/L Final   Office Visit on 08/15/2024   Component Date Value Ref Range Status     Glucose 08/15/2024 404 (A)  70 - 130 mg/dL Final    Lot Number 08/15/2024 [id number]   Final    Expiration Date 08/15/2024 1/20/2025   Final        Radiology Results        Assessment / Plan         Assessment and Plan   Diagnoses and all orders for this visit:    1. Type 2 diabetes mellitus with hypoglycemia without coma, with long-term current use of insulin (Primary)  -     Hemoglobin A1c; Future  -     Hemoglobin A1c  -     POC Glycosylated Hemoglobin (Hb A1C)    2. Primary hypertension    3. Hyperlipidemia LDL goal <70    4. Weakness    5. Stage 3b chronic kidney disease      For now, no medication changes made.  I suggest holding the valsartan for now as his blood pressure at home is not elevated and is marginally low in the low 1 teens on average.  His creatinine clearance is also of concern given his recent issues with renal function.  He is to increase his activity and hopefully this will benefit him with his stamina.  If cardiopulmonary rehab is not approved for him then I feel that he would benefit from physical therapy.  They are to contact me if that referral is needed.      Discussed possible differential diagnoses, testing, treatment, recommended non-pharmacological interventions, risks, warning signs to monitor for that would indicate need for follow-up in clinic or ER. If no improvement with these regimens or you have new or worsening symptoms follow-up. Patient verbalizes understanding and agreement with plan of care. Denies further needs or concerns.     Patient was given instructions and counseling regarding her condition and for health maintenance advised.         Health Maintenance  Health Maintenance:   Health Maintenance Due   Topic Date Due    ZOSTER VACCINE (1 of 2) Never done    Hepatitis B (1 of 3 - Risk 3-dose series) Never done    DIABETIC FOOT EXAM  12/14/2022    DIABETIC EYE EXAM  10/19/2024    ANNUAL WELLNESS VISIT  01/30/2025        Meds ordered during this visit  No orders of the  defined types were placed in this encounter.      Meds stopped during this visit:  Medications Discontinued During This Encounter   Medication Reason    tamsulosin (FLOMAX) 0.4 MG capsule 24 hr capsule *Therapy completed    fluticasone (FLONASE) 50 MCG/ACT nasal spray Historical Med - Therapy completed    betamethasone valerate (VALISONE) 0.1 % ointment *Therapy completed        Visit Diagnoses    ICD-10-CM ICD-9-CM   1. Type 2 diabetes mellitus with hypoglycemia without coma, with long-term current use of insulin  E11.649 250.80    Z79.4 251.2     V58.67   2. Primary hypertension  I10 401.9   3. Hyperlipidemia LDL goal <70  E78.5 272.4   4. Weakness  R53.1 780.79   5. Stage 3b chronic kidney disease  N18.32 585.3       Patient was given instructions and counseling regarding his condition or for health maintenance advice. Please see specific information pulled into the AVS if appropriate.     Follow Up   Return in about 3 months (around 2/7/2025) for Medicare Wellness.          This document has been electronically signed by Yessica Ricci DO   November 7, 2024 16:39 EST    Dictated Utilizing Dragon Dictation: Part of this note may be an electronic transcription/translation of spoken language to printed text using the Dragon Dictation System.    Yessica Ricci D.O.  [facility]

## 2024-11-15 ENCOUNTER — TELEPHONE (OUTPATIENT)
Dept: ENDOCRINOLOGY | Facility: CLINIC | Age: 69
End: 2024-11-15
Payer: MEDICARE

## 2024-11-15 RX ORDER — CALCIUM CARB/VITAMIN D3/VIT K1 500-100-40
TABLET,CHEWABLE ORAL
Qty: 100 EACH | Refills: 3 | Status: SHIPPED | OUTPATIENT
Start: 2024-11-15

## 2024-11-27 DIAGNOSIS — J45.40 MODERATE PERSISTENT ASTHMA, UNSPECIFIED WHETHER COMPLICATED: ICD-10-CM

## 2024-11-27 RX ORDER — FLUTICASONE FUROATE, UMECLIDINIUM BROMIDE AND VILANTEROL TRIFENATATE 100; 62.5; 25 UG/1; UG/1; UG/1
1 POWDER RESPIRATORY (INHALATION) DAILY
Qty: 60 EACH | Refills: 0 | Status: SHIPPED | OUTPATIENT
Start: 2024-11-27

## 2024-12-09 ENCOUNTER — OFFICE VISIT (OUTPATIENT)
Dept: PULMONOLOGY | Facility: CLINIC | Age: 69
End: 2024-12-09
Payer: MEDICARE

## 2024-12-09 VITALS
HEART RATE: 80 BPM | OXYGEN SATURATION: 95 % | WEIGHT: 172 LBS | BODY MASS INDEX: 29.37 KG/M2 | TEMPERATURE: 98 F | HEIGHT: 64 IN | DIASTOLIC BLOOD PRESSURE: 68 MMHG | SYSTOLIC BLOOD PRESSURE: 122 MMHG

## 2024-12-09 DIAGNOSIS — Z87.891 FORMER SMOKER: ICD-10-CM

## 2024-12-09 DIAGNOSIS — J92.9 PLEURAL THICKENING: ICD-10-CM

## 2024-12-09 DIAGNOSIS — K21.9 CHRONIC GERD: ICD-10-CM

## 2024-12-09 DIAGNOSIS — J98.19 TRAPPED LUNG: ICD-10-CM

## 2024-12-09 DIAGNOSIS — Z87.891 PERSONAL HISTORY OF NICOTINE DEPENDENCE: ICD-10-CM

## 2024-12-09 DIAGNOSIS — I25.5 ISCHEMIC CARDIOMYOPATHY: ICD-10-CM

## 2024-12-09 DIAGNOSIS — Z87.891 FORMER TOBACCO USE: Primary | ICD-10-CM

## 2024-12-09 PROCEDURE — 99214 OFFICE O/P EST MOD 30 MIN: CPT | Performed by: NURSE PRACTITIONER

## 2024-12-09 PROCEDURE — 3074F SYST BP LT 130 MM HG: CPT | Performed by: NURSE PRACTITIONER

## 2024-12-09 PROCEDURE — 3078F DIAST BP <80 MM HG: CPT | Performed by: NURSE PRACTITIONER

## 2024-12-09 NOTE — PROGRESS NOTES
[facility] Pulmonary Follow up    CHIEF COMPLAINT    Shortness of breath, persistent cough, former tobacco use, history of recurrent pleural effusions, and gastric reflux    HISTORY OF PRESENT ILLNESS    HPI:   Herbert Harley is a 69 y.o.male followed by pulmonary regarding shortness of breath, persistent cough, former tobacco use, history of recurrent pleural effusions, and gastric reflux.    Patient establish care with our clinic in October 2024 seen by Dr. Ahuja regarding some abnormal chest imaging as he has had a recurrent left pleural effusion requiring thoracenteses twice after his CABG in March 2023 by Dr. Saavedra.  Most recent CT imaging was unrevealing.  Most recent echo did show reduced EF of 41% and grade 1 diastolic dysfunction with RSVP 26 and he is on Bumex.  Does have a history of CKD baseline creatinine is around 1.7.    He is a former smoker with a 79-pack-year history who quit back in 2022.  PFTs were unrevealing for airway obstruction but did show some mild restriction and normal DLCO.  He is on Trelegy 100 and albuterol as needed.    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  Most recent CT imaging was unrevealing for any residual pleural fluid and consideration of a surgical decortication, although the risk of surgery may not provide enough symptomatic benefit based on his symptoms and chest imaging.    Does have a persistent cough as well as chronic reflux and does not routinely abide by reflux precautions.      He was given nebulized hypertonic saline to aid in mucus clearance.    Interval history:   Patient has done well since initial pulmonary evaluation.  He does complain of an ongoing persistent cough that usually starts in the evenings and last all throughout the night.  He states he has minimal secretions with this.  His wife does state that occasionally  he will wheeze during the night.    Continues to follow with cardiology and nephrology.  He does have a history of ischemic cardiomyopathy and most recent echocardiogram did show a reduced EF of 41% along with grade 1 diastolic dysfunction.  His nephrologist does have him on Bumex and he denies any lower extremity swelling.    He is accompanied today by his wife.    Informs me that he is planning on performing home sleep study this week.    Denies recent ED visits, hospitalizations, or exacerbations.     Denies fever, chills, night sweats, or hemoptysis. No recent sick contacts. No chest pain or palpitations. Denies lower extremity swelling or calf tenderness.     Patient Active Problem List   Diagnosis    I25.10, CABG 03/01/23    Hypertension    Hyperlipidemia LDL goal <70    Type 2 diabetes mellitus with hypoglycemia, with long-term current use of insulin    Ischemic cardiomyopathy    Allergic rhinitis    Prolonged Q-T interval on ECG    Anemia in chronic kidney disease    Chronic viral hepatitis C    Hypertensive heart disease with heart failure    Iron deficiency anemia    Long term (current) use of oral hypoglycemic drugs    Moderate protein-calorie malnutrition    Secondary hyperparathyroidism of renal origin    Chronic systolic (congestive) heart failure    Atherosclerotic heart disease of native coronary artery without angina pectoris    Abnormality of gait and mobility    Benign prostatic hyperplasia with urinary frequency    Diverticulosis    Calculus of gallbladder without cholecystitis without obstruction    Trapped LLL    Pleural thickening LLL (Since CABG)    Chronic GERD    Former smoker    Snoring       Allergies   Allergen Reactions    Wellbutrin [Bupropion] Swelling     Facial swelling, severe    Singulair [Montelukast] Myalgia and Headache       Current Outpatient Medications:     albuterol sulfate  (90 Base) MCG/ACT inhaler, Inhale 2 puffs Every 6 (Six) Hours As Needed for Wheezing., Disp:  "8 g, Rfl: 5    aspirin 81 MG EC tablet, Take 1 tablet by mouth Daily., Disp: 90 tablet, Rfl: 3    bumetanide (Bumex) 1 MG tablet, Take 1 tablet by mouth Daily., Disp: 90 tablet, Rfl: 1    coenzyme Q10 100 MG capsule, Take 1 capsule by mouth Daily., Disp: , Rfl:     Collagen-Vitamin C-Biotin (COLLAGEN 1500/C PO), Take 1 tablet by mouth Daily. OTC supplement, Disp: , Rfl:     dapagliflozin Propanediol (Farxiga) 10 MG tablet, Take 10 mg by mouth Daily. Indications: Type 2 Diabetes, Disp: 90 tablet, Rfl: 1    ezetimibe (ZETIA) 10 MG tablet, TAKE 1 TABLET BY MOUTH DAILY, Disp: 90 tablet, Rfl: 3    Insulin Syringe 31G X 5/16\" 1 ML misc, USE DAILY WITH INSULIN INJECTIONS, Disp: 100 each, Rfl: 3    Lantus 100 UNIT/ML injection, Inject 52 units every EVENING, increase by 2 units every 4 days for fasting goal  MX 60 units/day, Disp: 60 mL, Rfl: 2    nitroglycerin (NITROSTAT) 0.4 MG SL tablet, Place 1 tablet under the tongue Every 5 (Five) Minutes As Needed for Chest Pain. Take no more than 3 doses in 15 minutes., Disp: 25 tablet, Rfl: 3    potassium chloride ER (K-TAB) 20 MEQ tablet controlled-release ER tablet, Take 1 tablet by mouth Daily. ONE TABLET EVERY OTHER DAY, Disp: 45 tablet, Rfl: 1    rosuvastatin (CRESTOR) 40 MG tablet, Take 1 tablet by mouth Daily., Disp: 90 tablet, Rfl: 2    sertraline (ZOLOFT) 50 MG tablet, , Disp: , Rfl:     Trelegy Ellipta 100-62.5-25 MCG/ACT inhaler, INHALE 1 PUFF BY MOUTH DAILY, Disp: 60 each, Rfl: 0    vitamin D (ERGOCALCIFEROL) 1.25 MG (09043 UT) capsule capsule, TAKE 1 CAPSULE BY MOUTH ONCE WEEKLY, Disp: 12 capsule, Rfl: 0    valsartan (DIOVAN) 40 MG tablet, Take 1 tablet by mouth Daily. Hold for systolic blood pressure less than 100 mmHg (Patient not taking: Reported on 12/9/2024), Disp: 30 tablet, Rfl: 11  MEDICATION LIST AND ALLERGIES REVIEWED.    Social History     Tobacco Use    Smoking status: Former     Current packs/day: 0.00     Average packs/day: 1.5 packs/day for 52.9 " "years (79.4 ttl pk-yrs)     Types: Cigarettes     Start date: 1970     Quit date: 2022     Years since quittin.0     Passive exposure: Past    Smokeless tobacco: Never   Vaping Use    Vaping status: Never Used   Substance Use Topics    Alcohol use: Not Currently    Drug use: Never       FAMILY AND SOCIAL HISTORY REVIEWED.    Review of Systems   Constitutional:  Negative for chills, fatigue and fever.   Respiratory:  Positive for cough, shortness of breath and wheezing. Negative for chest tightness.    Cardiovascular:  Negative for chest pain, palpitations and leg swelling.   Skin:  Negative for color change.   Psychiatric/Behavioral:  Positive for sleep disturbance.    All other systems reviewed and are negative.    /68   Pulse 80   Temp 98 °F (36.7 °C)   Ht 162.6 cm (64\")   Wt 78 kg (172 lb)   SpO2 95% Comment: room air at rest  BMI 29.52 kg/m²     Immunization History   Administered Date(s) Administered    Pneumococcal Conjugate 20-Valent (PCV20) 2024    Tdap 2023       Physical Exam  Vitals reviewed.   Constitutional:       General: He is not in acute distress.     Appearance: Normal appearance.   Cardiovascular:      Rate and Rhythm: Normal rate and regular rhythm.      Pulses: Normal pulses.      Heart sounds: Normal heart sounds.   Pulmonary:      Effort: Pulmonary effort is normal. No respiratory distress.      Breath sounds: Rales (LLL) present. No wheezing or rhonchi.   Skin:     General: Skin is warm and dry.   Neurological:      General: No focal deficit present.      Mental Status: He is alert and oriented to person, place, and time.     RESULTS    PFTs:  10/2024: No airway obstruction.  Mild restriction with TLC 4.38, 78% predicted.  Normal DLCO.    : Severe reduction in both FEV1 and FVC with preserved FEV1/FVC ratio suggestive of a restrictive defect.    Imaging:   CT Abdomen Pelvis With Contrast  Result Date: 2024  Impression: 1. Left lower lung airspace " disease. 2. Cholelithiasis. 3. Diverticulosis. 4. Prostate hypertrophy. Electronically Signed: Carolina Dunn MD  9/23/2024 2:21 PM EDT  Workstation ID: [id number]    XR Chest 1 View  Result Date: 9/23/2024  Mild chronic interstitial changes of the lung fields without evidence of acute cardiopulmonary abnormality. Electronically Signed: Dashawn Dover MD  9/23/2024 2:16 PM EDT  Workstation ID: [id number]     PROBLEM LIST    Problem List Items Addressed This Visit       Ischemic cardiomyopathy    Overview     Echo (3/29/2014): LVEF 50-55%  Echo (12/2022): LVEF 41-45%         Trapped LLL    Pleural thickening LLL (Since CABG)    Chronic GERD    Former smoker     Other Visit Diagnoses       Former tobacco use    -  Primary    Personal history of nicotine dependence        Relevant Orders    CT chest low dose wo          DISCUSSION    Mr. Harley was seen today for follow-up and is stable from a respiratory standpoint.  He initially establish care with our clinic seen by Dr. Ahuja regarding some abnormal chest imaging compatible with an entrapped left lower lobe status post thoracenteses x 2 after his CABG back in March 2023.      Does continue to have intermittent cough that is mostly nocturnal.    Shortness of breath  Entrapped left lower lobe  Ischemic cardiomyopathy  Patient's shortness of breath is likely multifactorial in the setting of his entrapped left lower lobe, restrictive defect on PFTs, ischemic cardiomyopathy, and physical deconditioning.  Underwent PFTs that were unrevealing for obstruction but did show some mild restriction which could be related to his abnormal chest imaging.  Repeat annually.  He is on Trelegy 100 daily and may continue to use this.  Rinse mouth after use to prevent thrush.  Use albuterol/DuoNebs as needed.  Underwent CABG in March 2023 by Dr. Saavedra and did have recurrent left-sided pleural effusions that required thoracentesis x 2.  Serial CT imaging compatible with an entrapped  left lower lobe.  Discussed consideration of surgical decortication, however unsure if this would provide much symptom benefit and wishes to defer for now.  Most recent echocardiogram continue to show reduced EF of 41% with grade 1 diastolic dysfunction.  He is followed by cardiology.  Also is followed by nephrology and is on Bumex daily.  Denies any lower extremity swelling.  He is euvolemic on exam today.  Body mass index is 29.52 kg/m². Physical deconditioning could be playing a role. I do recommend 30 minutes of intentional low impact exercise daily and discussed methods to achieve this at today's visit.   If above work-up is negative would consider methacholine challenge ultimately CPX test to determine the cause of the patient's shortness of breath.    Persistent cough  GERD  Moderate hiatal hernia  Patient continues to have a persistent nonproductive cough in the evenings and throughout the night  Does carry history of gastric reflux and a moderate size hiatal hernia on CT imaging  I am suspicious that gastric reflux is largely playing a role as he frequently snacks up until bedtime and intermittently throughout the night  Reflux precautions discussed at length, particularly sleeping with his head of bed elevated via wedge pillow/bed blocks and being strictly n.p.o. 2 hours prior to bedtime and throughout the night    Former tobacco use   Patient is a former smoker with a 79 pack year history who quit in 2022  Prior CT of the chest from February 2024 did show some right sided pulmonary nodules largest measuring 6 mm.  Repeat CT due in February (order placed today).  Phenotype testing today.    LOUIS  The patient does have symptoms concerning for LOUIS and informs me that he is to undergo a home sleep study this week  If the study confirms sleep apnea, I do recommend implementation of PAP therapy.  We also discussed alternate treatment strategies for sleep apnea including an oral mandibular adjustment appliance,  surgical repair, and/or device insertion.    I personally spent a total of 32 minutes on patient visit today including chart review, face to face with the patient obtaining the history and physical exam, review of pertinent images and tests, counseling and discussion and/or coordination of care as described above, and documentation.  Total time excludes time spent on other separate services such as performing procedures or test interpretation, if applicable.    Electronically signed by TALON Luna, 12/09/24, 2:19 PM EST.    Please note that portions of this note were completed with a voice recognition program.      CC: Yessica Ricci DO

## 2024-12-16 ENCOUNTER — OFFICE VISIT (OUTPATIENT)
Dept: ENDOCRINOLOGY | Facility: CLINIC | Age: 69
End: 2024-12-16
Payer: MEDICARE

## 2024-12-16 VITALS
OXYGEN SATURATION: 98 % | HEART RATE: 67 BPM | DIASTOLIC BLOOD PRESSURE: 60 MMHG | HEIGHT: 64 IN | SYSTOLIC BLOOD PRESSURE: 120 MMHG | WEIGHT: 171 LBS | BODY MASS INDEX: 29.19 KG/M2

## 2024-12-16 DIAGNOSIS — E11.65 TYPE 2 DIABETES MELLITUS WITH HYPERGLYCEMIA, WITH LONG-TERM CURRENT USE OF INSULIN: Primary | ICD-10-CM

## 2024-12-16 DIAGNOSIS — I10 PRIMARY HYPERTENSION: ICD-10-CM

## 2024-12-16 DIAGNOSIS — Z79.4 TYPE 2 DIABETES MELLITUS WITH HYPERGLYCEMIA, WITH LONG-TERM CURRENT USE OF INSULIN: Primary | ICD-10-CM

## 2024-12-16 LAB
EXPIRATION DATE: ABNORMAL
GLUCOSE BLDC GLUCOMTR-MCNC: 255 MG/DL (ref 70–130)
Lab: ABNORMAL

## 2024-12-16 PROCEDURE — 3078F DIAST BP <80 MM HG: CPT | Performed by: PHYSICIAN ASSISTANT

## 2024-12-16 PROCEDURE — 3052F HG A1C>EQUAL 8.0%<EQUAL 9.0%: CPT | Performed by: PHYSICIAN ASSISTANT

## 2024-12-16 PROCEDURE — 3074F SYST BP LT 130 MM HG: CPT | Performed by: PHYSICIAN ASSISTANT

## 2024-12-16 PROCEDURE — 99214 OFFICE O/P EST MOD 30 MIN: CPT | Performed by: PHYSICIAN ASSISTANT

## 2024-12-16 PROCEDURE — 82947 ASSAY GLUCOSE BLOOD QUANT: CPT | Performed by: PHYSICIAN ASSISTANT

## 2024-12-16 PROCEDURE — 1159F MED LIST DOCD IN RCRD: CPT | Performed by: PHYSICIAN ASSISTANT

## 2024-12-16 PROCEDURE — 1160F RVW MEDS BY RX/DR IN RCRD: CPT | Performed by: PHYSICIAN ASSISTANT

## 2024-12-16 RX ORDER — INSULIN GLARGINE 100 [IU]/ML
INJECTION, SOLUTION SUBCUTANEOUS
Qty: 60 ML | Refills: 2 | Status: CANCELLED | OUTPATIENT
Start: 2024-12-16

## 2024-12-16 RX ORDER — INSULIN GLARGINE 100 [IU]/ML
INJECTION, SOLUTION SUBCUTANEOUS
Qty: 60 ML | Refills: 2 | Status: SHIPPED | OUTPATIENT
Start: 2024-12-16

## 2024-12-16 NOTE — TELEPHONE ENCOUNTER
Rx Refill Note  Requested Prescriptions     Pending Prescriptions Disp Refills    Lantus 100 UNIT/ML injection 60 mL 2     Sig: Inject 52 units every EVENING, increase by 2 units every 4 days for fasting goal  MX 60 units/day          Last office visit with prescribing clinician: 12/16/2024     Next office visit with prescribing clinician: 4/24/2025   }    Jarrett Harper MA  12/16/24, 15:00 EST

## 2024-12-16 NOTE — PROGRESS NOTES
"     Office Note      Date: 2024  Patient Name: Herbert Harley  MRN: [id number]  : 1955    Chief Complaint   Patient presents with    Diabetes     Type II       History of Present Illness:   Herbert Harley is a 69 y.o. male who presents for follow-up for type 2 diabetes diagnosed in .  He is accompanied by his wife today.  He remains on Lantus 52 units every evening and Farxiga 10 mg daily.  He reports overall his fasting blood sugars have been good typically between 80 and 120 mg/dL in the mornings.  He does not check these later in the day.  He reports he continues to see the retina specialist regularly and has a follow-up in .  All has been stable.  He continues to see nephrology regularly and has a follow-up appointment in January.        Subjective     Review of Systems:   Review of Systems   Constitutional: Negative.    Cardiovascular: Negative.    Gastrointestinal: Negative.    Endocrine: Negative.    Neurological: Negative.        The following portions of the patient's history were reviewed and updated as appropriate: allergies, current medications, past family history, past medical history, past social history, past surgical history, and problem list.    Objective     Vitals:    24 1345   BP: 120/60   BP Location: Right arm   Patient Position: Sitting   Cuff Size: Adult   Pulse: 67   SpO2: 98%   Weight: 77.6 kg (171 lb)   Height: 162.6 cm (64\")     Body mass index is 29.35 kg/m².    Physical Exam  Vitals reviewed.   Constitutional:       General: He is not in acute distress.     Appearance: Normal appearance.   Neurological:      Mental Status: He is alert.         HEMOGLOBIN A1C  Lab Results   Component Value Date    HGBA1C 8.1 (A) 2024       GLUCOSE  Glucose   Date Value Ref Range Status   2024 255 (A) 70 - 130 mg/dL Final       CMP  Lab Results   Component Value Date    GLUCOSE 240 (H) 2024    BUN 30 (H) 2024    CREATININE 1.74 (H) " 09/23/2024    EGFRIFNONA 66 12/14/2021    BCR 17.2 09/23/2024    K 4.4 09/23/2024    CO2 23.0 09/23/2024    CALCIUM 8.9 09/23/2024    AST 30 09/23/2024    ALT 37 09/23/2024       LIPID PANEL  Lab Results   Component Value Date    CHOL 160 02/02/2024    CHLPL 174 03/28/2014    TRIG 179 (H) 02/02/2024    HDL 33 (L) 02/02/2024    LDL 96 02/02/2024           TSH  Lab Results   Component Value Date    TSH 2.330 04/30/2024       Assessment / Plan      Assessment & Plan:  1. Type 2 diabetes mellitus with hyperglycemia, with long-term current use of insulin  His hemoglobin A1c had improved slightly in November but is still above goal at 8.1%.  He is concerned about adding additional medications due to possible side effects.  We will try adding Januvia 100 mg daily.  We discussed that this is overall very well-tolerated and does not an injection which she prefers to avoid.  I sent a prescription for this to his pharmacy and he will reach out if he has any trouble.  He will continue Lantus 52 units every evening and Farxiga 10 mg daily.  His weight is up 4 pounds since his appointment in August.  I encouraged healthy eating habits and physical activity as tolerated.  His reported fasting blood sugar readings have been good so we do not have room to push up on his Lantus.  His blood glucose this afternoon is 255 mg/dL.  - POC Glucose, Blood    2. Primary hypertension  His blood pressure is okay today.  He will continue his current medications.      Return in about 4 months (around 4/16/2025) for Recheck.     This note was dictated using Dragon voice recognition.    Electronically signed by: JOSE Merchant  12/16/2024

## 2024-12-17 DIAGNOSIS — J45.40 MODERATE PERSISTENT ASTHMA, UNSPECIFIED WHETHER COMPLICATED: ICD-10-CM

## 2024-12-17 RX ORDER — FLUTICASONE FUROATE, UMECLIDINIUM BROMIDE AND VILANTEROL TRIFENATATE 100; 62.5; 25 UG/1; UG/1; UG/1
1 POWDER RESPIRATORY (INHALATION) DAILY
Qty: 60 EACH | Refills: 0 | Status: SHIPPED | OUTPATIENT
Start: 2024-12-17

## 2024-12-19 ENCOUNTER — PATIENT MESSAGE (OUTPATIENT)
Dept: ENDOCRINOLOGY | Facility: CLINIC | Age: 69
End: 2024-12-19
Payer: MEDICARE

## 2025-01-02 RX ORDER — BUMETANIDE 1 MG/1
1 TABLET ORAL DAILY
Qty: 90 TABLET | Refills: 0 | Status: SHIPPED | OUTPATIENT
Start: 2025-01-02

## 2025-01-02 NOTE — TELEPHONE ENCOUNTER
"Chief Complaint   Patient presents with    Coronary Artery Disease     Follow up          Subjective:   Dilan Calderón is a 82 y.o. male who presents today for hospital follow-up.    Initially seen in the hospital by Dr. Man.    Patient was hospitalizaed from 11/10/2024-11/14/2024 after presenting to the ED with syncope and weakness.   Patient was symptomatic with lightheadedness and dizziness prior to syncope.  Then he woke up on the floor in a small pool of blood due to suffering trauma to right eyebrow.  Patient reports muscle tension like discomfort across both shoulders radiating down to both arms for about 3 to 4 days prior to admission, relieved mostly by deep breathing exercises.  This was associated with dizziness.  Last admission in May 2024 for NSTEMI when he received a cardiac stent to mid LAD.  Patient reports taking prescribed medications for 30 days only after which he decided to stop all his prescribed medications since he \"felt good.\" No follow-ups with cardiology since.    Cardiology recommended to proceed with repeat LHC. While in the ICU patient had another episode of syncope with 7.5 second high grade block so EP was consulted for possible pacemaker. After admission, echo revealed EF improvement to 45%, however with LV thrombis. Dr. Oilvia recommendation for Micra in setting of recurrent syncope, paroxysmal high grade av block.    Patient was hospitalized from 11/19/2024-11/27/2024 after presenting to the ED for another syncopal episode and weakness. He continued to have episodes of syncope in the hospital related to bradycardia and anemia. THough he had the apical LV thrombus due to ongoing bleeding the have to stop the coumadin which was found to be a retroperitoneal bleed.     Other medical problems include GIB, CAD / MI s/p PCI DOUGIE p-mLAD, ICM, HFmrEF, CKD.    Interval history:  Prior left heart cath on May 2, 2024 showed occluded proximal LAD, s/p Synergy 3 x 28 mm and 3 x 32 mm DOUGIE " [facility] IS REQUESTING FOR PATIENTS BD INS SYRNG UF 0.5 ML [id number] TO BE SENT TO [facility] ON [facility].        12/3/2024 visit:  Patient has been at [facility] since discharging from his last hospitalization. He says that he is still feeling fatigued and weak but denies any chest pain, palpitations, shortness of breath, orthopnea, PND, or syncope. He is still experiencing dizziness at times with moving from sitting to standing but it resolves quickly. He does have lower extremity edema. He says that he has not required any further blood transfusion since discharging the hospital and [facility] is continuing to monitor his blood levels. His blood pressure has been monitor at [facility] and has ranged from 96-110s with some in the 120s/50-70. He is continuing to rehab and do exercises. He is not sure how long he will be in SNF.     Today's visit:  Patient reports he is still at [facility]. He says his still feels fatigued but is continuing with physical therapy and exercise program as directed and getting stronger. He reports since last follow up improvement in his lower extremity edema. He says he is talking all the medications he is given at [facility] but not sure of his medication list as they provide his current medications. He denies lightheadedness/dizziness or syncope. He has no chest pain, shortness of breath, edema, or palpitations. He reports tolerating new medication and blood pressures have been good at [facility]. He still does not currently have a tentative discharge date.     Cardiovascular Risk Factors:  1. Smoking status: Former smoker  2. Type II Diabetes Mellitus: No   Lab Results   Component Value Date/Time    HBA1C 5.8 (H) 05/03/2024 04:00 AM     3. Hypertension: No  4. Dyslipidemia: Yes   Cholesterol,Tot   Date Value Ref Range Status   11/11/2024 197 100 - 199 mg/dL Final     LDL   Date Value Ref Range Status   11/11/2024 117 (H) <100 mg/dL Final     HDL   Date Value Ref Range Status   11/11/2024 62 >=40 mg/dL Final     Triglycerides   Date Value Ref Range Status   11/11/2024 88 0 - 149 mg/dL Final       Past  "Medical History:   Diagnosis Date    NSTEMI (non-ST elevated myocardial infarction) ([facility])          History reviewed. No pertinent family history.      Social History     Tobacco Use    Smoking status: Former     Types: Cigarettes    Smokeless tobacco: Never   Vaping Use    Vaping status: Never Used   Substance Use Topics    Alcohol use: Not Currently    Drug use: Never         No Known Allergies      Current Outpatient Medications   Medication Sig    bisoprolol (ZEBETA) 5 MG Tab Take 1 Tablet by mouth every day.    losartan (COZAAR) 25 MG Tab Take 0.5 Tablets by mouth every day.    magnesium hydroxide (MILK OF MAGNESIA) 400 MG/5ML Suspension Take 30 mL by mouth 1 time a day as needed.    spironolactone (ALDACTONE) 25 MG Tab Take 0.5 Tablets by mouth every day.    multivitamin Tab Take 1 Tablet by mouth every evening.    atorvastatin (LIPITOR) 80 MG tablet Take 1 Tablet by mouth every evening.    clopidogrel (PLAVIX) 75 MG Tab Take 1 Tablet by mouth every day.    levothyroxine (SYNTHROID) 50 MCG Tab Take 1 Tablet by mouth every morning on an empty stomach.    acetaminophen (TYLENOL) 500 MG Tab Take 500-1,000 mg by mouth 2 times a day as needed.         Review of Systems   Constitutional: Positive for malaise/fatigue.   Cardiovascular:  Negative for chest pain, dyspnea on exertion, leg swelling, near-syncope, orthopnea, palpitations, paroxysmal nocturnal dyspnea and syncope.   Respiratory:  Negative for shortness of breath.    Neurological:  Negative for dizziness, headaches and light-headedness.           Objective:   /60 (BP Location: Left arm, Patient Position: Sitting)   Pulse 60   Resp 16   Ht 1.702 m (5' 7\")   Wt 58.5 kg (129 lb)   SpO2 96%  Body mass index is 20.2 kg/m².    Physical Exam  Constitutional:       General: He is not in acute distress.  HENT:      Head: Normocephalic and atraumatic.   Cardiovascular:      Rate and Rhythm: Normal rate and regular rhythm.      Pulses: Normal pulses.      " Heart sounds: Murmur heard.   Pulmonary:      Effort: Pulmonary effort is normal. No respiratory distress.      Breath sounds: Normal breath sounds.   Musculoskeletal:      Right lower leg: No edema.      Left lower leg: No edema.   Neurological:      Mental Status: He is alert and oriented to person, place, and time.      Gait: Gait normal.   Psychiatric:         Behavior: Behavior normal.             Lab Results   Component Value Date/Time    SODIUM 135 11/27/2024 03:15 AM    POTASSIUM 4.1 11/27/2024 03:15 AM    CHLORIDE 104 11/27/2024 03:15 AM    CO2 21 11/27/2024 03:15 AM    GLUCOSE 103 (H) 11/27/2024 03:15 AM    BUN 35 (H) 11/27/2024 03:15 AM    CREATININE 1.00 11/27/2024 03:15 AM    CREATININE 1.2 02/11/2008 09:00 AM      Lab Results   Component Value Date/Time    WBC 11.0 (H) 11/26/2024 03:00 AM    RBC 2.69 (L) 11/26/2024 03:00 AM    HEMOGLOBIN 8.6 (L) 11/27/2024 03:15 AM    HEMATOCRIT 24.8 (L) 11/26/2024 03:00 AM    MCV 92.2 11/26/2024 03:00 AM    MCH 30.9 11/26/2024 03:00 AM    MCHC 33.5 11/26/2024 03:00 AM    MPV 11.4 11/26/2024 03:00 AM    NEUTSPOLYS 75.60 (H) 11/25/2024 04:30 AM    LYMPHOCYTES 13.50 (L) 11/25/2024 04:30 AM    MONOCYTES 8.40 11/25/2024 04:30 AM    EOSINOPHILS 1.20 11/25/2024 04:30 AM    BASOPHILS 0.20 11/25/2024 04:30 AM    HYPOCHROMIA 1+ 05/02/2024 08:42 AM    ANISOCYTOSIS 1+ 05/02/2024 08:42 AM      Lab Results   Component Value Date/Time    CHOLSTRLTOT 197 11/11/2024 04:20 AM     (H) 11/11/2024 04:20 AM    HDL 62 11/11/2024 04:20 AM    TRIGLYCERIDE 88 11/11/2024 04:20 AM       Lab Results   Component Value Date/Time    TROPONINT 83 (H) 11/21/2024 1812     Lab Results   Component Value Date/Time    NTPROBNP 4743 (H) 11/25/2024 0430   Coronary Angiogram 11/12/2024  HEMODYNAMICS:   Aortic pressure: 121/51 mmHg     CORONARY ANGIOGRAPHY:  The left main coronary artery is a short vessel with luminal disease that bifurcates into the left anterior descending and left circumflex coronary  arteries.  The left anterior descending coronary artery supplies several small diagonal branches and has a widely patent proximal to mid vessel stent followed by distal luminal irregularities.  The left circumflex coronary artery is a large, dominant vessel with proximal 80% disease, mid luminal irregularities, and distal 70% disease after the takeoff of the third obtuse marginal branch.  It supplies a small first obtuse marginal branch, a large and immediately bifurcating second obtuse marginal branch with 70% ostial disease in both limbs, a large third obtuse marginal branch with proximal 70% disease, and several small posterolateral branches  The right coronary artery is a moderate, nondominant vessel with proximal 50% disease, mid 70% disease, and severe diffuse distal disease prior to a distal chronic total occlusion.  The distal vessel refills by faint collaterals from the left coronary system.     PERCUTANEOUS CORONARY INTERVENTION:  Lesion location: Proximal left circumflex coronary  Lesion type: A  Lesion length: 15 mm  Pre-intervention GAMAL flow: 3  Post-intervention GAMAL flow: 3  Pre-intervention stenosis: 80%  Post-intervention stenosis: 0%  Stent: 3.0 x 24 mm Synergy drug-eluting stent     IMPRESSION:  Widely patent left anterior descending coronary artery stents.  Distal right coronary now chronically occluded.  Successful percutaneous coronary intervention to the proximal left circumflex coronary artery with one 3.0 x 24 mm Synergy drug-eluting stent postdilated to 3.5 mm.     RECOMMENDATIONS:  Return to floor with continued care per primary service.  TR band release per protocol.  Restart heparin drip per protocol in 2 hours if access site no bleeding complications.  Strict adherence with dual antiplatelet therapy for at least 3 months if tolerated.  Optimal medical management of residual coronary artery disease with consideration for further intervention depending on patient's symptoms and medical  compliance.    Cardiac Catheterization 5/2/2024 by Dr. Johns :  IMPRESSION:  1.  Occluded proximal LAD (heavily calcified vessel) status post successful IVUS guided intravascular lithotripsy and PCI deploying overlapping Synergy 3 x 28 mm and 3 x 32 mm DOUGIE, postdilated to ~ 3.5-3.7 mm.  2.  Ischemic cardiomyopathy with estimated LVEF 45% with anterior wall akinesis  3.  Significant elevated resting LVEDP 25 mmHg with no significant transaortic gradient on pullback     RECOMMENDATIONS:  Dual antiplatelet therapy for at least 12 months  Weigh risk versus benefits of further PCI to his remaining severe disease in the circumflex and RCA, especially given recent GI bleed with no clear identifiable source necessitating blood transfusions.  HI statin / PCSK9-I: Target LDL < 55 and TG < 150  TTE with echo enhancing agent  GDMT and lifestyle modifications for secondary ASCVD prevention  Cardiac Rehab referral  Assessment:   1. HFrEF (heart failure with reduced ejection fraction) (HCC)  - bisoprolol (ZEBETA) 5 MG Tab; Take 1 Tablet by mouth every day.  Dispense: 90 Tablet; Refill: 3  - losartan (COZAAR) 25 MG Tab; Take 0.5 Tablets by mouth every day.  Dispense: 45 Tablet; Refill: 3         Medical Decision Making:  Today's Assessment / Plan:    HFrEF, Stage C, Class III, LVEF 45%: no edema, no ascites, no JVD, and lungs clear on auscultation  -Heart failure due to ischemic cardiomyopathy  -ACE-I/ARB/ARNI: Start losartan 12.5 mg daily, patient previously not able to tolerate due to low blood pressure. Will start low dose to trial if able to tolerate.  -Evidence Based Beta-blocker: Continue bisoprolol 5 mg daily  -Aldosterone Antagonist: Continue spironolactone 12.5 mg daily. Continue to monitor blood pressure and for increased dizziness/lightheadedness.  -SGLT2i: Consider at follow up pending blood pressure control  -Labs: CMP in 1 week  -Continue Daily weights; daily BP readings; daily symptom tracking.    LV mural  thrombus  Circulating anticoagulants  -Continue eliquis 5 mg twice a day  -Repeat echocardiogram scheduled monitor thrombus and can discuss stopping anticoagulants pending results  -No signs of bleeding, continue to monitor hemoglobin and hematocrit    CAD s/p PCI DOUGIE   Ischemic cardiomyopathy  -Continue plavix and eliquis  -Stop asa due to anticoagulant, does not need triple therapy  -Continue atorvastatin 80 mg daily  -Continue bisoprolol 5 mg daily  -Continue exercise and therapies as recommended at SNF and increase activity as tolerated    Syncope  Cardiac pacemaker  -No reoccurrence of syncope  -Continue to monitor blood pressure and anemia as multiple factors related to syncope in hospital.   -PPM setting changed during last admission and pacemaker functioning properly  -Continue to follow with device clinic    Hyperlipidemia  -Most recent   -Continue atorvastatin 80 mg daily  -Goal of less than 70  -Check lipid panel in 6 months    Return in about 4 weeks (around 1/30/2025) for Pippa JOSEPH.    ISABEL Gonzalez.

## 2025-01-07 DIAGNOSIS — R29.818 SUSPECTED SLEEP APNEA: Primary | ICD-10-CM

## 2025-01-16 DIAGNOSIS — J45.40 MODERATE PERSISTENT ASTHMA, UNSPECIFIED WHETHER COMPLICATED: ICD-10-CM

## 2025-01-16 RX ORDER — ERGOCALCIFEROL 1.25 MG/1
50000 CAPSULE, LIQUID FILLED ORAL WEEKLY
Qty: 12 CAPSULE | Refills: 0 | Status: SHIPPED | OUTPATIENT
Start: 2025-01-16

## 2025-01-16 RX ORDER — FLUTICASONE FUROATE, UMECLIDINIUM BROMIDE AND VILANTEROL TRIFENATATE 100; 62.5; 25 UG/1; UG/1; UG/1
1 POWDER RESPIRATORY (INHALATION) DAILY
Qty: 60 EACH | Refills: 0 | Status: SHIPPED | OUTPATIENT
Start: 2025-01-16

## 2025-01-31 DIAGNOSIS — I50.22 CHRONIC SYSTOLIC (CONGESTIVE) HEART FAILURE: ICD-10-CM

## 2025-01-31 RX ORDER — ASPIRIN 81 MG/1
81 TABLET, COATED ORAL DAILY
Qty: 90 TABLET | Refills: 3 | Status: SHIPPED | OUTPATIENT
Start: 2025-01-31

## 2025-02-05 NOTE — PROGRESS NOTES
Follow-up Visit      Date: 2025  Patient Name: Hrebert Harley  : 1955   MRN: [id number]     Chief Complaint:    Chief Complaint   Patient presents with    Cardiomyopathy       History of Present Illness: Herbert Harley is a 69 y.o. male who is here today for follow-up on his ischemic cardiomyopathy.    Patient has been doing fine.  Patient recently was seen in the emergency room and abdominal pain and was found to have a cholecyst Dhiraj and followed by the surgery.  Patient denies any chest pain any shortness of breath any dizziness any palpitations or any other symptoms.  Patient denies any lower extremity edema.  Patient is able to work in with not have any problem.    Patient denies any bleeding.  Patient denies any claudication.  Patient denies any passing out.      Problem List     CARDIAC  Coronary Artery Disease:   Inferior STEMI with [facility], Dr. Carter, 3/28/2014:  EF 50%, NILE to the mid-dominant RCA, NILE to the proximal LAD  Stress test, 2023: Inferolateral ischemia,   [facility], 2023: Severe triple-vessel disease, %, RCA 80%, LAD 80%  2023 CABG x3    Myocardium:   Echo 3/2014:   EF 50-55%, RVSP 30.  Echo 2022:Stage C, HFmrEF EF 41- 45%, mild LVH  Echo 3/2023 EF 40%, RVC dilated, LA volume mildly increased  Echo, 10/31/2024: LVEF 41%, DDG 1    Valvular:   Mild calcification to aortic valve    Electrical:   NSR, LVH, PVC    Percardium:   Normal    VASCULAR  Arterial  Cerebrovascular disease:   Carotid Doppler: 2023 less than 50 %    CARDIAC RISK FACTORS  Hypertension  Diabetes  2023 A1C 9.5  2024 A1C 7.5  Dyslipidemia  2024   HDL 33 LDL 96  Tobacco Use, Quit after 75 years    NON-CARDIAC  Asthma/COPD: Abnormal PFTs  CKD, on dialysis     SURGERIES  Tonsillectomy  CABG x3      Subjective      Review of Systems:   Review of Systems   Respiratory:  Negative for apnea, cough, choking, chest tightness, shortness of breath, wheezing and stridor.   "  Cardiovascular:  Negative for chest pain, palpitations and leg swelling.       Medications:     Current Outpatient Medications:     albuterol sulfate  (90 Base) MCG/ACT inhaler, Inhale 2 puffs Every 6 (Six) Hours As Needed for Wheezing., Disp: 8 g, Rfl: 5    Aspirin Low Dose 81 MG EC tablet, TAKE 1 TABLET BY MOUTH DAILY, Disp: 90 tablet, Rfl: 3    bumetanide (BUMEX) 1 MG tablet, TAKE 1 TABLET BY MOUTH DAILY, Disp: 90 tablet, Rfl: 0    coenzyme Q10 100 MG capsule, Take 1 capsule by mouth Daily., Disp: , Rfl:     Collagen-Vitamin C-Biotin (COLLAGEN 1500/C PO), Take 1 tablet by mouth Daily. OTC supplement, Disp: , Rfl:     dapagliflozin Propanediol (Farxiga) 10 MG tablet, Take 10 mg by mouth Daily. Indications: Type 2 Diabetes, Disp: 90 tablet, Rfl: 1    ezetimibe (ZETIA) 10 MG tablet, TAKE 1 TABLET BY MOUTH DAILY, Disp: 90 tablet, Rfl: 3    Insulin Syringe 31G X 5/16\" 1 ML misc, USE DAILY WITH INSULIN INJECTIONS, Disp: 100 each, Rfl: 3    Lantus 100 UNIT/ML injection, Inject 52 units every EVENING, increase by 2 units every 4 days for fasting goal  MX 60 units/day, Disp: 60 mL, Rfl: 2    nitroglycerin (NITROSTAT) 0.4 MG SL tablet, Place 1 tablet under the tongue Every 5 (Five) Minutes As Needed for Chest Pain. Take no more than 3 doses in 15 minutes., Disp: 25 tablet, Rfl: 3    potassium chloride ER (K-TAB) 20 MEQ tablet controlled-release ER tablet, TAKE 1 TABLET BY MOUTH EVERY OTHER DAY, Disp: 45 tablet, Rfl: 1    rosuvastatin (CRESTOR) 40 MG tablet, Take 1 tablet by mouth Daily., Disp: 90 tablet, Rfl: 2    SITagliptin (Januvia) 100 MG tablet, Take 1 tablet by mouth Daily., Disp: 90 tablet, Rfl: 1    tamsulosin (FLOMAX) 0.4 MG capsule 24 hr capsule, Take 1 capsule by mouth Daily., Disp: , Rfl:     vitamin D (ERGOCALCIFEROL) 1.25 MG (41738 UT) capsule capsule, TAKE 1 CAPSULE BY MOUTH ONCE WEEKLY, Disp: 12 capsule, Rfl: 0    carvedilol (COREG) 3.125 MG tablet, Take 1 tablet by mouth 2 (Two) Times a " "Day., Disp: 60 tablet, Rfl: 11    Allergies:   Allergies   Allergen Reactions    Wellbutrin [Bupropion] Swelling     Facial swelling, severe    Singulair [Montelukast] Myalgia and Headache     Pt states it was causing vision changes.        Objective     Physical Exam:  Vitals:    02/06/25 1424   BP: 128/70   BP Location: Right arm   Patient Position: Sitting   Cuff Size: Adult   Pulse: 69   SpO2: 96%   Weight: 77.2 kg (170 lb 3.2 oz)   Height: 162.6 cm (64\")     Body mass index is 29.21 kg/m².    Constitutional:       General: Not in acute distress.     Appearance: Healthy appearance. Not in distress.     Neck:     JVP:Not elevated     Carotid artery: Normal    Pulmonary:      Effort: Pulmonary effort is normal.      Breath sounds: Normal breath sounds. No wheezing. No rhonchi. No rales.     Cardiovascular:      Normal rate. Regular rhythm. Normal S1. Normal S2.      Murmurs: There is no significant murmur.      No gallop. No click. No rub.     Abdominal:      General: Bowel sounds are normal.      Palpations: Abdomen is soft.      Tenderness: There is no abdominal tenderness.    Extremities:     Pulses:Normal radial and pedal pulses     Edema:no edema    Smoking Cessation:   Tobacco Product History : Patient quit smoking in 2022 after 52 pack years     Lab Review:   Lab Results   Component Value Date    GLUCOSE 240 (H) 09/23/2024    BUN 30 (H) 09/23/2024    CREATININE 1.74 (H) 09/23/2024    EGFRIFNONA 66 12/14/2021    BCR 17.2 09/23/2024    K 4.4 09/23/2024    CO2 23.0 09/23/2024    CALCIUM 8.9 09/23/2024    ALBUMIN 4.1 09/23/2024    AST 30 09/23/2024    ALT 37 09/23/2024     Lab Results   Component Value Date    WBC 7.59 09/23/2024    HGB 17.4 09/23/2024    HCT 53.2 (H) 09/23/2024    MCV 90.3 09/23/2024     09/23/2024     Lab Results   Component Value Date    TSH 2.330 04/30/2024           Assessment / Plan      Assessment:   Diagnosis Plan   1. I25.10, CABG 03/01/23        2. Primary hypertension      "   3. Ischemic cardiomyopathy        4. Type 2 diabetes mellitus with hypoglycemia without coma, with long-term current use of insulin             Plan:  Patient has successful revascularization by coronary artery bypass graft.  We will continue patient on the current medications.  We will go ahead and add carvedilol 3.125 to his medical regimen and see how he does.  Patient creatinine has been in stage IIIb-5. We will holding up on ACE inhibitors.  Her EF has been stable around 40%.  He will have his lipid profile checked on Monday by Dr. Ricci.      Follow Up:       Return in about 6 months (around 8/6/2025).    Nadya Ramirez MD

## 2025-02-06 ENCOUNTER — OFFICE VISIT (OUTPATIENT)
Dept: CARDIOLOGY | Facility: CLINIC | Age: 70
End: 2025-02-06
Payer: MEDICARE

## 2025-02-06 ENCOUNTER — TELEPHONE (OUTPATIENT)
Dept: FAMILY MEDICINE CLINIC | Facility: CLINIC | Age: 70
End: 2025-02-06
Payer: MEDICARE

## 2025-02-06 VITALS
DIASTOLIC BLOOD PRESSURE: 70 MMHG | OXYGEN SATURATION: 96 % | HEIGHT: 64 IN | HEART RATE: 69 BPM | SYSTOLIC BLOOD PRESSURE: 128 MMHG | BODY MASS INDEX: 29.06 KG/M2 | WEIGHT: 170.2 LBS

## 2025-02-06 DIAGNOSIS — I25.10 CORONARY ARTERY DISEASE INVOLVING NATIVE CORONARY ARTERY OF NATIVE HEART WITHOUT ANGINA PECTORIS: Primary | ICD-10-CM

## 2025-02-06 DIAGNOSIS — I10 PRIMARY HYPERTENSION: ICD-10-CM

## 2025-02-06 DIAGNOSIS — Z79.4 TYPE 2 DIABETES MELLITUS WITH HYPOGLYCEMIA WITHOUT COMA, WITH LONG-TERM CURRENT USE OF INSULIN: ICD-10-CM

## 2025-02-06 DIAGNOSIS — E11.649 TYPE 2 DIABETES MELLITUS WITH HYPOGLYCEMIA WITHOUT COMA, WITH LONG-TERM CURRENT USE OF INSULIN: ICD-10-CM

## 2025-02-06 DIAGNOSIS — I25.5 ISCHEMIC CARDIOMYOPATHY: ICD-10-CM

## 2025-02-06 PROCEDURE — 3078F DIAST BP <80 MM HG: CPT | Performed by: INTERNAL MEDICINE

## 2025-02-06 PROCEDURE — 99214 OFFICE O/P EST MOD 30 MIN: CPT | Performed by: INTERNAL MEDICINE

## 2025-02-06 PROCEDURE — 3074F SYST BP LT 130 MM HG: CPT | Performed by: INTERNAL MEDICINE

## 2025-02-06 RX ORDER — CARVEDILOL 3.12 MG/1
3.12 TABLET ORAL 2 TIMES DAILY
Qty: 60 TABLET | Refills: 11 | Status: SHIPPED | OUTPATIENT
Start: 2025-02-06

## 2025-02-06 RX ORDER — TAMSULOSIN HYDROCHLORIDE 0.4 MG/1
1 CAPSULE ORAL DAILY
COMMUNITY
Start: 2024-12-17

## 2025-02-06 NOTE — TELEPHONE ENCOUNTER
Hub staff attempted to follow warm transfer process and was unsuccessful     Caller: Vivian Harley    Relationship to patient: Emergency Contact    Best call back number:      Patient is needing:  MISSED CALL FROM OFFICE

## 2025-02-07 ENCOUNTER — TELEMEDICINE (OUTPATIENT)
Dept: FAMILY MEDICINE CLINIC | Facility: CLINIC | Age: 70
End: 2025-02-07
Payer: MEDICARE

## 2025-02-07 ENCOUNTER — TELEPHONE (OUTPATIENT)
Dept: FAMILY MEDICINE CLINIC | Facility: CLINIC | Age: 70
End: 2025-02-07

## 2025-02-07 VITALS
DIASTOLIC BLOOD PRESSURE: 70 MMHG | OXYGEN SATURATION: 94 % | WEIGHT: 168 LBS | BODY MASS INDEX: 28.84 KG/M2 | HEART RATE: 84 BPM | SYSTOLIC BLOOD PRESSURE: 120 MMHG

## 2025-02-07 DIAGNOSIS — N18.9 ANEMIA IN CHRONIC KIDNEY DISEASE, UNSPECIFIED CKD STAGE: ICD-10-CM

## 2025-02-07 DIAGNOSIS — Z00.00 ENCOUNTER FOR SUBSEQUENT ANNUAL WELLNESS VISIT (AWV) IN MEDICARE PATIENT: Primary | ICD-10-CM

## 2025-02-07 DIAGNOSIS — H93.8X9 SENSATION OF FULLNESS IN EAR, UNSPECIFIED LATERALITY: ICD-10-CM

## 2025-02-07 DIAGNOSIS — I10 PRIMARY HYPERTENSION: ICD-10-CM

## 2025-02-07 DIAGNOSIS — E78.5 HYPERLIPIDEMIA LDL GOAL <70: ICD-10-CM

## 2025-02-07 DIAGNOSIS — Z79.4 TYPE 2 DIABETES MELLITUS WITH HYPOGLYCEMIA WITHOUT COMA, WITH LONG-TERM CURRENT USE OF INSULIN: ICD-10-CM

## 2025-02-07 DIAGNOSIS — E11.649 TYPE 2 DIABETES MELLITUS WITH HYPOGLYCEMIA WITHOUT COMA, WITH LONG-TERM CURRENT USE OF INSULIN: ICD-10-CM

## 2025-02-07 DIAGNOSIS — D63.1 ANEMIA IN CHRONIC KIDNEY DISEASE, UNSPECIFIED CKD STAGE: ICD-10-CM

## 2025-02-07 PROCEDURE — G0439 PPPS, SUBSEQ VISIT: HCPCS | Performed by: FAMILY MEDICINE

## 2025-02-07 PROCEDURE — G2211 COMPLEX E/M VISIT ADD ON: HCPCS | Performed by: FAMILY MEDICINE

## 2025-02-07 PROCEDURE — 1159F MED LIST DOCD IN RCRD: CPT | Performed by: FAMILY MEDICINE

## 2025-02-07 PROCEDURE — 1160F RVW MEDS BY RX/DR IN RCRD: CPT | Performed by: FAMILY MEDICINE

## 2025-02-07 PROCEDURE — 3074F SYST BP LT 130 MM HG: CPT | Performed by: FAMILY MEDICINE

## 2025-02-07 PROCEDURE — 99214 OFFICE O/P EST MOD 30 MIN: CPT | Performed by: FAMILY MEDICINE

## 2025-02-07 PROCEDURE — 3078F DIAST BP <80 MM HG: CPT | Performed by: FAMILY MEDICINE

## 2025-02-07 PROCEDURE — 1126F AMNT PAIN NOTED NONE PRSNT: CPT | Performed by: FAMILY MEDICINE

## 2025-02-07 NOTE — ASSESSMENT & PLAN NOTE
Possibly impacted wax vs serous otitis media.   Advised to use flonase and sweet oil/debrox to aid in these symptoms.   Encouraged follow up with ENT

## 2025-02-07 NOTE — PROGRESS NOTES
" Subjective   The ABCs of the Annual Wellness Visit  Medicare Wellness Visit      Herbert Harley is a 69 y.o. patient who presents for a Medicare Wellness Visit.    This was an audio and video enabled telemedicine encounter.  Patient is at home, [place] as is provider     The following portions of the patient's history were reviewed and   updated as appropriate: allergies, current medications, past family history, past medical history, past social history, past surgical history, and problem list.    Compared to one year ago, the patient's physical   health is the same.  Compared to one year ago, the patient's mental   health is the same.    Recent Hospitalizations:  He was not admitted to the hospital during the last year.     Current Medical Providers:  Patient Care Team:  Yessica Ricci DO as PCP - General (Family Medicine)  Nima Peter MD as Consulting Physician (Endocrinology)  Nadya Ramirez MD as Cardiologist (Cardiology)  Yessica Ricci DO as Consulting Physician (Family Medicine)  Cris Figueroa APRN as Nurse Practitioner (Cardiology)    Outpatient Medications Prior to Visit   Medication Sig Dispense Refill    albuterol sulfate  (90 Base) MCG/ACT inhaler Inhale 2 puffs Every 6 (Six) Hours As Needed for Wheezing. 8 g 5    Aspirin Low Dose 81 MG EC tablet TAKE 1 TABLET BY MOUTH DAILY 90 tablet 3    bumetanide (BUMEX) 1 MG tablet TAKE 1 TABLET BY MOUTH DAILY 90 tablet 0    carvedilol (COREG) 3.125 MG tablet Take 1 tablet by mouth 2 (Two) Times a Day. 60 tablet 11    coenzyme Q10 100 MG capsule Take 1 capsule by mouth Daily.      Collagen-Vitamin C-Biotin (COLLAGEN 1500/C PO) Take 1 tablet by mouth Daily. OTC supplement      dapagliflozin Propanediol (Farxiga) 10 MG tablet Take 10 mg by mouth Daily. Indications: Type 2 Diabetes 90 tablet 1    ezetimibe (ZETIA) 10 MG tablet TAKE 1 TABLET BY MOUTH DAILY 90 tablet 3    Insulin Syringe 31G X 5/16\" 1 ML misc USE DAILY WITH INSULIN " INJECTIONS 100 each 3    Lantus 100 UNIT/ML injection Inject 52 units every EVENING, increase by 2 units every 4 days for fasting goal  MX 60 units/day 60 mL 2    nitroglycerin (NITROSTAT) 0.4 MG SL tablet Place 1 tablet under the tongue Every 5 (Five) Minutes As Needed for Chest Pain. Take no more than 3 doses in 15 minutes. 25 tablet 3    potassium chloride ER (K-TAB) 20 MEQ tablet controlled-release ER tablet TAKE 1 TABLET BY MOUTH EVERY OTHER DAY 45 tablet 1    rosuvastatin (CRESTOR) 40 MG tablet Take 1 tablet by mouth Daily. 90 tablet 2    SITagliptin (Januvia) 100 MG tablet Take 1 tablet by mouth Daily. 90 tablet 1    tamsulosin (FLOMAX) 0.4 MG capsule 24 hr capsule Take 1 capsule by mouth Daily.      vitamin D (ERGOCALCIFEROL) 1.25 MG (66319 UT) capsule capsule TAKE 1 CAPSULE BY MOUTH ONCE WEEKLY 12 capsule 0     No facility-administered medications prior to visit.     No opioid medication identified on active medication list. I have reviewed chart for other potential  high risk medication/s and harmful drug interactions in the elderly.      Aspirin is on active medication list. Aspirin use is indicated based on review of current medical condition/s. Pros and cons of this therapy have been discussed today. Benefits of this medication outweigh potential harm.  Patient has been encouraged to continue taking this medication.  .      Patient Active Problem List   Diagnosis    I25.10, CABG 03/01/23    Hypertension    Hyperlipidemia LDL goal <70    Type 2 diabetes mellitus with hypoglycemia, with long-term current use of insulin    Ischemic cardiomyopathy    Allergic rhinitis    Prolonged Q-T interval on ECG    Anemia in chronic kidney disease    Chronic viral hepatitis C    Hypertensive heart disease with heart failure    Iron deficiency anemia    Long term (current) use of oral hypoglycemic drugs    Moderate protein-calorie malnutrition    Secondary hyperparathyroidism of renal origin    Chronic systolic  "(congestive) heart failure    Atherosclerotic heart disease of native coronary artery without angina pectoris    Abnormality of gait and mobility    Benign prostatic hyperplasia with urinary frequency    Diverticulosis    Calculus of gallbladder without cholecystitis without obstruction    Trapped LLL    Pleural thickening LLL (Since CABG)    Chronic GERD    Former smoker    Snoring    Sensation of fullness in ear     Advance Care Planning Advance Directive is not on file.  ACP discussion was held with the patient during this visit. Patient has an advance directive (not in EMR), copy requested.            Objective   Vitals:    25 1544   BP: 120/70   Pulse: 84   SpO2: 94%   Weight: 76.2 kg (168 lb)       Estimated body mass index is 28.84 kg/m² as calculated from the following:    Height as of 25: 162.6 cm (64\").    Weight as of this encounter: 76.2 kg (168 lb).            Does the patient have evidence of cognitive impairment? No                                                                                                Health  Risk Assessment    Smoking Status:  Social History     Tobacco Use   Smoking Status Former    Current packs/day: 0.00    Average packs/day: 1.5 packs/day for 52.9 years (79.4 ttl pk-yrs)    Types: Cigarettes    Start date: 1970    Quit date: 2022    Years since quittin.1    Passive exposure: Past   Smokeless Tobacco Never     Alcohol Consumption:  Social History     Substance and Sexual Activity   Alcohol Use Not Currently       Fall Risk Screen  STEADI Fall Risk Assessment has not been completed.    Depression Screening   Little interest or pleasure in doing things? Not at all   Feeling down, depressed, or hopeless? Not at all   PHQ-2 Total Score 0      Health Habits and Functional and Cognitive Screenin/31/2025    10:05 AM   Functional & Cognitive Status   Do you have difficulty preparing food and eating? No    Do you have difficulty bathing yourself, " getting dressed or grooming yourself? No    Do you have difficulty using the toilet? No    Do you have difficulty moving around from place to place? No    Do you have trouble with steps or getting out of a bed or a chair? No    Current Diet Other    Dental Exam Not up to date    Eye Exam Up to date    Exercise (times per week) 0 times per week    Current Exercises Include Walking    Do you need help using the phone?  No    Are you deaf or do you have serious difficulty hearing?  Yes    Do you need help to go to places out of walking distance? No    Do you need help shopping? No    Do you need help preparing meals?  No    Do you need help with housework?  No    Do you need help with laundry? No    Do you need help taking your medications? No    Do you need help managing money? No    Do you ever drive or ride in a car without wearing a seat belt? No    Have you felt unusual stress, anger or loneliness in the last month? No    Who do you live with? Spouse    If you need help, do you have trouble finding someone available to you? No    Have you been bothered in the last four weeks by sexual problems? No    Do you have difficulty concentrating, remembering or making decisions? Yes        Patient-reported           Age-appropriate Screening Schedule:  Refer to the list below for future screening recommendations based on patient's age, sex and/or medical conditions. Orders for these recommended tests are listed in the plan section. The patient has been provided with a written plan.    Health Maintenance List  Health Maintenance   Topic Date Due    ZOSTER VACCINE (1 of 2) Never done    Hepatitis B (1 of 3 - Risk 3-dose series) Never done    COVID-19 Vaccine (1 - 2024-25 season) Never done    LIPID PANEL  02/02/2025    LUNG CANCER SCREENING  02/19/2025    HEMOGLOBIN A1C  05/07/2025    INFLUENZA VACCINE  03/31/2025 (Originally 7/1/2024)    DIABETIC EYE EXAM  06/01/2025    URINE MICROALBUMIN  08/06/2025    BMI FOLLOWUP   08/06/2025    ANNUAL WELLNESS VISIT  02/07/2026    COLORECTAL CANCER SCREENING  02/23/2026    TDAP/TD VACCINES (2 - Td or Tdap) 01/26/2033    HEPATITIS C SCREENING  Completed    Pneumococcal Vaccine 65+  Completed    AAA SCREEN ONCE  Completed                                                                                                                                                CMS Preventative Services Quick Reference  Risk Factors Identified During Encounter  Fall Risk-High or Moderate: Information on Fall Prevention Shared in After Visit Summary  Hearing Problem:  Has an upcoming appointment with ent   Immunizations Discussed/Encouraged: Influenza and Shingrix  Dental Screening Recommended  Vision Screening Recommended    The above risks/problems have been discussed with the patient.  Pertinent information has been shared with the patient in the After Visit Summary.  An After Visit Summary and PPPS were made available to the patient.    Follow Up:   Next Medicare Wellness visit to be scheduled in 1 year.         Additional E&M Note during same encounter follows:  Patient has additional, significant, and separately identifiable condition(s)/problem(s) that require work above and beyond the Medicare Wellness Visit     Chief Complaint  No chief complaint on file.    Subjective   HPI  Herbert is also being seen today for additional medical problem/s.          He has a past medical history of hypertension, hyperlipidemia, ischemic cardiomyopathy, chronic systolic heart failure, coronary artery disease status post CABG, type 2 diabetes, chronic viral hepatitis C, iron deficiency anemia, COPD.     For Type 2 DM - his HgA1c in office today is 8.1% he has an upcoming appointment with Endocrinology next month.  His fasting sugar this morning was 185 but this was an outlier for him. He is usually < 90 fasting. Blood sugar this morning was 81      Hyperlipidemia -target less than 55 on the LDL, currently prescribed  Crestor 40 mg daily.      He is able to stay euvolemic with Bumex 0.5 mg daily and potassium every other day.      PFTs did not give a definitive diagnosis.  He states that overall, his breathing has been better.  He followed with Pulmonology and     Acutely, he has been feeling as if his ears area stopped up. He is having some ear pressure. He has an upcoming appointment with ENT.       Objective   Vital Signs:  /70   Pulse 84   Wt 76.2 kg (168 lb)   SpO2 94%   BMI 28.84 kg/m²   Physical Exam  Vitals reviewed.   Constitutional:       Appearance: Normal appearance.   Cardiovascular:      Rate and Rhythm: Normal rate.   Pulmonary:      Effort: Pulmonary effort is normal.   Neurological:      Mental Status: He is alert.   Psychiatric:         Mood and Affect: Mood normal.         Thought Content: Thought content normal.                 Assessment and Plan            Encounter for subsequent annual wellness visit (AWV) in Medicare patient    Annual wellness exam completed today. Health Maintenance including immunizations was updated and reflected in the chart.    Health advice: healthy food choices with fresh fruits and vegetables, maintain sleep pattern at least 8 hours, avoid texting and distracted driving practices; wear safety belt, engage in regular exercise, maintain healthy weight, use safe sex practices, avoid alcohol and illicit drugs. Maintain immunizations that are up to date. Maintain health maintenance:PSA, Colon cancer screening, DEXA, Mammo, PAP, etc.  Follow up with PCP if struggling with depression or anxiety. Keep regular dental and eye exams. Brush and floss teeth daily.     I suggest they take a daily multivitamin that is age-appropriate (example women's One-A-Day.)  Patient voiced understanding of these instructions.     Follow up Annually for Physical            Type 2 diabetes mellitus with hypoglycemia without coma, with long-term current use of insulin           Primary  hypertension           Hyperlipidemia LDL goal <70            Anemia in chronic kidney disease, unspecified CKD stage           Sensation of fullness in ear, unspecified laterality  Possibly impacted wax vs serous otitis media.   Advised to use flonase and sweet oil/debrox to aid in these symptoms.   Encouraged follow up with ENT                Follow Up   Return in about 3 months (around 5/7/2025) for followup.  Patient was given instructions and counseling regarding his condition or for health maintenance advice. Please see specific information pulled into the AVS if appropriate.        This document has been electronically signed by Yessica Ricci DO   February 7, 2025 16:02 EST    Dictated Utilizing Dragon Dictation: Part of this note may be an electronic transcription/translation of spoken language to printed text using the Dragon Dictation System.    Yessica Ricci D.O.  [facility]

## 2025-02-10 ENCOUNTER — TRANSCRIBE ORDERS (OUTPATIENT)
Dept: LAB | Facility: HOSPITAL | Age: 70
End: 2025-02-10
Payer: MEDICARE

## 2025-02-10 ENCOUNTER — LAB (OUTPATIENT)
Dept: LAB | Facility: HOSPITAL | Age: 70
End: 2025-02-10
Payer: MEDICARE

## 2025-02-10 DIAGNOSIS — N18.30 STAGE 3 CHRONIC KIDNEY DISEASE, UNSPECIFIED WHETHER STAGE 3A OR 3B CKD: ICD-10-CM

## 2025-02-10 DIAGNOSIS — N18.30 STAGE 3 CHRONIC KIDNEY DISEASE, UNSPECIFIED WHETHER STAGE 3A OR 3B CKD: Primary | ICD-10-CM

## 2025-02-10 DIAGNOSIS — E13.69 OTHER SPECIFIED DIABETES MELLITUS WITH OTHER SPECIFIED COMPLICATION, UNSPECIFIED WHETHER LONG TERM INSULIN USE: Primary | ICD-10-CM

## 2025-02-10 DIAGNOSIS — E78.5 HYPERLIPIDEMIA LDL GOAL <70: ICD-10-CM

## 2025-02-10 LAB
CHOLEST SERPL-MCNC: 176 MG/DL (ref 0–200)
HDLC SERPL-MCNC: 32 MG/DL (ref 40–60)
LDLC SERPL CALC-MCNC: 104 MG/DL (ref 0–100)
LDLC/HDLC SERPL: 3.04 {RATIO}
TRIGL SERPL-MCNC: 234 MG/DL (ref 0–150)
VLDLC SERPL-MCNC: 40 MG/DL (ref 5–40)

## 2025-02-10 PROCEDURE — 82043 UR ALBUMIN QUANTITATIVE: CPT

## 2025-02-10 PROCEDURE — 83036 HEMOGLOBIN GLYCOSYLATED A1C: CPT

## 2025-02-10 PROCEDURE — 36415 COLL VENOUS BLD VENIPUNCTURE: CPT

## 2025-02-10 PROCEDURE — 82652 VIT D 1 25-DIHYDROXY: CPT

## 2025-02-10 PROCEDURE — 81001 URINALYSIS AUTO W/SCOPE: CPT

## 2025-02-10 PROCEDURE — 80061 LIPID PANEL: CPT

## 2025-02-10 PROCEDURE — 80069 RENAL FUNCTION PANEL: CPT

## 2025-02-11 LAB
ALBUMIN SERPL-MCNC: 4.2 G/DL (ref 3.5–5.2)
ALBUMIN UR-MCNC: 1.2 MG/DL
ANION GAP SERPL CALCULATED.3IONS-SCNC: 14 MMOL/L (ref 5–15)
BACTERIA UR QL AUTO: NORMAL /HPF
BILIRUB UR QL STRIP: NEGATIVE
BUN SERPL-MCNC: 28 MG/DL (ref 8–23)
BUN/CREAT SERPL: 17.8 (ref 7–25)
CALCIUM SPEC-SCNC: 9 MG/DL (ref 8.6–10.5)
CHLORIDE SERPL-SCNC: 102 MMOL/L (ref 98–107)
CLARITY UR: CLEAR
CO2 SERPL-SCNC: 22 MMOL/L (ref 22–29)
COLOR UR: YELLOW
CREAT SERPL-MCNC: 1.57 MG/DL (ref 0.76–1.27)
EGFRCR SERPLBLD CKD-EPI 2021: 47.4 ML/MIN/1.73
GLUCOSE SERPL-MCNC: 224 MG/DL (ref 65–99)
GLUCOSE UR STRIP-MCNC: ABNORMAL MG/DL
HBA1C MFR BLD: 7.3 % (ref 4.8–5.6)
HGB UR QL STRIP.AUTO: NEGATIVE
HYALINE CASTS UR QL AUTO: NORMAL /LPF
KETONES UR QL STRIP: NEGATIVE
LEUKOCYTE ESTERASE UR QL STRIP.AUTO: NEGATIVE
NITRITE UR QL STRIP: NEGATIVE
PH UR STRIP.AUTO: 5.5 [PH] (ref 5–8)
PHOSPHATE SERPL-MCNC: 3.3 MG/DL (ref 2.5–4.5)
POTASSIUM SERPL-SCNC: 4.4 MMOL/L (ref 3.5–5.2)
PROT UR QL STRIP: NEGATIVE
RBC # UR STRIP: NORMAL /HPF
REF LAB TEST METHOD: NORMAL
SODIUM SERPL-SCNC: 138 MMOL/L (ref 136–145)
SP GR UR STRIP: 1.01 (ref 1–1.03)
SQUAMOUS #/AREA URNS HPF: NORMAL /HPF
UROBILINOGEN UR QL STRIP: ABNORMAL
WBC # UR STRIP: NORMAL /HPF

## 2025-02-13 LAB — 1,25(OH)2D SERPL-MCNC: 48.2 PG/ML (ref 24.8–81.5)

## 2025-02-19 ENCOUNTER — HOSPITAL ENCOUNTER (OUTPATIENT)
Dept: CT IMAGING | Facility: HOSPITAL | Age: 70
Discharge: HOME | End: 2025-02-19
Admitting: NURSE PRACTITIONER
Payer: MEDICARE

## 2025-02-19 DIAGNOSIS — Z87.891 PERSONAL HISTORY OF NICOTINE DEPENDENCE: ICD-10-CM

## 2025-02-19 PROCEDURE — 71271 CT THORAX LUNG CANCER SCR C-: CPT

## 2025-02-21 ENCOUNTER — TELEPHONE (OUTPATIENT)
Dept: CARDIOLOGY | Facility: CLINIC | Age: 70
End: 2025-02-21
Payer: MEDICARE

## 2025-02-21 DIAGNOSIS — E78.5 HYPERLIPIDEMIA LDL GOAL <70: Primary | ICD-10-CM

## 2025-02-21 NOTE — TELEPHONE ENCOUNTER
----- Message from Nadya Ramirez sent at 2/21/2025  1:05 PM EST -----  Please call the patient regarding his abnormal result.  We will need to add Repatha to his treatment plan.

## 2025-02-21 NOTE — TELEPHONE ENCOUNTER
Spoke to patients wife regarding lab results and plan of care, they are agreeable to plan. Rx sent and repeat lipids in 3 months.

## 2025-02-27 NOTE — PROGRESS NOTES
[facility] Pulmonary Follow up    CHIEF COMPLAINT    Shortness of breath, persistent cough, former tobacco use, history of recurrent pleural effusions, and gastric reflux    HISTORY OF PRESENT ILLNESS    HPI:   Herbert Harley is a 69 y.o.male followed by pulmonary regarding shortness of breath, persistent cough, former tobacco use, history of recurrent pleural effusions, and gastric reflux.    Patient establish care with our clinic in October 2024 seen by Dr. Ahuja regarding some abnormal chest imaging as he has had a recurrent left pleural effusion requiring thoracenteses twice after his CABG in March 2023 by Dr. Saavedra.  Most recent CT imaging was unrevealing.  Most recent echo did show reduced EF of 41% and grade 1 diastolic dysfunction with RSVP 26 and he is on Bumex.  Does have a history of CKD baseline creatinine is around 1.7.    He is a former smoker with a 79-pack-year history who quit back in 2022.  PFTs were unrevealing for airway obstruction but did show some mild restriction and normal DLCO.  He is on Trelegy 100 and albuterol as needed.    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  Most recent CT imaging was unrevealing for any residual pleural fluid and consideration of a surgical decortication, although the risk of surgery may not provide enough symptomatic benefit based on his symptoms and chest imaging.    Does have a persistent cough as well as chronic reflux and does not routinely abide by reflux precautions.      He was given nebulized hypertonic saline to aid in mucus clearance.    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     Interval history:   Patient was last seen in the office by me in early December with ongoing complaints of a persistent cough that is mostly nocturnal accompanied by some intermittent  wheezing felt to be related to gastric reflux in the setting of his moderate hiatal hernia and reflux precautions were strongly recommended.  His wife accompanies him today and states that he has not been abiding by his reflux precautions and his nocturnal cough is ongoing.    In regards to his shortness of breath, this has overall improved and he has been trying to increase his activity level, however this has been difficult due to the colder weather.  He is planning on joining a gym with his wife.    Has been referred to Dr. Singh of sleep medicine for evaluation of possible sleep disordered breathing.  Underwent overnight oximetry and was ordered supplemental oxygen to wear at nighttime as his SpO2 was less than 88% for 8 minutes total.  He has not been wearing this.  Wife informs me that he is to undergo an in lab sleep study within the next 1-2 weeks.    Continues to follow with cardiology and nephrology.  He does have a history of ischemic cardiomyopathy and most recent echocardiogram did show a reduced EF of 41% along with grade 1 diastolic dysfunction.  His nephrologist does have him on Bumex and he denies any lower extremity swelling.    Denies recent ED visits, hospitalizations, or exacerbations.     Denies fever, chills, night sweats, or hemoptysis. No recent sick contacts. No chest pain or palpitations. Denies lower extremity swelling or calf tenderness.     Patient Active Problem List   Diagnosis    I25.10, CABG 03/01/23    Hypertension    Hyperlipidemia LDL goal <70    Type 2 diabetes mellitus with hypoglycemia, with long-term current use of insulin    Ischemic cardiomyopathy    Allergic rhinitis    Prolonged Q-T interval on ECG    Anemia in chronic kidney disease    Chronic viral hepatitis C    Hypertensive heart disease with heart failure    Iron deficiency anemia    Long term (current) use of oral hypoglycemic drugs    Moderate protein-calorie malnutrition    Secondary hyperparathyroidism of renal  "origin    Chronic systolic (congestive) heart failure    Atherosclerotic heart disease of native coronary artery without angina pectoris    Abnormality of gait and mobility    Benign prostatic hyperplasia with urinary frequency    Diverticulosis    Calculus of gallbladder without cholecystitis without obstruction    Trapped LLL    Pleural thickening LLL (Since CABG)    Chronic GERD    Former smoker    Snoring    Sensation of fullness in ear    Abnormal CT of the chest    Suspected LOUIS       Allergies   Allergen Reactions    Wellbutrin [Bupropion] Swelling     Facial swelling, severe    Singulair [Montelukast] Myalgia and Headache     Pt states it was causing vision changes.        Current Outpatient Medications:     albuterol sulfate  (90 Base) MCG/ACT inhaler, Inhale 2 puffs Every 6 (Six) Hours As Needed for Wheezing., Disp: 8 g, Rfl: 5    Aspirin Low Dose 81 MG EC tablet, TAKE 1 TABLET BY MOUTH DAILY, Disp: 90 tablet, Rfl: 3    bumetanide (BUMEX) 1 MG tablet, TAKE 1 TABLET BY MOUTH DAILY, Disp: 90 tablet, Rfl: 0    carvedilol (COREG) 3.125 MG tablet, Take 1 tablet by mouth 2 (Two) Times a Day., Disp: 60 tablet, Rfl: 11    coenzyme Q10 100 MG capsule, Take 1 capsule by mouth Daily., Disp: , Rfl:     Collagen-Vitamin C-Biotin (COLLAGEN 1500/C PO), Take 1 tablet by mouth Daily. OTC supplement, Disp: , Rfl:     dapagliflozin Propanediol (Farxiga) 10 MG tablet, Take 10 mg by mouth Daily. Indications: Type 2 Diabetes, Disp: 90 tablet, Rfl: 1    ezetimibe (ZETIA) 10 MG tablet, TAKE 1 TABLET BY MOUTH DAILY, Disp: 90 tablet, Rfl: 3    Insulin Syringe 31G X 5/16\" 1 ML misc, USE DAILY WITH INSULIN INJECTIONS, Disp: 100 each, Rfl: 3    Lantus 100 UNIT/ML injection, Inject 52 units every EVENING, increase by 2 units every 4 days for fasting goal  MX 60 units/day, Disp: 60 mL, Rfl: 2    nitroglycerin (NITROSTAT) 0.4 MG SL tablet, Place 1 tablet under the tongue Every 5 (Five) Minutes As Needed for Chest Pain. Take " "no more than 3 doses in 15 minutes., Disp: 25 tablet, Rfl: 3    potassium chloride ER (K-TAB) 20 MEQ tablet controlled-release ER tablet, TAKE 1 TABLET BY MOUTH EVERY OTHER DAY, Disp: 45 tablet, Rfl: 1    rosuvastatin (CRESTOR) 40 MG tablet, Take 1 tablet by mouth Daily., Disp: 90 tablet, Rfl: 2    SITagliptin (Januvia) 100 MG tablet, Take 1 tablet by mouth Daily., Disp: 90 tablet, Rfl: 1    valsartan (DIOVAN) 40 MG tablet, , Disp: , Rfl:     vitamin D (ERGOCALCIFEROL) 1.25 MG (36405 UT) capsule capsule, TAKE 1 CAPSULE BY MOUTH ONCE WEEKLY, Disp: 12 capsule, Rfl: 0    Evolocumab (REPATHA) solution auto-injector SureClick injection, Inject 1 mL under the skin into the appropriate area as directed Every 14 (Fourteen) Days. (Patient not taking: Reported on 2025), Disp: 6 mL, Rfl: 3    tamsulosin (FLOMAX) 0.4 MG capsule 24 hr capsule, Take 1 capsule by mouth Daily., Disp: , Rfl:   MEDICATION LIST AND ALLERGIES REVIEWED.    Social History     Tobacco Use    Smoking status: Former     Current packs/day: 0.00     Average packs/day: 1.5 packs/day for 52.9 years (79.4 ttl pk-yrs)     Types: Cigarettes     Start date: 1970     Quit date: 2022     Years since quittin.2     Passive exposure: Past    Smokeless tobacco: Never   Vaping Use    Vaping status: Never Used   Substance Use Topics    Alcohol use: Never    Drug use: Never       FAMILY AND SOCIAL HISTORY REVIEWED.    Review of Systems   Constitutional:  Negative for chills, fatigue and fever.   Respiratory:  Positive for cough. Negative for chest tightness, shortness of breath and wheezing.    Cardiovascular:  Negative for chest pain, palpitations and leg swelling.   Skin:  Negative for color change.   Psychiatric/Behavioral:  Positive for sleep disturbance.    All other systems reviewed and are negative.    /64   Pulse 77   Ht 162.6 cm (64\")   Wt 78.5 kg (173 lb)   SpO2 97%   BMI 29.70 kg/m²     Immunization History   Administered Date(s) " Administered    Pneumococcal Conjugate 20-Valent (PCV20) 04/30/2024    Tdap 01/26/2023       Physical Exam  Vitals reviewed.   Constitutional:       General: He is not in acute distress.     Appearance: Normal appearance.   Cardiovascular:      Rate and Rhythm: Normal rate and regular rhythm.      Pulses: Normal pulses.      Heart sounds: Normal heart sounds.   Pulmonary:      Effort: Pulmonary effort is normal. No respiratory distress.      Breath sounds: Rales (LLL) present. No wheezing or rhonchi.   Skin:     General: Skin is warm and dry.   Neurological:      General: No focal deficit present.      Mental Status: He is alert and oriented to person, place, and time.     RESULTS    PFTs:  10/2024: No airway obstruction.  Mild restriction with TLC 4.38, 78% predicted.  Normal DLCO.    2023: Severe reduction in both FEV1 and FVC with preserved FEV1/FVC ratio suggestive of a restrictive defect.    Imaging:   CT Chest Low Dose Cancer Screening WO  Result Date: 2/21/2025  Stable pulmonary nodules. No new suspicious nodule Recommendation: Continue annual screening with LDCT Lung Rads Assessment: Lung-RADS L2 - Benign appearance or <1% chance of malignancy. Electronically Signed: Gaurav Mike  2/21/2025 6:03 PM EST  Workstation ID: [id number]     CT Abdomen Pelvis With Contrast  Result Date: 9/23/2024  Impression: 1. Left lower lung airspace disease. 2. Cholelithiasis. 3. Diverticulosis. 4. Prostate hypertrophy. Electronically Signed: Carolina Dunn MD  9/23/2024 2:21 PM EDT  Workstation ID: [id number]    XR Chest 1 View  Result Date: 9/23/2024  Mild chronic interstitial changes of the lung fields without evidence of acute cardiopulmonary abnormality. Electronically Signed: Dashawn Dover MD  9/23/2024 2:16 PM EDT  Workstation ID: [id number]     PROBLEM LIST    Problem List Items Addressed This Visit       Ischemic cardiomyopathy    Overview     Echo (3/29/2014): LVEF 50-55%  Echo (12/2022): LVEF 41-45%         Trapped LLL     Chronic GERD    Former smoker - Primary    Relevant Orders    CT chest low dose wo    Abnormal CT of the chest    Suspected LOUIS       DISCUSSION    Mr. Harley was seen today for follow-up and is stable from a respiratory standpoint.  He initially establish care with our clinic seen by Dr. Ahuja regarding some abnormal chest imaging compatible with an entrapped left lower lobe status post thoracenteses x 2 after his CABG back in March 2023.      Does continue to have intermittent cough that is mostly nocturnal likely related to gastric reflux in the setting of his moderate hiatal hernia, however he has not been abiding by reflux precautions and symptoms are ongoing.    Underwent overnight oximetry noted to have an SpO2 of less than 88% for 8 minutes and oxygen was ordered, but he has not been wearing it.  Due to concern for sleep disordered breathing he is scheduled for an in lab split-night study in the near future.    Shortness of breath is largely improved and he is trying to increase his activity level.    Shortness of breath  Entrapped left lower lobe  Ischemic cardiomyopathy  Patient's shortness of breath is likely multifactorial in the setting of his entrapped left lower lobe, restrictive defect on PFTs, ischemic cardiomyopathy, and physical deconditioning.  Overall has improved since his last visit.  Underwent PFTs that were unrevealing for obstruction but did show some mild restriction which could be related to his abnormal chest imaging.  Repeat annually due October 2025.  He is on Trelegy 100 daily and may continue to use this.  Rinse mouth after use to prevent thrush.  Use albuterol/DuoNebs as needed.  Underwent CABG in March 2023 by Dr. Saavedra and did have recurrent left-sided pleural effusions that required thoracentesis x 2.  Serial CT imaging compatible with an entrapped left lower lobe.  Discussed consideration of surgical decortication, however unsure if this would provide much symptom  benefit and wishes to defer for now.  Most recent echocardiogram continue to show reduced EF of 41% with grade 1 diastolic dysfunction.  He is followed by cardiology and nephrology on Bumex daily.  Denies any lower extremity swelling.  He is euvolemic on exam today.  Body mass index is 29.7 kg/m². Physical deconditioning could be playing a role. I do recommend 30 minutes of intentional low impact exercise daily and discussed methods to achieve this at today's visit.  He is planning on joining his wife in the near future at the gym.  If above work-up is negative would consider methacholine challenge ultimately CPX test to determine the cause of the patient's shortness of breath.    Persistent cough  GERD  Moderate hiatal hernia  Patient continues to have a persistent nonproductive cough in the evenings and throughout the night  Does carry history of gastric reflux and a moderate size hiatal hernia on CT imaging  I am suspicious that gastric reflux is largely playing a role as he frequently snacks up until bedtime and intermittently throughout the night  Reflux precautions discussed again at length, particularly sleeping with his head of bed elevated via wedge pillow/bed blocks and being strictly n.p.o. 2-3 hours prior to bedtime and throughout the night.     Former tobacco use   Abnormal CT of the chest  Patient is a former smoker with a 79 pack year history who quit in 2022  Serial CT imaging has showed stability of the multiple pulmonary nodules, largest measuring around 8 mm in the lingula.  This was measured for a 6 mm on the prior scan, however does appear unchanged.  Repeat CT in 1 year based on lung cancer screening guidelines.  Continues to have some chronic atelectasis of the left lower lobe which remains unchanged.  MM phenotype     LOUIS  Nocturnal desaturation  Overnight oximetry as ordered by his cardiologist did show nocturnal hypoxemia with an SpO2 of less than 88% for 8 minutes.  Supplemental oxygen was  ordered but he has not been wearing this.  The patient does have symptoms concerning for LOUIS and informs me that he is to undergo an in lab study in the near future.  If the patient does have sleep apnea, he has nocturnal desaturation may be corrected on PAP therapy alone.  If the study confirms sleep apnea, I do recommend implementation of PAP therapy.  We also discussed alternate treatment strategies for sleep apnea including an oral mandibular adjustment appliance, surgical repair, and/or device insertion.    I personally spent a total of 35 minutes on patient visit today including chart review, face to face with the patient obtaining the history and physical exam, review of pertinent images and tests, counseling and discussion and/or coordination of care as described above, and documentation.  Total time excludes time spent on other separate services such as performing procedures or test interpretation, if applicable.    Electronically signed by TALON Luna, 02/28/25, 9:11 AM EST.    Please note that portions of this note were completed with a voice recognition program.      CC: Yessica Ricci DO

## 2025-02-28 ENCOUNTER — OFFICE VISIT (OUTPATIENT)
Dept: PULMONOLOGY | Facility: CLINIC | Age: 70
End: 2025-02-28
Payer: MEDICARE

## 2025-02-28 VITALS
BODY MASS INDEX: 29.53 KG/M2 | WEIGHT: 173 LBS | HEIGHT: 64 IN | OXYGEN SATURATION: 97 % | DIASTOLIC BLOOD PRESSURE: 64 MMHG | SYSTOLIC BLOOD PRESSURE: 120 MMHG | HEART RATE: 77 BPM

## 2025-02-28 DIAGNOSIS — R93.89 ABNORMAL CT OF THE CHEST: ICD-10-CM

## 2025-02-28 DIAGNOSIS — Z87.891 FORMER SMOKER: Primary | ICD-10-CM

## 2025-02-28 DIAGNOSIS — J98.19 TRAPPED LUNG: ICD-10-CM

## 2025-02-28 DIAGNOSIS — K21.9 CHRONIC GERD: ICD-10-CM

## 2025-02-28 DIAGNOSIS — I25.5 ISCHEMIC CARDIOMYOPATHY: ICD-10-CM

## 2025-02-28 DIAGNOSIS — G47.33 OSA (OBSTRUCTIVE SLEEP APNEA): ICD-10-CM

## 2025-02-28 RX ORDER — VALSARTAN 40 MG/1
TABLET ORAL
COMMUNITY

## 2025-03-11 RX ORDER — BUMETANIDE 1 MG/1
1 TABLET ORAL DAILY
Qty: 90 TABLET | Refills: 0 | Status: SHIPPED | OUTPATIENT
Start: 2025-03-11

## 2025-03-31 RX ORDER — BUMETANIDE 1 MG/1
1 TABLET ORAL DAILY
Qty: 90 TABLET | Refills: 0 | Status: SHIPPED | OUTPATIENT
Start: 2025-03-31

## 2025-04-01 RX ORDER — TAMSULOSIN HYDROCHLORIDE 0.4 MG/1
1 CAPSULE ORAL DAILY
Qty: 90 CAPSULE | OUTPATIENT
Start: 2025-04-01

## 2025-04-01 RX ORDER — TAMSULOSIN HYDROCHLORIDE 0.4 MG/1
1 CAPSULE ORAL DAILY
Qty: 30 CAPSULE | Refills: 1 | Status: SHIPPED | OUTPATIENT
Start: 2025-04-01

## 2025-04-10 RX ORDER — ERGOCALCIFEROL 1.25 MG/1
50000 CAPSULE, LIQUID FILLED ORAL WEEKLY
Qty: 12 CAPSULE | Refills: 0 | Status: SHIPPED | OUTPATIENT
Start: 2025-04-10

## 2025-04-28 RX ORDER — SITAGLIPTIN 100 MG/1
100 TABLET, FILM COATED ORAL DAILY
Qty: 90 TABLET | Refills: 1 | Status: SHIPPED | OUTPATIENT
Start: 2025-04-28

## 2025-04-28 NOTE — TELEPHONE ENCOUNTER
Rx Refill Note  Requested Prescriptions     Pending Prescriptions Disp Refills    SITagliptin (Januvia) 100 MG tablet [Pharmacy Med Name: JANUVIA 100 MG TABLET] 90 tablet 0     Sig: TAKE 1 TABLET BY MOUTH DAILY      Last office visit with prescribing clinician: 12/16/2024     Next office visit with prescribing clinician: 9/4/2025     Leanne Jessica MA  04/28/25, 10:54 EDT

## 2025-05-27 ENCOUNTER — PATIENT OUTREACH (OUTPATIENT)
Dept: CASE MANAGEMENT | Facility: OTHER | Age: 70
End: 2025-05-27
Payer: MEDICARE

## 2025-05-27 NOTE — OUTREACH NOTE
AMBULATORY CASE MANAGEMENT NOTE    Names and Relationships of Patient/Support Persons: Contact: Cricket, Herbert Dunaway; Relationship: Self -     Patient Outreach    Spoke with patient as a proactive outreach call. Patient stated he pulmonary and CHF symptoms are well managed. He denied questions/needs regarding medications and appointments. He has my contact information should he have future questions.     Tamie VALLADARES  Ambulatory Case Management    5/27/2025, 09:35 EDT

## 2025-05-29 RX ORDER — TAMSULOSIN HYDROCHLORIDE 0.4 MG/1
1 CAPSULE ORAL DAILY
Qty: 30 CAPSULE | Refills: 1 | Status: SHIPPED | OUTPATIENT
Start: 2025-05-29

## 2025-06-24 RX ORDER — BUMETANIDE 1 MG/1
1 TABLET ORAL DAILY
Qty: 90 TABLET | Refills: 0 | Status: SHIPPED | OUTPATIENT
Start: 2025-06-24

## 2025-06-26 ENCOUNTER — OFFICE VISIT (OUTPATIENT)
Dept: PULMONOLOGY | Facility: CLINIC | Age: 70
End: 2025-06-26
Payer: MEDICARE

## 2025-06-26 VITALS
HEART RATE: 64 BPM | DIASTOLIC BLOOD PRESSURE: 82 MMHG | SYSTOLIC BLOOD PRESSURE: 124 MMHG | HEIGHT: 64 IN | BODY MASS INDEX: 28.68 KG/M2 | WEIGHT: 168 LBS | OXYGEN SATURATION: 97 % | TEMPERATURE: 98.6 F

## 2025-06-26 DIAGNOSIS — Z87.891 FORMER SMOKER: ICD-10-CM

## 2025-06-26 DIAGNOSIS — I25.5 ISCHEMIC CARDIOMYOPATHY: ICD-10-CM

## 2025-06-26 DIAGNOSIS — G47.33 OSA (OBSTRUCTIVE SLEEP APNEA): Primary | ICD-10-CM

## 2025-06-26 DIAGNOSIS — R93.89 ABNORMAL CT OF THE CHEST: ICD-10-CM

## 2025-06-26 DIAGNOSIS — K21.9 CHRONIC GERD: ICD-10-CM

## 2025-06-26 DIAGNOSIS — J98.19 TRAPPED LUNG: ICD-10-CM

## 2025-06-26 RX ORDER — FLUTICASONE PROPIONATE 50 MCG
2 SPRAY, SUSPENSION (ML) NASAL DAILY
COMMUNITY
Start: 2025-05-16

## 2025-06-26 RX ORDER — SODIUM CHLORIDE FOR INHALATION 3 %
VIAL, NEBULIZER (ML) INHALATION
COMMUNITY
Start: 2025-06-19

## 2025-06-26 NOTE — PROGRESS NOTES
[facility] Pulmonary Follow up    CHIEF COMPLAINT    Shortness of breath, persistent cough, former tobacco use, history of recurrent pleural effusions, and gastric reflux    HISTORY OF PRESENT ILLNESS    HPI:   Herbert Harley is a 69 y.o.male followed by pulmonary regarding shortness of breath, persistent cough, former tobacco use, history of recurrent pleural effusions, and gastric reflux.    Patient establish care with our clinic in October 2024 seen by Dr. Ahuja regarding some abnormal chest imaging as he has had a recurrent left pleural effusion requiring thoracenteses twice after his CABG in March 2023 by Dr. Saavedra.  Most recent CT imaging was unrevealing.  Most recent echo did show reduced EF of 41% and grade 1 diastolic dysfunction with RSVP 26 and he is on Bumex.  Does have a history of CKD baseline creatinine is around 1.7.    He is a former smoker with a 79-pack-year history who quit back in 2022.  PFTs were unrevealing for airway obstruction but did show some mild restriction and normal DLCO.  He was trialed on Trelegy 100 but did not notice much benefit on this and is now on albuterol as needed.    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  Most recent CT imaging was unrevealing for any residual pleural fluid and consideration of a surgical decortication, although the risk of surgery may not provide enough symptomatic benefit based on his symptoms and chest imaging.    Does have a persistent cough as well as chronic reflux and does not routinely abide by reflux precautions.      He was given nebulized hypertonic saline to aid in mucus clearance.    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     Interval history:   Patient was last seen in the office by me in February with ongoing complaints of nocturnal cough as he was  not abiding by reflux precautions, however his shortness of breath had improved somewhat as he was working to increase his activity level.  His cough does remain and he again does not abide by reflux precautions.    Has was referred to Dr. Singh of sleep medicine for evaluation of possible sleep disordered breathing, however canceled the appointment.  Underwent overnight oximetry in December 2024 and was ordered supplemental oxygen to wear at nighttime as his SpO2 was less than 88% for 8 minutes total.  He has not been wearing this.  He canceled his appointment with Dr. Singh.    Continues to follow with cardiology and nephrology.  He does have a history of ischemic cardiomyopathy and most recent echocardiogram did show a reduced EF of 41% along with grade 1 diastolic dysfunction.  His nephrologist does have him on Bumex and he denies any lower extremity swelling.    He is here today with his wife.    Denies recent ED visits, hospitalizations, or exacerbations.     Denies fever, chills, night sweats, or hemoptysis. No recent sick contacts. No chest pain or palpitations. Denies lower extremity swelling or calf tenderness.     Patient Active Problem List   Diagnosis    I25.10, CABG 03/01/23    Hypertension    Hyperlipidemia LDL goal <70    Type 2 diabetes mellitus with hypoglycemia, with long-term current use of insulin    Ischemic cardiomyopathy    Allergic rhinitis    Prolonged Q-T interval on ECG    Anemia in chronic kidney disease    Chronic viral hepatitis C    Hypertensive heart disease with heart failure    Iron deficiency anemia    Long term (current) use of oral hypoglycemic drugs    Moderate protein-calorie malnutrition    Secondary hyperparathyroidism of renal origin    Chronic systolic (congestive) heart failure    Atherosclerotic heart disease of native coronary artery without angina pectoris    Abnormality of gait and mobility    Benign prostatic hyperplasia with urinary frequency    Diverticulosis     "Calculus of gallbladder without cholecystitis without obstruction    Trapped LLL    Pleural thickening LLL (Since CABG)    Chronic GERD    Former smoker    Snoring    Sensation of fullness in ear    Abnormal CT of the chest    Suspected LOUIS       Allergies   Allergen Reactions    Wellbutrin [Bupropion] Swelling     Facial swelling, severe    Singulair [Montelukast] Myalgia and Headache     Pt states it was causing vision changes.        Current Outpatient Medications:     albuterol sulfate  (90 Base) MCG/ACT inhaler, Inhale 2 puffs Every 6 (Six) Hours As Needed for Wheezing., Disp: 8 g, Rfl: 5    Aspirin Low Dose 81 MG EC tablet, TAKE 1 TABLET BY MOUTH DAILY, Disp: 90 tablet, Rfl: 3    bumetanide (BUMEX) 1 MG tablet, TAKE 1 TABLET BY MOUTH DAILY, Disp: 90 tablet, Rfl: 0    carvedilol (COREG) 3.125 MG tablet, Take 1 tablet by mouth 2 (Two) Times a Day., Disp: 60 tablet, Rfl: 11    coenzyme Q10 100 MG capsule, Take 1 capsule by mouth Daily., Disp: , Rfl:     Collagen-Vitamin C-Biotin (COLLAGEN 1500/C PO), Take 1 tablet by mouth Daily. OTC supplement, Disp: , Rfl:     dapagliflozin Propanediol (Farxiga) 10 MG tablet, Take 10 mg by mouth Daily. Indications: Type 2 Diabetes, Disp: 90 tablet, Rfl: 1    ezetimibe (ZETIA) 10 MG tablet, TAKE 1 TABLET BY MOUTH DAILY, Disp: 90 tablet, Rfl: 3    fluticasone (FLONASE) 50 MCG/ACT nasal spray, 2 sprays Daily., Disp: , Rfl:     Insulin Syringe 31G X 5/16\" 1 ML misc, USE DAILY WITH INSULIN INJECTIONS, Disp: 100 each, Rfl: 3    Januvia 100 MG tablet, TAKE 1 TABLET BY MOUTH DAILY, Disp: 90 tablet, Rfl: 1    Lantus 100 UNIT/ML injection, Inject 52 units every EVENING, increase by 2 units every 4 days for fasting goal  MX 60 units/day, Disp: 60 mL, Rfl: 2    nitroglycerin (NITROSTAT) 0.4 MG SL tablet, Place 1 tablet under the tongue Every 5 (Five) Minutes As Needed for Chest Pain. Take no more than 3 doses in 15 minutes., Disp: 25 tablet, Rfl: 3    potassium chloride ER " "(K-TAB) 20 MEQ tablet controlled-release ER tablet, TAKE 1 TABLET BY MOUTH EVERY OTHER DAY, Disp: 45 tablet, Rfl: 1    rosuvastatin (CRESTOR) 40 MG tablet, Take 1 tablet by mouth Daily., Disp: 90 tablet, Rfl: 2    tamsulosin (FLOMAX) 0.4 MG capsule 24 hr capsule, TAKE 1 CAPSULE BY MOUTH DAILY, Disp: 30 capsule, Rfl: 1    valsartan (DIOVAN) 40 MG tablet, , Disp: , Rfl:     vitamin D (ERGOCALCIFEROL) 1.25 MG (46625 UT) capsule capsule, TAKE 1 CAPSULE BY MOUTH ONCE WEEKLY, Disp: 12 capsule, Rfl: 0    sodium chloride 3 % nebulizer solution, , Disp: , Rfl:   MEDICATION LIST AND ALLERGIES REVIEWED.    Social History     Tobacco Use    Smoking status: Former     Current packs/day: 0.00     Average packs/day: 1.5 packs/day for 52.9 years (79.4 ttl pk-yrs)     Types: Cigarettes     Start date: 1970     Quit date: 2022     Years since quittin.5     Passive exposure: Past    Smokeless tobacco: Never   Vaping Use    Vaping status: Never Used   Substance Use Topics    Alcohol use: Never    Drug use: Never       FAMILY AND SOCIAL HISTORY REVIEWED.    Review of Systems   Constitutional:  Negative for chills, fatigue and fever.   Respiratory:  Positive for cough. Negative for chest tightness, shortness of breath and wheezing.    Cardiovascular:  Negative for chest pain, palpitations and leg swelling.   Skin:  Negative for color change.   Psychiatric/Behavioral:  Positive for sleep disturbance.    All other systems reviewed and are negative.    /82   Pulse 64   Temp 98.6 °F (37 °C) (Temporal)   Ht 162.6 cm (64\")   Wt 76.2 kg (168 lb)   SpO2 97% Comment: Room air at rest  BMI 28.84 kg/m²     Immunization History   Administered Date(s) Administered    Pneumococcal Conjugate 20-Valent (PCV20) 2024    Tdap 2023       Physical Exam  Vitals reviewed.   Constitutional:       General: He is not in acute distress.     Appearance: Normal appearance.   Cardiovascular:      Rate and Rhythm: Normal rate and " regular rhythm.      Pulses: Normal pulses.      Heart sounds: Normal heart sounds.   Pulmonary:      Effort: Pulmonary effort is normal. No respiratory distress.      Breath sounds: Rales (LLL) present. No wheezing or rhonchi.   Skin:     General: Skin is warm and dry.   Neurological:      General: No focal deficit present.      Mental Status: He is alert and oriented to person, place, and time.     RESULTS    PFTs:  10/2024: No airway obstruction.  Mild restriction with TLC 4.38, 78% predicted.  Normal DLCO.    2023: Severe reduction in both FEV1 and FVC with preserved FEV1/FVC ratio suggestive of a restrictive defect.    Imaging:   CT Chest Low Dose Cancer Screening WO  Result Date: 2/21/2025  Stable pulmonary nodules. No new suspicious nodule Recommendation: Continue annual screening with LDCT Lung Rads Assessment: Lung-RADS L2 - Benign appearance or <1% chance of malignancy. Electronically Signed: Gaurav Mike  2/21/2025 6:03 PM EST  Workstation ID: [id number]     CT Abdomen Pelvis With Contrast  Result Date: 9/23/2024  Impression: 1. Left lower lung airspace disease. 2. Cholelithiasis. 3. Diverticulosis. 4. Prostate hypertrophy. Electronically Signed: Carolina Dunn MD  9/23/2024 2:21 PM EDT  Workstation ID: [id number]    XR Chest 1 View  Result Date: 9/23/2024  Mild chronic interstitial changes of the lung fields without evidence of acute cardiopulmonary abnormality. Electronically Signed: Dashawn Dover MD  9/23/2024 2:16 PM EDT  Workstation ID: [id number]     PROBLEM LIST    Problem List Items Addressed This Visit       Ischemic cardiomyopathy    Overview   Echo (3/29/2014): LVEF 50-55%  Echo (12/2022): LVEF 41-45%         Trapped LLL    Relevant Medications    sodium chloride 3 % nebulizer solution    fluticasone (FLONASE) 50 MCG/ACT nasal spray    Chronic GERD    Former smoker    Abnormal CT of the chest    Suspected LOUIS - Primary    Relevant Orders    Home Sleep Study         DISCUSSION    Mr. Harley was  seen today for follow-up and is stable from a respiratory standpoint.  He initially establish care with our clinic seen by Dr. Ahuja regarding some abnormal chest imaging compatible with an entrapped left lower lobe status post thoracenteses x 2 after his CABG back in March 2023.      Does continue to have intermittent cough that is mostly nocturnal likely related to gastric reflux in the setting of his moderate hiatal hernia, however he has not been abiding by reflux precautions and symptoms are ongoing.    Underwent overnight oximetry noted to have an SpO2 of less than 88% for 8 minutes and oxygen was ordered, but he has not been wearing it.  Due to concern for sleep disordered breathing he was post to see Dr. Singh but canceled this appointment.  His symptoms are ongoing.    Shortness of breath is largely improved and he is trying to increase his activity level.    Shortness of breath  Entrapped left lower lobe  Ischemic cardiomyopathy  Patient's shortness of breath is likely multifactorial in the setting of his entrapped left lower lobe, restrictive defect on PFTs, ischemic cardiomyopathy, and physical deconditioning.  Overall has improved since his last visit.  Underwent PFTs that were unrevealing for obstruction but did show some mild restriction which could be related to his abnormal chest imaging.  Repeat annually due October 2025.  Was trialed on Trelegy 100 but did not note any benefit with this.  Continue albuterol and DuoNebs as needed.  Underwent CABG in March 2023 by Dr. Saavedra and did have recurrent left-sided pleural effusions that required thoracentesis x 2.  Serial CT imaging compatible with an entrapped left lower lobe now most compatible with atelectasis.  Discussed consideration of surgical decortication, however unsure if this would provide much symptom benefit and wishes to defer for now.  Most recent echocardiogram continue to show reduced EF of 41% with grade 1 diastolic dysfunction.   He is followed by cardiology and nephrology on Bumex daily.  Denies any lower extremity swelling.  He is euvolemic on exam today.  Body mass index is 28.84 kg/m². Physical deconditioning could be playing a role. I do recommend 30 minutes of intentional low impact exercise daily and discussed methods to achieve this at today's visit.  He is planning on joining his wife in the near future at the gym.  If above work-up is negative would consider methacholine challenge ultimately CPX test to determine the cause of the patient's shortness of breath.    Persistent cough  GERD  Moderate hiatal hernia  Patient continues to have a persistent nonproductive cough in the evenings and throughout the night  Does carry history of gastric reflux and a moderate size hiatal hernia on CT imaging  I am largely suspicious that gastric reflux is largely playing a role as he frequently snacks up until bedtime and intermittently throughout the night  Reflux precautions discussed again at length, particularly sleeping with his head of bed elevated via wedge pillow/bed blocks and being strictly n.p.o. 2-3 hours prior to bedtime and throughout the night.      Former tobacco use   Abnormal CT of the chest  Patient is a former smoker with a 79 pack year history who quit in 2022  Serial CT imaging has showed stability of the multiple pulmonary nodules, largest measuring around 8 mm in the lingula.  This was measured for a 6 mm on the prior scan, however does appear unchanged.  Repeat CT in 1 year due February 2026 based on lung cancer screening guidelines.  Continues to have some chronic atelectasis of the left lower lobe which remains unchanged.  MM phenotype     LOUIS  Nocturnal desaturation  Overnight oximetry as ordered by his cardiologist did show nocturnal hypoxemia with an SpO2 of less than 88% for 8 minutes.  Supplemental oxygen was ordered but he has not been wearing this.  Does have symptoms concerning for sleep disordered breathing and  was supposed to see Dr. Singh but canceled this appointment.  Ongoing symptoms of nonrestorative sleep, daytime somnolence, fatigue, and witnessed episodes of snoring by his spouse.  She states that he catnaps all throughout the day.  As he never wore his supplemental oxygen and his nocturnal hypoxemia was very mild, we will pursue home sleep study for further evaluation as he is adamant that he would not perform any in lab study.  Should the home sleep study yield LOUIS, I explained that this is not a benign disease process and he is agreeable to trial PAP therapy.  We also discussed alternate treatment strategies for sleep apnea including an oral mandibular adjustment appliance, surgical repair, and/or device insertion (current BMI is acceptable).    Return to clinic in 3 months with PFTs and PAP download if applicable.    I personally spent a total of 31 minutes on patient visit today including chart review, face to face with the patient obtaining the history and physical exam, review of pertinent images and tests, counseling and discussion and/or coordination of care as described above, and documentation.  Total time excludes time spent on other separate services such as performing procedures or test interpretation, if applicable.    Electronically signed by TALON Luna, 06/26/25, 4:06 PM EDT.    Please note that portions of this note were completed with a voice recognition program.      CC: Yessica Ricci DO

## 2025-06-30 RX ORDER — ERGOCALCIFEROL 1.25 MG/1
50000 CAPSULE, LIQUID FILLED ORAL WEEKLY
Qty: 12 CAPSULE | Refills: 0 | Status: SHIPPED | OUTPATIENT
Start: 2025-06-30

## 2025-07-01 NOTE — PROGRESS NOTES
Follow-up Visit      Date: 2025  Patient Name: Herbert Harley  : 1955   MRN: [id number]     Chief Complaint:    Chief Complaint   Patient presents with    Cardiomyopathy     History of Present Illness:        The patient presents for evaluation of chest pain, hiatal hernia, and hyperlipidemia.    He reports experiencing occasional bilateral chest pain, which is triggered by certain movements or positions. The pain typically subsides within a minute and is not accompanied by shortness of breath. He also reports no swelling in his feet. His physical activity is limited, with minimal walking due to the hot weather. His energy levels remain consistent, although he expresses a desire for improvement.    He has been diagnosed with a hiatal hernia and is currently addressing this issue. A sleep study is scheduled for him, as he was found to have sleep apnea during a recent visit to his healthcare provider.    He has been prescribed Repatha injections but has encountered difficulties in administering them. Despite attempts to follow the instructions, he was unable to successfully inject the medication. Consequently, he discontinued the purchase of Repatha due to its high cost. [facility].       Problem List   CARDIAC  Coronary Artery Disease:   Inferior STEMI with [facility], Dr. Carter, 3/28/2014:  EF 50%, NILE to the mid-dominant RCA, NILE to the proximal LAD  Stress test, 2023: Inferolateral ischemia,   [facility], 2023: Severe triple-vessel disease, %, RCA 80%, LAD 80%  2023 CABG x3    Myocardium:   Echo 3/2014:   EF 50-55%, RVSP 30.  Echo 2022:Stage C, HFmrEF EF 41- 45%, mild LVH  Echo 3/2023 EF 40%, RVC dilated, LA volume mildly increased  Echo, 10/31/2024: LVEF 41%, DDG 1    Valvular:   Mild calcification to aortic valve    Electrical:   NSR, LVH, PVC    Percardium:   Normal    VASCULAR  Arterial  Cerebrovascular  "disease:   Carotid Doppler: 2/2023 less than 50 %    CARDIAC RISK FACTORS  Hypertension  Diabetes  7/2023 A1C 9.5  2/2024 A1C 7.5  Dyslipidemia  2/2024   HDL 33 LDL 96  2/2025   HDL 32   3/17/2025: TC 86  HDL 29 LDL 37  Tobacco Use, Quit after 75 years    NON-CARDIAC  Asthma/COPD: Abnormal PFTs  CKD, on dialysis     SURGERIES  Tonsillectomy  CABG x3    Subjective      Review of Systems:   Review of Systems   Respiratory:  Negative for apnea, cough, choking, chest tightness, shortness of breath, wheezing and stridor.    Cardiovascular:  Negative for chest pain, palpitations and leg swelling.       Medications:     Current Outpatient Medications:     albuterol sulfate  (90 Base) MCG/ACT inhaler, Inhale 2 puffs Every 6 (Six) Hours As Needed for Wheezing., Disp: 8 g, Rfl: 5    Aspirin Low Dose 81 MG EC tablet, TAKE 1 TABLET BY MOUTH DAILY, Disp: 90 tablet, Rfl: 3    bumetanide (BUMEX) 1 MG tablet, TAKE 1 TABLET BY MOUTH DAILY, Disp: 90 tablet, Rfl: 0    carvedilol (COREG) 3.125 MG tablet, Take 1 tablet by mouth 2 (Two) Times a Day., Disp: 60 tablet, Rfl: 11    coenzyme Q10 100 MG capsule, Take 1 capsule by mouth Daily., Disp: , Rfl:     Collagen-Vitamin C-Biotin (COLLAGEN 1500/C PO), Take 1 tablet by mouth Daily. OTC supplement, Disp: , Rfl:     dapagliflozin Propanediol (Farxiga) 10 MG tablet, Take 10 mg by mouth Daily. Indications: Type 2 Diabetes, Disp: 90 tablet, Rfl: 1    ezetimibe (ZETIA) 10 MG tablet, TAKE 1 TABLET BY MOUTH DAILY, Disp: 90 tablet, Rfl: 3    fluticasone (FLONASE) 50 MCG/ACT nasal spray, 2 sprays Daily., Disp: , Rfl:     Insulin Syringe 31G X 5/16\" 1 ML misc, USE DAILY WITH INSULIN INJECTIONS, Disp: 100 each, Rfl: 3    Januvia 100 MG tablet, TAKE 1 TABLET BY MOUTH DAILY, Disp: 90 tablet, Rfl: 1    Lantus 100 UNIT/ML injection, Inject 52 units every EVENING, increase by 2 units every 4 days for fasting goal  MX 60 units/day, Disp: 60 mL, Rfl: 2    " "nitroglycerin (NITROSTAT) 0.4 MG SL tablet, Place 1 tablet under the tongue Every 5 (Five) Minutes As Needed for Chest Pain. Take no more than 3 doses in 15 minutes., Disp: 25 tablet, Rfl: 3    potassium chloride ER (K-TAB) 20 MEQ tablet controlled-release ER tablet, TAKE 1 TABLET BY MOUTH EVERY OTHER DAY, Disp: 45 tablet, Rfl: 1    rosuvastatin (CRESTOR) 40 MG tablet, Take 1 tablet by mouth Daily., Disp: 90 tablet, Rfl: 2    sodium chloride 3 % nebulizer solution, , Disp: , Rfl:     valsartan (DIOVAN) 40 MG tablet, , Disp: , Rfl:     vitamin D (ERGOCALCIFEROL) 1.25 MG (61522 UT) capsule capsule, TAKE 1 CAPSULE BY MOUTH ONCE WEEKLY, Disp: 12 capsule, Rfl: 0    tamsulosin (FLOMAX) 0.4 MG capsule 24 hr capsule, TAKE 1 CAPSULE BY MOUTH DAILY, Disp: 30 capsule, Rfl: 1    Allergies:   Allergies   Allergen Reactions    Wellbutrin [Bupropion] Swelling     Facial swelling, severe    Singulair [Montelukast] Myalgia and Headache     Pt states it was causing vision changes.        Objective     Physical Exam:  Vitals:    07/03/25 1301   BP: 124/62   BP Location: Right arm   Patient Position: Sitting   Cuff Size: Adult   Pulse: 70   SpO2: 95%   Weight: 76.2 kg (168 lb)   Height: 162.6 cm (64\")     Body mass index is 28.84 kg/m².    Constitutional:       General: Not in acute distress.     Appearance: Healthy appearance. Not in distress.     Neck:     JVP:Not elevated     Carotid artery: Normal    Pulmonary:      Effort: Pulmonary effort is normal.      Breath sounds: Normal breath sounds. No wheezing. No rhonchi. No rales.     Cardiovascular:      Normal rate. Regular rhythm. Normal S1. Normal S2.      Murmurs: There is 2/6 systolic murmur.      No gallop. No click. No rub.     Abdominal:      General: Bowel sounds are normal.      Palpations: Abdomen is soft.      Tenderness: There is no abdominal tenderness.    Extremities:     Pulses:Normal radial and pedal pulses     Edema:no edema    Smoking Cessation:   Tobacco Product " History : Patient quit smoking in 2022 after 104 PPY     Lab Review:             Lab Results   Component Value Date    GLUCOSE 224 (H) 02/10/2025    BUN 28 (H) 02/10/2025    CREATININE 1.57 (H) 02/10/2025    EGFRIFNONA 66 12/14/2021    BCR 17.8 02/10/2025    K 4.4 02/10/2025    CO2 22.0 02/10/2025    CALCIUM 9.0 02/10/2025    ALBUMIN 4.2 02/10/2025    AST 30 09/23/2024    ALT 37 09/23/2024     Lab Results   Component Value Date    WBC 7.59 09/23/2024    HGB 17.4 09/23/2024    HCT 53.2 (H) 09/23/2024    MCV 90.3 09/23/2024     09/23/2024     Lab Results   Component Value Date    TSH 2.330 04/30/2024             Assessment / Plan      Assessment and Plan:    Diagnosis Plan   1. I25.10, CABG 03/01/23        2. Hyperlipidemia LDL goal <70  Non-HDL Cholesterol Panel    Hepatic Function Panel    Lipid Panel    High Sensitivity CRP    Microalbumin / Creatinine Urine Ratio - Urine, Clean Catch      3. Ischemic cardiomyopathy        4. Primary hypertension              1. Chest pain/coronary artery disease: Likely related to previous surgery and arthritis.  - Continue current medication regimen  - [facility] work [facility] any [facility]  - Take a midday nap lasting between 30 to 45 minutes to help boost energy levels    2.  Sleep apnea.  - Scheduled for a sleep study to evaluate symptoms and potential sleep apnea    3. Hyperlipidemia.  - Unable to administer Repatha injections due to difficulty with the process  - Repeat blood work in the next few months either at [facility] or our facility  - Consider switching to another injection form administered twice a year at a healthcare facility if cholesterol levels remain high and unable to take any medication       Follow Up:       Return in about 1 year (around 7/3/2026).    Nadya Ramirez MD     Patient or patient representative verbalized consent for the use of Ambient Listening during the visit with  Nadya Ramirez MD for  chart documentation. 7/9/2025  17:57 EDT

## 2025-07-03 ENCOUNTER — OFFICE VISIT (OUTPATIENT)
Dept: CARDIOLOGY | Facility: CLINIC | Age: 70
End: 2025-07-03
Payer: MEDICARE

## 2025-07-03 VITALS
HEART RATE: 70 BPM | OXYGEN SATURATION: 95 % | DIASTOLIC BLOOD PRESSURE: 62 MMHG | BODY MASS INDEX: 28.68 KG/M2 | SYSTOLIC BLOOD PRESSURE: 124 MMHG | HEIGHT: 64 IN | WEIGHT: 168 LBS

## 2025-07-03 DIAGNOSIS — I10 PRIMARY HYPERTENSION: ICD-10-CM

## 2025-07-03 DIAGNOSIS — E78.5 HYPERLIPIDEMIA LDL GOAL <70: ICD-10-CM

## 2025-07-03 DIAGNOSIS — I25.10 CORONARY ARTERY DISEASE INVOLVING NATIVE CORONARY ARTERY OF NATIVE HEART WITHOUT ANGINA PECTORIS: Primary | ICD-10-CM

## 2025-07-03 DIAGNOSIS — I25.5 ISCHEMIC CARDIOMYOPATHY: ICD-10-CM

## 2025-07-03 PROCEDURE — 3074F SYST BP LT 130 MM HG: CPT | Performed by: INTERNAL MEDICINE

## 2025-07-03 PROCEDURE — 3078F DIAST BP <80 MM HG: CPT | Performed by: INTERNAL MEDICINE

## 2025-07-03 PROCEDURE — 99214 OFFICE O/P EST MOD 30 MIN: CPT | Performed by: INTERNAL MEDICINE

## 2025-07-08 RX ORDER — TAMSULOSIN HYDROCHLORIDE 0.4 MG/1
1 CAPSULE ORAL DAILY
Qty: 30 CAPSULE | Refills: 1 | Status: SHIPPED | OUTPATIENT
Start: 2025-07-08

## 2025-07-23 DIAGNOSIS — I50.22 CHRONIC SYSTOLIC (CONGESTIVE) HEART FAILURE: ICD-10-CM

## 2025-07-23 RX ORDER — POTASSIUM CHLORIDE 1500 MG/1
20 TABLET, EXTENDED RELEASE ORAL
Qty: 45 TABLET | Refills: 1 | Status: SHIPPED | OUTPATIENT
Start: 2025-07-23

## (undated) DEVICE — FLTR RESERV PERFUS INTERSEPT 02 STRL

## (undated) DEVICE — AVID DUAL STAGE VENOUS DRAINAGE CANNULA: Brand: AVID DUAL STAGE VENOUS DRAINAGE CANNULA

## (undated) DEVICE — SUT SILK 4/0 TIES 18IN A183H

## (undated) DEVICE — SUT SILK 0/0 CT2 18IN C027D

## (undated) DEVICE — ADAPT/Y PERFUS DLP FML/LUER COLR/CODE/CLMP 8.9AND25.4CM

## (undated) DEVICE — PK PERFUS CUST W/CARDIOPLEGIA

## (undated) DEVICE — TUBING, SUCTION, 1/4" X 10', STRAIGHT: Brand: MEDLINE

## (undated) DEVICE — EZ GLIDE AORTIC CANNULA: Brand: EDWARDS LIFESCIENCES EZ GLIDE AORTIC CANNULA

## (undated) DEVICE — MODEL BT2000 P/N 700287-012KIT CONTENTS: MANIFOLD WITH SALINE AND CONTRAST PORTS, SALINE TUBING WITH SPIKE AND HAND SYRINGE, TRANSDUCER: Brand: BT2000 AUTOMATED MANIFOLD KIT

## (undated) DEVICE — SUT PDS 1 CTX 36IN VIO PDP371T

## (undated) DEVICE — MODEL AT P65, P/N 701554-001KIT CONTENTS: HAND CONTROLLER, 3-WAY HIGH-PRESSURE STOPCOCK WITH ROTATING END AND PREMIUM HIGH-PRESSURE TUBING: Brand: ANGIOTOUCH® KIT

## (undated) DEVICE — CVR PROB ULTRASND/TRANSD W/GEL 18X120CM STRL

## (undated) DEVICE — SYS VASOVIEW HEMOPRO ENDOSCOPIC HARVST VESL

## (undated) DEVICE — AVANTI + 4F STD W/GW: Brand: AVANTI

## (undated) DEVICE — CLTH CLENS READYCLEANSE PERI CARE PK/5

## (undated) DEVICE — GLV SURG BIOGEL LTX PF 7 1/2

## (undated) DEVICE — TEMP PACING WIRE: Brand: MYO/WIRE

## (undated) DEVICE — TRAP FLD MINIVAC MEGADYNE 100ML

## (undated) DEVICE — BLD SCLPL BEAVR MINI STR 2BVL 180D LF

## (undated) DEVICE — TOWEL,OR,DSP,ST,BLUE,STD,4/PK,20PK/CS: Brand: MEDLINE

## (undated) DEVICE — Device: Brand: PERFECTCUT ROTATING AORTIC PUNCH

## (undated) DEVICE — Device

## (undated) DEVICE — 3M™ MEDIPORE™ H SOFT CLOTH SURGICAL TAPE, 2863, 3 IN X 10 YD, 12/CASE: Brand: 3M™ MEDIPORE™

## (undated) DEVICE — PK CATH CARD 10

## (undated) DEVICE — PATIENT RETURN ELECTRODE, SINGLE-USE, CONTACT QUALITY MONITORING, ADULT, WITH 9FT CORD, FOR PATIENTS WEIGING OVER 33LBS. (15KG): Brand: MEGADYNE

## (undated) DEVICE — SUT PROLN 4/0 RB1 D/A 36IN 8557H

## (undated) DEVICE — ANGIOGRAPHIC CATHETER: Brand: EXPO™

## (undated) DEVICE — DEV COMP RAD PRELUDESYNC 24CM

## (undated) DEVICE — PK HEART OPN 10

## (undated) DEVICE — PENCL ROCKRSWCH MEGADYNE W/HOLSTR 10FT SS

## (undated) DEVICE — GLV SURG BIOGEL LTX PF 8

## (undated) DEVICE — SUT PROLN 6/0 C1 D/A 30IN 8706H

## (undated) DEVICE — OASIS DRAIN, SINGLE, INLINE & ATS COMPATIBLE: Brand: OASIS

## (undated) DEVICE — ANTIBACTERIAL UNDYED BRAIDED (POLYGLACTIN 910), SYNTHETIC ABSORBABLE SUTURE: Brand: COATED VICRYL

## (undated) DEVICE — CATH DIAG EXPO .045 FL3.5 5F 100CM

## (undated) DEVICE — NDL PERC 1PRT THNWALL W/BASEPLT 18G 7CM

## (undated) DEVICE — PAD ARMBRD SURG CONVOL 7.5X20X2IN

## (undated) DEVICE — TBG PENCL TELESCP MEGADYNE SMOKE EVAC 10FT

## (undated) DEVICE — LEVEL SENSORS PADS ARE USED TO ATTACH THE LEVEL SENSORS TO A HARD SHELL RESERVOIR. INCLUDES COUPLING GEL.: Brand: TERUMO® ADVANCED PERFUSION SYSTEM 1

## (undated) DEVICE — SENSR CERBRL O2 PK/2

## (undated) DEVICE — ADULT, W/LG. BACK PAD, RADIOTRANSPARENT ELEMENT AND LEAD WIRE: Brand: DEFIBRILLATION ELECTRODES

## (undated) DEVICE — SUT PROLN 4/0 SH D/A 36IN 8521H

## (undated) DEVICE — ELECTRD BLD EZ CLN STD 2.5IN

## (undated) DEVICE — TBG SXN INTRACARD RIDGID FLUT 24F .25X13IN A/

## (undated) DEVICE — CONNECT Y INTERSEPT W/LL 3/8 X 3/8 X 3/8IN

## (undated) DEVICE — CANN VESL DLP 1WY BLNT/TP 3MM

## (undated) DEVICE — GLIDESHEATH SLENDER STAINLESS STEEL KIT: Brand: GLIDESHEATH SLENDER

## (undated) DEVICE — 12 FOOT DISPOSABLE EXTENSION CABLE WITH SAFE CONNECT / SCREW-DOWN

## (undated) DEVICE — CATHETER,URETHRAL,REDRUBBER,STERILE,22FR: Brand: MEDLINE

## (undated) DEVICE — SUT PROLN 3/0 SH D/A 36IN 8522H

## (undated) DEVICE — SUCTION CANISTER 2500CC: Brand: DEROYAL

## (undated) DEVICE — SUT SILK 2 SUTUPAK TIE 60IN SA8H 2STRAND

## (undated) DEVICE — TBG SXN RIGD MINI/SUCKER 9F 4.75IN

## (undated) DEVICE — SUT PROLN 7/0 CV BV1 24IN 8304H BX/36

## (undated) DEVICE — GW INQWIRE FC PTFE STD J/1.5 .035 260

## (undated) DEVICE — STERILE PVP: Brand: MEDLINE INDUSTRIES, INC.

## (undated) DEVICE — PK ATS CUST W CARDIOTOMY RESEVOIR

## (undated) DEVICE — CANN AORT ROOT DLP VNT 14G 7F